# Patient Record
Sex: FEMALE | Race: WHITE | NOT HISPANIC OR LATINO | Employment: OTHER | URBAN - METROPOLITAN AREA
[De-identification: names, ages, dates, MRNs, and addresses within clinical notes are randomized per-mention and may not be internally consistent; named-entity substitution may affect disease eponyms.]

---

## 2018-01-04 ENCOUNTER — HOSPITAL ENCOUNTER (OUTPATIENT)
Dept: LAB | Facility: MEDICAL CENTER | Age: 78
End: 2018-01-04
Attending: OTOLARYNGOLOGY
Payer: MEDICARE

## 2018-01-04 LAB
BUN SERPL-MCNC: 32 MG/DL (ref 8–22)
CREAT SERPL-MCNC: 0.96 MG/DL (ref 0.5–1.4)
GFR SERPL CREATININE-BSD FRML MDRD: 56 ML/MIN/1.73 M 2

## 2018-01-04 PROCEDURE — 36415 COLL VENOUS BLD VENIPUNCTURE: CPT

## 2018-01-04 PROCEDURE — 84520 ASSAY OF UREA NITROGEN: CPT

## 2018-01-04 PROCEDURE — 82565 ASSAY OF CREATININE: CPT

## 2018-01-07 ENCOUNTER — HOSPITAL ENCOUNTER (OUTPATIENT)
Dept: RADIOLOGY | Facility: MEDICAL CENTER | Age: 78
End: 2018-01-07
Attending: OTOLARYNGOLOGY
Payer: MEDICARE

## 2018-01-07 DIAGNOSIS — R42 DIZZINESS: ICD-10-CM

## 2018-01-07 PROCEDURE — 700117 HCHG RX CONTRAST REV CODE 255: Performed by: OTOLARYNGOLOGY

## 2018-01-07 PROCEDURE — 70553 MRI BRAIN STEM W/O & W/DYE: CPT

## 2018-01-07 PROCEDURE — A9577 INJ MULTIHANCE: HCPCS | Performed by: OTOLARYNGOLOGY

## 2018-01-07 RX ADMIN — GADOBENATE DIMEGLUMINE 10 ML: 529 INJECTION, SOLUTION INTRAVENOUS at 10:49

## 2018-01-25 ENCOUNTER — PHYSICAL THERAPY (OUTPATIENT)
Dept: PHYSICAL THERAPY | Facility: REHABILITATION | Age: 78
End: 2018-01-25
Attending: OTOLARYNGOLOGY
Payer: MEDICARE

## 2018-01-25 DIAGNOSIS — R42 DIZZINESS AND GIDDINESS: ICD-10-CM

## 2018-01-25 PROCEDURE — G8978 MOBILITY CURRENT STATUS: HCPCS | Mod: CK

## 2018-01-25 PROCEDURE — 97161 PT EVAL LOW COMPLEX 20 MIN: CPT

## 2018-01-25 PROCEDURE — 97535 SELF CARE MNGMENT TRAINING: CPT

## 2018-01-25 PROCEDURE — G8979 MOBILITY GOAL STATUS: HCPCS | Mod: CI

## 2018-01-25 ASSESSMENT — ENCOUNTER SYMPTOMS
PAIN SCALE: 0
PAIN SCALE AT LOWEST: 0
PAIN SCALE AT HIGHEST: 2

## 2018-01-25 NOTE — OP THERAPY EVALUATION
Outpatient Physical Therapy  VESTIBULAR EVALUATION    [facility] Physical [place] [address]  [address]  [place] [address]  Phone:  [phone]  Fax:  [phone]    Date of Evaluation: 2018    Patient: Rashmi Parra  YOB: 1940  MRN: [id number]     Referring Provider: Aliza Pemberton M.D.  [address] [place] [address]   Referring Diagnosis: Dizziness and giddiness [R42]     Time Calculation  Start time: 1334  Stop time: 1430 Time Calculation (min): 56 minutes     Physical Therapy Occurrence Codes    Date physical therapy treatment started:  18          Chief Complaint: Loss Of Balance    Visit Diagnoses     ICD-10-CM   1. Dizziness and giddiness R42         History of Present Illness:     Mechanism of injury:  Pt presents to PT with complaint of dizziness/unsteadiness that started Spring 2017.  Sudden onset in the middle of the day after a STS.  Defines it as dizziness and having to hold onto the wall. Does not note true vertigo.  Has had nausea along with it a few times. Seems to typically come on with these transitions and with head turns.  ENG and MRI WNL.  L sided SNHL.  Over time reports the dizzy 'spells' to have become less frequent in the last few months.  She does have neck pain, for which she has had good response to PT right before the dizziness started.  Finds she does have to walk slower and be more aware of head movements.  Vision has been more blurred, no recent vision exam.     Prior level of function:  Retired. Gym 2x/week, daily walks. Started using a walking stick approx 9 mo ago.     Ear problems:  Hearing loss    Sleep disturbance:  Not disrupted  Symptoms:     Current symptom ratin    At best symptom ratin    At worst symptom ratin    Progression:  Improving  Social Support:     Lives in:  One-story house    Lives with:  Spouse ( has PD. )  Diagnostic Tests:     MRI studies: normal    Treatments:     No prior  "treatments received    Patient goals:     Patient goals for therapy:  Improved balance and independence with ADLs/IADLs      Past Medical History:   Diagnosis Date   • Anesthesia 1964    PONV in 1964   • Arthritis     osteo hands & knees   • Cataract     PE IOL   • Heart burn    • Heart murmur    • Hemorrhagic disorder (CMS-HCC)     \"thin skin\"   • Hiatus hernia syndrome    • High cholesterol    • Hypertension    • Indigestion    • Pain 2015    \"painful tooth upper L jaw\"   • Psychiatric problem 2015    anxiety   • Urinary incontinence      Past Surgical History:   Procedure Laterality Date   • KNEE ARTHROPLASTY TOTAL Right 11/2/2015    Procedure: KNEE ARTHROPLASTY TOTAL;  Surgeon: Venkatesh Cardoza M.D.;  Location: SURGERY [facility];  Service:    • OTHER ORTHOPEDIC SURGERY  1993    L ankle   • CHOLECYSTECTOMY  1964   • GYN SURGERY      hysterectomy     Social History   Substance Use Topics   • Smoking status: Former Smoker     Quit date: 1/22/1993   • Smokeless tobacco: Never Used   • Alcohol use Yes      Comment: 1 glass of wine 3-4 times a week     Family and Occupational History     Social History   • Marital status:      Spouse name: N/A   • Number of children: N/A   • Years of education: N/A       Objective:    Gait:     Assessment: difficulty with concurrent head rotation, improved gait with assistive device and unable to walk tandem gait  Vestibulospinal Exam:     MCTSIB:         Firm, eyes open: within normal limits        Firm, eyes closed: within normal limits        Foam, eyes open: within normal limits        Foam, eyes closed: 3% below normal        Composite Score: within normal limits        Comments: Notable fear of falling during testing despite nearly WNL across all conditions.  Especially fearful cond 4- pt opening eyes on first trial and listing to R consistently      Dynamic Gait Index: 20/24    Fall Risk: no  Oculomotor Exam:         Details: No spontaneous nystagmus central gaze     "      Details: No spontaneous nystagmus eccentric gaze    No saccadic eye movements    Smooth pursuit present    Convergence:        Abnormal convergence 5 cm    No skew  Active Range of Motion:     Active range of motion comments: C/S rotation R= 60, L=40 deg. Painful to the L.    Limb Ataxia Exam:     Finger-to-Nose:         Intention tremor: none    Heel-to-Shin:         Intention tremor: none    Dysdiadochokinesia: none.  Strength Exam:     Upper extremities within functional limits    Comments: Hip abd/ext 4-/5  Reflex Exam:       Deep Tendon Reflexes:         Left L4 (Patellar): normal (2+)        Right L4 (Patellar): normal (2+)  Sensation Tests:     Comments: Diminished bilateral LEs in peripheral neuropathy pattern   Vestibulo-Ocular Exam:   Normal head thrust test  BPPV Exam:     Normal [place]-Hallpike    Normal roll test    Comments: Slight motion sensitivity in return to seated from L simba hallpike.         Therapeutic Treatments and Modalities:     1. Functional Training, Self Care (CPT 66992), 10 minutes, Education- goals of PT, balance systems, testing results.  Pt to schedule a vision exam to clear this system.  Also to start back to regular walks, emphasizing UE swing and trunk rotation.         Assessment:     Functional impairments:  Gait abnormality/instability, decreased utilization of VIS/vest/somato, decreased limits of stability, pain and decreased postural stability    Assessment details:  Mrs. Parra is a 77 y.o female who presents to PT with complaint of dizziness and imbalance x approx 9 months.  PT eval suggests her symptoms to be multifactorial.  Possible L sided peripheral hypofunction related to the SNHL, but also with subacute onset of peripheral neuropathy, recent vision changes and chronic OA in the neck.  Her balance testing was only slightly abnormal; however, pt demonstrates severe guarding and fear avoidance during clinic navigation.  Skilled PT services are indicated to address the  mentioned functional limitations and enhance QOL.       Barriers to therapy:  Comorbidities    Prognosis: good    Goals:     Short term goals:    - Improve L c/s rotation 10 degrees  - No dizziness with SL to sit or STS transitions  - Normalize arm swing and trunk rotation during gait.     Short term goal time span:  2-4 weeks    Long term goals:  - Improve DHI at least 15%  - All MCTSIB measures WNL  - Pt able to ambulate community without walking stick  - Indep with HEP    Long term goal time span:  6-8 weeks  Plan:     Therapy options:  Physical therapy treatment to continue    Planned therapy interventions:  Canalith Repositioning (CPT 40440), E Stim Unattended (CPT 70880), Functional Training, Self Care (CPT 12465), Neuromuscular Re-education (CPT 75963), Mechanical Traction (CPT 03922), Manual Therapy (CPT 39882), Hot or Cold Pack Therapy (CPT 42964), Therapeutic Activities (CPT 71238) and Therapeutic Exercise (CPT 07266)    Frequency:  1x week    Duration in weeks:  8    Discussed with:  Patient      Functional Limitation G-Codes and Severity Modifiers  PT Functional Assessment Tool Used: DHI  PT Functional Assessment Score: 50%  Dizziness Handicap Inventory - Physical Items Score: 14  Dizziness Handicap Inventory - Emotional Items Score: 22  Dizziness Handicap Inventory - Functional Items Score: 14  Dizziness Handicap Inventory - Total Score: 50  Current: Mobility: Current (): CK   Goal: Mobility: Goal (): CI       Referring provider co-signature:  I have reviewed this plan of care and my co-signature certifies the need for services.  Certification Dates:   From 1/25/18     To 3/22/18    Physician Signature: ________________________________ Date: ______________

## 2018-02-06 ENCOUNTER — PHYSICAL THERAPY (OUTPATIENT)
Dept: PHYSICAL THERAPY | Facility: REHABILITATION | Age: 78
End: 2018-02-06
Attending: OTOLARYNGOLOGY
Payer: MEDICARE

## 2018-02-06 DIAGNOSIS — R42 DIZZINESS AND GIDDINESS: ICD-10-CM

## 2018-02-06 PROCEDURE — 97112 NEUROMUSCULAR REEDUCATION: CPT

## 2018-02-06 PROCEDURE — 97014 ELECTRIC STIMULATION THERAPY: CPT

## 2018-02-06 NOTE — OP THERAPY DAILY TREATMENT
Outpatient Physical Therapy  DAILY TREATMENT     [facility] Physical [address]  [address]  [place] [address]  Phone:  [phone]  Fax:  [phone]    Date: 02/06/2018    Patient: Rashmi Parra  YOB: 1940  MRN: [id number]     Time Calculation  Start time: 1130  Stop time: 1226 Time Calculation (min): 56 minutes     Chief Complaint: Vertigo and Loss Of Balance    Visit #: 2    SUBJECTIVE:  Balance seems a little better when I pay attention to arm swing.  No recent dizziness.     OBJECTIVE:    Therapeutic Treatments and Modalities:     1. Neuromuscular Re-education (CPT 06595), 25 minutes    2. E Stim Unattended (CPT 75311), 15 minutes    Therapeutic Treatment and Modalities Summary:   - Ankle sways EC  - [place] sways EO  - Heel raises (bilat x 30 and on shuttle 4C x 30)   - Gait with HTs  - Gait with EC- SBA.  Slight R list    Brief manual: SOR and CTJ rotational mobs    IFC and MH x 15 min to c/s      Pain rating before treatment: 4  Pain rating after treatment: 2    ASSESSMENT:   Response to treatment: Poor gastroc strength limits ant WS and push off.  Tx well tolerated.  Decreased c/s pain following.     PLAN/RECOMMENDATIONS:   Plan for treatment: therapy treatment to continue next visit.  Planned interventions for next visit: continue with current treatment.

## 2018-02-15 ENCOUNTER — PHYSICAL THERAPY (OUTPATIENT)
Dept: PHYSICAL THERAPY | Facility: REHABILITATION | Age: 78
End: 2018-02-15
Attending: OTOLARYNGOLOGY
Payer: MEDICARE

## 2018-02-15 DIAGNOSIS — R42 DIZZINESS AND GIDDINESS: ICD-10-CM

## 2018-02-15 PROCEDURE — 97112 NEUROMUSCULAR REEDUCATION: CPT

## 2018-02-15 PROCEDURE — 97014 ELECTRIC STIMULATION THERAPY: CPT

## 2018-02-15 NOTE — OP THERAPY DAILY TREATMENT
"  Outpatient Physical Therapy  DAILY TREATMENT     [facility] Physical [address].  [address]  [place] [address]  Phone:  [phone]  Fax:  [phone]    Date: 02/15/2018    Patient: Rashmi Parra  YOB: 1940  MRN: [id number]     Time Calculation  Start time: 1059  Stop time: 1155 Time Calculation (min): 56 minutes     Chief Complaint: Vertigo; Loss Of Balance; and Neck Problem    Visit #: 3    SUBJECTIVE:  Started using HR machine at home. Sore neck from massage yesterday.     OBJECTIVE:    Therapeutic Treatments and Modalities:     1. Neuromuscular Re-education (CPT 27608), 25 minutes    2. E Stim Unattended (CPT 54538), 15 minutes    Therapeutic Treatment and Modalities Summary:   - Ankle sways EC  - [place] sways EO  - Heel raises (on shuttle 5C x 30)   - Airex balance EC standard stance  - Ball circles  - 1/2 tandem with EC    Brief manual: SOR and CTJ rotational mobs    IFC and MH x 15 min to c/s      Pain rating before treatment: 6- R side of neck.   Pain rating after treatment: 3    ASSESSMENT:   Response to treatment: Improving understanding of how to create \"just right\" balance challenge for self at home.  Less stable on L compared to R, but improved confidence in WS.      PLAN/RECOMMENDATIONS:   Plan for treatment: therapy treatment to continue next visit.  Planned interventions for next visit: continue with current treatment. Progress balance challenges, rebounder, VORx1      "

## 2018-02-22 ENCOUNTER — PHYSICAL THERAPY (OUTPATIENT)
Dept: PHYSICAL THERAPY | Facility: REHABILITATION | Age: 78
End: 2018-02-22
Attending: OTOLARYNGOLOGY
Payer: MEDICARE

## 2018-02-22 DIAGNOSIS — R42 DIZZINESS AND GIDDINESS: ICD-10-CM

## 2018-02-22 PROCEDURE — 97112 NEUROMUSCULAR REEDUCATION: CPT

## 2018-02-22 PROCEDURE — 97140 MANUAL THERAPY 1/> REGIONS: CPT

## 2018-02-22 PROCEDURE — 97014 ELECTRIC STIMULATION THERAPY: CPT

## 2018-02-22 NOTE — OP THERAPY DAILY TREATMENT
Outpatient Physical Therapy  DAILY TREATMENT     [facility] Physical [address]  [address]  [place] [address]  Phone:  [phone]  Fax:  [phone]    Date: 02/22/2018    Patient: Rashmi Parra  YOB: 1940  MRN: [id number]     Time Calculation  Start time: 1131  Stop time: 1241 Time Calculation (min): 70 minutes     Chief Complaint: Loss Of Balance and Neck Problem    Visit #: 4    SUBJECTIVE:  No new complaints.     OBJECTIVE:      Therapeutic Treatments and Modalities:     1. Neuromuscular Re-education (CPT 09546)    2. E Stim Unattended (CPT 11562), IFC and MH to c/s    3. Manual Therapy (CPT 56274), SOR, CTJ rotational mobs, DTW B UTs/LS. R1 mobs L.      Therapeutic Treatment and Modalities Summary:   - Ankle sways EC  - [place] sways EO  - Rocker board x 2 min ea way   - Airex balance with step tap  - Airex step hold with HTs  - Gait with HT  - Bkwd gait  - [place] with HHA      Time-based treatments/modalities:  Manual therapy minutes (CPT 72376): 15 minutes  Neuromusc re-ed, balance, coor, post minutes (CPT 93623): 25 minutes       Pain rating before treatment: 4  Pain rating after treatment: 2    ASSESSMENT:   Response to treatment: Initially needing UEs for rocker board, but able to improve ankle strategy to indep by the end of session.  Sometimes self limits balance work due to fear of falling. Dizziness resolved x approx 2 weeks.     PLAN/RECOMMENDATIONS:   Plan for treatment: therapy treatment to continue next visit.  Planned interventions for next visit: Pt has 1 visit RE.  Expecting d/c .

## 2018-03-01 ENCOUNTER — PHYSICAL THERAPY (OUTPATIENT)
Dept: PHYSICAL THERAPY | Facility: REHABILITATION | Age: 78
End: 2018-03-01
Attending: OTOLARYNGOLOGY
Payer: MEDICARE

## 2018-03-01 DIAGNOSIS — R42 DIZZINESS AND GIDDINESS: ICD-10-CM

## 2018-03-01 PROCEDURE — G8979 MOBILITY GOAL STATUS: HCPCS | Mod: CI

## 2018-03-01 PROCEDURE — 97014 ELECTRIC STIMULATION THERAPY: CPT

## 2018-03-01 PROCEDURE — G8980 MOBILITY D/C STATUS: HCPCS | Mod: CI

## 2018-03-01 PROCEDURE — 97530 THERAPEUTIC ACTIVITIES: CPT

## 2018-03-01 PROCEDURE — G8978 MOBILITY CURRENT STATUS: HCPCS | Mod: CI

## 2018-03-01 NOTE — OP THERAPY DAILY TREATMENT
Outpatient Physical Therapy  DAILY TREATMENT     [facility] Physical [address].  [address]  [place] [address]  Phone:  [phone]  Fax:  [phone]    Date: 03/01/2018    Patient: Rashmi Parra  YOB: 1940  MRN: [id number]     Time Calculation  Start time: 1131  Stop time: 1223 Time Calculation (min): 52 minutes     Chief Complaint: Loss Of Balance    Visit #: 5    SUBJECTIVE:  Did a new machine on Mon/Wed- heel raises in 3 positions. Not sure why, but the L knee is hurting a lot today with any lateral or pivoting motions.     OBJECTIVE:  Current objective measures: TUG= 9 sec. MCTSIB WNL.       Therapeutic Treatments and Modalities:     1. Therapeutic Activities (CPT 80562), Review of balance HEP.  Modifications for soleus strengthening vs the machine she was using.  Obj measures as above.     2. E Stim Unattended (CPT 06988), IFC and MH to c/s 15 min    3. Manual Therapy (CPT 44892), SOR, CTJ rotational mobs, DTW B UTs/LS. R1 mobs L.      Time-based treatments/modalities:  Manual therapy minutes (CPT 90393): 10 minutes  Therapeutic activity minutes (CPT 37652): 25 minutes       ASSESSMENT:   Response to treatment: Balance WNL, low fall risk. Indep with HEP.     PLAN/RECOMMENDATIONS:   Plan for treatment: no further treatment needed.  Planned interventions for next visit: non, d/c.

## 2018-03-01 NOTE — OP THERAPY DISCHARGE SUMMARY
Outpatient Physical Therapy  DISCHARGE SUMMARY NOTE      [place] [address]  [address]  [place] [address]  Phone:  [phone]  Fax:  [phone]    Date of Visit: 03/01/2018    Patient: Rashmi Parra  YOB: 1940  MRN: [id number]     Referring Provider: Aliza Pemberton M.D.  [address] [place] [address]   Referring Diagnosis Dizziness and giddiness [R42]     Physical Therapy Occurrence Codes    Date physical therapy treatment started:  1/25/18          Functional Limitation G-Codes and Severity Modifiers  PT Functional Assessment Tool Used: MCTSIB  PT Functional Assessment Score: WNL  Goal: Mobility: Goal (): CI   Discharge: Mobility: Discharge (): CI       Your patient is being discharged from Physical Therapy with the following comments:   · Goals met    Comments:  Mrs. Parra has been seen for  5 PT sessions for her dizziness and imbalance.  Symptoms appear to be related to poor posterior chain strength, decreased vestibular control, slower balance reactions.  She now shows all balance measures WNL for age and reports infrequent dizziness.  Did have an episode last week related to lack of nutrition- short lasting.  Pt is indep with HEP.     Limitations Remaining:  New onset of L knee pain x 2 days.     Recommendations:  D/C to HEP.  Pt may call with further questions.  Thank you for the referral!    Cydney Blevins, PT, DPT    Date: 3/1/2018

## 2018-04-28 ENCOUNTER — HOSPITAL ENCOUNTER (OUTPATIENT)
Dept: RADIOLOGY | Facility: MEDICAL CENTER | Age: 78
End: 2018-04-28
Attending: OBSTETRICS & GYNECOLOGY
Payer: MEDICARE

## 2018-04-28 DIAGNOSIS — Z12.31 VISIT FOR SCREENING MAMMOGRAM: ICD-10-CM

## 2018-04-28 PROCEDURE — 77067 SCR MAMMO BI INCL CAD: CPT

## 2018-12-28 ENCOUNTER — HOSPITAL ENCOUNTER (OUTPATIENT)
Dept: RADIOLOGY | Facility: MEDICAL CENTER | Age: 78
End: 2018-12-28
Attending: OBSTETRICS & GYNECOLOGY
Payer: MEDICARE

## 2018-12-28 DIAGNOSIS — Z01.812 PRE-OPERATIVE LABORATORY EXAMINATION: ICD-10-CM

## 2018-12-28 DIAGNOSIS — Z01.811 PRE-OPERATIVE RESPIRATORY EXAMINATION: ICD-10-CM

## 2018-12-28 DIAGNOSIS — Z01.810 PRE-OPERATIVE CARDIOVASCULAR EXAMINATION: ICD-10-CM

## 2018-12-28 LAB
ANION GAP SERPL CALC-SCNC: 8 MMOL/L (ref 0–11.9)
BASOPHILS # BLD AUTO: 0.3 % (ref 0–1.8)
BASOPHILS # BLD: 0.02 K/UL (ref 0–0.12)
BUN SERPL-MCNC: 22 MG/DL (ref 8–22)
CALCIUM SERPL-MCNC: 9.6 MG/DL (ref 8.5–10.5)
CHLORIDE SERPL-SCNC: 102 MMOL/L (ref 96–112)
CO2 SERPL-SCNC: 27 MMOL/L (ref 20–33)
CREAT SERPL-MCNC: 0.9 MG/DL (ref 0.5–1.4)
EKG IMPRESSION: NORMAL
EOSINOPHIL # BLD AUTO: 0.07 K/UL (ref 0–0.51)
EOSINOPHIL NFR BLD: 1.1 % (ref 0–6.9)
ERYTHROCYTE [DISTWIDTH] IN BLOOD BY AUTOMATED COUNT: 48.1 FL (ref 35.9–50)
EST. AVERAGE GLUCOSE BLD GHB EST-MCNC: 166 MG/DL
GLUCOSE SERPL-MCNC: 129 MG/DL (ref 65–99)
HBA1C MFR BLD: 7.4 % (ref 0–5.6)
HCT VFR BLD AUTO: 46.2 % (ref 37–47)
HGB BLD-MCNC: 15.2 G/DL (ref 12–16)
IMM GRANULOCYTES # BLD AUTO: 0.02 K/UL (ref 0–0.11)
IMM GRANULOCYTES NFR BLD AUTO: 0.3 % (ref 0–0.9)
LYMPHOCYTES # BLD AUTO: 1.1 K/UL (ref 1–4.8)
LYMPHOCYTES NFR BLD: 17.5 % (ref 22–41)
MCH RBC QN AUTO: 31.7 PG (ref 27–33)
MCHC RBC AUTO-ENTMCNC: 32.9 G/DL (ref 33.6–35)
MCV RBC AUTO: 96.3 FL (ref 81.4–97.8)
MONOCYTES # BLD AUTO: 0.63 K/UL (ref 0–0.85)
MONOCYTES NFR BLD AUTO: 10 % (ref 0–13.4)
NEUTROPHILS # BLD AUTO: 4.45 K/UL (ref 2–7.15)
NEUTROPHILS NFR BLD: 70.8 % (ref 44–72)
NRBC # BLD AUTO: 0 K/UL
NRBC BLD-RTO: 0 /100 WBC
PLATELET # BLD AUTO: 247 K/UL (ref 164–446)
PMV BLD AUTO: 10.2 FL (ref 9–12.9)
POTASSIUM SERPL-SCNC: 4.7 MMOL/L (ref 3.6–5.5)
RBC # BLD AUTO: 4.8 M/UL (ref 4.2–5.4)
SODIUM SERPL-SCNC: 137 MMOL/L (ref 135–145)
WBC # BLD AUTO: 6.3 K/UL (ref 4.8–10.8)

## 2018-12-28 PROCEDURE — 80048 BASIC METABOLIC PNL TOTAL CA: CPT

## 2018-12-28 PROCEDURE — 85025 COMPLETE CBC W/AUTO DIFF WBC: CPT

## 2018-12-28 PROCEDURE — 93010 ELECTROCARDIOGRAM REPORT: CPT | Performed by: INTERNAL MEDICINE

## 2018-12-28 PROCEDURE — 71046 X-RAY EXAM CHEST 2 VIEWS: CPT

## 2018-12-28 PROCEDURE — 36415 COLL VENOUS BLD VENIPUNCTURE: CPT

## 2018-12-28 PROCEDURE — 83036 HEMOGLOBIN GLYCOSYLATED A1C: CPT

## 2018-12-28 PROCEDURE — 93005 ELECTROCARDIOGRAM TRACING: CPT

## 2018-12-28 RX ORDER — POLYETHYLENE GLYCOL 3350 17 G/17G
17 POWDER, FOR SOLUTION ORAL DAILY
Status: ON HOLD | COMMUNITY
End: 2019-07-17

## 2019-01-21 ENCOUNTER — HOSPITAL ENCOUNTER (OUTPATIENT)
Facility: MEDICAL CENTER | Age: 79
End: 2019-01-21
Attending: OBSTETRICS & GYNECOLOGY | Admitting: OBSTETRICS & GYNECOLOGY
Payer: MEDICARE

## 2019-01-21 ENCOUNTER — APPOINTMENT (OUTPATIENT)
Dept: RADIOLOGY | Facility: MEDICAL CENTER | Age: 79
End: 2019-01-21
Attending: ANESTHESIOLOGY
Payer: MEDICARE

## 2019-01-21 VITALS
OXYGEN SATURATION: 92 % | WEIGHT: 171.08 LBS | BODY MASS INDEX: 29.21 KG/M2 | TEMPERATURE: 97.2 F | SYSTOLIC BLOOD PRESSURE: 110 MMHG | RESPIRATION RATE: 16 BRPM | HEIGHT: 64 IN | HEART RATE: 94 BPM | DIASTOLIC BLOOD PRESSURE: 65 MMHG

## 2019-01-21 LAB — GLUCOSE BLD-MCNC: 153 MG/DL (ref 65–99)

## 2019-01-21 PROCEDURE — 501330 HCHG SET, CYSTO IRRIG TUBING: Performed by: OBSTETRICS & GYNECOLOGY

## 2019-01-21 PROCEDURE — 160029 HCHG SURGERY MINUTES - 1ST 30 MINS LEVEL 4: Performed by: OBSTETRICS & GYNECOLOGY

## 2019-01-21 PROCEDURE — 160041 HCHG SURGERY MINUTES - EA ADDL 1 MIN LEVEL 4: Performed by: OBSTETRICS & GYNECOLOGY

## 2019-01-21 PROCEDURE — 110454 HCHG SHELL REV 250: Performed by: OBSTETRICS & GYNECOLOGY

## 2019-01-21 PROCEDURE — 700101 HCHG RX REV CODE 250

## 2019-01-21 PROCEDURE — C1771 REP DEV, URINARY, W/SLING: HCPCS | Performed by: OBSTETRICS & GYNECOLOGY

## 2019-01-21 PROCEDURE — 160035 HCHG PACU - 1ST 60 MINS PHASE I: Performed by: OBSTETRICS & GYNECOLOGY

## 2019-01-21 PROCEDURE — 501838 HCHG SUTURE GENERAL: Performed by: OBSTETRICS & GYNECOLOGY

## 2019-01-21 PROCEDURE — A4314 CATH W/DRAINAGE 2-WAY LATEX: HCPCS | Performed by: OBSTETRICS & GYNECOLOGY

## 2019-01-21 PROCEDURE — 82962 GLUCOSE BLOOD TEST: CPT

## 2019-01-21 PROCEDURE — 160046 HCHG PACU - 1ST 60 MINS PHASE II: Performed by: OBSTETRICS & GYNECOLOGY

## 2019-01-21 PROCEDURE — 500892 HCHG PACK, PERI-GYN: Performed by: OBSTETRICS & GYNECOLOGY

## 2019-01-21 PROCEDURE — A9270 NON-COVERED ITEM OR SERVICE: HCPCS | Performed by: ANESTHESIOLOGY

## 2019-01-21 PROCEDURE — 160047 HCHG PACU  - EA ADDL 30 MINS PHASE II: Performed by: OBSTETRICS & GYNECOLOGY

## 2019-01-21 PROCEDURE — 71045 X-RAY EXAM CHEST 1 VIEW: CPT

## 2019-01-21 PROCEDURE — 160009 HCHG ANES TIME/MIN: Performed by: OBSTETRICS & GYNECOLOGY

## 2019-01-21 PROCEDURE — 160048 HCHG OR STATISTICAL LEVEL 1-5: Performed by: OBSTETRICS & GYNECOLOGY

## 2019-01-21 PROCEDURE — 160025 RECOVERY II MINUTES (STATS): Performed by: OBSTETRICS & GYNECOLOGY

## 2019-01-21 PROCEDURE — 700111 HCHG RX REV CODE 636 W/ 250 OVERRIDE (IP)

## 2019-01-21 PROCEDURE — 700102 HCHG RX REV CODE 250 W/ 637 OVERRIDE(OP): Performed by: ANESTHESIOLOGY

## 2019-01-21 PROCEDURE — 160002 HCHG RECOVERY MINUTES (STAT): Performed by: OBSTETRICS & GYNECOLOGY

## 2019-01-21 PROCEDURE — 160036 HCHG PACU - EA ADDL 30 MINS PHASE I: Performed by: OBSTETRICS & GYNECOLOGY

## 2019-01-21 DEVICE — SLING TRANSOBTURATOR CURVED SYSTEM: Type: IMPLANTABLE DEVICE | Status: FUNCTIONAL

## 2019-01-21 RX ORDER — METOPROLOL TARTRATE 1 MG/ML
1 INJECTION, SOLUTION INTRAVENOUS
Status: DISCONTINUED | OUTPATIENT
Start: 2019-01-21 | End: 2019-01-21 | Stop reason: HOSPADM

## 2019-01-21 RX ORDER — HYDROMORPHONE HYDROCHLORIDE 1 MG/ML
0.4 INJECTION, SOLUTION INTRAMUSCULAR; INTRAVENOUS; SUBCUTANEOUS
Status: DISCONTINUED | OUTPATIENT
Start: 2019-01-21 | End: 2019-01-21 | Stop reason: HOSPADM

## 2019-01-21 RX ORDER — OXYCODONE HCL 5 MG/5 ML
10 SOLUTION, ORAL ORAL
Status: DISCONTINUED | OUTPATIENT
Start: 2019-01-21 | End: 2019-01-21 | Stop reason: HOSPADM

## 2019-01-21 RX ORDER — SODIUM CHLORIDE, SODIUM LACTATE, POTASSIUM CHLORIDE, CALCIUM CHLORIDE 600; 310; 30; 20 MG/100ML; MG/100ML; MG/100ML; MG/100ML
1000 INJECTION, SOLUTION INTRAVENOUS
Status: DISCONTINUED | OUTPATIENT
Start: 2019-01-21 | End: 2019-01-21 | Stop reason: HOSPADM

## 2019-01-21 RX ORDER — VANCOMYCIN HYDROCHLORIDE 500 MG/10ML
INJECTION, POWDER, LYOPHILIZED, FOR SOLUTION INTRAVENOUS
Status: COMPLETED | OUTPATIENT
Start: 2019-01-21 | End: 2019-01-21

## 2019-01-21 RX ORDER — OXYCODONE HCL 5 MG/5 ML
5 SOLUTION, ORAL ORAL
Status: DISCONTINUED | OUTPATIENT
Start: 2019-01-21 | End: 2019-01-21 | Stop reason: HOSPADM

## 2019-01-21 RX ORDER — DULOXETIN HYDROCHLORIDE 60 MG/1
60 CAPSULE, DELAYED RELEASE ORAL DAILY
Status: ON HOLD | COMMUNITY
End: 2019-08-16

## 2019-01-21 RX ORDER — BUPIVACAINE HYDROCHLORIDE AND EPINEPHRINE 2.5; 5 MG/ML; UG/ML
INJECTION, SOLUTION EPIDURAL; INFILTRATION; INTRACAUDAL; PERINEURAL
Status: DISCONTINUED | OUTPATIENT
Start: 2019-01-21 | End: 2019-01-21 | Stop reason: HOSPADM

## 2019-01-21 RX ORDER — DOCUSATE SODIUM 100 MG/1
100 CAPSULE, LIQUID FILLED ORAL
COMMUNITY
End: 2021-05-06

## 2019-01-21 RX ORDER — ONDANSETRON 2 MG/ML
4 INJECTION INTRAMUSCULAR; INTRAVENOUS
Status: DISCONTINUED | OUTPATIENT
Start: 2019-01-21 | End: 2019-01-21 | Stop reason: HOSPADM

## 2019-01-21 RX ORDER — MAGNESIUM HYDROXIDE 1200 MG/15ML
LIQUID ORAL
Status: DISCONTINUED | OUTPATIENT
Start: 2019-01-21 | End: 2019-01-21 | Stop reason: HOSPADM

## 2019-01-21 RX ORDER — ACETAMINOPHEN 500 MG
1000 TABLET ORAL ONCE
Status: DISCONTINUED | OUTPATIENT
Start: 2019-01-21 | End: 2019-01-21 | Stop reason: HOSPADM

## 2019-01-21 RX ORDER — HYDROMORPHONE HYDROCHLORIDE 1 MG/ML
0.2 INJECTION, SOLUTION INTRAMUSCULAR; INTRAVENOUS; SUBCUTANEOUS
Status: DISCONTINUED | OUTPATIENT
Start: 2019-01-21 | End: 2019-01-21 | Stop reason: HOSPADM

## 2019-01-21 RX ORDER — GENTAMICIN SULFATE 40 MG/ML
INJECTION, SOLUTION INTRAMUSCULAR; INTRAVENOUS
Status: DISCONTINUED | OUTPATIENT
Start: 2019-01-21 | End: 2019-01-21 | Stop reason: HOSPADM

## 2019-01-21 RX ORDER — CHOLECALCIFEROL (VITAMIN D3) 125 MCG
5 CAPSULE ORAL
COMMUNITY
End: 2024-01-31

## 2019-01-21 RX ORDER — PROMETHAZINE HYDROCHLORIDE 25 MG/1
12.5 SUPPOSITORY RECTAL EVERY 4 HOURS PRN
Status: DISCONTINUED | OUTPATIENT
Start: 2019-01-21 | End: 2019-01-21 | Stop reason: HOSPADM

## 2019-01-21 RX ORDER — HYDROMORPHONE HYDROCHLORIDE 1 MG/ML
0.1 INJECTION, SOLUTION INTRAMUSCULAR; INTRAVENOUS; SUBCUTANEOUS
Status: DISCONTINUED | OUTPATIENT
Start: 2019-01-21 | End: 2019-01-21 | Stop reason: HOSPADM

## 2019-01-21 RX ORDER — HYDRALAZINE HYDROCHLORIDE 20 MG/ML
5 INJECTION INTRAMUSCULAR; INTRAVENOUS
Status: DISCONTINUED | OUTPATIENT
Start: 2019-01-21 | End: 2019-01-21 | Stop reason: HOSPADM

## 2019-01-21 RX ORDER — HALOPERIDOL 5 MG/ML
1 INJECTION INTRAMUSCULAR
Status: DISCONTINUED | OUTPATIENT
Start: 2019-01-21 | End: 2019-01-21 | Stop reason: HOSPADM

## 2019-01-21 RX ORDER — DIPHENHYDRAMINE HYDROCHLORIDE 50 MG/ML
12.5 INJECTION INTRAMUSCULAR; INTRAVENOUS
Status: DISCONTINUED | OUTPATIENT
Start: 2019-01-21 | End: 2019-01-21 | Stop reason: HOSPADM

## 2019-01-21 RX ORDER — CELECOXIB 200 MG/1
200 CAPSULE ORAL ONCE
Status: DISCONTINUED | OUTPATIENT
Start: 2019-01-21 | End: 2019-01-21 | Stop reason: HOSPADM

## 2019-01-21 RX ORDER — ESTRADIOL 0.1 MG/G
CREAM VAGINAL DAILY
Status: ON HOLD | COMMUNITY
End: 2019-07-17

## 2019-01-21 RX ADMIN — Medication 2.5 MG: at 09:45

## 2019-01-21 RX ADMIN — ALBUTEROL SULFATE 2.5 MG: 2.5 SOLUTION RESPIRATORY (INHALATION) at 09:45

## 2019-01-21 ASSESSMENT — PAIN SCALES - GENERAL
PAINLEVEL_OUTOF10: 3
PAINLEVEL_OUTOF10: 0
PAINLEVEL_OUTOF10: 3
PAINLEVEL_OUTOF10: 4
PAINLEVEL_OUTOF10: 2
PAINLEVEL_OUTOF10: 3
PAINLEVEL_OUTOF10: 2
PAINLEVEL_OUTOF10: 3
PAINLEVEL_OUTOF10: 0
PAINLEVEL_OUTOF10: 0

## 2019-01-21 NOTE — PROGRESS NOTES
Patient up to bathroom after instilling 300cc of NS.  Patient voided.  Bladder Scan showed she had 139 cc in her bladder.  Replaced rausch catheter.  Gave instructions on leg bag and large rausch bag.  Patient's questions answered.    at bedside.

## 2019-01-21 NOTE — OR NURSING
Patient A+Ox4. States she has hx of emphysema.  Does nnot wear home o2.  Sats 97% on rm air pre-op. Post op rm air sats to 70's. .  Dr Williamson updated.  To give patient Albuterol Breathing TX and recheck.  Cont to monitor

## 2019-01-21 NOTE — OR NURSING
CXR done per Dr Morgan.  Awaiting plan of care,.  Patient sats on rm air after treatment 84%.  Placed back on o2 2l/nc.  Cont to monitor

## 2019-01-21 NOTE — H&P
DATE OF OPERATION:  01/21/2019    CHIEF COMPLAINT:  Prolapse including cystocele, enterocele and rectocele,   difficulty urinating, pain and urgency incontinence.    HISTORY OF PRESENT ILLNESS:  The patient is a 78-year-old G3, P3, status post   hysterectomy with BSO in 1993.  The patient presents to our office as a   referral from Eliz Braun for evaluation and treatment of pelvic support   problems.  The patient states that she has tried a pessary in the past.  She   has tried of them, they have all fallen out.  The prolapse itself is not very   bothersome, but her urinary incontinence is very bothersome.  She wants the   urinary incontinence fixed more so than the prolapse.  She does complain of   urgency incontinence and was seen by Kassidy and had urethral hypermobility and   was able to empty her bladder.  She was placed on Myrbetriq and this helped   some of the urgency and frequency, but she still has urge incontinence.  She   wears pads or depends all the time.  She denies stress urinary incontinence.    Her bowels are firm.  She takes stool softeners.  She denies any splinting.    She is not sexually active.  Her  has Parkinson's disease.  On exam,   the patient has a large cystocele and large enterocele, which bulges beyond   the introitus with Valsalva.  She has a moderate rectocele with good perineal   support.  After discussion of the treatment options, the patient would like to   proceed with low LeFort colpocleisis, transobturator tape urethral sling and   perineorrhaphy if necessary.  Risks, benefits, alternatives and procedure were   discussed with the patient in detail and she agrees to proceed.    PAST MEDICAL HISTORY:  Diabetes, irritable bowel syndrome, high blood   pressure, depression, anxiety, mitral valve disorders, history of pneumonia,   GERD.    MEDICATIONS:  Dexilant 60 mg, lovastatin 20 mg, carvedilol 6.5 mg, metformin    mg, spironolactone 25 mg, duloxetine 60 mg,  Myrbetriq 50 mg, and   supplements.    PAST SURGICAL HISTORY:  Hysterectomy with BSO in , gallbladder in ,   tonsils in 1944.    MENSTRUAL HISTORY:  The patient underwent menarche at age 11, menopause at age   53.    OBSTETRICAL HISTORY:  She has had 3 vaginal deliveries, largest weighing 6   pounds 6 ounces and was delivered in .    FAMILY HISTORY:  Her father  at age 62 from a heart attack.  Her mother    at age 46 from colon and liver cancer.  Otherwise noncontributory.    SOCIAL HISTORY:  The patient smoked a pack a day for 36 years, but quit 24   years ago.  Alcohol use, drinks a glass of wine 5-6 days a week.    ALLERGIES:  No known drug allergies.    REVIEW OF SYSTEMS:  Noncontributory.    PHYSICAL EXAMINATION:  VITAL SIGNS:  The patient's blood pressure is 90/60, her weight is 169, height   is 5 feet 3 inches, BMI is 30.  Please see EPIC vitals.  HEENT:  Within normal limits.  NECK:  Supple, without lymphadenopathy or thyromegaly.  CARDIOVASCULAR:  Regular rate and rhythm.  LUNGS:  Clear to auscultation.  BACK:  No CVA tenderness.  ABDOMEN:  Soft and nontender, nondistended.  No masses are palpable.  PELVIC:  EGBUS is without lesions.  The vagina is atrophic and there is a   bulge, which extends beyond the introitus with Valsalva consistent with a   cystocele and enterocele.  There is mild-to-moderate rectocele with good   perineal support.  There is laxity at the UV junction and she does leak with   Valsalva.  EXTREMITIES:  Benign.    ASSESSMENT:  1.  A 78-year-old G3, P3, status post total abdominal hysterectomy bilateral   salpingo-oophorectomy in .  2.  Not on hormone replacement therapy.  3.  Diabetes, high blood pressure, gastroesophageal reflux disease, anxiety   depression, irritable bowel syndrome.  4.  Pelvic support problems including large cystocele and enterocele, mild to   moderate rectocele, urgency incontinence, urgency and frequency better on   Myrbetriq, failed  treatment with pessary, vaginal atrophy, not sexually   active, and without desire to be in the future.    PLAN:  The patient is scheduled for LeFort colpocleisis, transobturator tape   urethral sling and perineorrhaphy if necessary.  Risks, benefits, alternatives   and procedure were discussed with the patient in detail and she agrees to   proceed.  Preoperative labs will be obtained.  Preoperative antibiotics will   be administered.  If she has any problems or questions, she can contact my   office.  She was given a prescription for Norco 5/325, #30; and Zofran 8 mg,   #10.       ____________________________________     Minnie Bañuelos MD    EAH / NTS    DD:  01/20/2019 16:50:49  DT:  01/20/2019 17:16:09    D#:  [id number]  Job#:  [id number]    cc: TRINITY SILVEIRA, NAKUL OROZCO MD

## 2019-01-21 NOTE — OP REPORT
DATE OF SERVICE:  01/21/2019    PREOPERATIVE DIAGNOSES:  Prolapse, cystocele, enterocele, mild rectocele,   difficulty emptying her bladder, occult stress urinary incontinence.    POSTOPERATIVE DIAGNOSES:  Prolapse, cystocele, enterocele, mild rectocele,   difficulty emptying her bladder, occult stress urinary incontinence.    PROCEDURES:  [place] colpocleisis, a transobturator tape urethral sling using   [employer] Obtryx product and cystoscopy.    SURGEON:  Minnie Bañuelos MD    ASSISTANT:  MIGUE Amaral    ANESTHESIOLOGIST:  Inderjit Felix MD    ANESTHESIA:  General LMA.    ESTIMATED BLOOD LOSS:  20 mL.    SPECIMEN:  None.    FINDINGS AT THE TIME OF SURGERY:  Exam under anesthesia reveals a large   enterocele protruding beyond the introitus and a moderate-to-large cystocele   included in this prolapse.  There is a mild rectocele noted and good perineal   support.  Post-procedure cystoscopy was normal with good efflux of urine   bilaterally and no interruption in the bladder mucosa.  Excellent support was   achieved postoperatively.    COMPLICATIONS:  None.    TECHNIQUE:  Patient was taken to the operating room where general anesthesia   was placed.  She was then placed in the [facility] with excellent   positioning, prepped and draped in the normal sterile fashion.  The apex of   the vagina was grasped with 2 Allis clamps and tension applied.  The anterior   wall of the vagina rectangle was outlined in this area as well as posteriorly   0.25% Marcaine with epinephrine was then injected into the vaginal mucosa,   especially in and around that rectangular area.  The same was done   posteriorly.  The scalpel was then used to outline the rectangles both   anteriorly and posteriorly and Metzenbaum scissors were then used to dissect   the vaginal mucosa off the underlying tissue inside of this rectangular area,   both anteriorly and posteriorly.  Once this was complete, the 0 Vicryl was    then used to imbricate the prolapse up board starting at the angles and in an   anterior to posterior fashion imbricating the tissue upward and approximating   the 2 raw surface areas made anteriorly and posteriorly.  Once the mucosa   matched up at the top, I then used 0 Vicryl to reapproximate the vaginal   mucosa.  Excellent reduction in the prolapse was achieved.  Attention was then   turned to the transobturator tape urethral sling procedure.  An Allis clamp   was used to grasp the vaginal mucosa underlying the mid urethra and 0.25%   Marcaine with epinephrine was then injected into this area and out laterally   on each side.  A 1 cm incision was made with the scalpel and Metzenbaum   scissors were then used to dissect out laterally on each side, allowing entry   of my pinky finger just behind the pubic ramus.  The patient's legs were put   in a low lithotomy position.  The adductor insertion was palpated.  A cammy was   made 1 cm below on each side, 0.25% Marcaine with epinephrine was then   injected into this area and the Gaff needle was introduced through the lateral   incision through the transobturator fascia around the pubic ramus touching my   pinky finger and brought through periurethrally on each side.  The Obtryx   graft was then attached to these Gaff needles and brought out through the   lateral incisions, creating a hammock effect below the mid urethra.  An 8 mm   dilator was then placed above the graft and below the urethra to ensure   tension free positioning.  The plastic sheath was then removed from the graft   and the graft was noted to be underlying the mid urethra in a tension freeway.    Irrigation was then performed.  Surgiflo was placed into the dissected areas   and 3-0 Vicryl was used to close the vaginal mucosa in a running fashion.    Excellent reapproximation was achieved.  Irrigation was performed.  There was   no bleeding.  Cystoscopic examination of the bladder revealed a normal  bladder   with good efflux of urine bilaterally and no interruption in the bladder   mucosa.  The cystoscope was removed.  Melissa catheter replaced.  The lateral   incisions from the TOT were reapproximated with Dermabond.  Excellent   reapproximation was achieved.  There was no reason for a perineorrhaphy as the   patient had excellent support and so this was not performed.  The patient   tolerated the procedure well and was brought to recovery room in stable   condition.       ____________________________________     MD BRIDGETTE Romero / KHURRAM    DD:  01/21/2019 09:03:03  DT:  01/21/2019 09:21:27    D#:  [id number]  Job#:  [id number]

## 2019-01-21 NOTE — OR SURGEON
Immediate Post OP Note    PreOp Diagnosis: Prolapse, cystocele, enterocele, mild rectocele, difficulty emptying bladder, occult JANUARY    PostOp Diagnosis: Same    Procedure(s):  [place] COLPOCLESIS - Wound Class: Clean Contaminated  BLADDER SLING FEMALE- TOT - Wound Class: Clean Contaminated  CYSTOSCOPY    Surgeon(s):  Minnie Bañuelos M.D.    Anesthesiologist/Type of Anesthesia:  Anesthesiologist: Inderjit Felix M.D./General    Surgical Staff:  Assistant: Marshall Lambert R.N.  Circulator: Cholo Watkins R.N.; Any Henderson R.N.  Scrub Person: Neisha Lo    Specimens removed if any:  * No specimens in log *    Estimated Blood Loss: 20 CC    Findings: Large enterocele protruding beyond the introitus, large cystocele, mild rectocele, great support achieved with colopocleisis, cystoscopy normal postop, no perneorrhaphy needed postop    Complications: None        1/21/2019 8:53 AM Minnie Bañuelos M.D.

## 2019-01-21 NOTE — OR NURSING
Patient sats mid 90's on room air.  Dr Felix updated.  Patient states she feels better.  Still some soreness in lower abd but it is tolerable.  Plan to discharge home ok with MD.

## 2019-01-21 NOTE — DISCHARGE INSTRUCTIONS
Surgery after care  Refer to this sheet in the next few weeks. These instructions provide you with information on caring for yourself after your procedure. Your health care provider may also give you more specific instructions. Your treatment has been planned according to current medical practices, but problems sometimes occur. Call your health care provider if you have any problems or questions after your procedure.  WHAT TO EXPECT AFTER THE PROCEDURE   After your procedure, it is typical to have the following:  · Vaginal discomfort that can be relieved by pain medicines.  · Vaginal spotting.  HOME CARE INSTRUCTIONS   · Follow your health care provider's instructions regarding diet, rest, driving, and general activities.  · Do not lift anything over 5 lb (2.3 kg) until your health care provider approves.  · Only take over-the-counter or prescription medicines as directed by your health care provider.  · Do not take aspirin. It can cause bleeding.  · You may take a laxative as directed by your health care provider.  · You may take warm sitz baths 2-3 times a day to reduce swelling and discomfort.  ¨ Fill the bathtub half full with warm water.  ¨ Sit in the water and open the drain a little.  ¨ Turn on the warm water to keep the tub half full. Keep the water running constantly.  ¨ Soak in the water for 15-20 minutes.  ¨ After the sitz bath, pat the affected area dry first.  · Follow up with your health care provider as directed.  SEEK MEDICAL CARE IF:   · You have a fever.  · You begin to bleed from the wound area.  · You develop bloody or painful urination.  · You develop abdominal pain.  · You have increasing pain in the affected area.  · You have redness or swelling in the affected area.  · You have heavy drainage or pus coming from the affected area.  · You develop a rash.  · You think the stitches (sutures) in your wound are breaking.  · You have nausea or diarrhea.  · You become constipated.  SEEK  IMMEDIATE MEDICAL CARE IF:   · You develop shortness of breath.  · You develop leg or chest pain.  · You faint.     This information is not intended to replace advice given to you by your health care provider. Make sure you discuss any questions you have with your health care provider.   Melissa Catheter Care, Adult  A Melissa catheter is a soft, flexible tube that is placed into the bladder to drain urine. A Melissa catheter may be inserted if:  · You leak urine or are not able to control when you urinate (urinary incontinence).  · You are not able to urinate when you need to (urinary retention).  · You had prostate surgery or surgery on the genitals.  · You have certain medical conditions, such as multiple sclerosis, dementia, or a spinal cord injury.  If you are going home with a Melissa catheter in place, follow the instructions below.  TAKING CARE OF THE CATHETER  1. Wash your hands with soap and water.  2. Using mild soap and warm water on a clean washcloth:  ¨ Clean the area on your body closest to the catheter insertion site using a circular motion, moving away from the catheter. Never wipe toward the catheter because this could sweep bacteria up into the urethra and cause infection.  ¨ Remove all traces of soap. Pat the area dry with a clean towel. For males, reposition the foreskin.  3. Attach the catheter to your leg so there is no tension on the catheter. Use adhesive tape or a leg strap. If you are using adhesive tape, remove any sticky residue left behind by the previous tape you used.  4. Keep the drainage bag below the level of the bladder, but keep it off the floor.  5. Check throughout the day to be sure the catheter is working and urine is draining freely. Make sure the tubing does not become kinked.  6. Do not pull on the catheter or try to remove it. Pulling could damage internal tissues.  TAKING CARE OF THE DRAINAGE BAGS  You will be given two drainage bags to take home. One is a large overnight  drainage bag, and the other is a smaller leg bag that fits underneath clothing. You may wear the overnight bag at any time, but you should never wear the smaller leg bag at night. Follow the instructions below for how to empty, change, and clean your drainage bags.  Emptying the Drainage Bag  You must empty your drainage bag when it is  -½ full or at least 2-3 times a day.  1. Wash your hands with soap and water.  2. Keep the drainage bag below your hips, below the level of your bladder. This stops urine from going back into the tubing and into your bladder.  3. Hold the dirty bag over the toilet or a clean container.  4. Open the pour spout at the bottom of the bag and empty the urine into the toilet or container. Do not let the pour spout touch the toilet, container, or any other surface. Doing so can place bacteria on the bag, which can cause an infection.  5. Clean the pour spout with a gauze pad or cotton ball that has rubbing alcohol on it.  6. Close the pour spout.  7. Attach the bag to your leg with adhesive tape or a leg strap.  8. Wash your hands well.  Changing the Drainage Bag  Change your drainage bag once a month or sooner if it starts to smell bad or look dirty. Below are steps to follow when changing the drainage bag.  1. Wash your hands with soap and water.  2. Pinch off the rubber catheter so that urine does not spill out.  3. Disconnect the catheter tube from the drainage tube at the connection valve. Do not let the tubes touch any surface.  4. Clean the end of the catheter tube with an alcohol wipe. Use a different alcohol wipe to clean the end of the drainage tube.  5. Connect the catheter tube to the drainage tube of the clean drainage bag.  6. Attach the new bag to the leg with adhesive tape or a leg strap. Avoid attaching the new bag too tightly.  7. Wash your hands well.  Cleaning the Drainage Bag  1. Wash your hands with soap and water.  2. Wash the bag in warm, soapy water.  3. Rinse the  bag thoroughly with warm water.  4. Fill the bag with a solution of white vinegar and water (1 cup vinegar to 1 qt warm water [.2 L vinegar to 1 L warm water]). Close the bag and soak it for 30 minutes in the solution.  5. Rinse the bag with warm water.  6. Hang the bag to dry with the pour spout open and hanging downward.  7. Store the clean bag (once it is dry) in a clean plastic bag.  8. Wash your hands well.  PREVENTING INFECTION  · Wash your hands before and after handling your catheter.  · Take showers daily and wash the area where the catheter enters your body. Do not take baths. Replace wet leg straps with dry ones, if this applies.  · Do not use powders, sprays, or lotions on the genital area. Only use creams, lotions, or ointments as directed by your caregiver.  · For females, wipe from front to back after each bowel movement.  · Drink enough fluids to keep your urine clear or pale yellow unless you have a fluid restriction.  · Do not let the drainage bag or tubing touch or lie on the floor.  · Wear cotton underwear to absorb moisture and to keep your .  SEEK MEDICAL CARE IF:   · Your urine is cloudy or smells unusually bad.  · Your catheter becomes clogged.  · You are not draining urine into the bag or your bladder feels full.  · Your catheter starts to leak.  SEEK IMMEDIATE MEDICAL CARE IF:   · You have pain, swelling, redness, or pus where the catheter enters the body.  · You have pain in the abdomen, legs, lower back, or bladder.  · You have a fever.  · You see blood fill the catheter, or your urine is pink or red.  · You have nausea, vomiting, or chills.  · Your catheter gets pulled out.  MAKE SURE YOU:   · Understand these instructions.  · Will watch your condition.  · Will get help right away if you are not doing well or get worse.     This information is not intended to replace advice given to you by your health care provider. Make sure you discuss any questions you have with your health  care provider.     Document Released: 12/18/2006 Document Revised: 05/04/2015 Document Reviewed: 12/09/2013  [employer] Interactive Patient Education ©2016 [employer]      ACTIVITY: Rest and take it easy for the first 24 hours.  A responsible adult is recommended to remain with you during that time.  It is normal to feel sleepy.  We encourage you to not do anything that requires balance, judgment or coordination.    MILD FLU-LIKE SYMPTOMS ARE NORMAL. YOU MAY EXPERIENCE GENERALIZED MUSCLE ACHES, THROAT IRRITATION, HEADACHE AND/OR SOME NAUSEA.    FOR 24 HOURS DO NOT:  Drive, operate machinery or run household appliances.  Drink beer or alcoholic beverages.   Make important decisions or sign legal documents.    SPECIAL INSTRUCTIONS: *Follow Dr. See instructions**    DIET: To avoid nausea, slowly advance diet as tolerated, avoiding spicy or greasy foods for the first day.  Add more substantial food to your diet according to your physician's instructions.  Babies can be fed formula or breast milk as soon as they are hungry.  INCREASE FLUIDS AND FIBER TO AVOID CONSTIPATION.    SURGICAL DRESSING/BATHING: *Ok to shower in 24-48 hours**    FOLLOW-UP APPOINTMENT:  A follow-up appointment should be arranged with your doctor in 1-2 weeks; call to schedule.    You should CALL YOUR PHYSICIAN if you develop:  Fever greater than 101 degrees F.  Pain not relieved by medication, or persistent nausea or vomiting.  Excessive bleeding (blood soaking through dressing) or unexpected drainage from the wound.  Extreme redness or swelling around the incision site, drainage of pus or foul smelling drainage.  Inability to urinate or empty your bladder within 8 hours.  Problems with breathing or chest pain.    You should call 911 if you develop problems with breathing or chest pain.  If you are unable to contact your doctor or surgical center, you should go to the nearest emergency room or urgent care center.  Physician's telephone #:  [phone]  Dr. Bañuelos    If any questions arise, call your doctor.  If your doctor is not available, please feel free to call the Surgical Center at [phone].  The Center is open Monday through Friday from 7AM to 7PM.  You can also call the HEALTH HOTLINE open 24 hours/day, 7 days/week and speak to a nurse at [phone], or toll free at [phone].    A registered nurse may call you a few days after your surgery to see how you are doing after your procedure.    MEDICATIONS: Resume taking daily medication.  Take prescribed pain medication with food.  If no medication is prescribed, you may take non-aspirin pain medication if needed.  PAIN MEDICATION CAN BE VERY CONSTIPATING.  Take a stool softener or laxative such as senokot, pericolace, or milk of magnesia if needed.    Prescription given for pain medication at home..    If your physician has prescribed pain medication that includes Acetaminophen (Tylenol), do not take additional Acetaminophen (Tylenol) while taking the prescribed medication.    Depression / Suicide Risk    As you are discharged from this [facility] facility, it is important to learn how to keep safe from harming yourself.    Recognize the warning signs:  · Abrupt changes in personality, positive or negative- including increase in energy   · Giving away possessions  · Change in eating patterns- significant weight changes-  positive or negative  · Change in sleeping patterns- unable to sleep or sleeping all the time   · Unwillingness or inability to communicate  · Depression  · Unusual sadness, discouragement and loneliness  · Talk of wanting to die  · Neglect of personal appearance   · Rebelliousness- reckless behavior  · Withdrawal from people/activities they love  · Confusion- inability to concentrate     If you or a loved one observes any of these behaviors or has concerns about self-harm, here's what you can do:  · Talk about it- your feelings and reasons for harming  yourself  · Remove any means that you might use to hurt yourself (examples: pills, rope, extension cords, firearm)  · Get professional help from the community (Mental Health, Substance Abuse, psychological counseling)  · Do not be alone:Call your Safe Contact- someone whom you trust who will be there for you.  · Call your local CRISIS HOTLINE 718-3219 or 928-840-4143  · Call your local Children's Mobile Crisis Response Team [place] (683) 637-0625 or www.[employer]  · Call the toll free National Suicide Prevention Hotlines   · National Suicide Prevention Lifeline 899-512-ICGS (1625)  · National Hope Line Network 800-SUICIDE (749-0201)

## 2019-01-21 NOTE — OP REPORT
DATE OF SERVICE:  01/21/2019    PREOPERATIVE DIAGNOSES:  Pelvic support problems including large enterocele,   large cystocele and mild rectocele, difficulty emptying her bladder, frequency   and urgency of urination.    POSTOPERATIVE DIAGNOSES:  Pelvic support problems including large enterocele,   large cystocele and mild rectocele, difficulty emptying her bladder, frequency   and urgency of urination.    PROCEDURE:  [place] colpocleisis and a transobturator tape urethral sling,   cystoscopy.    SURGEON:  Minnie Bañuelos MD    ASSISTANT:  PRISCA Amaral    ANESTHESIOLOGIST:  Inderjit Felix MD    ANESTHESIA:  General LMA.    ESTIMATED BLOOD LOSS:  50 mL.    FINDINGS AT THE TIME OF SURGERY:  Exam under anesthesia reveals a bulge   protruding through the introitus which is consistent with her bladder.  The   cervix is not far behind.  There is good perineal support and only a mild   rectocele.  Postprocedure, the bladder appeared normal with excellent support   and the vagina had great support, its length was reduced to 5 cm and introital   opening is 3 fingerbreadths.    TECHNIQUE:  Patient was taken to the operating room where general anesthesia   was placed.  She was then placed in the [facility] with excellent   position, prepped and draped in normal sterile fashion.  The Melissa catheter   was in place and 2 Allis clamps were used to grasp the apex of the vagina and   a rectangular area was outlined and injected with 0.25% Marcaine with   epinephrine on both the anterior and posterior wall of this prolapse.  I then   excised the vaginal mucosa inside of the rectangle on both sides and 0 Vicryl   was placed, was used to imbricate this prolapse upward and attached the 2 raw   surface areas together.  Once this was accomplished, 0 Vicryl was used to   close the vaginal mucosa at the distal end.  This reduced the prolapse nicely.    I then did a transobturator tape urethral sling in the usual  fashion and did   a cystoscopic examination which appeared normal postprocedure with excellent   support at the base of the bladder.  The cystoscope was removed and Melissa   catheter replaced.  No rectocele was necessary.  The patient tolerated the   procedure well and was brought to recovery room in stable condition.       ____________________________________     MD BRIDGETTE Romero / KHURRAM    DD:  01/21/2019 12:41:31  DT:  01/21/2019 12:52:50    D#:  [id number]  Job#:  [id number]

## 2019-07-09 ENCOUNTER — APPOINTMENT (RX ONLY)
Dept: URBAN - METROPOLITAN AREA CLINIC 4 | Facility: CLINIC | Age: 79
Setting detail: DERMATOLOGY
End: 2019-07-09

## 2019-07-09 DIAGNOSIS — L57.0 ACTINIC KERATOSIS: ICD-10-CM

## 2019-07-09 PROBLEM — D48.5 NEOPLASM OF UNCERTAIN BEHAVIOR OF SKIN: Status: ACTIVE | Noted: 2019-07-09

## 2019-07-09 PROBLEM — E78.5 HYPERLIPIDEMIA, UNSPECIFIED: Status: ACTIVE | Noted: 2019-07-09

## 2019-07-09 PROCEDURE — ? LIQUID NITROGEN

## 2019-07-09 PROCEDURE — ? BIOPSY BY SHAVE METHOD

## 2019-07-09 PROCEDURE — 17003 DESTRUCT PREMALG LES 2-14: CPT

## 2019-07-09 PROCEDURE — 11102 TANGNTL BX SKIN SINGLE LES: CPT

## 2019-07-09 PROCEDURE — 17000 DESTRUCT PREMALG LESION: CPT | Mod: 59

## 2019-07-09 ASSESSMENT — LOCATION ZONE DERM
LOCATION ZONE: NOSE
LOCATION ZONE: ARM

## 2019-07-09 ASSESSMENT — LOCATION DETAILED DESCRIPTION DERM
LOCATION DETAILED: NASAL SUPRATIP
LOCATION DETAILED: LEFT PROXIMAL DORSAL FOREARM

## 2019-07-09 ASSESSMENT — LOCATION SIMPLE DESCRIPTION DERM
LOCATION SIMPLE: NOSE
LOCATION SIMPLE: LEFT FOREARM

## 2019-07-09 NOTE — PROCEDURE: BIOPSY BY SHAVE METHOD
Render Path Notes In Note?: No
Curettage Text: The wound bed was treated with curettage after the biopsy was performed.
Hemostasis: Pressure
Silver Nitrate Text: The wound bed was treated with silver nitrate after the biopsy was performed.
Detail Level: Detailed
Biopsy Method: Dermablade
Was A Bandage Applied: Yes
Post-Care Instructions: I reviewed with the patient in detail post-care instructions. Patient is to keep the biopsy site dry overnight, and then apply bacitracin twice daily until healed. Patient may apply hydrogen peroxide soaks to remove any crusting.
Additional Anesthesia Volume In Cc (Will Not Render If 0): 0
Cryotherapy Text: The wound bed was treated with cryotherapy after the biopsy was performed.
Lab Facility: 
Wound Care: Petrolatum
Depth Of Biopsy: dermis
Anesthesia Type: 1% lidocaine with 1:100,000 epinephrine and 408mcg clindamycin/ml and a 1:10 solution of 8.4% sodium bicarbonate
Billing Type: Third-Party Bill
Lab: [id number]
Notification Instructions: Patient will be notified of biopsy results. However, patient instructed to call the office if not contacted within 2 weeks.
Electrodesiccation Text: The wound bed was treated with electrodesiccation after the biopsy was performed.
Biopsy Type: H and E
Dressing: bandage
Type Of Destruction Used: Curettage
Consent: Written consent was obtained and risks were reviewed including but not limited to scarring, infection, bleeding, scabbing, incomplete removal, nerve damage and allergy to anesthesia.
Electrodesiccation And Curettage Text: The wound bed was treated with electrodesiccation and curettage after the biopsy was performed.

## 2019-07-16 ENCOUNTER — APPOINTMENT (RX ONLY)
Dept: URBAN - METROPOLITAN AREA CLINIC 4 | Facility: CLINIC | Age: 79
Setting detail: DERMATOLOGY
End: 2019-07-16

## 2019-07-16 PROBLEM — C44.729 SQUAMOUS CELL CARCINOMA OF SKIN OF LEFT LOWER LIMB, INCLUDING HIP: Status: ACTIVE | Noted: 2019-07-16

## 2019-07-16 PROCEDURE — ? MOHS SURGERY PHONE CONSULTATION

## 2019-07-17 ENCOUNTER — APPOINTMENT (OUTPATIENT)
Dept: RADIOLOGY | Facility: MEDICAL CENTER | Age: 79
End: 2019-07-17
Attending: SURGERY
Payer: MEDICARE

## 2019-07-17 ENCOUNTER — ANESTHESIA EVENT (OUTPATIENT)
Dept: SURGERY | Facility: MEDICAL CENTER | Age: 79
End: 2019-07-17
Payer: MEDICARE

## 2019-07-17 ENCOUNTER — ANESTHESIA (OUTPATIENT)
Dept: SURGERY | Facility: MEDICAL CENTER | Age: 79
End: 2019-07-17
Payer: MEDICARE

## 2019-07-17 ENCOUNTER — HOSPITAL ENCOUNTER (OUTPATIENT)
Facility: MEDICAL CENTER | Age: 79
End: 2019-07-18
Attending: SURGERY | Admitting: SURGERY
Payer: MEDICARE

## 2019-07-17 DIAGNOSIS — C50.311 MALIGNANT NEOPLASM OF LOWER-INNER QUADRANT OF RIGHT FEMALE BREAST, UNSPECIFIED ESTROGEN RECEPTOR STATUS (HCC): ICD-10-CM

## 2019-07-17 LAB
EKG IMPRESSION: NORMAL
ERYTHROCYTE [DISTWIDTH] IN BLOOD BY AUTOMATED COUNT: 45.1 FL (ref 35.9–50)
GLUCOSE BLD-MCNC: 104 MG/DL (ref 65–99)
HCT VFR BLD AUTO: 42.1 % (ref 37–47)
HGB BLD-MCNC: 14.5 G/DL (ref 12–16)
MCH RBC QN AUTO: 32.4 PG (ref 27–33)
MCHC RBC AUTO-ENTMCNC: 34.4 G/DL (ref 33.6–35)
MCV RBC AUTO: 94.2 FL (ref 81.4–97.8)
PLATELET # BLD AUTO: 246 K/UL (ref 164–446)
PMV BLD AUTO: 11.1 FL (ref 9–12.9)
RBC # BLD AUTO: 4.47 M/UL (ref 4.2–5.4)
WBC # BLD AUTO: 6.7 K/UL (ref 4.8–10.8)

## 2019-07-17 PROCEDURE — A9270 NON-COVERED ITEM OR SERVICE: HCPCS | Performed by: ANESTHESIOLOGY

## 2019-07-17 PROCEDURE — 700105 HCHG RX REV CODE 258: Performed by: SURGERY

## 2019-07-17 PROCEDURE — 160048 HCHG OR STATISTICAL LEVEL 1-5: Performed by: SURGERY

## 2019-07-17 PROCEDURE — 85027 COMPLETE CBC AUTOMATED: CPT

## 2019-07-17 PROCEDURE — 500389 HCHG DRAIN, RESERVOIR SUCT JP 100CC: Performed by: SURGERY

## 2019-07-17 PROCEDURE — 93010 ELECTROCARDIOGRAM REPORT: CPT | Performed by: INTERNAL MEDICINE

## 2019-07-17 PROCEDURE — A9270 NON-COVERED ITEM OR SERVICE: HCPCS | Performed by: SURGERY

## 2019-07-17 PROCEDURE — 700111 HCHG RX REV CODE 636 W/ 250 OVERRIDE (IP): Performed by: ANESTHESIOLOGY

## 2019-07-17 PROCEDURE — 160041 HCHG SURGERY MINUTES - EA ADDL 1 MIN LEVEL 4: Performed by: SURGERY

## 2019-07-17 PROCEDURE — 93005 ELECTROCARDIOGRAM TRACING: CPT | Performed by: SURGERY

## 2019-07-17 PROCEDURE — 700101 HCHG RX REV CODE 250: Performed by: ANESTHESIOLOGY

## 2019-07-17 PROCEDURE — 700102 HCHG RX REV CODE 250 W/ 637 OVERRIDE(OP): Performed by: SURGERY

## 2019-07-17 PROCEDURE — 160029 HCHG SURGERY MINUTES - 1ST 30 MINS LEVEL 4: Performed by: SURGERY

## 2019-07-17 PROCEDURE — 160002 HCHG RECOVERY MINUTES (STAT): Performed by: SURGERY

## 2019-07-17 PROCEDURE — 502594 HCHG SCISSOR HANDLE, HARMONIC ACE: Performed by: SURGERY

## 2019-07-17 PROCEDURE — 501838 HCHG SUTURE GENERAL: Performed by: SURGERY

## 2019-07-17 PROCEDURE — 700101 HCHG RX REV CODE 250: Performed by: SURGERY

## 2019-07-17 PROCEDURE — 160036 HCHG PACU - EA ADDL 30 MINS PHASE I: Performed by: SURGERY

## 2019-07-17 PROCEDURE — 96374 THER/PROPH/DIAG INJ IV PUSH: CPT

## 2019-07-17 PROCEDURE — 700111 HCHG RX REV CODE 636 W/ 250 OVERRIDE (IP)

## 2019-07-17 PROCEDURE — 96376 TX/PRO/DX INJ SAME DRUG ADON: CPT

## 2019-07-17 PROCEDURE — A6402 STERILE GAUZE <= 16 SQ IN: HCPCS | Performed by: SURGERY

## 2019-07-17 PROCEDURE — 96375 TX/PRO/DX INJ NEW DRUG ADDON: CPT

## 2019-07-17 PROCEDURE — 500378 HCHG DRAIN, J-VAC ROUND 19FR: Performed by: SURGERY

## 2019-07-17 PROCEDURE — 88331 PATH CONSLTJ SURG 1 BLK 1SPC: CPT

## 2019-07-17 PROCEDURE — 700102 HCHG RX REV CODE 250 W/ 637 OVERRIDE(OP): Performed by: ANESTHESIOLOGY

## 2019-07-17 PROCEDURE — A9541 TC99M SULFUR COLLOID: HCPCS | Mod: RT

## 2019-07-17 PROCEDURE — 88307 TISSUE EXAM BY PATHOLOGIST: CPT | Mod: 59

## 2019-07-17 PROCEDURE — 82962 GLUCOSE BLOOD TEST: CPT

## 2019-07-17 PROCEDURE — 160009 HCHG ANES TIME/MIN: Performed by: SURGERY

## 2019-07-17 PROCEDURE — 88309 TISSUE EXAM BY PATHOLOGIST: CPT

## 2019-07-17 PROCEDURE — 160035 HCHG PACU - 1ST 60 MINS PHASE I: Performed by: SURGERY

## 2019-07-17 RX ORDER — LORAZEPAM 2 MG/ML
0.5 INJECTION INTRAMUSCULAR
Status: DISCONTINUED | OUTPATIENT
Start: 2019-07-17 | End: 2019-07-18 | Stop reason: HOSPADM

## 2019-07-17 RX ORDER — ONDANSETRON 2 MG/ML
4 INJECTION INTRAMUSCULAR; INTRAVENOUS
Status: DISCONTINUED | OUTPATIENT
Start: 2019-07-17 | End: 2019-07-18 | Stop reason: HOSPADM

## 2019-07-17 RX ORDER — DOCUSATE SODIUM 100 MG/1
100 CAPSULE, LIQUID FILLED ORAL
Status: DISCONTINUED | OUTPATIENT
Start: 2019-07-17 | End: 2019-07-18 | Stop reason: HOSPADM

## 2019-07-17 RX ORDER — DEXLANSOPRAZOLE 60 MG/1
60 CAPSULE, DELAYED RELEASE ORAL EVERY EVENING
Status: DISCONTINUED | OUTPATIENT
Start: 2019-07-17 | End: 2019-07-18

## 2019-07-17 RX ORDER — SODIUM CHLORIDE 9 MG/ML
INJECTION, SOLUTION INTRAVENOUS CONTINUOUS
Status: DISCONTINUED | OUTPATIENT
Start: 2019-07-17 | End: 2019-07-18 | Stop reason: HOSPADM

## 2019-07-17 RX ORDER — HALOPERIDOL 5 MG/ML
1 INJECTION INTRAMUSCULAR
Status: DISCONTINUED | OUTPATIENT
Start: 2019-07-17 | End: 2019-07-18 | Stop reason: HOSPADM

## 2019-07-17 RX ORDER — OXYCODONE HCL 5 MG/5 ML
10 SOLUTION, ORAL ORAL
Status: COMPLETED | OUTPATIENT
Start: 2019-07-17 | End: 2019-07-17

## 2019-07-17 RX ORDER — DULOXETIN HYDROCHLORIDE 60 MG/1
60 CAPSULE, DELAYED RELEASE ORAL DAILY
Status: DISCONTINUED | OUTPATIENT
Start: 2019-07-18 | End: 2019-07-18 | Stop reason: HOSPADM

## 2019-07-17 RX ORDER — AMOXICILLIN 250 MG
1 CAPSULE ORAL NIGHTLY
Status: DISCONTINUED | OUTPATIENT
Start: 2019-07-17 | End: 2019-07-18 | Stop reason: HOSPADM

## 2019-07-17 RX ORDER — SPIRONOLACTONE 25 MG/1
25 TABLET ORAL DAILY
Status: DISCONTINUED | OUTPATIENT
Start: 2019-07-18 | End: 2019-07-18 | Stop reason: HOSPADM

## 2019-07-17 RX ORDER — IBUPROFEN 200 MG
200 TABLET ORAL EVERY 6 HOURS PRN
Status: ON HOLD | COMMUNITY
End: 2022-06-15

## 2019-07-17 RX ORDER — ONDANSETRON 2 MG/ML
INJECTION INTRAMUSCULAR; INTRAVENOUS PRN
Status: DISCONTINUED | OUTPATIENT
Start: 2019-07-17 | End: 2019-07-17 | Stop reason: SURG

## 2019-07-17 RX ORDER — CARVEDILOL 6.25 MG/1
6.25 TABLET ORAL EVERY EVENING
Status: DISCONTINUED | OUTPATIENT
Start: 2019-07-18 | End: 2019-07-18 | Stop reason: HOSPADM

## 2019-07-17 RX ORDER — SODIUM CHLORIDE, SODIUM LACTATE, POTASSIUM CHLORIDE, CALCIUM CHLORIDE 600; 310; 30; 20 MG/100ML; MG/100ML; MG/100ML; MG/100ML
INJECTION, SOLUTION INTRAVENOUS CONTINUOUS
Status: DISCONTINUED | OUTPATIENT
Start: 2019-07-17 | End: 2019-07-18 | Stop reason: HOSPADM

## 2019-07-17 RX ORDER — LIDOCAINE AND PRILOCAINE 25; 25 MG/G; MG/G
CREAM TOPICAL ONCE
Status: COMPLETED | OUTPATIENT
Start: 2019-07-17 | End: 2019-07-17

## 2019-07-17 RX ORDER — ENEMA 19; 7 G/133ML; G/133ML
1 ENEMA RECTAL
Status: DISCONTINUED | OUTPATIENT
Start: 2019-07-17 | End: 2019-07-18 | Stop reason: HOSPADM

## 2019-07-17 RX ORDER — OXYCODONE HCL 5 MG/5 ML
5 SOLUTION, ORAL ORAL
Status: COMPLETED | OUTPATIENT
Start: 2019-07-17 | End: 2019-07-17

## 2019-07-17 RX ORDER — DEXAMETHASONE SODIUM PHOSPHATE 4 MG/ML
INJECTION, SOLUTION INTRA-ARTICULAR; INTRALESIONAL; INTRAMUSCULAR; INTRAVENOUS; SOFT TISSUE PRN
Status: DISCONTINUED | OUTPATIENT
Start: 2019-07-17 | End: 2019-07-17 | Stop reason: SURG

## 2019-07-17 RX ORDER — LABETALOL HYDROCHLORIDE 5 MG/ML
5 INJECTION, SOLUTION INTRAVENOUS
Status: DISCONTINUED | OUTPATIENT
Start: 2019-07-17 | End: 2019-07-18 | Stop reason: HOSPADM

## 2019-07-17 RX ORDER — IBUPROFEN 200 MG
400 TABLET ORAL EVERY 6 HOURS PRN
Status: DISCONTINUED | OUTPATIENT
Start: 2019-07-17 | End: 2019-07-18

## 2019-07-17 RX ORDER — POLYETHYLENE GLYCOL 3350 17 G/17G
17 POWDER, FOR SOLUTION ORAL DAILY
Status: ON HOLD | COMMUNITY
End: 2022-06-15

## 2019-07-17 RX ORDER — IBUPROFEN 200 MG
200 TABLET ORAL 3 TIMES DAILY
Status: DISCONTINUED | OUTPATIENT
Start: 2019-07-18 | End: 2019-07-18 | Stop reason: HOSPADM

## 2019-07-17 RX ORDER — MEPERIDINE HYDROCHLORIDE 25 MG/ML
25 INJECTION INTRAMUSCULAR; INTRAVENOUS; SUBCUTANEOUS
Status: DISCONTINUED | OUTPATIENT
Start: 2019-07-17 | End: 2019-07-18 | Stop reason: HOSPADM

## 2019-07-17 RX ORDER — HYDROMORPHONE HYDROCHLORIDE 1 MG/ML
0.4 INJECTION, SOLUTION INTRAMUSCULAR; INTRAVENOUS; SUBCUTANEOUS
Status: DISCONTINUED | OUTPATIENT
Start: 2019-07-17 | End: 2019-07-18 | Stop reason: HOSPADM

## 2019-07-17 RX ORDER — CARBOXYMETHYLCELLULOSE SODIUM 5 MG/ML
1 SOLUTION/ DROPS OPHTHALMIC PRN
Status: ON HOLD | COMMUNITY
End: 2019-08-16

## 2019-07-17 RX ORDER — OXYCODONE HYDROCHLORIDE 10 MG/1
10 TABLET ORAL
Status: DISCONTINUED | OUTPATIENT
Start: 2019-07-17 | End: 2019-07-18 | Stop reason: HOSPADM

## 2019-07-17 RX ORDER — HYDROMORPHONE HYDROCHLORIDE 1 MG/ML
0.2 INJECTION, SOLUTION INTRAMUSCULAR; INTRAVENOUS; SUBCUTANEOUS
Status: DISCONTINUED | OUTPATIENT
Start: 2019-07-17 | End: 2019-07-18 | Stop reason: HOSPADM

## 2019-07-17 RX ORDER — LOVASTATIN 20 MG/1
20 TABLET ORAL NIGHTLY
Status: DISCONTINUED | OUTPATIENT
Start: 2019-07-18 | End: 2019-07-18 | Stop reason: HOSPADM

## 2019-07-17 RX ORDER — DIPHENHYDRAMINE HYDROCHLORIDE 50 MG/ML
12.5 INJECTION INTRAMUSCULAR; INTRAVENOUS
Status: DISCONTINUED | OUTPATIENT
Start: 2019-07-17 | End: 2019-07-18 | Stop reason: HOSPADM

## 2019-07-17 RX ORDER — HYDRALAZINE HYDROCHLORIDE 20 MG/ML
5 INJECTION INTRAMUSCULAR; INTRAVENOUS
Status: DISCONTINUED | OUTPATIENT
Start: 2019-07-17 | End: 2019-07-18 | Stop reason: HOSPADM

## 2019-07-17 RX ORDER — ACETAMINOPHEN 325 MG/1
650 TABLET ORAL EVERY 6 HOURS
Status: DISCONTINUED | OUTPATIENT
Start: 2019-07-18 | End: 2019-07-18 | Stop reason: HOSPADM

## 2019-07-17 RX ORDER — LOVASTATIN 20 MG/1
20 TABLET ORAL NIGHTLY
COMMUNITY
End: 2024-01-03 | Stop reason: SDUPTHER

## 2019-07-17 RX ORDER — DOCUSATE SODIUM 100 MG/1
100 CAPSULE, LIQUID FILLED ORAL 2 TIMES DAILY
Status: DISCONTINUED | OUTPATIENT
Start: 2019-07-17 | End: 2019-07-18 | Stop reason: HOSPADM

## 2019-07-17 RX ORDER — ONDANSETRON 2 MG/ML
4 INJECTION INTRAMUSCULAR; INTRAVENOUS EVERY 4 HOURS PRN
Status: DISCONTINUED | OUTPATIENT
Start: 2019-07-17 | End: 2019-07-18 | Stop reason: HOSPADM

## 2019-07-17 RX ORDER — SODIUM CHLORIDE, SODIUM LACTATE, POTASSIUM CHLORIDE, CALCIUM CHLORIDE 600; 310; 30; 20 MG/100ML; MG/100ML; MG/100ML; MG/100ML
INJECTION, SOLUTION INTRAVENOUS CONTINUOUS
Status: ACTIVE | OUTPATIENT
Start: 2019-07-17 | End: 2019-07-18

## 2019-07-17 RX ORDER — HYDROMORPHONE HYDROCHLORIDE 1 MG/ML
0.1 INJECTION, SOLUTION INTRAMUSCULAR; INTRAVENOUS; SUBCUTANEOUS
Status: DISCONTINUED | OUTPATIENT
Start: 2019-07-17 | End: 2019-07-18 | Stop reason: HOSPADM

## 2019-07-17 RX ORDER — CARBOXYMETHYLCELLULOSE SODIUM 5 MG/ML
1 SOLUTION/ DROPS OPHTHALMIC PRN
Status: DISCONTINUED | OUTPATIENT
Start: 2019-07-17 | End: 2019-07-18

## 2019-07-17 RX ORDER — CELECOXIB 200 MG/1
CAPSULE ORAL
Status: DISPENSED
Start: 2019-07-17 | End: 2019-07-18

## 2019-07-17 RX ORDER — BUPIVACAINE HYDROCHLORIDE AND EPINEPHRINE 5; 5 MG/ML; UG/ML
INJECTION, SOLUTION EPIDURAL; INTRACAUDAL; PERINEURAL
Status: DISCONTINUED | OUTPATIENT
Start: 2019-07-17 | End: 2019-07-17 | Stop reason: HOSPADM

## 2019-07-17 RX ORDER — BISACODYL 10 MG
10 SUPPOSITORY, RECTAL RECTAL
Status: DISCONTINUED | OUTPATIENT
Start: 2019-07-17 | End: 2019-07-18 | Stop reason: HOSPADM

## 2019-07-17 RX ORDER — ALPRAZOLAM 0.25 MG/1
0.5 TABLET ORAL ONCE
Status: COMPLETED | OUTPATIENT
Start: 2019-07-17 | End: 2019-07-17

## 2019-07-17 RX ORDER — SPIRONOLACTONE 25 MG/1
25 TABLET ORAL DAILY
COMMUNITY
End: 2021-02-10

## 2019-07-17 RX ORDER — ACETAMINOPHEN 500 MG
TABLET ORAL
Status: DISPENSED
Start: 2019-07-17 | End: 2019-07-18

## 2019-07-17 RX ORDER — AMOXICILLIN 250 MG
1 CAPSULE ORAL
Status: DISCONTINUED | OUTPATIENT
Start: 2019-07-17 | End: 2019-07-18 | Stop reason: HOSPADM

## 2019-07-17 RX ORDER — OXYCODONE HYDROCHLORIDE 5 MG/1
5 TABLET ORAL
Status: DISCONTINUED | OUTPATIENT
Start: 2019-07-17 | End: 2019-07-18 | Stop reason: HOSPADM

## 2019-07-17 RX ORDER — POLYETHYLENE GLYCOL 3350 17 G/17G
1 POWDER, FOR SOLUTION ORAL 2 TIMES DAILY
Status: DISCONTINUED | OUTPATIENT
Start: 2019-07-17 | End: 2019-07-18 | Stop reason: HOSPADM

## 2019-07-17 RX ORDER — PHENYLEPHRINE HYDROCHLORIDE 10 MG/ML
INJECTION, SOLUTION INTRAMUSCULAR; INTRAVENOUS; SUBCUTANEOUS PRN
Status: DISCONTINUED | OUTPATIENT
Start: 2019-07-17 | End: 2019-07-17 | Stop reason: SURG

## 2019-07-17 RX ORDER — CEFAZOLIN SODIUM 1 G/3ML
INJECTION, POWDER, FOR SOLUTION INTRAMUSCULAR; INTRAVENOUS PRN
Status: DISCONTINUED | OUTPATIENT
Start: 2019-07-17 | End: 2019-07-17 | Stop reason: SURG

## 2019-07-17 RX ADMIN — CEFAZOLIN 2 G: 330 INJECTION, POWDER, FOR SOLUTION INTRAMUSCULAR; INTRAVENOUS at 18:41

## 2019-07-17 RX ADMIN — FENTANYL CITRATE 25 MCG: 50 INJECTION, SOLUTION INTRAMUSCULAR; INTRAVENOUS at 21:50

## 2019-07-17 RX ADMIN — PROPOFOL 100 MG: 10 INJECTION, EMULSION INTRAVENOUS at 18:47

## 2019-07-17 RX ADMIN — DEXAMETHASONE SODIUM PHOSPHATE 8 MG: 4 INJECTION, SOLUTION INTRA-ARTICULAR; INTRALESIONAL; INTRAMUSCULAR; INTRAVENOUS; SOFT TISSUE at 18:56

## 2019-07-17 RX ADMIN — FENTANYL CITRATE 50 MCG: 50 INJECTION, SOLUTION INTRAMUSCULAR; INTRAVENOUS at 20:45

## 2019-07-17 RX ADMIN — LIDOCAINE HYDROCHLORIDE 60 MG: 20 INJECTION, SOLUTION INTRAVENOUS at 18:47

## 2019-07-17 RX ADMIN — LORAZEPAM 0.5 MG: 2 INJECTION INTRAMUSCULAR; INTRAVENOUS at 22:39

## 2019-07-17 RX ADMIN — FENTANYL CITRATE 50 MCG: 50 INJECTION, SOLUTION INTRAMUSCULAR; INTRAVENOUS at 19:25

## 2019-07-17 RX ADMIN — FENTANYL CITRATE 50 MCG: 50 INJECTION, SOLUTION INTRAMUSCULAR; INTRAVENOUS at 19:55

## 2019-07-17 RX ADMIN — SODIUM CHLORIDE, POTASSIUM CHLORIDE, SODIUM LACTATE AND CALCIUM CHLORIDE: 600; 310; 30; 20 INJECTION, SOLUTION INTRAVENOUS at 18:41

## 2019-07-17 RX ADMIN — FENTANYL CITRATE 25 MCG: 50 INJECTION, SOLUTION INTRAMUSCULAR; INTRAVENOUS at 22:34

## 2019-07-17 RX ADMIN — ROCURONIUM BROMIDE 40 MG: 10 INJECTION, SOLUTION INTRAVENOUS at 18:47

## 2019-07-17 RX ADMIN — ONDANSETRON 4 MG: 2 INJECTION INTRAMUSCULAR; INTRAVENOUS at 18:56

## 2019-07-17 RX ADMIN — SUGAMMADEX 160 MG: 100 INJECTION, SOLUTION INTRAVENOUS at 20:45

## 2019-07-17 RX ADMIN — ROCURONIUM BROMIDE 20 MG: 10 INJECTION, SOLUTION INTRAVENOUS at 19:55

## 2019-07-17 RX ADMIN — ALPRAZOLAM 0.5 MG: 0.25 TABLET ORAL at 15:08

## 2019-07-17 RX ADMIN — PHENYLEPHRINE HYDROCHLORIDE 100 MCG: 10 INJECTION INTRAVENOUS at 19:00

## 2019-07-17 RX ADMIN — MIDAZOLAM HYDROCHLORIDE 2 MG: 1 INJECTION, SOLUTION INTRAMUSCULAR; INTRAVENOUS at 18:41

## 2019-07-17 RX ADMIN — FENTANYL CITRATE 25 MCG: 50 INJECTION, SOLUTION INTRAMUSCULAR; INTRAVENOUS at 21:42

## 2019-07-17 RX ADMIN — FENTANYL CITRATE 25 MCG: 50 INJECTION, SOLUTION INTRAMUSCULAR; INTRAVENOUS at 22:11

## 2019-07-17 RX ADMIN — FENTANYL CITRATE 50 MCG: 50 INJECTION, SOLUTION INTRAMUSCULAR; INTRAVENOUS at 19:00

## 2019-07-17 RX ADMIN — OXYCODONE HYDROCHLORIDE 10 MG: 5 SOLUTION ORAL at 21:56

## 2019-07-17 RX ADMIN — FENTANYL CITRATE 50 MCG: 50 INJECTION, SOLUTION INTRAMUSCULAR; INTRAVENOUS at 18:47

## 2019-07-17 RX ADMIN — LIDOCAINE AND PRILOCAINE 1 APPLICATION: 25; 25 CREAM TOPICAL at 15:07

## 2019-07-17 ASSESSMENT — PAIN SCALES - GENERAL: PAIN_LEVEL: 2

## 2019-07-17 NOTE — PROGRESS NOTES
Med rec updated and complete  Allergies reviewed  Interviewed pt with  at bedside with permission from pt.  Pt had a list of medications at bedside, went over list of medications and returned list of medications back to pts .  Pt reports that she only takes her CARVEDILOL 6.25MG and METFORMIN 500MG once a day, not twice a day.  Pt reports no antibiotics in the last 2 weeks

## 2019-07-17 NOTE — OR NURSING
preop assessment completed, all questions answered, pt educated on surgical plan of care, call light in place

## 2019-07-18 VITALS
SYSTOLIC BLOOD PRESSURE: 124 MMHG | DIASTOLIC BLOOD PRESSURE: 46 MMHG | TEMPERATURE: 97.8 F | RESPIRATION RATE: 17 BRPM | WEIGHT: 171.32 LBS | HEIGHT: 64 IN | BODY MASS INDEX: 29.25 KG/M2 | OXYGEN SATURATION: 96 % | HEART RATE: 112 BPM

## 2019-07-18 LAB — PATHOLOGY CONSULT NOTE: NORMAL

## 2019-07-18 PROCEDURE — 302135 SEQUENTIAL COMPRESSION MACHINE: Performed by: SURGERY

## 2019-07-18 PROCEDURE — A9270 NON-COVERED ITEM OR SERVICE: HCPCS | Performed by: SURGERY

## 2019-07-18 PROCEDURE — 700112 HCHG RX REV CODE 229: Performed by: SURGERY

## 2019-07-18 PROCEDURE — 700102 HCHG RX REV CODE 250 W/ 637 OVERRIDE(OP): Performed by: SURGERY

## 2019-07-18 PROCEDURE — G0378 HOSPITAL OBSERVATION PER HR: HCPCS

## 2019-07-18 RX ORDER — OXYCODONE HYDROCHLORIDE 5 MG/1
2.5-5 TABLET ORAL EVERY 4 HOURS PRN
Qty: 15 TAB | Refills: 0 | Status: SHIPPED | OUTPATIENT
Start: 2019-07-18 | End: 2019-07-25

## 2019-07-18 RX ORDER — OMEPRAZOLE 20 MG/1
40 CAPSULE, DELAYED RELEASE ORAL EVERY EVENING
Status: DISCONTINUED | OUTPATIENT
Start: 2019-07-18 | End: 2019-07-18 | Stop reason: HOSPADM

## 2019-07-18 RX ADMIN — ACETAMINOPHEN 650 MG: 325 TABLET, FILM COATED ORAL at 02:01

## 2019-07-18 RX ADMIN — ACETAMINOPHEN 650 MG: 325 TABLET, FILM COATED ORAL at 05:07

## 2019-07-18 RX ADMIN — LOVASTATIN 20 MG: 20 TABLET ORAL at 02:01

## 2019-07-18 RX ADMIN — ACETAMINOPHEN 650 MG: 325 TABLET, FILM COATED ORAL at 17:56

## 2019-07-18 RX ADMIN — IBUPROFEN 200 MG: 200 TABLET, FILM COATED ORAL at 12:31

## 2019-07-18 RX ADMIN — IBUPROFEN 200 MG: 200 TABLET, FILM COATED ORAL at 17:56

## 2019-07-18 RX ADMIN — DOCUSATE SODIUM 100 MG: 100 CAPSULE, LIQUID FILLED ORAL at 05:08

## 2019-07-18 RX ADMIN — DULOXETINE HYDROCHLORIDE 60 MG: 60 CAPSULE, DELAYED RELEASE ORAL at 05:08

## 2019-07-18 RX ADMIN — SPIRONOLACTONE 25 MG: 25 TABLET ORAL at 05:07

## 2019-07-18 RX ADMIN — OXYCODONE HYDROCHLORIDE 5 MG: 5 TABLET ORAL at 05:08

## 2019-07-18 RX ADMIN — CARVEDILOL 6.25 MG: 6.25 TABLET, FILM COATED ORAL at 02:01

## 2019-07-18 RX ADMIN — IBUPROFEN 200 MG: 200 TABLET, FILM COATED ORAL at 07:34

## 2019-07-18 RX ADMIN — ACETAMINOPHEN 650 MG: 325 TABLET, FILM COATED ORAL at 12:31

## 2019-07-18 ASSESSMENT — PATIENT HEALTH QUESTIONNAIRE - PHQ9
1. LITTLE INTEREST OR PLEASURE IN DOING THINGS: NOT AT ALL
2. FEELING DOWN, DEPRESSED, IRRITABLE, OR HOPELESS: NOT AT ALL
SUM OF ALL RESPONSES TO PHQ9 QUESTIONS 1 AND 2: 0

## 2019-07-18 NOTE — ANESTHESIA QCDR
2019 [facility] Clinical Data Registry (for Quality Improvement)     Postoperative nausea/vomiting risk protocol (Adult = 18 yrs and Pediatric 3-17 yrs)- (430 and 463)  General inhalation anesthetic (NOT TIVA) with PONV risk factors: Yes  Provision of anti-emetic therapy with at least 2 different classes of agents: Yes   Patient DID NOT receive anti-emetic therapy and reason is documented in Medical Record:  N/A    Multimodal Pain Management- (AQI59)  Patient undergoing Elective Surgery (i.e. Outpatient, or ASC, or Prescheduled Surgery prior to Hospital Admission): Yes  Use of Multimodal Pain Management, two or more drugs and/or interventions, NOT including systemic opioids: Yes   Exception: Documented allergy to multiple classes of analgesics:  N/A    PACU assessment of acute postoperative pain prior to Anesthesia Care End- Applies to Patients Age = 18- (ABG7)  Initial PACU pain score is which of the following: < 7/10  Patient unable to report pain score: N/A    Post-anesthetic transfer of care checklist/protocol to PACU/ICU- (426 and 427)  Upon conclusion of case, patient transferred to which of the following locations: PACU/Non-ICU  Use of transfer checklist/protocol: Yes  Exclusion: Service Performed in Patient Hospital Room (and thus did not require transfer): N/A    PACU Reintubation- (AQI31)  General anesthesia requiring endotracheal intubation (ETT) along with subsequent extubation in OR or PACU: Yes  Required reintubation in the PACU: No   Extubation was a planned trial documented in the medical record prior to removal of the original airway device:  N/A    Unplanned admission to ICU related to anesthesia service up through end of PACU care- (MD51)  Unplanned admission to ICU (not initially anticipated at anesthesia start time): No

## 2019-07-18 NOTE — PROGRESS NOTES
Two RN skin check completed. Incisions to left and right chest. Scab on left lower calf where mole was recently removed. All other skin clean dry and intact.

## 2019-07-18 NOTE — OR SURGEON
Immediate Post OP Note    PreOp Diagnosis: Right breast cancer clinical stage 2    PostOp Diagnosis: same    Procedure(s):  TOTAL MASTECTOMY - Wound Class: Clean with Drain  INTRA OPERATIVE SENTINEL NODE IDENTIFICATION AND BIOPSY - Wound Class: Clean with Drain  LYMPHADENECTOMY, AXILLARY - Wound Class: Clean with Drain  MASTECTOMY, MODIFIED RADICAL - Wound Class: Clean with Drain  WIDE V FLAP - Wound Class: Clean with Drain    Surgeon(s):  SOHEILA Munoz M.D.    Anesthesiologist/Type of Anesthesia:  Anesthesiologist: Julio Cesar Spear M.D./General    Surgical Staff:  Circulator: Ros Ramirez R.N.  Relief Circulator: Talia Christine R.N.  Scrub Person: Carmenelisabeth Ganelian    Specimens removed if any:  ID Type Source Tests Collected by Time Destination   A : Right axilla sentinel lymph node Tissue Lymph Node PATHOLOGY SPECIMEN Andre Shelton M.D. 7/17/2019  7:38 PM    B : Right Axillary Content Tissue Arm PATHOLOGY SPECIMEN Andre Shelton M.D. 7/17/2019  7:39 PM    C : Right Modified Radical Mastectomy Tissue Breast PATHOLOGY SPECIMEN Andre Shelton M.D. 7/17/2019  7:56 PM    D : Left Total mastectomy Tissue Breast PATHOLOGY SPECIMEN Andre Shelton M.D. 7/17/2019  8:18 PM        Estimated Blood Loss: 300    Findings: neg SLN    Complications: 0        7/17/2019 9:10 PM Andre Shelton M.D.

## 2019-07-18 NOTE — OP REPORT
DATE OF SERVICE:  07/17/2019    PREOPERATIVE DIAGNOSIS:  Carcinoma of the right breast, clinical stage II.    POSTOPERATIVE DIAGNOSIS:  Carcinoma of the right breast, clinical stage II.    OPERATIONS:  1.  Intraoperative identification of right axillary sentinel lymph node with   biopsy.  2.  Right modified radical mastectomy.  3.  Left total mastectomy.  4.  Bilateral Y-V advancement flaps, approximately 140 sq cm on the right and   120 sq cm on the left.    SURGEON:  Andre Shelton MD    ANESTHESIOLOGIST:  Julio Cesar Spear MD    ANESTHESIA:  General anesthesia.    ASSISTANT:  Donna Warren MD    OPERATIVE NOTE:  The patient, 79 years of age, presented with a mass in the   right breast.  She underwent radiologic evaluation, which was inconclusive.    She presented to my office.  She did have some mild erythema and tenderness   overlying the mass, which was in the lower inner quadrant of the right breast   in the juxtasternal location.  Office ultrasound demonstrated a slight   hypoechoic irregularity underlying the mass, which appeared to extend up close   to the skin.  I felt this might represent a sebaceous cyst, but could not   rule out malignant pathology.  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  Patient did have it sent for further molecular   subtyping and I believe it was HER-2/liz negative.  The lesion was at least 4   cm in maximum diameter and it did extend to surgical margin.  Patient was   carefully counseled regarding options of care.  We did await the results of   the HER-2/liz test, which came back negative.  The patient was therefore felt   to be a candidate to proceed to surgical therapy.  She did consult with   plastic surgery, but ultimately declined reconstruction.  She initially wanted   bilateral  mastectomies, but after counseling agreed to proceed with a right   mastectomy with sentinel lymph node biopsy.  However, after considering her   options, her desire for symmetry was overriding, and when she presented on the   day of surgery, she again requested that bilateral mastectomies be performed.    The patient did undergo preoperative injection with technetium sulfur   colloid in the right periareolar area.  After a period for incubation to   occur, the patient was felt to be a candidate to proceed to surgical therapy.    She did undergo preoperative evaluation, which was satisfactory to proceed.    She had the risks, possible complications of operation explained to her in   detail.  We discussed also plastic surgical oncoplastic reconstruction with a   Y-V advancement flap to reduce the posterior axillary fold and she consented   to that.  The patient received prophylactic antibiotics, had sequential   stockings applied as antiembolism prophylaxis and was taken to the operating   room.  There, she was placed under anesthesia by Dr. Spear.  Her breasts   were prepped with ChloraPrep solution.  Sterile drapes were applied and a   timeout was affected.  The right axilla was interrogated with the navigator   and appeared to harbor a sentinel lymph node.  Patient had incisions made on   the chest encompassing the excisional biopsy on the right.  This was an   ellipse broadly around the nipple and then a Y-V advancement flap was also   created in the posterior axillary fold.  The patient had incisions made and   carried down through skin and subcutaneous tissue.  Then, she had   circumferential flaps made using countertraction electrocautery superiorly.    Dissection was carried up into the infraclavicular location of the pectoralis   muscle, medially to the lateral border of the sternum, inferiorly to the   rectus sheath and laterally to the latissimus dorsi.  Patient had the breast   then reflected off the  chest wall from medial to lateral.  The axilla was   opened on the right.  It was interrogated and a likely location of a sentinel   lymph node was identified.  This was dissected, re-interrogated and then   ultimately elevated and excised.  The tissue that was excised did contain a   sentinel lymph node, which was isolated and given to pathology for frozen   section.  That returned negative.  The remaining tissue was sent as axillary   contents.  Some of the axillary tail of the breast also harbored lymph nodes.    The breast was ultimately detached from the chest wall and marked in the   axilla with a long suture.  Patient had the Y-V advancement flap created using   a triangular dissection and medial rotation.  Hemostasis had been secured   with electrocautery.  In the axilla, the axillary tissues were divided using   Harmonic becky.  On the left, a similar dissection was created without   axillary node sampling.  However, I believe the axillary tail tissue did   contain some lymph nodes.  This excision basically was performed in a similar   fashion with again a Y-V advancement flap being advanced.  This was about 140   sq cm on the right and 120 sq cm on the left.  Patient had the wounds   irrigated.  Hemostasis was assured.  A 19 round Bard channel drain was placed   through a separate stab incision in the anterior axillary line on both sides.    This was sewn in place using nylon suture.  The Y-V advancement flap was   advanced.  The skin was tailored in order to allow for smooth fitting of the   flap.  The patient had the wound then closed ____ interrupted 3-0 Vicryl   sutures and the skin was closed using running 4-0 Monocryl subcuticular.  Once   these were closed completely, Marcaine was injected into both drains, which   were left uncapped to be opened in the recovery room.  Patient had the wound   steri-stripped.  A sterile dressing was applied.  Biopatches were placed   around the drain under  Tegaderms.  Patient was awakened, extubated and taken   to the recovery room in stable and satisfactory condition.  Estimated blood   loss was around 300 mL.  Sponge, instrument and needle counts were reported as   correct for all phases of surgery.  Patient is hours late.  The patient will   be admitted for postoperative observation and care this evening.  Permanent   pathology will dictate the recommendations regarding adjuvant therapy.       ____________________________________     MD JOE RIZVI / KHURRAM    DD:  07/17/2019 21:18:52  DT:  07/17/2019 22:34:17    D#:  [id number]  Job#:  [id number]    cc: MARTIN HIGHTOWER MD, ARCELIA SRINIVASAN MD, Donna Warren MD

## 2019-07-18 NOTE — ANESTHESIA PROCEDURE NOTES
Airway  Date/Time: 7/17/2019 6:47 PM  Performed by: MARTIN HIGHTOWER  Authorized by: MARTIN HIGHTOWER     Location:  OR  Urgency:  Elective  Indications for Airway Management:  Anesthesia  Spontaneous Ventilation: absent    Sedation Level:  Deep  Preoxygenated: Yes    Patient Position:  Sniffing  Final Airway Type:  Endotracheal airway  Final Endotracheal Airway:  ETT  Cuffed: Yes    Technique Used for Successful ETT Placement:  Direct laryngoscopy  Insertion Site:  Oral  Blade Type:  Venkat  Laryngoscope Blade/Videolaryngoscope Blade Size:  3  ETT Size (mm):  7.0  Measured from:  Teeth  ETT to Teeth (cm):  21  Placement Verified by: auscultation and capnometry    Cormack-Lehane Classification:  Grade I - full view of glottis  Number of Attempts at Approach:  1

## 2019-07-18 NOTE — DISCHARGE PLANNING
Anticipated Discharge Disposition:   Home with help from     Action:    Spoke to patient.  She was ambulating to bathroom.  She and spouse stated that she has a FWW at home that she can use.  Pt has DELMAR x 2 visible.  Pts spouse stated that they live in a single level home and pt independent with ADLs and IADLs.  Spouse and daughter will be available to assist patient as needed.    Barriers to Discharge:    None    Plan:    DC today    Care Transition Team Assessment    Information Source  Orientation : Oriented x 4  Information Given By: Spouse  Informant's Name: Michael Parra  Who is responsible for making decisions for patient? : Patient    Readmission Evaluation  Is this a readmission?: No    Elopement Risk  Legal Hold: No  Ambulatory or Self Mobile in Wheelchair: Yes  Disoriented: No  Psychiatric Symptoms: None  History of Wandering: No  Elopement this Admit: No  Vocalizing Wanting to Leave: No  Displays Behaviors, Body Language Wanting to Leave: No-Not at Risk for Elopement  Elopement Risk: Not at Risk for Elopement         Discharge Preparedness  What is your plan after discharge?: Home with help  What are your discharge supports?: Spouse  Prior Functional Level: Ambulatory, Drives Self, Independent with Activities of Daily Living, Independent with Medication Management  Difficulity with ADLs: Walking  Difficulity with IADLs: None    Functional Assesment  Prior Functional Level: Ambulatory, Drives Self, Independent with Activities of Daily Living, Independent with Medication Management    Finances  Financial Barriers to Discharge: No  Prescription Coverage: Yes              Advance Directive  Advance Directive?: DPOA for Health Care  Durable Power of  Name and Contact : Michael Parra              Discharge Risks or Barriers  Discharge risks or barriers?: No    Anticipated Discharge Information  Anticipated discharge disposition: Home  Discharge Address: [address] [place] [id number]  Discharge  Contact Phone Number: [phone]

## 2019-07-18 NOTE — DISCHARGE INSTRUCTIONS
- Call or seek medical attention for questions or concerns   - Follow up with Dr. Browning in 1 weeks time   - Follow up with primary care provider within one weeks time   - Resume regular diet   - May take over the counter acetaminophen or ibuprofen as needed for pain   - Continue daily over the counter stool softener while on narcotics   - No operation of machinery or motorized vehicles while under the influence of narcotics   - No alcohol, marijuana or illicit drug use while under the influence of narcotics   - No swimming, hot tubs, baths or wound submersion until cleared by outpatient provider. May shower   - No contact sports, strenuous activities, or heavy lifting until cleared by outpatient provider   - If respiratory decompensation, change in condition or worsening condition, or signs or symptoms of infection occur seek medical attention   - Empty DELMAR drains and record output and color as frequently as needed    Discharge Instructions    Discharged to home by car with relative. Discharged via wheelchair, hospital escort: Yes.  Special equipment needed: Not Applicable    Be sure to schedule a follow-up appointment with your primary care doctor or any specialists as instructed.     Discharge Plan:        I understand that a diet low in cholesterol, fat, and sodium is recommended for good health. Unless I have been given specific instructions below for another diet, I accept this instruction as my diet prescription.   Other diet: Regular    Special Instructions: None    · Is patient discharged on Warfarin / Coumadin?   No     Depression / Suicide Risk    As you are discharged from this [facility] facility, it is important to learn how to keep safe from harming yourself.    Recognize the warning signs:  · Abrupt changes in personality, positive or negative- including increase in energy   · Giving away possessions  · Change in eating patterns- significant weight changes-  positive or negative  · Change in  sleeping patterns- unable to sleep or sleeping all the time   · Unwillingness or inability to communicate  · Depression  · Unusual sadness, discouragement and loneliness  · Talk of wanting to die  · Neglect of personal appearance   · Rebelliousness- reckless behavior  · Withdrawal from people/activities they love  · Confusion- inability to concentrate     If you or a loved one observes any of these behaviors or has concerns about self-harm, here's what you can do:  · Talk about it- your feelings and reasons for harming yourself  · Remove any means that you might use to hurt yourself (examples: pills, rope, extension cords, firearm)  · Get professional help from the community (Mental Health, Substance Abuse, psychological counseling)  · Do not be alone:Call your Safe Contact- someone whom you trust who will be there for you.  · Call your local CRISIS HOTLINE 344-9317 or 172-240-1338  · Call your local Children's Mobile Crisis Response Team [place] (645) 569-4308 or www.MovingHealth  · Call the toll free National Suicide Prevention Hotlines   · National Suicide Prevention Lifeline 677-506-ZKQQ (7193)  · National Hope Line Network 800-SUICIDE (570-7775)

## 2019-07-18 NOTE — PROGRESS NOTES
"/51   Pulse 76   Temp 36.4 °C (97.6 °F) (Temporal)   Resp 17   Ht 1.626 m (5' 4\")   Wt 77.7 kg (171 lb 5.1 oz)   LMP  (LMP Unknown)   SpO2 97%   BMI 29.41 kg/m²     POD # 1 - bilateral mastectomy     Up in chair  Tolerating diet  Adequate pain control    A&O x 4  Respiratory rate even and unlabored  Abd soft  Chest wall tender as expected - steri strips bilateral - DELMAR x 2 - serosanguinous  Mobilizing    Home with RX and instructions  Follow up discussed   Delmar care per nursing  RX given to daughter     "

## 2019-07-18 NOTE — ANESTHESIA PREPROCEDURE EVALUATION
Relevant Problems   No relevant active problems       Physical Exam    Airway   Mallampati: II  TM distance: >3 FB  Neck ROM: full       Cardiovascular - normal exam  Rhythm: regular  Rate: normal  (-) murmur     Dental - normal exam         Pulmonary - normal exam  Breath sounds clear to auscultation     Abdominal    Neurological - normal exam                 Anesthesia Plan    ASA 3       Plan - general       Airway plan will be ETT        Induction: intravenous    Postoperative Plan: Postoperative administration of opioids is intended.    Pertinent diagnostic labs and testing reviewed    Informed Consent:    Anesthetic plan and risks discussed with patient.    Use of blood products discussed with: patient whom consented to blood products.

## 2019-07-18 NOTE — ANESTHESIA POSTPROCEDURE EVALUATION
Patient: Rashmi Parra    Procedure Summary     Date:  07/17/19 Room / Location:  [facility] OR 08 / SURGERY [facility]    Anesthesia Start:  1841 Anesthesia Stop:  2124    Procedures:       TOTAL MASTECTOMY (Left Breast)      INTRA OPERATIVE SENTINEL NODE IDENTIFICATION AND BIOPSY (Right Axilla)      LYMPHADENECTOMY, AXILLARY (Axilla)      MASTECTOMY, MODIFIED RADICAL (Right Breast)      WIDE V FLAP (Bilateral Chest) Diagnosis:  (RIGHT BREAST CANCER LOWER INNER QUADRANT)    Surgeon:  Andre Shelton M.D. Responsible Provider:  Julio Cesar Spear M.D.    Anesthesia Type:  general ASA Status:  3          Final Anesthesia Type: general  Last vitals  BP   Blood Pressure : (!) 81/65    Temp   36.3 °C (97.4 °F)    Pulse   Pulse: 86   Resp   16    SpO2   98 %      Anesthesia Post Evaluation    Patient location during evaluation: PACU  Patient participation: complete - patient participated  Level of consciousness: awake and alert  Pain score: 2    Airway patency: patent  Anesthetic complications: no  Cardiovascular status: hemodynamically stable  Respiratory status: acceptable  Hydration status: euvolemic    PONV: none

## 2019-07-18 NOTE — CARE PLAN
Problem: Safety  Goal: Will remain free from falls    Intervention: Implement fall precautions  Call light w/in reach. Instructed pt to call for assistance before getting OOB- pt verbalized understanding.        Problem: Pain Management  Goal: Pain level will decrease to patient's comfort goal    Intervention: Follow pain managment plan developed in collaboration with patient and Interdisciplinary Team  Tylenol and motrin given scheduled. Pt denies pain this AM. Educated pt on importance of pain control- pt verbalized understanding.

## 2019-07-18 NOTE — PROGRESS NOTES
Pt aaox4. Up w/ hand held assist. Pt denies pain. +BS. Voiding via BR. DELMAR drains x2 in place. Reviewed poc with pt-verbalized understanding. Call light in reach. Educated pt on using IS.

## 2019-07-18 NOTE — ANESTHESIA TIME REPORT
Anesthesia Start and Stop Event Times     Date Time Event    7/17/2019 1841 Anesthesia Start     2124 Anesthesia Stop        Responsible Staff  07/17/19    Name Role Begin End    Julio Cesar Spear M.D. Anesth 1841 2124        Preop Diagnosis (Free Text):  Pre-op Diagnosis     RIGHT BREAST CANCER LOWER INNER QUADRANT        Preop Diagnosis (Codes):  Diagnosis Information     Diagnosis Code(s):         Post op Diagnosis  Breast cancer (HCC)      Premium Reason  A. 3PM - 7AM    Comments:

## 2019-07-19 ENCOUNTER — HOSPITAL ENCOUNTER (OUTPATIENT)
Dept: RADIOLOGY | Facility: MEDICAL CENTER | Age: 79
End: 2019-07-19

## 2019-07-19 NOTE — PROGRESS NOTES
Educated pt and family on DELMAR drain care. Denis handouts provided. Supplies given. Went over discharge instructions w/ pt, when to call the doctor, f/u appointments, medications, spinal precautions. Copy of discharge paperwork given to pt. Pt had no further questions, pt discharged to home w/ family by WC.

## 2019-07-23 ENCOUNTER — APPOINTMENT (RX ONLY)
Dept: URBAN - METROPOLITAN AREA CLINIC 22 | Facility: CLINIC | Age: 79
Setting detail: DERMATOLOGY
End: 2019-07-23

## 2019-07-23 PROBLEM — C44.92 SQUAMOUS CELL CARCINOMA OF SKIN, UNSPECIFIED: Status: ACTIVE | Noted: 2019-07-23

## 2019-07-23 PROCEDURE — ? MOHS SURGERY PHONE CONSULTATION

## 2019-07-23 NOTE — PROCEDURE: MOHS SURGERY PHONE CONSULTATION
Detail Level: Simple
Office Location Of Mohs Surgery: [facility]
Which Antibiotic Do They Take For Surgical Prophylaxis?: Amoxicillin (2 grams)
If Yes- Additional Details: R knee 2015
Has The Patient Ever Had A Heart Attack Or Stroke?: No
Has The Pathology Report Been Received?: Yes
Date Of Mohs Surgery: 08/13/2019
Pathology Accession #: [id number]
If Yes- What Is The Patient's Pharmacy Number?: [phone]
Referring Provider: Steve

## 2019-08-16 ENCOUNTER — ANESTHESIA (OUTPATIENT)
Dept: SURGERY | Facility: MEDICAL CENTER | Age: 79
End: 2019-08-16
Payer: MEDICARE

## 2019-08-16 ENCOUNTER — ANESTHESIA EVENT (OUTPATIENT)
Dept: SURGERY | Facility: MEDICAL CENTER | Age: 79
End: 2019-08-16
Payer: MEDICARE

## 2019-08-16 ENCOUNTER — HOSPITAL ENCOUNTER (OUTPATIENT)
Facility: MEDICAL CENTER | Age: 79
End: 2019-08-16
Attending: SURGERY | Admitting: SURGERY
Payer: MEDICARE

## 2019-08-16 VITALS
DIASTOLIC BLOOD PRESSURE: 56 MMHG | RESPIRATION RATE: 17 BRPM | SYSTOLIC BLOOD PRESSURE: 73 MMHG | HEIGHT: 64 IN | HEART RATE: 89 BPM | TEMPERATURE: 97.1 F | BODY MASS INDEX: 28.12 KG/M2 | OXYGEN SATURATION: 91 % | WEIGHT: 164.68 LBS

## 2019-08-16 LAB
ANION GAP SERPL CALC-SCNC: 10 MMOL/L (ref 0–11.9)
BUN SERPL-MCNC: 27 MG/DL (ref 8–22)
CALCIUM SERPL-MCNC: 9.7 MG/DL (ref 8.5–10.5)
CHLORIDE SERPL-SCNC: 102 MMOL/L (ref 96–112)
CO2 SERPL-SCNC: 26 MMOL/L (ref 20–33)
CREAT SERPL-MCNC: 1.07 MG/DL (ref 0.5–1.4)
GLUCOSE SERPL-MCNC: 181 MG/DL (ref 65–99)
PATHOLOGY CONSULT NOTE: NORMAL
POTASSIUM SERPL-SCNC: 4.6 MMOL/L (ref 3.6–5.5)
SODIUM SERPL-SCNC: 138 MMOL/L (ref 135–145)

## 2019-08-16 PROCEDURE — 88307 TISSUE EXAM BY PATHOLOGIST: CPT

## 2019-08-16 PROCEDURE — 160028 HCHG SURGERY MINUTES - 1ST 30 MINS LEVEL 3: Performed by: SURGERY

## 2019-08-16 PROCEDURE — 700111 HCHG RX REV CODE 636 W/ 250 OVERRIDE (IP)

## 2019-08-16 PROCEDURE — 700111 HCHG RX REV CODE 636 W/ 250 OVERRIDE (IP): Performed by: ANESTHESIOLOGY

## 2019-08-16 PROCEDURE — 160047 HCHG PACU  - EA ADDL 30 MINS PHASE II: Performed by: SURGERY

## 2019-08-16 PROCEDURE — 160036 HCHG PACU - EA ADDL 30 MINS PHASE I: Performed by: SURGERY

## 2019-08-16 PROCEDURE — A6402 STERILE GAUZE <= 16 SQ IN: HCPCS | Performed by: SURGERY

## 2019-08-16 PROCEDURE — 501497 HCHG SURGICLIP: Performed by: SURGERY

## 2019-08-16 PROCEDURE — 700101 HCHG RX REV CODE 250: Performed by: ANESTHESIOLOGY

## 2019-08-16 PROCEDURE — A9270 NON-COVERED ITEM OR SERVICE: HCPCS | Performed by: ANESTHESIOLOGY

## 2019-08-16 PROCEDURE — A6222 GAUZE <=16 IN NO W/SAL W/O B: HCPCS | Performed by: SURGERY

## 2019-08-16 PROCEDURE — 80048 BASIC METABOLIC PNL TOTAL CA: CPT

## 2019-08-16 PROCEDURE — 500389 HCHG DRAIN, RESERVOIR SUCT JP 100CC: Performed by: SURGERY

## 2019-08-16 PROCEDURE — 160046 HCHG PACU - 1ST 60 MINS PHASE II: Performed by: SURGERY

## 2019-08-16 PROCEDURE — 501838 HCHG SUTURE GENERAL: Performed by: SURGERY

## 2019-08-16 PROCEDURE — 160025 RECOVERY II MINUTES (STATS): Performed by: SURGERY

## 2019-08-16 PROCEDURE — 700102 HCHG RX REV CODE 250 W/ 637 OVERRIDE(OP): Performed by: ANESTHESIOLOGY

## 2019-08-16 PROCEDURE — 501445 HCHG STAPLER, SKIN DISP: Performed by: SURGERY

## 2019-08-16 PROCEDURE — 160009 HCHG ANES TIME/MIN: Performed by: SURGERY

## 2019-08-16 PROCEDURE — 160048 HCHG OR STATISTICAL LEVEL 1-5: Performed by: SURGERY

## 2019-08-16 PROCEDURE — 502594 HCHG SCISSOR HANDLE, HARMONIC ACE: Performed by: SURGERY

## 2019-08-16 PROCEDURE — 700105 HCHG RX REV CODE 258: Performed by: ANESTHESIOLOGY

## 2019-08-16 PROCEDURE — 160039 HCHG SURGERY MINUTES - EA ADDL 1 MIN LEVEL 3: Performed by: SURGERY

## 2019-08-16 PROCEDURE — 700105 HCHG RX REV CODE 258: Performed by: SURGERY

## 2019-08-16 PROCEDURE — 160035 HCHG PACU - 1ST 60 MINS PHASE I: Performed by: SURGERY

## 2019-08-16 PROCEDURE — 160002 HCHG RECOVERY MINUTES (STAT): Performed by: SURGERY

## 2019-08-16 RX ORDER — HALOPERIDOL 5 MG/ML
1 INJECTION INTRAMUSCULAR
Status: DISCONTINUED | OUTPATIENT
Start: 2019-08-16 | End: 2019-08-16 | Stop reason: HOSPADM

## 2019-08-16 RX ORDER — ACETAMINOPHEN 500 MG
1000 TABLET ORAL ONCE
Status: COMPLETED | OUTPATIENT
Start: 2019-08-16 | End: 2019-08-16

## 2019-08-16 RX ORDER — HYDROMORPHONE HYDROCHLORIDE 1 MG/ML
0.2 INJECTION, SOLUTION INTRAMUSCULAR; INTRAVENOUS; SUBCUTANEOUS
Status: DISCONTINUED | OUTPATIENT
Start: 2019-08-16 | End: 2019-08-16 | Stop reason: HOSPADM

## 2019-08-16 RX ORDER — SODIUM CHLORIDE, SODIUM LACTATE, POTASSIUM CHLORIDE, CALCIUM CHLORIDE 600; 310; 30; 20 MG/100ML; MG/100ML; MG/100ML; MG/100ML
INJECTION, SOLUTION INTRAVENOUS CONTINUOUS
Status: DISCONTINUED | OUTPATIENT
Start: 2019-08-16 | End: 2019-08-16 | Stop reason: HOSPADM

## 2019-08-16 RX ORDER — SUCCINYLCHOLINE CHLORIDE 20 MG/ML
INJECTION INTRAMUSCULAR; INTRAVENOUS PRN
Status: DISCONTINUED | OUTPATIENT
Start: 2019-08-16 | End: 2019-08-16 | Stop reason: SURG

## 2019-08-16 RX ORDER — OXYCODONE HCL 5 MG/5 ML
5 SOLUTION, ORAL ORAL
Status: COMPLETED | OUTPATIENT
Start: 2019-08-16 | End: 2019-08-16

## 2019-08-16 RX ORDER — HYDROMORPHONE HYDROCHLORIDE 1 MG/ML
0.1 INJECTION, SOLUTION INTRAMUSCULAR; INTRAVENOUS; SUBCUTANEOUS
Status: DISCONTINUED | OUTPATIENT
Start: 2019-08-16 | End: 2019-08-16 | Stop reason: HOSPADM

## 2019-08-16 RX ORDER — LIDOCAINE HYDROCHLORIDE 20 MG/ML
INJECTION, SOLUTION EPIDURAL; INFILTRATION; INTRACAUDAL; PERINEURAL PRN
Status: DISCONTINUED | OUTPATIENT
Start: 2019-08-16 | End: 2019-08-16 | Stop reason: SURG

## 2019-08-16 RX ORDER — HYDROMORPHONE HYDROCHLORIDE 1 MG/ML
0.4 INJECTION, SOLUTION INTRAMUSCULAR; INTRAVENOUS; SUBCUTANEOUS
Status: DISCONTINUED | OUTPATIENT
Start: 2019-08-16 | End: 2019-08-16 | Stop reason: HOSPADM

## 2019-08-16 RX ORDER — DIPHENHYDRAMINE HYDROCHLORIDE 50 MG/ML
12.5 INJECTION INTRAMUSCULAR; INTRAVENOUS
Status: DISCONTINUED | OUTPATIENT
Start: 2019-08-16 | End: 2019-08-16 | Stop reason: HOSPADM

## 2019-08-16 RX ORDER — MEPERIDINE HYDROCHLORIDE 25 MG/ML
6.25 INJECTION INTRAMUSCULAR; INTRAVENOUS; SUBCUTANEOUS
Status: DISCONTINUED | OUTPATIENT
Start: 2019-08-16 | End: 2019-08-16 | Stop reason: HOSPADM

## 2019-08-16 RX ORDER — ROCURONIUM BROMIDE 10 MG/ML
INJECTION, SOLUTION INTRAVENOUS PRN
Status: DISCONTINUED | OUTPATIENT
Start: 2019-08-16 | End: 2019-08-16 | Stop reason: SURG

## 2019-08-16 RX ORDER — ACETAMINOPHEN 500 MG
500 TABLET ORAL EVERY 6 HOURS PRN
Status: ON HOLD | COMMUNITY
End: 2022-06-15

## 2019-08-16 RX ORDER — LIDOCAINE HYDROCHLORIDE 10 MG/ML
INJECTION, SOLUTION EPIDURAL; INFILTRATION; INTRACAUDAL; PERINEURAL
Status: COMPLETED
Start: 2019-08-16 | End: 2019-08-16

## 2019-08-16 RX ORDER — GABAPENTIN 300 MG/1
300 CAPSULE ORAL ONCE
Status: COMPLETED | OUTPATIENT
Start: 2019-08-16 | End: 2019-08-16

## 2019-08-16 RX ORDER — OXYCODONE HCL 5 MG/5 ML
10 SOLUTION, ORAL ORAL
Status: COMPLETED | OUTPATIENT
Start: 2019-08-16 | End: 2019-08-16

## 2019-08-16 RX ORDER — ONDANSETRON 2 MG/ML
INJECTION INTRAMUSCULAR; INTRAVENOUS PRN
Status: DISCONTINUED | OUTPATIENT
Start: 2019-08-16 | End: 2019-08-16 | Stop reason: SURG

## 2019-08-16 RX ORDER — ONDANSETRON 2 MG/ML
4 INJECTION INTRAMUSCULAR; INTRAVENOUS
Status: COMPLETED | OUTPATIENT
Start: 2019-08-16 | End: 2019-08-16

## 2019-08-16 RX ORDER — DEXAMETHASONE SODIUM PHOSPHATE 4 MG/ML
INJECTION, SOLUTION INTRA-ARTICULAR; INTRALESIONAL; INTRAMUSCULAR; INTRAVENOUS; SOFT TISSUE PRN
Status: DISCONTINUED | OUTPATIENT
Start: 2019-08-16 | End: 2019-08-16 | Stop reason: HOSPADM

## 2019-08-16 RX ORDER — PHENYLEPHRINE HYDROCHLORIDE 10 MG/ML
INJECTION, SOLUTION INTRAMUSCULAR; INTRAVENOUS; SUBCUTANEOUS PRN
Status: DISCONTINUED | OUTPATIENT
Start: 2019-08-16 | End: 2019-08-16 | Stop reason: HOSPADM

## 2019-08-16 RX ORDER — CEFAZOLIN SODIUM 1 G/3ML
INJECTION, POWDER, FOR SOLUTION INTRAMUSCULAR; INTRAVENOUS PRN
Status: DISCONTINUED | OUTPATIENT
Start: 2019-08-16 | End: 2019-08-16 | Stop reason: SURG

## 2019-08-16 RX ADMIN — FENTANYL CITRATE 25 MCG: 50 INJECTION, SOLUTION INTRAMUSCULAR; INTRAVENOUS at 10:10

## 2019-08-16 RX ADMIN — LIDOCAINE HYDROCHLORIDE 0.3 ML: 10 INJECTION, SOLUTION EPIDURAL; INFILTRATION; INTRACAUDAL at 07:17

## 2019-08-16 RX ADMIN — CEFAZOLIN 2 G: 330 INJECTION, POWDER, FOR SOLUTION INTRAMUSCULAR; INTRAVENOUS at 08:26

## 2019-08-16 RX ADMIN — Medication 0.3 ML: at 07:17

## 2019-08-16 RX ADMIN — ONDANSETRON 4 MG: 2 INJECTION INTRAMUSCULAR; INTRAVENOUS at 10:24

## 2019-08-16 RX ADMIN — FENTANYL CITRATE 50 MCG: 50 INJECTION, SOLUTION INTRAMUSCULAR; INTRAVENOUS at 08:29

## 2019-08-16 RX ADMIN — LIDOCAINE HYDROCHLORIDE 5 ML: 20 INJECTION, SOLUTION EPIDURAL; INFILTRATION; INTRACAUDAL at 08:29

## 2019-08-16 RX ADMIN — PHENYLEPHRINE HYDROCHLORIDE 200 MCG: 10 INJECTION INTRAVENOUS at 08:48

## 2019-08-16 RX ADMIN — FENTANYL CITRATE 50 MCG: 50 INJECTION, SOLUTION INTRAMUSCULAR; INTRAVENOUS at 08:45

## 2019-08-16 RX ADMIN — ACETAMINOPHEN 1000 MG: 500 TABLET ORAL at 07:15

## 2019-08-16 RX ADMIN — GABAPENTIN 300 MG: 300 CAPSULE ORAL at 07:15

## 2019-08-16 RX ADMIN — ROCURONIUM BROMIDE 50 MG: 10 INJECTION, SOLUTION INTRAVENOUS at 08:29

## 2019-08-16 RX ADMIN — SUCCINYLCHOLINE CHLORIDE 100 MG: 20 INJECTION, SOLUTION INTRAMUSCULAR; INTRAVENOUS at 08:29

## 2019-08-16 RX ADMIN — FENTANYL CITRATE 25 MCG: 50 INJECTION, SOLUTION INTRAMUSCULAR; INTRAVENOUS at 10:40

## 2019-08-16 RX ADMIN — PHENYLEPHRINE HYDROCHLORIDE 400 MCG: 10 INJECTION INTRAVENOUS at 08:29

## 2019-08-16 RX ADMIN — FENTANYL CITRATE 50 MCG: 50 INJECTION, SOLUTION INTRAMUSCULAR; INTRAVENOUS at 09:32

## 2019-08-16 RX ADMIN — ONDANSETRON 4 MG: 2 INJECTION INTRAMUSCULAR; INTRAVENOUS at 08:29

## 2019-08-16 RX ADMIN — DEXAMETHASONE SODIUM PHOSPHATE 4 MG: 4 INJECTION, SOLUTION INTRA-ARTICULAR; INTRALESIONAL; INTRAMUSCULAR; INTRAVENOUS; SOFT TISSUE at 08:29

## 2019-08-16 RX ADMIN — SODIUM CHLORIDE, POTASSIUM CHLORIDE, SODIUM LACTATE AND CALCIUM CHLORIDE: 600; 310; 30; 20 INJECTION, SOLUTION INTRAVENOUS at 07:18

## 2019-08-16 RX ADMIN — PROPOFOL 100 MG: 10 INJECTION, EMULSION INTRAVENOUS at 08:29

## 2019-08-16 RX ADMIN — SODIUM CHLORIDE, POTASSIUM CHLORIDE, SODIUM LACTATE AND CALCIUM CHLORIDE: 600; 310; 30; 20 INJECTION, SOLUTION INTRAVENOUS at 08:40

## 2019-08-16 RX ADMIN — EPHEDRINE SULFATE 50 MG: 50 INJECTION INTRAMUSCULAR; INTRAVENOUS; SUBCUTANEOUS at 08:51

## 2019-08-16 RX ADMIN — SODIUM CHLORIDE, POTASSIUM CHLORIDE, SODIUM LACTATE AND CALCIUM CHLORIDE: 600; 310; 30; 20 INJECTION, SOLUTION INTRAVENOUS at 10:26

## 2019-08-16 RX ADMIN — OXYCODONE HYDROCHLORIDE 10 MG: 5 SOLUTION ORAL at 10:11

## 2019-08-16 RX ADMIN — FENTANYL CITRATE 25 MCG: 50 INJECTION, SOLUTION INTRAMUSCULAR; INTRAVENOUS at 10:55

## 2019-08-16 RX ADMIN — FENTANYL CITRATE 25 MCG: 50 INJECTION, SOLUTION INTRAMUSCULAR; INTRAVENOUS at 10:16

## 2019-08-16 SDOH — HEALTH STABILITY: MENTAL HEALTH: HOW OFTEN DO YOU HAVE 6 OR MORE DRINKS ON ONE OCCASION?: WEEKLY

## 2019-08-16 ASSESSMENT — PAIN SCALES - GENERAL: PAIN_LEVEL: 0

## 2019-08-16 NOTE — OR NURSING
Received from pacu at 1135.  Dressing dry and intact.  Up to chair and taking po well.  ezra's in place.  Family knows how to empty.

## 2019-08-16 NOTE — ANESTHESIA TIME REPORT
Anesthesia Start and Stop Event Times     Date Time Event    8/16/2019 0743 Ready for Procedure     0826 Anesthesia Start     0948 Anesthesia Stop        Responsible Staff  08/16/19    Name Role Begin End    Edi Parekh D.O. Anesth 0826 0948        Preop Diagnosis (Free Text):  Pre-op Diagnosis     RIGHT BREAST CANCER OVERLAPPING SITES, POST OPERATIVE SEROMAS        Preop Diagnosis (Codes):    Post op Diagnosis  Breast cancer (HCC)  local invasion into chest wall    Premium Reason  Non-Premium    Comments:

## 2019-08-16 NOTE — OR NURSING
Came over from pacu at 1135.  Bp  systolic in high 60 and low 70's.  Sats staying down below 90 without aggresive incentive spirometer. Initially sats 80-88  Then up to 90-92 with pulmonary toilet.  Able to do 500 on incentive spirometer and eventually able to pull 1300 to 1500.  Able to urinate.  Walked 3 times around department.  Slow and steady and states she has walker at home.  On dc stated pain was 3/10 and will take tramadol when she gets home.   States she is ready to go home and home at 1422. Patient promised to stop taking her diuretic and stated she was no longer dizzy like when she first came to pacu 2. . Stated she felt comfortable going home. Anesthesia and surgeon both aware of low BP and is back to baseline.

## 2019-08-16 NOTE — ANESTHESIA PROCEDURE NOTES
Airway  Date/Time: 8/16/2019 8:31 AM  Performed by: Edi Parekh D.O.  Authorized by: Edi Parekh D.O.     Location:  OR  Urgency:  Elective  Indications for Airway Management:  Anesthesia  Spontaneous Ventilation: absent    Sedation Level:  Deep  Preoxygenated: Yes    Patient Position:  Sniffing  Final Airway Type:  Endotracheal airway  Final Endotracheal Airway:  ETT  Cuffed: Yes    Technique Used for Successful ETT Placement:  Direct laryngoscopy  Insertion Site:  Oral  Blade Type:  Venkat  Laryngoscope Blade/Videolaryngoscope Blade Size:  3  ETT Size (mm):  7.0  Measured from:  Teeth  ETT to Teeth (cm):  20  Placement Verified by: auscultation and capnometry    Cormack-Lehane Classification:  Grade I - full view of glottis  Number of Attempts at Approach:  1

## 2019-08-16 NOTE — OR SURGEON
Immediate Post OP Note    PreOp Diagnosis: postoperative seromas  positve superior medial margin right mastectomy   Flap necrosis     PostOp Diagnosis: same    Procedure(s):  WIDE EXCISION, LESION- RE-EXCISION FOR EXCISION FOR MARGINS RIGHT CHEST WALL  - Wound Class: Clean  INCISION AND DRAINAGE- OF BILATERAL CHEST SEROMAS WITH DRAIN PLACEMENT revision advancement flaps 100 sq cm  - Wound Class: Clean    Surgeon(s):  Andre Shelton M.D.    Anesthesiologist/Type of Anesthesia:  Anesthesiologist: Edi Parekh D.O./General    Surgical Staff:  Circulator: Izabela Ulloa R.N.  Relief Scrub: Elidia Toledo  Scrub Person: iLcha Feliz    Specimens removed if any:  ID Type Source Tests Collected by Time Destination   A : SUPERIOR MEDIAL MARGIN, SHORT SUPERIOS, LONG DISTAL Other Other PATHOLOGY SPECIMEN Andre Shelton M.D. 8/16/2019  9:10 AM        Estimated Blood Loss: 100    Findings: same    Complications: 0        8/16/2019 9:37 AM Andre Shelton M.D.

## 2019-08-16 NOTE — ANESTHESIA POSTPROCEDURE EVALUATION
Patient: Rashmi Parra    Procedure Summary     Date:  08/16/19 [facility] / [facility] SURGERY [facility]    Anesthesia Start:  0826 Anesthesia Stop:  0948    Procedures:       WIDE EXCISION, LESION- RE-EXCISION FOR EXCISION FOR MARGINS RIGHT CHEST WALL INSTRMUSCULAR (Right Breast)      INCISION AND DRAINAGE- OF BILATERAL CHEST SEROMAS WITH DRAIN PLACEMENT (Bilateral Breast) Diagnosis:  (RIGHT BREAST CANCER OVERLAPPING SITES, POST OPERATIVE SEROMAS)    Surgeon:  Andre Shelton M.D. Responsible Provider:  Edi Parekh D.O.    Anesthesia Type:  general ASA Status:  3          Final Anesthesia Type: general  Last vitals  BP   NIBP: (!) 75/59    Temp   35.9 °C (96.7 °F)    Pulse   Pulse: 94   Resp   16    SpO2   90 %      Anesthesia Post Evaluation    Patient location during evaluation: bedside  Patient participation: complete - patient participated  Level of consciousness: awake and alert  Pain score: 0    Airway patency: patent  Anesthetic complications: no  Cardiovascular status: adequate  Respiratory status: acceptable  Hydration status: acceptable    PONV: none           Nurse Pain Score: 0 (NPRS)

## 2019-08-16 NOTE — ANESTHESIA PREPROCEDURE EVALUATION
Relevant Problems   ANESTHESIA (within normal limits)      PULMONARY (within normal limits)      NEURO (within normal limits)      CARDIAC (within normal limits)      GI (within normal limits)       (within normal limits)      ENDO (within normal limits)       Physical Exam    Airway   Mallampati: II  TM distance: >3 FB  Neck ROM: limited       Cardiovascular - normal exam  Rhythm: regular  Rate: normal     Dental - normal exam         Pulmonary - normal exam  (+) decreased breath sounds     Abdominal - normal exam     Neurological - normal exam                 Anesthesia Plan    ASA 3       Plan - general       Airway plan will be ETT        Induction: intravenous          Informed Consent:    Anesthetic plan and risks discussed with patient.    Use of blood products discussed with: patient whom.

## 2019-08-16 NOTE — PROGRESS NOTES
Med rec complete per pt at bedside  Interviewed pt with family at bedside with permission from pt  Allergies reviewed and updated.    Pt does not remember last doses of medications.

## 2019-08-16 NOTE — OP REPORT
DATE OF SERVICE:  08/16/2019    PREOPERATIVE DIAGNOSES:  1.  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  2.  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  3.  Bilateral postoperative seromas.    POSTOPERATIVE DIAGNOSES:  1.  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  2.  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  3.  Bilateral postoperative seromas.    OPERATIONS AND PROCEDURES:  1.  Resection of right mastectomy superior medial margin.  2.  Revision of bilateral Y-V advancement flaps over 100 square cm for flap   necrosis.  3.  Incision and drainage of bilateral breast seroma.    SURGEON:  Andre Shelton MD    ANESTHESIOLOGIST:  Edi Parekh DO    OPERATIVE NOTE:  The patient, 79 years of age, underwent bilateral   mastectomies for stage II carcinoma of the right breast.  She was left with a   positive superior medial margin over the deep and superior margin over just   several millimeters.  The patient was felt to be a candidate for reoperation,   established clear surgical margins.  In addition, she had areas of flap   necrosis involving both Y-V advancement flaps, which were felt to be revisable   under anesthesia.  In addition, she had bilateral postoperative seromas after   drain removal and it was felt that incision and drainage replacement of   drainage was indicated.  The risks, possible complications of such operation   were carefully explained to the patient in detail.  She understood and   accepted those risks and wished to proceed.  She had a satisfactory   preoperative evaluation, was taken to the operating room and placed under   anesthesia by Dr. Parekh.  Once anesthetized, her chest wall was sterilely   prepped with ChloraPrep solution, sterile drapes were applied.  A time-out  was   affected.  The patient had this right superior medial margin widely resected   circumferentially involving the medial aspect of the previous incision.  This   was resected full thickness down to the chest wall.  This was done using   combination of sharp dissection and electrocautery.  The patient had this   margin marked short superior, long lateral with suture materials and submitted   for permanent pathology.  The wound was subsequently closed using interrupted   3-0 subdermal tissues and automatic stapling device.  The patient's flaps   were resected over the necrotic areas bilaterally.  On the left, the patient   had a fairly large seroma.  This was drained.  A 19-Burkin[place] round Glen drain   was placed within the cavity and brought out through separate stab incisions   and sewn in place using nylon suture later connected to bulb suction.  This   flap was closed using interrupted subdermal 3-0 Vicryl sutures and automatic   stapling device to the skin.  The patient had a larger area of flap necrosis   on the right and along the inferior aspect of the Y-V advancement flap   extending up to the anterior tip.  This was widely resected over a distance of   about 95 square cm.  This was resected full thickness.  Dog ear deformities   were corrected with triangular resections.  Once this was satisfactorily   resected and hemostasis was obtained, a small seroma on the right side was   drained with a 19-Burkin[place] round Glen drain and similarly tunneled and sewn in   place using nylon suture.  The wound was similarly closed.  The patient had a   Biopatch placement around both drains and Tegaderm dressings.  The wounds   themselves were dressed with Xeroform and soft dressings.  The patient was   awakened, extubated, and taken to recovery room in stable satisfactory   condition.  Estimated blood loss was 100 mL.  Sponge, instrument, and needle   counts reported as correct.  The patient has an adequate supply of  tramadol   for postoperative pain.  She was asked to return to see me in the office in 1   week and final pathology is now pending.  We will determine further treatment.       ____________________________________     MD JOE RIZVI / KHURRAM    DD:  08/16/2019 09:44:43  DT:  08/16/2019 10:52:24    D#:  [id number]  Job#:  [id number]    cc: Edi Parekh DO

## 2019-08-16 NOTE — DISCHARGE INSTRUCTIONS
ACTIVITY: Rest and take it easy for the first 24 hours.  A responsible adult is  recommended to remain with you during that time.  It is normal to feel sleepy.  We encourage you to not do anything that requires balance, judgment or coordination.    MILD FLU-LIKE SYMPTOMS ARE NORMAL. YOU MAY EXPERIENCE GENERALIZED MUSCLE ACHES, THROAT IRRITATION, HEADACHE AND/OR SOME NAUSEA.    FOR 24 HOURS DO NOT:  Drive, operate machinery or run household appliances.  Drink beer or alcoholic beverages.   Make important decisions or sign legal documents.    SPECIAL INSTRUCTIONS:     Discharge Instructions: Caring for Your Lewis-Berrios Drainage Tube  Your doctor discharges you with a Lewis-Berrios drainage tube. Doctors commonly leave this drain within the abdominal cavity after surgery. It helps drain and collect blood and body fluid after surgery. This can prevent swelling and reduces the risk for infection. The tube is held in place by a few stitches. It is covered with a bandage. Your doctor will remove the drain when he or she determines you no longer need it.  Home care  · Don’t sleep on the same side as the tube.  · Secure the tube and bag inside your clothing with a safety pin. This helps keep the tube from being pulled out.  · Empty your drain at least twice a day. Empty it more often if the drain is full. Wash  and dry your hands before emptying the drain.  ? Lift the opening on the drain.  ? Drain the fluid into a measuring cup.  ? Record the amount of fluid each time you empty the drain. Include the date and time it was emptied. Share this information with your doctor on your next visit.  ? Squeeze the bulb with your hands until you hear air coming out of the bulb if your doctor has instructed you to do so (sometimes the bulb is used as a reservoir without suction). Check with your doctor about specific drain instructions.  ? Close the opening.  · Change the dressing around the tube every day.  ? Wash your  hands.  ? Remove the old bandage.  ? Wash your hands again.  ? Wet a cotton swab and clean the skin around the incision and tube site. Use normal saline solution (salt and water). Or, you can use warm, soapy water.  ? Put a new bandage on the incision and tube site. Make the bandage large enough to cover the whole incision area.  ? Tape the bandage in place.  · Keep the bandage and tube site dry when you shower. Ask your healthcare provider about the best way to do this.  · “Stripping” the tube helps keep blood clots from blocking the tube. Ask your nurse how often you should strip the tube. Stripping may not be needed, depending on where and why your doctor placed the tube. It may even be dangerous in some cases.   ? Hold the tubing where it leaves the skin, with one hand. This keeps it from pulling on the skin.  ? Pinch the tubing with the thumb and first finger of your other hand.  ? Slowly and firmly pull your thumb and first finger down the tubing. You may find it helpful to hold an alcohol swab between your fingers and the tube to lubricate the tubing.  ? If the pulling hurts or feels like it’s coming out of the skin, stop. Begin again more gently.  Follow-up care  Make a follow-up appointment as directed by our staff.     When to seek medical care  Call your healthcare provider right away if you have any of the following:  · New or increased pain around the tube  · Redness, swelling, or warmth around the incision or tube  · Drainage that is foul-smelling  · Vomiting  · Fever of 100.4°F (38°C)  · Fluid leaking around the tube  · Incision seems not to be healing  · Stitches become loose  · Tube falls out or breaks  · Drainage that changes from light pink to dark red  · Blood clots in the drainage bulb  · A sudden increase or decrease in the amount of drainage (over 30 mL)         DIET: To avoid nausea, slowly advance diet as tolerated, avoiding spicy or greasy foods for the first day.  Add more substantial food  to your diet according to your physician's instructions.  Babies can be fed formula or breast milk as soon as they are hungry.  INCREASE FLUIDS AND FIBER TO AVOID CONSTIPATION.    SURGICAL DRESSING/BATHING: *keep dressings clean and dry*    FOLLOW-UP APPOINTMENT:  A follow-up appointment should be arranged with your doctor in *1 week*; call to schedule.    You should CALL YOUR PHYSICIAN if you develop:  Fever greater than 101 degrees F.  Pain not relieved by medication, or persistent nausea or vomiting.  Excessive bleeding (blood soaking through dressing) or unexpected drainage from the wound.  Extreme redness or swelling around the incision site, drainage of pus or foul smelling drainage.  Inability to urinate or empty your bladder within 8 hours.  Problems with breathing or chest pain.    You should call 911 if you develop problems with breathing or chest pain.  If you are unable to contact your doctor or surgical center, you should go to the nearest emergency room or urgent care center.  Physician's telephone #: *Dr. Shelton [phone]*    If any questions arise, call your doctor.  If your doctor is not available, please feel free to call the Surgical Center at [phone].  The Center is open Monday through Friday from 7AM to 7PM.  You can also call the [employer] HOTLINE open 24 hours/day, 7 days/week and speak to a nurse at [phone], or toll free at [phone].    A registered nurse may call you a few days after your surgery to see how you are doing after your procedure.    MEDICATIONS: Resume taking daily medication.  Take prescribed pain medication with food.  If no medication is prescribed, you may take non-aspirin pain medication if needed.  PAIN MEDICATION CAN BE VERY CONSTIPATING.  Take a stool softener or laxative such as senokot, pericolace, or milk of magnesia if needed.    Prescription given for *has at home**.  Last pain medication given at *10:11am*.    If your physician has prescribed pain  medication that includes Acetaminophen (Tylenol), do not take additional Acetaminophen (Tylenol) while taking the prescribed medication.    Depression / Suicide Risk    As you are discharged from this [facility] facility, it is important to learn how to keep safe from harming yourself.    Recognize the warning signs:  · Abrupt changes in personality, positive or negative- including increase in energy   · Giving away possessions  · Change in eating patterns- significant weight changes-  positive or negative  · Change in sleeping patterns- unable to sleep or sleeping all the time   · Unwillingness or inability to communicate  · Depression  · Unusual sadness, discouragement and loneliness  · Talk of wanting to die  · Neglect of personal appearance   · Rebelliousness- reckless behavior  · Withdrawal from people/activities they love  · Confusion- inability to concentrate     If you or a loved one observes any of these behaviors or has concerns about self-harm, here's what you can do:  · Talk about it- your feelings and reasons for harming yourself  · Remove any means that you might use to hurt yourself (examples: pills, rope, extension cords, firearm)  · Get professional help from the community (Mental Health, Substance Abuse, psychological counseling)  · Do not be alone:Call your Safe Contact- someone whom you trust who will be there for you.  · Call your local CRISIS HOTLINE 932-3391 or 132-395-1695  · Call your local Children's Mobile Crisis Response Team [place] (979) 872-2070 or www.[employer]  · Call the toll free National Suicide Prevention Hotlines   · National Suicide Prevention Lifeline 445-505-RZKH (5913)  · National Hope Line Network 800-SUICIDE (177-8501)

## 2019-08-26 ENCOUNTER — APPOINTMENT (RX ONLY)
Dept: URBAN - METROPOLITAN AREA CLINIC 36 | Facility: CLINIC | Age: 79
Setting detail: DERMATOLOGY
End: 2019-08-26

## 2019-09-05 ENCOUNTER — HOSPITAL ENCOUNTER (OUTPATIENT)
Dept: RADIATION ONCOLOGY | Facility: MEDICAL CENTER | Age: 79
End: 2019-09-30
Attending: RADIOLOGY
Payer: MEDICARE

## 2019-09-05 ENCOUNTER — DOCUMENTATION (OUTPATIENT)
Dept: HEMATOLOGY ONCOLOGY | Facility: MEDICAL CENTER | Age: 79
End: 2019-09-05

## 2019-09-05 VITALS
SYSTOLIC BLOOD PRESSURE: 102 MMHG | DIASTOLIC BLOOD PRESSURE: 68 MMHG | HEART RATE: 94 BPM | OXYGEN SATURATION: 90 % | WEIGHT: 161 LBS | TEMPERATURE: 97.5 F | BODY MASS INDEX: 27.64 KG/M2

## 2019-09-05 PROCEDURE — 99205 OFFICE O/P NEW HI 60 MIN: CPT | Performed by: RADIOLOGY

## 2019-09-05 PROCEDURE — 99214 OFFICE O/P EST MOD 30 MIN: CPT | Performed by: RADIOLOGY

## 2019-09-05 ASSESSMENT — PAIN SCALES - GENERAL: PAINLEVEL: NO PAIN

## 2019-09-05 NOTE — NON-PROVIDER
Patient was seen today in clinic with Dr. Martinez for Consult.  Vitals signs and weight were obtained and pain assessment was completed.  Allergies and medications were reviewed with the patient.  Review of systems completed.     Vitals/Pain:  Vitals:    09/05/19 0851   BP: 102/68   Pulse: 94   Temp: 36.4 °C (97.5 °F)   SpO2: 90%   Weight: 73 kg (161 lb)   Pain Score: No pain        Allergies:   Patient has no known allergies.    Current Medications:  Current Outpatient Medications   Medication Sig Dispense Refill   • Cholecalciferol (VITAMIN D PO) Take  by mouth.     • Cyanocobalamin (B-12 PO) Take 1 Tab by mouth every day.     • acetaminophen (TYLENOL) 500 MG Tab Take 500 mg by mouth every 6 hours as needed.     • spironolactone (ALDACTONE) 25 MG Tab Take 25 mg by mouth every day.     • lovastatin (MEVACOR) 20 MG Tab Take 20 mg by mouth every evening.     • Calcium Citrate (CITRACAL PO) Take 1 Tab by mouth every day.     • B Complex Vitamins (B COMPLEX PO) Take 1 Tab by mouth every day.     • SELENIUM PO Take 1 Tab by mouth every day.     • polyethylene glycol/lytes (MIRALAX) Pack Take 17 g by mouth every day.     • Lifitegrast (XIIDRA) 5 % Solution Place 1 Drop in both eyes every bedtime.     • ibuprofen (MOTRIN) 200 MG Tab Take 200 mg by mouth every 6 hours as needed for Mild Pain.     • Melatonin 5 MG Tab Take 0.25 Tabs by mouth every bedtime.     • docusate sodium (COLACE) 100 MG Cap Take 100 mg by mouth 1 time daily as needed for Constipation.     • metFORMIN (GLUCOPHAGE) 500 MG Tab Take 500 mg by mouth every evening.     • Mirabegron ER (MYRBETRIQ) 50 MG TABLET SR 24 HR Take 50 mg by mouth every evening.     • Dexlansoprazole (DEXILANT) 60 MG CAPSULE DELAYED RELEASE delayed-release capsule Take 60 mg by mouth every evening.     • Multiple Vitamins-Minerals (MULTIVITAMIN PO) Take 1 Tab by mouth every day.     • MAGNESIUM PO Take 1 Cap by mouth every day.       No current facility-administered medications for  this encounter.          PCP:  Kade Renner, Med Ass't

## 2019-09-05 NOTE — PROGRESS NOTES
RADIATION ONCOLOGY CONSULT    DATE OF SERVICE: 9/5/2019    IDENTIFICATION: A 79 y.o. female with  a T2-T3N0 grade I,  ER/MT positive HER-2/liz negative invasive ductal carcinoma presenting with skin redness, lymph vascular invasion and dermal lymph vascular invasion S/P MRM with initial positive margin status post re-resection with a 2 mm margin on the anterior margin.  She is here at the kind request of Dr. Velazquez.      HISTORY OF PRESENT ILLNESS: Patient initially presented with a sore bump on the medial inferior aspect of the right breast.  It was thought to possibly be an inflamed sebaceous cyst.  She underwent a mammogram and ultrasound on 5/24/2019.  The mammogram was negative but the ultrasound did show some skin thickening on the right breast.  She was then subsequently referred to Dr. Shelton and she underwent an excisional biopsy on 7/2/2019.  Tumor came back a grade 1 invasive ductal carcinoma 4.1 cm in greatest dimension with positive margins.  There was dermal lymphatic invasion and lymphovascular invasion.  Tumor was ER MT positive at greater than 90%.  HER-2/liz was IHC 0 Ki-67 was 10%.  She then elected to undergo bilateral mastectomies and sentinel node dissection on 7/17/2019.  The left breast was negative the right breast showed a grade 1 invasive ductal carcinoma with 1.4 cm there was a positive superior medial skin margin with the breath of 5 mm and a positive deep margin.  4 nodes were taken out in the right axilla all were negative.  She then underwent a re-resection of the anterior margin on 8/16/2019 and there was greater than 2 mm of margin on that specimen but there was residual disease.  She is now seen in consultation about the possibility of radiation therapy to decrease her chance of local recurrence.  She has seen Dr. Velazquez and an aromatase inhibitor is also recommended.    PAST MEDICAL HISTORY:   Past Medical History:   Diagnosis Date   • Arthritis     osteo, generalized   • Bowel  "habit changes     constipation   • Breath shortness    • Cataract     removed bilat   • COPD (chronic obstructive pulmonary disease) (HCC)    • Diabetes (HCC)     \"pre\", oral meds   • Heart burn    • Heart murmur    • Hiatus hernia syndrome    • High cholesterol    • Hypertension    • Indigestion    • Psychiatric problem 2015    anxiety   • Snoring     no sleep study   • Urinary incontinence        PAST SURGICAL HISTORY:  Past Surgical History:   Procedure Laterality Date   • WIDE EXCISION Right 8/16/2019    Procedure: WIDE EXCISION, LESION- RE-EXCISION FOR EXCISION FOR MARGINS RIGHT CHEST WALL INSTRMUSCULAR;  Surgeon: Andre Shelton M.D.;  Location: [facility];  Service: General   • INCISION AND DRAINAGE GENERAL Bilateral 8/16/2019    Procedure: INCISION AND DRAINAGE- OF BILATERAL CHEST SEROMAS WITH DRAIN PLACEMENT;  Surgeon: Andre Shelton M.D.;  Location: [facility];  Service: General   • PB MASTECTOMY, MODIFIED RADICAL Right 7/17/2019    Procedure: MASTECTOMY, MODIFIED RADICAL;  Surgeon: Andre Shelton M.D.;  Location: [facility];  Service: General   • NODE BIOPSY SENTINEL Right 7/17/2019    Procedure: INTRA OPERATIVE SENTINEL NODE IDENTIFICATION AND BIOPSY;  Surgeon: Andre Shelton M.D.;  Location: [facility];  Service: General   • AXILLARY NODE DISSECTION  7/17/2019    Procedure: LYMPHADENECTOMY, AXILLARY;  Surgeon: Andre Shelton M.D.;  Location: [facility];  Service: General   • MASTECTOMY Left 7/17/2019    Procedure: TOTAL MASTECTOMY;  Surgeon: Andre Shelton M.D.;  Location: [facility];  Service: General   • FLAP CLOSURE Bilateral 7/17/2019    Procedure: WIDE V FLAP;  Surgeon: Andre Shelton M.D.;  Location: [facility];  Service: General   • LEFORT COLPOCLESIS  1/21/2019    Procedure: LEFORT COLPOCLESIS;  Surgeon: Minnie Bañuelos M.D.;  Location: [facility];  Service: Labor and Delivery   • BLADDER " SLING FEMALE  2019    Procedure: BLADDER SLING FEMALE- TOT;  Surgeon: Minnie Bañuelos M.D.;  Location: [facility];  Service: Labor and Delivery   • KNEE ARTHROPLASTY TOTAL Right 2015    Procedure: KNEE ARTHROPLASTY TOTAL;  Surgeon: Venkatesh Cardoza M.D.;  Location: SUR[facility];  Service:    • OTHER ORTHOPEDIC SURGERY      L ankle   • GYN SURGERY      abdominal hysterectomy   • CHOLECYSTECTOMY     • CATARACT EXTRACTION WITH IOL Bilateral        GYNECOLOGICAL STATUS:   she was on hormone replacement for 10 years and on vaginal estrogen from 2018 until diagnosis.    CURRENT MEDICATIONS:  Current Outpatient Medications   Medication Sig Dispense Refill   • Cholecalciferol (VITAMIN D PO) Take  by mouth.     • Cyanocobalamin (B-12 PO) Take 1 Tab by mouth every day.     • acetaminophen (TYLENOL) 500 MG Tab Take 500 mg by mouth every 6 hours as needed.     • spironolactone (ALDACTONE) 25 MG Tab Take 25 mg by mouth every day.     • lovastatin (MEVACOR) 20 MG Tab Take 20 mg by mouth every evening.     • Calcium Citrate (CITRACAL PO) Take 1 Tab by mouth every day.     • B Complex Vitamins (B COMPLEX PO) Take 1 Tab by mouth every day.     • SELENIUM PO Take 1 Tab by mouth every day.     • polyethylene glycol/lytes (MIRALAX) Pack Take 17 g by mouth every day.     • Lifitegrast (XIIDRA) 5 % Solution Place 1 Drop in both eyes every bedtime.     • ibuprofen (MOTRIN) 200 MG Tab Take 200 mg by mouth every 6 hours as needed for Mild Pain.     • Melatonin 5 MG Tab Take 0.25 Tabs by mouth every bedtime.     • docusate sodium (COLACE) 100 MG Cap Take 100 mg by mouth 1 time daily as needed for Constipation.     • metFORMIN (GLUCOPHAGE) 500 MG Tab Take 500 mg by mouth every evening.     • Mirabegron ER (MYRBETRIQ) 50 MG TABLET SR 24 HR Take 50 mg by mouth every evening.     • Dexlansoprazole (DEXILANT) 60 MG CAPSULE DELAYED RELEASE delayed-release capsule Take 60 mg by mouth every  evening.     • Multiple Vitamins-Minerals (MULTIVITAMIN PO) Take 1 Tab by mouth every day.     • MAGNESIUM PO Take 1 Cap by mouth every day.       No current facility-administered medications for this encounter.        ALLERGIES:    Patient has no known allergies.    FAMILY HISTORY:    Mother  of liver cancer age 46.  Her maternal grandfather  at age 52 of some type of abdominal cancer        SOCIAL HISTORY:     reports that she quit smoking about 27 years ago. Her smoking use included cigarettes. She has a 30.00 pack-year smoking history. She has never used smokeless tobacco. She reports that she drinks about 0.6 oz of alcohol per week. She reports that she does not use drugs.  She lives with her  who has Parkinson's and she has a very supportive family.    REVIEW OF SYSTEMS: Pertinent positives consist of she had weight loss since the surgery, decrease in appetite fatigue diabetes and she is also had hot flashes since she stopped the vaginal estrogen.  She has taste change dry mouth change in voice dental issues.  She has hearing loss left greater than right occasional vertigo blurred vision double vision visual difficulties and she has had cataract surgery.  She has neck stiffness dizziness unsteady gait.  She is had memory loss which she says is worse and she is had the surgeries.  She has occasional shortness of breath on exertion.  She has depression and anxiety which is chronic but worsened with the diagnosis  The rest of the review of systems is negative and has been reviewed by me. It is in the nursing note dated 2019 in [facility]    Patient has no pain.      PHYSICAL EXAM:    1= Restricted in physically strenuous activity, but ambulatory and able to carry out work of a light sedentary nature, e.g., light housework, office work.  Vitals:    19 0851   BP: 102/68   Pulse: 94   Temp: 36.4 °C (97.5 °F)   SpO2: 90%   Weight: 73 kg (161 lb)   Pain Score: No pain        GENERAL: Well-appearing  alert and oriented x3 appearing younger than her stated age of 79  Chest wall: Bilateral mastectomies that she has more swelling on the left side laterally and she has an open dehiscence on the left mastectomy scar.  The right is smooth she has postsurgical changes bilaterally there is no evidence of nodularity or mass appreciated.  HEENT:  Pupils are equal, round, and reactive to light.  Extraocular muscles   are intact. Sclerae nonicteric.  Conjunctivae pink.  Oral cavity, tongue   protrudes midline.   NECK:   No peripheral adenopathy of the neck, supraclavicular fossa or axillae   bilaterally.  LUNGS:  Clear to ascultation   HEART:  Regular rate and rhythm.  No murmur appreciated  ABDOMEN:  Soft. No evidence of hepatosplenomegaly.    EXTREMITIES: 1+ edema in the lower extremities she also has stasis changes.  She also has a resection site of a skin cancer on the medial upper aspect of the left calf  NEUROLOGIC:  Cranial nerves II through XII were intact. Normal stance and gait motor and sensory grossly within normal limits        IMPRESSION:    A 79 y.o. with   a T2-T3N0 grade I,  ER/NY positive HER-2/liz negative invasive ductal carcinoma presenting with skin redness, lymph vascular invasion and dermal lymph vascular invasion S/P MRM with initial positive margin status post re-resection with a 2 mm margin on the anterior margin..      RECOMMENDATIONS:   I had a long discussion with the patient her  and daughter regarding radiation therapy.  I am very concerned about local recurrence mainly because of the extent of the tumor, the extent of lymph vascular invasion in the dermal lymphatic invasion seen on the initial biopsy specimen.  I am recommending radiation therapy to decrease her chance of local recurrence.  I've described the details of radiation along with the side effects both acute and chronic, including but not exclusive to fatigue, skin reaction, local soreness, swelling, delayed healing,  scarring fibrosis discoloration. Ample time was allowed for questions, and patient understands.    We are going to wait a few weeks because of healing issues and she is scheduled for simulation on the 20th of this month.    Thank you for the opportunity to participate in her care.  If any questions or comments, please do not hesitate in calling.    Please note that this dictation was created using voice recognition software. I have made every reasonable attempt to correct obvious errors, but I expect that there are errors of grammar and possibly content that I did not discover before finalizing the note.

## 2019-09-06 NOTE — PROGRESS NOTES
Late entry from brief visit 9/5/2019    Nutrition Services: New XRT  79 year old female with diagnosis of breast cancer.  Scheduled for SIM 9/20.  Patient and her family stopped me in the hallway yesterday following consult with Dr. Martinez and requested information for a healthy diet.  Provided handouts and briefly talked through tips.     RD will follow-up for more formal consult either day of SIM or when XRT starts.  Please have patient contact us as needed until then: [phone]

## 2019-09-10 ENCOUNTER — APPOINTMENT (RX ONLY)
Dept: URBAN - METROPOLITAN AREA CLINIC 4 | Facility: CLINIC | Age: 79
Setting detail: DERMATOLOGY
End: 2019-09-10

## 2019-09-10 DIAGNOSIS — Z85.828 PERSONAL HISTORY OF OTHER MALIGNANT NEOPLASM OF SKIN: ICD-10-CM

## 2019-09-10 DIAGNOSIS — D22 MELANOCYTIC NEVI: ICD-10-CM

## 2019-09-10 DIAGNOSIS — L85.3 XEROSIS CUTIS: ICD-10-CM

## 2019-09-10 DIAGNOSIS — L81.4 OTHER MELANIN HYPERPIGMENTATION: ICD-10-CM

## 2019-09-10 DIAGNOSIS — D18.0 HEMANGIOMA: ICD-10-CM

## 2019-09-10 DIAGNOSIS — L57.0 ACTINIC KERATOSIS: ICD-10-CM

## 2019-09-10 DIAGNOSIS — L82.1 OTHER SEBORRHEIC KERATOSIS: ICD-10-CM

## 2019-09-10 PROBLEM — D22.61 MELANOCYTIC NEVI OF RIGHT UPPER LIMB, INCLUDING SHOULDER: Status: ACTIVE | Noted: 2019-09-10

## 2019-09-10 PROBLEM — D22.5 MELANOCYTIC NEVI OF TRUNK: Status: ACTIVE | Noted: 2019-09-10

## 2019-09-10 PROBLEM — D22.71 MELANOCYTIC NEVI OF RIGHT LOWER LIMB, INCLUDING HIP: Status: ACTIVE | Noted: 2019-09-10

## 2019-09-10 PROBLEM — D18.01 HEMANGIOMA OF SKIN AND SUBCUTANEOUS TISSUE: Status: ACTIVE | Noted: 2019-09-10

## 2019-09-10 PROBLEM — D22.72 MELANOCYTIC NEVI OF LEFT LOWER LIMB, INCLUDING HIP: Status: ACTIVE | Noted: 2019-09-10

## 2019-09-10 PROBLEM — D22.62 MELANOCYTIC NEVI OF LEFT UPPER LIMB, INCLUDING SHOULDER: Status: ACTIVE | Noted: 2019-09-10

## 2019-09-10 PROCEDURE — ? SUNSCREEN RECOMMENDATIONS

## 2019-09-10 PROCEDURE — ? COUNSELING

## 2019-09-10 PROCEDURE — ? LIQUID NITROGEN

## 2019-09-10 PROCEDURE — 17000 DESTRUCT PREMALG LESION: CPT

## 2019-09-10 PROCEDURE — ? ADDITIONAL NOTES

## 2019-09-10 PROCEDURE — 17003 DESTRUCT PREMALG LES 2-14: CPT

## 2019-09-10 PROCEDURE — 99214 OFFICE O/P EST MOD 30 MIN: CPT | Mod: 25

## 2019-09-10 ASSESSMENT — LOCATION DETAILED DESCRIPTION DERM
LOCATION DETAILED: RIGHT SUPERIOR MEDIAL MIDBACK
LOCATION DETAILED: PERIUMBILICAL SKIN
LOCATION DETAILED: LEFT SUPERIOR PARIETAL SCALP
LOCATION DETAILED: RIGHT ANTERIOR PROXIMAL THIGH
LOCATION DETAILED: LEFT CENTRAL ZYGOMA
LOCATION DETAILED: LEFT ULNAR DORSAL HAND
LOCATION DETAILED: LEFT PROXIMAL POSTERIOR UPPER ARM
LOCATION DETAILED: LEFT SUPERIOR MEDIAL UPPER BACK
LOCATION DETAILED: RIGHT DISTAL POSTERIOR UPPER ARM
LOCATION DETAILED: LEFT CENTRAL MALAR CHEEK
LOCATION DETAILED: LEFT DISTAL PRETIBIAL REGION
LOCATION DETAILED: LEFT PROXIMAL DORSAL FOREARM
LOCATION DETAILED: UPPER STERNUM
LOCATION DETAILED: LEFT ANTERIOR PROXIMAL THIGH
LOCATION DETAILED: RIGHT RADIAL DORSAL HAND
LOCATION DETAILED: SUPERIOR THORACIC SPINE
LOCATION DETAILED: LEFT FOREHEAD
LOCATION DETAILED: RIGHT CENTRAL MALAR CHEEK
LOCATION DETAILED: RIGHT RIB CAGE
LOCATION DETAILED: LEFT INFERIOR ANTERIOR NECK
LOCATION DETAILED: RIGHT PROXIMAL DORSAL FOREARM

## 2019-09-10 ASSESSMENT — LOCATION SIMPLE DESCRIPTION DERM
LOCATION SIMPLE: LEFT ANTERIOR NECK
LOCATION SIMPLE: RIGHT THIGH
LOCATION SIMPLE: SCALP
LOCATION SIMPLE: LEFT UPPER BACK
LOCATION SIMPLE: LEFT HAND
LOCATION SIMPLE: RIGHT LOWER BACK
LOCATION SIMPLE: RIGHT HAND
LOCATION SIMPLE: LEFT CHEEK
LOCATION SIMPLE: LEFT PRETIBIAL REGION
LOCATION SIMPLE: UPPER BACK
LOCATION SIMPLE: RIGHT FOREARM
LOCATION SIMPLE: LEFT POSTERIOR UPPER ARM
LOCATION SIMPLE: LEFT ZYGOMA
LOCATION SIMPLE: LEFT FOREHEAD
LOCATION SIMPLE: RIGHT CHEEK
LOCATION SIMPLE: LEFT FOREARM
LOCATION SIMPLE: CHEST
LOCATION SIMPLE: ABDOMEN
LOCATION SIMPLE: RIGHT POSTERIOR UPPER ARM
LOCATION SIMPLE: LEFT THIGH

## 2019-09-10 ASSESSMENT — LOCATION ZONE DERM
LOCATION ZONE: TRUNK
LOCATION ZONE: HAND
LOCATION ZONE: ARM
LOCATION ZONE: LEG
LOCATION ZONE: SCALP
LOCATION ZONE: NECK
LOCATION ZONE: FACE

## 2019-09-10 NOTE — PROCEDURE: ADDITIONAL NOTES
Detail Level: Detailed
Additional Notes: Was excised by Dr Ralph, due to patients breast cancer diagnosis.
Additional Notes: Treated with LN2
Detail Level: Simple

## 2019-09-10 NOTE — HPI: FULL BODY SKIN EXAMINATION
How Severe Are Your Spot(S)?: moderate
What Type Of Note Output Would You Prefer (Optional)?: Standard Output
What Is The Reason For Today's Visit?: Full Body Skin Examination
What Is The Reason For Today's Visit? (Being Monitored For X): concerning skin lesions on an annual basis
Additional History: Dry, rough skin on left shin for 3 months. FBE.

## 2019-09-20 ENCOUNTER — HOSPITAL ENCOUNTER (OUTPATIENT)
Dept: RADIATION ONCOLOGY | Facility: MEDICAL CENTER | Age: 79
End: 2019-09-20

## 2019-09-20 ENCOUNTER — DOCUMENTATION (OUTPATIENT)
Dept: HEMATOLOGY ONCOLOGY | Facility: MEDICAL CENTER | Age: 79
End: 2019-09-20

## 2019-09-20 PROCEDURE — 77263 THER RADIOLOGY TX PLNG CPLX: CPT | Performed by: RADIOLOGY

## 2019-09-20 PROCEDURE — 77290 THER RAD SIMULAJ FIELD CPLX: CPT | Performed by: RADIOLOGY

## 2019-09-20 PROCEDURE — 77334 RADIATION TREATMENT AID(S): CPT | Performed by: RADIOLOGY

## 2019-09-20 PROCEDURE — 77334 RADIATION TREATMENT AID(S): CPT | Mod: 26 | Performed by: RADIOLOGY

## 2019-09-20 PROCEDURE — 77290 THER RAD SIMULAJ FIELD CPLX: CPT | Mod: 26 | Performed by: RADIOLOGY

## 2019-09-20 NOTE — PROGRESS NOTES
"Nutrition Services:  New XRT Patient  79 year old female with diagnosis of breast cancer - T2-T3N0 grade I,  ER/AR positive HER-2/liz.    Past Medical History:   Diagnosis Date   • Arthritis     osteo, generalized   • Bowel habit changes     constipation   • Breath shortness    • Cataract     removed bilat   • COPD (chronic obstructive pulmonary disease) (HCC)    • Diabetes (HCC)     \"pre\", oral meds   • Heart burn    • Heart murmur    • Hiatus hernia syndrome    • High cholesterol    • Hypertension    • Indigestion    • Psychiatric problem 2015    anxiety   • Snoring     no sleep study   • Urinary incontinence      I visited with patient and her family chairside this afternoon following SIM appointment.  Patient reports that she has lost weight since May.  She reports that prior to diagnosis her UBW was around 170-172 pounds in May of this year.  She reports a fair appetite and that she eats three meals per day and typically has an evening snack or dessert.  No GI distress or N/V/C/D reported. She does take miralax fairly regularly per her report. Additionally she drinks a pea protein based protein powder most days, 1x/day.  She reports that she was told to lose weight.  I discussed RD role in IFC and nutrition goals during treatment. We discussed the benefit of small, frequent meals and snacks focusing on high calorie high protein and nutrient dense items as well as the benefits of maintaining weight and lean muscle mass throughout treatment.  Provided patient with handouts on topics disused as well as a cook book with nutricius recipes.  Encouraged patient and family to attend cancer survivorship nutrition program.     Assessment:  • Pertinent Labs: No chem panel in the last 2 weeks; A1c 12/28/18: 7.4  • Pertinent Meds: Vitamin B complex, calcium citrate, Vitamin D, colace, lovastatin, magnesium, metformin, multivitamin, miralax, tramadol  • S/P bilateral mastectomies 7/17/19    Weight: 9/5 = 73 kg/161 " pounds  Height: 5'3'' (Per ID)  BMI: 28.5 - Considered overweight however weight loss not recommended during treatment.     Weight Change/Malnutrition Risk:   Patient with 11 pound/6.3% weight loss in the last 4 months which is non-severe and considered insignificant but worth noting. Some of patients weight loss likely related to bilateral mastectomy. No indication of malnutrition at this time.     PLAN/RECOMMEND -   • Focus on high protein and nutrient rich foods - handout provided.  • Incorporate protein shake daily - provided recipe ideas and recipe book.  • Continue with current daily nutrition routine and contact RD should questions or concerns arise.     RD monitoring

## 2019-09-26 PROCEDURE — 77295 3-D RADIOTHERAPY PLAN: CPT | Mod: 26 | Performed by: RADIOLOGY

## 2019-09-26 PROCEDURE — 77334 RADIATION TREATMENT AID(S): CPT | Mod: 26 | Performed by: RADIOLOGY

## 2019-09-26 PROCEDURE — 77300 RADIATION THERAPY DOSE PLAN: CPT | Performed by: RADIOLOGY

## 2019-09-26 PROCEDURE — 77300 RADIATION THERAPY DOSE PLAN: CPT | Mod: 26,59 | Performed by: RADIOLOGY

## 2019-09-26 PROCEDURE — 77295 3-D RADIOTHERAPY PLAN: CPT | Performed by: RADIOLOGY

## 2019-09-26 PROCEDURE — 77334 RADIATION TREATMENT AID(S): CPT | Performed by: RADIOLOGY

## 2019-09-30 ENCOUNTER — DOCUMENTATION (OUTPATIENT)
Dept: HEMATOLOGY ONCOLOGY | Facility: MEDICAL CENTER | Age: 79
End: 2019-09-30

## 2019-09-30 ENCOUNTER — HOSPITAL ENCOUNTER (OUTPATIENT)
Dept: RADIATION ONCOLOGY | Facility: MEDICAL CENTER | Age: 79
End: 2019-09-30

## 2019-09-30 LAB
CHEMOTHERAPY INFUSION START DATE: NORMAL
CHEMOTHERAPY RECORDS: 1.8
CHEMOTHERAPY RECORDS: 2520
CHEMOTHERAPY RECORDS: NORMAL
CHEMOTHERAPY RX CANCER: NORMAL
RAD ONC ARIA COURSE TREATMENT ELAPSED DAYS: NORMAL
RAD ONC ARIA PLAN TREATMENT DATES: NORMAL
RAD ONC ARIA REFERENCE POINT DOSAGE GIVEN TO DATE: 1.8
RAD ONC ARIA REFERENCE POINT DOSAGE GIVEN TO DATE: 1.8
RAD ONC ARIA REFERENCE POINT ID: NORMAL
RAD ONC ARIA REFERENCE POINT ID: NORMAL
RAD ONC ARIA REFERENCE POINT SESSION DOSAGE GIVEN: 1.8
RAD ONC ARIA REFERENCE POINT SESSION DOSAGE GIVEN: 1.8

## 2019-09-30 PROCEDURE — 77412 RADIATION TX DELIVERY LVL 3: CPT | Performed by: RADIOLOGY

## 2019-09-30 PROCEDURE — 77280 THER RAD SIMULAJ FIELD SMPL: CPT | Performed by: RADIOLOGY

## 2019-09-30 PROCEDURE — 77280 THER RAD SIMULAJ FIELD SMPL: CPT | Mod: 26 | Performed by: RADIOLOGY

## 2019-09-30 PROCEDURE — 77417 THER RADIOLOGY PORT IMAGE(S): CPT | Performed by: RADIOLOGY

## 2019-10-01 ENCOUNTER — HOSPITAL ENCOUNTER (OUTPATIENT)
Dept: RADIATION ONCOLOGY | Facility: MEDICAL CENTER | Age: 79
End: 2019-10-01

## 2019-10-01 ENCOUNTER — HOSPITAL ENCOUNTER (OUTPATIENT)
Dept: RADIATION ONCOLOGY | Facility: MEDICAL CENTER | Age: 79
End: 2019-10-31
Attending: RADIOLOGY
Payer: MEDICARE

## 2019-10-01 LAB
CHEMOTHERAPY INFUSION START DATE: NORMAL
CHEMOTHERAPY RECORDS: 1.8
CHEMOTHERAPY RECORDS: 2520
CHEMOTHERAPY RECORDS: NORMAL
CHEMOTHERAPY RX CANCER: NORMAL
RAD ONC ARIA COURSE TREATMENT ELAPSED DAYS: NORMAL
RAD ONC ARIA PLAN TREATMENT DATES: NORMAL
RAD ONC ARIA REFERENCE POINT DOSAGE GIVEN TO DATE: 1.8
RAD ONC ARIA REFERENCE POINT DOSAGE GIVEN TO DATE: 3.6
RAD ONC ARIA REFERENCE POINT ID: NORMAL
RAD ONC ARIA REFERENCE POINT ID: NORMAL
RAD ONC ARIA REFERENCE POINT SESSION DOSAGE GIVEN: 1.8
RAD ONC ARIA REFERENCE POINT SESSION DOSAGE GIVEN: 1.8

## 2019-10-01 PROCEDURE — 77417 THER RADIOLOGY PORT IMAGE(S): CPT | Performed by: RADIOLOGY

## 2019-10-01 PROCEDURE — 77412 RADIATION TX DELIVERY LVL 3: CPT | Performed by: RADIOLOGY

## 2019-10-01 NOTE — PROGRESS NOTES
Nutrition Services: Brief Update  Weight: 160 lbs, weighed in radiation   Previous weight: 161 lbs (9/5/19)  Weight Change: weight has been stable for past month.    RD met with pt, accompanied by pt , to discuss adequate nutrition. Pt has many questions regarding healthy vs unhealthy foods and how much to have. Asked questions about exercise-currently walking occasionally, protein sources, and deli meats. Denies n/v/d/c, does state that has not had treatment yet. RD clarifies that it is good to establish a baseline and target any issues if they are already present. Pt states appetite is still good and continues to drink pea protein smoothie. Also making sandwiches, having Greek yogurt with fruit, and oatmeal with nuts.     Plan/Recommend:  • Recommended balanced diet vs seeing food as unhealthy or healthy. Emphasized that maintaining muscle mass/strength during treatment process as most important. Discussed pea protein as suitable protein source.   • Discussed walking as means to help maintain muscle mass, encourage as tolerated.  • Recommended continue with pea protein, provided handout with other high protein foods  • Provided RD contact information, encouraged to call if questions/issues arise.     RD to continue to monitor throughout treatment.  Please contact [phone]

## 2019-10-02 LAB
CHEMOTHERAPY INFUSION START DATE: NORMAL
CHEMOTHERAPY RECORDS: 1.8
CHEMOTHERAPY RECORDS: 2520
CHEMOTHERAPY RECORDS: NORMAL
CHEMOTHERAPY RX CANCER: NORMAL
RAD ONC ARIA COURSE TREATMENT ELAPSED DAYS: NORMAL
RAD ONC ARIA PLAN TREATMENT DATES: NORMAL
RAD ONC ARIA REFERENCE POINT DOSAGE GIVEN TO DATE: 3.6
RAD ONC ARIA REFERENCE POINT DOSAGE GIVEN TO DATE: 5.4
RAD ONC ARIA REFERENCE POINT ID: NORMAL
RAD ONC ARIA REFERENCE POINT ID: NORMAL
RAD ONC ARIA REFERENCE POINT SESSION DOSAGE GIVEN: 1.8
RAD ONC ARIA REFERENCE POINT SESSION DOSAGE GIVEN: 1.8

## 2019-10-02 PROCEDURE — 77336 RADIATION PHYSICS CONSULT: CPT | Performed by: RADIOLOGY

## 2019-10-02 PROCEDURE — 77412 RADIATION TX DELIVERY LVL 3: CPT | Performed by: RADIOLOGY

## 2019-10-03 ENCOUNTER — HOSPITAL ENCOUNTER (OUTPATIENT)
Dept: RADIATION ONCOLOGY | Facility: MEDICAL CENTER | Age: 79
End: 2019-10-03

## 2019-10-03 LAB
CHEMOTHERAPY INFUSION START DATE: NORMAL
CHEMOTHERAPY RECORDS: 1.8
CHEMOTHERAPY RECORDS: 2520
CHEMOTHERAPY RECORDS: NORMAL
CHEMOTHERAPY RX CANCER: NORMAL
RAD ONC ARIA COURSE TREATMENT ELAPSED DAYS: NORMAL
RAD ONC ARIA PLAN TREATMENT DATES: NORMAL
RAD ONC ARIA REFERENCE POINT DOSAGE GIVEN TO DATE: 3.6
RAD ONC ARIA REFERENCE POINT DOSAGE GIVEN TO DATE: 7.2
RAD ONC ARIA REFERENCE POINT ID: NORMAL
RAD ONC ARIA REFERENCE POINT ID: NORMAL
RAD ONC ARIA REFERENCE POINT SESSION DOSAGE GIVEN: 1.8
RAD ONC ARIA REFERENCE POINT SESSION DOSAGE GIVEN: 1.8

## 2019-10-03 PROCEDURE — 77412 RADIATION TX DELIVERY LVL 3: CPT | Performed by: RADIOLOGY

## 2019-10-04 ENCOUNTER — HOSPITAL ENCOUNTER (OUTPATIENT)
Dept: RADIATION ONCOLOGY | Facility: MEDICAL CENTER | Age: 79
End: 2019-10-04

## 2019-10-04 LAB
CHEMOTHERAPY INFUSION START DATE: NORMAL
CHEMOTHERAPY RECORDS: 1.8
CHEMOTHERAPY RECORDS: 2520
CHEMOTHERAPY RECORDS: NORMAL
CHEMOTHERAPY RX CANCER: NORMAL
RAD ONC ARIA COURSE TREATMENT ELAPSED DAYS: NORMAL
RAD ONC ARIA PLAN TREATMENT DATES: NORMAL
RAD ONC ARIA REFERENCE POINT DOSAGE GIVEN TO DATE: 5.4
RAD ONC ARIA REFERENCE POINT DOSAGE GIVEN TO DATE: 9
RAD ONC ARIA REFERENCE POINT ID: NORMAL
RAD ONC ARIA REFERENCE POINT ID: NORMAL
RAD ONC ARIA REFERENCE POINT SESSION DOSAGE GIVEN: 1.8
RAD ONC ARIA REFERENCE POINT SESSION DOSAGE GIVEN: 1.8

## 2019-10-04 PROCEDURE — 77427 RADIATION TX MANAGEMENT X5: CPT | Performed by: RADIOLOGY

## 2019-10-04 PROCEDURE — 77412 RADIATION TX DELIVERY LVL 3: CPT | Performed by: RADIOLOGY

## 2019-10-07 ENCOUNTER — HOSPITAL ENCOUNTER (OUTPATIENT)
Dept: RADIATION ONCOLOGY | Facility: MEDICAL CENTER | Age: 79
End: 2019-10-07

## 2019-10-07 LAB
CHEMOTHERAPY INFUSION START DATE: NORMAL
CHEMOTHERAPY RECORDS: 1.8
CHEMOTHERAPY RECORDS: 2520
CHEMOTHERAPY RECORDS: NORMAL
CHEMOTHERAPY RX CANCER: NORMAL
RAD ONC ARIA COURSE TREATMENT ELAPSED DAYS: NORMAL
RAD ONC ARIA PLAN TREATMENT DATES: NORMAL
RAD ONC ARIA REFERENCE POINT DOSAGE GIVEN TO DATE: 10.8
RAD ONC ARIA REFERENCE POINT DOSAGE GIVEN TO DATE: 5.4
RAD ONC ARIA REFERENCE POINT ID: NORMAL
RAD ONC ARIA REFERENCE POINT ID: NORMAL
RAD ONC ARIA REFERENCE POINT SESSION DOSAGE GIVEN: 1.8
RAD ONC ARIA REFERENCE POINT SESSION DOSAGE GIVEN: 1.8

## 2019-10-07 PROCEDURE — 77412 RADIATION TX DELIVERY LVL 3: CPT | Performed by: RADIOLOGY

## 2019-10-07 PROCEDURE — 77417 THER RADIOLOGY PORT IMAGE(S): CPT | Performed by: RADIOLOGY

## 2019-10-08 ENCOUNTER — HOSPITAL ENCOUNTER (OUTPATIENT)
Dept: RADIATION ONCOLOGY | Facility: MEDICAL CENTER | Age: 79
End: 2019-10-08

## 2019-10-08 LAB
CHEMOTHERAPY INFUSION START DATE: NORMAL
CHEMOTHERAPY RECORDS: 1.8
CHEMOTHERAPY RECORDS: 2520
CHEMOTHERAPY RECORDS: NORMAL
CHEMOTHERAPY RX CANCER: NORMAL
RAD ONC ARIA COURSE TREATMENT ELAPSED DAYS: NORMAL
RAD ONC ARIA PLAN TREATMENT DATES: NORMAL
RAD ONC ARIA REFERENCE POINT DOSAGE GIVEN TO DATE: 12.6
RAD ONC ARIA REFERENCE POINT DOSAGE GIVEN TO DATE: 7.2
RAD ONC ARIA REFERENCE POINT ID: NORMAL
RAD ONC ARIA REFERENCE POINT ID: NORMAL
RAD ONC ARIA REFERENCE POINT SESSION DOSAGE GIVEN: 1.8
RAD ONC ARIA REFERENCE POINT SESSION DOSAGE GIVEN: 1.8

## 2019-10-08 PROCEDURE — 77412 RADIATION TX DELIVERY LVL 3: CPT | Performed by: RADIOLOGY

## 2019-10-09 LAB
CHEMOTHERAPY INFUSION START DATE: NORMAL
CHEMOTHERAPY RECORDS: 1.8
CHEMOTHERAPY RECORDS: 2520
CHEMOTHERAPY RECORDS: NORMAL
CHEMOTHERAPY RX CANCER: NORMAL
RAD ONC ARIA COURSE TREATMENT ELAPSED DAYS: NORMAL
RAD ONC ARIA PLAN TREATMENT DATES: NORMAL
RAD ONC ARIA REFERENCE POINT DOSAGE GIVEN TO DATE: 14.4
RAD ONC ARIA REFERENCE POINT DOSAGE GIVEN TO DATE: 7.2
RAD ONC ARIA REFERENCE POINT ID: NORMAL
RAD ONC ARIA REFERENCE POINT ID: NORMAL
RAD ONC ARIA REFERENCE POINT SESSION DOSAGE GIVEN: 1.8
RAD ONC ARIA REFERENCE POINT SESSION DOSAGE GIVEN: 1.8

## 2019-10-09 PROCEDURE — 77412 RADIATION TX DELIVERY LVL 3: CPT | Performed by: RADIOLOGY

## 2019-10-09 PROCEDURE — 77336 RADIATION PHYSICS CONSULT: CPT | Performed by: RADIOLOGY

## 2019-10-10 ENCOUNTER — HOSPITAL ENCOUNTER (OUTPATIENT)
Dept: RADIATION ONCOLOGY | Facility: MEDICAL CENTER | Age: 79
End: 2019-10-10

## 2019-10-10 LAB
CHEMOTHERAPY INFUSION START DATE: NORMAL
CHEMOTHERAPY RECORDS: 1.8
CHEMOTHERAPY RECORDS: 2520
CHEMOTHERAPY RECORDS: NORMAL
CHEMOTHERAPY RX CANCER: NORMAL
RAD ONC ARIA COURSE TREATMENT ELAPSED DAYS: NORMAL
RAD ONC ARIA PLAN TREATMENT DATES: NORMAL
RAD ONC ARIA REFERENCE POINT DOSAGE GIVEN TO DATE: 16.2
RAD ONC ARIA REFERENCE POINT DOSAGE GIVEN TO DATE: 9
RAD ONC ARIA REFERENCE POINT ID: NORMAL
RAD ONC ARIA REFERENCE POINT ID: NORMAL
RAD ONC ARIA REFERENCE POINT SESSION DOSAGE GIVEN: 1.8
RAD ONC ARIA REFERENCE POINT SESSION DOSAGE GIVEN: 1.8

## 2019-10-10 PROCEDURE — 77412 RADIATION TX DELIVERY LVL 3: CPT | Performed by: RADIOLOGY

## 2019-10-11 ENCOUNTER — HOSPITAL ENCOUNTER (OUTPATIENT)
Dept: RADIATION ONCOLOGY | Facility: MEDICAL CENTER | Age: 79
End: 2019-10-11

## 2019-10-11 LAB
CHEMOTHERAPY INFUSION START DATE: NORMAL
CHEMOTHERAPY RECORDS: 1.8
CHEMOTHERAPY RECORDS: 2520
CHEMOTHERAPY RECORDS: NORMAL
CHEMOTHERAPY RX CANCER: NORMAL
RAD ONC ARIA COURSE TREATMENT ELAPSED DAYS: NORMAL
RAD ONC ARIA PLAN TREATMENT DATES: NORMAL
RAD ONC ARIA REFERENCE POINT DOSAGE GIVEN TO DATE: 18
RAD ONC ARIA REFERENCE POINT DOSAGE GIVEN TO DATE: 9
RAD ONC ARIA REFERENCE POINT ID: NORMAL
RAD ONC ARIA REFERENCE POINT ID: NORMAL
RAD ONC ARIA REFERENCE POINT SESSION DOSAGE GIVEN: 1.8
RAD ONC ARIA REFERENCE POINT SESSION DOSAGE GIVEN: 1.8

## 2019-10-11 PROCEDURE — 77412 RADIATION TX DELIVERY LVL 3: CPT | Performed by: RADIOLOGY

## 2019-10-11 PROCEDURE — 77427 RADIATION TX MANAGEMENT X5: CPT | Performed by: RADIOLOGY

## 2019-10-14 ENCOUNTER — HOSPITAL ENCOUNTER (OUTPATIENT)
Dept: RADIATION ONCOLOGY | Facility: MEDICAL CENTER | Age: 79
End: 2019-10-14

## 2019-10-14 LAB
CHEMOTHERAPY INFUSION START DATE: NORMAL
CHEMOTHERAPY RECORDS: 1.8
CHEMOTHERAPY RECORDS: 2520
CHEMOTHERAPY RECORDS: NORMAL
CHEMOTHERAPY RX CANCER: NORMAL
RAD ONC ARIA COURSE TREATMENT ELAPSED DAYS: NORMAL
RAD ONC ARIA PLAN TREATMENT DATES: NORMAL
RAD ONC ARIA REFERENCE POINT DOSAGE GIVEN TO DATE: 10.8
RAD ONC ARIA REFERENCE POINT DOSAGE GIVEN TO DATE: 19.8
RAD ONC ARIA REFERENCE POINT ID: NORMAL
RAD ONC ARIA REFERENCE POINT ID: NORMAL
RAD ONC ARIA REFERENCE POINT SESSION DOSAGE GIVEN: 1.8
RAD ONC ARIA REFERENCE POINT SESSION DOSAGE GIVEN: 1.8

## 2019-10-14 PROCEDURE — 77412 RADIATION TX DELIVERY LVL 3: CPT | Performed by: RADIOLOGY

## 2019-10-14 PROCEDURE — 77417 THER RADIOLOGY PORT IMAGE(S): CPT | Performed by: RADIOLOGY

## 2019-10-15 ENCOUNTER — HOSPITAL ENCOUNTER (OUTPATIENT)
Dept: RADIATION ONCOLOGY | Facility: MEDICAL CENTER | Age: 79
End: 2019-10-15

## 2019-10-15 LAB
CHEMOTHERAPY INFUSION START DATE: NORMAL
CHEMOTHERAPY RECORDS: 1.8
CHEMOTHERAPY RECORDS: 2520
CHEMOTHERAPY RECORDS: NORMAL
CHEMOTHERAPY RX CANCER: NORMAL
RAD ONC ARIA COURSE TREATMENT ELAPSED DAYS: NORMAL
RAD ONC ARIA PLAN TREATMENT DATES: NORMAL
RAD ONC ARIA REFERENCE POINT DOSAGE GIVEN TO DATE: 10.8
RAD ONC ARIA REFERENCE POINT DOSAGE GIVEN TO DATE: 21.6
RAD ONC ARIA REFERENCE POINT ID: NORMAL
RAD ONC ARIA REFERENCE POINT ID: NORMAL
RAD ONC ARIA REFERENCE POINT SESSION DOSAGE GIVEN: 1.8
RAD ONC ARIA REFERENCE POINT SESSION DOSAGE GIVEN: 1.8

## 2019-10-15 PROCEDURE — 77412 RADIATION TX DELIVERY LVL 3: CPT | Performed by: RADIOLOGY

## 2019-10-16 LAB
CHEMOTHERAPY INFUSION START DATE: NORMAL
CHEMOTHERAPY RECORDS: 1.8
CHEMOTHERAPY RECORDS: 2520
CHEMOTHERAPY RECORDS: NORMAL
CHEMOTHERAPY RX CANCER: NORMAL
DATE 1ST CHEMO CANCER: NORMAL
RAD ONC ARIA COURSE LAST TREATMENT DATE: NORMAL
RAD ONC ARIA COURSE TREATMENT ELAPSED DAYS: NORMAL
RAD ONC ARIA REFERENCE POINT DOSAGE GIVEN TO DATE: 12.6
RAD ONC ARIA REFERENCE POINT DOSAGE GIVEN TO DATE: 23.4
RAD ONC ARIA REFERENCE POINT ID: NORMAL
RAD ONC ARIA REFERENCE POINT ID: NORMAL
RAD ONC ARIA REFERENCE POINT SESSION DOSAGE GIVEN: 1.8
RAD ONC ARIA REFERENCE POINT SESSION DOSAGE GIVEN: 1.8

## 2019-10-16 PROCEDURE — 77412 RADIATION TX DELIVERY LVL 3: CPT | Performed by: RADIOLOGY

## 2019-10-16 PROCEDURE — 77336 RADIATION PHYSICS CONSULT: CPT | Performed by: RADIOLOGY

## 2019-10-17 ENCOUNTER — HOSPITAL ENCOUNTER (OUTPATIENT)
Dept: RADIATION ONCOLOGY | Facility: MEDICAL CENTER | Age: 79
End: 2019-10-17

## 2019-10-17 ENCOUNTER — DOCUMENTATION (OUTPATIENT)
Dept: HEMATOLOGY ONCOLOGY | Facility: MEDICAL CENTER | Age: 79
End: 2019-10-17

## 2019-10-17 LAB
CHEMOTHERAPY INFUSION START DATE: NORMAL
CHEMOTHERAPY RECORDS: 1.8
CHEMOTHERAPY RECORDS: 2520
CHEMOTHERAPY RECORDS: NORMAL
CHEMOTHERAPY RX CANCER: NORMAL
DATE 1ST CHEMO CANCER: NORMAL
RAD ONC ARIA COURSE LAST TREATMENT DATE: NORMAL
RAD ONC ARIA COURSE TREATMENT ELAPSED DAYS: NORMAL
RAD ONC ARIA REFERENCE POINT DOSAGE GIVEN TO DATE: 12.6
RAD ONC ARIA REFERENCE POINT DOSAGE GIVEN TO DATE: 25.2
RAD ONC ARIA REFERENCE POINT ID: NORMAL
RAD ONC ARIA REFERENCE POINT ID: NORMAL
RAD ONC ARIA REFERENCE POINT SESSION DOSAGE GIVEN: 1.8
RAD ONC ARIA REFERENCE POINT SESSION DOSAGE GIVEN: 1.8

## 2019-10-17 PROCEDURE — 77412 RADIATION TX DELIVERY LVL 3: CPT | Performed by: RADIOLOGY

## 2019-10-17 NOTE — PROGRESS NOTES
Nutrition Services: Brief Update  Weight: 158.6 pounds (10/16)  Weight Change: Weight is down ~ 2.4 pounds/1.4% in the last month which is insignificant but worth noting..    I visited with patient this afternoon prior to XRT. Patient reports that she is feeling good and has not GI distress or nutrition related questions or concerns at this time.  I provided RD contact information and encouraged her to contact us should questions or concerns arise throughout the remainder of treatment. Patient had to rush off to another appointment today and unable to stay for more discussion.     RD is available [phone].

## 2019-10-18 ENCOUNTER — HOSPITAL ENCOUNTER (OUTPATIENT)
Dept: RADIATION ONCOLOGY | Facility: MEDICAL CENTER | Age: 79
End: 2019-10-18

## 2019-10-18 LAB
CHEMOTHERAPY INFUSION START DATE: NORMAL
CHEMOTHERAPY RECORDS: 1.8
CHEMOTHERAPY RECORDS: 2520
CHEMOTHERAPY RECORDS: NORMAL
CHEMOTHERAPY RX CANCER: NORMAL
DATE 1ST CHEMO CANCER: NORMAL
RAD ONC ARIA COURSE LAST TREATMENT DATE: NORMAL
RAD ONC ARIA COURSE TREATMENT ELAPSED DAYS: NORMAL
RAD ONC ARIA REFERENCE POINT DOSAGE GIVEN TO DATE: 14.4
RAD ONC ARIA REFERENCE POINT DOSAGE GIVEN TO DATE: 27
RAD ONC ARIA REFERENCE POINT ID: NORMAL
RAD ONC ARIA REFERENCE POINT ID: NORMAL
RAD ONC ARIA REFERENCE POINT SESSION DOSAGE GIVEN: 1.8
RAD ONC ARIA REFERENCE POINT SESSION DOSAGE GIVEN: 1.8

## 2019-10-18 PROCEDURE — 77427 RADIATION TX MANAGEMENT X5: CPT | Performed by: RADIOLOGY

## 2019-10-18 PROCEDURE — 77412 RADIATION TX DELIVERY LVL 3: CPT | Performed by: RADIOLOGY

## 2019-10-21 ENCOUNTER — HOSPITAL ENCOUNTER (OUTPATIENT)
Dept: RADIATION ONCOLOGY | Facility: MEDICAL CENTER | Age: 79
End: 2019-10-21

## 2019-10-21 LAB
CHEMOTHERAPY INFUSION START DATE: NORMAL
CHEMOTHERAPY RECORDS: 1.8
CHEMOTHERAPY RECORDS: 2520
CHEMOTHERAPY RECORDS: NORMAL
CHEMOTHERAPY RX CANCER: NORMAL
DATE 1ST CHEMO CANCER: NORMAL
RAD ONC ARIA COURSE LAST TREATMENT DATE: NORMAL
RAD ONC ARIA COURSE TREATMENT ELAPSED DAYS: NORMAL
RAD ONC ARIA REFERENCE POINT DOSAGE GIVEN TO DATE: 14.4
RAD ONC ARIA REFERENCE POINT DOSAGE GIVEN TO DATE: 28.8
RAD ONC ARIA REFERENCE POINT ID: NORMAL
RAD ONC ARIA REFERENCE POINT ID: NORMAL
RAD ONC ARIA REFERENCE POINT SESSION DOSAGE GIVEN: 1.8
RAD ONC ARIA REFERENCE POINT SESSION DOSAGE GIVEN: 1.8

## 2019-10-21 PROCEDURE — 77417 THER RADIOLOGY PORT IMAGE(S): CPT | Performed by: RADIOLOGY

## 2019-10-21 PROCEDURE — 77412 RADIATION TX DELIVERY LVL 3: CPT | Performed by: RADIOLOGY

## 2019-10-22 ENCOUNTER — HOSPITAL ENCOUNTER (OUTPATIENT)
Dept: RADIATION ONCOLOGY | Facility: MEDICAL CENTER | Age: 79
End: 2019-10-22

## 2019-10-22 LAB
CHEMOTHERAPY INFUSION START DATE: NORMAL
CHEMOTHERAPY RECORDS: 1.8
CHEMOTHERAPY RECORDS: 2520
CHEMOTHERAPY RECORDS: NORMAL
CHEMOTHERAPY RX CANCER: NORMAL
DATE 1ST CHEMO CANCER: NORMAL
RAD ONC ARIA COURSE LAST TREATMENT DATE: NORMAL
RAD ONC ARIA COURSE TREATMENT ELAPSED DAYS: NORMAL
RAD ONC ARIA REFERENCE POINT DOSAGE GIVEN TO DATE: 16.2
RAD ONC ARIA REFERENCE POINT DOSAGE GIVEN TO DATE: 30.6
RAD ONC ARIA REFERENCE POINT ID: NORMAL
RAD ONC ARIA REFERENCE POINT ID: NORMAL
RAD ONC ARIA REFERENCE POINT SESSION DOSAGE GIVEN: 1.8
RAD ONC ARIA REFERENCE POINT SESSION DOSAGE GIVEN: 1.8

## 2019-10-22 PROCEDURE — 77412 RADIATION TX DELIVERY LVL 3: CPT | Performed by: RADIOLOGY

## 2019-10-23 LAB
CHEMOTHERAPY INFUSION START DATE: NORMAL
CHEMOTHERAPY RECORDS: 1.8
CHEMOTHERAPY RECORDS: 2520
CHEMOTHERAPY RECORDS: NORMAL
CHEMOTHERAPY RX CANCER: NORMAL
DATE 1ST CHEMO CANCER: NORMAL
RAD ONC ARIA COURSE LAST TREATMENT DATE: NORMAL
RAD ONC ARIA COURSE TREATMENT ELAPSED DAYS: NORMAL
RAD ONC ARIA REFERENCE POINT DOSAGE GIVEN TO DATE: 16.2
RAD ONC ARIA REFERENCE POINT DOSAGE GIVEN TO DATE: 32.4
RAD ONC ARIA REFERENCE POINT ID: NORMAL
RAD ONC ARIA REFERENCE POINT ID: NORMAL
RAD ONC ARIA REFERENCE POINT SESSION DOSAGE GIVEN: 1.8
RAD ONC ARIA REFERENCE POINT SESSION DOSAGE GIVEN: 1.8

## 2019-10-23 PROCEDURE — 77412 RADIATION TX DELIVERY LVL 3: CPT | Performed by: RADIOLOGY

## 2019-10-23 PROCEDURE — 77336 RADIATION PHYSICS CONSULT: CPT | Performed by: RADIOLOGY

## 2019-10-24 ENCOUNTER — HOSPITAL ENCOUNTER (OUTPATIENT)
Dept: RADIATION ONCOLOGY | Facility: MEDICAL CENTER | Age: 79
End: 2019-10-24

## 2019-10-24 LAB
CHEMOTHERAPY INFUSION START DATE: NORMAL
CHEMOTHERAPY RECORDS: 1.8
CHEMOTHERAPY RECORDS: 2520
CHEMOTHERAPY RECORDS: NORMAL
CHEMOTHERAPY RX CANCER: NORMAL
DATE 1ST CHEMO CANCER: NORMAL
RAD ONC ARIA COURSE LAST TREATMENT DATE: NORMAL
RAD ONC ARIA COURSE TREATMENT ELAPSED DAYS: NORMAL
RAD ONC ARIA REFERENCE POINT DOSAGE GIVEN TO DATE: 18
RAD ONC ARIA REFERENCE POINT DOSAGE GIVEN TO DATE: 34.2
RAD ONC ARIA REFERENCE POINT ID: NORMAL
RAD ONC ARIA REFERENCE POINT ID: NORMAL
RAD ONC ARIA REFERENCE POINT SESSION DOSAGE GIVEN: 1.8
RAD ONC ARIA REFERENCE POINT SESSION DOSAGE GIVEN: 1.8

## 2019-10-24 PROCEDURE — 77412 RADIATION TX DELIVERY LVL 3: CPT | Performed by: RADIOLOGY

## 2019-10-28 ENCOUNTER — HOSPITAL ENCOUNTER (OUTPATIENT)
Dept: RADIATION ONCOLOGY | Facility: MEDICAL CENTER | Age: 79
End: 2019-10-28

## 2019-10-28 LAB
CHEMOTHERAPY INFUSION START DATE: NORMAL
CHEMOTHERAPY RECORDS: 1.8
CHEMOTHERAPY RECORDS: 2520
CHEMOTHERAPY RECORDS: NORMAL
CHEMOTHERAPY RX CANCER: NORMAL
DATE 1ST CHEMO CANCER: NORMAL
RAD ONC ARIA COURSE LAST TREATMENT DATE: NORMAL
RAD ONC ARIA COURSE TREATMENT ELAPSED DAYS: NORMAL
RAD ONC ARIA REFERENCE POINT DOSAGE GIVEN TO DATE: 18
RAD ONC ARIA REFERENCE POINT DOSAGE GIVEN TO DATE: 36
RAD ONC ARIA REFERENCE POINT ID: NORMAL
RAD ONC ARIA REFERENCE POINT ID: NORMAL
RAD ONC ARIA REFERENCE POINT SESSION DOSAGE GIVEN: 1.8
RAD ONC ARIA REFERENCE POINT SESSION DOSAGE GIVEN: 1.8

## 2019-10-28 PROCEDURE — 77427 RADIATION TX MANAGEMENT X5: CPT | Performed by: RADIOLOGY

## 2019-10-28 PROCEDURE — 77412 RADIATION TX DELIVERY LVL 3: CPT | Performed by: RADIOLOGY

## 2019-10-29 ENCOUNTER — HOSPITAL ENCOUNTER (OUTPATIENT)
Dept: RADIATION ONCOLOGY | Facility: MEDICAL CENTER | Age: 79
End: 2019-10-29

## 2019-10-29 LAB
CHEMOTHERAPY INFUSION START DATE: NORMAL
CHEMOTHERAPY RECORDS: 1.8
CHEMOTHERAPY RECORDS: 2520
CHEMOTHERAPY RECORDS: NORMAL
CHEMOTHERAPY RX CANCER: NORMAL
DATE 1ST CHEMO CANCER: NORMAL
RAD ONC ARIA COURSE LAST TREATMENT DATE: NORMAL
RAD ONC ARIA COURSE TREATMENT ELAPSED DAYS: NORMAL
RAD ONC ARIA REFERENCE POINT DOSAGE GIVEN TO DATE: 19.8
RAD ONC ARIA REFERENCE POINT DOSAGE GIVEN TO DATE: 37.8
RAD ONC ARIA REFERENCE POINT ID: NORMAL
RAD ONC ARIA REFERENCE POINT ID: NORMAL
RAD ONC ARIA REFERENCE POINT SESSION DOSAGE GIVEN: 1.8
RAD ONC ARIA REFERENCE POINT SESSION DOSAGE GIVEN: 1.8

## 2019-10-29 PROCEDURE — 77412 RADIATION TX DELIVERY LVL 3: CPT | Performed by: RADIOLOGY

## 2019-10-29 PROCEDURE — 77417 THER RADIOLOGY PORT IMAGE(S): CPT | Performed by: RADIOLOGY

## 2019-10-30 LAB
CHEMOTHERAPY INFUSION START DATE: NORMAL
CHEMOTHERAPY RECORDS: 1.8
CHEMOTHERAPY RECORDS: 2520
CHEMOTHERAPY RECORDS: NORMAL
CHEMOTHERAPY RX CANCER: NORMAL
DATE 1ST CHEMO CANCER: NORMAL
RAD ONC ARIA COURSE LAST TREATMENT DATE: NORMAL
RAD ONC ARIA COURSE TREATMENT ELAPSED DAYS: NORMAL
RAD ONC ARIA REFERENCE POINT DOSAGE GIVEN TO DATE: 19.8
RAD ONC ARIA REFERENCE POINT DOSAGE GIVEN TO DATE: 39.6
RAD ONC ARIA REFERENCE POINT ID: NORMAL
RAD ONC ARIA REFERENCE POINT ID: NORMAL
RAD ONC ARIA REFERENCE POINT SESSION DOSAGE GIVEN: 1.8
RAD ONC ARIA REFERENCE POINT SESSION DOSAGE GIVEN: 1.8

## 2019-10-30 PROCEDURE — 77412 RADIATION TX DELIVERY LVL 3: CPT | Performed by: RADIOLOGY

## 2019-10-31 ENCOUNTER — HOSPITAL ENCOUNTER (OUTPATIENT)
Dept: RADIATION ONCOLOGY | Facility: MEDICAL CENTER | Age: 79
End: 2019-10-31

## 2019-10-31 LAB
CHEMOTHERAPY INFUSION START DATE: NORMAL
CHEMOTHERAPY RECORDS: 1.8
CHEMOTHERAPY RECORDS: 2520
CHEMOTHERAPY RECORDS: NORMAL
CHEMOTHERAPY RX CANCER: NORMAL
DATE 1ST CHEMO CANCER: NORMAL
RAD ONC ARIA COURSE LAST TREATMENT DATE: NORMAL
RAD ONC ARIA COURSE TREATMENT ELAPSED DAYS: NORMAL
RAD ONC ARIA REFERENCE POINT DOSAGE GIVEN TO DATE: 21.6
RAD ONC ARIA REFERENCE POINT DOSAGE GIVEN TO DATE: 41.4
RAD ONC ARIA REFERENCE POINT ID: NORMAL
RAD ONC ARIA REFERENCE POINT ID: NORMAL
RAD ONC ARIA REFERENCE POINT SESSION DOSAGE GIVEN: 1.8
RAD ONC ARIA REFERENCE POINT SESSION DOSAGE GIVEN: 1.8

## 2019-10-31 PROCEDURE — 77336 RADIATION PHYSICS CONSULT: CPT | Performed by: RADIOLOGY

## 2019-10-31 PROCEDURE — 77412 RADIATION TX DELIVERY LVL 3: CPT | Performed by: RADIOLOGY

## 2019-11-01 ENCOUNTER — HOSPITAL ENCOUNTER (OUTPATIENT)
Dept: RADIATION ONCOLOGY | Facility: MEDICAL CENTER | Age: 79
End: 2019-11-30
Attending: INTERNAL MEDICINE
Payer: MEDICARE

## 2019-11-01 ENCOUNTER — HOSPITAL ENCOUNTER (OUTPATIENT)
Dept: RADIATION ONCOLOGY | Facility: MEDICAL CENTER | Age: 79
End: 2019-11-01

## 2019-11-01 LAB
CHEMOTHERAPY INFUSION START DATE: NORMAL
CHEMOTHERAPY RECORDS: 1.8
CHEMOTHERAPY RECORDS: 2520
CHEMOTHERAPY RECORDS: NORMAL
CHEMOTHERAPY RX CANCER: NORMAL
DATE 1ST CHEMO CANCER: NORMAL
RAD ONC ARIA COURSE LAST TREATMENT DATE: NORMAL
RAD ONC ARIA COURSE TREATMENT ELAPSED DAYS: NORMAL
RAD ONC ARIA REFERENCE POINT DOSAGE GIVEN TO DATE: 21.6
RAD ONC ARIA REFERENCE POINT DOSAGE GIVEN TO DATE: 43.2
RAD ONC ARIA REFERENCE POINT ID: NORMAL
RAD ONC ARIA REFERENCE POINT ID: NORMAL
RAD ONC ARIA REFERENCE POINT SESSION DOSAGE GIVEN: 1.8
RAD ONC ARIA REFERENCE POINT SESSION DOSAGE GIVEN: 1.8

## 2019-11-01 PROCEDURE — 77412 RADIATION TX DELIVERY LVL 3: CPT | Performed by: RADIOLOGY

## 2019-11-04 ENCOUNTER — HOSPITAL ENCOUNTER (OUTPATIENT)
Dept: RADIATION ONCOLOGY | Facility: MEDICAL CENTER | Age: 79
End: 2019-11-04

## 2019-11-04 LAB
CHEMOTHERAPY INFUSION START DATE: NORMAL
CHEMOTHERAPY RECORDS: 1.8
CHEMOTHERAPY RECORDS: 2520
CHEMOTHERAPY RECORDS: NORMAL
CHEMOTHERAPY RX CANCER: NORMAL
DATE 1ST CHEMO CANCER: NORMAL
RAD ONC ARIA COURSE LAST TREATMENT DATE: NORMAL
RAD ONC ARIA COURSE TREATMENT ELAPSED DAYS: NORMAL
RAD ONC ARIA REFERENCE POINT DOSAGE GIVEN TO DATE: 23.4
RAD ONC ARIA REFERENCE POINT DOSAGE GIVEN TO DATE: 45
RAD ONC ARIA REFERENCE POINT ID: NORMAL
RAD ONC ARIA REFERENCE POINT ID: NORMAL
RAD ONC ARIA REFERENCE POINT SESSION DOSAGE GIVEN: 1.8
RAD ONC ARIA REFERENCE POINT SESSION DOSAGE GIVEN: 1.8

## 2019-11-04 PROCEDURE — 77412 RADIATION TX DELIVERY LVL 3: CPT | Performed by: RADIOLOGY

## 2019-11-04 PROCEDURE — 77427 RADIATION TX MANAGEMENT X5: CPT | Performed by: RADIOLOGY

## 2019-11-05 ENCOUNTER — HOSPITAL ENCOUNTER (OUTPATIENT)
Dept: RADIATION ONCOLOGY | Facility: MEDICAL CENTER | Age: 79
End: 2019-11-05

## 2019-11-05 LAB
CHEMOTHERAPY INFUSION START DATE: NORMAL
CHEMOTHERAPY RECORDS: 1.8
CHEMOTHERAPY RECORDS: 2520
CHEMOTHERAPY RECORDS: NORMAL
CHEMOTHERAPY RX CANCER: NORMAL
DATE 1ST CHEMO CANCER: NORMAL
RAD ONC ARIA COURSE LAST TREATMENT DATE: NORMAL
RAD ONC ARIA COURSE TREATMENT ELAPSED DAYS: NORMAL
RAD ONC ARIA REFERENCE POINT DOSAGE GIVEN TO DATE: 23.4
RAD ONC ARIA REFERENCE POINT DOSAGE GIVEN TO DATE: 46.8
RAD ONC ARIA REFERENCE POINT ID: NORMAL
RAD ONC ARIA REFERENCE POINT ID: NORMAL
RAD ONC ARIA REFERENCE POINT SESSION DOSAGE GIVEN: 1.8
RAD ONC ARIA REFERENCE POINT SESSION DOSAGE GIVEN: 1.8

## 2019-11-05 PROCEDURE — 77412 RADIATION TX DELIVERY LVL 3: CPT | Performed by: RADIOLOGY

## 2019-11-05 PROCEDURE — 77417 THER RADIOLOGY PORT IMAGE(S): CPT | Performed by: RADIOLOGY

## 2019-11-06 VITALS
OXYGEN SATURATION: 90 % | BODY MASS INDEX: 26.73 KG/M2 | DIASTOLIC BLOOD PRESSURE: 62 MMHG | SYSTOLIC BLOOD PRESSURE: 101 MMHG | WEIGHT: 155.7 LBS | TEMPERATURE: 98.3 F | HEART RATE: 100 BPM

## 2019-11-06 PROBLEM — Z17.0 MALIGNANT NEOPLASM OF OVERLAPPING SITES OF BREAST IN FEMALE, ESTROGEN RECEPTOR POSITIVE (HCC): Status: ACTIVE | Noted: 2019-11-06

## 2019-11-06 PROBLEM — C50.819 MALIGNANT NEOPLASM OF OVERLAPPING SITES OF BREAST IN FEMALE, ESTROGEN RECEPTOR POSITIVE (HCC): Status: ACTIVE | Noted: 2019-11-06

## 2019-11-06 LAB
CHEMOTHERAPY INFUSION START DATE: NORMAL
CHEMOTHERAPY RECORDS: 1.8
CHEMOTHERAPY RECORDS: 2520
CHEMOTHERAPY RECORDS: NORMAL
CHEMOTHERAPY RX CANCER: NORMAL
DATE 1ST CHEMO CANCER: NORMAL
RAD ONC ARIA COURSE LAST TREATMENT DATE: NORMAL
RAD ONC ARIA COURSE TREATMENT ELAPSED DAYS: NORMAL
RAD ONC ARIA REFERENCE POINT DOSAGE GIVEN TO DATE: 25.2
RAD ONC ARIA REFERENCE POINT DOSAGE GIVEN TO DATE: 48.6
RAD ONC ARIA REFERENCE POINT ID: NORMAL
RAD ONC ARIA REFERENCE POINT ID: NORMAL
RAD ONC ARIA REFERENCE POINT SESSION DOSAGE GIVEN: 1.8
RAD ONC ARIA REFERENCE POINT SESSION DOSAGE GIVEN: 1.8

## 2019-11-06 PROCEDURE — 77307 TELETHX ISODOSE PLAN CPLX: CPT | Performed by: RADIOLOGY

## 2019-11-06 PROCEDURE — 77334 RADIATION TREATMENT AID(S): CPT | Performed by: RADIOLOGY

## 2019-11-06 PROCEDURE — 77412 RADIATION TX DELIVERY LVL 3: CPT | Performed by: RADIOLOGY

## 2019-11-06 PROCEDURE — 77334 RADIATION TREATMENT AID(S): CPT | Mod: 26 | Performed by: RADIOLOGY

## 2019-11-06 PROCEDURE — 77307 TELETHX ISODOSE PLAN CPLX: CPT | Mod: 26 | Performed by: RADIOLOGY

## 2019-11-06 ASSESSMENT — PAIN SCALES - GENERAL: PAINLEVEL: 7=MODERATE-SEVERE PAIN

## 2019-11-06 NOTE — ON TREATMENT VISIT
ON TREATMENT NOTE  RADIATION ONCOLOGY DEPARTMENT    Patient name:  Rashmi Parra    Primary Physician:  Michael Braun M.D. MRN: [id number]  CSN: [id number]   Referring physician:  Shalonda Velazquez M.D. : 1940, 79 y.o.     ENCOUNTER DATE:  19    DIAGNOSIS:    Malignant neoplasm of overlapping sites of breast in female, estrogen receptor positive (HCC)  Staging form: Breast, AJCC 8th Edition  - Pathologic: Stage IA (pT3, pN0(mol+), cM0, G1, ER+, PA+, HER2-) - Signed by Anita Martinez M.D. on 2019  Multigene prognostic tests performed: None  Histologic grading system: 3 grade system      TREATMENT SUMMARY:  Radiation Treatments     Active   Plans   Rt CW Bolus   Most recent treatment: Dose planned: 180 cGy (fraction 13 of 14 on 2019)   Total: Dose planned: 2,520 cGy   Elapsed Course Treatment Days: 36 @       Rt CW No Marleen   Most recent treatment: Dose planned: 180 cGy (fraction 14 of 14 on 2019)   Total: Dose planned: 2,520 cGy   Elapsed Course Treatment Days: 37 @       Reference Points   Rt CW   Most recent treatment: Dose given: 180 cGy (on 2019)   Total: Dose given: 4,860 cGy   Elapsed Course Treatment Days: 37 @       Rt CW Bolus CP   Most recent treatment: Dose given: 180 cGy (on 2019)   Total: Dose given: 2,340 cGy   Elapsed Course Treatment Days: 36 @       Rt CW No Marleen CP   Most recent treatment: Dose given: 180 cGy (on 2019)   Total: Dose given: 2,520 cGy   Elapsed Course Treatment Days: 37 @                     SUBJECTIVE:   Doing well without major complaint      VITAL SIGNS:  /62 (BP Location: Left arm, Patient Position: Sitting, BP Cuff Size: Adult)   Pulse 100   Temp 36.8 °C (98.3 °F) (Temporal)   Wt 70.6 kg (155 lb 11.2 oz)   SpO2 90%    Encounter Vitals  Temperature: 36.8 °C (98.3 °F)  Temp src: Temporal  Blood Pressure : 101/62  Pulse: 100  Pulse Oximetry: 90 %  Weight: 70.6 kg  (155 lb 11.2 oz)  Pain Score: 7=Moderate-Severe Pain  Pain Assessment 11/6/2019 9/5/2019   Pain Assessment Acute Pain -   Pain Score 7 0   Pain Loc Breast -   Some recent data might be hidden          PHYSICAL EXAM:  Brisk erythematous reaction in the treatment field but no sign of skin breakdown  Moderate    Toxicity Assessment 11/6/2019   Toxicity Assessment Breast   Fatigue (lethargy, malaise, asthenia) Increased fatigue over baseline, but not altering normal activities   Radiation Dermatitis Moderate to brisk erythema or a patchy moist desquamation, mostly confined to skin folds and creases, with/without moderate edema   Lymphatics Normal   RT - Pain due to RT Mild pain not interfering with function   Dyspnea Normal         IMPRESSION:  Cancer Staging  No matching staging information was found for the patient.    PLAN:  No change in treatment plan    Disposition:  Treatment plan reviewed. Questions answered. Continue therapy outlined.     Anita Martinez M.D.    Orders Placed This Encounter   • Rad Onc [facility] Session Summary

## 2019-11-07 ENCOUNTER — HOSPITAL ENCOUNTER (OUTPATIENT)
Dept: RADIATION ONCOLOGY | Facility: MEDICAL CENTER | Age: 79
End: 2019-11-07

## 2019-11-07 LAB
CHEMOTHERAPY INFUSION START DATE: NORMAL
CHEMOTHERAPY RECORDS: 1.8
CHEMOTHERAPY RECORDS: 2520
CHEMOTHERAPY RECORDS: NORMAL
CHEMOTHERAPY RX CANCER: NORMAL
DATE 1ST CHEMO CANCER: NORMAL
RAD ONC ARIA COURSE LAST TREATMENT DATE: NORMAL
RAD ONC ARIA COURSE TREATMENT ELAPSED DAYS: NORMAL
RAD ONC ARIA REFERENCE POINT DOSAGE GIVEN TO DATE: 25.2
RAD ONC ARIA REFERENCE POINT DOSAGE GIVEN TO DATE: 50.4
RAD ONC ARIA REFERENCE POINT ID: NORMAL
RAD ONC ARIA REFERENCE POINT ID: NORMAL
RAD ONC ARIA REFERENCE POINT SESSION DOSAGE GIVEN: 1.8
RAD ONC ARIA REFERENCE POINT SESSION DOSAGE GIVEN: 1.8

## 2019-11-07 PROCEDURE — 77336 RADIATION PHYSICS CONSULT: CPT | Performed by: RADIOLOGY

## 2019-11-07 PROCEDURE — 77412 RADIATION TX DELIVERY LVL 3: CPT | Performed by: RADIOLOGY

## 2019-11-08 PROCEDURE — 77412 RADIATION TX DELIVERY LVL 3: CPT | Performed by: RADIOLOGY

## 2019-11-08 PROCEDURE — 77280 THER RAD SIMULAJ FIELD SMPL: CPT | Performed by: RADIOLOGY

## 2019-11-09 LAB
CHEMOTHERAPY INFUSION START DATE: NORMAL
CHEMOTHERAPY RECORDS: 1000
CHEMOTHERAPY RECORDS: 1000
CHEMOTHERAPY RECORDS: 2
CHEMOTHERAPY RECORDS: 2
CHEMOTHERAPY RECORDS: NORMAL
CHEMOTHERAPY RX CANCER: NORMAL
DATE 1ST CHEMO CANCER: NORMAL
RAD ONC ARIA COURSE LAST TREATMENT DATE: NORMAL
RAD ONC ARIA COURSE TREATMENT ELAPSED DAYS: NORMAL
RAD ONC ARIA REFERENCE POINT DOSAGE GIVEN TO DATE: 2
RAD ONC ARIA REFERENCE POINT DOSAGE GIVEN TO DATE: 2
RAD ONC ARIA REFERENCE POINT ID: NORMAL
RAD ONC ARIA REFERENCE POINT ID: NORMAL
RAD ONC ARIA REFERENCE POINT SESSION DOSAGE GIVEN: 2
RAD ONC ARIA REFERENCE POINT SESSION DOSAGE GIVEN: 2

## 2019-11-11 ENCOUNTER — HOSPITAL ENCOUNTER (OUTPATIENT)
Dept: RADIATION ONCOLOGY | Facility: MEDICAL CENTER | Age: 79
End: 2019-11-11

## 2019-11-11 LAB
CHEMOTHERAPY INFUSION START DATE: NORMAL
CHEMOTHERAPY RECORDS: 1000
CHEMOTHERAPY RECORDS: 1000
CHEMOTHERAPY RECORDS: 2
CHEMOTHERAPY RECORDS: 2
CHEMOTHERAPY RECORDS: NORMAL
CHEMOTHERAPY RX CANCER: NORMAL
DATE 1ST CHEMO CANCER: NORMAL
RAD ONC ARIA COURSE LAST TREATMENT DATE: NORMAL
RAD ONC ARIA COURSE TREATMENT ELAPSED DAYS: NORMAL
RAD ONC ARIA REFERENCE POINT DOSAGE GIVEN TO DATE: 4
RAD ONC ARIA REFERENCE POINT DOSAGE GIVEN TO DATE: 4
RAD ONC ARIA REFERENCE POINT ID: NORMAL
RAD ONC ARIA REFERENCE POINT ID: NORMAL
RAD ONC ARIA REFERENCE POINT SESSION DOSAGE GIVEN: 2
RAD ONC ARIA REFERENCE POINT SESSION DOSAGE GIVEN: 2

## 2019-11-11 PROCEDURE — 77412 RADIATION TX DELIVERY LVL 3: CPT | Performed by: RADIOLOGY

## 2019-11-11 PROCEDURE — 77427 RADIATION TX MANAGEMENT X5: CPT | Performed by: RADIOLOGY

## 2019-11-12 ENCOUNTER — HOSPITAL ENCOUNTER (OUTPATIENT)
Dept: RADIATION ONCOLOGY | Facility: MEDICAL CENTER | Age: 79
End: 2019-11-12

## 2019-11-12 LAB
CHEMOTHERAPY INFUSION START DATE: NORMAL
CHEMOTHERAPY RECORDS: 1000
CHEMOTHERAPY RECORDS: 1000
CHEMOTHERAPY RECORDS: 2
CHEMOTHERAPY RECORDS: 2
CHEMOTHERAPY RECORDS: NORMAL
CHEMOTHERAPY RX CANCER: NORMAL
DATE 1ST CHEMO CANCER: NORMAL
RAD ONC ARIA COURSE LAST TREATMENT DATE: NORMAL
RAD ONC ARIA COURSE TREATMENT ELAPSED DAYS: NORMAL
RAD ONC ARIA REFERENCE POINT DOSAGE GIVEN TO DATE: 6
RAD ONC ARIA REFERENCE POINT DOSAGE GIVEN TO DATE: 6
RAD ONC ARIA REFERENCE POINT ID: NORMAL
RAD ONC ARIA REFERENCE POINT ID: NORMAL
RAD ONC ARIA REFERENCE POINT SESSION DOSAGE GIVEN: 2
RAD ONC ARIA REFERENCE POINT SESSION DOSAGE GIVEN: 2

## 2019-11-12 PROCEDURE — 77412 RADIATION TX DELIVERY LVL 3: CPT | Performed by: RADIOLOGY

## 2019-11-13 VITALS
HEART RATE: 95 BPM | BODY MASS INDEX: 26.09 KG/M2 | TEMPERATURE: 98.5 F | DIASTOLIC BLOOD PRESSURE: 60 MMHG | WEIGHT: 152 LBS | OXYGEN SATURATION: 94 % | SYSTOLIC BLOOD PRESSURE: 88 MMHG

## 2019-11-13 LAB
CHEMOTHERAPY INFUSION START DATE: NORMAL
CHEMOTHERAPY RECORDS: 1000
CHEMOTHERAPY RECORDS: 1000
CHEMOTHERAPY RECORDS: 2
CHEMOTHERAPY RECORDS: 2
CHEMOTHERAPY RECORDS: NORMAL
CHEMOTHERAPY RX CANCER: NORMAL
DATE 1ST CHEMO CANCER: NORMAL
RAD ONC ARIA COURSE LAST TREATMENT DATE: NORMAL
RAD ONC ARIA COURSE TREATMENT ELAPSED DAYS: NORMAL
RAD ONC ARIA REFERENCE POINT DOSAGE GIVEN TO DATE: 8
RAD ONC ARIA REFERENCE POINT DOSAGE GIVEN TO DATE: 8
RAD ONC ARIA REFERENCE POINT ID: NORMAL
RAD ONC ARIA REFERENCE POINT ID: NORMAL
RAD ONC ARIA REFERENCE POINT SESSION DOSAGE GIVEN: 2
RAD ONC ARIA REFERENCE POINT SESSION DOSAGE GIVEN: 2

## 2019-11-13 PROCEDURE — 77412 RADIATION TX DELIVERY LVL 3: CPT | Performed by: RADIOLOGY

## 2019-11-13 ASSESSMENT — PAIN SCALES - GENERAL: PAINLEVEL: NO PAIN

## 2019-11-13 NOTE — ON TREATMENT VISIT
ON TREATMENT NOTE  RADIATION ONCOLOGY DEPARTMENT    Patient name:  Rashmi Parra    Primary Physician:  Michael Braun M.D. MRN: [id number]  CSN: [id number]   Referring physician:  Shalonda Velazquez M.D. : 1940, 79 y.o.     ENCOUNTER DATE:  19    DIAGNOSIS:    Malignant neoplasm of overlapping sites of breast in female, estrogen receptor positive (HCC)  Staging form: Breast, AJCC 8th Edition  - Pathologic: Stage IA (pT3, pN0(mol+), cM0, G1, ER+, IA+, HER2-) - Signed by Anita Martinez M.D. on 2019  Multigene prognostic tests performed: None  Histologic grading system: 3 grade system      TREATMENT SUMMARY:  Radiation Treatments     Active   Plans   R Lat CW Bst   Most recent treatment: Dose planned: 200 cGy (fraction 4 of 5 on 2019)   Total: Dose planned: 1,000 cGy   Elapsed Course Treatment Days: 44 @       R Med CW Bst   Most recent treatment: Dose planned: 200 cGy (fraction 4 of 5 on 2019)   Total: Dose planned: 1,000 cGy   Elapsed Course Treatment Days: 44 @       Rt CW Bolus   Most recent treatment: Dose planned: 180 cGy (fraction 14 of 14 on 2019)   Total: Dose planned: 2,520 cGy   Elapsed Course Treatment Days: 38 @ [id number]      Rt CW No Marleen   Most recent treatment: Dose planned: 180 cGy (fraction 14 of 14 on 2019)   Total: Dose planned: 2,520 cGy   Elapsed Course Treatment Days: 37 @ [id number]      Reference Points   R Lat CW Scar Bs   Most recent treatment: Dose given: 200 cGy (on 2019)   Total: Dose given: 800 cGy   Elapsed Course Treatment Days: 44 @       R Med CW Scar Bs   Most recent treatment: Dose given: 200 cGy (on 2019)   Total: Dose given: 800 cGy   Elapsed Course Treatment Days: 44 @       Rt CW   Most recent treatment: Dose given: 180 cGy (on 2019)   Total: Dose given: 5,040 cGy   Elapsed Course Treatment Days: 38 @ [id number]      Rt CW Bolus CP   Most recent treatment:  Dose given: 180 cGy (on 11/7/2019)   Total: Dose given: 2,520 cGy   Elapsed Course Treatment Days: 38 @ [id number]      Rt CW No Marleen CP   Most recent treatment: Dose given: 180 cGy (on 11/6/2019)   Total: Dose given: 2,520 cGy   Elapsed Course Treatment Days: 37 @ [id number]                    SUBJECTIVE:   Doing well slight pain in the treatment field but otherwise without complaints      VITAL SIGNS:  BP (!) 88/60 (BP Location: Left arm, Patient Position: Sitting, BP Cuff Size: Adult)   Pulse 95   Temp 36.9 °C (98.5 °F) (Temporal)   Wt 68.9 kg (152 lb)   SpO2 94%    Encounter Vitals  Temperature: 36.9 °C (98.5 °F)  Temp src: Temporal  Blood Pressure : (!) 88/60  Pulse: 95  Pulse Oximetry: 94 %  Weight: 68.9 kg (152 lb)  Pain Score: No pain  Pain Assessment 11/13/2019 11/6/2019 9/5/2019   Pain Assessment Denies Pain Acute Pain -   Pain Score 0 7 0   Pain Loc - Breast -   Some recent data might be hidden          PHYSICAL EXAM:  Brisk erythematous reaction in the treatment field no sign of skin breakdown  Moderate    Toxicity Assessment 11/13/2019 11/6/2019   Toxicity Assessment Breast Breast   Fatigue (lethargy, malaise, asthenia) Increased fatigue over baseline, but not altering normal activities Increased fatigue over baseline, but not altering normal activities   Radiation Dermatitis Moderate to brisk erythema or a patchy moist desquamation, mostly confined to skin folds and creases, with/without moderate edema Moderate to brisk erythema or a patchy moist desquamation, mostly confined to skin folds and creases, with/without moderate edema   Lymphatics Normal Normal   RT - Pain due to RT None Mild pain not interfering with function   Dyspnea Normal Normal         IMPRESSION:  Cancer Staging  Malignant neoplasm of overlapping sites of breast in female, estrogen receptor positive (HCC)  Staging form: Breast, AJCC 8th Edition  - Pathologic: Stage IA (pT3, pN0(mol+), cM0, G1, ER+, DC+, HER2-) - Signed by  Anita Martinez M.D. on 11/6/2019      PLAN:  No change in treatment plan.  Recommend she use more Aquaphor.  She will be complete tomorrow and I will see her in follow-up in 6 weeks or sooner as needed.    Disposition:  Treatment plan reviewed. Questions answered. Continue therapy outlined.     Anita Martinez M.D.    Orders Placed This Encounter   • Rad Onc [facility] Session Summary

## 2019-11-14 ENCOUNTER — HOSPITAL ENCOUNTER (OUTPATIENT)
Dept: RADIATION ONCOLOGY | Facility: MEDICAL CENTER | Age: 79
End: 2019-11-14

## 2019-11-14 LAB
CHEMOTHERAPY INFUSION START DATE: NORMAL
CHEMOTHERAPY RECORDS: 1000
CHEMOTHERAPY RECORDS: 1000
CHEMOTHERAPY RECORDS: 2
CHEMOTHERAPY RECORDS: 2
CHEMOTHERAPY RECORDS: NORMAL
CHEMOTHERAPY RX CANCER: NORMAL
DATE 1ST CHEMO CANCER: NORMAL
RAD ONC ARIA COURSE LAST TREATMENT DATE: NORMAL
RAD ONC ARIA COURSE TREATMENT ELAPSED DAYS: NORMAL
RAD ONC ARIA REFERENCE POINT DOSAGE GIVEN TO DATE: 10
RAD ONC ARIA REFERENCE POINT DOSAGE GIVEN TO DATE: 10
RAD ONC ARIA REFERENCE POINT ID: NORMAL
RAD ONC ARIA REFERENCE POINT ID: NORMAL
RAD ONC ARIA REFERENCE POINT SESSION DOSAGE GIVEN: 2
RAD ONC ARIA REFERENCE POINT SESSION DOSAGE GIVEN: 2

## 2019-11-14 PROCEDURE — 77336 RADIATION PHYSICS CONSULT: CPT | Performed by: RADIOLOGY

## 2019-11-14 PROCEDURE — 77412 RADIATION TX DELIVERY LVL 3: CPT | Performed by: RADIOLOGY

## 2019-11-19 LAB
CHEMOTHERAPY INFUSION START DATE: NORMAL
CHEMOTHERAPY INFUSION STOP DATE: NORMAL
CHEMOTHERAPY RECORDS: 1.8
CHEMOTHERAPY RECORDS: 1.8
CHEMOTHERAPY RECORDS: 1000
CHEMOTHERAPY RECORDS: 1000
CHEMOTHERAPY RECORDS: 2
CHEMOTHERAPY RECORDS: 2
CHEMOTHERAPY RECORDS: 2520
CHEMOTHERAPY RECORDS: 2520
CHEMOTHERAPY RECORDS: NORMAL
CHEMOTHERAPY RX CANCER: NORMAL
DATE 1ST CHEMO CANCER: NORMAL
RAD ONC ARIA COURSE LAST TREATMENT DATE: NORMAL
RAD ONC ARIA COURSE TREATMENT ELAPSED DAYS: NORMAL
RAD ONC ARIA REFERENCE POINT DOSAGE GIVEN TO DATE: 10
RAD ONC ARIA REFERENCE POINT DOSAGE GIVEN TO DATE: 10
RAD ONC ARIA REFERENCE POINT DOSAGE GIVEN TO DATE: 25.2
RAD ONC ARIA REFERENCE POINT DOSAGE GIVEN TO DATE: 25.2
RAD ONC ARIA REFERENCE POINT DOSAGE GIVEN TO DATE: 50.4
RAD ONC ARIA REFERENCE POINT ID: NORMAL

## 2019-12-31 ENCOUNTER — PATIENT MESSAGE (OUTPATIENT)
Dept: HEALTH INFORMATION MANAGEMENT | Facility: OTHER | Age: 79
End: 2019-12-31

## 2019-12-31 ENCOUNTER — PATIENT OUTREACH (OUTPATIENT)
Dept: OTHER | Facility: MEDICAL CENTER | Age: 79
End: 2019-12-31

## 2019-12-31 NOTE — LETTER
[facility]   [address] [place] [address] [address]  [place] [address]  Phone: [phone] - Fax: [phone]              Date: 12/31/19    Rashmi Richter [place] [address]    Medical Record Number: [id number]  Re: Treatment Summary and Survivorship Care Plan      Dear Rashmi,    A cancer Treatment Summary and Follow Up Care Plan, also known as a survivorship care plan, contains important information for you including: a summary of your diagnosis and treatment, recommendations for follow up care, recommendations for early detection and management of treatment-related effects, and a guide for healthy living going forward.    Why is a survivorship care plan important? This is a record for you to keep on file for future reference, but it is also a communication tool that you can share with your non-cancer medical providers to inform them of potential long-term or late effects of your cancer treatment and recommended follow up screening needed to maintain optimal health.     We will be calling you to schedule a meeting to review this document with you to answer any questions you might have regarding follow up and to assist you in identifying resources if necessary.      You can contact us at [phone] if you have questions or if you would like to schedule an appointment. Our scheduling hours are from 8:00am - 4:30pm Monday through Friday.      Kind Regards,       [facility] Survivorship program

## 2020-01-07 ENCOUNTER — HOSPITAL ENCOUNTER (OUTPATIENT)
Dept: RADIATION ONCOLOGY | Facility: MEDICAL CENTER | Age: 80
End: 2020-01-31
Attending: RADIOLOGY
Payer: MEDICARE

## 2020-01-07 VITALS
TEMPERATURE: 98.2 F | OXYGEN SATURATION: 96 % | HEART RATE: 92 BPM | DIASTOLIC BLOOD PRESSURE: 72 MMHG | SYSTOLIC BLOOD PRESSURE: 101 MMHG | BODY MASS INDEX: 26.47 KG/M2 | WEIGHT: 154.2 LBS

## 2020-01-07 PROCEDURE — 99212 OFFICE O/P EST SF 10 MIN: CPT | Performed by: RADIOLOGY

## 2020-01-07 RX ORDER — ANASTROZOLE 1 MG/1
1 TABLET ORAL DAILY
COMMUNITY
Start: 2019-12-01 | End: 2023-07-04

## 2020-01-07 SDOH — HEALTH STABILITY: MENTAL HEALTH: HOW OFTEN DO YOU HAVE 6 OR MORE DRINKS ON ONE OCCASION?: DAILY OR ALMOST DAILY

## 2020-01-07 SDOH — HEALTH STABILITY: MENTAL HEALTH: HOW OFTEN DO YOU HAVE A DRINK CONTAINING ALCOHOL?: 4 OR MORE TIMES A WEEK

## 2020-01-07 ASSESSMENT — PAIN SCALES - GENERAL: PAINLEVEL: NO PAIN

## 2020-01-07 NOTE — NON-PROVIDER
Patient was seen today in clinic with Dr. Martinez for follow up.  Vitals signs and weight were obtained and pain assessment was completed.  Allergies and medications were reviewed with the patient.  Review of systems completed.     Vitals/Pain:  Vitals:    01/07/20 1428   BP: 101/72   BP Location: Left arm   Patient Position: Sitting   BP Cuff Size: Adult   Pulse: 92   Temp: 36.8 °C (98.2 °F)   TempSrc: Temporal   SpO2: 96%   Weight: 69.9 kg (154 lb 3.2 oz)   Pain Score: No pain        Allergies:   Patient has no known allergies.    Current Medications:  Current Outpatient Medications   Medication Sig Dispense Refill   • Cholecalciferol (VITAMIN D PO) Take  by mouth.     • Cyanocobalamin (B-12 PO) Take 1 Tab by mouth every day.     • acetaminophen (TYLENOL) 500 MG Tab Take 500 mg by mouth every 6 hours as needed.     • lovastatin (MEVACOR) 20 MG Tab Take 20 mg by mouth every evening.     • B Complex Vitamins (B COMPLEX PO) Take 1 Tab by mouth every day.     • polyethylene glycol/lytes (MIRALAX) Pack Take 17 g by mouth every day.     • Lifitegrast (XIIDRA) 5 % Solution Place 1 Drop in both eyes every bedtime.     • ibuprofen (MOTRIN) 200 MG Tab Take 200 mg by mouth every 6 hours as needed for Mild Pain.     • Melatonin 5 MG Tab Take 0.25 Tabs by mouth every bedtime.     • metFORMIN (GLUCOPHAGE) 500 MG Tab Take 500 mg by mouth every evening.     • Mirabegron ER (MYRBETRIQ) 50 MG TABLET SR 24 HR Take 50 mg by mouth every evening.     • Dexlansoprazole (DEXILANT) 60 MG CAPSULE DELAYED RELEASE delayed-release capsule Take 60 mg by mouth every evening.     • Multiple Vitamins-Minerals (MULTIVITAMIN PO) Take 1 Tab by mouth every day.     • MAGNESIUM PO Take 1 Cap by mouth every day.     • spironolactone (ALDACTONE) 25 MG Tab Take 25 mg by mouth every day.     • Calcium Citrate (CITRACAL PO) Take 1 Tab by mouth every day.     • SELENIUM PO Take 1 Tab by mouth every day.     • docusate sodium (COLACE) 100 MG Cap Take 100 mg  by mouth 1 time daily as needed for Constipation.       No current facility-administered medications for this encounter.          PCP:  Kade Villatoro, Med Ass't

## 2020-01-07 NOTE — PROGRESS NOTES
RADIATION ONCOLOGY FOLLOW-UP    DATE OF SERVICE: 1/7/2020    IDENTIFICATION:   A 79 y.o. 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   Radiation therapy complete 11/14/2019    HISTORY OF PRESENT ILLNESS:   Since last seen patient has been doing well she still notices tightness and achiness but otherwise she is doing well.  She is continuing on anastrozole.    CURRENT MEDICATIONS:  Current Outpatient Medications   Medication Sig Dispense Refill   • anastrozole (ARIMIDEX) 1 MG Tab Take 1 mg by mouth.     • Cholecalciferol (VITAMIN D PO) Take  by mouth.     • Cyanocobalamin (B-12 PO) Take 1 Tab by mouth every day.     • acetaminophen (TYLENOL) 500 MG Tab Take 500 mg by mouth every 6 hours as needed.     • lovastatin (MEVACOR) 20 MG Tab Take 20 mg by mouth every evening.     • B Complex Vitamins (B COMPLEX PO) Take 1 Tab by mouth every day.     • polyethylene glycol/lytes (MIRALAX) Pack Take 17 g by mouth every day.     • Lifitegrast (XIIDRA) 5 % Solution Place 1 Drop in both eyes every bedtime.     • ibuprofen (MOTRIN) 200 MG Tab Take 200 mg by mouth every 6 hours as needed for Mild Pain.     • Melatonin 5 MG Tab Take 0.25 Tabs by mouth every bedtime.     • metFORMIN (GLUCOPHAGE) 500 MG Tab Take 500 mg by mouth every evening.     • Mirabegron ER (MYRBETRIQ) 50 MG TABLET SR 24 HR Take 50 mg by mouth every evening.     • Dexlansoprazole (DEXILANT) 60 MG CAPSULE DELAYED RELEASE delayed-release capsule Take 60 mg by mouth every evening.     • Multiple Vitamins-Minerals (MULTIVITAMIN PO) Take 1 Tab by mouth every day.     • MAGNESIUM PO Take 1 Cap by mouth every day.     • spironolactone (ALDACTONE) 25 MG Tab Take 25 mg by mouth every day.     • Calcium Citrate (CITRACAL PO) Take 1 Tab  by mouth every day.     • SELENIUM PO Take 1 Tab by mouth every day.     • docusate sodium (COLACE) 100 MG Cap Take 100 mg by mouth 1 time daily as needed for Constipation.       No current facility-administered medications for this encounter.        ALLERGIES:  Patient has no known allergies.    FAMILY HISTORY:    No family history on file.[unfilled]        SOCIAL HISTORY:     reports that she quit smoking about 28 years ago. Her smoking use included cigarettes. She has a 30.00 pack-year smoking history. She has quit using smokeless tobacco. She reports current alcohol use of about 0.6 oz of alcohol per week. She reports that she does not use drugs.      REVIEW OF SYSTEMS: Is significant for palpitations weight gain because of increase in appetite she has some fatigue.  She has marginal diabetes and occasional hot flashes.  Her taste is improving she has dry mouth she has fatigue issues hearing loss visual difficulties nocturia incontinence muscle pain joint pain in the arms and legs all of these things are chronic.  She has neck stiffness unsteady gait neuropathy in her legs memory loss cough shortness of breath depression and anxiety.  The rest of the review of systems has been reviewed by me and is documented in the nursing note in [facility] dated 1/7/2020     PHYSICAL EXAM:     ECOG PERFORMANCE STATUS:  0= Fully active, able to carry on all pre-disease performance without restriction.       Vitals:    01/07/20 1428   BP: 101/72   BP Location: Left arm   Patient Position: Sitting   BP Cuff Size: Adult   Pulse: 92   Temp: 36.8 °C (98.2 °F)   TempSrc: Temporal   SpO2: 96%   Weight: 69.9 kg (154 lb 3.2 oz)   Pain Score: No pain        GENERAL: Well-appearing alert and oriented x3 in no apparent distress  Chest wall: Bilateral mastectomies the right chest wall has evidence of hyperpigmentation and induration consistent with post radiation change.  Left side is well-healed there is no evidence of any nodularity or mass  in either chest wall or axilla.  HEENT:  Pupils are equal, round, and reactive to light.  Extraocular muscles   are intact. Sclerae nonicteric.  Conjunctivae pink.  Oral cavity, tongue   protrudes midline.   EXTREMITIES:  Without Edema.  NEUROLOGIC:  Cranial nerves II through XII were intact. Normal stance and gait motor and sensory grossly within normal limits          IMPRESSION:    A 79 y.o. female with  a T2-T3N0 grade I,  ER/WI positive HER-2/liz negative invasive ductal carcinoma presenting with skin redness, lymph vascular invasion and dermal lymph vascular invasion S/P MRM with initial positive margin status post re-resection with a 2 mm margin on the anterior margin.  Radiation therapy complete 11/14/2019 on anastrozole without evidence of disease.    RECOMMENDATIONS:   Patient is going to continue her follow-up based on national cancer guidelines with Dr. Velazquez am happy to see her on an as-needed basis.      Thank you for the opportunity to participate in her care.  If any questions or comments, please do not hesitate in calling.      Please note that this dictation was created using voice recognition software. I have made every reasonable attempt to correct obvious errors, but I expect that there are errors of grammar and possibly content that I did not discover before finalizing the note.

## 2020-02-18 ENCOUNTER — APPOINTMENT (RX ONLY)
Dept: URBAN - METROPOLITAN AREA CLINIC 4 | Facility: CLINIC | Age: 80
Setting detail: DERMATOLOGY
End: 2020-02-18

## 2020-02-18 DIAGNOSIS — L82.1 OTHER SEBORRHEIC KERATOSIS: ICD-10-CM

## 2020-02-18 DIAGNOSIS — L57.0 ACTINIC KERATOSIS: ICD-10-CM

## 2020-02-18 PROBLEM — D48.5 NEOPLASM OF UNCERTAIN BEHAVIOR OF SKIN: Status: ACTIVE | Noted: 2020-02-18

## 2020-02-18 PROCEDURE — 11102 TANGNTL BX SKIN SINGLE LES: CPT

## 2020-02-18 PROCEDURE — ? LIQUID NITROGEN

## 2020-02-18 PROCEDURE — 11103 TANGNTL BX SKIN EA SEP/ADDL: CPT

## 2020-02-18 PROCEDURE — 99213 OFFICE O/P EST LOW 20 MIN: CPT | Mod: 25

## 2020-02-18 PROCEDURE — ? BIOPSY BY SHAVE METHOD AND DESTRUCTION

## 2020-02-18 PROCEDURE — ? COUNSELING

## 2020-02-18 PROCEDURE — 17003 DESTRUCT PREMALG LES 2-14: CPT

## 2020-02-18 PROCEDURE — 17000 DESTRUCT PREMALG LESION: CPT | Mod: 59

## 2020-02-18 ASSESSMENT — LOCATION SIMPLE DESCRIPTION DERM
LOCATION SIMPLE: LEFT CHEEK
LOCATION SIMPLE: NOSE
LOCATION SIMPLE: RIGHT PRETIBIAL REGION
LOCATION SIMPLE: INFERIOR FOREHEAD
LOCATION SIMPLE: RIGHT CHEEK

## 2020-02-18 ASSESSMENT — LOCATION DETAILED DESCRIPTION DERM
LOCATION DETAILED: RIGHT MEDIAL MALAR CHEEK
LOCATION DETAILED: LEFT NASAL DORSUM
LOCATION DETAILED: RIGHT DISTAL PRETIBIAL REGION
LOCATION DETAILED: LEFT INFERIOR CENTRAL MALAR CHEEK
LOCATION DETAILED: RIGHT LATERAL MALAR CHEEK
LOCATION DETAILED: LEFT CENTRAL MALAR CHEEK
LOCATION DETAILED: INFERIOR MID FOREHEAD
LOCATION DETAILED: LEFT SUPERIOR CENTRAL MALAR CHEEK

## 2020-02-18 ASSESSMENT — LOCATION ZONE DERM
LOCATION ZONE: FACE
LOCATION ZONE: LEG
LOCATION ZONE: NOSE

## 2020-02-18 NOTE — PROCEDURE: BIOPSY BY SHAVE METHOD AND DESTRUCTION
Detail Level: Detailed
Biopsy Type: H and E
Bill As?: Biopsy by Shave Method
Size Of Lesion In Cm (Optional): 0
Size Of Lesion After Curettage: 0.6
Anesthesia Type: 1% lidocaine with epinephrine
Anesthesia Volume In Cc: 2
Hemostasis: Aluminum Chloride and Electrocautery
Destruction Type: electrodesiccation
Number Of Curettages: 3
Wound Care: Petrolatum
Lab: [id number]
Lab Facility: 
Render Path Notes In Note?: No
Consent: Written consent was obtained and risks were reviewed including but not limited to scarring, infection, bleeding, scabbing, incomplete removal, nerve damage and allergy to anesthesia.
Post-Care Instructions: I reviewed with the patient in detail post-care instructions. Patient is to keep the biopsy site dry overnight, and then apply bacitracin twice daily until healed. Patient may apply hydrogen peroxide soaks to remove any crusting.
Notification Instructions: Patient will be notified of biopsy results. However, patient instructed to call the office if not contacted within 2 weeks.
Billing Type: Third-Party Bill
Size Of Lesion After Curettage: 0.5

## 2020-02-18 NOTE — PROCEDURE: MIPS QUALITY
Quality 431: Preventive Care And Screening: Unhealthy Alcohol Use - Screening: Patient screened for unhealthy alcohol use using a single question and scores less than 2 times per year
Quality 226: Preventive Care And Screening: Tobacco Use: Screening And Cessation Intervention: Patient screened for tobacco use and is an ex/non-smoker
Quality 130: Documentation Of Current Medications In The Medical Record: Current Medications Documented
Detail Level: Detailed
Quality 111:Pneumonia Vaccination Status For Older Adults: Pneumococcal Vaccination Previously Received

## 2020-02-18 NOTE — PROCEDURE: LIQUID NITROGEN
Render Note In Bullet Format When Appropriate: No
Post-Care Instructions: I reviewed with the patient in detail post-care instructions. Patient is to wear sunprotection, and avoid picking at any of the treated lesions. Pt may apply Vaseline to crusted or scabbing areas.
Number Of Freeze-Thaw Cycles: 2 freeze-thaw cycles
Duration Of Freeze Thaw-Cycle (Seconds): 2
Detail Level: Simple
Consent: The patient's consent was obtained including but not limited to risks of crusting, scabbing, blistering, scarring, darker or lighter pigmentary change, recurrence, incomplete removal and infection.

## 2020-02-18 NOTE — PROCEDURE: COUNSELING
Detail Level: Zone
Patient Specific Counseling (Will Not Stick From Patient To Patient): Discussed benefit of red light PDT.

## 2020-04-07 ENCOUNTER — APPOINTMENT (RX ONLY)
Dept: URBAN - METROPOLITAN AREA CLINIC 36 | Facility: CLINIC | Age: 80
Setting detail: DERMATOLOGY
End: 2020-04-07

## 2020-04-07 DIAGNOSIS — Z48.817 ENCOUNTER FOR SURGICAL AFTERCARE FOLLOWING SURGERY ON THE SKIN AND SUBCUTANEOUS TISSUE: ICD-10-CM

## 2020-04-07 PROBLEM — C44.722 SQUAMOUS CELL CARCINOMA OF SKIN OF RIGHT LOWER LIMB, INCLUDING HIP: Status: ACTIVE | Noted: 2020-04-07

## 2020-04-07 PROCEDURE — ? EXCISION

## 2020-04-07 PROCEDURE — 99212 OFFICE O/P EST SF 10 MIN: CPT | Mod: 25

## 2020-04-07 PROCEDURE — 11602 EXC TR-EXT MAL+MARG 1.1-2 CM: CPT

## 2020-04-07 PROCEDURE — 12032 INTMD RPR S/A/T/EXT 2.6-7.5: CPT

## 2020-04-07 ASSESSMENT — LOCATION SIMPLE DESCRIPTION DERM: LOCATION SIMPLE: LEFT PRETIBIAL REGION

## 2020-04-07 ASSESSMENT — LOCATION DETAILED DESCRIPTION DERM: LOCATION DETAILED: LEFT DISTAL PRETIBIAL REGION

## 2020-04-07 ASSESSMENT — LOCATION ZONE DERM: LOCATION ZONE: LEG

## 2020-04-07 NOTE — PROCEDURE: EXCISION
Referring Physician (Optional): jos
Surgeon Performing The Repair (Optional): [email or web address]
Biopsy Photograph Reviewed: Yes
Previous Accession (Optional): [id number]
Size Of Lesion In Cm: 1
Size Of Margin In Cm: 0.3
Excision Method: Round
Anesthesia Volume In Cc: 10
Did You Provide Opioid Counseling: No
Repair Type: Purse String Closure (Intermediate)
Intermediate / Complex Repair - Final Wound Length In Cm: 2.6
Undermining Type: Entire Wound
Debridement Text: The wound edges were debrided prior to proceeding with the closure to facilitate wound healing.
Helical Rim Text: The closure involved the helical rim.
Vermilion Border Text: The closure involved the vermilion border.
Nostril Rim Text: The closure involved the nostril rim.
Retention Suture Text: Retention sutures were placed to support the closure and prevent dehiscence.
Primary Defect Length (In Cm): 1.6
Secondary Defect Length (In Cm): 0
Suture Removal: 14 days
Lab: [id number]
Lab Facility: 
Graft Donor Site Bandage (Optional-Leave Blank If You Don't Want In Note): Steri-strips and a pressure bandage were applied to the donor site.
Epidermal Closure Graft Donor Site (Optional): simple interrupted
Billing Type: Third-Party Bill
Excision Depth: adipose tissue
Scalpel Size: 15 blade
Anesthesia Type: 0.5% lidocaine with 1:200,000 epinephrine and a 1:10 solution of 8.4% sodium bicarbonate and 408mcg clindamycin/ml
Additional Anesthesia Volume In Cc: 0.5
Hemostasis: Electrocautery
Estimated Blood Loss (Cc): minimal
Detail Level: Detailed
Anesthesia Type: 1% lidocaine with epinephrine
Anesthesia Volume In Cc: 7
Deep Sutures: 4-0 PDO
Epidermal Closure: running subcuticular
Wound Care: Petrolatum
Dressing: dry sterile dressing
Positioning (Leave Blank If You Do Not Want): The patient was placed in a comfortable position exposing the surgical site.
Pre-Excision Curettage Text (Leave Blank If You Do Not Want): Prior to drawing the surgical margin the visible lesion was removed with electrodesiccation and curettage to clearly define the lesion size.
Complex Repair Preamble Text (Leave Blank If You Do Not Want): Extensive wide undermining was performed.
Intermediate Repair Preamble Text (Leave Blank If You Do Not Want): Undermining was performed with blunt dissection.
Curvilinear Excision Additional Text (Leave Blank If You Do Not Want): The margin was drawn around the clinically apparent lesion.  A curvilinear shape was then drawn on the skin incorporating the lesion and margins.  Incisions were then made along these lines to the appropriate tissue plane and the lesion was extirpated.
Fusiform Excision Additional Text (Leave Blank If You Do Not Want): The margin was drawn around the clinically apparent lesion.  A fusiform shape was then drawn on the skin incorporating the lesion and margins.  Incisions were then made along these lines to the appropriate tissue plane and the lesion was extirpated.
Elliptical Excision Additional Text (Leave Blank If You Do Not Want): The margin was drawn around the clinically apparent lesion.  An elliptical shape was then drawn on the skin incorporating the lesion and margins.  Incisions were then made along these lines to the appropriate tissue plane and the lesion was extirpated.
Saucerization Excision Additional Text (Leave Blank If You Do Not Want): The margin was drawn around the clinically apparent lesion.  Incisions were then made along these lines, in a tangential fashion, to the appropriate tissue plane and the lesion was extirpated.
Slit Excision Additional Text (Leave Blank If You Do Not Want): A linear line was drawn on the skin overlying the lesion. An incision was made slowly until the lesion was visualized.  Once visualized, the lesion was removed with blunt dissection.
Excisional Biopsy Additional Text (Leave Blank If You Do Not Want): The margin was drawn around the clinically apparent lesion. An elliptical shape was then drawn on the skin incorporating the lesion and margins.  Incisions were then made along these lines to the appropriate tissue plane and the lesion was extirpated.
Perilesional Excision Additional Text (Leave Blank If You Do Not Want): The margin was drawn around the clinically apparent lesion. Incisions were then made along these lines to the appropriate tissue plane and the lesion was extirpated.
Repair Performed By Another Provider Text (Leave Blank If You Do Not Want): After the tissue was excised the defect was repaired by another provider.
No Repair - Repaired With Adjacent Surgical Defect Text (Leave Blank If You Do Not Want): After the excision the defect was repaired concurrently with another surgical defect which was in close approximation.
Advancement Flap (Single) Text: The defect edges were debeveled with a #15 scalpel blade.  Given the location of the defect and the proximity to free margins a single advancement flap was deemed most appropriate.  Using a sterile surgical marker, an appropriate advancement flap was drawn incorporating the defect and placing the expected incisions within the relaxed skin tension lines where possible.    The area thus outlined was incised deep to adipose tissue with a #15 scalpel blade.  The skin margins were undermined to an appropriate distance in all directions utilizing iris scissors.
Advancement Flap (Double) Text: The defect edges were debeveled with a #15 scalpel blade.  Given the location of the defect and the proximity to free margins a double advancement flap was deemed most appropriate.  Using a sterile surgical marker, the appropriate advancement flaps were drawn incorporating the defect and placing the expected incisions within the relaxed skin tension lines where possible.    The area thus outlined was incised deep to adipose tissue with a #15 scalpel blade.  The skin margins were undermined to an appropriate distance in all directions utilizing iris scissors.
Burow's Advancement Flap Text: The defect edges were debeveled with a #15 scalpel blade.  Given the location of the defect and the proximity to free margins a Burow's advancement flap was deemed most appropriate.  Using a sterile surgical marker, the appropriate advancement flap was drawn incorporating the defect and placing the expected incisions within the relaxed skin tension lines where possible.    The area thus outlined was incised deep to adipose tissue with a #15 scalpel blade.  The skin margins were undermined to an appropriate distance in all directions utilizing iris scissors.
Chonodrocutaneous Helical Advancement Flap Text: The defect edges were debeveled with a #15 scalpel blade.  Given the location of the defect and the proximity to free margins a chondrocutaneous helical advancement flap was deemed most appropriate.  Using a sterile surgical marker, the appropriate advancement flap was drawn incorporating the defect and placing the expected incisions within the relaxed skin tension lines where possible.    The area thus outlined was incised deep to adipose tissue with a #15 scalpel blade.  The skin margins were undermined to an appropriate distance in all directions utilizing iris scissors.
Crescentic Advancement Flap Text: The defect edges were debeveled with a #15 scalpel blade.  Given the location of the defect and the proximity to free margins a crescentic advancement flap was deemed most appropriate.  Using a sterile surgical marker, the appropriate advancement flap was drawn incorporating the defect and placing the expected incisions within the relaxed skin tension lines where possible.    The area thus outlined was incised deep to adipose tissue with a #15 scalpel blade.  The skin margins were undermined to an appropriate distance in all directions utilizing iris scissors.
A-T Advancement Flap Text: The defect edges were debeveled with a #15 scalpel blade.  Given the location of the defect, shape of the defect and the proximity to free margins an A-T advancement flap was deemed most appropriate.  Using a sterile surgical marker, an appropriate advancement flap was drawn incorporating the defect and placing the expected incisions within the relaxed skin tension lines where possible.    The area thus outlined was incised deep to adipose tissue with a #15 scalpel blade.  The skin margins were undermined to an appropriate distance in all directions utilizing iris scissors.
O-T Advancement Flap Text: The defect edges were debeveled with a #15 scalpel blade.  Given the location of the defect, shape of the defect and the proximity to free margins an O-T advancement flap was deemed most appropriate.  Using a sterile surgical marker, an appropriate advancement flap was drawn incorporating the defect and placing the expected incisions within the relaxed skin tension lines where possible.    The area thus outlined was incised deep to adipose tissue with a #15 scalpel blade.  The skin margins were undermined to an appropriate distance in all directions utilizing iris scissors.
O-L Flap Text: The defect edges were debeveled with a #15 scalpel blade.  Given the location of the defect, shape of the defect and the proximity to free margins an O-L flap was deemed most appropriate.  Using a sterile surgical marker, an appropriate advancement flap was drawn incorporating the defect and placing the expected incisions within the relaxed skin tension lines where possible.    The area thus outlined was incised deep to adipose tissue with a #15 scalpel blade.  The skin margins were undermined to an appropriate distance in all directions utilizing iris scissors.
O-Z Flap Text: The defect edges were debeveled with a #15 scalpel blade.  Given the location of the defect, shape of the defect and the proximity to free margins an O-Z flap was deemed most appropriate.  Using a sterile surgical marker, an appropriate transposition flap was drawn incorporating the defect and placing the expected incisions within the relaxed skin tension lines where possible. The area thus outlined was incised deep to adipose tissue with a #15 scalpel blade.  The skin margins were undermined to an appropriate distance in all directions utilizing iris scissors.
Double O-Z Flap Text: The defect edges were debeveled with a #15 scalpel blade.  Given the location of the defect, shape of the defect and the proximity to free margins a Double O-Z flap was deemed most appropriate.  Using a sterile surgical marker, an appropriate transposition flap was drawn incorporating the defect and placing the expected incisions within the relaxed skin tension lines where possible. The area thus outlined was incised deep to adipose tissue with a #15 scalpel blade.  The skin margins were undermined to an appropriate distance in all directions utilizing iris scissors.
V-Y Flap Text: The defect edges were debeveled with a #15 scalpel blade.  Given the location of the defect, shape of the defect and the proximity to free margins a V-Y flap was deemed most appropriate.  Using a sterile surgical marker, an appropriate advancement flap was drawn incorporating the defect and placing the expected incisions within the relaxed skin tension lines where possible.    The area thus outlined was incised deep to adipose tissue with a #15 scalpel blade.  The skin margins were undermined to an appropriate distance in all directions utilizing iris scissors.
Advancement-Rotation Flap Text: The defect edges were debeveled with a #15 scalpel blade.  Given the location of the defect, shape of the defect and the proximity to free margins an advancement-rotation flap was deemed most appropriate.  Using a sterile surgical marker, an appropriate flap was drawn incorporating the defect and placing the expected incisions within the relaxed skin tension lines where possible. The area thus outlined was incised deep to adipose tissue with a #15 scalpel blade.  The skin margins were undermined to an appropriate distance in all directions utilizing iris scissors.
Mercedes Flap Text: The defect edges were debeveled with a #15 scalpel blade.  Given the location of the defect, shape of the defect and the proximity to free margins a Mercedes flap was deemed most appropriate.  Using a sterile surgical marker, an appropriate advancement flap was drawn incorporating the defect and placing the expected incisions within the relaxed skin tension lines where possible. The area thus outlined was incised deep to adipose tissue with a #15 scalpel blade.  The skin margins were undermined to an appropriate distance in all directions utilizing iris scissors.
Modified Advancement Flap Text: The defect edges were debeveled with a #15 scalpel blade.  Given the location of the defect, shape of the defect and the proximity to free margins a modified advancement flap was deemed most appropriate.  Using a sterile surgical marker, an appropriate advancement flap was drawn incorporating the defect and placing the expected incisions within the relaxed skin tension lines where possible.    The area thus outlined was incised deep to adipose tissue with a #15 scalpel blade.  The skin margins were undermined to an appropriate distance in all directions utilizing iris scissors.
Mucosal Advancement Flap Text: Given the location of the defect, shape of the defect and the proximity to free margins a mucosal advancement flap was deemed most appropriate. Incisions were made with a 15 blade scalpel in the appropriate fashion along the cutaneous vermilion border and the mucosal lip. The remaining actinically damaged mucosal tissue was excised.  The mucosal advancement flap was then elevated to the gingival sulcus with care taken to preserve the neurovascular structures and advanced into the primary defect. Care was taken to ensure that precise realignment of the vermilion border was achieved.
Hatchet Flap Text: The defect edges were debeveled with a #15 scalpel blade.  Given the location of the defect, shape of the defect and the proximity to free margins a hatchet flap was deemed most appropriate.  Using a sterile surgical marker, an appropriate hatchet flap was drawn incorporating the defect and placing the expected incisions within the relaxed skin tension lines where possible.    The area thus outlined was incised deep to adipose tissue with a #15 scalpel blade.  The skin margins were undermined to an appropriate distance in all directions utilizing iris scissors.
Rotation Flap Text: The defect edges were debeveled with a #15 scalpel blade.  Given the location of the defect, shape of the defect and the proximity to free margins a rotation flap was deemed most appropriate.  Using a sterile surgical marker, an appropriate rotation flap was drawn incorporating the defect and placing the expected incisions within the relaxed skin tension lines where possible.    The area thus outlined was incised deep to adipose tissue with a #15 scalpel blade.  The skin margins were undermined to an appropriate distance in all directions utilizing iris scissors.
Spiral Flap Text: The defect edges were debeveled with a #15 scalpel blade.  Given the location of the defect, shape of the defect and the proximity to free margins a spiral flap was deemed most appropriate.  Using a sterile surgical marker, an appropriate rotation flap was drawn incorporating the defect and placing the expected incisions within the relaxed skin tension lines where possible. The area thus outlined was incised deep to adipose tissue with a #15 scalpel blade.  The skin margins were undermined to an appropriate distance in all directions utilizing iris scissors.
Star Wedge Flap Text: The defect edges were debeveled with a #15 scalpel blade.  Given the location of the defect, shape of the defect and the proximity to free margins a star wedge flap was deemed most appropriate.  Using a sterile surgical marker, an appropriate rotation flap was drawn incorporating the defect and placing the expected incisions within the relaxed skin tension lines where possible. The area thus outlined was incised deep to adipose tissue with a #15 scalpel blade.  The skin margins were undermined to an appropriate distance in all directions utilizing iris scissors.
Transposition Flap Text: The defect edges were debeveled with a #15 scalpel blade.  Given the location of the defect and the proximity to free margins a transposition flap was deemed most appropriate.  Using a sterile surgical marker, an appropriate transposition flap was drawn incorporating the defect.    The area thus outlined was incised deep to adipose tissue with a #15 scalpel blade.  The skin margins were undermined to an appropriate distance in all directions utilizing iris scissors.
Muscle Hinge Flap Text: The defect edges were debeveled with a #15 scalpel blade.  Given the size, depth and location of the defect and the proximity to free margins a muscle hinge flap was deemed most appropriate.  Using a sterile surgical marker, an appropriate hinge flap was drawn incorporating the defect. The area thus outlined was incised with a #15 scalpel blade.  The skin margins were undermined to an appropriate distance in all directions utilizing iris scissors.
Melolabial Transposition Flap Text: The defect edges were debeveled with a #15 scalpel blade.  Given the location of the defect and the proximity to free margins a melolabial flap was deemed most appropriate.  Using a sterile surgical marker, an appropriate melolabial transposition flap was drawn incorporating the defect.    The area thus outlined was incised deep to adipose tissue with a #15 scalpel blade.  The skin margins were undermined to an appropriate distance in all directions utilizing iris scissors.
Rhombic Flap Text: The defect edges were debeveled with a #15 scalpel blade.  Given the location of the defect and the proximity to free margins a rhombic flap was deemed most appropriate.  Using a sterile surgical marker, an appropriate rhombic flap was drawn incorporating the defect.    The area thus outlined was incised deep to adipose tissue with a #15 scalpel blade.  The skin margins were undermined to an appropriate distance in all directions utilizing iris scissors.
Rhomboid Transposition Flap Text: The defect edges were debeveled with a #15 scalpel blade.  Given the location of the defect and the proximity to free margins a rhomboid transposition flap was deemed most appropriate.  Using a sterile surgical marker, an appropriate rhomboid flap was drawn incorporating the defect.    The area thus outlined was incised deep to adipose tissue with a #15 scalpel blade.  The skin margins were undermined to an appropriate distance in all directions utilizing iris scissors.
Bi-Rhombic Flap Text: The defect edges were debeveled with a #15 scalpel blade.  Given the location of the defect and the proximity to free margins a bi-rhombic flap was deemed most appropriate.  Using a sterile surgical marker, an appropriate rhombic flap was drawn incorporating the defect. The area thus outlined was incised deep to adipose tissue with a #15 scalpel blade.  The skin margins were undermined to an appropriate distance in all directions utilizing iris scissors.
Helical Rim Advancement Flap Text: The defect edges were debeveled with a #15 blade scalpel.  Given the location of the defect and the proximity to free margins (helical rim) a double helical rim advancement flap was deemed most appropriate.  Using a sterile surgical marker, the appropriate advancement flaps were drawn incorporating the defect and placing the expected incisions between the helical rim and antihelix where possible.  The area thus outlined was incised through and through with a #15 scalpel blade.  With a skin hook and iris scissors, the flaps were gently and sharply undermined and freed up.
Bilateral Helical Rim Advancement Flap Text: The defect edges were debeveled with a #15 blade scalpel.  Given the location of the defect and the proximity to free margins (helical rim) a bilateral helical rim advancement flap was deemed most appropriate.  Using a sterile surgical marker, the appropriate advancement flaps were drawn incorporating the defect and placing the expected incisions between the helical rim and antihelix where possible.  The area thus outlined was incised through and through with a #15 scalpel blade.  With a skin hook and iris scissors, the flaps were gently and sharply undermined and freed up.
Ear Star Wedge Flap Text: The defect edges were debeveled with a #15 blade scalpel.  Given the location of the defect and the proximity to free margins (helical rim) an ear star wedge flap was deemed most appropriate.  Using a sterile surgical marker, the appropriate flap was drawn incorporating the defect and placing the expected incisions between the helical rim and antihelix where possible.  The area thus outlined was incised through and through with a #15 scalpel blade.
Banner Transposition Flap Text: The defect edges were debeveled with a #15 scalpel blade.  Given the location of the defect and the proximity to free margins a Banner transposition flap was deemed most appropriate.  Using a sterile surgical marker, an appropriate flap drawn around the defect. The area thus outlined was incised deep to adipose tissue with a #15 scalpel blade.  The skin margins were undermined to an appropriate distance in all directions utilizing iris scissors.
Bilobed Flap Text: The defect edges were debeveled with a #15 scalpel blade.  Given the location of the defect and the proximity to free margins a bilobe flap was deemed most appropriate.  Using a sterile surgical marker, an appropriate bilobe flap drawn around the defect.    The area thus outlined was incised deep to adipose tissue with a #15 scalpel blade.  The skin margins were undermined to an appropriate distance in all directions utilizing iris scissors.
Bilobed Transposition Flap Text: The defect edges were debeveled with a #15 scalpel blade.  Given the location of the defect and the proximity to free margins a bilobed transposition flap was deemed most appropriate.  Using a sterile surgical marker, an appropriate bilobe flap drawn around the defect.    The area thus outlined was incised deep to adipose tissue with a #15 scalpel blade.  The skin margins were undermined to an appropriate distance in all directions utilizing iris scissors.
Trilobed Flap Text: The defect edges were debeveled with a #15 scalpel blade.  Given the location of the defect and the proximity to free margins a trilobed flap was deemed most appropriate.  Using a sterile surgical marker, an appropriate trilobed flap drawn around the defect.    The area thus outlined was incised deep to adipose tissue with a #15 scalpel blade.  The skin margins were undermined to an appropriate distance in all directions utilizing iris scissors.
Dorsal Nasal Flap Text: The defect edges were debeveled with a #15 scalpel blade.  Given the location of the defect and the proximity to free margins a dorsal nasal flap was deemed most appropriate.  Using a sterile surgical marker, an appropriate dorsal nasal flap was drawn around the defect.    The area thus outlined was incised deep to adipose tissue with a #15 scalpel blade.  The skin margins were undermined to an appropriate distance in all directions utilizing iris scissors.
Island Pedicle Flap Text: The defect edges were debeveled with a #15 scalpel blade.  Given the location of the defect, shape of the defect and the proximity to free margins an island pedicle advancement flap was deemed most appropriate.  Using a sterile surgical marker, an appropriate advancement flap was drawn incorporating the defect, outlining the appropriate donor tissue and placing the expected incisions within the relaxed skin tension lines where possible.    The area thus outlined was incised deep to adipose tissue with a #15 scalpel blade.  The skin margins were undermined to an appropriate distance in all directions around the primary defect and laterally outward around the island pedicle utilizing iris scissors.  There was minimal undermining beneath the pedicle flap.
Island Pedicle Flap With Canthal Suspension Text: The defect edges were debeveled with a #15 scalpel blade.  Given the location of the defect, shape of the defect and the proximity to free margins an island pedicle advancement flap was deemed most appropriate.  Using a sterile surgical marker, an appropriate advancement flap was drawn incorporating the defect, outlining the appropriate donor tissue and placing the expected incisions within the relaxed skin tension lines where possible. The area thus outlined was incised deep to adipose tissue with a #15 scalpel blade.  The skin margins were undermined to an appropriate distance in all directions around the primary defect and laterally outward around the island pedicle utilizing iris scissors.  There was minimal undermining beneath the pedicle flap. A suspension suture was placed in the canthal tendon to prevent tension and prevent ectropion.
Alar Island Pedicle Flap Text: The defect edges were debeveled with a #15 scalpel blade.  Given the location of the defect, shape of the defect and the proximity to the alar rim an island pedicle advancement flap was deemed most appropriate.  Using a sterile surgical marker, an appropriate advancement flap was drawn incorporating the defect, outlining the appropriate donor tissue and placing the expected incisions within the nasal ala running parallel to the alar rim. The area thus outlined was incised with a #15 scalpel blade.  The skin margins were undermined minimally to an appropriate distance in all directions around the primary defect and laterally outward around the island pedicle utilizing iris scissors.  There was minimal undermining beneath the pedicle flap.
Double Island Pedicle Flap Text: The defect edges were debeveled with a #15 scalpel blade.  Given the location of the defect, shape of the defect and the proximity to free margins a double island pedicle advancement flap was deemed most appropriate.  Using a sterile surgical marker, an appropriate advancement flap was drawn incorporating the defect, outlining the appropriate donor tissue and placing the expected incisions within the relaxed skin tension lines where possible.    The area thus outlined was incised deep to adipose tissue with a #15 scalpel blade.  The skin margins were undermined to an appropriate distance in all directions around the primary defect and laterally outward around the island pedicle utilizing iris scissors.  There was minimal undermining beneath the pedicle flap.
Island Pedicle Flap-Requiring Vessel Identification Text: The defect edges were debeveled with a #15 scalpel blade.  Given the location of the defect, shape of the defect and the proximity to free margins an island pedicle advancement flap was deemed most appropriate.  Using a sterile surgical marker, an appropriate advancement flap was drawn, based on the axial vessel mentioned above, incorporating the defect, outlining the appropriate donor tissue and placing the expected incisions within the relaxed skin tension lines where possible.    The area thus outlined was incised deep to adipose tissue with a #15 scalpel blade.  The skin margins were undermined to an appropriate distance in all directions around the primary defect and laterally outward around the island pedicle utilizing iris scissors.  There was minimal undermining beneath the pedicle flap.
Keystone Flap Text: The defect edges were debeveled with a #15 scalpel blade.  Given the location of the defect, shape of the defect a keystone flap was deemed most appropriate.  Using a sterile surgical marker, an appropriate keystone flap was drawn incorporating the defect, outlining the appropriate donor tissue and placing the expected incisions within the relaxed skin tension lines where possible. The area thus outlined was incised deep to adipose tissue with a #15 scalpel blade.  The skin margins were undermined to an appropriate distance in all directions around the primary defect and laterally outward around the flap utilizing iris scissors.
O-T Plasty Text: The defect edges were debeveled with a #15 scalpel blade.  Given the location of the defect, shape of the defect and the proximity to free margins an O-T plasty was deemed most appropriate.  Using a sterile surgical marker, an appropriate O-T plasty was drawn incorporating the defect and placing the expected incisions within the relaxed skin tension lines where possible.    The area thus outlined was incised deep to adipose tissue with a #15 scalpel blade.  The skin margins were undermined to an appropriate distance in all directions utilizing iris scissors.
O-Z Plasty Text: The defect edges were debeveled with a #15 scalpel blade.  Given the location of the defect, shape of the defect and the proximity to free margins an O-Z plasty (double transposition flap) was deemed most appropriate.  Using a sterile surgical marker, the appropriate transposition flaps were drawn incorporating the defect and placing the expected incisions within the relaxed skin tension lines where possible.    The area thus outlined was incised deep to adipose tissue with a #15 scalpel blade.  The skin margins were undermined to an appropriate distance in all directions utilizing iris scissors.  Hemostasis was achieved with electrocautery.  The flaps were then transposed into place, one clockwise and the other counterclockwise, and anchored with interrupted buried subcutaneous sutures.
Double O-Z Plasty Text: The defect edges were debeveled with a #15 scalpel blade.  Given the location of the defect, shape of the defect and the proximity to free margins a Double O-Z plasty (double transposition flap) was deemed most appropriate.  Using a sterile surgical marker, the appropriate transposition flaps were drawn incorporating the defect and placing the expected incisions within the relaxed skin tension lines where possible. The area thus outlined was incised deep to adipose tissue with a #15 scalpel blade.  The skin margins were undermined to an appropriate distance in all directions utilizing iris scissors.  Hemostasis was achieved with electrocautery.  The flaps were then transposed into place, one clockwise and the other counterclockwise, and anchored with interrupted buried subcutaneous sutures.
S Plasty Text: Given the location and shape of the defect, and the orientation of relaxed skin tension lines, an S-plasty was deemed most appropriate for repair.  Using a sterile surgical marker, the appropriate outline of the S-plasty was drawn, incorporating the defect and placing the expected incisions within the relaxed skin tension lines where possible.  The area thus outlined was incised deep to adipose tissue with a #15 scalpel blade.  The skin margins were undermined to an appropriate distance in all directions utilizing iris scissors. The skin flaps were advanced over the defect.  The opposing margins were then approximated with interrupted buried subcutaneous sutures.
V-Y Plasty Text: The defect edges were debeveled with a #15 scalpel blade.  Given the location of the defect, shape of the defect and the proximity to free margins an V-Y advancement flap was deemed most appropriate.  Using a sterile surgical marker, an appropriate advancement flap was drawn incorporating the defect and placing the expected incisions within the relaxed skin tension lines where possible.    The area thus outlined was incised deep to adipose tissue with a #15 scalpel blade.  The skin margins were undermined to an appropriate distance in all directions utilizing iris scissors.
H Plasty Text: Given the location of the defect, shape of the defect and the proximity to free margins a H-plasty was deemed most appropriate for repair.  Using a sterile surgical marker, the appropriate advancement arms of the H-plasty were drawn incorporating the defect and placing the expected incisions within the relaxed skin tension lines where possible. The area thus outlined was incised deep to adipose tissue with a #15 scalpel blade. The skin margins were undermined to an appropriate distance in all directions utilizing iris scissors.  The opposing advancement arms were then advanced into place in opposite direction and anchored with interrupted buried subcutaneous sutures.
W Plasty Text: The lesion was extirpated to the level of the fat with a #15 scalpel blade.  Given the location of the defect, shape of the defect and the proximity to free margins a W-plasty was deemed most appropriate for repair.  Using a sterile surgical marker, the appropriate transposition arms of the W-plasty were drawn incorporating the defect and placing the expected incisions within the relaxed skin tension lines where possible.    The area thus outlined was incised deep to adipose tissue with a #15 scalpel blade.  The skin margins were undermined to an appropriate distance in all directions utilizing iris scissors.  The opposing transposition arms were then transposed into place in opposite direction and anchored with interrupted buried subcutaneous sutures.
Z Plasty Text: The lesion was extirpated to the level of the fat with a #15 scalpel blade.  Given the location of the defect, shape of the defect and the proximity to free margins a Z-plasty was deemed most appropriate for repair.  Using a sterile surgical marker, the appropriate transposition arms of the Z-plasty were drawn incorporating the defect and placing the expected incisions within the relaxed skin tension lines where possible.    The area thus outlined was incised deep to adipose tissue with a #15 scalpel blade.  The skin margins were undermined to an appropriate distance in all directions utilizing iris scissors.  The opposing transposition arms were then transposed into place in opposite direction and anchored with interrupted buried subcutaneous sutures.
Cheek Interpolation Flap Text: A decision was made to reconstruct the defect utilizing an interpolation axial flap and a staged reconstruction.  A telfa template was made of the defect.  This telfa template was then used to outline the Cheek Interpolation flap.  The donor area for the pedicle flap was then injected with anesthesia.  The flap was excised through the skin and subcutaneous tissue down to the layer of the underlying musculature.  The interpolation flap was carefully excised within this deep plane to maintain its blood supply.  The edges of the donor site were undermined.   The donor site was closed in a primary fashion.  The pedicle was then rotated into position and sutured.  Once the tube was sutured into place, adequate blood supply was confirmed with blanching and refill.  The pedicle was then wrapped with xeroform gauze and dressed appropriately with a telfa and gauze bandage to ensure continued blood supply and protect the attached pedicle.
Cheek-To-Nose Interpolation Flap Text: A decision was made to reconstruct the defect utilizing an interpolation axial flap and a staged reconstruction.  A telfa template was made of the defect.  This telfa template was then used to outline the Cheek-To-Nose Interpolation flap.  The donor area for the pedicle flap was then injected with anesthesia.  The flap was excised through the skin and subcutaneous tissue down to the layer of the underlying musculature.  The interpolation flap was carefully excised within this deep plane to maintain its blood supply.  The edges of the donor site were undermined.   The donor site was closed in a primary fashion.  The pedicle was then rotated into position and sutured.  Once the tube was sutured into place, adequate blood supply was confirmed with blanching and refill.  The pedicle was then wrapped with xeroform gauze and dressed appropriately with a telfa and gauze bandage to ensure continued blood supply and protect the attached pedicle.
Interpolation Flap Text: A decision was made to reconstruct the defect utilizing an interpolation axial flap and a staged reconstruction.  A telfa template was made of the defect.  This telfa template was then used to outline the interpolation flap.  The donor area for the pedicle flap was then injected with anesthesia.  The flap was excised through the skin and subcutaneous tissue down to the layer of the underlying musculature.  The interpolation flap was carefully excised within this deep plane to maintain its blood supply.  The edges of the donor site were undermined.   The donor site was closed in a primary fashion.  The pedicle was then rotated into position and sutured.  Once the tube was sutured into place, adequate blood supply was confirmed with blanching and refill.  The pedicle was then wrapped with xeroform gauze and dressed appropriately with a telfa and gauze bandage to ensure continued blood supply and protect the attached pedicle.
Melolabial Interpolation Flap Text: A decision was made to reconstruct the defect utilizing an interpolation axial flap and a staged reconstruction.  A telfa template was made of the defect.  This telfa template was then used to outline the melolabial interpolation flap.  The donor area for the pedicle flap was then injected with anesthesia.  The flap was excised through the skin and subcutaneous tissue down to the layer of the underlying musculature.  The pedicle flap was carefully excised within this deep plane to maintain its blood supply.  The edges of the donor site were undermined.   The donor site was closed in a primary fashion.  The pedicle was then rotated into position and sutured.  Once the tube was sutured into place, adequate blood supply was confirmed with blanching and refill.  The pedicle was then wrapped with xeroform gauze and dressed appropriately with a telfa and gauze bandage to ensure continued blood supply and protect the attached pedicle.
Mastoid Interpolation Flap Text: A decision was made to reconstruct the defect utilizing an interpolation axial flap and a staged reconstruction.  A telfa template was made of the defect.  This telfa template was then used to outline the mastoid interpolation flap.  The donor area for the pedicle flap was then injected with anesthesia.  The flap was excised through the skin and subcutaneous tissue down to the layer of the underlying musculature.  The pedicle flap was carefully excised within this deep plane to maintain its blood supply.  The edges of the donor site were undermined.   The donor site was closed in a primary fashion.  The pedicle was then rotated into position and sutured.  Once the tube was sutured into place, adequate blood supply was confirmed with blanching and refill.  The pedicle was then wrapped with xeroform gauze and dressed appropriately with a telfa and gauze bandage to ensure continued blood supply and protect the attached pedicle.
Posterior Auricular Interpolation Flap Text: A decision was made to reconstruct the defect utilizing an interpolation axial flap and a staged reconstruction.  A telfa template was made of the defect.  This telfa template was then used to outline the posterior auricular interpolation flap.  The donor area for the pedicle flap was then injected with anesthesia.  The flap was excised through the skin and subcutaneous tissue down to the layer of the underlying musculature.  The pedicle flap was carefully excised within this deep plane to maintain its blood supply.  The edges of the donor site were undermined.   The donor site was closed in a primary fashion.  The pedicle was then rotated into position and sutured.  Once the tube was sutured into place, adequate blood supply was confirmed with blanching and refill.  The pedicle was then wrapped with xeroform gauze and dressed appropriately with a telfa and gauze bandage to ensure continued blood supply and protect the attached pedicle.
Paramedian Forehead Flap Text: A decision was made to reconstruct the defect utilizing an interpolation axial flap and a staged reconstruction.  A telfa template was made of the defect.  This telfa template was then used to outline the paramedian forehead pedicle flap.  The donor area for the pedicle flap was then injected with anesthesia.  The flap was excised through the skin and subcutaneous tissue down to the layer of the underlying musculature.  The pedicle flap was carefully excised within this deep plane to maintain its blood supply.  The edges of the donor site were undermined.   The donor site was closed in a primary fashion.  The pedicle was then rotated into position and sutured.  Once the tube was sutured into place, adequate blood supply was confirmed with blanching and refill.  The pedicle was then wrapped with xeroform gauze and dressed appropriately with a telfa and gauze bandage to ensure continued blood supply and protect the attached pedicle.
Lip Wedge Excision Repair Text: Given the location of the defect and the proximity to free margins a full thickness wedge repair was deemed most appropriate.  Using a sterile surgical marker, the appropriate repair was drawn incorporating the defect and placing the expected incisions perpendicular to the vermilion border.  The vermilion border was also meticulously outlined to ensure appropriate reapproximation during the repair.  The area thus outlined was incised through and through with a #15 scalpel blade.  The muscularis and dermis were reaproximated with deep sutures following hemostasis. Care was taken to realign the vermilion border before proceeding with the superficial closure.  Once the vermilion was realigned the superfical and mucosal closure was finished.
Ftsg Text: The defect edges were debeveled with a #15 scalpel blade.  Given the location of the defect, shape of the defect and the proximity to free margins a full thickness skin graft was deemed most appropriate.  Using a sterile surgical marker, the primary defect shape was transferred to the donor site. The area thus outlined was incised deep to adipose tissue with a #15 scalpel blade.  The harvested graft was then trimmed of adipose tissue until only dermis and epidermis was left.  The skin margins of the secondary defect were undermined to an appropriate distance in all directions utilizing iris scissors.  The secondary defect was closed with interrupted buried subcutaneous sutures.  The skin edges were then re-apposed with running  sutures.  The skin graft was then placed in the primary defect and oriented appropriately.
Split-Thickness Skin Graft Text: The defect edges were debeveled with a #15 scalpel blade.  Given the location of the defect, shape of the defect and the proximity to free margins a split thickness skin graft was deemed most appropriate.  Using a sterile surgical marker, the primary defect shape was transferred to the donor site. The split thickness graft was then harvested.  The skin graft was then placed in the primary defect and oriented appropriately.
Cartilage Graft Text: The defect edges were debeveled with a #15 scalpel blade.  Given the location of the defect, shape of the defect, the fact the defect involved a full thickness cartilage defect a cartilage graft was deemed most appropriate.  An appropriate donor site was identified, cleansed, and anesthetized. The cartilage graft was then harvested and transferred to the recipient site, oriented appropriately and then sutured into place.  The secondary defect was then repaired using a primary closure.
Composite Graft Text: The defect edges were debeveled with a #15 scalpel blade.  Given the location of the defect, shape of the defect, the proximity to free margins and the fact the defect was full thickness a composite graft was deemed most appropriate.  The defect was outline and then transferred to the donor site.  A full thickness graft was then excised from the donor site. The graft was then placed in the primary defect, oriented appropriately and then sutured into place.  The secondary defect was then repaired using a primary closure.
Epidermal Autograft Text: The defect edges were debeveled with a #15 scalpel blade.  Given the location of the defect, shape of the defect and the proximity to free margins an epidermal autograft was deemed most appropriate.  Using a sterile surgical marker, the primary defect shape was transferred to the donor site. The epidermal graft was then harvested.  The skin graft was then placed in the primary defect and oriented appropriately.
Dermal Autograft Text: The defect edges were debeveled with a #15 scalpel blade.  Given the location of the defect, shape of the defect and the proximity to free margins a dermal autograft was deemed most appropriate.  Using a sterile surgical marker, the primary defect shape was transferred to the donor site. The area thus outlined was incised deep to adipose tissue with a #15 scalpel blade.  The harvested graft was then trimmed of adipose and epidermal tissue until only dermis was left.  The skin graft was then placed in the primary defect and oriented appropriately.
Skin Substitute Text: The defect edges were debeveled with a #15 scalpel blade.  Given the location of the defect, shape of the defect and the proximity to free margins a skin substitute graft was deemed most appropriate.  The graft material was trimmed to fit the size of the defect. The graft was then placed in the primary defect and oriented appropriately.
Tissue Cultured Epidermal Autograft Text: The defect edges were debeveled with a #15 scalpel blade.  Given the location of the defect, shape of the defect and the proximity to free margins a tissue cultured epidermal autograft was deemed most appropriate.  The graft was then trimmed to fit the size of the defect.  The graft was then placed in the primary defect and oriented appropriately.
Xenograft Text: The defect edges were debeveled with a #15 scalpel blade.  Given the location of the defect, shape of the defect and the proximity to free margins a xenograft was deemed most appropriate.  The graft was then trimmed to fit the size of the defect.  The graft was then placed in the primary defect and oriented appropriately.
Purse String (Intermediate) Text: Given the location of the defect and the characteristics of the surrounding skin a purse string intermediate closure was deemed most appropriate.  Undermining was performed circumfirentially around the surgical defect.  A purse string suture was then placed and tightened.
Purse String (Simple) Text: Given the location of the defect and the characteristics of the surrounding skin a purse string simple closure was deemed most appropriate.  Undermining was performed circumferentially around the surgical defect.  A purse string suture was then placed and tightened.
Partial Purse String (Intermediate) Text: Given the location of the defect and the characteristics of the surrounding skin an intermediate purse string closure was deemed most appropriate.  Undermining was performed circumferentially around the surgical defect.  A purse string suture was then placed and tightened. Wound tension of the circular defect prevented complete closure of the wound.
Partial Purse String (Simple) Text: Given the location of the defect and the characteristics of the surrounding skin a simple purse string closure was deemed most appropriate.  Undermining was performed circumferentially around the surgical defect.  A purse string suture was then placed and tightened. Wound tension of the circular defect prevented complete closure of the wound.
Complex Repair And Single Advancement Flap Text: The defect edges were debeveled with a #15 scalpel blade.  The primary defect was closed partially with a complex linear closure.  Given the location of the remaining defect, shape of the defect and the proximity to free margins a single advancement flap was deemed most appropriate for complete closure of the defect.  Using a sterile surgical marker, an appropriate advancement flap was drawn incorporating the defect and placing the expected incisions within the relaxed skin tension lines where possible.    The area thus outlined was incised deep to adipose tissue with a #15 scalpel blade.  The skin margins were undermined to an appropriate distance in all directions utilizing iris scissors.
Complex Repair And Double Advancement Flap Text: The defect edges were debeveled with a #15 scalpel blade.  The primary defect was closed partially with a complex linear closure.  Given the location of the remaining defect, shape of the defect and the proximity to free margins a double advancement flap was deemed most appropriate for complete closure of the defect.  Using a sterile surgical marker, an appropriate advancement flap was drawn incorporating the defect and placing the expected incisions within the relaxed skin tension lines where possible.    The area thus outlined was incised deep to adipose tissue with a #15 scalpel blade.  The skin margins were undermined to an appropriate distance in all directions utilizing iris scissors.
Complex Repair And Modified Advancement Flap Text: The defect edges were debeveled with a #15 scalpel blade.  The primary defect was closed partially with a complex linear closure.  Given the location of the remaining defect, shape of the defect and the proximity to free margins a modified advancement flap was deemed most appropriate for complete closure of the defect.  Using a sterile surgical marker, an appropriate advancement flap was drawn incorporating the defect and placing the expected incisions within the relaxed skin tension lines where possible.    The area thus outlined was incised deep to adipose tissue with a #15 scalpel blade.  The skin margins were undermined to an appropriate distance in all directions utilizing iris scissors.
Complex Repair And A-T Advancement Flap Text: The defect edges were debeveled with a #15 scalpel blade.  The primary defect was closed partially with a complex linear closure.  Given the location of the remaining defect, shape of the defect and the proximity to free margins an A-T advancement flap was deemed most appropriate for complete closure of the defect.  Using a sterile surgical marker, an appropriate advancement flap was drawn incorporating the defect and placing the expected incisions within the relaxed skin tension lines where possible.    The area thus outlined was incised deep to adipose tissue with a #15 scalpel blade.  The skin margins were undermined to an appropriate distance in all directions utilizing iris scissors.
Complex Repair And O-T Advancement Flap Text: The defect edges were debeveled with a #15 scalpel blade.  The primary defect was closed partially with a complex linear closure.  Given the location of the remaining defect, shape of the defect and the proximity to free margins an O-T advancement flap was deemed most appropriate for complete closure of the defect.  Using a sterile surgical marker, an appropriate advancement flap was drawn incorporating the defect and placing the expected incisions within the relaxed skin tension lines where possible.    The area thus outlined was incised deep to adipose tissue with a #15 scalpel blade.  The skin margins were undermined to an appropriate distance in all directions utilizing iris scissors.
Complex Repair And O-L Flap Text: The defect edges were debeveled with a #15 scalpel blade.  The primary defect was closed partially with a complex linear closure.  Given the location of the remaining defect, shape of the defect and the proximity to free margins an O-L flap was deemed most appropriate for complete closure of the defect.  Using a sterile surgical marker, an appropriate flap was drawn incorporating the defect and placing the expected incisions within the relaxed skin tension lines where possible.    The area thus outlined was incised deep to adipose tissue with a #15 scalpel blade.  The skin margins were undermined to an appropriate distance in all directions utilizing iris scissors.
Complex Repair And Bilobe Flap Text: The defect edges were debeveled with a #15 scalpel blade.  The primary defect was closed partially with a complex linear closure.  Given the location of the remaining defect, shape of the defect and the proximity to free margins a bilobe flap was deemed most appropriate for complete closure of the defect.  Using a sterile surgical marker, an appropriate advancement flap was drawn incorporating the defect and placing the expected incisions within the relaxed skin tension lines where possible.    The area thus outlined was incised deep to adipose tissue with a #15 scalpel blade.  The skin margins were undermined to an appropriate distance in all directions utilizing iris scissors.
Complex Repair And Melolabial Flap Text: The defect edges were debeveled with a #15 scalpel blade.  The primary defect was closed partially with a complex linear closure.  Given the location of the remaining defect, shape of the defect and the proximity to free margins a melolabial flap was deemed most appropriate for complete closure of the defect.  Using a sterile surgical marker, an appropriate advancement flap was drawn incorporating the defect and placing the expected incisions within the relaxed skin tension lines where possible.    The area thus outlined was incised deep to adipose tissue with a #15 scalpel blade.  The skin margins were undermined to an appropriate distance in all directions utilizing iris scissors.
Complex Repair And Rotation Flap Text: The defect edges were debeveled with a #15 scalpel blade.  The primary defect was closed partially with a complex linear closure.  Given the location of the remaining defect, shape of the defect and the proximity to free margins a rotation flap was deemed most appropriate for complete closure of the defect.  Using a sterile surgical marker, an appropriate advancement flap was drawn incorporating the defect and placing the expected incisions within the relaxed skin tension lines where possible.    The area thus outlined was incised deep to adipose tissue with a #15 scalpel blade.  The skin margins were undermined to an appropriate distance in all directions utilizing iris scissors.
Complex Repair And Rhombic Flap Text: The defect edges were debeveled with a #15 scalpel blade.  The primary defect was closed partially with a complex linear closure.  Given the location of the remaining defect, shape of the defect and the proximity to free margins a rhombic flap was deemed most appropriate for complete closure of the defect.  Using a sterile surgical marker, an appropriate advancement flap was drawn incorporating the defect and placing the expected incisions within the relaxed skin tension lines where possible.    The area thus outlined was incised deep to adipose tissue with a #15 scalpel blade.  The skin margins were undermined to an appropriate distance in all directions utilizing iris scissors.
Complex Repair And Transposition Flap Text: The defect edges were debeveled with a #15 scalpel blade.  The primary defect was closed partially with a complex linear closure.  Given the location of the remaining defect, shape of the defect and the proximity to free margins a transposition flap was deemed most appropriate for complete closure of the defect.  Using a sterile surgical marker, an appropriate advancement flap was drawn incorporating the defect and placing the expected incisions within the relaxed skin tension lines where possible.    The area thus outlined was incised deep to adipose tissue with a #15 scalpel blade.  The skin margins were undermined to an appropriate distance in all directions utilizing iris scissors.
Complex Repair And V-Y Plasty Text: The defect edges were debeveled with a #15 scalpel blade.  The primary defect was closed partially with a complex linear closure.  Given the location of the remaining defect, shape of the defect and the proximity to free margins a V-Y plasty was deemed most appropriate for complete closure of the defect.  Using a sterile surgical marker, an appropriate advancement flap was drawn incorporating the defect and placing the expected incisions within the relaxed skin tension lines where possible.    The area thus outlined was incised deep to adipose tissue with a #15 scalpel blade.  The skin margins were undermined to an appropriate distance in all directions utilizing iris scissors.
Complex Repair And M Plasty Text: The defect edges were debeveled with a #15 scalpel blade.  The primary defect was closed partially with a complex linear closure.  Given the location of the remaining defect, shape of the defect and the proximity to free margins an M plasty was deemed most appropriate for complete closure of the defect.  Using a sterile surgical marker, an appropriate advancement flap was drawn incorporating the defect and placing the expected incisions within the relaxed skin tension lines where possible.    The area thus outlined was incised deep to adipose tissue with a #15 scalpel blade.  The skin margins were undermined to an appropriate distance in all directions utilizing iris scissors.
Complex Repair And Double M Plasty Text: The defect edges were debeveled with a #15 scalpel blade.  The primary defect was closed partially with a complex linear closure.  Given the location of the remaining defect, shape of the defect and the proximity to free margins a double M plasty was deemed most appropriate for complete closure of the defect.  Using a sterile surgical marker, an appropriate advancement flap was drawn incorporating the defect and placing the expected incisions within the relaxed skin tension lines where possible.    The area thus outlined was incised deep to adipose tissue with a #15 scalpel blade.  The skin margins were undermined to an appropriate distance in all directions utilizing iris scissors.
Complex Repair And W Plasty Text: The defect edges were debeveled with a #15 scalpel blade.  The primary defect was closed partially with a complex linear closure.  Given the location of the remaining defect, shape of the defect and the proximity to free margins a W plasty was deemed most appropriate for complete closure of the defect.  Using a sterile surgical marker, an appropriate advancement flap was drawn incorporating the defect and placing the expected incisions within the relaxed skin tension lines where possible.    The area thus outlined was incised deep to adipose tissue with a #15 scalpel blade.  The skin margins were undermined to an appropriate distance in all directions utilizing iris scissors.
Complex Repair And Z Plasty Text: The defect edges were debeveled with a #15 scalpel blade.  The primary defect was closed partially with a complex linear closure.  Given the location of the remaining defect, shape of the defect and the proximity to free margins a Z plasty was deemed most appropriate for complete closure of the defect.  Using a sterile surgical marker, an appropriate advancement flap was drawn incorporating the defect and placing the expected incisions within the relaxed skin tension lines where possible.    The area thus outlined was incised deep to adipose tissue with a #15 scalpel blade.  The skin margins were undermined to an appropriate distance in all directions utilizing iris scissors.
Complex Repair And Dorsal Nasal Flap Text: The defect edges were debeveled with a #15 scalpel blade.  The primary defect was closed partially with a complex linear closure.  Given the location of the remaining defect, shape of the defect and the proximity to free margins a dorsal nasal flap was deemed most appropriate for complete closure of the defect.  Using a sterile surgical marker, an appropriate flap was drawn incorporating the defect and placing the expected incisions within the relaxed skin tension lines where possible.    The area thus outlined was incised deep to adipose tissue with a #15 scalpel blade.  The skin margins were undermined to an appropriate distance in all directions utilizing iris scissors.
Complex Repair And Ftsg Text: The defect edges were debeveled with a #15 scalpel blade.  The primary defect was closed partially with a complex linear closure.  Given the location of the defect, shape of the defect and the proximity to free margins a full thickness skin graft was deemed most appropriate to repair the remaining defect.  The graft was trimmed to fit the size of the remaining defect.  The graft was then placed in the primary defect, oriented appropriately, and sutured into place.
Complex Repair And Split-Thickness Skin Graft Text: The defect edges were debeveled with a #15 scalpel blade.  The primary defect was closed partially with a complex linear closure.  Given the location of the defect, shape of the defect and the proximity to free margins a split thickness skin graft was deemed most appropriate to repair the remaining defect.  The graft was trimmed to fit the size of the remaining defect.  The graft was then placed in the primary defect, oriented appropriately, and sutured into place.
Complex Repair And Epidermal Autograft Text: The defect edges were debeveled with a #15 scalpel blade.  The primary defect was closed partially with a complex linear closure.  Given the location of the defect, shape of the defect and the proximity to free margins an epidermal autograft was deemed most appropriate to repair the remaining defect.  The graft was trimmed to fit the size of the remaining defect.  The graft was then placed in the primary defect, oriented appropriately, and sutured into place.
Complex Repair And Dermal Autograft Text: The defect edges were debeveled with a #15 scalpel blade.  The primary defect was closed partially with a complex linear closure.  Given the location of the defect, shape of the defect and the proximity to free margins an dermal autograft was deemed most appropriate to repair the remaining defect.  The graft was trimmed to fit the size of the remaining defect.  The graft was then placed in the primary defect, oriented appropriately, and sutured into place.
Complex Repair And Tissue Cultured Epidermal Autograft Text: The defect edges were debeveled with a #15 scalpel blade.  The primary defect was closed partially with a complex linear closure.  Given the location of the defect, shape of the defect and the proximity to free margins an tissue cultured epidermal autograft was deemed most appropriate to repair the remaining defect.  The graft was trimmed to fit the size of the remaining defect.  The graft was then placed in the primary defect, oriented appropriately, and sutured into place.
Complex Repair And Xenograft Text: The defect edges were debeveled with a #15 scalpel blade.  The primary defect was closed partially with a complex linear closure.  Given the location of the defect, shape of the defect and the proximity to free margins a xenograft was deemed most appropriate to repair the remaining defect.  The graft was trimmed to fit the size of the remaining defect.  The graft was then placed in the primary defect, oriented appropriately, and sutured into place.
Complex Repair And Skin Substitute Graft Text: The defect edges were debeveled with a #15 scalpel blade.  The primary defect was closed partially with a complex linear closure.  Given the location of the remaining defect, shape of the defect and the proximity to free margins a skin substitute graft was deemed most appropriate to repair the remaining defect.  The graft was trimmed to fit the size of the remaining defect.  The graft was then placed in the primary defect, oriented appropriately, and sutured into place.
Path Notes (To The Dermatopathologist): Please check margins.
Consent was obtained from the patient. The risks and benefits to therapy were discussed in detail. Specifically, the risks of infection, scarring, bleeding, prolonged wound healing, incomplete removal, allergy to anesthesia, nerve injury and recurrence were addressed. Prior to the procedure, the treatment site was clearly identified and confirmed by the patient. All components of Universal Protocol/PAUSE Rule completed.
Post-Care Instructions: I reviewed with the patient in detail post-care instructions. Patient is not to engage in any heavy lifting, exercise, or swimming for the next 14 days. Should the patient develop any fevers, chills, bleeding, severe pain patient will contact the office immediately.
Home Suture Removal Text: Patient was provided a home suture removal kit and will remove their sutures at home.  If they have any questions or difficulties they will call the office.
Where Do You Want The Question To Include Opioid Counseling Located?: Case Summary Tab
Information: Selecting Yes will display possible errors in your note based on the variables you have selected. This validation is only offered as a suggestion for you. PLEASE NOTE THAT THE VALIDATION TEXT WILL BE REMOVED WHEN YOU FINALIZE YOUR NOTE. IF YOU WANT TO FAX A PRELIMINARY NOTE YOU WILL NEED TO TOGGLE THIS TO 'NO' IF YOU DO NOT WANT IT IN YOUR FAXED NOTE.

## 2020-04-28 ENCOUNTER — APPOINTMENT (RX ONLY)
Dept: URBAN - METROPOLITAN AREA CLINIC 36 | Facility: CLINIC | Age: 80
Setting detail: DERMATOLOGY
End: 2020-04-28

## 2020-04-28 DIAGNOSIS — Z48.02 ENCOUNTER FOR REMOVAL OF SUTURES: ICD-10-CM

## 2020-04-28 PROCEDURE — ? SUTURE REMOVAL (GLOBAL PERIOD)

## 2020-04-28 PROCEDURE — 99024 POSTOP FOLLOW-UP VISIT: CPT

## 2020-04-28 ASSESSMENT — LOCATION SIMPLE DESCRIPTION DERM: LOCATION SIMPLE: RIGHT PRETIBIAL REGION

## 2020-04-28 ASSESSMENT — LOCATION ZONE DERM: LOCATION ZONE: LEG

## 2020-04-28 ASSESSMENT — LOCATION DETAILED DESCRIPTION DERM: LOCATION DETAILED: RIGHT PROXIMAL PRETIBIAL REGION

## 2020-04-28 NOTE — PROCEDURE: SUTURE REMOVAL (GLOBAL PERIOD)
Detail Level: Detailed
Add 98554 Cpt? (Important Note: In 2017 The Use Of 85314 Is Being Tracked By Cms To Determine Future Global Period Reimbursement For Global Periods): yes

## 2020-06-29 ENCOUNTER — APPOINTMENT (RX ONLY)
Dept: URBAN - METROPOLITAN AREA CLINIC 36 | Facility: CLINIC | Age: 80
Setting detail: DERMATOLOGY
End: 2020-06-29

## 2020-06-29 DIAGNOSIS — Z48.817 ENCOUNTER FOR SURGICAL AFTERCARE FOLLOWING SURGERY ON THE SKIN AND SUBCUTANEOUS TISSUE: ICD-10-CM

## 2020-06-29 PROCEDURE — ? POST-OP WOUND CHECK

## 2020-06-29 PROCEDURE — 99024 POSTOP FOLLOW-UP VISIT: CPT

## 2020-06-29 ASSESSMENT — LOCATION SIMPLE DESCRIPTION DERM: LOCATION SIMPLE: RIGHT PRETIBIAL REGION

## 2020-06-29 ASSESSMENT — LOCATION ZONE DERM: LOCATION ZONE: LEG

## 2020-06-29 ASSESSMENT — LOCATION DETAILED DESCRIPTION DERM: LOCATION DETAILED: RIGHT PROXIMAL PRETIBIAL REGION

## 2020-06-29 NOTE — PROCEDURE: POST-OP WOUND CHECK
Detail Level: Generalized
Add 91053 Cpt? (Important Note: In 2017 The Use Of 54098 Is Being Tracked By Cms To Determine Future Global Period Reimbursement For Global Periods): yes

## 2020-08-18 ENCOUNTER — APPOINTMENT (RX ONLY)
Dept: URBAN - METROPOLITAN AREA CLINIC 4 | Facility: CLINIC | Age: 80
Setting detail: DERMATOLOGY
End: 2020-08-18

## 2020-08-18 DIAGNOSIS — Z85.828 PERSONAL HISTORY OF OTHER MALIGNANT NEOPLASM OF SKIN: ICD-10-CM

## 2020-08-18 DIAGNOSIS — L81.4 OTHER MELANIN HYPERPIGMENTATION: ICD-10-CM

## 2020-08-18 DIAGNOSIS — D18.0 HEMANGIOMA: ICD-10-CM

## 2020-08-18 DIAGNOSIS — D22 MELANOCYTIC NEVI: ICD-10-CM

## 2020-08-18 DIAGNOSIS — L82.1 OTHER SEBORRHEIC KERATOSIS: ICD-10-CM

## 2020-08-18 DIAGNOSIS — L57.0 ACTINIC KERATOSIS: ICD-10-CM

## 2020-08-18 DIAGNOSIS — Z71.89 OTHER SPECIFIED COUNSELING: ICD-10-CM

## 2020-08-18 PROBLEM — D48.5 NEOPLASM OF UNCERTAIN BEHAVIOR OF SKIN: Status: ACTIVE | Noted: 2020-08-18

## 2020-08-18 PROBLEM — D22.9 MELANOCYTIC NEVI, UNSPECIFIED: Status: ACTIVE | Noted: 2020-08-18

## 2020-08-18 PROBLEM — D18.01 HEMANGIOMA OF SKIN AND SUBCUTANEOUS TISSUE: Status: ACTIVE | Noted: 2020-08-18

## 2020-08-18 PROCEDURE — 11102 TANGNTL BX SKIN SINGLE LES: CPT

## 2020-08-18 PROCEDURE — 17000 DESTRUCT PREMALG LESION: CPT | Mod: 59

## 2020-08-18 PROCEDURE — 17003 DESTRUCT PREMALG LES 2-14: CPT

## 2020-08-18 PROCEDURE — ? BIOPSY BY SHAVE METHOD AND DESTRUCTION

## 2020-08-18 PROCEDURE — ? COUNSELING

## 2020-08-18 PROCEDURE — ? ADDITIONAL NOTES

## 2020-08-18 PROCEDURE — 99213 OFFICE O/P EST LOW 20 MIN: CPT | Mod: 25

## 2020-08-18 PROCEDURE — ? LIQUID NITROGEN

## 2020-08-18 ASSESSMENT — LOCATION SIMPLE DESCRIPTION DERM
LOCATION SIMPLE: LEFT FOREARM
LOCATION SIMPLE: LEFT CHEEK
LOCATION SIMPLE: NOSE

## 2020-08-18 ASSESSMENT — LOCATION ZONE DERM
LOCATION ZONE: FACE
LOCATION ZONE: ARM
LOCATION ZONE: NOSE

## 2020-08-18 ASSESSMENT — LOCATION DETAILED DESCRIPTION DERM
LOCATION DETAILED: LEFT MEDIAL MALAR CHEEK
LOCATION DETAILED: NASAL SUPRATIP
LOCATION DETAILED: LEFT INFERIOR MEDIAL MALAR CHEEK
LOCATION DETAILED: LEFT PROXIMAL LATERAL DORSAL FOREARM

## 2020-08-18 NOTE — PROCEDURE: LIQUID NITROGEN
Consent: The patient's consent was obtained including but not limited to risks of crusting, scabbing, blistering, scarring, darker or lighter pigmentary change, recurrence, incomplete removal and infection.
Number Of Freeze-Thaw Cycles: 2 freeze-thaw cycles
Duration Of Freeze Thaw-Cycle (Seconds): 2
Render Note In Bullet Format When Appropriate: No
Detail Level: Simple
Post-Care Instructions: I reviewed with the patient in detail post-care instructions. Patient is to wear sunprotection, and avoid picking at any of the treated lesions. Pt may apply Vaseline to crusted or scabbing areas.

## 2020-08-18 NOTE — PROCEDURE: BIOPSY BY SHAVE METHOD AND DESTRUCTION
Detail Level: Detailed
Biopsy Type: H and E
Bill As?: Biopsy by Shave Method
Size Of Lesion In Cm (Optional): 0.4
Size Of Lesion After Curettage: 0.5
Anesthesia Type: 1% lidocaine with epinephrine
Anesthesia Volume In Cc: 2
Hemostasis: Drysol
Destruction Type: electrodesiccation
Number Of Curettages: 3
Wound Care: Petrolatum
Lab: [id number]
Lab Facility: 
Render Path Notes In Note?: No
Consent: Written consent was obtained and risks were reviewed including but not limited to scarring, infection, bleeding, scabbing, incomplete removal, nerve damage and allergy to anesthesia.
Post-Care Instructions: I reviewed with the patient in detail post-care instructions. Patient is to keep the biopsy site dry overnight, and then apply bacitracin twice daily until healed. Patient may apply hydrogen peroxide soaks to remove any crusting.
Notification Instructions: Patient will be notified of biopsy results. However, patient instructed to call the office if not contacted within 2 weeks.
Billing Type: Third-Party Bill

## 2020-08-18 NOTE — PROCEDURE: MIPS QUALITY
Quality 111:Pneumonia Vaccination Status For Older Adults: Pneumococcal Vaccination Previously Received
Detail Level: Detailed
Quality 431: Preventive Care And Screening: Unhealthy Alcohol Use - Screening: Patient screened for unhealthy alcohol use using a single question and scores less than 2 times per year
Quality 226: Preventive Care And Screening: Tobacco Use: Screening And Cessation Intervention: Patient screened for tobacco use and is an ex/non-smoker
Quality 130: Documentation Of Current Medications In The Medical Record: Current Medications Documented

## 2020-08-31 ENCOUNTER — APPOINTMENT (RX ONLY)
Dept: URBAN - METROPOLITAN AREA CLINIC 36 | Facility: CLINIC | Age: 80
Setting detail: DERMATOLOGY
End: 2020-08-31

## 2020-08-31 DIAGNOSIS — Z48.817 ENCOUNTER FOR SURGICAL AFTERCARE FOLLOWING SURGERY ON THE SKIN AND SUBCUTANEOUS TISSUE: ICD-10-CM

## 2020-08-31 PROCEDURE — 99024 POSTOP FOLLOW-UP VISIT: CPT

## 2020-08-31 PROCEDURE — ? POST-OP WOUND CHECK

## 2020-08-31 ASSESSMENT — LOCATION ZONE DERM: LOCATION ZONE: FACE

## 2020-08-31 ASSESSMENT — LOCATION SIMPLE DESCRIPTION DERM: LOCATION SIMPLE: LEFT FOREHEAD

## 2020-08-31 ASSESSMENT — LOCATION DETAILED DESCRIPTION DERM: LOCATION DETAILED: LEFT INFERIOR MEDIAL FOREHEAD

## 2020-08-31 NOTE — PROCEDURE: POST-OP WOUND CHECK
Additional Comments: Patient to follow up with PMD and PRN with me
Detail Level: Generalized
Add 46850 Cpt? (Important Note: In 2017 The Use Of 01585 Is Being Tracked By Cms To Determine Future Global Period Reimbursement For Global Periods): yes

## 2020-09-28 ENCOUNTER — APPOINTMENT (RX ONLY)
Dept: URBAN - METROPOLITAN AREA CLINIC 36 | Facility: CLINIC | Age: 80
Setting detail: DERMATOLOGY
End: 2020-09-28

## 2020-09-28 PROBLEM — C44.622 SQUAMOUS CELL CARCINOMA OF SKIN OF RIGHT UPPER LIMB, INCLUDING SHOULDER: Status: ACTIVE | Noted: 2020-09-28

## 2020-09-28 PROCEDURE — 17271 DSTR MAL LES S/N/H/F/G 0.6-1: CPT

## 2020-09-28 PROCEDURE — 17271 DSTR MAL LES S/N/H/F/G 0.6-1: CPT | Mod: 76

## 2020-09-28 PROCEDURE — ? CURETTAGE AND DESTRUCTION WITH PATHOLOGY

## 2020-09-28 NOTE — PROCEDURE: CURETTAGE AND DESTRUCTION WITH PATHOLOGY
Detail Level: Detailed
Size Of Lesion In Cm: 0.5
Size Of Lesion After Curettage: 0.7
Anesthesia Type: 1% lidocaine with 1:100,000 epinephrine and a 1:6 solution of 8.4% sodium bicarbonate
Anesthesia Volume In Cc: 1.8
Cautery Type: electrodesiccation
Number Of Curettages: 3
What Was Performed First?: Curettage
Additional Information: (Optional): The wound was cleaned, and a pressure dressing was applied.  The patient received detailed post-op instructions.
Lab: [id number]
Lab Facility: 
Histology Text: Following the procedure a portion of the curetted material was sent for histologic evaluation.
Biopsy Type: H and E
Render Path Notes In Note?: No
Consent was obtained from the patient. The risks, benefits and alternatives to therapy were discussed in detail. Specifically, the risks of infection, scarring, bleeding, prolonged wound healing, nerve injury, incomplete removal, allergy to anesthesia and recurrence were addressed. Alternatives to ED&C, such as: surgical removal and XRT were also discussed.  Prior to the procedure, the treatment site was clearly identified and confirmed by the patient. All components of Universal Protocol/PAUSE Rule completed.
Post-Care Instructions: I reviewed with the patient in detail post-care instructions. Patient is to keep the area dry for 48 hours, and not to engage in any swimming until the area is healed. Should the patient develop any fevers, chills, bleeding, severe pain patient will contact the office immediately.
Billing Type: Third-Party Bill
Bill As A Line Item Or As Units: Line Item
Size Of Lesion In Cm: 0.8
Size Of Lesion After Curettage: 1
Anesthesia Type: 1% lidocaine with epinephrine and a 1:10 solution of 8.4% sodium bicarbonate
Anesthesia Volume In Cc: 1.1

## 2020-12-30 ENCOUNTER — APPOINTMENT (RX ONLY)
Dept: URBAN - METROPOLITAN AREA CLINIC 4 | Facility: CLINIC | Age: 80
Setting detail: DERMATOLOGY
End: 2020-12-30

## 2020-12-30 DIAGNOSIS — L57.0 ACTINIC KERATOSIS: ICD-10-CM

## 2020-12-30 DIAGNOSIS — L82.1 OTHER SEBORRHEIC KERATOSIS: ICD-10-CM

## 2020-12-30 PROBLEM — D48.5 NEOPLASM OF UNCERTAIN BEHAVIOR OF SKIN: Status: ACTIVE | Noted: 2020-12-30

## 2020-12-30 PROCEDURE — ? ADDITIONAL NOTES

## 2020-12-30 PROCEDURE — 17000 DESTRUCT PREMALG LESION: CPT | Mod: 59

## 2020-12-30 PROCEDURE — ? LIQUID NITROGEN

## 2020-12-30 PROCEDURE — 99212 OFFICE O/P EST SF 10 MIN: CPT | Mod: 25

## 2020-12-30 PROCEDURE — ? COUNSELING

## 2020-12-30 PROCEDURE — ? BIOPSY BY SHAVE METHOD AND DESTRUCTION

## 2020-12-30 PROCEDURE — 11102 TANGNTL BX SKIN SINGLE LES: CPT

## 2020-12-30 PROCEDURE — 17003 DESTRUCT PREMALG LES 2-14: CPT

## 2020-12-30 ASSESSMENT — LOCATION DETAILED DESCRIPTION DERM
LOCATION DETAILED: LEFT MEDIAL SUPERIOR CHEST
LOCATION DETAILED: NASAL DORSUM
LOCATION DETAILED: UPPER STERNUM
LOCATION DETAILED: STERNAL NOTCH
LOCATION DETAILED: LEFT SUPERIOR PARIETAL SCALP

## 2020-12-30 ASSESSMENT — LOCATION ZONE DERM
LOCATION ZONE: SCALP
LOCATION ZONE: NOSE
LOCATION ZONE: TRUNK

## 2020-12-30 ASSESSMENT — LOCATION SIMPLE DESCRIPTION DERM
LOCATION SIMPLE: NOSE
LOCATION SIMPLE: SCALP
LOCATION SIMPLE: CHEST

## 2020-12-30 NOTE — PROCEDURE: BIOPSY BY SHAVE METHOD AND DESTRUCTION

## 2020-12-30 NOTE — PROCEDURE: LIQUID NITROGEN
Consent: The patient's consent was obtained including but not limited to risks of crusting, scabbing, blistering, scarring, darker or lighter pigmentary change, recurrence, incomplete removal and infection.
Duration Of Freeze Thaw-Cycle (Seconds): 2
Number Of Freeze-Thaw Cycles: 2 freeze-thaw cycles
Post-Care Instructions: I reviewed with the patient in detail post-care instructions. Patient is to wear sunprotection, and avoid picking at any of the treated lesions. Pt may apply Vaseline to crusted or scabbing areas.
Render Post-Care Instructions In Note?: no
Detail Level: Simple

## 2021-01-14 DIAGNOSIS — Z23 NEED FOR VACCINATION: ICD-10-CM

## 2021-02-03 ENCOUNTER — PATIENT OUTREACH (OUTPATIENT)
Dept: MEDICAL GROUP | Facility: MEDICAL CENTER | Age: 81
End: 2021-02-03

## 2021-02-03 ENCOUNTER — TELEPHONE (OUTPATIENT)
Dept: MEDICAL GROUP | Facility: MEDICAL CENTER | Age: 81
End: 2021-02-03

## 2021-02-03 NOTE — PROGRESS NOTES
NEW PATIENT VISIT PRE-VISIT PLANNING    1.  [facility] Patient is checked in Patient Demographics?Yes    2.  Immunizations were updated in Epic using Reconcile Outside Information activity? Yes         3.  Is this appointment scheduled as a Hospital Follow-Up? No    4.  Patient is due for the following Health Maintenance Topics:   Health Maintenance Due   Topic Date Due   • Annual Wellness Visit  1940   • COVID-19 Vaccine (1 of 2) [birth year]19/1956   • IMM DTaP/Tdap/Td Vaccine (1 - Tdap) 05/19/1958   • PAP SMEAR  05/19/1960   • COLONOSCOPY  05/19/1989   • IMM ZOSTER VACCINES (2 of 3) 12/09/2012   • MAMMOGRAM  04/28/2019   • BONE DENSITY  06/25/2020   • IMM INFLUENZA (1) 09/01/2020       5.  Reviewed/Updated the following with patient:       •   Preferred Pharmacy? Yes       •   Preferred Lab? Yes       •   Preferred Communication? Yes       •   Allergies? Yes       •   Medications? YES. Was Abstract Encounter opened and chart updated? YES       •   Social History? Yes       •   Family History (document living status of immediate family members and if + hx of  cancer, diabetes, hypertension, hyperlipidemia, heart attack, stroke) No    6.  Updated Care Team?       •   DME Company (gait device, O2, CPAP, etc.) N\A       •   Other Specialists (eye doctor, derm, GYN, cardiology, endo, etc): YES    7.  AHA ([employer]uls[employer]) form printed for Provider? N/A

## 2021-02-09 SDOH — ECONOMIC STABILITY: HOUSING INSECURITY
IN THE LAST 12 MONTHS, WAS THERE A TIME WHEN YOU DID NOT HAVE A STEADY PLACE TO SLEEP OR SLEPT IN A SHELTER (INCLUDING NOW)?: NO

## 2021-02-09 SDOH — HEALTH STABILITY: PHYSICAL HEALTH: ON AVERAGE, HOW MANY DAYS PER WEEK DO YOU ENGAGE IN MODERATE TO STRENUOUS EXERCISE (LIKE A BRISK WALK)?: 0 DAYS

## 2021-02-09 SDOH — ECONOMIC STABILITY: INCOME INSECURITY: IN THE LAST 12 MONTHS, WAS THERE A TIME WHEN YOU WERE NOT ABLE TO PAY THE MORTGAGE OR RENT ON TIME?: NO

## 2021-02-09 SDOH — ECONOMIC STABILITY: TRANSPORTATION INSECURITY
IN THE PAST 12 MONTHS, HAS LACK OF RELIABLE TRANSPORTATION KEPT YOU FROM MEDICAL APPOINTMENTS, MEETINGS, WORK OR FROM GETTING THINGS NEEDED FOR DAILY LIVING?: NO

## 2021-02-09 SDOH — HEALTH STABILITY: PHYSICAL HEALTH: ON AVERAGE, HOW MANY MINUTES DO YOU ENGAGE IN EXERCISE AT THIS LEVEL?: 0 MINUTES

## 2021-02-09 SDOH — ECONOMIC STABILITY: HOUSING INSECURITY: IN THE LAST 12 MONTHS, HOW MANY PLACES HAVE YOU LIVED?: 1

## 2021-02-09 SDOH — ECONOMIC STABILITY: TRANSPORTATION INSECURITY

## 2021-02-09 SDOH — HEALTH STABILITY: MENTAL HEALTH
STRESS IS WHEN SOMEONE FEELS TENSE, NERVOUS, ANXIOUS, OR CAN'T SLEEP AT NIGHT BECAUSE THEIR MIND IS TROUBLED. HOW STRESSED ARE YOU?: TO SOME EXTENT

## 2021-02-09 ASSESSMENT — SOCIAL DETERMINANTS OF HEALTH (SDOH)
HOW OFTEN DO YOU HAVE SIX OR MORE DRINKS ON ONE OCCASION: NEVER
IN A TYPICAL WEEK, HOW MANY TIMES DO YOU TALK ON THE PHONE WITH FAMILY, FRIENDS, OR NEIGHBORS?: MORE THAN THREE TIMES A WEEK
HOW OFTEN DO YOU ATTEND CHURCH OR RELIGIOUS SERVICES?: 1 TO 4 TIMES PER YEAR
HOW OFTEN DO YOU GET TOGETHER WITH FRIENDS OR RELATIVES?: TWICE A WEEK
HOW MANY DRINKS CONTAINING ALCOHOL DO YOU HAVE ON A TYPICAL DAY WHEN YOU ARE DRINKING: 1 OR 2

## 2021-02-10 ENCOUNTER — OFFICE VISIT (OUTPATIENT)
Dept: MEDICAL GROUP | Facility: MEDICAL CENTER | Age: 81
End: 2021-02-10
Payer: MEDICARE

## 2021-02-10 VITALS
SYSTOLIC BLOOD PRESSURE: 122 MMHG | HEART RATE: 86 BPM | OXYGEN SATURATION: 92 % | BODY MASS INDEX: 27.46 KG/M2 | TEMPERATURE: 97.6 F | WEIGHT: 155 LBS | HEIGHT: 63 IN | DIASTOLIC BLOOD PRESSURE: 74 MMHG

## 2021-02-10 DIAGNOSIS — C50.812 MALIGNANT NEOPLASM OF OVERLAPPING SITES OF BOTH BREASTS IN FEMALE, ESTROGEN RECEPTOR POSITIVE (HCC): ICD-10-CM

## 2021-02-10 DIAGNOSIS — R06.02 SHORTNESS OF BREATH: ICD-10-CM

## 2021-02-10 DIAGNOSIS — N39.46 MIXED STRESS AND URGE URINARY INCONTINENCE: ICD-10-CM

## 2021-02-10 DIAGNOSIS — Z17.0 MALIGNANT NEOPLASM OF OVERLAPPING SITES OF BOTH BREASTS IN FEMALE, ESTROGEN RECEPTOR POSITIVE (HCC): ICD-10-CM

## 2021-02-10 DIAGNOSIS — K22.719 BARRETT'S ESOPHAGUS WITH DYSPLASIA: ICD-10-CM

## 2021-02-10 DIAGNOSIS — C50.811 MALIGNANT NEOPLASM OF OVERLAPPING SITES OF BOTH BREASTS IN FEMALE, ESTROGEN RECEPTOR POSITIVE (HCC): ICD-10-CM

## 2021-02-10 DIAGNOSIS — E78.49 OTHER HYPERLIPIDEMIA: ICD-10-CM

## 2021-02-10 DIAGNOSIS — R73.03 PREDIABETES: ICD-10-CM

## 2021-02-10 DIAGNOSIS — M25.551 HIP PAIN, RIGHT: ICD-10-CM

## 2021-02-10 PROCEDURE — 99204 OFFICE O/P NEW MOD 45 MIN: CPT | Performed by: NURSE PRACTITIONER

## 2021-02-10 RX ORDER — DULOXETIN HYDROCHLORIDE 60 MG/1
60 CAPSULE, DELAYED RELEASE ORAL DAILY
COMMUNITY
End: 2022-04-26 | Stop reason: SDUPTHER

## 2021-02-10 NOTE — PROGRESS NOTES
Rashmi Parra is a 80 y.o. 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 Had biopsy and double mastectomy with Dr. Shelton. Completed 6 weeks radiation with Dr. Martinez, finished November 2019.     [facility]-Onc  [facility]/Onc    Hip pain, right  Chronic pain. Planning to get hip replaced.     Shortness of breath  Chronic. Ongoing for a few years. Getting short of breath with exertion. Sleeping 9+ hours nightly. Feels exhausted in the morning. No history of asthma. Smoked for over 30 years, quit in 1994.     Does snore when she sleeps. Does wake up with some mild headaches.     Prediabetes  Chronic. Diagnosed by past PCP a few years ago. Currently taking Metformin 500 mg nightly.     Ott's esophagus with dysplasia  Chronic.  Established with [facility], Dr. Wesley    Other hyperlipidemia  Chronic.  Stable on lovastatin 20 mg nightly.    Current medicines (including changes today)  Current Outpatient Medications   Medication Sig Dispense Refill   • DULoxetine (CYMBALTA) 60 MG Cap DR Particles delayed-release capsule Take 60 mg by mouth every day.     • anastrozole (ARIMIDEX) 1 MG Tab Take 1 mg by mouth.     • Dexlansoprazole (DEXILANT) 60 MG CAPSULE DELAYED RELEASE delayed-release capsule Take 60 mg by mouth every evening.     • Cholecalciferol (VITAMIN D PO) Take  by mouth.     • Cyanocobalamin (B-12 PO) Take 1 Tab by mouth every day.     • acetaminophen (TYLENOL) 500 MG Tab Take 500 mg by mouth every 6 hours as needed.     • lovastatin (MEVACOR) 20 MG Tab Take 20 mg by mouth every evening.     • Calcium Citrate (CITRACAL PO) Take 1 Tab by mouth every day.     • B Complex Vitamins (B COMPLEX PO) Take 1 Tab by mouth every day.     • polyethylene glycol/lytes (MIRALAX) Pack Take 17 g by mouth every day.     • Lifitegrast (XIIDRA) 5 % Solution Place 1 Drop in both eyes every  bedtime.     • ibuprofen (MOTRIN) 200 MG Tab Take 200 mg by mouth every 6 hours as needed for Mild Pain.     • Melatonin 5 MG Tab Take 0.25 Tabs by mouth every bedtime.     • docusate sodium (COLACE) 100 MG Cap Take 100 mg by mouth 1 time daily as needed for Constipation.     • metFORMIN (GLUCOPHAGE) 500 MG Tab Take 500 mg by mouth every evening.     • Mirabegron ER (MYRBETRIQ) 50 MG TABLET SR 24 HR Take 50 mg by mouth every evening.     • Multiple Vitamins-Minerals (MULTIVITAMIN PO) Take 1 Tab by mouth every day.     • MAGNESIUM PO Take 1 Cap by mouth every day.       No current facility-administered medications for this visit.     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  She  has a past surgical history that includes other orthopedic surgery (); gyn surgery (); cholecystectomy (); knee arthroplasty total (Right, 2015); cataract extraction with iol (Bilateral); lefort colpoclesis (2019); bladder sling female (2019); pr mastectomy, modified radical (Right, 2019); node biopsy sentinel (Right, 2019); axillary node dissection (2019); mastectomy (Left, 2019); flap closure (Bilateral, 2019); wide excision (Right, 2019); incision and drainage general (Bilateral, 2019); and abdominal hysterectomy total ().  Social History     Tobacco Use   • Smoking status: Former Smoker     Packs/day: 1.00     Years: 30.00     Pack years: 30.00     Types: Cigarettes     Quit date: 1992     Years since quittin.1   • Smokeless tobacco: Former User   Substance Use Topics   • Alcohol use:  "Yes     Alcohol/week: 0.6 oz     Types: 1 Glasses of wine per week     Comment: occ   • Drug use: No     Social History     Social History Narrative   • Not on file     No family history on file.  No family status information on file.         ROS  No chest pain, no abdominal pain, no rash.  Positive ROS as per HPI.  All other systems reviewed and are negative      Objective:     /74 (BP Location: Right arm, Patient Position: Sitting, BP Cuff Size: Adult)   Pulse 86   Temp 36.4 °C (97.6 °F) (Temporal)   Ht 1.6 m (5' 3\")   Wt 70.3 kg (155 lb)   SpO2 92%  Body mass index is 27.46 kg/m².  Physical Exam:    Constitutional: Alert, no distress.  Skin: Warm, dry, good turgor, no rashes in visible areas.  Eye: Equal, round and reactive, conjunctiva clear, lids normal.  ENMT: L mask in place  Respiratory: Unlabored respiratory effort, lungs clear to auscultation, no wheezes, no ronchi.  Cardiovascular: Normal S1, S2, no murmur, no edema.  Psych: Alert and oriented x3, normal affect and mood.        Assessment and Plan:   The following treatment plan was discussed    1. Malignant neoplasm of overlapping sites of both breasts in female, estrogen receptor positive (HCC)  Stable  Continue anastrozole 1 mg daily  Continue follow-up with oncology, Dr. Velazquez    2. Ott's esophagus with dysplasia  Stable  Continue Dexilant 60 mg daily  Continue follow-up with GI    3. Mixed stress and urge urinary incontinence  Stable  S/p bladder surgery with Dr. Bauñelos  Continue Myrbetriq 50 mg nightly    4. Hip pain, right  Unstable  Patient plans for hip replacement when she is medically stable.    5. Shortness of breath  Unstable  Concern for possible underlying sleep apnea versus COPD  Check overnight oximetry and pulmonary function test  - PULMONARY FUNCTION TESTS -Test requested: Spirometry with-out & with Bronchodilator; Future    6. Prediabetes  Stable  Continue metformin 500 mg daily    7. Other " hyperlipidemia  Stable  Continue lovastatin 20 mg daily      Records requested.  Followup: Return in about 2 months (around 4/10/2021).    I have placed the below orders and discussed them with an approved delegating provider. The MA is performing the below orders under the direction of Dr. Jenkins

## 2021-02-10 NOTE — ASSESSMENT & PLAN NOTE
Diagnosed July 2019. Had biopsy and double mastectomy with Dr. Shelton. Completed 6 weeks radiation with Dr. Martinez, finished November 2019.     [facility]On[facility]  [facility]/Onc

## 2021-02-10 NOTE — LETTER
[facility]  DAYANA Zaman  [address]  [place] [address]  Fax: [phone]   Authorization for Release/Disclosure of   Protected Health Information   Name: LINNEA HOFFMANN : 1940 SSN: [id number]   Address: [address] [place] [address]  [place] [address] Phone:    [phone] (home)    I authorize the entity listed below to release/disclose the PHI below to:   [facility]/DAYANA Zaman and DAYANA Zaman   Provider or Entity Name:  Michael Braun MD   Address   [facility], [facility], Zip  [address] [place] [address] [address] [place], [place],[address]   Phone: [phone]    Fax:   Reason for request: continuity of care   Information to be released:    [  ] LAST COLONOSCOPY,  including any PATH REPORT and follow-up  [  ] LAST FIT/COLOGUARD RESULT [  ] LAST DEXA  [  ] LAST MAMMOGRAM  [  ] LAST PAP  [  ] LAST LABS [  ] RETINA EXAM REPORT  [  ] IMMUNIZATION RECORDS  [ XXX ] Release all info      [  ] Check here and initial the line next to each item to release ALL health information INCLUDING  _____ Care and treatment for drug and / or alcohol abuse  _____ HIV testing, infection status, or AIDS  _____ Genetic Testing    DATES OF SERVICE OR TIME PERIOD TO BE DISCLOSED: _____________  I understand and acknowledge that:  * This Authorization may be revoked at any time by you in writing, except if your health information has already been used or disclosed.  * Your health information that will be used or disclosed as a result of you signing this authorization could be re-disclosed by the recipient. If this occurs, your re-disclosed health information may no longer be protected by State or Federal laws.  * You may refuse to sign this Authorization. Your refusal will not affect your ability to obtain treatment.  * This Authorization becomes effective upon signing and will  on (date) __________.      If no date is indicated, this Authorization will  one (1) year from the signature date.    Name:  Rashmi Parra    Signature:   Date:     2/10/2021       PLEASE FAX REQUESTED RECORDS BACK TO: [phone]

## 2021-02-10 NOTE — ASSESSMENT & PLAN NOTE
Chronic. Ongoing for a few years. Getting short of breath with exertion. Sleeping 9+ hours nightly. Feels exhausted in the morning. No history of asthma. Smoked for over 30 years, quit in 1994.     Does snore when she sleeps. Does wake up with some mild headaches.

## 2021-02-17 ENCOUNTER — IMMUNIZATION (OUTPATIENT)
Dept: FAMILY PLANNING/WOMEN'S HEALTH CLINIC | Facility: IMMUNIZATION CENTER | Age: 81
End: 2021-02-17
Attending: INTERNAL MEDICINE
Payer: MEDICARE

## 2021-02-17 DIAGNOSIS — Z23 ENCOUNTER FOR VACCINATION: Primary | ICD-10-CM

## 2021-02-17 DIAGNOSIS — Z23 NEED FOR VACCINATION: ICD-10-CM

## 2021-02-17 PROCEDURE — 0001A PFIZER SARS-COV-2 VACCINE: CPT

## 2021-02-17 PROCEDURE — 91300 PFIZER SARS-COV-2 VACCINE: CPT

## 2021-02-22 ENCOUNTER — APPOINTMENT (RX ONLY)
Dept: URBAN - METROPOLITAN AREA CLINIC 4 | Facility: CLINIC | Age: 81
Setting detail: DERMATOLOGY
End: 2021-02-22

## 2021-02-22 DIAGNOSIS — D22 MELANOCYTIC NEVI: ICD-10-CM

## 2021-02-22 DIAGNOSIS — L82.0 INFLAMED SEBORRHEIC KERATOSIS: ICD-10-CM

## 2021-02-22 DIAGNOSIS — D18.0 HEMANGIOMA: ICD-10-CM

## 2021-02-22 DIAGNOSIS — L29.8 OTHER PRURITUS: ICD-10-CM

## 2021-02-22 DIAGNOSIS — L82.1 OTHER SEBORRHEIC KERATOSIS: ICD-10-CM

## 2021-02-22 DIAGNOSIS — L29.89 OTHER PRURITUS: ICD-10-CM

## 2021-02-22 DIAGNOSIS — L81.4 OTHER MELANIN HYPERPIGMENTATION: ICD-10-CM

## 2021-02-22 DIAGNOSIS — Z85.828 PERSONAL HISTORY OF OTHER MALIGNANT NEOPLASM OF SKIN: ICD-10-CM

## 2021-02-22 PROBLEM — D22.9 MELANOCYTIC NEVI, UNSPECIFIED: Status: ACTIVE | Noted: 2021-02-22

## 2021-02-22 PROBLEM — D18.01 HEMANGIOMA OF SKIN AND SUBCUTANEOUS TISSUE: Status: ACTIVE | Noted: 2021-02-22

## 2021-02-22 PROCEDURE — 99213 OFFICE O/P EST LOW 20 MIN: CPT

## 2021-02-22 PROCEDURE — ? COUNSELING

## 2021-02-22 PROCEDURE — ? LIQUID NITROGEN (COSMETIC)

## 2021-02-22 PROCEDURE — ? SUNSCREEN RECOMMENDATIONS

## 2021-02-22 ASSESSMENT — LOCATION SIMPLE DESCRIPTION DERM
LOCATION SIMPLE: LEFT UPPER BACK
LOCATION SIMPLE: CHEST
LOCATION SIMPLE: POSTERIOR SCALP
LOCATION SIMPLE: RIGHT UPPER BACK
LOCATION SIMPLE: LEFT PRETIBIAL REGION
LOCATION SIMPLE: NOSE

## 2021-02-22 ASSESSMENT — LOCATION DETAILED DESCRIPTION DERM
LOCATION DETAILED: MID-OCCIPITAL SCALP
LOCATION DETAILED: RIGHT SUPERIOR MEDIAL UPPER BACK
LOCATION DETAILED: NASAL DORSUM
LOCATION DETAILED: STERNAL NOTCH
LOCATION DETAILED: LEFT SUPERIOR UPPER BACK
LOCATION DETAILED: RIGHT SUPERIOR UPPER BACK
LOCATION DETAILED: LEFT DISTAL PRETIBIAL REGION

## 2021-02-22 ASSESSMENT — LOCATION ZONE DERM
LOCATION ZONE: NOSE
LOCATION ZONE: LEG
LOCATION ZONE: TRUNK
LOCATION ZONE: SCALP

## 2021-02-22 NOTE — PROCEDURE: LIQUID NITROGEN (COSMETIC)
Billing Information: Bill by Static Price
Render Post-Care Instructions In Note?: no
Post-Care Instructions: I reviewed with the patient in detail post-care instructions. Patient is to wear sunprotection, and avoid picking at any of the treated lesions. Pt may apply Vaseline to crusted or scabbing areas.
Consent: The patient's consent was obtained including but not limited to risks of crusting, scabbing, blistering, scarring, darker or lighter pigmentary change, recurrence, incomplete removal and infection. The patient understands that the procedure is cosmetic in nature and is not covered by insurance.
Detail Level: Detailed

## 2021-02-24 DIAGNOSIS — N39.46 MIXED STRESS AND URGE URINARY INCONTINENCE: ICD-10-CM

## 2021-02-24 RX ORDER — MIRABEGRON 50 MG/1
50 TABLET, FILM COATED, EXTENDED RELEASE ORAL EVERY EVENING
Qty: 90 TABLET | Refills: 2 | Status: SHIPPED | OUTPATIENT
Start: 2021-02-24 | End: 2021-09-20

## 2021-02-24 RX ORDER — MIRABEGRON 50 MG/1
50 TABLET, FILM COATED, EXTENDED RELEASE ORAL EVERY EVENING
Qty: 5 TABLET | Refills: 0 | Status: SHIPPED | OUTPATIENT
Start: 2021-02-24 | End: 2021-02-24 | Stop reason: SDUPTHER

## 2021-02-25 NOTE — TELEPHONE ENCOUNTER
Received request via: Pharmacy    Was the patient seen in the last year in this department? Yes    Does the patient have an active prescription (recently filled or refills available) for medication(s) requested? No       Pt states she takes this medication every day and only has 3 left. She would like a couple of pills sent to [facility] on [facility] so she can  this week. She would like a 90 day supply sent through [facility] mail order. Please advise.

## 2021-03-06 ENCOUNTER — IMMUNIZATION (OUTPATIENT)
Dept: FAMILY PLANNING/WOMEN'S HEALTH CLINIC | Facility: IMMUNIZATION CENTER | Age: 81
End: 2021-03-06
Attending: INTERNAL MEDICINE
Payer: MEDICARE

## 2021-03-06 DIAGNOSIS — Z23 ENCOUNTER FOR VACCINATION: Primary | ICD-10-CM

## 2021-03-06 PROCEDURE — 91300 PFIZER SARS-COV-2 VACCINE: CPT

## 2021-03-06 PROCEDURE — 0002A PFIZER SARS-COV-2 VACCINE: CPT

## 2021-03-12 ENCOUNTER — HOSPITAL ENCOUNTER (OUTPATIENT)
Dept: PULMONOLOGY | Facility: MEDICAL CENTER | Age: 81
End: 2021-03-12
Attending: NURSE PRACTITIONER
Payer: MEDICARE

## 2021-03-12 DIAGNOSIS — R06.02 SHORTNESS OF BREATH: ICD-10-CM

## 2021-03-12 PROCEDURE — 94060 EVALUATION OF WHEEZING: CPT

## 2021-03-12 RX ORDER — ALBUTEROL SULFATE 90 UG/1
2 AEROSOL, METERED RESPIRATORY (INHALATION)
Status: DISCONTINUED | OUTPATIENT
Start: 2021-03-12 | End: 2021-03-13 | Stop reason: HOSPADM

## 2021-03-12 ASSESSMENT — PULMONARY FUNCTION TESTS
FVC: 1.85
FEV1: 1.44
FEV1/FVC: 77.81
FEV1_PERCENT_CHANGE: 5
FEV1/FVC_PERCENT_CHANGE: 125
FVC: 1.76
FEV1: 1.37
FEV1_LLN: 1.54
FVC_LLN: 2.03
FEV1/FVC_PERCENT_PREDICTED: 101
FVC_PERCENT_PREDICTED: 72
FEV1_PERCENT_PREDICTED: 78
FEV1/FVC_PERCENT_LLN: 64.32
FVC_PREDICTED: 2.43
FEV1/FVC_PERCENT_PREDICTED: 76
FEV1/FVC: 77.98
FEV1_LLN: 1.54
FEV1/FVC_PERCENT_LLN: 64.32
FEV1/FVC_PERCENT_PREDICTED: 101
FEV1_PREDICTED: 1.84
FEV1/FVC_PERCENT_CHANGE: 0
FEV1/FVC_PERCENT_PREDICTED: 103
FEV1/FVC: 77.84
FEV1_PERCENT_CHANGE: 4
FVC_PERCENT_PREDICTED: 76
FVC_LLN: 2.03
FEV1/FVC: 78
FEV1_PERCENT_PREDICTED: 74
FEV1/FVC_PREDICTED: 77.03
FEV1/FVC_PERCENT_PREDICTED: 103

## 2021-03-13 PROCEDURE — 94060 EVALUATION OF WHEEZING: CPT | Mod: 26 | Performed by: INTERNAL MEDICINE

## 2021-03-13 NOTE — PROCEDURES
DATE OF SERVICE:  03/12/2021     PULMONARY FUNCTION TEST INTERPRETATION     REFERRING PROVIDER:  JAMARCUS Segura     INTERPRETING PROVIDER:  Edu Rodarte MD     RESULTS:  SPIROMETRY:  FVC prebronchodilator 1.76 liters, 72% predicted; postbronchodilator 1.85   liters, 4% improvement.  FEV1 prebronchodilator 1.37 liters, 74% predicted; postbronchodilator 1.44   liters, 5% improvement.  FEV1/FVC 77%.     LUNG VOLUMES:  Slow vital capacity 1.77 liters, 72% predicted.  Inspiratory capacity 1.56 liters, 75% predicted.  Expiratory reserve volume 0.20 liters, 57% predicted.     INTERPRETATION:  1.  There is a proportional reduction in the forced volumes.  There is no   significant obstruction.  There is a partial bronchodilator response in the   mid flow rates.  In the absence of spirometric evidence of obstruction, use   symptom response to determine if short-acting bronchodilator use is clinically   indicated.  2.  The proportional reduction in forced volumes is suggestive of restriction. Consider repeat testing with complete lung volume and diffusion capacity testing if clinically appropriate.  3.  Flow-volume loop is consistent with the above interpretation.  4.  No prior PFTs for comparison at the time of interpretation.        ______________________________  MD UNRULY Zaragoza III/DUNCAN/JELENA    DD:  03/12/2021 15:56  DT:  03/12/2021 17:51    Job#:  [id number]

## 2021-03-22 ENCOUNTER — TELEPHONE (OUTPATIENT)
Dept: MEDICAL GROUP | Facility: MEDICAL CENTER | Age: 81
End: 2021-03-22

## 2021-03-22 DIAGNOSIS — R06.02 SHORTNESS OF BREATH: ICD-10-CM

## 2021-03-22 DIAGNOSIS — R94.2 ABNORMAL PFT: ICD-10-CM

## 2021-03-22 NOTE — TELEPHONE ENCOUNTER
Please notify patient that her pulmonary function test was abnormal.  I would still like her to do the overnight home sleep test as well, however in the meantime I have placed a referral to the pulmonary specialists as they may need to do further testing to see why she is having the symptoms.    STEVEN Zaman.

## 2021-03-24 NOTE — TELEPHONE ENCOUNTER
Likely not since her symptoms have been going on for a few years.  I am more concerned for a lung problem like COPD or sleep apnea which is what we will be testing for.  I would still like her to do the OPO and please add apnea link.      STEVEN Zaman.

## 2021-03-24 NOTE — TELEPHONE ENCOUNTER
1. Caller Name: Rashmi [place] Olson  Call Back Number: [phone] (home)     How would the patient prefer to be contacted with a response: Phone call OK to leave a detailed message    Pt notified, provided her with Pulmonary contact information. She will call to schedule. She is wondering if the cause of the abnormal results could be cancer? Please advise.    Pt states she had difficulties with the home sleep OPO because she did not read the instructions right. She tried three nights in a row and was unsuccessful so she sent it back, she is wondering if she should try to do it again? Please advise.

## 2021-04-07 ENCOUNTER — TELEPHONE (OUTPATIENT)
Dept: MEDICAL GROUP | Facility: MEDICAL CENTER | Age: 81
End: 2021-04-07

## 2021-04-07 NOTE — TELEPHONE ENCOUNTER
ESTABLISHED PATIENT PRE-VISIT PLANNING     Patient was NOT contacted to complete PVP.  ----------------------------------------------------------------------------------------------------    1.  Reviewed notes from the last few office visits within the medical group: Yes    2.  If any orders were placed at last visit or intended to be done for this visit (i.e. 6 mos follow-up), do we have Results/Consult Notes?        •  Labs - Labs were not ordered at last office visit.       •  Imaging - Imaging was not ordered at last office visit.       •  Referrals - Referral ordered, patient has NOT been seen. Referral to Pulmonary and Sleep has been AUTHORIZED.    3. Is this appointment scheduled as a Hospital Follow-Up? No    4.  Immunizations were updated in Epic using Reconcile Outside Information activity? Yes. Immunizations reconciled through Web-[employer] and outside sources. Immunization records are up to date.     5.  Patient is due for the following Health Maintenance Topics:   Health Maintenance Due   Topic Date Due   • Annual Wellness Visit  Never done   • IMM DTaP/Tdap/Td Vaccine (1 - Tdap) Never done   • PAP SMEAR  Never done   • COLONOSCOPY  Never done   • IMM ZOSTER VACCINES (2 of 3) 12/09/2012   • MAMMOGRAM  04/28/2019   • BONE DENSITY  06/25/2020         ----------------------------------------------------------------------------------------------------  This pre-Visit Planning note has been created for the Provider and Medical Assistant to review prior to the patient's office appointment. Patient is NOT REQUIRED to follow-up on this note.   Outside information NOT reconciled using the [employer]ist[employer] feature. Per Carlota Plaza, the Tourist[employer] feature is down as of 02/09/2021 at 2:00pm. Will reconcile outside information at a later date.

## 2021-04-09 ENCOUNTER — OFFICE VISIT (OUTPATIENT)
Dept: MEDICAL GROUP | Facility: MEDICAL CENTER | Age: 81
End: 2021-04-09
Payer: MEDICARE

## 2021-04-09 VITALS
DIASTOLIC BLOOD PRESSURE: 70 MMHG | TEMPERATURE: 98.7 F | OXYGEN SATURATION: 94 % | HEART RATE: 99 BPM | WEIGHT: 153 LBS | HEIGHT: 63 IN | SYSTOLIC BLOOD PRESSURE: 120 MMHG | BODY MASS INDEX: 27.11 KG/M2

## 2021-04-09 DIAGNOSIS — N89.8 VAGINAL ITCHING: ICD-10-CM

## 2021-04-09 DIAGNOSIS — B37.81 THRUSH OF MOUTH AND ESOPHAGUS (HCC): ICD-10-CM

## 2021-04-09 DIAGNOSIS — B37.0 THRUSH OF MOUTH AND ESOPHAGUS (HCC): ICD-10-CM

## 2021-04-09 DIAGNOSIS — R07.0 BURNING SENSATION OF THROAT: ICD-10-CM

## 2021-04-09 DIAGNOSIS — R06.02 SHORTNESS OF BREATH: ICD-10-CM

## 2021-04-09 DIAGNOSIS — G47.34 NOCTURNAL HYPOXIA: ICD-10-CM

## 2021-04-09 PROCEDURE — 99214 OFFICE O/P EST MOD 30 MIN: CPT | Performed by: NURSE PRACTITIONER

## 2021-04-09 RX ORDER — FLUCONAZOLE 150 MG/1
150 TABLET ORAL DAILY
Qty: 1 TABLET | Refills: 0 | Status: SHIPPED | OUTPATIENT
Start: 2021-04-09 | End: 2021-04-10

## 2021-04-09 ASSESSMENT — PATIENT HEALTH QUESTIONNAIRE - PHQ9: CLINICAL INTERPRETATION OF PHQ2 SCORE: 0

## 2021-04-09 NOTE — ASSESSMENT & PLAN NOTE
Started having some voice changes and noticing white cottage cheese like chunks in her mouth and throat in the morning. Started about October 2020. She does have Barretts esophagus, established with GI, last EGD was last May or June which was stable.     Unable to eat certain foods due to burning.     Has been on a few rounds of antibiotics.

## 2021-04-09 NOTE — ASSESSMENT & PLAN NOTE
Recent OPO revealed significant hypoxia throughout the night. Oxygen levels remained less that 90%, the lowest reading was 57%.     Patient continues to have significant shortness of breath.    No history of heart or lung disease.

## 2021-04-13 ENCOUNTER — TELEPHONE (OUTPATIENT)
Dept: MEDICAL GROUP | Facility: MEDICAL CENTER | Age: 81
End: 2021-04-13

## 2021-04-13 DIAGNOSIS — R06.02 SHORTNESS OF BREATH: ICD-10-CM

## 2021-04-13 DIAGNOSIS — G47.34 NOCTURNAL HYPOXIA: ICD-10-CM

## 2021-04-14 NOTE — PROGRESS NOTES
Subjective:   Rashmi Parra is a 80 y.o. female here today for follow up on shortness of breath:    Burning sensation of throat  Started having some voice changes and noticing white cottage cheese like chunks in her mouth and throat in the morning. Started about October 2020. She does have Barretts esophagus, established with GI, last EGD was last May or June which was stable.     Unable to eat certain foods due to burning.     Has been on a few rounds of antibiotics.     Nocturnal hypoxia  Recent OPO revealed significant hypoxia throughout the night. Oxygen levels remained less that 90%, the lowest reading was 57%.     Patient continues to have significant shortness of breath.    No history of heart or lung disease.        Current medicines (including changes today)  Current Outpatient Medications   Medication Sig Dispense Refill   • nystatin (MYCOSTATIN) 693631 UNIT/ML Suspension Take 5 mL by mouth 4 times a day for 7 days. Swish in the mouth and retain for as long as possible (several minutes) before swallowing 60 mL 1   • Mirabegron ER (MYRBETRIQ) 50 MG TABLET SR 24 HR Take 50 mg by mouth every evening. Indications: Frequent Urination, Urinary Incontinence, Urinary Urgency 90 tablet 2   • DULoxetine (CYMBALTA) 60 MG Cap DR Particles delayed-release capsule Take 60 mg by mouth every day.     • anastrozole (ARIMIDEX) 1 MG Tab Take 1 mg by mouth.     • Cholecalciferol (VITAMIN D PO) Take  by mouth.     • Cyanocobalamin (B-12 PO) Take 1 Tab by mouth every day.     • acetaminophen (TYLENOL) 500 MG Tab Take 500 mg by mouth every 6 hours as needed.     • lovastatin (MEVACOR) 20 MG Tab Take 20 mg by mouth every evening.     • Calcium Citrate (CITRACAL PO) Take 1 Tab by mouth every day.     • B Complex Vitamins (B COMPLEX PO) Take 1 Tab by mouth every day.     • polyethylene glycol/lytes (MIRALAX) Pack Take 17 g by mouth every day.     • Lifitegrast (XIIDRA) 5 % Solution Place 1 Drop in both eyes every bedtime.     •  "ibuprofen (MOTRIN) 200 MG Tab Take 200 mg by mouth every 6 hours as needed for Mild Pain.     • Melatonin 5 MG Tab Take 0.25 Tabs by mouth every bedtime.     • docusate sodium (COLACE) 100 MG Cap Take 100 mg by mouth 1 time daily as needed for Constipation.     • metFORMIN (GLUCOPHAGE) 500 MG Tab Take 500 mg by mouth every evening.     • Dexlansoprazole (DEXILANT) 60 MG CAPSULE DELAYED RELEASE delayed-release capsule Take 60 mg by mouth every evening.     • Multiple Vitamins-Minerals (MULTIVITAMIN PO) Take 1 Tab by mouth every day.     • MAGNESIUM PO Take 1 Cap by mouth every day.       No current facility-administered medications for this visit.     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    ROS   No chest pain, + shortness of breath, no abdominal pain  Positive ROS as per HPI.  All other systems reviewed and are negative.     Objective:     /70 (BP Location: Left arm, Patient Position: Sitting, BP Cuff Size: Adult)   Pulse 99   Temp 37.1 °C (98.7 °F) (Temporal)   Ht 1.6 m (5' 3\")   Wt 69.4 kg (153 lb)   SpO2 94%  Body mass index is 27.1 kg/m².     Physical Exam:  Constitutional: Alert, no distress.  Skin: Warm, dry, good turgor, no rashes in visible areas.  Eye: Equal, round and reactive, conjunctiva clear, lids normal.  ENMT: Mask in place  Respiratory: Unlabored respiratory effort, lungs clear to auscultation, no wheezes, no ronchi.  Cardiovascular: Normal S1, S2, no murmur, no edema.  Psych: Alert and oriented x3, normal affect and mood.        Assessment and Plan:   The following treatment plan was discussed    1. " "Shortness of breath  Unstable  OPO shows nocturnal hypoxia of unknown etiology  Patient had issues with OPO device, unsure of accuracy  Repeat test with [facility]   Check Echo, concern for underlying heart or lung disease  Follow up with pulmonology  - EC-ECHOCARDIOGRAM COMPLETE W/O CONT; Future    2. Burning sensation of throat  Unstable  Concern for GERD vs Thrush due to \" white chunks in back of throat in the morning\" and recent antibiotic use    3. Thrush of mouth and esophagus (HCC)  Unstable  Treat for thrush to see if symptoms improve, if not may be worsening GERD  - fluconazole (DIFLUCAN) 150 MG tablet; Take 1 tablet by mouth every day for 1 day.  Dispense: 1 tablet; Refill: 0  - nystatin (MYCOSTATIN) 884348 UNIT/ML Suspension; Take 5 mL by mouth 4 times a day for 7 days. Swish in the mouth and retain for as long as possible (several minutes) before swallowing  Dispense: 60 mL; Refill: 1    4. Vaginal itching  Unstable, antibiotic induced  Diflucan sent to pharmacy  - fluconazole (DIFLUCAN) 150 MG tablet; Take 1 tablet by mouth every day for 1 day.  Dispense: 1 tablet; Refill: 0    5. Nocturnal hypoxia  Unstable  Cause still unknown  Night time oxygen ordered for 2L  Repeat OPO  Check Echo  Follow up with pulmonology for PFT and workup  COPD vs sleep apnea?      Followup: Return in about 3 months (around 7/9/2021).    I have placed the below orders and discussed them with an approved delegating provider. The MA is performing the below orders under the direction of Dr. Mandujano             "

## 2021-04-22 ENCOUNTER — HOSPITAL ENCOUNTER (OUTPATIENT)
Dept: CARDIOLOGY | Facility: MEDICAL CENTER | Age: 81
End: 2021-04-22
Attending: NURSE PRACTITIONER
Payer: MEDICARE

## 2021-04-22 ENCOUNTER — TELEPHONE (OUTPATIENT)
Dept: MEDICAL GROUP | Facility: MEDICAL CENTER | Age: 81
End: 2021-04-22

## 2021-04-22 DIAGNOSIS — R06.02 SHORTNESS OF BREATH: ICD-10-CM

## 2021-04-22 DIAGNOSIS — G47.34 NOCTURNAL HYPOXIA: ICD-10-CM

## 2021-04-22 DIAGNOSIS — R94.30 ABNORMAL CARDIAC FUNCTION TEST: ICD-10-CM

## 2021-04-22 DIAGNOSIS — Z92.3 HISTORY OF RADIATION THERAPY: ICD-10-CM

## 2021-04-22 LAB
LV EJECT FRACT  99904: 75
LV EJECT FRACT MOD 2C 99903: 83.1
LV EJECT FRACT MOD 4C 99902: 72.97
LV EJECT FRACT MOD BP 99901: 78.24

## 2021-04-22 PROCEDURE — 93306 TTE W/DOPPLER COMPLETE: CPT | Mod: 26 | Performed by: INTERNAL MEDICINE

## 2021-04-22 PROCEDURE — 93306 TTE W/DOPPLER COMPLETE: CPT

## 2021-04-22 NOTE — TELEPHONE ENCOUNTER
Please notify patient that her ultrasound of her heart was abnormal. I have placed a referral for her to see one of our cardiologists to further evaluate this.     STEVEN Zaman.

## 2021-04-23 ENCOUNTER — TELEPHONE (OUTPATIENT)
Dept: MEDICAL GROUP | Facility: MEDICAL CENTER | Age: 81
End: 2021-04-23

## 2021-04-26 NOTE — TELEPHONE ENCOUNTER
She has abnormal movement of her heart muscle which is may be a sign of heart failure. Her heart is still functioning well, but I think this likely is contributing to her symptoms.     STEVEN Zaman.

## 2021-04-26 NOTE — TELEPHONE ENCOUNTER
Pt notified, provided her with contact information to schedule with cardiology. Pt would like to know what was found that was abnormal in the results? Please advise.

## 2021-05-06 ENCOUNTER — OFFICE VISIT (OUTPATIENT)
Dept: CARDIOLOGY | Facility: MEDICAL CENTER | Age: 81
End: 2021-05-06
Payer: MEDICARE

## 2021-05-06 VITALS
DIASTOLIC BLOOD PRESSURE: 58 MMHG | RESPIRATION RATE: 16 BRPM | HEIGHT: 63 IN | OXYGEN SATURATION: 92 % | BODY MASS INDEX: 27.36 KG/M2 | WEIGHT: 154.4 LBS | SYSTOLIC BLOOD PRESSURE: 90 MMHG | HEART RATE: 87 BPM

## 2021-05-06 DIAGNOSIS — E78.00 HYPERCHOLESTEROLEMIA: ICD-10-CM

## 2021-05-06 DIAGNOSIS — R94.30 ABNORMAL CARDIAC FUNCTION TEST: ICD-10-CM

## 2021-05-06 LAB — EKG IMPRESSION: NORMAL

## 2021-05-06 PROCEDURE — 99204 OFFICE O/P NEW MOD 45 MIN: CPT | Performed by: INTERNAL MEDICINE

## 2021-05-06 PROCEDURE — 93000 ELECTROCARDIOGRAM COMPLETE: CPT | Performed by: INTERNAL MEDICINE

## 2021-05-06 ASSESSMENT — ENCOUNTER SYMPTOMS
PND: 0
WEIGHT LOSS: 1
ORTHOPNEA: 0
MEMORY LOSS: 1
DIAPHORESIS: 1
LOSS OF CONSCIOUSNESS: 0
WEAKNESS: 1
MYALGIAS: 0
BLURRED VISION: 0
FEVER: 0
INSOMNIA: 0
DEPRESSION: 1
CHILLS: 0
ABDOMINAL PAIN: 0
DIZZINESS: 0
PALPITATIONS: 0
SHORTNESS OF BREATH: 1

## 2021-05-06 NOTE — PROGRESS NOTES
"Chief Complaint   Patient presents with   • Abnormal Cardiac Function Testing     NP       Subjective:   Aleyda Parra is a 80 y.o. female who presents today furred by PCP Tere Roth for an abnormal echocardiogram showing a normal EF, increased left ventricular gradient and mild mitral stenosis.    The patient has been a longtime patient of Ruddy Braun MD recently transferred care to Emil Mandujano MD.    The patient has no prior history of heart disease.  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    The patient states that over the 2 years she has \"had to slow down\" primarily due to right hip pain.  She and her  used to \"hike\" regularly.  She requires walking sticks outside and uses a walker around the house for stability and support.  She denies any shortness of breath with her usual daily activities but if she tries to go up an incline she she will feel fatigued and short of breath.  No PND, orthopnea or lower extremity edema.  No angina pectoris.  No palpitations.  No syncope.    The patient purposely avoids drinking water throughout the day because of bladder incontinence being unable to get to the bathroom in time.    The patient previously been on Benicar for hypertension but over time her blood pressure became low and her prior PCP Dr. Ruddy Braun discontinued her medications.    The patient smoked cigarettes 25 pack years, quit 27 years ago.  No history of TIA, stroke, seizures, DVT or PE.    Past Medical History:   Diagnosis Date   • Arthritis     osteo, generalized   • Bowel habit changes     constipation   • Breath shortness    • Cataract     removed bilat   • COPD (chronic obstructive pulmonary disease) (HCC)    • Diabetes (HCC)  " "   \"pre\", oral meds   • Heart burn    • Heart murmur    • Hiatus hernia syndrome    • High cholesterol    • Hypertension    • Indigestion    • Malignant neoplasm of overlapping sites of breast in female, estrogen receptor positive (HCC) 11/6/2019   • Psychiatric problem 2015    anxiety   • Snoring     no sleep study   • Urinary incontinence      Past Surgical History:   Procedure Laterality Date   • WIDE EXCISION Right 8/16/2019    Procedure: WIDE EXCISION, LESION- RE-EXCISION FOR EXCISION FOR MARGINS RIGHT CHEST WALL INSTRMUSCULAR;  Surgeon: Andre Shelton M.D.;  Location: [facility];  Service: General   • INCISION AND DRAINAGE GENERAL Bilateral 8/16/2019    Procedure: INCISION AND DRAINAGE- OF BILATERAL CHEST SEROMAS WITH DRAIN PLACEMENT;  Surgeon: Andre Shelton M.D.;  Location: [facility];  Service: General   • PB MASTECTOMY, MODIFIED RADICAL Right 7/17/2019    Procedure: MASTECTOMY, MODIFIED RADICAL;  Surgeon: Andre Shelton M.D.;  Location: [facility];  Service: General   • NODE BIOPSY SENTINEL Right 7/17/2019    Procedure: INTRA OPERATIVE SENTINEL NODE IDENTIFICATION AND BIOPSY;  Surgeon: Andre Shelton M.D.;  Location: [facility];  Service: General   • AXILLARY NODE DISSECTION  7/17/2019    Procedure: LYMPHADENECTOMY, AXILLARY;  Surgeon: Andre Shelton M.D.;  Location: [facility];  Service: General   • MASTECTOMY Left 7/17/2019    Procedure: TOTAL MASTECTOMY;  Surgeon: Andre Shelton M.D.;  Location: [facility];  Service: General   • FLAP CLOSURE Bilateral 7/17/2019    Procedure: WIDE V FLAP;  Surgeon: Andre Shelton M.D.;  Location: [facility];  Service: General   • LEFORT COLPOCLESIS  1/21/2019    Procedure: LEFORT COLPOCLESIS;  Surgeon: Minnie Bañuelos M.D.;  Location: [facility];  Service: Labor and Delivery   • BLADDER SLING FEMALE  1/21/2019    Procedure: BLADDER SLING FEMALE- TOT;  Surgeon: " Minnie Bañuelos M.D.;  Location: SUR[facility];  Service: Labor and Delivery   • KNEE ARTHROPLASTY TOTAL Right 2015    Procedure: KNEE ARTHROPLASTY TOTAL;  Surgeon: Venkatesh Cardoza M.D.;  Location: SURGERY [facility];  Service:    • ABDOMINAL HYSTERECTOMY TOTAL     • OTHER ORTHOPEDIC SURGERY      L ankle   • GYN SURGERY      abdominal hysterectomy   • CHOLECYSTECTOMY     • CATARACT EXTRACTION WITH IOL Bilateral      Family History   Problem Relation Age of Onset   • Heart Disease Neg Hx      Social History     Socioeconomic History   • Marital status:      Spouse name: Not on file   • Number of children: Not on file   • Years of education: Not on file   • Highest education level: Some college, no degree   Occupational History   • Not on file   Tobacco Use   • Smoking status: Former Smoker     Packs/day: 1.00     Years: 30.00     Pack years: 30.00     Types: Cigarettes     Quit date: 1992     Years since quittin.3   • Smokeless tobacco: Former User   Substance and Sexual Activity   • Alcohol use: Yes     Alcohol/week: 0.6 oz     Types: 1 Glasses of wine per week     Comment: occ   • Drug use: No   • Sexual activity: Not on file   Other Topics Concern   • Not on file   Social History Narrative   • Not on file     Social Determinants of Health     Financial Resource Strain:    • Difficulty of Paying Living Expenses:    Food Insecurity:    • Worried About Running Out of Food in the Last Year:    • Ran Out of Food in the Last Year:    Transportation Needs: Unknown   • Lack of Transportation (Medical): Not on file   • Lack of Transportation (Non-Medical): No   Physical Activity: Inactive   • Days of Exercise per Week: 0 days   • Minutes of Exercise per Session: 0 min   Stress: Stress Concern Present   • Feeling of Stress : To some extent   Social Connections: Unknown   • Frequency of Communication with Friends and Family: More than three times a week   • Frequency of  Social Gatherings with Friends and Family: Twice a week   • Attends [employer] Services: 1 to 4 times per year   • Active Member of Clubs or Organizations: Not on file   • Attends Club or Organization Meetings: Not on file   • Marital Status:    Intimate Partner Violence:    • Fear of Current or Ex-Partner:    • Emotionally Abused:    • Physically Abused:    • Sexually Abused:      No Known Allergies  Outpatient Encounter Medications as of 5/6/2021   Medication Sig Dispense Refill   • Mirabegron ER (MYRBETRIQ) 50 MG TABLET SR 24 HR Take 50 mg by mouth every evening. Indications: Frequent Urination, Urinary Incontinence, Urinary Urgency 90 tablet 2   • DULoxetine (CYMBALTA) 60 MG Cap DR Particles delayed-release capsule Take 60 mg by mouth every day.     • anastrozole (ARIMIDEX) 1 MG Tab Take 1 mg by mouth.     • Cholecalciferol (VITAMIN D PO) Take  by mouth.     • acetaminophen (TYLENOL) 500 MG Tab Take 500 mg by mouth every 6 hours as needed.     • lovastatin (MEVACOR) 20 MG Tab Take 20 mg by mouth every evening.     • Calcium Citrate (CITRACAL PO) Take 1 Tab by mouth every day.     • B Complex Vitamins (B COMPLEX PO) Take 1 Tab by mouth every day.     • polyethylene glycol/lytes (MIRALAX) Pack Take 17 g by mouth every day.     • Lifitegrast (XIIDRA) 5 % Solution Place 1 Drop in both eyes every bedtime.     • ibuprofen (MOTRIN) 200 MG Tab Take 200 mg by mouth every 6 hours as needed for Mild Pain.     • Melatonin 5 MG Tab Take 0.25 Tabs by mouth every bedtime.     • metFORMIN (GLUCOPHAGE) 500 MG Tab Take 500 mg by mouth every evening.     • Dexlansoprazole (DEXILANT) 60 MG CAPSULE DELAYED RELEASE delayed-release capsule Take 60 mg by mouth every evening.     • Multiple Vitamins-Minerals (MULTIVITAMIN PO) Take 1 Tab by mouth every day.     • MAGNESIUM PO Take 1 Cap by mouth every day.     • Cyanocobalamin (B-12 PO) Take 1 Tab by mouth every day.     • docusate sodium (COLACE) 100 MG Cap Take 100 mg by mouth 1  "time daily as needed for Constipation.       No facility-administered encounter medications on file as of 5/6/2021.     Review of Systems   Constitutional: Positive for malaise/fatigue. Negative for chills and fever.   HENT: Negative for congestion.    Eyes: Negative for blurred vision.   Respiratory: Positive for shortness of breath.    Cardiovascular: Negative for chest pain, palpitations, orthopnea, leg swelling and PND.   Gastrointestinal: Negative for abdominal pain.   Genitourinary: Negative for dysuria.   Musculoskeletal: Positive for joint pain. Negative for myalgias.   Skin: Negative for rash.   Neurological: Positive for weakness. Negative for dizziness and loss of consciousness.   Psychiatric/Behavioral: Positive for depression and memory loss. The patient does not have insomnia.         Objective:   BP (!) 90/58 (BP Location: Left arm, Patient Position: Sitting, BP Cuff Size: Adult)   Pulse 87   Resp 16   Ht 1.6 m (5' 3\")   Wt 70 kg (154 lb 6.4 oz)   LMP  (LMP Unknown)   SpO2 92%   BMI 27.35 kg/m²     Physical Exam   Constitutional: She is oriented to person, place, and time. No distress.   Elderly.  Frail.   Eyes: Pupils are equal, round, and reactive to light. Conjunctivae and EOM are normal.   Neck: No JVD present.   Cardiovascular: Normal rate, regular rhythm, normal heart sounds and intact distal pulses. Exam reveals no gallop and no friction rub.   No murmur heard.  Pulses:       Carotid pulses are 2+ on the right side and 2+ on the left side.  No murmur appreciated today.   Pulmonary/Chest: Effort normal. No accessory muscle usage. No respiratory distress. She has decreased breath sounds. She has no wheezes. She has no rales.   Severe kyphosis.   Abdominal: Soft. She exhibits no distension and no mass. There is no hepatosplenomegaly. There is no abdominal tenderness.   Musculoskeletal:         General: No edema.      Cervical back: Normal range of motion and neck supple.   Neurological: She " is alert and oriented to person, place, and time. Coordination abnormal.   Significant limited mobility requiring walker for ambulation.   Skin: Skin is warm, dry and intact. No rash noted. No cyanosis. Nails show no clubbing.   Psychiatric: She has a normal mood and affect. Her behavior is normal.   Vitals reviewed.    EKG 5/6/2021 sinus rhythm, rate 77.  Within normal limits.  Personally reviewed/interpreted.    ECHOCARDIOGRAM 4/22/2021  No prior study is available for comparison.   Left ventricular chamber size is small.  Hyperdynamic left ventricular systolic function.  Left ventricular ejection fraction is visually estimated to be greater   than 75%.  Increased intracavitary gradient 36 mmHg.  Mild mitral stenosis.  Unable to estimate pulmonary artery pressure due to an inadequate tricuspid regurgitant jet.    PFTs  1.  There is a proportional reduction in the forced volumes.  There is no significant obstruction.  There is a partial bronchodilator response in the mid flow rates.  In the absence of spirometric evidence of obstruction, use symptom response to determine if short-acting bronchodilator use is clinically   indicated.  2.  The proportional reduction in forced volumes is suggestive of restriction. Consider repeat testing with complete lung volume and diffusion capacity testing if clinically appropriate.  3.  Flow-volume loop is consistent with the above interpretation.  4.  No prior PFTs for comparison at the time of interpretation.    Assessment:     1. Abnormal cardiac function test     2. Hypercholesterolemia  Comp Metabolic Panel    TSH+FREE T4    CBC WITHOUT DIFFERENTIAL    EKG    DX-CHEST-2 VIEWS       Medical Decision Making:  Today's Assessment / Status / Plan:     Assessment  1.  Abnormal echocardiogram with mild mitral stenosis due to mitral annular calcification and increased left ventricular cavity gradient.  2.  H/O shortness of breath, probably multifactorial.  3.  General functional  debility multifactorial.  4.  Nocturnal hypoxemia requiring O2 therapy.  5.  Severe kyphosis.  6. ?  Restrictive lung disease by PFTs.  7.  Diabetes mellitus.  8.  Low blood pressure, chronic.  9.  Osteoarthritis, bilateral hips.  10.  S/P right TKR.  11.  Breast cancer 2019 right side.  Bilateral mastectomy.  Radiation therapy.    Recommendation Discussion  1.  The patient shortness of breath is mainly limited to our attempt at at least moderate activity and probably a combination of her general infirmity, debility, some lung disease but unlikely primary cardiac.  She has no shortness of breath with her normal daily activity nor heart failure symptoms.  Her echocardiographic findings involving the intracavitary gradient physiologic, nonpathologic due in part to an underfilled left ventricle related to volume depletion as she admittedly avoids adequate daily hydration because of urinary frequency and urgency.  Prior renal function laboratories results and low blood pressure suggests volume depletion.  Mitral stenosis is only mild and would unlikely account for any contributing symptoms.  2.  Her nocturnal oximetry is not cardiac related as she has no manifestations of CHF.  I suspect she has some restrictive lung disease at least related to her severe kyphosis and some degree of chronic (nonobstructive) lung disease related to her prior smoking history.  This could be clarified as recommended with complete PFTs.  3.  No CXR has been done and this will be ordered.  4.  No current laboratories have been ordered and this will be arranged.  5.  Instructed patient to increase daily fluid and salt intake i.e. salty soups, as this would help improve her volume status and hopefully blood pressure.

## 2021-05-06 NOTE — PROGRESS NOTES
"Chief Complaint   Patient presents with   • Abnormal Cardiac Function Testing     NP       Subjective:   Aleyda Parra is a 80 y.o. female who presents today referred by PCP Tere RICKETTS for a cardiology consultation for an abnormal echocardiogram showing normal EF 75%, increased left ventricular cavitary gradient and mild mitral stenosis.    The patient had been a longtime patient of Ruddy Braun MD recently transferred care to Emil Mandujano MD.    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    The patient states that over the past 2 years she has \"had to slow down\" primarily due to right hip pain.  She and her  used to \"hike\" regularly.  She requires the assistance of walking sticks outside and a walker around the house for stability and support.  She denies shortness of breath with her usual daily activities but if she tries to walk up an incline she gets fatigued and some shortness of breath.  No PND, orthopnea or lower extremity edema.  No angina pectoris.  No palpitations.  No syncope.    The patient purposely avoids drinking water throughout the day because of significant bladder incontinence, being unable to get to the bathroom on time.    The patient previously had been on Benicar for hypertension but over time her blood pressure became low and her prior PCP Dr. Ruddy Braun discontinued her medications.    The patient smoked cigarettes 25 pack years, quit 27 years ago.  No history of TIA, stroke, seizures, DVT or pulmonary emboli.    Past Medical History:   Diagnosis Date   • Arthritis     osteo, generalized   • Bowel habit changes     constipation   • Breath shortness    • Cataract     " "removed bilat   • COPD (chronic obstructive pulmonary disease) (HCC)    • Diabetes (HCC)     \"pre\", oral meds   • Heart burn    • Heart murmur    • Hiatus hernia syndrome    • High cholesterol    • Hypertension    • Indigestion    • Malignant neoplasm of overlapping sites of breast in female, estrogen receptor positive (HCC) 11/6/2019   • Psychiatric problem 2015    anxiety   • Snoring     no sleep study   • Urinary incontinence      Past Surgical History:   Procedure Laterality Date   • WIDE EXCISION Right 8/16/2019    Procedure: WIDE EXCISION, LESION- RE-EXCISION FOR EXCISION FOR MARGINS RIGHT CHEST WALL INSTRMUSCULAR;  Surgeon: Andre Shelton M.D.;  Location: [facility];  Service: General   • INCISION AND DRAINAGE GENERAL Bilateral 8/16/2019    Procedure: INCISION AND DRAINAGE- OF BILATERAL CHEST SEROMAS WITH DRAIN PLACEMENT;  Surgeon: Andre Shelton M.D.;  Location: [facility];  Service: General   • PB MASTECTOMY, MODIFIED RADICAL Right 7/17/2019    Procedure: MASTECTOMY, MODIFIED RADICAL;  Surgeon: Andre Shelton M.D.;  Location: [facility];  Service: General   • NODE BIOPSY SENTINEL Right 7/17/2019    Procedure: INTRA OPERATIVE SENTINEL NODE IDENTIFICATION AND BIOPSY;  Surgeon: Andre Shelton M.D.;  Location: [facility];  Service: General   • AXILLARY NODE DISSECTION  7/17/2019    Procedure: LYMPHADENECTOMY, AXILLARY;  Surgeon: Andre Shelton M.D.;  Location: [facility];  Service: General   • MASTECTOMY Left 7/17/2019    Procedure: TOTAL MASTECTOMY;  Surgeon: Andre Shelton M.D.;  Location: [facility];  Service: General   • FLAP CLOSURE Bilateral 7/17/2019    Procedure: WIDE V FLAP;  Surgeon: Andre Shelton M.D.;  Location: [facility];  Service: General   • LEFORT COLPOCLESIS  1/21/2019    Procedure: LEFORT COLPOCLESIS;  Surgeon: Minnie Bañuelos M.D.;  Location: [facility];  Service: Labor and Delivery "   • BLADDER SLING FEMALE  2019    Procedure: BLADDER SLING FEMALE- TOT;  Surgeon: Minnie Bañuelos M.D.;  Location: [facility];  Service: Labor and Delivery   • KNEE ARTHROPLASTY TOTAL Right 2015    Procedure: KNEE ARTHROPLASTY TOTAL;  Surgeon: Venkatesh Cardoza M.D.;  Location: SUR[facility];  Service:    • ABDOMINAL HYSTERECTOMY TOTAL     • OTHER ORTHOPEDIC SURGERY      L ankle   • GYN SURGERY      abdominal hysterectomy   • CHOLECYSTECTOMY     • CATARACT EXTRACTION WITH IOL Bilateral      Family History   Problem Relation Age of Onset   • Heart Disease Neg Hx      Social History     Socioeconomic History   • Marital status:      Spouse name: Not on file   • Number of children: Not on file   • Years of education: Not on file   • Highest education level: Some college, no degree   Occupational History   • Not on file   Tobacco Use   • Smoking status: Former Smoker     Packs/day: 1.00     Years: 30.00     Pack years: 30.00     Types: Cigarettes     Quit date: 1992     Years since quittin.3   • Smokeless tobacco: Former User   Substance and Sexual Activity   • Alcohol use: Yes     Alcohol/week: 0.6 oz     Types: 1 Glasses of wine per week     Comment: occ   • Drug use: No   • Sexual activity: Not on file   Other Topics Concern   • Not on file   Social History Narrative   • Not on file     Social Determinants of Health     Financial Resource Strain:    • Difficulty of Paying Living Expenses:    Food Insecurity:    • Worried About Running Out of Food in the Last Year:    • Ran Out of Food in the Last Year:    Transportation Needs: Unknown   • Lack of Transportation (Medical): Not on file   • Lack of Transportation (Non-Medical): No   Physical Activity: Inactive   • Days of Exercise per Week: 0 days   • Minutes of Exercise per Session: 0 min   Stress: Stress Concern Present   • Feeling of Stress : To some extent   Social Connections: Unknown   • Frequency  of Communication with Friends and Family: More than three times a week   • Frequency of Social Gatherings with Friends and Family: Twice a week   • Attends [employer] Services: 1 to 4 times per year   • Active Member of Clubs or Organizations: Not on file   • Attends Club or Organization Meetings: Not on file   • Marital Status:    Intimate Partner Violence:    • Fear of Current or Ex-Partner:    • Emotionally Abused:    • Physically Abused:    • Sexually Abused:      No Known Allergies  Outpatient Encounter Medications as of 5/6/2021   Medication Sig Dispense Refill   • Mirabegron ER (MYRBETRIQ) 50 MG TABLET SR 24 HR Take 50 mg by mouth every evening. Indications: Frequent Urination, Urinary Incontinence, Urinary Urgency 90 tablet 2   • DULoxetine (CYMBALTA) 60 MG Cap DR Particles delayed-release capsule Take 60 mg by mouth every day.     • anastrozole (ARIMIDEX) 1 MG Tab Take 1 mg by mouth.     • Cholecalciferol (VITAMIN D PO) Take  by mouth.     • acetaminophen (TYLENOL) 500 MG Tab Take 500 mg by mouth every 6 hours as needed.     • lovastatin (MEVACOR) 20 MG Tab Take 20 mg by mouth every evening.     • Calcium Citrate (CITRACAL PO) Take 1 Tab by mouth every day.     • B Complex Vitamins (B COMPLEX PO) Take 1 Tab by mouth every day.     • polyethylene glycol/lytes (MIRALAX) Pack Take 17 g by mouth every day.     • Lifitegrast (XIIDRA) 5 % Solution Place 1 Drop in both eyes every bedtime.     • ibuprofen (MOTRIN) 200 MG Tab Take 200 mg by mouth every 6 hours as needed for Mild Pain.     • Melatonin 5 MG Tab Take 0.25 Tabs by mouth every bedtime.     • metFORMIN (GLUCOPHAGE) 500 MG Tab Take 500 mg by mouth every evening.     • Dexlansoprazole (DEXILANT) 60 MG CAPSULE DELAYED RELEASE delayed-release capsule Take 60 mg by mouth every evening.     • Multiple Vitamins-Minerals (MULTIVITAMIN PO) Take 1 Tab by mouth every day.     • MAGNESIUM PO Take 1 Cap by mouth every day.     • Cyanocobalamin (B-12 PO) Take 1  "Tab by mouth every day.     • docusate sodium (COLACE) 100 MG Cap Take 100 mg by mouth 1 time daily as needed for Constipation.       No facility-administered encounter medications on file as of 5/6/2021.     Review of Systems   Constitutional: Positive for diaphoresis, malaise/fatigue and weight loss. Negative for chills and fever.   HENT: Positive for hearing loss. Negative for congestion.    Eyes: Negative for blurred vision.   Respiratory: Positive for shortness of breath.    Cardiovascular: Negative for chest pain, palpitations, orthopnea, leg swelling and PND.   Gastrointestinal: Negative for abdominal pain.   Genitourinary: Negative for dysuria.   Musculoskeletal: Positive for joint pain. Negative for myalgias.   Skin: Negative for rash.   Neurological: Positive for weakness. Negative for dizziness and loss of consciousness.   Psychiatric/Behavioral: Positive for depression and memory loss. The patient does not have insomnia.         Objective:   BP (!) 90/58 (BP Location: Left arm, Patient Position: Sitting, BP Cuff Size: Adult)   Pulse 87   Resp 16   Ht 1.6 m (5' 3\")   Wt 70 kg (154 lb 6.4 oz)   LMP  (LMP Unknown)   SpO2 92%   BMI 27.35 kg/m²     Physical Exam   Constitutional: She is oriented to person, place, and time. She appears well-developed and well-nourished. No distress.   Eyes: Pupils are equal, round, and reactive to light. Conjunctivae and EOM are normal.   Neck: No JVD present.   Cardiovascular: Normal rate, regular rhythm, normal heart sounds and intact distal pulses. Exam reveals no gallop and no friction rub.   No murmur heard.  Pulses:       Carotid pulses are 2+ on the right side and 2+ on the left side.       Radial pulses are 2+ on the right side and 2+ on the left side.   No appreciable murmur noted.   Pulmonary/Chest: Effort normal. No accessory muscle usage. No respiratory distress. She has decreased breath sounds. She has no wheezes. She has no rales.   Severe kyphosis. "   Abdominal: Soft. She exhibits no distension and no mass. There is no hepatosplenomegaly. There is no abdominal tenderness.   Musculoskeletal:         General: No edema.      Cervical back: Normal range of motion and neck supple.   Neurological: She is alert and oriented to person, place, and time. Coordination abnormal.   Significant limited mobility.   Skin: Skin is warm, dry and intact. No rash noted. No cyanosis. Nails show no clubbing.   Psychiatric: She has a normal mood and affect. Her behavior is normal.   Vitals reviewed.      Assessment:     1. Abnormal cardiac function test     2. Hypercholesterolemia  Comp Metabolic Panel    TSH+FREE T4    CBC WITHOUT DIFFERENTIAL    EKG    DX-CHEST-2 VIEWS       Medical Decision Making:  Today's Assessment / Status / Plan:     Assessment  1.  Abnormal echocardiogram with mild mitral stenosis, mitral annular calcification, increased left ventricular intracavitary gradient.  2.  H/O shortness of breath, probably multifactorial.  3.  General functional debility, multifactorial.  4.  Nocturnal hypoxemia requiring O2 therapy.  5.  Severe kyphosis.  6.?  Restrictive lung disease by PFTs.  7.  Diabetes mellitus.  8.  Low blood pressure, chronic.  9.  Osteoarthritis, diffuse, bilateral hips.  10.  S/P right total knee replacement.  11.  Breast cancer 2019, right side.  Bilateral mastectomy.  Radiation therapy.    Recommendation Discussion  1.  No CXR has been done and this will be ordered.  2.  No current laboratories have been done and these will be ordered.  3.  Instructed patient to increase daily fluid and salt intake i.e. salty soups, potato chips as this would improve her volume status and hopefully blood pressure and left ventricular volume.  4.  The patient does not appear to be limited by dyspnea and shortness of breath.  The patient's functional status appears to be mainly limited due to her severe osteoarthritis.  To the degree that her shortness of breath is a  factor is uncertain but there is no evidence of volume overload or heart failure.  The echocardiographic findings of intracavitary gradient he is probably more physiologic than pathologic due in part to underfilled, volume depleted status as she purposely avoids adequate daily hydration due to urinary incontinence.  Prior renal function laboratories support volume depletion as a factor.  Mitral stenosis only mild and would unlikely account for any contributing symptoms.  5.  Her nocturnal oximetry is not cardiac related as she has no heart failure symptoms.  PFTs suggests some element of restrictive lung disease.  6.  Await laboratory and CXR results.  7.  No additional diagnostic or therapeutic recommendations at this time.

## 2021-05-13 ENCOUNTER — HOSPITAL ENCOUNTER (OUTPATIENT)
Dept: RADIOLOGY | Facility: MEDICAL CENTER | Age: 81
End: 2021-05-13
Attending: INTERNAL MEDICINE
Payer: MEDICARE

## 2021-05-13 DIAGNOSIS — E78.00 HYPERCHOLESTEROLEMIA: ICD-10-CM

## 2021-05-13 PROCEDURE — 71046 X-RAY EXAM CHEST 2 VIEWS: CPT

## 2021-05-14 ENCOUNTER — HOSPITAL ENCOUNTER (OUTPATIENT)
Dept: LAB | Facility: MEDICAL CENTER | Age: 81
End: 2021-05-14
Attending: INTERNAL MEDICINE
Payer: MEDICARE

## 2021-05-14 DIAGNOSIS — E78.00 HYPERCHOLESTEROLEMIA: ICD-10-CM

## 2021-05-14 LAB
ALBUMIN SERPL BCP-MCNC: 4.3 G/DL (ref 3.2–4.9)
ALBUMIN/GLOB SERPL: 1.4 G/DL
ALP SERPL-CCNC: 114 U/L (ref 30–99)
ALT SERPL-CCNC: 16 U/L (ref 2–50)
ANION GAP SERPL CALC-SCNC: 11 MMOL/L (ref 7–16)
AST SERPL-CCNC: 23 U/L (ref 12–45)
BILIRUB SERPL-MCNC: 0.3 MG/DL (ref 0.1–1.5)
BUN SERPL-MCNC: 37 MG/DL (ref 8–22)
CALCIUM SERPL-MCNC: 9.7 MG/DL (ref 8.4–10.2)
CHLORIDE SERPL-SCNC: 103 MMOL/L (ref 96–112)
CO2 SERPL-SCNC: 27 MMOL/L (ref 20–33)
CREAT SERPL-MCNC: 0.92 MG/DL (ref 0.5–1.4)
ERYTHROCYTE [DISTWIDTH] IN BLOOD BY AUTOMATED COUNT: 46.6 FL (ref 35.9–50)
GLOBULIN SER CALC-MCNC: 3 G/DL (ref 1.9–3.5)
GLUCOSE SERPL-MCNC: 164 MG/DL (ref 65–99)
HCT VFR BLD AUTO: 46 % (ref 37–47)
HGB BLD-MCNC: 15.1 G/DL (ref 12–16)
MCH RBC QN AUTO: 32 PG (ref 27–33)
MCHC RBC AUTO-ENTMCNC: 32.8 G/DL (ref 33.6–35)
MCV RBC AUTO: 97.5 FL (ref 81.4–97.8)
PLATELET # BLD AUTO: 233 K/UL (ref 164–446)
PMV BLD AUTO: 10.1 FL (ref 9–12.9)
POTASSIUM SERPL-SCNC: 4.3 MMOL/L (ref 3.6–5.5)
PROT SERPL-MCNC: 7.3 G/DL (ref 6–8.2)
RBC # BLD AUTO: 4.72 M/UL (ref 4.2–5.4)
SODIUM SERPL-SCNC: 141 MMOL/L (ref 135–145)
T4 FREE SERPL-MCNC: 0.98 NG/DL (ref 0.93–1.7)
TSH SERPL DL<=0.005 MIU/L-ACNC: 1.27 UIU/ML (ref 0.38–5.33)
WBC # BLD AUTO: 7 K/UL (ref 4.8–10.8)

## 2021-05-14 PROCEDURE — 36415 COLL VENOUS BLD VENIPUNCTURE: CPT

## 2021-05-14 PROCEDURE — 85027 COMPLETE CBC AUTOMATED: CPT

## 2021-05-14 PROCEDURE — 80053 COMPREHEN METABOLIC PANEL: CPT

## 2021-05-14 PROCEDURE — 84443 ASSAY THYROID STIM HORMONE: CPT

## 2021-05-14 PROCEDURE — 84439 ASSAY OF FREE THYROXINE: CPT

## 2021-05-26 ENCOUNTER — OFFICE VISIT (OUTPATIENT)
Dept: SLEEP MEDICINE | Facility: MEDICAL CENTER | Age: 81
End: 2021-05-26
Payer: MEDICARE

## 2021-05-26 VITALS
HEIGHT: 63 IN | TEMPERATURE: 97.9 F | SYSTOLIC BLOOD PRESSURE: 106 MMHG | WEIGHT: 155 LBS | HEART RATE: 90 BPM | OXYGEN SATURATION: 95 % | DIASTOLIC BLOOD PRESSURE: 70 MMHG | RESPIRATION RATE: 14 BRPM | BODY MASS INDEX: 27.46 KG/M2

## 2021-05-26 DIAGNOSIS — Z85.3 HISTORY OF BREAST CANCER: ICD-10-CM

## 2021-05-26 DIAGNOSIS — G47.34 NOCTURNAL HYPOXIA: ICD-10-CM

## 2021-05-26 DIAGNOSIS — R60.9 EDEMA, UNSPECIFIED TYPE: ICD-10-CM

## 2021-05-26 DIAGNOSIS — R91.8 OTHER NONSPECIFIC ABNORMAL FINDING OF LUNG FIELD: ICD-10-CM

## 2021-05-26 DIAGNOSIS — R06.09 DOE (DYSPNEA ON EXERTION): ICD-10-CM

## 2021-05-26 DIAGNOSIS — M40.203 KYPHOSIS OF CERVICOTHORACIC REGION, UNSPECIFIED KYPHOSIS TYPE: ICD-10-CM

## 2021-05-26 DIAGNOSIS — Z87.891 FORMER SMOKER: ICD-10-CM

## 2021-05-26 PROCEDURE — 99204 OFFICE O/P NEW MOD 45 MIN: CPT | Performed by: INTERNAL MEDICINE

## 2021-05-26 ASSESSMENT — ENCOUNTER SYMPTOMS
SPEECH CHANGE: 0
EYE DISCHARGE: 0
CLAUDICATION: 0
PHOTOPHOBIA: 0
BLURRED VISION: 0
COUGH: 0
ABDOMINAL PAIN: 0
VOMITING: 0
MYALGIAS: 0
CHILLS: 0
NECK PAIN: 0
NAUSEA: 0
ORTHOPNEA: 0
DOUBLE VISION: 0
SPUTUM PRODUCTION: 0
SHORTNESS OF BREATH: 1
DIZZINESS: 0
FALLS: 0
WEAKNESS: 0
DIARRHEA: 0
HEMOPTYSIS: 0
BACK PAIN: 0
DEPRESSION: 0
HEADACHES: 0
FOCAL WEAKNESS: 0
HEARTBURN: 0
STRIDOR: 0
PND: 0
WEIGHT LOSS: 0
FEVER: 0
EYE PAIN: 0
PALPITATIONS: 0
EYE REDNESS: 0
WHEEZING: 0
SORE THROAT: 0
DIAPHORESIS: 0
TREMORS: 0
CONSTIPATION: 0
SINUS PAIN: 0

## 2021-05-26 ASSESSMENT — PAIN SCALES - GENERAL: PAINLEVEL: NO PAIN

## 2021-05-26 ASSESSMENT — FIBROSIS 4 INDEX: FIB4 SCORE: 2.02

## 2021-05-26 NOTE — PROGRESS NOTES
Chief Complaint   Patient presents with   • New Patient     Shortness of Breath       HPI: This patient is a 81 y.o. female presenting for evaluation of MONTIEL.  PMHx is significant for gastroesophageal reflux disease complicated by Ott's esophagus, breast cancer, ER positive, status post bilateral mastectomy and radiation therapy completed November 2018 currently on anastrozole, urinary incontinence, cataracts.  She is a former tobacco smoker with roughly 30-pack-year history and quit at the age of 54.  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  Father suffered from coronary artery disease.  Mother suffered from unknown type of cancer but was a non-smoker.  Patient tells me that prior to her gym closure for coronavirus precautions she was going to the gym on a regular basis and walking daily with her .  She does have fairly severe degenerative joint disease of the hip as well as kyphosis of the spine which has resulted in 2 to 3 inches of height loss.  She has been unable to exercise on a regular basis and over the past 6 months has become more dyspneic with activity.  She had an echocardiogram recently that showed hyperdynamic LV and was seen by cardiology.  It was felt that this was physiologic and not pathologic and she was advised to take in more salt and fluid however this is difficult for her due to urinary incontinence.  She denies chronic cough.  She does have some bilateral lower extremity edema which is somewhat new for her per patient.  She had spirometry done in March that showed proportionate reduction in FEV1 and FVC likely consistent with her kyphosis.  Chest x-ray from May 13 showed kyphosis but no clear evidence of pulmonary parenchymal disease.  Recent labs were unremarkable with normal hemoglobin, normal thyroid.  She had mildly elevated alkaline phosphatase.  She is on supplemental oxygen at 3 L/min based on overnight oximetry performed  "by her primary care provider.  Room air SPO2 is 95% today.  There is an overnight oximetry on 2 L as well as 3 L showing adequate oxygenation.  The one done on 2 L showed normal SPO2 for the most part although there was artifact suggesting inadequate oxygenation therefore oxygen was turned up to 3 L.    Past Medical History:   Diagnosis Date   • Arthritis     osteo, generalized   • Bowel habit changes     constipation   • Breath shortness    • Cataract     removed bilat   • COPD (chronic obstructive pulmonary disease) ([facility])    • Diabetes (HCC)     \"pre\", oral meds   • Heart burn    • Heart murmur    • Hiatus hernia syndrome    • High cholesterol    • Hypertension    • Indigestion    • Malignant neoplasm of overlapping sites of breast in female, estrogen receptor positive (HCC) 2019   • Psychiatric problem 2015    anxiety   • Shortness of breath    • Snoring     no sleep study   • Urinary incontinence    • Wears glasses        Social History     Socioeconomic History   • Marital status:      Spouse name: Not on file   • Number of children: Not on file   • Years of education: Not on file   • Highest education level: Some college, no degree   Occupational History   • Not on file   Tobacco Use   • Smoking status: Former Smoker     Packs/day: 1.00     Years: 30.00     Pack years: 30.00     Types: Cigarettes     Quit date: 1992     Years since quittin.4   • Smokeless tobacco: Former User   Vaping Use   • Vaping Use: Never used   Substance and Sexual Activity   • Alcohol use: Yes     Alcohol/week: 0.6 oz     Types: 1 Glasses of wine per week     Comment: occ   • Drug use: No   • Sexual activity: Not on file   Other Topics Concern   • Not on file   Social History Narrative   • Not on file     Social Determinants of Health     Financial Resource Strain:    • Difficulty of Paying Living Expenses:    Food Insecurity:    • Worried About Running Out of Food in the Last Year:    • Ran Out of Food in the Last " Year:    Transportation Needs: Unknown   • Lack of Transportation (Medical): Not on file   • Lack of Transportation (Non-Medical): No   Physical Activity: Inactive   • Days of Exercise per Week: 0 days   • Minutes of Exercise per Session: 0 min   Stress: Stress Concern Present   • Feeling of Stress : To some extent   Social Connections: Unknown   • Frequency of Communication with Friends and Family: More than three times a week   • Frequency of Social Gatherings with Friends and Family: Twice a week   • Attends [employer] Services: 1 to 4 times per year   • Active Member of Clubs or Organizations: Not on file   • Attends Club or Organization Meetings: Not on file   • Marital Status:    Intimate Partner Violence:    • Fear of Current or Ex-Partner:    • Emotionally Abused:    • Physically Abused:    • Sexually Abused:        Family History   Problem Relation Age of Onset   • Heart Disease Neg Hx        Current Outpatient Medications on File Prior to Visit   Medication Sig Dispense Refill   • Mirabegron ER (MYRBETRIQ) 50 MG TABLET SR 24 HR Take 50 mg by mouth every evening. Indications: Frequent Urination, Urinary Incontinence, Urinary Urgency 90 tablet 2   • DULoxetine (CYMBALTA) 60 MG Cap DR Particles delayed-release capsule Take 60 mg by mouth every day.     • Cholecalciferol (VITAMIN D PO) Take  by mouth.     • acetaminophen (TYLENOL) 500 MG Tab Take 500 mg by mouth every 6 hours as needed.     • lovastatin (MEVACOR) 20 MG Tab Take 20 mg by mouth every evening.     • Calcium Citrate (CITRACAL PO) Take 1 Tab by mouth every day.     • B Complex Vitamins (B COMPLEX PO) Take 1 Tab by mouth every day.     • polyethylene glycol/lytes (MIRALAX) Pack Take 17 g by mouth every day.     • ibuprofen (MOTRIN) 200 MG Tab Take 200 mg by mouth every 6 hours as needed for Mild Pain.     • Melatonin 5 MG Tab Take 0.25 Tabs by mouth every bedtime.     • metFORMIN (GLUCOPHAGE) 500 MG Tab Take 500 mg by mouth every evening.    "  • Multiple Vitamins-Minerals (MULTIVITAMIN PO) Take 1 Tab by mouth every day.     • MAGNESIUM PO Take 1 Cap by mouth every day.     • anastrozole (ARIMIDEX) 1 MG Tab Take 1 mg by mouth.     • Lifitegrast (XIIDRA) 5 % Solution Place 1 Drop in both eyes every bedtime.     • Dexlansoprazole (DEXILANT) 60 MG CAPSULE DELAYED RELEASE delayed-release capsule Take 60 mg by mouth every evening.       No current facility-administered medications on file prior to visit.       Allergies: Patient has no known allergies.    ROS:   Review of Systems   Constitutional: Negative for chills, diaphoresis, fever, malaise/fatigue and weight loss.   HENT: Negative for congestion, ear discharge, ear pain, hearing loss, nosebleeds, sinus pain, sore throat and tinnitus.    Eyes: Negative for blurred vision, double vision, photophobia, pain, discharge and redness.   Respiratory: Positive for shortness of breath. Negative for cough, hemoptysis, sputum production, wheezing and stridor.    Cardiovascular: Negative for chest pain, palpitations, orthopnea, claudication, leg swelling and PND.   Gastrointestinal: Negative for abdominal pain, constipation, diarrhea, heartburn, nausea and vomiting.   Genitourinary: Negative for dysuria and urgency.   Musculoskeletal: Negative for back pain, falls, joint pain, myalgias and neck pain.   Skin: Negative for itching and rash.   Neurological: Negative for dizziness, tremors, speech change, focal weakness, weakness and headaches.   Endo/Heme/Allergies: Negative for environmental allergies.   Psychiatric/Behavioral: Negative for depression.       /70 (BP Location: Right arm, Patient Position: Sitting, BP Cuff Size: Adult)   Pulse 90   Temp 36.6 °C (97.9 °F) (Temporal)   Resp 14   Ht 1.6 m (5' 3\")   Wt 70.3 kg (155 lb)   SpO2 95%     Physical Exam:  Physical Exam  Vitals reviewed.   Constitutional:       General: She is not in acute distress.     Appearance: Normal appearance. She is " well-developed.   HENT:      Head: Normocephalic and atraumatic.      Right Ear: External ear normal.      Left Ear: External ear normal.      Nose: Nose normal. No congestion.      Mouth/Throat:      Mouth: Mucous membranes are moist.      Pharynx: Oropharynx is clear. No oropharyngeal exudate.   Eyes:      General: No scleral icterus.     Extraocular Movements: Extraocular movements intact.      Conjunctiva/sclera: Conjunctivae normal.      Pupils: Pupils are equal, round, and reactive to light.   Neck:      Vascular: No JVD.      Trachea: No tracheal deviation.   Cardiovascular:      Rate and Rhythm: Normal rate and regular rhythm.      Heart sounds: Normal heart sounds. No murmur heard.   No friction rub. No gallop.    Pulmonary:      Effort: Pulmonary effort is normal. No accessory muscle usage or respiratory distress.      Breath sounds: Normal breath sounds. No wheezing or rales.   Abdominal:      General: There is no distension.      Palpations: Abdomen is soft.      Tenderness: There is no abdominal tenderness.   Musculoskeletal:         General: Deformity present. No tenderness. Normal range of motion.      Cervical back: Normal range of motion and neck supple.      Right lower leg: Edema present.      Left lower leg: Edema present.      Comments: Kyphosis of C-T spine   Lymphadenopathy:      Cervical: No cervical adenopathy.   Skin:     General: Skin is warm and dry.      Findings: No rash.      Nails: There is no clubbing.   Neurological:      Mental Status: She is alert and oriented to person, place, and time.      Cranial Nerves: No cranial nerve deficit.      Gait: Gait normal.   Psychiatric:         Mood and Affect: Mood normal.         Behavior: Behavior normal.         PFTs as reviewed by me personally: as per hPI    Imaging as reviewed by me personally: as per hPI    Assessment:  1. Nocturnal hypoxia  CT-CHEST (THORAX) W/O   2. MONTIEL (dyspnea on exertion)  PULMONARY FUNCTION TESTS -Test requested:  Complete Pulmonary Function Test    CT-CHEST (THORAX) W/O   3. Kyphosis of cervicothoracic region, unspecified kyphosis type     4. Former smoker     5. Edema, unspecified type     6. Other nonspecific abnormal finding of lung field   PULMONARY FUNCTION TESTS -Test requested: Complete Pulmonary Function Test    CT-CHEST (THORAX) W/O   7. History of breast cancer         Plan:  1.  This is a new finding although I am not sure if it is a new phenomenon.  I suspect patient probably had low oxygen for some time at night and my guess is related to her kyphosis with limited lung expansion that is made worse in the supine position during REM sleep with skeletal muscle paralysis.  I do think she probably did fine on 2 L/min but she is certainly not harmed at 3 L/min.  We will continue this and obtain full pulmonary function testing and CT chest given she is at risk for radiation-induced lung damage and has a history of tobacco use.  2.  This is new for the patient and really only started after she has decreased her activity therefore I suspect an element of deconditioning in the setting of already, mildly compromised lung expansion due to kyphosis.  No clear history of COPD but she was a smoker and we will obtain CT chest and PFTs as per above.  3.  Discussion above.  I expect we will have restriction on PFTs as was seen on spirometry.  Best treatment for this is exercise.  4.  Patient is tobacco free and not a candidate for lung cancer screening.  Encouraged ongoing abstinence.  5.  This is new and I suspect venous stasis given relative inactivity.  No evidence for RV dysfunction on echo and nocturnal hypoxia is adequately treated.  Follow-up with PCP.  6.  Duplicate diagnosis to obtain PFTs.  Please see above.  7.  On anastrozole but did receive XRT.  CT chest to ensure no radiation-induced lung damage.  Return in about 3 months (around 8/26/2021) for PFTs, CT chest.

## 2021-06-06 ENCOUNTER — HOSPITAL ENCOUNTER (OUTPATIENT)
Dept: RADIOLOGY | Facility: MEDICAL CENTER | Age: 81
End: 2021-06-06
Attending: INTERNAL MEDICINE
Payer: MEDICARE

## 2021-06-06 DIAGNOSIS — R91.8 OTHER NONSPECIFIC ABNORMAL FINDING OF LUNG FIELD: ICD-10-CM

## 2021-06-06 DIAGNOSIS — R06.09 DOE (DYSPNEA ON EXERTION): ICD-10-CM

## 2021-06-06 DIAGNOSIS — G47.34 NOCTURNAL HYPOXIA: ICD-10-CM

## 2021-06-06 PROCEDURE — 71250 CT THORAX DX C-: CPT | Mod: MG

## 2021-06-11 ENCOUNTER — OFFICE VISIT (OUTPATIENT)
Dept: MEDICAL GROUP | Facility: MEDICAL CENTER | Age: 81
End: 2021-06-11
Payer: MEDICARE

## 2021-06-11 VITALS
SYSTOLIC BLOOD PRESSURE: 100 MMHG | HEART RATE: 93 BPM | DIASTOLIC BLOOD PRESSURE: 64 MMHG | OXYGEN SATURATION: 92 % | BODY MASS INDEX: 27.11 KG/M2 | TEMPERATURE: 97.5 F | HEIGHT: 63 IN | WEIGHT: 153 LBS

## 2021-06-11 DIAGNOSIS — M25.561 CHRONIC PAIN OF BOTH KNEES: ICD-10-CM

## 2021-06-11 DIAGNOSIS — M25.551 HIP PAIN, RIGHT: ICD-10-CM

## 2021-06-11 DIAGNOSIS — M25.562 CHRONIC PAIN OF BOTH KNEES: ICD-10-CM

## 2021-06-11 DIAGNOSIS — Z13.21 ENCOUNTER FOR VITAMIN DEFICIENCY SCREENING: ICD-10-CM

## 2021-06-11 DIAGNOSIS — R73.03 PREDIABETES: ICD-10-CM

## 2021-06-11 DIAGNOSIS — G89.29 CHRONIC PAIN OF BOTH KNEES: ICD-10-CM

## 2021-06-11 DIAGNOSIS — E78.49 OTHER HYPERLIPIDEMIA: ICD-10-CM

## 2021-06-11 DIAGNOSIS — J34.89 RHINORRHEA: ICD-10-CM

## 2021-06-11 DIAGNOSIS — J43.8 OTHER EMPHYSEMA (HCC): ICD-10-CM

## 2021-06-11 PROCEDURE — 99214 OFFICE O/P EST MOD 30 MIN: CPT | Performed by: NURSE PRACTITIONER

## 2021-06-11 RX ORDER — FLUTICASONE PROPIONATE 50 MCG
1 SPRAY, SUSPENSION (ML) NASAL DAILY
Qty: 16 G | Refills: 3 | Status: ON HOLD | OUTPATIENT
Start: 2021-06-11 | End: 2021-09-30

## 2021-06-11 ASSESSMENT — FIBROSIS 4 INDEX: FIB4 SCORE: 2.02

## 2021-06-11 NOTE — ASSESSMENT & PLAN NOTE
Ongoing for years. She had a right total knee replacement by Dr. Cardoza in 2015, a year later she fell and landed on her knee and has had increased pain ever since. She now reports worsening left knee pain which she feels is likely due to her severe right hip pain which is limiting her ability to stay active and exercise and also making her walk differently.     She takes tylenol or ibuprofen as needed with minimal relief. Has never had IA injections in the past but would be interested if this will help regain her mobility. She feels her health is rapidly declining due to her decreased mobility, she is starting to feel weak.

## 2021-06-11 NOTE — ASSESSMENT & PLAN NOTE
Diagnosed via recent CT chest. Currently seeing pulmonology for this workup. PFTs ordered, she has not yet scheduled this.     Wearing oxygen at night.     CT also showed RLL opacity concerning for atelectasis vs pneumonia. Patient states that she feels well, does have a chronic dry cough. No fevers, chills, worsening malaise. She does have significant kyphosis as well.

## 2021-06-11 NOTE — ASSESSMENT & PLAN NOTE
Chronic. She has been wanting to get surgical clearance to get her hip replaced, however due to her declining pulmonary function this will not happen anytime soon.     She continues to take ibuprofen or tylenol which provide minimal relief.     She feels that she is becoming quite deconditioned and weak because of this and feels it is contributing to her declining health as she has always been very active.     She has never had IA injections or any interventional pain treatments but she is interested in this if it will help her with pain and mobility. She may not be a good surgical candidate.

## 2021-06-12 NOTE — PROGRESS NOTES
Subjective:   Rashmi Parra is a 81 y.o. female here today for 2 month follow up    Chronic pain of both knees  Ongoing for years. She had a right total knee replacement by Dr. Cardoza in 2015, a year later she fell and landed on her knee and has had increased pain ever since. She now reports worsening left knee pain which she feels is likely due to her severe right hip pain which is limiting her ability to stay active and exercise and also making her walk differently.     She takes tylenol or ibuprofen as needed with minimal relief. Has never had IA injections in the past but would be interested if this will help regain her mobility. She feels her health is rapidly declining due to her decreased mobility, she is starting to feel weak.     Hip pain, right  Chronic. She has been wanting to get surgical clearance to get her hip replaced, however due to her declining pulmonary function this will not happen anytime soon.     She continues to take ibuprofen or tylenol which provide minimal relief.     She feels that she is becoming quite deconditioned and weak because of this and feels it is contributing to her declining health as she has always been very active.     She has never had IA injections or any interventional pain treatments but she is interested in this if it will help her with pain and mobility. She may not be a good surgical candidate.     Other emphysema (HCC)  Diagnosed via recent CT chest. Currently seeing pulmonology for this workup. PFTs ordered, she has not yet scheduled this.     Wearing oxygen at night.     CT also showed RLL opacity concerning for atelectasis vs pneumonia. Patient states that she feels well, does have a chronic dry cough. No fevers, chills, worsening malaise. She does have significant kyphosis as well.        Current medicines (including changes today)  Current Outpatient Medications   Medication Sig Dispense Refill   • fluticasone (FLONASE) 50 MCG/ACT nasal spray Administer 1  Spray into affected nostril(S) every day. 16 g 3   • Mirabegron ER (MYRBETRIQ) 50 MG TABLET SR 24 HR Take 50 mg by mouth every evening. Indications: Frequent Urination, Urinary Incontinence, Urinary Urgency 90 tablet 2   • DULoxetine (CYMBALTA) 60 MG Cap DR Particles delayed-release capsule Take 60 mg by mouth every day.     • anastrozole (ARIMIDEX) 1 MG Tab Take 1 mg by mouth.     • Cholecalciferol (VITAMIN D PO) Take  by mouth.     • acetaminophen (TYLENOL) 500 MG Tab Take 500 mg by mouth every 6 hours as needed.     • lovastatin (MEVACOR) 20 MG Tab Take 20 mg by mouth every evening.     • Calcium Citrate (CITRACAL PO) Take 1 Tab by mouth every day.     • B Complex Vitamins (B COMPLEX PO) Take 1 Tab by mouth every day.     • polyethylene glycol/lytes (MIRALAX) Pack Take 17 g by mouth every day.     • Lifitegrast (XIIDRA) 5 % Solution Place 1 Drop in both eyes every bedtime.     • ibuprofen (MOTRIN) 200 MG Tab Take 200 mg by mouth every 6 hours as needed for Mild Pain.     • Melatonin 5 MG Tab Take 0.25 Tabs by mouth every bedtime.     • metFORMIN (GLUCOPHAGE) 500 MG Tab Take 500 mg by mouth every evening.     • Dexlansoprazole (DEXILANT) 60 MG CAPSULE DELAYED RELEASE delayed-release capsule Take 60 mg by mouth every evening.     • Multiple Vitamins-Minerals (MULTIVITAMIN PO) Take 1 Tab by mouth every day.     • MAGNESIUM PO Take 1 Cap by mouth every day.       No current facility-administered medications for this visit.     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    ROS   No chest pain, no  "shortness of breath, no abdominal pain  Positive ROS as per HPI.  All other systems reviewed and are negative.     Objective:     /64 (BP Location: Right arm, Patient Position: Sitting, BP Cuff Size: Adult)   Pulse 93   Temp 36.4 °C (97.5 °F) (Temporal)   Ht 1.6 m (5' 3\")   Wt 69.4 kg (153 lb)   SpO2 92%  Body mass index is 27.1 kg/m².     Physical Exam:  Constitutional: Alert, no distress.  Skin: Warm, dry, good turgor, no rashes in visible areas.  Eye: Equal, round and reactive, conjunctiva clear, lids normal.  ENMT: Mask in place  Respiratory: Unlabored respiratory effort, lungs clear to auscultation, no wheezes, no ronchi.  Cardiovascular: Normal S1, S2, no murmur, trace BLE edema.  Psych: Alert and oriented x3, normal affect and mood.  MSK: Enlargement/swelling right knee.      Assessment and Plan:   The following treatment plan was discussed    1. Other emphysema (HCC)  Unstable  Continue follow up with pulmonology  Will attempt to schedule patient's PFT  I think opacity on CT likely atelectasis and not PNA due to patient's lack of symptoms  Educated on pulmonary toilet and deep breathing/posture exercises.     2. Chronic pain of both knees  Unstable  Follow up with physiatry to discuss interventional options  - REFERRAL TO OUTPATIENT INTERVENTIONAL PAIN CLINIC    3. Hip pain, right  Unstable  Causing patient quite a bit of pain and mental strain  Likely will not be a good surgical candidate for hip replacement   Follow up with physiatry to discuss additional pain management options  - REFERRAL TO OUTPATIENT INTERVENTIONAL PAIN CLINIC    4. Rhinorrhea  Unstable  Advised flonase by pulmonology, rx provided  - fluticasone (FLONASE) 50 MCG/ACT nasal spray; Administer 1 Spray into affected nostril(S) every day.  Dispense: 16 g; Refill: 3    5. Prediabetes  - HEMOGLOBIN A1C; Future    6. Other hyperlipidemia  - Lipid Profile; Future    7. Encounter for vitamin deficiency screening  - VITAMIN D,25 " HYDROXY; Future      Followup: Return in about 2 months (around 8/11/2021).    I have placed the below orders and discussed them with an approved delegating provider. The MA is performing the below orders under the direction of Dr. Mandujano

## 2021-06-16 ENCOUNTER — NON-PROVIDER VISIT (OUTPATIENT)
Dept: SLEEP MEDICINE | Facility: MEDICAL CENTER | Age: 81
End: 2021-06-16
Payer: MEDICARE

## 2021-06-16 VITALS — HEIGHT: 63 IN | BODY MASS INDEX: 26.93 KG/M2 | WEIGHT: 152 LBS

## 2021-06-16 DIAGNOSIS — R91.8 OTHER NONSPECIFIC ABNORMAL FINDING OF LUNG FIELD: ICD-10-CM

## 2021-06-16 DIAGNOSIS — R06.09 DOE (DYSPNEA ON EXERTION): ICD-10-CM

## 2021-06-16 PROCEDURE — 94729 DIFFUSING CAPACITY: CPT | Performed by: INTERNAL MEDICINE

## 2021-06-16 PROCEDURE — 94010 BREATHING CAPACITY TEST: CPT | Performed by: INTERNAL MEDICINE

## 2021-06-16 PROCEDURE — 94726 PLETHYSMOGRAPHY LUNG VOLUMES: CPT | Performed by: INTERNAL MEDICINE

## 2021-06-16 ASSESSMENT — PULMONARY FUNCTION TESTS
FVC: 1.96
FEV1_LLN: 1.52
FEV1/FVC_PREDICTED: 77
FEV1/FVC: 74
FVC_PREDICTED: 2.4
FVC_PERCENT_PREDICTED: 81
FEV1/FVC_PERCENT_PREDICTED: 99
FEV1/FVC_PERCENT_PREDICTED: 76
FEV1_PREDICTED: 1.82
FEV1/FVC: 75
FEV1: 1.46
FEV1/FVC_PERCENT_PREDICTED: 96
FEV1/FVC_PERCENT_LLN: 64
FVC_LLN: 2.00
FEV1_PERCENT_PREDICTED: 80

## 2021-06-16 ASSESSMENT — FIBROSIS 4 INDEX: FIB4 SCORE: 2.02

## 2021-06-16 NOTE — PROCEDURES
Tech: Omaira Iraheta, RRT, CPFT  Tech notes: Good patient effort & cooperation.  Except DLCO IVC less than 90% of VC, the results of this test meet the ATS/ERS standards for acceptability & reproducibility.  Test was performed on the [employer] Body Plethysmograph-Elite DX system.  Predicted values were GLI-2012 for spirometry, GLI- 2017 for DLCO, ITS for Lung Volumes.  The DLCO was uncorrected for Hgb.    Interpretation:  Baseline spirometry shows low normal FVC at 1.96 L or 81% predicted and low normal FEV1 at 1.46 L or 80% predicted.  FEV1/FVC ratio is normal at 75.  Total lung capacity is also low normal at 3.93 L or 80% predicted.  Diffusion capacity is mildly reduced at 74% predicted.  Overall pulmonary function testing shows mild restrictive defect with mildly reduced DLCO.  This could be suggestive of early interstitial process.  Correlate clinically and with imaging.

## 2021-06-23 ENCOUNTER — TELEPHONE (OUTPATIENT)
Dept: MEDICAL GROUP | Facility: MEDICAL CENTER | Age: 81
End: 2021-06-23

## 2021-06-23 NOTE — TELEPHONE ENCOUNTER
Received request via: Pharmacy    Was the patient seen in the last year in this department? Yes    Does the patient have an active prescription (recently filled or refills available) for medication(s) requested? No       SIG from pharmacy: Take 1 Tablet by mouth twice daily. However, pt states that in the last year and a half she has been taking 1 per day per her previous doctor. She would like to know if you would like her to stay at 1 or increase to 2 per day.

## 2021-06-23 NOTE — TELEPHONE ENCOUNTER
She should complete these labs prior to our next visit so that we can review them.     STEVEN Zaman.

## 2021-06-23 NOTE — TELEPHONE ENCOUNTER
VOICEMAIL  1. Caller Name: Rashmi Neelam Olson  Call Back Number: [phone] (home)     2. Message: Pt received labs that were ordered on 06/11/21 in the mail. She is wondering when she needs to complete them? Pt has appt on 07/09/21. Please advise.

## 2021-06-24 ENCOUNTER — TELEPHONE (OUTPATIENT)
Dept: MEDICAL GROUP | Facility: MEDICAL CENTER | Age: 81
End: 2021-06-24

## 2021-06-24 NOTE — TELEPHONE ENCOUNTER
VOICEMAIL  1. Caller Name: [facility]  Call Back Number: [phone]    2. Message: [facility] pharmacy left message that they received an rx for Metformin 500 MG today. However, yesterday the pt picked up an old rx that was for Metformin  MG. Pharmacy requesting clarification if pt should be on extended release or regular Metformin. Please advise.

## 2021-07-01 ENCOUNTER — HOSPITAL ENCOUNTER (OUTPATIENT)
Dept: LAB | Facility: MEDICAL CENTER | Age: 81
End: 2021-07-01
Attending: NURSE PRACTITIONER
Payer: MEDICARE

## 2021-07-01 DIAGNOSIS — E78.49 OTHER HYPERLIPIDEMIA: ICD-10-CM

## 2021-07-01 DIAGNOSIS — R73.03 PREDIABETES: ICD-10-CM

## 2021-07-01 DIAGNOSIS — Z13.21 ENCOUNTER FOR VITAMIN DEFICIENCY SCREENING: ICD-10-CM

## 2021-07-01 LAB
25(OH)D3 SERPL-MCNC: 83 NG/ML (ref 30–100)
CHOLEST SERPL-MCNC: 164 MG/DL (ref 100–199)
EST. AVERAGE GLUCOSE BLD GHB EST-MCNC: 148 MG/DL
FASTING STATUS PATIENT QL REPORTED: NORMAL
HBA1C MFR BLD: 6.8 % (ref 4–5.6)
HDLC SERPL-MCNC: 66 MG/DL
LDLC SERPL CALC-MCNC: 79 MG/DL
TRIGL SERPL-MCNC: 94 MG/DL (ref 0–149)

## 2021-07-01 PROCEDURE — 82306 VITAMIN D 25 HYDROXY: CPT | Mod: GA

## 2021-07-01 PROCEDURE — 36415 COLL VENOUS BLD VENIPUNCTURE: CPT | Mod: GA

## 2021-07-01 PROCEDURE — 80061 LIPID PANEL: CPT

## 2021-07-01 PROCEDURE — 83036 HEMOGLOBIN GLYCOSYLATED A1C: CPT | Mod: GA

## 2021-07-08 ENCOUNTER — OFFICE VISIT (OUTPATIENT)
Dept: CARDIOLOGY | Facility: MEDICAL CENTER | Age: 81
End: 2021-07-08
Payer: MEDICARE

## 2021-07-08 VITALS
BODY MASS INDEX: 26.75 KG/M2 | HEART RATE: 86 BPM | HEIGHT: 63 IN | RESPIRATION RATE: 12 BRPM | DIASTOLIC BLOOD PRESSURE: 60 MMHG | OXYGEN SATURATION: 92 % | WEIGHT: 151 LBS | SYSTOLIC BLOOD PRESSURE: 112 MMHG

## 2021-07-08 DIAGNOSIS — I05.0 MILD MITRAL STENOSIS: ICD-10-CM

## 2021-07-08 DIAGNOSIS — R06.02 SHORTNESS OF BREATH: ICD-10-CM

## 2021-07-08 PROCEDURE — 99214 OFFICE O/P EST MOD 30 MIN: CPT | Performed by: INTERNAL MEDICINE

## 2021-07-08 RX ORDER — GABAPENTIN 100 MG/1
100 CAPSULE ORAL
COMMUNITY
Start: 2021-06-22 | End: 2023-01-16 | Stop reason: SDUPTHER

## 2021-07-08 RX ORDER — METFORMIN HYDROCHLORIDE 500 MG/1
500 TABLET, EXTENDED RELEASE ORAL
COMMUNITY
Start: 2021-06-23 | End: 2021-07-10

## 2021-07-08 ASSESSMENT — ENCOUNTER SYMPTOMS
DIAPHORESIS: 1
WEAKNESS: 1
FEVER: 0
SHORTNESS OF BREATH: 1
MEMORY LOSS: 1
BLURRED VISION: 0
INSOMNIA: 0
CHILLS: 0
DIZZINESS: 0
ABDOMINAL PAIN: 0
MYALGIAS: 0
LOSS OF CONSCIOUSNESS: 0
PALPITATIONS: 0
PND: 0
DEPRESSION: 1
ORTHOPNEA: 0
WEIGHT LOSS: 1

## 2021-07-08 ASSESSMENT — FIBROSIS 4 INDEX: FIB4 SCORE: 2.02

## 2021-07-08 NOTE — PROGRESS NOTES
"Chief Complaint   Patient presents with   • Shortness of Breath   • Abnormal Cardiac Function Testing       Subjective:   Aleyda Parra is a 81 y.o. female who presents today for follow-up evaluation of shortness of breath, mild mitral regurgitation and mild left ventricular outflow gradient.    Since 5/6/2021 she has no symptoms.  No chest discomfort.  No increase in shortness of breath.  Fatigues easily.  Naps during the day.  Work-up since last appointment remarkable for elevated BUN.  Remainder of the lab including TFTs normal.  CXR negative for edema.  Tries to maintain adequate daily hydration.    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    The patient smoked cigarettes 25 pack years, quit 27 years ago.  No history of TIA, stroke, seizures, DVT or pulmonary emboli.    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, estrogen receptor positive (HCC) 11/6/2019   • Psychiatric problem 2015    anxiety   • Shortness of breath    • Snoring     no sleep study   • Urinary incontinence    • Wears glasses      Past Surgical History:   Procedure Laterality Date   • WIDE EXCISION Right 8/16/2019    Procedure: WIDE EXCISION, LESION- RE-EXCISION FOR EXCISION FOR MARGINS RIGHT CHEST WALL INSTRMUSCULAR;  Surgeon: Andre PENALOZA" SOHEILA Shelton;  Location: [facility];  Service: General   • INCISION AND DRAINAGE GENERAL Bilateral 8/16/2019    Procedure: INCISION AND DRAINAGE- OF BILATERAL CHEST SEROMAS WITH DRAIN PLACEMENT;  Surgeon: Andre Shelton M.D.;  Location: [facility];  Service: General   • PB MASTECTOMY, MODIFIED RADICAL Right 7/17/2019    Procedure: MASTECTOMY, MODIFIED RADICAL;  Surgeon: Andre Shelton M.D.;  Location: [facility];  Service: General   • NODE BIOPSY SENTINEL Right 7/17/2019    Procedure: INTRA OPERATIVE SENTINEL NODE IDENTIFICATION AND BIOPSY;  Surgeon: Andre Shelton M.D.;  Location: [facility];  Service: General   • AXILLARY NODE DISSECTION  7/17/2019    Procedure: LYMPHADENECTOMY, AXILLARY;  Surgeon: Andre Shelton M.D.;  Location: [facility];  Service: General   • MASTECTOMY Left 7/17/2019    Procedure: TOTAL MASTECTOMY;  Surgeon: Andre Shelton M.D.;  Location: [facility];  Service: General   • FLAP CLOSURE Bilateral 7/17/2019    Procedure: WIDE V FLAP;  Surgeon: Andre Shelton M.D.;  Location: [facility];  Service: General   • LEFORT COLPOCLESIS  1/21/2019    Procedure: LEFORT COLPOCLESIS;  Surgeon: Minnie Bañuelos M.D.;  Location: [facility];  Service: Labor and Delivery   • BLADDER SLING FEMALE  1/21/2019    Procedure: BLADDER SLING FEMALE- TOT;  Surgeon: Minnie Bañuelos M.D.;  Location: [facility];  Service: Labor and Delivery   • KNEE ARTHROPLASTY TOTAL Right 11/2/2015    Procedure: KNEE ARTHROPLASTY TOTAL;  Surgeon: Venkatesh Cardoza M.D.;  Location: [facility];  Service:    • ABDOMINAL HYSTERECTOMY TOTAL  1994   • OTHER ORTHOPEDIC SURGERY  1993    L ankle   • GYN SURGERY  1992    abdominal hysterectomy   • CHOLECYSTECTOMY  1964   • CATARACT EXTRACTION WITH IOL Bilateral      Family History   Problem Relation Age of Onset   • Heart Disease Neg Hx      Social History      Socioeconomic History   • Marital status:      Spouse name: Not on file   • Number of children: Not on file   • Years of education: Not on file   • Highest education level: Some college, no degree   Occupational History   • Not on file   Tobacco Use   • Smoking status: Former Smoker     Packs/day: 1.00     Years: 30.00     Pack years: 30.00     Types: Cigarettes     Quit date: 1992     Years since quittin.5   • Smokeless tobacco: Former User   Vaping Use   • Vaping Use: Never used   Substance and Sexual Activity   • Alcohol use: Yes     Alcohol/week: 0.6 oz     Types: 1 Glasses of wine per week     Comment: occ   • Drug use: No   • Sexual activity: Not on file   Other Topics Concern   • Not on file   Social History Narrative   • Not on file     Social Determinants of Health     Financial Resource Strain:    • Difficulty of Paying Living Expenses:    Food Insecurity:    • Worried About Running Out of Food in the Last Year:    • Ran Out of Food in the Last Year:    Transportation Needs: Unknown   • Lack of Transportation (Medical): Not on file   • Lack of Transportation (Non-Medical): No   Physical Activity: Inactive   • Days of Exercise per Week: 0 days   • Minutes of Exercise per Session: 0 min   Stress: Stress Concern Present   • Feeling of Stress : To some extent   Social Connections: Unknown   • Frequency of Communication with Friends and Family: More than three times a week   • Frequency of Social Gatherings with Friends and Family: Twice a week   • Attends Congregational Services: 1 to 4 times per year   • Active Member of Clubs or Organizations: Not on file   • Attends Club or Organization Meetings: Not on file   • Marital Status:    Intimate Partner Violence:    • Fear of Current or Ex-Partner:    • Emotionally Abused:    • Physically Abused:    • Sexually Abused:      No Known Allergies  Outpatient Encounter Medications as of 2021   Medication Sig Dispense Refill   • gabapentin  (NEURONTIN) 100 MG Cap Take 100 mg by mouth.     • metFORMIN (GLUCOPHAGE) 500 MG Tab Take 1 tablet by mouth every day. 90 tablet 1   • fluticasone (FLONASE) 50 MCG/ACT nasal spray Administer 1 Spray into affected nostril(S) every day. 16 g 3   • Mirabegron ER (MYRBETRIQ) 50 MG TABLET SR 24 HR Take 50 mg by mouth every evening. Indications: Frequent Urination, Urinary Incontinence, Urinary Urgency 90 tablet 2   • DULoxetine (CYMBALTA) 60 MG Cap DR Particles delayed-release capsule Take 60 mg by mouth every day.     • anastrozole (ARIMIDEX) 1 MG Tab Take 1 mg by mouth.     • Cholecalciferol (VITAMIN D PO) Take  by mouth.     • acetaminophen (TYLENOL) 500 MG Tab Take 500 mg by mouth every 6 hours as needed.     • lovastatin (MEVACOR) 20 MG Tab Take 20 mg by mouth every evening.     • Calcium Citrate (CITRACAL PO) Take 1 Tab by mouth every day.     • B Complex Vitamins (B COMPLEX PO) Take 1 Tab by mouth every day.     • polyethylene glycol/lytes (MIRALAX) Pack Take 17 g by mouth every day.     • Lifitegrast (XIIDRA) 5 % Solution Place 1 Drop in both eyes every bedtime.     • ibuprofen (MOTRIN) 200 MG Tab Take 200 mg by mouth every 6 hours as needed for Mild Pain.     • Melatonin 5 MG Tab Take 0.25 Tabs by mouth every bedtime.     • Dexlansoprazole (DEXILANT) 60 MG CAPSULE DELAYED RELEASE delayed-release capsule Take 60 mg by mouth every evening.     • Multiple Vitamins-Minerals (MULTIVITAMIN PO) Take 1 Tab by mouth every day.     • MAGNESIUM PO Take 1 capsule by mouth as needed.     • metFORMIN ER (GLUCOPHAGE XR) 500 MG TABLET SR 24 HR Take 500 mg by mouth. (Patient not taking: Reported on 7/8/2021)       No facility-administered encounter medications on file as of 7/8/2021.     Review of Systems   Constitutional: Positive for diaphoresis, malaise/fatigue and weight loss. Negative for chills and fever.   HENT: Positive for hearing loss. Negative for congestion.    Eyes: Negative for blurred vision.   Respiratory:  "Positive for shortness of breath.    Cardiovascular: Negative for chest pain, palpitations, orthopnea, leg swelling and PND.   Gastrointestinal: Negative for abdominal pain.   Genitourinary: Negative for dysuria.   Musculoskeletal: Positive for joint pain. Negative for myalgias.   Skin: Negative for rash.   Neurological: Positive for weakness. Negative for dizziness and loss of consciousness.   Psychiatric/Behavioral: Positive for depression and memory loss. The patient does not have insomnia.         Objective:   /60 (BP Location: Left arm, Patient Position: Sitting, BP Cuff Size: Adult)   Pulse 86   Resp 12   Ht 1.6 m (5' 3\")   Wt 68.5 kg (151 lb)   LMP  (LMP Unknown)   SpO2 92%   BMI 26.75 kg/m²     Physical Exam   Constitutional: She is oriented to person, place, and time. She appears well-developed. No distress.   Eyes: Pupils are equal, round, and reactive to light. Conjunctivae are normal.   Neck: No JVD present.   Cardiovascular: Normal rate, regular rhythm and normal heart sounds. Exam reveals no gallop and no friction rub.   No murmur heard.  Pulses:       Carotid pulses are 2+ on the right side and 2+ on the left side.       Radial pulses are 2+ on the right side and 2+ on the left side.   No appreciable murmur noted.   Pulmonary/Chest: Effort normal. No accessory muscle usage. No respiratory distress. She has decreased breath sounds. She has no wheezes. She has no rales.   Abdominal: Soft. She exhibits no distension and no mass. There is no abdominal tenderness.   Musculoskeletal:      Cervical back: Normal range of motion and neck supple.   Neurological: She is alert and oriented to person, place, and time. Coordination abnormal.   Significant limited mobility.   Skin: Skin is warm and dry. No rash noted. Nails show no clubbing.   Psychiatric: Her behavior is normal.   Vitals reviewed.    ECHOCARDIOGRAM 4/22/2021  No prior study is available for comparison.   Hyperdynamic left ventricular " systolic function.  Left ventricular ejection fraction is visually estimated to be greater   than 75%.  Unable to estimate pulmonary artery pressure due to an inadequate   tricuspid regurgitant jet.    PFTs 2021  Baseline spirometry shows low normal FVC at 1.96 L or 81% predicted and low normal FEV1 at 1.46 L or 80% predicted.  FEV1/FVC ratio is normal at 75.  Total lung capacity is also low normal at 3.93 L or 80% predicted.  Diffusion capacity is mildly reduced at 74% predicted.  Overall pulmonary function testing shows mild restrictive defect with mildly reduced DLCO.  This could be suggestive of early interstitial process.  Correlate clinically and with imaging.    EK2021 sinus rhythm, rate 77.  Personally reviewed.    Assessment:     1. Shortness of breath     2. Mild mitral stenosis         Medical Decision Making:  Today's Assessment / Status / Plan:     Assessment  1.  Abnormal echocardiogram with mild mitral stenosis, mitral annular calcification, increased left ventricular intracavitary gradient.  2.  H/O shortness of breath, probably multifactorial.  3.  General functional debility, multifactorial.  4.  Nocturnal hypoxemia requiring O2 therapy.  5.  Severe kyphosis.  6.?  Restrictive lung disease by PFTs.  7.  Diabetes mellitus.  8.  Low blood pressure, chronic.  9.  Osteoarthritis, diffuse, bilateral hips.  10.  S/P right total knee replacement.  11.  Breast cancer 2019, right side.  Bilateral mastectomy.  Radiation therapy.  12.  General frailty.    Recommendation Discussion  1.  I cannot explain the patient's shortness of breath problems on any specific cardiac etiology.  Mitral stenosis is only mild.  I suspect the patient is significantly dehydrated based on history and elevated BUN.  Encouraged the patient to maintain good daily hydration with electrolyte supplements.  2.  No additional cardiac recommendations.  3.  RTC 8 months with her .

## 2021-07-09 ENCOUNTER — OFFICE VISIT (OUTPATIENT)
Dept: MEDICAL GROUP | Facility: MEDICAL CENTER | Age: 81
End: 2021-07-09
Payer: MEDICARE

## 2021-07-09 VITALS
BODY MASS INDEX: 26.75 KG/M2 | DIASTOLIC BLOOD PRESSURE: 78 MMHG | OXYGEN SATURATION: 91 % | SYSTOLIC BLOOD PRESSURE: 122 MMHG | HEIGHT: 63 IN | WEIGHT: 151 LBS | HEART RATE: 97 BPM | TEMPERATURE: 97 F

## 2021-07-09 DIAGNOSIS — F33.1 MODERATE EPISODE OF RECURRENT MAJOR DEPRESSIVE DISORDER (HCC): ICD-10-CM

## 2021-07-09 DIAGNOSIS — J43.8 OTHER EMPHYSEMA (HCC): ICD-10-CM

## 2021-07-09 PROCEDURE — 99214 OFFICE O/P EST MOD 30 MIN: CPT | Performed by: NURSE PRACTITIONER

## 2021-07-09 RX ORDER — DULOXETIN HYDROCHLORIDE 30 MG/1
30 CAPSULE, DELAYED RELEASE ORAL DAILY
Qty: 90 CAPSULE | Refills: 3 | Status: ON HOLD | OUTPATIENT
Start: 2021-07-09 | End: 2022-06-15

## 2021-07-09 ASSESSMENT — FIBROSIS 4 INDEX: FIB4 SCORE: 2.02

## 2021-07-09 NOTE — ASSESSMENT & PLAN NOTE
Diagnosed via recent CT chest. Currently seeing pulmonology for this workup. Recent PFTs show mild restrictive disease as well.     Wearing oxygen at night. Having shortness of breath and malaise with significant exertion, not check oxygen saturation at home, doesn't have oximeter but will purchase one.     CT also showed RLL opacity concerning for atelectasis vs pneumonia. Patient states that she feels well, does have a chronic dry cough. No fevers, chills, worsening malaise. She does have significant kyphosis as well.

## 2021-07-10 PROBLEM — I05.0 MILD MITRAL STENOSIS: Status: ACTIVE | Noted: 2021-07-10

## 2021-07-12 PROBLEM — F33.1 MODERATE EPISODE OF RECURRENT MAJOR DEPRESSIVE DISORDER (HCC): Status: ACTIVE | Noted: 2021-07-12

## 2021-07-12 NOTE — PROGRESS NOTES
Subjective:   Rashmi Parra is a 81 y.o. female here today for follow up:    Other emphysema (HCC)  Diagnosed via recent CT chest. Currently seeing pulmonology for this workup. Recent PFTs show mild restrictive disease as well.     Wearing oxygen at night. Having shortness of breath and malaise with significant exertion, not check oxygen saturation at home, doesn't have oximeter but will purchase one.     CT also showed RLL opacity concerning for atelectasis vs pneumonia. Patient states that she feels well, does have a chronic dry cough. No fevers, chills, worsening malaise. She does have significant kyphosis as well.     Moderate episode of recurrent major depressive disorder (HCC)  Chronic. Has been taking duloxetine 60 mg for several years. Feel that her depression has been worsening due to worsening health and life stress as well as worsening hip pain and delay in being able to get hip replacement.     She denies SI/HI. Not seeing therapist or psychiatry.        Current medicines (including changes today)  Current Outpatient Medications   Medication Sig Dispense Refill   • DULoxetine (CYMBALTA) 30 MG Cap DR Particles Take 1 capsule by mouth every day. TOTAL 90 MG DAILY 90 capsule 3   • gabapentin (NEURONTIN) 100 MG Cap Take 100 mg by mouth.     • metFORMIN (GLUCOPHAGE) 500 MG Tab Take 1 tablet by mouth every day. 90 tablet 1   • fluticasone (FLONASE) 50 MCG/ACT nasal spray Administer 1 Spray into affected nostril(S) every day. 16 g 3   • Mirabegron ER (MYRBETRIQ) 50 MG TABLET SR 24 HR Take 50 mg by mouth every evening. Indications: Frequent Urination, Urinary Incontinence, Urinary Urgency 90 tablet 2   • DULoxetine (CYMBALTA) 60 MG Cap DR Particles delayed-release capsule Take 60 mg by mouth every day.     • anastrozole (ARIMIDEX) 1 MG Tab Take 1 mg by mouth.     • Cholecalciferol (VITAMIN D PO) Take  by mouth.     • acetaminophen (TYLENOL) 500 MG Tab Take 500 mg by mouth every 6 hours as needed.     •  "lovastatin (MEVACOR) 20 MG Tab Take 20 mg by mouth every evening.     • Calcium Citrate (CITRACAL PO) Take 1 Tab by mouth every day.     • B Complex Vitamins (B COMPLEX PO) Take 1 Tab by mouth every day.     • polyethylene glycol/lytes (MIRALAX) Pack Take 17 g by mouth every day.     • Lifitegrast (XIIDRA) 5 % Solution Place 1 Drop in both eyes every bedtime.     • ibuprofen (MOTRIN) 200 MG Tab Take 200 mg by mouth every 6 hours as needed for Mild Pain.     • Melatonin 5 MG Tab Take 0.25 Tabs by mouth every bedtime.     • Dexlansoprazole (DEXILANT) 60 MG CAPSULE DELAYED RELEASE delayed-release capsule Take 60 mg by mouth every evening.     • Multiple Vitamins-Minerals (MULTIVITAMIN PO) Take 1 Tab by mouth every day.     • MAGNESIUM PO Take 1 capsule by mouth as needed.       No current facility-administered medications for this visit.     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    ROS   No chest pain, no shortness of breath, no abdominal pain  Positive ROS as per HPI.  All other systems reviewed and are negative.     Objective:     /78 (BP Location: Left arm, Patient Position: Sitting, BP Cuff Size: Adult)   Pulse 97   Temp 36.1 °C (97 °F) (Temporal)   Ht 1.6 m (5' 3\")   Wt 68.5 kg (151 lb)   SpO2 91%  Body mass index is 26.75 kg/m².     Physical Exam:  Constitutional: Alert, no distress.  Skin: Warm, dry, good turgor, no rashes in visible areas.  Eye: Equal, round and reactive, conjunctiva clear, lids normal.  ENMT: Lips without lesions, good dentition, oropharynx clear.  Neck: Trachea " midline, no masses, no thyromegaly. No cervical or supraclavicular lymphadenopathy  Respiratory: Unlabored respiratory effort, lungs clear to auscultation, no wheezes, no ronchi.  Cardiovascular: Normal S1, S2, no murmur, no edema.  Abdomen: Soft, non-tender, no masses, no hepatosplenomegaly.  Psych: Alert and oriented x3, normal affect and mood.        Assessment and Plan:   The following treatment plan was discussed    1. Moderate episode of recurrent major depressive disorder (HCC)  Unstable  Increase duloxetine to 90 mg daily total, additional 30 mg capsules sent to pharmacy to take with 60 mg capsules  - DULoxetine (CYMBALTA) 30 MG Cap DR Particles; Take 1 capsule by mouth every day. TOTAL 90 MG DAILY  Dispense: 90 capsule; Refill: 3    2. Other emphysema (HCC)  Unstable  Continue follow up with pulmonology  Continue wearing oxygen at night, 2L  In office walk test on room air revealed oxygen saturations dropping to 87 % after 5 minutes of walking. Returned to 91% at rest on room air.   Ordered daytime oxygen portable concentrator for as needed use with exertion.       Followup: Return in about 4 weeks (around 8/6/2021) for Depression/Anxiety.    I have placed the below orders and discussed them with an approved delegating provider. The MA is performing the below orders under the direction of Dr. Mandujano

## 2021-07-12 NOTE — ASSESSMENT & PLAN NOTE
Chronic. Has been taking duloxetine 60 mg for several years. Feel that her depression has been worsening due to worsening health and life stress as well as worsening hip pain and delay in being able to get hip replacement.     She denies SI/HI. Not seeing therapist or psychiatry.

## 2021-08-09 ENCOUNTER — APPOINTMENT (OUTPATIENT)
Dept: MEDICAL GROUP | Facility: MEDICAL CENTER | Age: 81
End: 2021-08-09
Payer: MEDICARE

## 2021-08-12 ENCOUNTER — OFFICE VISIT (OUTPATIENT)
Dept: MEDICAL GROUP | Facility: MEDICAL CENTER | Age: 81
End: 2021-08-12
Payer: MEDICARE

## 2021-08-12 VITALS
WEIGHT: 147 LBS | DIASTOLIC BLOOD PRESSURE: 66 MMHG | HEART RATE: 96 BPM | SYSTOLIC BLOOD PRESSURE: 114 MMHG | HEIGHT: 63 IN | OXYGEN SATURATION: 91 % | TEMPERATURE: 98.1 F | BODY MASS INDEX: 26.05 KG/M2

## 2021-08-12 DIAGNOSIS — M25.551 HIP PAIN, RIGHT: ICD-10-CM

## 2021-08-12 DIAGNOSIS — R53.81 PHYSICAL DECONDITIONING: ICD-10-CM

## 2021-08-12 PROCEDURE — 99213 OFFICE O/P EST LOW 20 MIN: CPT | Performed by: NURSE PRACTITIONER

## 2021-08-12 ASSESSMENT — FIBROSIS 4 INDEX: FIB4 SCORE: 2.02

## 2021-08-12 NOTE — ASSESSMENT & PLAN NOTE
Chronic. She has been wanting to get surgical clearance to get her hip replaced, however needed cardiac and pulmonary clearance which she now has.     She continues to take ibuprofen or tylenol which provide minimal relief.     She feels that she is becoming quite deconditioned and weak because of this and feels it is contributing to her declining health as she has always been very active.     She has never had IA injections or any interventional pain treatments but she is interested in this if it will help her with pain and mobility. She may not be a good surgical candidate.

## 2021-08-12 NOTE — PROGRESS NOTES
Subjective:   Rashmi Parra is a 81 y.o. female here today for hip pain    Hip pain, right  Chronic. She has been wanting to get surgical clearance to get her hip replaced, however needed cardiac and pulmonary clearance which she now has.     She continues to take ibuprofen or tylenol which provide minimal relief.     She feels that she is becoming quite deconditioned and weak because of this and feels it is contributing to her declining health as she has always been very active.     She has never had IA injections or any interventional pain treatments but she is interested in this if it will help her with pain and mobility. She may not be a good surgical candidate.        Current medicines (including changes today)  Current Outpatient Medications   Medication Sig Dispense Refill   • DULoxetine (CYMBALTA) 30 MG Cap DR Particles Take 1 capsule by mouth every day. TOTAL 90 MG DAILY 90 capsule 3   • gabapentin (NEURONTIN) 100 MG Cap Take 100 mg by mouth.     • metFORMIN (GLUCOPHAGE) 500 MG Tab Take 1 tablet by mouth every day. 90 tablet 1   • fluticasone (FLONASE) 50 MCG/ACT nasal spray Administer 1 Spray into affected nostril(S) every day. 16 g 3   • Mirabegron ER (MYRBETRIQ) 50 MG TABLET SR 24 HR Take 50 mg by mouth every evening. Indications: Frequent Urination, Urinary Incontinence, Urinary Urgency 90 tablet 2   • DULoxetine (CYMBALTA) 60 MG Cap DR Particles delayed-release capsule Take 60 mg by mouth every day.     • anastrozole (ARIMIDEX) 1 MG Tab Take 1 mg by mouth.     • Cholecalciferol (VITAMIN D PO) Take  by mouth.     • acetaminophen (TYLENOL) 500 MG Tab Take 500 mg by mouth every 6 hours as needed.     • lovastatin (MEVACOR) 20 MG Tab Take 20 mg by mouth every evening.     • Calcium Citrate (CITRACAL PO) Take 1 Tab by mouth every day.     • B Complex Vitamins (B COMPLEX PO) Take 1 Tab by mouth every day.     • polyethylene glycol/lytes (MIRALAX) Pack Take 17 g by mouth every day.     • Lifitegrast  "(XIIDRA) 5 % Solution Place 1 Drop in both eyes every bedtime.     • ibuprofen (MOTRIN) 200 MG Tab Take 200 mg by mouth every 6 hours as needed for Mild Pain.     • Melatonin 5 MG Tab Take 0.25 Tabs by mouth every bedtime.     • Dexlansoprazole (DEXILANT) 60 MG CAPSULE DELAYED RELEASE delayed-release capsule Take 60 mg by mouth every evening.     • Multiple Vitamins-Minerals (MULTIVITAMIN PO) Take 1 Tab by mouth every day.     • MAGNESIUM PO Take 1 capsule by mouth as needed.       No current facility-administered medications for this visit.     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    ROS   No chest pain, no shortness of breath, no abdominal pain  Positive ROS as per HPI.  All other systems reviewed and are negative.     Objective:     /66 (BP Location: Left arm, Patient Position: Sitting, BP Cuff Size: Adult)   Pulse 96   Temp 36.7 °C (98.1 °F) (Temporal)   Ht 1.6 m (5' 3\")   Wt 66.7 kg (147 lb)   SpO2 91%  Body mass index is 26.04 kg/m².     Physical Exam:  Constitutional: Alert, no distress.  Skin: Warm, dry, good turgor, no rashes in visible areas.  Eye: Equal, round and reactive, conjunctiva clear, lids normal.  ENMT:Mask in place  Respiratory: Unlabored respiratory effort  Psych: Alert and oriented x3, normal affect and mood.      Assessment and Plan:   The following treatment plan was discussed    1. Hip pain, right  Unstable  Follow up with PT for strengthening  Follow up with Dr. Lewis Serrano to discuss IA injection and surgery  - REFERRAL TO PHYSICAL THERAPY    2. Physical " deconditioning  - REFERRAL TO PHYSICAL THERAPY      Followup: Return in about 3 months (around 11/12/2021).    I have placed the below orders and discussed them with an approved delegating provider. The MA is performing the below orders under the direction of Dr. Mandujano

## 2021-09-17 ENCOUNTER — TELEPHONE (OUTPATIENT)
Dept: MEDICAL GROUP | Facility: MEDICAL CENTER | Age: 81
End: 2021-09-17

## 2021-09-19 DIAGNOSIS — N39.46 MIXED STRESS AND URGE URINARY INCONTINENCE: ICD-10-CM

## 2021-09-20 ENCOUNTER — SLEEP CENTER VISIT (OUTPATIENT)
Dept: SLEEP MEDICINE | Facility: MEDICAL CENTER | Age: 81
End: 2021-09-20
Payer: MEDICARE

## 2021-09-20 VITALS
OXYGEN SATURATION: 90 % | HEIGHT: 63 IN | RESPIRATION RATE: 16 BRPM | WEIGHT: 148 LBS | SYSTOLIC BLOOD PRESSURE: 124 MMHG | DIASTOLIC BLOOD PRESSURE: 76 MMHG | TEMPERATURE: 97.2 F | BODY MASS INDEX: 26.22 KG/M2 | HEART RATE: 95 BPM

## 2021-09-20 DIAGNOSIS — C50.811 MALIGNANT NEOPLASM OF OVERLAPPING SITES OF BOTH BREASTS IN FEMALE, ESTROGEN RECEPTOR POSITIVE (HCC): ICD-10-CM

## 2021-09-20 DIAGNOSIS — K21.9 GASTROESOPHAGEAL REFLUX DISEASE, UNSPECIFIED WHETHER ESOPHAGITIS PRESENT: ICD-10-CM

## 2021-09-20 DIAGNOSIS — J43.9 MIXED RESTRICTIVE AND OBSTRUCTIVE LUNG DISEASE (HCC): ICD-10-CM

## 2021-09-20 DIAGNOSIS — Z87.891 FORMER SMOKER: ICD-10-CM

## 2021-09-20 DIAGNOSIS — J98.4 MIXED RESTRICTIVE AND OBSTRUCTIVE LUNG DISEASE (HCC): ICD-10-CM

## 2021-09-20 DIAGNOSIS — R06.09 DOE (DYSPNEA ON EXERTION): ICD-10-CM

## 2021-09-20 DIAGNOSIS — C50.812 MALIGNANT NEOPLASM OF OVERLAPPING SITES OF BOTH BREASTS IN FEMALE, ESTROGEN RECEPTOR POSITIVE (HCC): ICD-10-CM

## 2021-09-20 DIAGNOSIS — Z17.0 MALIGNANT NEOPLASM OF OVERLAPPING SITES OF BOTH BREASTS IN FEMALE, ESTROGEN RECEPTOR POSITIVE (HCC): ICD-10-CM

## 2021-09-20 PROCEDURE — 99214 OFFICE O/P EST MOD 30 MIN: CPT | Performed by: INTERNAL MEDICINE

## 2021-09-20 RX ORDER — MIRABEGRON 50 MG/1
TABLET, FILM COATED, EXTENDED RELEASE ORAL
Qty: 90 TABLET | Refills: 3 | Status: SHIPPED | OUTPATIENT
Start: 2021-09-20 | End: 2022-10-05

## 2021-09-20 RX ORDER — ALBUTEROL SULFATE 90 UG/1
2 AEROSOL, METERED RESPIRATORY (INHALATION) EVERY 4 HOURS PRN
Qty: 1 EACH | Refills: 6 | Status: ON HOLD | OUTPATIENT
Start: 2021-09-20 | End: 2021-09-30

## 2021-09-20 RX ORDER — DEXLANSOPRAZOLE 60 MG/1
60 CAPSULE, DELAYED RELEASE ORAL EVERY EVENING
Qty: 90 CAPSULE | Refills: 3 | Status: ON HOLD | OUTPATIENT
Start: 2021-09-20 | End: 2022-06-15

## 2021-09-20 ASSESSMENT — ENCOUNTER SYMPTOMS
DIAPHORESIS: 0
SHORTNESS OF BREATH: 1
EYE PAIN: 0
DIZZINESS: 0
TREMORS: 0
SPEECH CHANGE: 0
ABDOMINAL PAIN: 0
FOCAL WEAKNESS: 0
ORTHOPNEA: 0
MYALGIAS: 0
HEADACHES: 0
WEAKNESS: 0
NECK PAIN: 0
FEVER: 0
EYE REDNESS: 0
STRIDOR: 0
SINUS PAIN: 0
HEARTBURN: 0
PND: 0
BLURRED VISION: 0
BACK PAIN: 0
FALLS: 0
CONSTIPATION: 0
DOUBLE VISION: 0
HEMOPTYSIS: 0
COUGH: 0
WEIGHT LOSS: 0
SPUTUM PRODUCTION: 0
DIARRHEA: 0
SORE THROAT: 0
EYE DISCHARGE: 0
CLAUDICATION: 0
VOMITING: 0
DEPRESSION: 0
CHILLS: 0
WHEEZING: 0
PALPITATIONS: 0
NAUSEA: 0
PHOTOPHOBIA: 0

## 2021-09-20 ASSESSMENT — FIBROSIS 4 INDEX: FIB4 SCORE: 2.02

## 2021-09-20 NOTE — PROGRESS NOTES
"Chief Complaint   Patient presents with   • Follow-Up     last seen 5/26/21    • Results     CT Chest Thorax 6/7/21, PFT 6/16/21          HPI: This patient is a 81 y.o. female whom is followed in our clinic for MONTIEL last seen by me on 5/26/21. PMHx is significant for gastroesophageal reflux disease complicated by Ott's esophagus, breast cancer, ER positive, status post bilateral mastectomy and radiation therapy completed November 2018 currently on anastrozole, urinary incontinence, cataracts.  She is a former tobacco smoker with roughly 30-pack-year history and quit at the age of 54. Pt presented to me with MONTIEL over 6 mos after not being able to exercise due to covid for 9-12 mos. She was seen by cardiology and LV fx was hyperdynamic but no TR jet to estimate RVSP in April. We ordered PFTs and CT chest. PFTS showed restriction based on low FVC and low TLC (both 80% pred) but FEV1 also reduced at 81% pred. Her DLCO was reduced mildly at 74% predicted. Her CT showed focal fibrosis in RUL c/w hx of XRT and some emphysema. She continues to have MONTIEL stable to slightly worse per pt. No new sxs such as CP, edema, wheezing or cough.     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, estrogen receptor positive (HCC) 11/6/2019   • Psychiatric problem 2015    anxiety   • Shortness of breath    • Snoring     no sleep study   • Urinary incontinence    • Wears glasses        Social History     Socioeconomic History   • Marital status:      Spouse name: Not on file   • Number of children: Not on file   • Years of education: " Not on file   • Highest education level: Some college, no degree   Occupational History   • Not on file   Tobacco Use   • Smoking status: Former Smoker     Packs/day: 1.00     Years: 30.00     Pack years: 30.00     Types: Cigarettes     Quit date: 1992     Years since quittin.7   • Smokeless tobacco: Former User   Vaping Use   • Vaping Use: Never used   Substance and Sexual Activity   • Alcohol use: Yes     Alcohol/week: 0.6 oz     Types: 1 Glasses of wine per week     Comment: occ   • Drug use: No   • Sexual activity: Not on file   Other Topics Concern   • Not on file   Social History Narrative   • Not on file     Social Determinants of Health     Financial Resource Strain:    • Difficulty of Paying Living Expenses:    Food Insecurity:    • Worried About Running Out of Food in the Last Year:    • Ran Out of Food in the Last Year:    Transportation Needs: Unknown   • Lack of Transportation (Medical): Not on file   • Lack of Transportation (Non-Medical): No   Physical Activity: Inactive   • Days of Exercise per Week: 0 days   • Minutes of Exercise per Session: 0 min   Stress: Stress Concern Present   • Feeling of Stress : To some extent   Social Connections: Unknown   • Frequency of Communication with Friends and Family: More than three times a week   • Frequency of Social Gatherings with Friends and Family: Twice a week   • Attends Protestant Services: 1 to 4 times per year   • Active Member of Clubs or Organizations: Not on file   • Attends Club or Organization Meetings: Not on file   • Marital Status:    Intimate Partner Violence:    • Fear of Current or Ex-Partner:    • Emotionally Abused:    • Physically Abused:    • Sexually Abused:        Family History   Problem Relation Age of Onset   • Heart Disease Neg Hx        Current Outpatient Medications on File Prior to Visit   Medication Sig Dispense Refill   • Dexlansoprazole (DEXILANT) 60 MG CAPSULE DELAYED RELEASE delayed-release capsule Take 60 mg  by mouth every evening. Indications: Gastroesophageal Reflux Disease 90 Capsule 3   • MYRBETRIQ 50 MG TABLET SR 24 HR TAKE 1 TABLET EVERY EVENINGFOR FREQUENT URINATION,    URINARY INCONTINENCE,      URINARY URGENCY. 90 Tablet 3   • DULoxetine (CYMBALTA) 30 MG Cap DR Particles Take 1 capsule by mouth every day. TOTAL 90 MG DAILY 90 capsule 3   • gabapentin (NEURONTIN) 100 MG Cap Take 100 mg by mouth.     • metFORMIN (GLUCOPHAGE) 500 MG Tab Take 1 tablet by mouth every day. 90 tablet 1   • fluticasone (FLONASE) 50 MCG/ACT nasal spray Administer 1 Spray into affected nostril(S) every day. 16 g 3   • DULoxetine (CYMBALTA) 60 MG Cap DR Particles delayed-release capsule Take 60 mg by mouth every day.     • anastrozole (ARIMIDEX) 1 MG Tab Take 1 mg by mouth.     • Cholecalciferol (VITAMIN D PO) Take  by mouth.     • acetaminophen (TYLENOL) 500 MG Tab Take 500 mg by mouth every 6 hours as needed.     • lovastatin (MEVACOR) 20 MG Tab Take 20 mg by mouth every evening.     • Calcium Citrate (CITRACAL PO) Take 1 Tab by mouth every day.     • B Complex Vitamins (B COMPLEX PO) Take 1 Tab by mouth every day.     • polyethylene glycol/lytes (MIRALAX) Pack Take 17 g by mouth every day.     • Lifitegrast (XIIDRA) 5 % Solution Place 1 Drop in both eyes every bedtime.     • ibuprofen (MOTRIN) 200 MG Tab Take 200 mg by mouth every 6 hours as needed for Mild Pain.     • Melatonin 5 MG Tab Take 0.25 Tabs by mouth every bedtime.     • Multiple Vitamins-Minerals (MULTIVITAMIN PO) Take 1 Tab by mouth every day.     • MAGNESIUM PO Take 1 capsule by mouth as needed.       No current facility-administered medications on file prior to visit.       Patient has no known allergies.      ROS:   Review of Systems   Constitutional: Negative for chills, diaphoresis, fever, malaise/fatigue and weight loss.   HENT: Negative for congestion, ear discharge, ear pain, hearing loss, nosebleeds, sinus pain, sore throat and tinnitus.    Eyes: Negative for  "blurred vision, double vision, photophobia, pain, discharge and redness.   Respiratory: Positive for shortness of breath. Negative for cough, hemoptysis, sputum production, wheezing and stridor.    Cardiovascular: Negative for chest pain, palpitations, orthopnea, claudication, leg swelling and PND.   Gastrointestinal: Negative for abdominal pain, constipation, diarrhea, heartburn, nausea and vomiting.   Genitourinary: Negative for dysuria and urgency.   Musculoskeletal: Negative for back pain, falls, joint pain, myalgias and neck pain.   Skin: Negative for itching and rash.   Neurological: Negative for dizziness, tremors, speech change, focal weakness, weakness and headaches.   Endo/Heme/Allergies: Negative for environmental allergies.   Psychiatric/Behavioral: Negative for depression.       /76 (BP Location: Left arm, Patient Position: Sitting, BP Cuff Size: Adult)   Pulse 95   Temp 36.2 °C (97.2 °F) (Temporal)   Resp 16   Ht 1.6 m (5' 3\")   Wt 67.1 kg (148 lb)   SpO2 90%   Physical Exam  Vitals reviewed.   Constitutional:       General: She is not in acute distress.     Appearance: Normal appearance. She is well-developed and normal weight.   HENT:      Head: Normocephalic and atraumatic.      Right Ear: External ear normal.      Left Ear: External ear normal.      Nose: Nose normal. No congestion.      Mouth/Throat:      Mouth: Mucous membranes are moist.      Pharynx: Oropharynx is clear. No oropharyngeal exudate.   Eyes:      General: No scleral icterus.     Extraocular Movements: Extraocular movements intact.      Conjunctiva/sclera: Conjunctivae normal.      Pupils: Pupils are equal, round, and reactive to light.   Neck:      Vascular: No JVD.      Trachea: No tracheal deviation.   Cardiovascular:      Rate and Rhythm: Normal rate and regular rhythm.      Heart sounds: Murmur heard.   No friction rub. No gallop.       Comments: II/VI systolic murmur  Pulmonary:      Effort: Pulmonary effort is " normal. No accessory muscle usage or respiratory distress.      Breath sounds: No wheezing or rales.      Comments: Focal inspiratory crackles R posterior/upper lung field, o/w CTA  Abdominal:      General: There is no distension.      Palpations: Abdomen is soft.      Tenderness: There is no abdominal tenderness.   Musculoskeletal:         General: No tenderness or deformity. Normal range of motion.      Cervical back: Normal range of motion and neck supple.      Right lower leg: No edema.      Left lower leg: No edema.   Lymphadenopathy:      Cervical: No cervical adenopathy.   Skin:     General: Skin is warm and dry.      Findings: No rash.      Nails: There is no clubbing.   Neurological:      Mental Status: She is alert and oriented to person, place, and time.      Cranial Nerves: No cranial nerve deficit.      Gait: Gait normal.   Psychiatric:         Mood and Affect: Mood normal.         Behavior: Behavior normal.         PFTs as reviewed by me personally: as per hPI    Imaging as reviewed by me personally:  As per HPI    Assessment:  1. MONTIEL (dyspnea on exertion)     2. Mixed restrictive and obstructive lung disease (HCC)  PULMONARY FUNCTION TESTS -Test requested: Complete Pulmonary Function Test   3. Former smoker     4. Gastroesophageal reflux disease, unspecified whether esophagitis present     5. 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 I suspect multifactorial given emphysema and focal fibrosis plus deconditioning. Unfortunatetly she does not qualify for pulm rehab. Start trial of prn MICHAEL and encourage activity. F/U 6 mos with repeat PFTS to eval for progression  2. Suspect combo of emphysema, focal fibrosis and MSK limitations. See above. Consider PT given does not qualify for pulmonary rehab  3. Tobacco free, encouraged ongoing abstinence  4. Fairly severe, Discussed lifestyle modifications and how this may contribute to  airway inflammation, on PPI       5. S/p XRT likely accounting for CT findings; no e/o recurrence    Return in about 6 months (around 3/20/2022) for PFTs at that time .

## 2021-09-20 NOTE — PATIENT INSTRUCTIONS
The only way an adult can develop immunity to an infectious agent such as a virus is to become infected and clear the infection or by vaccination.

## 2021-09-23 NOTE — H&P
"Surgery Orthopedic History & Physical Note    Date  9/23/2021    Primary Care Physician  DAYANA Zaman  Pre-Op Diagnosis Codes:     * Primary localized osteoarthrosis of the hip, right [M16.11]    HPI  This is a 81 y.o. female who presented with bilateral hip pain. She says the right hip is worse than the left. She has had both of this pain for years. She says the pain is currently 3 out of 10, but it can get up to a 10 out of 10 depending on activity. She says specifically downhill activities makes the right hip worse. She has pain with weightbearing and pain with activities. It is interfering with her ADLs.  She has done physical therapy for over 3 months in the past. This did not provide any relief.  She takes Advil every morning for the pain. She is now using assistive devices to help with walking.     Past Medical History:   Diagnosis Date   • Arthritis     osteo, generalized   • Bowel habit changes     constipation   • Breath shortness    • Cataract     removed bilat   • COPD (chronic obstructive pulmonary disease) ([facility])    • Diabetes (HCC)     \"pre\", oral meds   • Heart burn    • Heart murmur    • Hiatus hernia syndrome    • High cholesterol    • Hypertension    • Indigestion    • Malignant neoplasm of overlapping sites of breast in female, estrogen receptor positive (HCC) 11/6/2019   • Psychiatric problem 2015    anxiety   • Shortness of breath    • Snoring     no sleep study   • Urinary incontinence    • Wears glasses        Past Surgical History:   Procedure Laterality Date   • WIDE EXCISION Right 8/16/2019    Procedure: WIDE EXCISION, LESION- RE-EXCISION FOR EXCISION FOR MARGINS RIGHT CHEST WALL INSTRMUSCULAR;  Surgeon: Andre Shelton M.D.;  Location: [facility];  Service: General   • INCISION AND DRAINAGE GENERAL Bilateral 8/16/2019    Procedure: INCISION AND DRAINAGE- OF BILATERAL CHEST SEROMAS WITH DRAIN PLACEMENT;  Surgeon: Andre Shelton M.D.;  Location: [facility]" [facility];  Service: General   • PB MASTECTOMY, MODIFIED RADICAL Right 7/17/2019    Procedure: MASTECTOMY, MODIFIED RADICAL;  Surgeon: Andre Shelton M.D.;  Location: [facility];  Service: General   • NODE BIOPSY SENTINEL Right 7/17/2019    Procedure: INTRA OPERATIVE SENTINEL NODE IDENTIFICATION AND BIOPSY;  Surgeon: Andre Shelton M.D.;  Location: [facility];  Service: General   • AXILLARY NODE DISSECTION  7/17/2019    Procedure: LYMPHADENECTOMY, AXILLARY;  Surgeon: Andre Shelton M.D.;  Location: [facility];  Service: General   • MASTECTOMY Left 7/17/2019    Procedure: TOTAL MASTECTOMY;  Surgeon: Andre Shelton M.D.;  Location: [facility];  Service: General   • FLAP CLOSURE Bilateral 7/17/2019    Procedure: WIDE V FLAP;  Surgeon: Andre Shelton M.D.;  Location: [facility];  Service: General   • LEFORT COLPOCLESIS  1/21/2019    Procedure: LEFORT COLPOCLESIS;  Surgeon: Minnie Bañuelos M.D.;  Location: [facility];  Service: Labor and Delivery   • BLADDER SLING FEMALE  1/21/2019    Procedure: BLADDER SLING FEMALE- TOT;  Surgeon: Minnie Bañuelos M.D.;  Location: [facility];  Service: Labor and Delivery   • KNEE ARTHROPLASTY TOTAL Right 11/2/2015    Procedure: KNEE ARTHROPLASTY TOTAL;  Surgeon: Venkatesh Cardoza M.D.;  Location: [facility];  Service:    • ABDOMINAL HYSTERECTOMY TOTAL  1994   • OTHER ORTHOPEDIC SURGERY  1993    L ankle   • GYN SURGERY  1992    abdominal hysterectomy   • CHOLECYSTECTOMY  1964   • CATARACT EXTRACTION WITH IOL Bilateral        No current facility-administered medications for this encounter.     Current Outpatient Medications   Medication Sig Dispense Refill   • Dexlansoprazole (DEXILANT) 60 MG CAPSULE DELAYED RELEASE delayed-release capsule Take 60 mg by mouth every evening. Indications: Gastroesophageal Reflux Disease 90 Capsule 3   • MYRBETRIQ 50 MG TABLET SR 24 HR TAKE 1 TABLET  EVERY EVENINGFOR FREQUENT URINATION,    URINARY INCONTINENCE,      URINARY URGENCY. 90 Tablet 3   • albuterol (VENTOLIN HFA) 108 (90 Base) MCG/ACT Aero Soln inhalation aerosol Inhale 2 Puffs every four hours as needed for Shortness of Breath. 1 Each 6   • DULoxetine (CYMBALTA) 30 MG Cap DR Particles Take 1 capsule by mouth every day. TOTAL 90 MG DAILY 90 capsule 3   • gabapentin (NEURONTIN) 100 MG Cap Take 100 mg by mouth.     • metFORMIN (GLUCOPHAGE) 500 MG Tab Take 1 tablet by mouth every day. 90 tablet 1   • fluticasone (FLONASE) 50 MCG/ACT nasal spray Administer 1 Spray into affected nostril(S) every day. 16 g 3   • DULoxetine (CYMBALTA) 60 MG Cap DR Particles delayed-release capsule Take 60 mg by mouth every day.     • anastrozole (ARIMIDEX) 1 MG Tab Take 1 mg by mouth.     • Cholecalciferol (VITAMIN D PO) Take  by mouth.     • acetaminophen (TYLENOL) 500 MG Tab Take 500 mg by mouth every 6 hours as needed.     • lovastatin (MEVACOR) 20 MG Tab Take 20 mg by mouth every evening.     • Calcium Citrate (CITRACAL PO) Take 1 Tab by mouth every day.     • B Complex Vitamins (B COMPLEX PO) Take 1 Tab by mouth every day.     • polyethylene glycol/lytes (MIRALAX) Pack Take 17 g by mouth every day.     • Lifitegrast (XIIDRA) 5 % Solution Place 1 Drop in both eyes every bedtime.     • ibuprofen (MOTRIN) 200 MG Tab Take 200 mg by mouth every 6 hours as needed for Mild Pain.     • Melatonin 5 MG Tab Take 0.25 Tabs by mouth every bedtime.     • Multiple Vitamins-Minerals (MULTIVITAMIN PO) Take 1 Tab by mouth every day.     • MAGNESIUM PO Take 1 capsule by mouth as needed.         Social History     Occupational History   • Not on file   Tobacco Use   • Smoking status: Former Smoker     Packs/day: 1.00     Years: 30.00     Pack years: 30.00     Types: Cigarettes     Quit date: 1992     Years since quittin.7   • Smokeless tobacco: Former User   Vaping Use   • Vaping Use: Never used   Substance and Sexual Activity   •  Alcohol use: Yes     Alcohol/week: 0.6 oz     Types: 1 Glasses of wine per week     Comment: occ   • Drug use: No   • Sexual activity: Not on file       Family History   Problem Relation Age of Onset   • Heart Disease Neg Hx        Allergies  Patient has no known allergies.    Review of Systems  Pertinent ROS in HPI. Other ROS reviewed and found to be otherwise unremarkable.     Physical Exam  General: On exam, she is a well-developed, well-nourished female. She appears her stated age.   HEENT: Normal.  Respirations:  Nonlabored.   Cardiovascular:  She has palpable pedal pulses.   Skin: Warm and dry. No rash or lesions.   Neurologic:  She has intact sensation, bilateral lower extremities.   Musculoskeletal: On exam of the left hip, her left hip moves well in all directions with minimal discomfort. She has some tenderness over the greater troch.  Her right hip, she has severe pain at 90 degrees of hip flexion. External rotation she can get to about 25 degrees and internal rotation she will only do about 5 degrees of rotation before she has severe limiting pain. She has a negative straight leg raise. She walks with a severely antalgic gait favoring that right side. She does also use a walking stick, which she has used for the last year or 2.     Radiology:  X-rays taken show severe bone-on-bone degenerative changes in the right hip with sclerosis, cystic changes, and osteophytes. The left hip shows moderate narrowing.     Assessment/Plan:  Pre-Op Diagnosis Codes:     * Primary localized osteoarthrosis of the hip, right [M16.11]  Procedure(s):  ARTHROPLASTY, HIP, TOTAL, ANTERIOR APPROACH    At this point, she has tried and failed conservative management. She is indicated for a total hip arthroplasty.  Risk benefits surgery were discussed patient we will set her up for surgery now that she is been cleared.      Patient has been educated about choice for planned prosthesis and rationale for its use.  Patient understands  and wishes to proceed.

## 2021-09-27 ENCOUNTER — PRE-ADMISSION TESTING (OUTPATIENT)
Dept: ADMISSIONS | Facility: MEDICAL CENTER | Age: 81
End: 2021-09-27
Attending: ORTHOPAEDIC SURGERY
Payer: MEDICARE

## 2021-09-27 DIAGNOSIS — M16.11 PRIMARY OSTEOARTHRITIS OF RIGHT HIP: ICD-10-CM

## 2021-09-27 LAB
ABO GROUP BLD: NORMAL
ANION GAP SERPL CALC-SCNC: 12 MMOL/L (ref 7–16)
APTT PPP: 26.1 SEC (ref 24.7–36)
BASOPHILS # BLD AUTO: 0.3 % (ref 0–1.8)
BASOPHILS # BLD: 0.02 K/UL (ref 0–0.12)
BUN SERPL-MCNC: 30 MG/DL (ref 8–22)
CALCIUM SERPL-MCNC: 9.9 MG/DL (ref 8.4–10.2)
CHLORIDE SERPL-SCNC: 102 MMOL/L (ref 96–112)
CO2 SERPL-SCNC: 26 MMOL/L (ref 20–33)
CREAT SERPL-MCNC: 0.7 MG/DL (ref 0.5–1.4)
EOSINOPHIL # BLD AUTO: 0.06 K/UL (ref 0–0.51)
EOSINOPHIL NFR BLD: 0.9 % (ref 0–6.9)
ERYTHROCYTE [DISTWIDTH] IN BLOOD BY AUTOMATED COUNT: 46.6 FL (ref 35.9–50)
GLUCOSE SERPL-MCNC: 94 MG/DL (ref 65–99)
HCT VFR BLD AUTO: 44.1 % (ref 37–47)
HGB BLD-MCNC: 14.3 G/DL (ref 12–16)
IMM GRANULOCYTES # BLD AUTO: 0.01 K/UL (ref 0–0.11)
IMM GRANULOCYTES NFR BLD AUTO: 0.1 % (ref 0–0.9)
LYMPHOCYTES # BLD AUTO: 1.17 K/UL (ref 1–4.8)
LYMPHOCYTES NFR BLD: 17.1 % (ref 22–41)
MCH RBC QN AUTO: 31.9 PG (ref 27–33)
MCHC RBC AUTO-ENTMCNC: 32.4 G/DL (ref 33.6–35)
MCV RBC AUTO: 98.4 FL (ref 81.4–97.8)
MONOCYTES # BLD AUTO: 0.6 K/UL (ref 0–0.85)
MONOCYTES NFR BLD AUTO: 8.7 % (ref 0–13.4)
NEUTROPHILS # BLD AUTO: 5 K/UL (ref 2–7.15)
NEUTROPHILS NFR BLD: 72.9 % (ref 44–72)
NRBC # BLD AUTO: 0 K/UL
NRBC BLD-RTO: 0 /100 WBC
PLATELET # BLD AUTO: 220 K/UL (ref 164–446)
PMV BLD AUTO: 10.5 FL (ref 9–12.9)
POTASSIUM SERPL-SCNC: 4.5 MMOL/L (ref 3.6–5.5)
RBC # BLD AUTO: 4.48 M/UL (ref 4.2–5.4)
RH BLD: NORMAL
SODIUM SERPL-SCNC: 140 MMOL/L (ref 135–145)
WBC # BLD AUTO: 6.9 K/UL (ref 4.8–10.8)

## 2021-09-27 PROCEDURE — U0003 INFECTIOUS AGENT DETECTION BY NUCLEIC ACID (DNA OR RNA); SEVERE ACUTE RESPIRATORY SYNDROME CORONAVIRUS 2 (SARS-COV-2) (CORONAVIRUS DISEASE [COVID-19]), AMPLIFIED PROBE TECHNIQUE, MAKING USE OF HIGH THROUGHPUT TECHNOLOGIES AS DESCRIBED BY CMS-2020-01-R: HCPCS

## 2021-09-27 PROCEDURE — 80048 BASIC METABOLIC PNL TOTAL CA: CPT

## 2021-09-27 PROCEDURE — 85025 COMPLETE CBC W/AUTO DIFF WBC: CPT

## 2021-09-27 PROCEDURE — 87640 STAPH A DNA AMP PROBE: CPT | Mod: XU

## 2021-09-27 PROCEDURE — 85730 THROMBOPLASTIN TIME PARTIAL: CPT

## 2021-09-27 PROCEDURE — U0005 INFEC AGEN DETEC AMPLI PROBE: HCPCS

## 2021-09-27 PROCEDURE — G0475 HIV COMBINATION ASSAY: HCPCS

## 2021-09-27 PROCEDURE — C9803 HOPD COVID-19 SPEC COLLECT: HCPCS

## 2021-09-27 PROCEDURE — 87641 MR-STAPH DNA AMP PROBE: CPT

## 2021-09-27 PROCEDURE — 86900 BLOOD TYPING SEROLOGIC ABO: CPT

## 2021-09-27 PROCEDURE — 86901 BLOOD TYPING SEROLOGIC RH(D): CPT

## 2021-09-27 PROCEDURE — 36415 COLL VENOUS BLD VENIPUNCTURE: CPT

## 2021-09-27 ASSESSMENT — FIBROSIS 4 INDEX: FIB4 SCORE: 2.02

## 2021-09-27 NOTE — OR NURSING
Request for medical clearance for surgery sent to Dr. Serrano office on Friday, 9/24/21 via fax. Called and left message to Margo to send medical clearance for surgery.

## 2021-09-27 NOTE — PREPROCEDURE INSTRUCTIONS
"Pre-admit appointment completed. \"Preparing for your Procedure\" instructions given to Pt with verbal, written, and video content. Pt states all instructions given are understood and to call pre-admit or Dr's office for additional questions or any symptoms of illness/covid develop prior to DOS. Medications the patient will take the morning of surgery per anesthesia protocol: Cymbalta, Gabapentin. Instructed to take other prescribed medications through the day before surgery.        Surgery preparation checklist for Joint Replacement Program given to Pt with verbal instructions.     Denies anesthesia complications    Talked to Margo Surgery Scheduler via Telephone, states Dr. Serrano never requested a cardiac or pulmonary clearance for the patient. Margo states the medical clearance from a year ago was regarding an infection the patient had after dental work and had been cleared of that after infection was resolved.     Patient states she has clearance for surgery by Dr. Osorio, Pulmonologist and Dr. Brand, Cardiologist.  "

## 2021-09-28 LAB
HIV 1+2 AB+HIV1 P24 AG SERPL QL IA: NORMAL
SARS-COV-2 RNA RESP QL NAA+PROBE: NOTDETECTED
SCCMEC + MECA PNL NOSE NAA+PROBE: NEGATIVE
SCCMEC + MECA PNL NOSE NAA+PROBE: NEGATIVE
SPECIMEN SOURCE: NORMAL

## 2021-09-28 NOTE — OR NURSING
"Sent patient anesthesia summary and verified need for official cardiac and pulmonary clearances from patient's doctors (Dr. Brand and Dr. Osorio) with Dr. Guthrie. Response from Dr. Guthrie:      \"Based on the cardiology recommendations, there is no need for further follow-up of this patient. She is OK to proceed from my standpoint. Thank you.  Jefferson Gtuhrie M.D.  [facility]\"    "

## 2021-09-28 NOTE — DISCHARGE PLANNING
Late entry for 9/27/2021 at 1535:      DISCHARGE PLANNING NOTE - TOTAL JOINT    Procedure: Procedure(s):  ARTHROPLASTY, HIP, TOTAL, ANTERIOR APPROACH  Procedure Date: 9/30/2021  Insurance: Payor: MEDICARE / Plan: MEDICARE PART A & B / Product Type: *No Product type* /    Equipment currently available at home?  front-wheel walker, raised toilet seat and built in shower bench, walking poles.  Steps into the home? 2  Steps within the home? 0  Toilet height? ADA  Type of shower? walk-in shower  Who will be with you during your recovery? Spouse.  Is Outpatient Physical Therapy set up after surgery? No   Did you take the Total Joint Class and where? Yes received NAON book today.  Planning same day discharge?No     This writer met with pt and spouse during her preadmission appointment. Pt states she has all needed equipment. Home safety checklist reviewed and copy given to pt. Pt educated to dc criteria. All questions answered and verbalizes understanding of all instructions. No dc needs identified at this time. Anticipate dc to home without barriers.

## 2021-09-29 NOTE — TELEPHONE ENCOUNTER
Tried to call pt to ask which medication she has tried and failed. Unable to leave .    Form placed in con bon.

## 2021-09-30 ENCOUNTER — HOSPITAL ENCOUNTER (OUTPATIENT)
Facility: MEDICAL CENTER | Age: 81
End: 2021-10-04
Attending: ORTHOPAEDIC SURGERY | Admitting: ORTHOPAEDIC SURGERY
Payer: MEDICARE

## 2021-09-30 ENCOUNTER — ANESTHESIA EVENT (OUTPATIENT)
Dept: SURGERY | Facility: MEDICAL CENTER | Age: 81
End: 2021-09-30
Payer: MEDICARE

## 2021-09-30 ENCOUNTER — ANESTHESIA (OUTPATIENT)
Dept: SURGERY | Facility: MEDICAL CENTER | Age: 81
End: 2021-09-30
Payer: MEDICARE

## 2021-09-30 ENCOUNTER — APPOINTMENT (OUTPATIENT)
Dept: RADIOLOGY | Facility: MEDICAL CENTER | Age: 81
End: 2021-09-30
Attending: ORTHOPAEDIC SURGERY
Payer: MEDICARE

## 2021-09-30 ENCOUNTER — APPOINTMENT (OUTPATIENT)
Dept: RADIOLOGY | Facility: MEDICAL CENTER | Age: 81
End: 2021-09-30
Attending: STUDENT IN AN ORGANIZED HEALTH CARE EDUCATION/TRAINING PROGRAM
Payer: MEDICARE

## 2021-09-30 ENCOUNTER — PATIENT OUTREACH (OUTPATIENT)
Dept: HEALTH INFORMATION MANAGEMENT | Facility: OTHER | Age: 81
End: 2021-09-30

## 2021-09-30 DIAGNOSIS — Z96.641 STATUS POST RIGHT HIP REPLACEMENT: ICD-10-CM

## 2021-09-30 DIAGNOSIS — M17.11 LOCALIZED PRIMARY OSTEOARTHRITIS OF RIGHT LOWER LEG: Primary | ICD-10-CM

## 2021-09-30 LAB
ABO GROUP BLD: NORMAL
BLD GP AB SCN SERPL QL: NORMAL
GLUCOSE BLD-MCNC: 137 MG/DL (ref 65–99)
RH BLD: NORMAL

## 2021-09-30 PROCEDURE — A9270 NON-COVERED ITEM OR SERVICE: HCPCS | Performed by: STUDENT IN AN ORGANIZED HEALTH CARE EDUCATION/TRAINING PROGRAM

## 2021-09-30 PROCEDURE — 700101 HCHG RX REV CODE 250

## 2021-09-30 PROCEDURE — A9270 NON-COVERED ITEM OR SERVICE: HCPCS | Performed by: ORTHOPAEDIC SURGERY

## 2021-09-30 PROCEDURE — 86850 RBC ANTIBODY SCREEN: CPT

## 2021-09-30 PROCEDURE — 36415 COLL VENOUS BLD VENIPUNCTURE: CPT

## 2021-09-30 PROCEDURE — 86900 BLOOD TYPING SEROLOGIC ABO: CPT

## 2021-09-30 PROCEDURE — 700111 HCHG RX REV CODE 636 W/ 250 OVERRIDE (IP): Performed by: ANESTHESIOLOGY

## 2021-09-30 PROCEDURE — 160002 HCHG RECOVERY MINUTES (STAT): Performed by: ORTHOPAEDIC SURGERY

## 2021-09-30 PROCEDURE — 501838 HCHG SUTURE GENERAL: Performed by: ORTHOPAEDIC SURGERY

## 2021-09-30 PROCEDURE — 160031 HCHG SURGERY MINUTES - 1ST 30 MINS LEVEL 5: Performed by: ORTHOPAEDIC SURGERY

## 2021-09-30 PROCEDURE — 700101 HCHG RX REV CODE 250: Performed by: ANESTHESIOLOGY

## 2021-09-30 PROCEDURE — 700111 HCHG RX REV CODE 636 W/ 250 OVERRIDE (IP): Performed by: ORTHOPAEDIC SURGERY

## 2021-09-30 PROCEDURE — 97535 SELF CARE MNGMENT TRAINING: CPT

## 2021-09-30 PROCEDURE — 700101 HCHG RX REV CODE 250: Performed by: ORTHOPAEDIC SURGERY

## 2021-09-30 PROCEDURE — 160009 HCHG ANES TIME/MIN: Performed by: ORTHOPAEDIC SURGERY

## 2021-09-30 PROCEDURE — 160036 HCHG PACU - EA ADDL 30 MINS PHASE I: Performed by: ORTHOPAEDIC SURGERY

## 2021-09-30 PROCEDURE — 86901 BLOOD TYPING SEROLOGIC RH(D): CPT

## 2021-09-30 PROCEDURE — 96375 TX/PRO/DX INJ NEW DRUG ADDON: CPT

## 2021-09-30 PROCEDURE — 700102 HCHG RX REV CODE 250 W/ 637 OVERRIDE(OP): Performed by: ORTHOPAEDIC SURGERY

## 2021-09-30 PROCEDURE — 27130 TOTAL HIP ARTHROPLASTY: CPT | Mod: RT | Performed by: ORTHOPAEDIC SURGERY

## 2021-09-30 PROCEDURE — 500002 HCHG ADHESIVE, DERMABOND: Performed by: ORTHOPAEDIC SURGERY

## 2021-09-30 PROCEDURE — G0378 HOSPITAL OBSERVATION PER HR: HCPCS

## 2021-09-30 PROCEDURE — 160042 HCHG SURGERY MINUTES - EA ADDL 1 MIN LEVEL 5: Performed by: ORTHOPAEDIC SURGERY

## 2021-09-30 PROCEDURE — 110372 HCHG SHELL REV 278: Performed by: ORTHOPAEDIC SURGERY

## 2021-09-30 PROCEDURE — 94669 MECHANICAL CHEST WALL OSCILL: CPT

## 2021-09-30 PROCEDURE — 700105 HCHG RX REV CODE 258: Performed by: ORTHOPAEDIC SURGERY

## 2021-09-30 PROCEDURE — 700102 HCHG RX REV CODE 250 W/ 637 OVERRIDE(OP): Performed by: STUDENT IN AN ORGANIZED HEALTH CARE EDUCATION/TRAINING PROGRAM

## 2021-09-30 PROCEDURE — 160035 HCHG PACU - 1ST 60 MINS PHASE I: Performed by: ORTHOPAEDIC SURGERY

## 2021-09-30 PROCEDURE — 27130 TOTAL HIP ARTHROPLASTY: CPT | Mod: 80ROC,RT | Performed by: STUDENT IN AN ORGANIZED HEALTH CARE EDUCATION/TRAINING PROGRAM

## 2021-09-30 PROCEDURE — 82962 GLUCOSE BLOOD TEST: CPT

## 2021-09-30 PROCEDURE — C1776 JOINT DEVICE (IMPLANTABLE): HCPCS | Performed by: ORTHOPAEDIC SURGERY

## 2021-09-30 PROCEDURE — 94760 N-INVAS EAR/PLS OXIMETRY 1: CPT

## 2021-09-30 PROCEDURE — C1713 ANCHOR/SCREW BN/BN,TIS/BN: HCPCS | Performed by: ORTHOPAEDIC SURGERY

## 2021-09-30 PROCEDURE — 700111 HCHG RX REV CODE 636 W/ 250 OVERRIDE (IP): Performed by: STUDENT IN AN ORGANIZED HEALTH CARE EDUCATION/TRAINING PROGRAM

## 2021-09-30 PROCEDURE — 160048 HCHG OR STATISTICAL LEVEL 1-5: Performed by: ORTHOPAEDIC SURGERY

## 2021-09-30 PROCEDURE — 502578 HCHG PACK, TOTAL HIP: Performed by: ORTHOPAEDIC SURGERY

## 2021-09-30 PROCEDURE — 72170 X-RAY EXAM OF PELVIS: CPT

## 2021-09-30 PROCEDURE — 96365 THER/PROPH/DIAG IV INF INIT: CPT

## 2021-09-30 PROCEDURE — 96374 THER/PROPH/DIAG INJ IV PUSH: CPT

## 2021-09-30 DEVICE — IMPLANT R3 0 DEG XLPE ACET LNR 36MM X 52MM (1EA): Type: IMPLANTABLE DEVICE | Site: HIP | Status: FUNCTIONAL

## 2021-09-30 DEVICE — IMPLANT REF SPHER HEAD SCREW 25MM (1EA): Type: IMPLANTABLE DEVICE | Site: HIP | Status: FUNCTIONAL

## 2021-09-30 DEVICE — IMPLANT OXINIUM FEM HD 12/14 36 MM -3 (1EA): Type: IMPLANTABLE DEVICE | Site: HIP | Status: FUNCTIONAL

## 2021-09-30 DEVICE — STEM POLAR CEMENTLESS WITH COLLAR 1: Type: IMPLANTABLE DEVICE | Site: HIP | Status: FUNCTIONAL

## 2021-09-30 DEVICE — IMPLANT REF SPHER HEAD SCREW 35MM (1EA): Type: IMPLANTABLE DEVICE | Site: HIP | Status: FUNCTIONAL

## 2021-09-30 DEVICE — IMPLANT R3 3 HOLE ACET SHELL 52MM (1EA): Type: IMPLANTABLE DEVICE | Site: HIP | Status: FUNCTIONAL

## 2021-09-30 RX ORDER — ACETAMINOPHEN 500 MG
1000 TABLET ORAL EVERY 6 HOURS
Status: DISCONTINUED | OUTPATIENT
Start: 2021-09-30 | End: 2021-10-04 | Stop reason: HOSPADM

## 2021-09-30 RX ORDER — OXYCODONE HYDROCHLORIDE 5 MG/1
2.5 TABLET ORAL
Status: DISCONTINUED | OUTPATIENT
Start: 2021-09-30 | End: 2021-10-04 | Stop reason: HOSPADM

## 2021-09-30 RX ORDER — AMOXICILLIN 250 MG
1 CAPSULE ORAL
Status: DISCONTINUED | OUTPATIENT
Start: 2021-09-30 | End: 2021-10-04 | Stop reason: HOSPADM

## 2021-09-30 RX ORDER — DIPHENHYDRAMINE HYDROCHLORIDE 50 MG/ML
25 INJECTION INTRAMUSCULAR; INTRAVENOUS EVERY 6 HOURS PRN
Status: DISCONTINUED | OUTPATIENT
Start: 2021-09-30 | End: 2021-10-04 | Stop reason: HOSPADM

## 2021-09-30 RX ORDER — HALOPERIDOL 5 MG/ML
1 INJECTION INTRAMUSCULAR
Status: DISCONTINUED | OUTPATIENT
Start: 2021-09-30 | End: 2021-09-30 | Stop reason: HOSPADM

## 2021-09-30 RX ORDER — MIDAZOLAM HYDROCHLORIDE 1 MG/ML
INJECTION INTRAMUSCULAR; INTRAVENOUS PRN
Status: DISCONTINUED | OUTPATIENT
Start: 2021-09-30 | End: 2021-09-30 | Stop reason: SURG

## 2021-09-30 RX ORDER — ONDANSETRON 2 MG/ML
4 INJECTION INTRAMUSCULAR; INTRAVENOUS
Status: DISCONTINUED | OUTPATIENT
Start: 2021-09-30 | End: 2021-09-30 | Stop reason: HOSPADM

## 2021-09-30 RX ORDER — ACETAMINOPHEN 500 MG
1000 TABLET ORAL EVERY 6 HOURS PRN
Status: DISCONTINUED | OUTPATIENT
Start: 2021-10-05 | End: 2021-10-04 | Stop reason: HOSPADM

## 2021-09-30 RX ORDER — OXYCODONE HCL 5 MG/5 ML
5 SOLUTION, ORAL ORAL
Status: DISCONTINUED | OUTPATIENT
Start: 2021-09-30 | End: 2021-09-30 | Stop reason: HOSPADM

## 2021-09-30 RX ORDER — BISACODYL 10 MG
10 SUPPOSITORY, RECTAL RECTAL
Status: DISCONTINUED | OUTPATIENT
Start: 2021-09-30 | End: 2021-10-04 | Stop reason: HOSPADM

## 2021-09-30 RX ORDER — SODIUM CHLORIDE, SODIUM LACTATE, POTASSIUM CHLORIDE, CALCIUM CHLORIDE 600; 310; 30; 20 MG/100ML; MG/100ML; MG/100ML; MG/100ML
INJECTION, SOLUTION INTRAVENOUS CONTINUOUS
Status: ACTIVE | OUTPATIENT
Start: 2021-09-30 | End: 2021-09-30

## 2021-09-30 RX ORDER — LIDOCAINE HYDROCHLORIDE 40 MG/ML
SOLUTION TOPICAL PRN
Status: DISCONTINUED | OUTPATIENT
Start: 2021-09-30 | End: 2021-09-30 | Stop reason: SURG

## 2021-09-30 RX ORDER — POLYETHYLENE GLYCOL 3350 17 G/17G
1 POWDER, FOR SOLUTION ORAL 2 TIMES DAILY PRN
Status: DISCONTINUED | OUTPATIENT
Start: 2021-09-30 | End: 2021-10-04 | Stop reason: HOSPADM

## 2021-09-30 RX ORDER — DULOXETIN HYDROCHLORIDE 30 MG/1
60 CAPSULE, DELAYED RELEASE ORAL DAILY
Status: DISCONTINUED | OUTPATIENT
Start: 2021-10-01 | End: 2021-10-04 | Stop reason: HOSPADM

## 2021-09-30 RX ORDER — ROCURONIUM BROMIDE 10 MG/ML
INJECTION, SOLUTION INTRAVENOUS PRN
Status: DISCONTINUED | OUTPATIENT
Start: 2021-09-30 | End: 2021-09-30 | Stop reason: SURG

## 2021-09-30 RX ORDER — OMEPRAZOLE 20 MG/1
40 CAPSULE, DELAYED RELEASE ORAL EVERY EVENING
Status: DISCONTINUED | OUTPATIENT
Start: 2021-09-30 | End: 2021-10-04 | Stop reason: HOSPADM

## 2021-09-30 RX ORDER — OXYCODONE HYDROCHLORIDE 5 MG/1
2.5-5 TABLET ORAL EVERY 4 HOURS PRN
Qty: 30 TABLET | Refills: 0 | Status: SHIPPED | OUTPATIENT
Start: 2021-09-30 | End: 2021-10-01

## 2021-09-30 RX ORDER — MAGNESIUM SULFATE HEPTAHYDRATE 40 MG/ML
INJECTION, SOLUTION INTRAVENOUS PRN
Status: DISCONTINUED | OUTPATIENT
Start: 2021-09-30 | End: 2021-09-30 | Stop reason: SURG

## 2021-09-30 RX ORDER — DEXLANSOPRAZOLE 60 MG/1
60 CAPSULE, DELAYED RELEASE ORAL EVERY EVENING
Status: DISCONTINUED | OUTPATIENT
Start: 2021-09-30 | End: 2021-09-30

## 2021-09-30 RX ORDER — OXYCODONE HCL 5 MG/5 ML
10 SOLUTION, ORAL ORAL
Status: DISCONTINUED | OUTPATIENT
Start: 2021-09-30 | End: 2021-09-30 | Stop reason: HOSPADM

## 2021-09-30 RX ORDER — PHENYLEPHRINE HCL IN 0.9% NACL 0.5 MG/5ML
SYRINGE (ML) INTRAVENOUS PRN
Status: DISCONTINUED | OUTPATIENT
Start: 2021-09-30 | End: 2021-09-30 | Stop reason: SURG

## 2021-09-30 RX ORDER — GABAPENTIN 100 MG/1
100 CAPSULE ORAL 3 TIMES DAILY
Status: DISCONTINUED | OUTPATIENT
Start: 2021-09-30 | End: 2021-10-04 | Stop reason: HOSPADM

## 2021-09-30 RX ORDER — CEFAZOLIN SODIUM 1 G/3ML
INJECTION, POWDER, FOR SOLUTION INTRAMUSCULAR; INTRAVENOUS PRN
Status: DISCONTINUED | OUTPATIENT
Start: 2021-09-30 | End: 2021-09-30 | Stop reason: SURG

## 2021-09-30 RX ORDER — SODIUM CHLORIDE 9 MG/ML
INJECTION, SOLUTION INTRAMUSCULAR; INTRAVENOUS; SUBCUTANEOUS
Status: DISCONTINUED | OUTPATIENT
Start: 2021-09-30 | End: 2021-09-30 | Stop reason: HOSPADM

## 2021-09-30 RX ORDER — CEFAZOLIN SODIUM IN 0.9 % NACL 2 G/100 ML
2 PLASTIC BAG, INJECTION (ML) INTRAVENOUS ONCE
Status: DISCONTINUED | OUTPATIENT
Start: 2021-09-30 | End: 2021-09-30 | Stop reason: HOSPADM

## 2021-09-30 RX ORDER — BUPIVACAINE HYDROCHLORIDE AND EPINEPHRINE 2.5; 5 MG/ML; UG/ML
INJECTION, SOLUTION EPIDURAL; INFILTRATION; INTRACAUDAL; PERINEURAL
Status: DISCONTINUED | OUTPATIENT
Start: 2021-09-30 | End: 2021-09-30 | Stop reason: HOSPADM

## 2021-09-30 RX ORDER — TRANEXAMIC ACID 100 MG/ML
INJECTION, SOLUTION INTRAVENOUS PRN
Status: DISCONTINUED | OUTPATIENT
Start: 2021-09-30 | End: 2021-09-30 | Stop reason: SURG

## 2021-09-30 RX ORDER — ANASTROZOLE 1 MG/1
1 TABLET ORAL DAILY
Status: DISCONTINUED | OUTPATIENT
Start: 2021-09-30 | End: 2021-10-04 | Stop reason: HOSPADM

## 2021-09-30 RX ORDER — SCOLOPAMINE TRANSDERMAL SYSTEM 1 MG/1
1 PATCH, EXTENDED RELEASE TRANSDERMAL
Status: DISCONTINUED | OUTPATIENT
Start: 2021-09-30 | End: 2021-10-04 | Stop reason: HOSPADM

## 2021-09-30 RX ORDER — HYDROMORPHONE HYDROCHLORIDE 1 MG/ML
0.25 INJECTION, SOLUTION INTRAMUSCULAR; INTRAVENOUS; SUBCUTANEOUS
Status: DISCONTINUED | OUTPATIENT
Start: 2021-09-30 | End: 2021-10-04 | Stop reason: HOSPADM

## 2021-09-30 RX ORDER — SODIUM CHLORIDE, SODIUM LACTATE, POTASSIUM CHLORIDE, CALCIUM CHLORIDE 600; 310; 30; 20 MG/100ML; MG/100ML; MG/100ML; MG/100ML
INJECTION, SOLUTION INTRAVENOUS CONTINUOUS
Status: DISCONTINUED | OUTPATIENT
Start: 2021-09-30 | End: 2021-09-30 | Stop reason: HOSPADM

## 2021-09-30 RX ORDER — DEXAMETHASONE SODIUM PHOSPHATE 4 MG/ML
4 INJECTION, SOLUTION INTRA-ARTICULAR; INTRALESIONAL; INTRAMUSCULAR; INTRAVENOUS; SOFT TISSUE
Status: DISCONTINUED | OUTPATIENT
Start: 2021-09-30 | End: 2021-10-04 | Stop reason: HOSPADM

## 2021-09-30 RX ORDER — DOCUSATE SODIUM 100 MG/1
100 CAPSULE, LIQUID FILLED ORAL 2 TIMES DAILY
Status: DISCONTINUED | OUTPATIENT
Start: 2021-09-30 | End: 2021-10-04 | Stop reason: HOSPADM

## 2021-09-30 RX ORDER — DEXAMETHASONE SODIUM PHOSPHATE 4 MG/ML
INJECTION, SOLUTION INTRA-ARTICULAR; INTRALESIONAL; INTRAMUSCULAR; INTRAVENOUS; SOFT TISSUE PRN
Status: DISCONTINUED | OUTPATIENT
Start: 2021-09-30 | End: 2021-09-30 | Stop reason: SURG

## 2021-09-30 RX ORDER — AMOXICILLIN 250 MG
1 CAPSULE ORAL NIGHTLY
Status: DISCONTINUED | OUTPATIENT
Start: 2021-09-30 | End: 2021-10-04 | Stop reason: HOSPADM

## 2021-09-30 RX ORDER — DIPHENHYDRAMINE HYDROCHLORIDE 50 MG/ML
12.5 INJECTION INTRAMUSCULAR; INTRAVENOUS
Status: DISCONTINUED | OUTPATIENT
Start: 2021-09-30 | End: 2021-09-30 | Stop reason: HOSPADM

## 2021-09-30 RX ORDER — ACETAMINOPHEN 500 MG
500-1000 TABLET ORAL EVERY 6 HOURS PRN
Qty: 30 TABLET | Refills: 0 | Status: SHIPPED | OUTPATIENT
Start: 2021-09-30 | End: 2023-07-04

## 2021-09-30 RX ORDER — ENEMA 19; 7 G/133ML; G/133ML
1 ENEMA RECTAL
Status: DISCONTINUED | OUTPATIENT
Start: 2021-09-30 | End: 2021-10-04 | Stop reason: HOSPADM

## 2021-09-30 RX ORDER — HALOPERIDOL 5 MG/ML
1 INJECTION INTRAMUSCULAR EVERY 6 HOURS PRN
Status: DISCONTINUED | OUTPATIENT
Start: 2021-09-30 | End: 2021-10-04 | Stop reason: HOSPADM

## 2021-09-30 RX ORDER — ASPIRIN 81 MG/1
81 TABLET, CHEWABLE ORAL
Qty: 56 TABLET | Refills: 0 | Status: SHIPPED | OUTPATIENT
Start: 2021-10-01 | End: 2021-10-01 | Stop reason: SDUPTHER

## 2021-09-30 RX ORDER — CEFAZOLIN SODIUM IN 0.9 % NACL 2 G/100 ML
2 PLASTIC BAG, INJECTION (ML) INTRAVENOUS ONCE
Status: COMPLETED | OUTPATIENT
Start: 2021-09-30 | End: 2021-09-30

## 2021-09-30 RX ORDER — ONDANSETRON 2 MG/ML
4 INJECTION INTRAMUSCULAR; INTRAVENOUS EVERY 4 HOURS PRN
Status: DISCONTINUED | OUTPATIENT
Start: 2021-09-30 | End: 2021-10-04 | Stop reason: HOSPADM

## 2021-09-30 RX ORDER — ONDANSETRON 2 MG/ML
INJECTION INTRAMUSCULAR; INTRAVENOUS PRN
Status: DISCONTINUED | OUTPATIENT
Start: 2021-09-30 | End: 2021-09-30 | Stop reason: SURG

## 2021-09-30 RX ORDER — KETOROLAC TROMETHAMINE 30 MG/ML
INJECTION, SOLUTION INTRAMUSCULAR; INTRAVENOUS
Status: DISCONTINUED | OUTPATIENT
Start: 2021-09-30 | End: 2021-09-30 | Stop reason: HOSPADM

## 2021-09-30 RX ORDER — POLYETHYLENE GLYCOL 3350 17 G/17G
1 POWDER, FOR SOLUTION ORAL DAILY
Status: DISCONTINUED | OUTPATIENT
Start: 2021-09-30 | End: 2021-10-04 | Stop reason: HOSPADM

## 2021-09-30 RX ORDER — OXYCODONE HYDROCHLORIDE 5 MG/1
5 TABLET ORAL
Status: DISCONTINUED | OUTPATIENT
Start: 2021-09-30 | End: 2021-10-04 | Stop reason: HOSPADM

## 2021-09-30 RX ADMIN — MAGNESIUM SULFATE IN WATER 1 G: 40 INJECTION, SOLUTION INTRAVENOUS at 12:42

## 2021-09-30 RX ADMIN — ACETAMINOPHEN 1000 MG: 500 TABLET, FILM COATED ORAL at 21:33

## 2021-09-30 RX ADMIN — TRANEXAMIC ACID 1000 MG: 100 INJECTION, SOLUTION INTRAVENOUS at 12:40

## 2021-09-30 RX ADMIN — DEXAMETHASONE SODIUM PHOSPHATE 4 MG: 4 INJECTION, SOLUTION INTRAMUSCULAR; INTRAVENOUS at 11:49

## 2021-09-30 RX ADMIN — CEFAZOLIN 2 G: 330 INJECTION, POWDER, FOR SOLUTION INTRAMUSCULAR; INTRAVENOUS at 11:22

## 2021-09-30 RX ADMIN — TRANEXAMIC ACID 1000 MG: 100 INJECTION, SOLUTION INTRAVENOUS at 11:24

## 2021-09-30 RX ADMIN — PROPOFOL 120 MG: 10 INJECTION, EMULSION INTRAVENOUS at 11:19

## 2021-09-30 RX ADMIN — ROCURONIUM BROMIDE 15 MG: 10 INJECTION, SOLUTION INTRAVENOUS at 12:00

## 2021-09-30 RX ADMIN — OMEPRAZOLE 40 MG: 20 CAPSULE, DELAYED RELEASE ORAL at 17:59

## 2021-09-30 RX ADMIN — SODIUM CHLORIDE, POTASSIUM CHLORIDE, SODIUM LACTATE AND CALCIUM CHLORIDE: 600; 310; 30; 20 INJECTION, SOLUTION INTRAVENOUS at 09:20

## 2021-09-30 RX ADMIN — Medication 100 MCG: at 12:35

## 2021-09-30 RX ADMIN — LIDOCAINE HYDROCHLORIDE 4 ML: 40 SOLUTION TOPICAL at 11:20

## 2021-09-30 RX ADMIN — SENNOSIDES AND DOCUSATE SODIUM 1 TABLET: 50; 8.6 TABLET ORAL at 21:34

## 2021-09-30 RX ADMIN — ACETAMINOPHEN 1000 MG: 500 TABLET, FILM COATED ORAL at 15:28

## 2021-09-30 RX ADMIN — ROCURONIUM BROMIDE 35 MG: 10 INJECTION, SOLUTION INTRAVENOUS at 11:19

## 2021-09-30 RX ADMIN — CEFAZOLIN 2 G: 1 INJECTION, POWDER, FOR SOLUTION INTRAVENOUS at 15:28

## 2021-09-30 RX ADMIN — GABAPENTIN 100 MG: 100 CAPSULE ORAL at 15:28

## 2021-09-30 RX ADMIN — EPHEDRINE SULFATE 10 MG: 50 INJECTION INTRAVENOUS at 13:00

## 2021-09-30 RX ADMIN — EPHEDRINE SULFATE 25 MG: 50 INJECTION INTRAVENOUS at 12:59

## 2021-09-30 RX ADMIN — POLYETHYLENE GLYCOL 3350 1 PACKET: 17 POWDER, FOR SOLUTION ORAL at 15:27

## 2021-09-30 RX ADMIN — MIDAZOLAM HYDROCHLORIDE 1 MG: 1 INJECTION, SOLUTION INTRAMUSCULAR; INTRAVENOUS at 11:19

## 2021-09-30 RX ADMIN — FENTANYL CITRATE 100 MCG: 50 INJECTION, SOLUTION INTRAMUSCULAR; INTRAVENOUS at 11:19

## 2021-09-30 RX ADMIN — ANASTROZOLE 1 MG: 1 TABLET, COATED ORAL at 15:30

## 2021-09-30 RX ADMIN — SUGAMMADEX 200 MG: 100 INJECTION, SOLUTION INTRAVENOUS at 12:39

## 2021-09-30 RX ADMIN — ONDANSETRON 4 MG: 2 INJECTION INTRAMUSCULAR; INTRAVENOUS at 12:39

## 2021-09-30 ASSESSMENT — COGNITIVE AND FUNCTIONAL STATUS - GENERAL
SUGGESTED CMS G CODE MODIFIER MOBILITY: CL
DAILY ACTIVITIY SCORE: 15
WALKING IN HOSPITAL ROOM: A LOT
PERSONAL GROOMING: A LITTLE
TURNING FROM BACK TO SIDE WHILE IN FLAT BAD: A LITTLE
STANDING UP FROM CHAIR USING ARMS: A LOT
MOVING TO AND FROM BED TO CHAIR: A LOT
DRESSING REGULAR UPPER BODY CLOTHING: A LOT
SUGGESTED CMS G CODE MODIFIER DAILY ACTIVITY: CK
DRESSING REGULAR LOWER BODY CLOTHING: A LOT
MOBILITY SCORE: 12
HELP NEEDED FOR BATHING: A LOT
CLIMB 3 TO 5 STEPS WITH RAILING: TOTAL
MOVING FROM LYING ON BACK TO SITTING ON SIDE OF FLAT BED: A LOT
TOILETING: A LOT

## 2021-09-30 ASSESSMENT — PAIN DESCRIPTION - PAIN TYPE
TYPE: SURGICAL PAIN
TYPE: ACUTE PAIN;SURGICAL PAIN
TYPE: ACUTE PAIN
TYPE: ACUTE PAIN;SURGICAL PAIN

## 2021-09-30 NOTE — ANESTHESIA POSTPROCEDURE EVALUATION
Patient: Rashmi Parra    Procedure Summary     Date: 09/30/21 Room / Location:  OR 05 / SURGERY [facility]    Anesthesia Start: 1117 Anesthesia Stop: 1253    Procedure: ARTHROPLASTY, HIP, TOTAL, ANTERIOR APPROACH (Right Hip) Diagnosis:       Primary localized osteoarthrosis of the hip, right      (Primary localized osteoarthrosis of the hip, right [M16.11])    Surgeons: Lewis Serrano M.D. Responsible Provider: Cuong Carmona M.D.    Anesthesia Type: general ASA Status: 3          Final Anesthesia Type: general  Last vitals  BP   Blood Pressure : 102/57    Temp   36.4 °C (97.5 °F)    Pulse   82   Resp   16    SpO2   92 %      Anesthesia Post Evaluation    Patient location during evaluation: PACU  Patient participation: complete - patient participated  Level of consciousness: awake and alert    Airway patency: patent  Anesthetic complications: no  Cardiovascular status: hemodynamically stable  Respiratory status: acceptable  Hydration status: euvolemic    PONV: none          No complications documented.     Nurse Pain Score: 3 (NPRS)

## 2021-09-30 NOTE — PROGRESS NOTES
"COVID-19 Surge In Effect    Assumed care of pt at 1440, pt admitted to room 223-1 from PACU. Pt is alert and oriented, on 10L O2 via oxymask per ERAS protocol. Assessment complete. R hip surgical dressing observed, CDI. Pt c/o R hip bein \"sore\", polar ice placed, SCD's on. Pt able to dorsi/plantar flex BLE, +2 pedal pulses palpated to BLE, denies any numbness/tingling at this time.    1500 Pt x2 assist to commode, pt states \"I feel so much weaker than before\". Pt unable to ambulate, only shuffle at this time. Pt tires easily, discussed plan to stay overnight with patient and family at bedside. Will update Dr. Serrano.  "

## 2021-09-30 NOTE — OR SURGEON
Immediate Post OP Note    PreOp Diagnosis: Right hip OA      PostOp Diagnosis: Same      Procedure(s):  ARTHROPLASTY, HIP, TOTAL, ANTERIOR APPROACH - Wound Class: Clean    Surgeon(s):  SOHEILA Garces M.D.    Anesthesiologist/Type of Anesthesia:  Anesthesiologist: Cuong Carmona M.D./General    Surgical Staff:  Circulator: Cholo Watkins R.N.; Dean Downs R.N.  Limb Duff: Katie Maki  Scrub Person: Jem Villarreal  Radiology Technologist: Barbara Jenkins; Leann Lomeli    Specimens removed if any:  * No specimens in log *    Estimated Blood Loss: 100cc    Findings: Right hip OA    Complications: None        9/30/2021 12:52 PM Domenic Martinez M.D.

## 2021-09-30 NOTE — OR NURSING
1320-received report from Edmund HOOPER. Patient  Awake, lying with eyes open, responding to questions. She remains very drowsy.     1330-xrays done at bedside. Changed to oxymask at 10L O2 per ERAS orders. Checked surgical dressing, CDI, surrounding tissue appears soft and no swelling noted.    1335-pts BP improving    1340-checked FSBS 137    1348-report to Jocelyn HOOPER

## 2021-09-30 NOTE — OR NURSING
1250 Pt arrived from OR post   ARTHROPLASTY, HIP, TOTAL, ANTERIOR APPROACH Right   , report received. Pt breathing spontaneous and unlabored with OPA. Dressing CDI. CMS intact.

## 2021-09-30 NOTE — LETTER
September 7, 2021    [facility]  [address] [address]  [phone]    Patient Name: Rashmi Parra  Surgeon Name: Surgeon(s):  SOHEILA Garces P.A.-C.  Surgery Facility: [facility] (Former[facility], [facility] [facility])  Surgery Date: 9/28/2021    The time of your surgery is not final and may change up to and until the day of your surgery. You will be contacted 24-48 hours prior to your surgery date with your check-in and surgery time.    If you will not be at one of the below numbers please call/text the surgery scheduler at [phone]  Cell Phone: [phone]    BEFORE YOUR SURGERY  Pre Registration and/or Lab Work must be done within and no earlier than 28 days prior to your surgery date. Please call [facility] @ [phone] for an appointment as soon as possible.      Please refrain from smoking any substance after midnight prior to surgery. Smoking may interfere with the anesthetic and frequently produces nausea during the recovery period.    Continue taking all lifesaving medications. Including the morning of your surgery with small sip of water.    Please read the MEDICATION INSTRUCTIONS below completely.    DAY OF YOUR SURGERY  Nothing to eat or drink after midnight     Please arrive at the hospital/surgery center at the check-in time provided.     An adult will need to bring you and take you home after your surgery.     AFTER YOUR SURGERY  Post op Appointment:   Date: 10/13/21   Time: 0945   With: DR MORAN   Location: [address] [place] [phone]    - No dental work for 3-6 months after your surgery.    TIME OFF WORK  FMLA or Disability forms can be faxed directly to: [phone] or you may drop them off at [address] [facility] [phone]. Our office charges a $20.00 fee per form. Forms will be completed within 10 business days of drop off and payment received. For the status of your forms you may contact  our disability office directly [phone].    MEDICATION INSTRUCTIONS  The following medications should be stopped a minimum of 10 days prior to surgery:  All over the counter, Supplements & Herbal medications    Anorectics: Phentermine (Adipex-P, Lomaira and Suprenza), Phentermine-topiramate (Qsymia), Bupropion-naltrexone (Contrave)    Opiod Partial Agonists/Opioid Antagonists: Buprenorphine (Subocone, Belbuca, Butrans, Probuphine Implant, Sublocade), Naltrexone (ReVia, Vivitrol), Naloxone    Amphetamines: Dextroamphetamine/Amphetamine (Adderall, Mydayis), Methylphenidate Hydrochloride (Concerta, Metadate, Methylin, Ritalin)    The following medications should be stopped 5 days prior to surgery:  Blood Thinners: Any Aspirin, Aspirin products, anti-inflammatories such as ibuprofen and any blood thinners such as Coumadin and Plavix. Please consult your prescribing physician if you are on life saving blood thinners, in regards to when to stop medications prior to surgery.     The following medications should be stopped a minimum of 3 days prior to surgery:  PDE-5 inhibitors: Sildenafil (Viagra), Tadalafil (Cialis), Vardenafil (Levitra), Avanafil (Stendra)    MAO Inhibitors: Rasagiline (Azilect), Selegiline (Eldepryl, Emsam, Selapar), Isocarboxazid (Marplan), Phenelzine (Nardil)

## 2021-09-30 NOTE — ANESTHESIA PREPROCEDURE EVALUATION
Relevant Problems   PULMONARY   (positive) Other emphysema (HCC)   (positive) Shortness of breath       Physical Exam    Airway   Mallampati: II  TM distance: >3 FB  Neck ROM: full       Cardiovascular - normal exam  Rhythm: regular  Rate: normal  (-) murmur     Dental - normal exam           Pulmonary - normal exam  Breath sounds clear to auscultation  (+) decreased breath sounds     Abdominal    Neurological - normal exam                 Anesthesia Plan    ASA 3   ASA physical status 3 criteria: COPD    Plan - general       Airway plan will be ETT        Plan Factors:   Patient was previously instructed to abstain from smoking on day of procedure.  Patient did not smoke on day of procedure.      Induction: intravenous    Postoperative Plan: Postoperative administration of opioids is intended.        Informed Consent:    Anesthetic plan and risks discussed with patient.    Use of blood products discussed with: patient whom.

## 2021-09-30 NOTE — DISCHARGE PLANNING
Anticipated Discharge Disposition: Home    Action: LSW completed chart review. Per d/c planning note, pt has all needed equipment including front-wheel walker and raised toilet seat. No d/c needs reported or identified at this time.    Barriers to Discharge: None    Plan: LSW to follow and assist as needed.

## 2021-09-30 NOTE — OR NURSING
1357 Pt  updated on progress, POC and ETA for move to room.    1406 Pt states pain is controled and tolerable, denies nausea. Pt tolerating PO fluids. Report to Twyla HOOPER. Pt placed on transport list.    1434 Pt taken to room by transport in stable condition on 10 lpm O2 via oxymask with portable tank @ full psi.

## 2021-09-30 NOTE — ANESTHESIA TIME REPORT
Anesthesia Start and Stop Event Times     Date Time Event    9/30/2021 1111 Ready for Procedure     1117 Anesthesia Start     1253 Anesthesia Stop        Responsible Staff  09/30/21    Name Role Begin End    Cuong Carmona M.D. Anesth 1117 1253        Preop Diagnosis (Free Text):  Pre-op Diagnosis     Primary localized osteoarthrosis of the hip, right [M16.11]        Preop Diagnosis (Codes):  Diagnosis Information     Diagnosis Code(s): Primary localized osteoarthrosis of the hip, right [M16.11]        Premium Reason  Non-Premium    Comments:

## 2021-09-30 NOTE — OP REPORT
DIAGNOSIS: Osteoarthritis, right hip.    PROCEDURE: right Total hip arthroplasty.    ANESTHESIA: General.    COMPLICATIONS: None.    SURGEON: Lewis Serrano MD.    ASSISTANT: Domenic Martinez    INDICATIONS: This is a patient with severe osteoarthritis causing pain,   having failed conservative treatments.    DESCRIPTION OF PROCEDURE: Patient was identified in the preop area, site was   marked, taken back to the operating room and underwent general anesthesia.   right lower extremity was prepped and draped in sterile manner. Preoperative   timeout was held and antibiotics were given. Incision was made coming off the  ASIS. Soft tissue dissected down to fascia. Fascia was split in line with   the tensor. Tensor was retracted laterally. Deep fascia was incised and   vessels were ligated. A capsulotomy was performed and then an osteotomy of   the femoral neck. The acetabulum was then reamed up to a 54 and a 54 R3 cluster   hole cup by Smith and Nephew was placed. A liner was placed for a 36 head.   Osteophytes were debrided and then the femur was externally rotated and   extended. This was then broached up to a size 1, and a 1 standard offset polar stem  by Smith and Nephew was placed. Final trialing showed equal leg lengths with  a -3 head, the -3 36 Oxinium head by Smith and Nephew was placed. Wound was   soaked with dilute Betadine solution and was injected with Marcaine. Vicryl   was used for the fascia, Monocryl soft tissue skin and Dermabond for the final  skin layer. Patient was woken up, taken back to PACU, will be weightbear as   tolerated.

## 2021-09-30 NOTE — ANESTHESIA PROCEDURE NOTES
Airway    Date/Time: 9/30/2021 11:20 AM  Performed by: Cuong Carmona M.D.  Authorized by: Cuong Carmona M.D.     Location:  OR  Urgency:  Elective  Indications for Airway Management:  Anesthesia      Spontaneous Ventilation: absent    Sedation Level:  Deep  Preoxygenated: Yes    Patient Position:  Sniffing  Final Airway Type:  Endotracheal airway  Final Endotracheal Airway:  ETT  Cuffed: Yes    Technique Used for Successful ETT Placement:  Direct laryngoscopy    Insertion Site:  Oral  Blade Type:  Venkat  Laryngoscope Blade/Videolaryngoscope Blade Size:  3  ETT Size (mm):  6.5  Measured from:  Teeth  ETT to Teeth (cm):  22  Placement Verified by: auscultation and capnometry    Cormack-Lehane Classification:  Grade I - full view of glottis  Number of Attempts at Approach:  1

## 2021-10-01 PROCEDURE — G0378 HOSPITAL OBSERVATION PER HR: HCPCS

## 2021-10-01 PROCEDURE — 97165 OT EVAL LOW COMPLEX 30 MIN: CPT

## 2021-10-01 PROCEDURE — 700102 HCHG RX REV CODE 250 W/ 637 OVERRIDE(OP): Performed by: STUDENT IN AN ORGANIZED HEALTH CARE EDUCATION/TRAINING PROGRAM

## 2021-10-01 PROCEDURE — A9270 NON-COVERED ITEM OR SERVICE: HCPCS | Performed by: STUDENT IN AN ORGANIZED HEALTH CARE EDUCATION/TRAINING PROGRAM

## 2021-10-01 PROCEDURE — 97162 PT EVAL MOD COMPLEX 30 MIN: CPT

## 2021-10-01 PROCEDURE — 700102 HCHG RX REV CODE 250 W/ 637 OVERRIDE(OP): Performed by: ORTHOPAEDIC SURGERY

## 2021-10-01 PROCEDURE — 97535 SELF CARE MNGMENT TRAINING: CPT

## 2021-10-01 PROCEDURE — A9270 NON-COVERED ITEM OR SERVICE: HCPCS | Performed by: ORTHOPAEDIC SURGERY

## 2021-10-01 PROCEDURE — 94760 N-INVAS EAR/PLS OXIMETRY 1: CPT

## 2021-10-01 RX ORDER — ASPIRIN 81 MG/1
81 TABLET, CHEWABLE ORAL
Qty: 90 TABLET | Refills: 0 | Status: SHIPPED | OUTPATIENT
Start: 2021-10-01 | End: 2021-11-15

## 2021-10-01 RX ORDER — HYDROCODONE BITARTRATE AND ACETAMINOPHEN 5; 325 MG/1; MG/1
1 TABLET ORAL EVERY 4 HOURS PRN
Qty: 42 TABLET | Refills: 0 | Status: SHIPPED | OUTPATIENT
Start: 2021-10-01 | End: 2021-10-08

## 2021-10-01 RX ADMIN — ANASTROZOLE 1 MG: 1 TABLET, COATED ORAL at 06:01

## 2021-10-01 RX ADMIN — GABAPENTIN 100 MG: 100 CAPSULE ORAL at 11:26

## 2021-10-01 RX ADMIN — METFORMIN HYDROCHLORIDE 500 MG: 500 TABLET ORAL at 06:01

## 2021-10-01 RX ADMIN — OMEPRAZOLE 40 MG: 20 CAPSULE, DELAYED RELEASE ORAL at 17:05

## 2021-10-01 RX ADMIN — DULOXETINE HYDROCHLORIDE 60 MG: 30 CAPSULE, DELAYED RELEASE ORAL at 06:01

## 2021-10-01 RX ADMIN — GABAPENTIN 100 MG: 100 CAPSULE ORAL at 06:01

## 2021-10-01 RX ADMIN — POLYETHYLENE GLYCOL 3350 1 PACKET: 17 POWDER, FOR SOLUTION ORAL at 06:03

## 2021-10-01 RX ADMIN — ACETAMINOPHEN 1000 MG: 500 TABLET, FILM COATED ORAL at 11:26

## 2021-10-01 RX ADMIN — GABAPENTIN 100 MG: 100 CAPSULE ORAL at 17:06

## 2021-10-01 RX ADMIN — ASPIRIN 81 MG: 81 TABLET, COATED ORAL at 01:38

## 2021-10-01 RX ADMIN — ASPIRIN 81 MG: 81 TABLET, COATED ORAL at 12:33

## 2021-10-01 RX ADMIN — DOCUSATE SODIUM 100 MG: 100 CAPSULE, LIQUID FILLED ORAL at 17:05

## 2021-10-01 RX ADMIN — ACETAMINOPHEN 1000 MG: 500 TABLET, FILM COATED ORAL at 17:05

## 2021-10-01 RX ADMIN — ACETAMINOPHEN 1000 MG: 500 TABLET, FILM COATED ORAL at 06:01

## 2021-10-01 ASSESSMENT — COGNITIVE AND FUNCTIONAL STATUS - GENERAL
MOBILITY SCORE: 10
TOILETING: A LITTLE
SUGGESTED CMS G CODE MODIFIER DAILY ACTIVITY: CK
CLIMB 3 TO 5 STEPS WITH RAILING: TOTAL
MOVING FROM LYING ON BACK TO SITTING ON SIDE OF FLAT BED: A LOT
MOVING TO AND FROM BED TO CHAIR: UNABLE
DRESSING REGULAR LOWER BODY CLOTHING: A LOT
STANDING UP FROM CHAIR USING ARMS: A LITTLE
DAILY ACTIVITIY SCORE: 18
PERSONAL GROOMING: A LITTLE
TURNING FROM BACK TO SIDE WHILE IN FLAT BAD: UNABLE
SUGGESTED CMS G CODE MODIFIER MOBILITY: CL
HELP NEEDED FOR BATHING: A LOT
WALKING IN HOSPITAL ROOM: A LOT

## 2021-10-01 ASSESSMENT — GAIT ASSESSMENTS
GAIT LEVEL OF ASSIST: MINIMAL ASSIST
ASSISTIVE DEVICE: FRONT WHEEL WALKER
DISTANCE (FEET): 15

## 2021-10-01 ASSESSMENT — PAIN DESCRIPTION - PAIN TYPE
TYPE: SURGICAL PAIN

## 2021-10-01 ASSESSMENT — ACTIVITIES OF DAILY LIVING (ADL): TOILETING: INDEPENDENT

## 2021-10-01 NOTE — FACE TO FACE
Face to Face Supporting Documentation - Home Health    The encounter with this patient was in whole or in part the primary reason for home health admission.    Date of encounter:   Patient:                    MRN:                       YOB: 2022  Rashmi Parra  [id number]  1940     Home health to see patient for:  Comment: PT/OT/ HH aide    Skilled need for:  Surgical Aftercare BAKARI    Skilled nursing interventions to include:  Comment: none    Homebound status evidenced by:  Needs the assistance of another person in order to leave the home. Leaving home requires a considerable and taxing effort. There is a normal inability to leave the home.    Community Physician to provide follow up care: DAYANA Zaman     Optional Interventions? No      I certify the face to face encounter for this home health care referral meets the CMS requirements and the encounter/clinical assessment with the patient was, in whole, or in part, for the medical condition(s) listed above, which is the primary reason for home health care. Based on my clinical findings: the service(s) are medically necessary, support the need for home health care, and the homebound criteria are met.  I certify that this patient has had a face to face encounter by myself.  Roya Griffin P.A.-C. - NPI: [id number]

## 2021-10-01 NOTE — PROGRESS NOTES
"\"Covid 19 in surge\"      Bedside report received from day RN. Pt is AAO x 4.  Pt denies pain, N/V. POC discussed.Pt understands the plan to ambulate 50ft by midnight and urinate within 6hrs.Pt voided but unable to walk due to weakness and unstable.Evening assessment completed. CMS intact with good pulses. Polar ice and SCD's on to the Right Hip.Pt uses IS well. Surgical dressing is clean dry and intact. All needs met at this time.Bed in low position.Call light within reach.Rounding in place.Bed alarm is on.  "

## 2021-10-01 NOTE — PROGRESS NOTES
"Patient seen and examined  Reports feeling better today.  Was slow to progress according to PT notes.     /47   Pulse 64   Temp 36.6 °C (97.9 °F) (Oral)   Resp 18   Ht 1.6 m (5' 3\")   Wt 68.2 kg (150 lb 5.7 oz)   SpO2 94%           No acute distress  Dressing clean dry and intact  Neurovascularly intact    POD#1 BAKARI    Plan:  DVT Prophylaxis- TEDS/SCDs,  ASA 81 mg PO BID   Weight Bearing Status-wbat  PT/OT  Case Coordination  Patient has daughter that may be available to assist with addition of home help services.   Otherwise if patient's daughter is not amenable to this then we will discharge to SNF.   Medically cleared for discharge when arrangements decided.             "

## 2021-10-01 NOTE — THERAPY
Physical Therapy   Initial Evaluation     Patient Name: Rashmi Parra  Age:  81 y.o., Sex:  female  Medical Record #: [id number]  Today's Date: 10/1/2021     Precautions  Precautions: (P) Fall Risk;Anterior Hip Precautions;Weight Bearing As Tolerated Right Lower Extremity    Assessment  Patient is 81 y.o. female who was seen s/p R BAKARI with anterior hip precautions and WBAT for R LE. Pt presented to PT with impaired balance, impaired gait, impaired coordination, poor motor planning, poor safety awareness/insight into deficits, and dec activity tolerance. Pt was primarily limited due to poor balance and weakness. Pt demonstrated with tremulous activity in B LE as fatigue increased. With current functional impairments pt is a high fall risk and it is limiting her ability to safely perform bed mobility, sit<>stands, transfers, ambulation, and stairs. Pt is high motivated and was making progress during session and was provided with therapeutic exercises and able to perform sit<>stands, marching, sides steps, and eventually ambulate for 10 ft. Given high motivation and quick progression anticipate pt to benefit from post acute rehab as pt appears to be able to tolerate intensive therapy sessions.     Plan    Recommend Physical Therapy daily until therapy goals are met for the following treatments:  Bed Mobility, Community Re-integration, Equipment, Gait Training, Manual Therapy, Neuro Re-Education / Balance, Self Care/Home Evaluation, Stair Training, Therapeutic Activities and Therapeutic Exercises    DC Equipment Recommendations: (P) Unable to determine at this time  Discharge Recommendations: (P) Recommend post-acute placement for additional physical therapy services prior to discharge home     Objective       10/01/21 1130   Initial Contact Note    Initial Contact Note Order Received and Verified, Physical Therapy Evaluation in Progress with Full Report to Follow.   Precautions   Precautions Fall Risk;Anterior Hip  Precautions;Weight Bearing As Tolerated Right Lower Extremity   Vitals   O2 (LPM) 3   O2 Delivery Device Silicone Nasal Cannula   Vitals Comments pt states she needs it during the day, however, does not wear it    Pain 0 - 10 Group   Location Hip   Location Orientation Right   Description Sore   Therapist Pain Assessment Prior to Activity;During Activity;Post Activity;Nurse Notified;2   Prior Living Situation   Prior Services None   Housing / Facility 1 Story House   Steps Into Home 2   Steps In Home 0   Equipment Owned Front-Wheel Walker;4-Wheel Walker;Hospital Bed   Lives with - Patient's Self Care Capacity Spouse   Comments spouse has parkinsons and is unable to assist upon d/c to home; pt stated dtr may be able to assist, however this is unclear   Prior Level of Functional Mobility   Bed Mobility Independent   Transfer Status Independent   Ambulation Independent   Distance Ambulation (Feet)   (community )   Assistive Devices Used 4-Wheel Walker   Stairs Independent   Comments pt reports of an IPLOF at home    History of Falls   History of Falls No   Cognition    Cognition / Consciousness X   Safety Awareness Impaired   Attention Impaired   Comments presents with poor insight into current deficits; however very motivated    Strength Lower Body   Lower Body Strength  X   Comments presents with gross strength of 3+/5 in B LE; presents with tremulous activity in B LE   Sensation Lower Body   Lower Extremity Sensation   WDL   Lower Body Muscle Tone   Lower Body Muscle Tone  WDL   Strength Upper Body   Upper Body Strength  WDL   Upper Body Muscle Tone   Upper Body Muscle Tone  WDL   Neurological Concerns   Neurological Concerns No   Coordination Lower Body    Coordination Lower Body  X   Comments presents with poor foot placement and initiation during ambualtion with tremulous activity in B LE    Balance Assessment   Sitting Balance (Static) Fair   Sitting Balance (Dynamic) Fair -   Standing Balance (Static) Fair -    Standing Balance (Dynamic) Poor +   Weight Shift Sitting Fair   Weight Shift Standing Poor   Comments w/fww    Gait Analysis   Gait Level Of Assist Minimal Assist   Assistive Device Front Wheel Walker   Distance (Feet) 15   # of Times Distance was Traveled 1   Deviation Antalgic;Decreased Base Of Support;Step To;Shuffled Gait   Weight Bearing Status WBAT R LE    Comments cues for using B UE to offload B LE; cues for FWW placement and correct use and upright posture during ambulation    Bed Mobility    Supine to Sit Moderate Assist   Scooting Moderate Assist  (Min A after sit<>stands )   Comments HOB elevated and rails up    Functional Mobility   Sit to Stand Moderate Assist  (Min A after multiple attempts and cues )   Bed, Chair, Wheelchair Transfer Minimal Assist   Transfer Method Stand Step   Mobility EOB, sit<>stand, marching, side steps, transfer to chair, ambulation, back to chair    Comments cues throughout session for handplacement and sit<>stand set up    How much difficulty does the patient currently have...   Turning over in bed (including adjusting bedclothes, sheets and blankets)? 1   Sitting down on and standing up from a chair with arms (e.g., wheelchair, bedside commode, etc.) 2   Moving from lying on back to sitting on the side of the bed? 1   How much help from another person does the patient currently need...   Moving to and from a bed to a chair (including a wheelchair)? 3   Need to walk in a hospital room? 2   Climbing 3-5 steps with a railing? 1   6 clicks Mobility Score 10   Activity Tolerance   Sitting in Chair 25 mins   Sitting Edge of Bed 10 mins    Standing 3 mins x 5    Comments pt limited due to fatigue and weakness    Edema / Skin Assessment   Edema / Skin  Not Assessed   Patient / Family Goals    Patient / Family Goal #1 to go home    Short Term Goals    Short Term Goal # 1 pt will go supine<>sit w/hob elevated and rails down w/spv in 6tx for safe d/c home    Short Term Goal # 2 pt  will go sit<>stand w/fww w/spv in 6tx for safe d/c home   Short Term Goal # 3 pt will transfer bed<>chair w/fww w/spv in 6tx for safe d/c home    Short Term Goal # 4 pt will ambulate for 150ft w/fww w/spv in 6tx for safe d/c home    Short Term Goal # 5 pt will go up/down 2 steps w/spv in 6tx for safe d/c home    Education Group   Hip Precautions Anterior Patient Response Patient;Nonacceptance;Handout;Demonstration;Explanation;Verbal Demonstration   Role of Physical Therapist Patient Response Patient;Acceptance;Explanation;Demonstration;Verbal Demonstration   Exercises - Supine Patient Response Patient;Acceptance;Explanation;Demonstration;Verbal Demonstration;Action Demonstration;Handout   Problem List    Problems Impaired Bed Mobility;Impaired Transfers;Impaired Ambulation;Functional Strength Deficit;Impaired Balance;Impaired Coordination;Decreased Activity Tolerance;Safety Awareness Deficits / Cognition   Anticipated Discharge Equipment and Recommendations   DC Equipment Recommendations Unable to determine at this time   Discharge Recommendations Recommend post-acute placement for additional physical therapy services prior to discharge home   Interdisciplinary Plan of Care Collaboration   IDT Collaboration with  Nursing   Patient Position at End of Therapy Seated;Chair Alarm On;Call Light within Reach;Tray Table within Reach;Phone within Reach   Collaboration Comments aware of visit and recs    Session Information   Date / Session Number  10/1-1 (1/7, 10/7)   Priority 4  (SNF vs. Home ? )

## 2021-10-01 NOTE — THERAPY
Physical Therapy Education       09/30/21 [id number]   Interdisciplinary Plan of Care Collaboration   IDT Collaboration with  Nursing   Patient Position at End of Therapy In Bed   Collaboration Comments Deferred full evaluation and upright mobility at this time as pt recently mobilized with [facility] staff and required 2 person assist. Deferred due to safety concerns due to lethargy and risk of falling. Pt was provided with education on supine therapeutic exercises and anterior hip precautions. Pt provided with cues, demo, and facilitation to promote correct mechanics with supine therapeutic exercises. Pt provided with demo to maintain hip precautions with bed mobility and upright ambulation. Pt appears to understand current restrictions and was provided with handouts. Will plan on conducting formal evaluation 10/1.

## 2021-10-01 NOTE — PROGRESS NOTES
Covid-19 surge in effect    Received bedside report. Assumed pt care. Chart check complete. Pt is AAOX4, no signs of distress, denies nausea/vomiting and pain at this moment. Dressing at RLE CDI, with dorsi flex and plantar flex, +2 pulses. Ice pack and SCD's in placed. Educated for IS and deep breathing exercises. Fall precautions in place, treaded socks on pt, bed in low position. Call light within reach. Educated pt to call if needing anything.

## 2021-10-01 NOTE — DISCHARGE SUMMARY
Discharge Summary    CHIEF COMPLAINT ON ADMISSION  Right hip pain    Reason for Admission  Primary localized osteoarthrosis right hip     CODE STATUS  Full Code    HPI & HOSPITAL COURSE  This is a 81 y.o. female here with arthritic right hip pain, failed conservative measures.   She was indicated for R BAKARI and that was performed by Dr. Lewis Serrano on 9/30/21 without complications.   Patient has been stable post operatively but slow to progress and unsteady.   With no help at home it is decided SNF placement was best option.    She is WBAT, no precautions.     Therefore, she is discharged in fair and stable condition to skilled nursing facility.    The patient met 2-midnight criteria for an inpatient stay at the time of discharge.      FOLLOW UP ITEMS POST DISCHARGE  Keep dressing on until f/u in 2 weeks at [facility]. Ok to get wet in shower.   WBAT    DISCHARGE DIAGNOSES  Principal Problem:    Localized primary osteoarthritis of lower leg POA: Unknown  Resolved Problems:    * No resolved hospital problems. *      FOLLOW UP  Future Appointments   Date Time Provider Department Center   10/13/2021  9:45 AM Lewis Serrano M.D. [facility]   11/11/2021  2:30 PM DAYANA Zaman [facility] DEBORAH [place]   2/22/2022  2:30 PM PFT-RM2 [facility] None   3/3/2022  2:10 PM Leona Osorio M.D. [facility] None     DAYANA Zaman  [id number]  [facility] [id number] [phone]      Please call your Primary Care Provider to schedule an appointment as needed.      MEDICATIONS ON DISCHARGE     Medication List      START taking these medications      Instructions   aspirin 81 MG Chew chewable tablet  Commonly known as: Aspirin 81   Doctor's comments: For DVT prophylaxis  Chew 1 Tablet 2 times a day for 45 days.  Dose: 81 mg     HYDROcodone-acetaminophen 5-325 MG Tabs per tablet  Commonly known as: Norco   Take 1 Tablet by mouth every four hours as needed for up to 7 days.  Dose: 1 Tablet        CHANGE how you take  these medications      Instructions   * acetaminophen 500 MG Tabs  What changed: Another medication with the same name was added. Make sure you understand how and when to take each.  Commonly known as: TYLENOL   Take 500 mg by mouth every 6 hours as needed.  Dose: 500 mg     * acetaminophen 500 MG Tabs  What changed: You were already taking a medication with the same name, and this prescription was added. Make sure you understand how and when to take each.  Commonly known as: TYLENOL   Take 1-2 Tablets by mouth every 6 hours as needed.  Dose: 500-1,000 mg         * This list has 2 medication(s) that are the same as other medications prescribed for you. Read the directions carefully, and ask your doctor or other care provider to review them with you.            CONTINUE taking these medications      Instructions   anastrozole 1 MG Tabs  Commonly known as: ARIMIDEX   Take 1 mg by mouth.  Dose: 1 mg     B COMPLEX PO   Take 1 Tab by mouth every day.  Dose: 1 Tablet     CITRACAL PO   Take 1 Tab by mouth every day.  Dose: 1 Tablet     Dexilant 60 MG Cpdr delayed-release capsule  Generic drug: Dexlansoprazole   Take 60 mg by mouth every evening. Indications: Gastroesophageal Reflux Disease  Dose: 60 mg     * DULoxetine 60 MG Cpep delayed-release capsule  Commonly known as: CYMBALTA   Take 60 mg by mouth every day.  Dose: 60 mg     * DULoxetine 30 MG Cpep  Commonly known as: CYMBALTA   Take 1 capsule by mouth every day. TOTAL 90 MG DAILY  Dose: 30 mg     gabapentin 100 MG Caps  Commonly known as: NEURONTIN   Take 100 mg by mouth.  Dose: 100 mg     ibuprofen 200 MG Tabs  Commonly known as: MOTRIN   Take 200 mg by mouth every 6 hours as needed for Mild Pain.  Dose: 200 mg     lovastatin 20 MG Tabs  Commonly known as: MEVACOR   Take 20 mg by mouth every evening.  Dose: 20 mg     MAGNESIUM PO   Take 1 capsule by mouth as needed.  Dose: 1 Capsule     melatonin 5 mg Tabs   Take 0.25 Tabs by mouth every bedtime.  Dose: 0.25  Tablet     metFORMIN 500 MG Tabs  Commonly known as: GLUCOPHAGE   Take 1 tablet by mouth every day.  Dose: 500 mg     MULTIVITAMIN PO   Take 1 Tab by mouth every day.  Dose: 1 Tablet     Myrbetriq 50 MG Tb24  Generic drug: Mirabegron ER   TAKE 1 TABLET EVERY EVENINGFOR FREQUENT URINATION,    URINARY INCONTINENCE,      URINARY URGENCY.     polyethylene glycol/lytes 17 g Pack  Commonly known as: MIRALAX   Take 17 g by mouth every day.  Dose: 17 g     VITAMIN D PO   Take  by mouth.     Xiidra 5 % Soln  Generic drug: Lifitegrast   Place 1 Drop in both eyes every bedtime.  Dose: 1 Drop         * This list has 2 medication(s) that are the same as other medications prescribed for you. Read the directions carefully, and ask your doctor or other care provider to review them with you.                Allergies  No Known Allergies    DIET  Orders Placed This Encounter   Procedures   • Diet Order Diet: Regular     Standing Status:   Standing     Number of Occurrences:   1     Order Specific Question:   Diet:     Answer:   Regular [1]       ACTIVITY  As tolerated.  Weight bearing as tolerated    LINES, DRAINS, AND WOUNDS  This is an automated list. Peripheral IVs will be removed prior to discharge.  Peripheral IV 09/30/21 20 G Posterior;Right Hand (Active)   Site Assessment Clean;Dry;Intact 10/01/21 0800   Dressing Type Occlusive;Transparent 10/01/21 0800   Line Status Scrubbed the hub prior to access;Flushed;Infusing 10/01/21 0800   Dressing Status Clean;Dry;Intact 10/01/21 0800   Dressing Intervention N/A 10/01/21 0800   Date Primary Tubing Changed 09/30/21 09/30/21 1600   Date Secondary Tubing Changed 09/30/21 09/30/21 1600   Infiltration Grading ([facility], [facility]) 0 10/01/21 0800   Phlebitis Scale ([facility] Only) 0 10/01/21 0800       Wound 09/30/21 Incision Hip Right dermabond, zipline, acticoat, 4x4, tegaderm (Active)   Site Assessment RENÉE 10/01/21 0800   Periwound Assessment RENÉE 10/01/21 0800   Margins RENÉE 10/01/21 0800    Closure RENÉE 10/01/21 0800   Drainage Amount None 10/01/21 0800   Treatments Ice applied 10/01/21 0800   Dressing Options Transparent Film;Dry Gauze 10/01/21 0800   Dressing Changed Observed 10/01/21 0800   Dressing Status Intact;Dry;Clean 10/01/21 0800       Peripheral IV 09/30/21 20 G Posterior;Right Hand (Active)   Site Assessment Clean;Dry;Intact 10/01/21 0800   Dressing Type Occlusive;Transparent 10/01/21 0800   Line Status Scrubbed the hub prior to access;Flushed;Infusing 10/01/21 0800   Dressing Status Clean;Dry;Intact 10/01/21 0800   Dressing Intervention N/A 10/01/21 0800   Date Primary Tubing Changed 09/30/21 09/30/21 1600   Date Secondary Tubing Changed 09/30/21 09/30/21 1600   Infiltration Grading ([facility], [facility]) 0 10/01/21 0800   Phlebitis Scale ([facility] Only) 0 10/01/21 0800               MENTAL STATUS ON TRANSFER   A&O x 4    PROCEDURES  Right BAKARI 9/30/21    LABORATORY  Lab Results   Component Value Date    SODIUM 140 09/27/2021    POTASSIUM 4.5 09/27/2021    CHLORIDE 102 09/27/2021    CO2 26 09/27/2021    GLUCOSE 94 09/27/2021    BUN 30 (H) 09/27/2021    CREATININE 0.70 09/27/2021        Lab Results   Component Value Date    WBC 6.9 09/27/2021    HEMOGLOBIN 14.3 09/27/2021    HEMATOCRIT 44.1 09/27/2021    PLATELETCT 220 09/27/2021

## 2021-10-02 PROCEDURE — 97530 THERAPEUTIC ACTIVITIES: CPT

## 2021-10-02 PROCEDURE — 97116 GAIT TRAINING THERAPY: CPT

## 2021-10-02 PROCEDURE — G0378 HOSPITAL OBSERVATION PER HR: HCPCS

## 2021-10-02 PROCEDURE — 700102 HCHG RX REV CODE 250 W/ 637 OVERRIDE(OP): Performed by: ORTHOPAEDIC SURGERY

## 2021-10-02 PROCEDURE — A9270 NON-COVERED ITEM OR SERVICE: HCPCS | Performed by: ORTHOPAEDIC SURGERY

## 2021-10-02 PROCEDURE — 700102 HCHG RX REV CODE 250 W/ 637 OVERRIDE(OP): Performed by: STUDENT IN AN ORGANIZED HEALTH CARE EDUCATION/TRAINING PROGRAM

## 2021-10-02 PROCEDURE — A9270 NON-COVERED ITEM OR SERVICE: HCPCS | Performed by: STUDENT IN AN ORGANIZED HEALTH CARE EDUCATION/TRAINING PROGRAM

## 2021-10-02 PROCEDURE — 94760 N-INVAS EAR/PLS OXIMETRY 1: CPT

## 2021-10-02 RX ADMIN — DULOXETINE HYDROCHLORIDE 60 MG: 30 CAPSULE, DELAYED RELEASE ORAL at 05:14

## 2021-10-02 RX ADMIN — ACETAMINOPHEN 1000 MG: 500 TABLET, FILM COATED ORAL at 17:32

## 2021-10-02 RX ADMIN — GABAPENTIN 100 MG: 100 CAPSULE ORAL at 11:17

## 2021-10-02 RX ADMIN — ASPIRIN 81 MG: 81 TABLET, COATED ORAL at 01:28

## 2021-10-02 RX ADMIN — DOCUSATE SODIUM 100 MG: 100 CAPSULE, LIQUID FILLED ORAL at 05:14

## 2021-10-02 RX ADMIN — SENNOSIDES AND DOCUSATE SODIUM 1 TABLET: 50; 8.6 TABLET ORAL at 01:28

## 2021-10-02 RX ADMIN — ASPIRIN 81 MG: 81 TABLET, COATED ORAL at 12:44

## 2021-10-02 RX ADMIN — ACETAMINOPHEN 1000 MG: 500 TABLET, FILM COATED ORAL at 01:28

## 2021-10-02 RX ADMIN — ACETAMINOPHEN 1000 MG: 500 TABLET, FILM COATED ORAL at 05:14

## 2021-10-02 RX ADMIN — ACETAMINOPHEN 1000 MG: 500 TABLET, FILM COATED ORAL at 11:17

## 2021-10-02 RX ADMIN — METFORMIN HYDROCHLORIDE 500 MG: 500 TABLET ORAL at 08:06

## 2021-10-02 RX ADMIN — DOCUSATE SODIUM 100 MG: 100 CAPSULE, LIQUID FILLED ORAL at 17:32

## 2021-10-02 RX ADMIN — GABAPENTIN 100 MG: 100 CAPSULE ORAL at 05:14

## 2021-10-02 RX ADMIN — POLYETHYLENE GLYCOL 3350 1 PACKET: 17 POWDER, FOR SOLUTION ORAL at 05:14

## 2021-10-02 RX ADMIN — GABAPENTIN 100 MG: 100 CAPSULE ORAL at 17:32

## 2021-10-02 RX ADMIN — OMEPRAZOLE 40 MG: 20 CAPSULE, DELAYED RELEASE ORAL at 17:32

## 2021-10-02 RX ADMIN — ANASTROZOLE 1 MG: 1 TABLET, COATED ORAL at 05:14

## 2021-10-02 ASSESSMENT — COGNITIVE AND FUNCTIONAL STATUS - GENERAL
WALKING IN HOSPITAL ROOM: A LITTLE
MOVING FROM LYING ON BACK TO SITTING ON SIDE OF FLAT BED: A LOT
MOVING TO AND FROM BED TO CHAIR: A LITTLE
SUGGESTED CMS G CODE MODIFIER MOBILITY: CK
TURNING FROM BACK TO SIDE WHILE IN FLAT BAD: A LITTLE
CLIMB 3 TO 5 STEPS WITH RAILING: A LOT
MOBILITY SCORE: 16
STANDING UP FROM CHAIR USING ARMS: A LITTLE

## 2021-10-02 ASSESSMENT — GAIT ASSESSMENTS
GAIT LEVEL OF ASSIST: MINIMAL ASSIST
DISTANCE (FEET): 25
ASSISTIVE DEVICE: FRONT WHEEL WALKER
DEVIATION: ANTALGIC;STEP TO

## 2021-10-02 ASSESSMENT — PAIN DESCRIPTION - PAIN TYPE
TYPE: SURGICAL PAIN
TYPE: SURGICAL PAIN

## 2021-10-02 NOTE — DISCHARGE PLANNING
Received Choice form at 1154  Agency/Facility Name: [facility]  Referral sent per Choice form @ 0774

## 2021-10-02 NOTE — PROGRESS NOTES
"10/2/2021    Rashmi Parra    .S: No complaints    O:  /45   Pulse 80   Temp 37.2 °C (98.9 °F) (Temporal)   Resp 18   Ht 1.6 m (5' 3\")   Wt 68.2 kg (150 lb 5.7 oz)   SpO2 97%           No results for input(s): SODIUM, POTASSIUM, CHLORIDE, CO2, GLUCOSE, BUN, CPKTOTAL in the last 72 hours.    Intake/Output Summary (Last 24 hours) at 10/2/2021 0801  Last data filed at 10/2/2021 0459  Gross per 24 hour   Intake 120 ml   Output 900 ml   Net -780 ml     I have reviewed the relevant vascular and radiology studies.  Dressings intact  E/Fhl intact    A:   Active Hospital Problems    Diagnosis    • Localized primary osteoarthritis of lower leg [M17.10]          P: mobilize  D/c planning  "

## 2021-10-02 NOTE — THERAPY
Occupational Therapy   Initial Evaluation     Patient Name: Rashmi Parra  Age:  81 y.o., Sex:  female  Medical Record #: [id number]  Today's Date: 10/1/2021     Precautions  Precautions: Fall Risk, Anterior Hip Precautions, Weight Bearing As Tolerated Right Lower Extremity    Assessment  Patient is 81 y.o. female s/p R BAKARI. A&Ox3, mild confusion during session. Shows impaired safety,balance/strength,activity tolerance for ADL's; see below for current status. V/c's needed for transfers and safety with fww. Lives with spouse who has Parkinson's; pt had been indep prior. Pt is not at her baseline level; OT will continue to follow while in house.             Plan  Recommend Occupational Therapy 4 times per week until therapy goals are met for the following treatments:  Neuro Re-Education / Balance, Self Care/Activities of Daily Living and Therapeutic Activities.    DC Equipment Recommendations: Unable to determine at this time  Discharge Recommendations: Recommend post-acute placement for additional occupational therapy services prior to discharge home. Home with HH may be a safe discharge depending upon pt's progress and if daughter is available to provide 24/7 Spv for~1 week.        10/01/21 1359   Prior Living Situation   Prior Services None   Housing / Facility 1 Story House   Bathroom Set up Walk In Shower;Shower Chair   Equipment Owned 4-Wheel Walker;Front-Wheel Walker;Tub / Shower Seat;Bed Side Commode;Raised Toilet Seat With Arms;Hand Held Shower;Oxygen   Lives with - Patient's Self Care Capacity Spouse   Comments reports fww does not fit in BR.  with parkinson's; unable to assist. dtr may be able to stay 1-2 nights with pt.     Prior Level of ADL Function   Self Feeding Independent   Grooming / Hygiene Independent   Bathing Independent   Dressing Independent   Toileting Independent   Prior Level of IADL Function   Medication Management Independent   Laundry Independent   Kitchen Mobility Independent    Finances Unable To Determine At This Time   Home Management Independent   Shopping Unable To Determine At This Time   Prior Level Of Mobility Independent With Device in Home  (except for in br)   Driving / Transportation Unable To Determine At This Time   Occupation (Pre-Hospital Vocational) Retired Due To Age   Leisure Interests Unable To Determine At This Time   Vitals   O2 (LPM) 3   O2 Delivery Device Silicone Nasal Cannula   Pain 0 - 10 Group   Location Hip   Location Orientation Right   Therapist Pain Assessment Post Activity Pain Same as Prior to Activity;Nurse Notified   Cognition    Cognition / Consciousness X  (Ox3)   Safety Awareness Impaired   Attention Impaired   Passive ROM Upper Body   Passive ROM Upper Body WDL   Active ROM Upper Body   Active ROM Upper Body  WDL   Dominant Hand Right   Strength Upper Body   Upper Body Strength  WDL   Sensation Upper Body   Upper Extremity Sensation  WDL   Upper Body Muscle Tone   Upper Body Muscle Tone  WDL   Coordination Upper Body   Coordination WDL   Balance Assessment   Sitting Balance (Static) Fair +   Sitting Balance (Dynamic) Fair   Standing Balance (Static) Fair -   Standing Balance (Dynamic) Fair -   Weight Shift Sitting Fair   Weight Shift Standing Fair   Comments fww   Bed Mobility    Supine to Sit Moderate Assist   ADL Assessment   Eating Independent   Grooming Supervision;Standing   Lower Body Dressing Moderate Assist   Toileting Supervision   How much help from another person does the patient currently need...   Putting on and taking off regular lower body clothing? 2   Bathing (including washing, rinsing, and drying)? 2   Toileting, which includes using a toilet, bedpan, or urinal? 3   Putting on and taking off regular upper body clothing? 4   Taking care of personal grooming such as brushing teeth? 3   Eating meals? 4   6 Clicks Daily Activity Score 18   Functional Mobility   Sit to Stand Moderate Assist   Bed, Chair, Wheelchair Transfer Minimal  Assist   Toilet Transfers Moderate Assist   Transfer Method Stand Step   Mobility Chair>BR>chair>sink>chair   Visual Perception   Visual Perception  Not Tested   Activity Tolerance   Sitting in Chair >1hr   Sitting Edge of Bed 25   Standing 10 total    Patient / Family Goals   Patient / Family Goal #1 home   Short Term Goals   Short Term Goal # 1 pt will perform safe ADL transfers with Sup within 5 days    Short Term Goal # 2 pt will perform FB dressing with Sup within 5 days    Short Term Goal # 3 pt will perform toileting with Mod Indep within 5 days

## 2021-10-02 NOTE — PROGRESS NOTES
"Pt did not request pain meds this shift.  Pt tolerating 3L NC at NOC.  Pt voiding with purewick this shift.  Pt concerned about amount of Miralax ordered. Pt states she takes \"a cap-full\" at home and doesn't think the amount she is getting here has been enough. Day RN notified.  Chart check done.  COVID-19 surge in effect.  "

## 2021-10-02 NOTE — PROGRESS NOTES
Covid-19 surge in effect     Received bedside report. Assumed pt care. Chart check complete. Pt is AAOX4, no signs of distress. Dressing at RLE CDI, with dorsi flex and plantar flex, +2 pulses. Ice pack and SCD's in placed. Able to ambulate to the bathroom using FWW with standby assist. Educated for IS and deep breathing exercises. Fall precautions in place, treaded socks on pt, bed in low position. Call light within reach. Educated pt to call if needing anything.

## 2021-10-02 NOTE — THERAPY
Physical Therapy   Daily Treatment     Patient Name: Rashmi Parra  Age:  81 y.o., Sex:  female  Medical Record #: [id number]  Today's Date: 10/2/2021          Assessment    With assistance from daughter Pt appears to be safe for home.She understands HEP and has no equipment needs.Would benefit from out Pt PT after HH to regain former level of function    Plan    Continue current treatment plan.      10/02/21 1400   Total Time Spent   Total Time Spent (Mins) 45   Charge Group   PT Gait Training 1   PT Therapeutic Activities 1   Balance   Sitting Balance (Static) Fair +   Sitting Balance (Dynamic) Fair +   Standing Balance (Static) Fair   Standing Balance (Dynamic) Fair   Weight Shift Sitting Fair   Weight Shift Standing Fair   Gait Analysis   Gait Level Of Assist Minimal Assist   Assistive Device Front Wheel Walker   Distance (Feet) 25   # of Times Distance was Traveled 2   Deviation Antalgic;Step To   # of Stairs Climbed 1   Level of Assist with Stairs Moderate Assist   Weight Bearing Status wbat R   Bed Mobility    Supine to Sit Minimal Assist   Sit to Supine Moderate Assist   Scooting Minimal Assist   Functional Mobility   Sit to Stand Moderate Assist   Bed, Chair, Wheelchair Transfer Minimal Assist   Transfer Method Stand Step   How much difficulty does the patient currently have...   Turning over in bed (including adjusting bedclothes, sheets and blankets)? 3   Sitting down on and standing up from a chair with arms (e.g., wheelchair, bedside commode, etc.) 2   Moving from lying on back to sitting on the side of the bed? 3   How much help from another person does the patient currently need...   Moving to and from a bed to a chair (including a wheelchair)? 3   Need to walk in a hospital room? 3   Climbing 3-5 steps with a railing? 2   6 clicks Mobility Score 16   Activity Tolerance   Sitting Edge of Bed 10   Standing 15   Short Term Goals    Short Term Goal # 1 pt will go supine<>sit w/hob elevated and rails down  w/spv in 6tx for safe d/c home    Goal Outcome # 1 Progressing as expected   Short Term Goal # 2 pt will go sit<>stand w/fww w/spv in 6tx for safe d/c home   Goal Outcome # 2 Progressing as expected   Short Term Goal # 3 pt will transfer bed<>chair w/fww w/spv in 6tx for safe d/c home    Goal Outcome # 3 Progressing as expected   Short Term Goal # 4 pt will ambulate for 150ft w/fww w/spv in 6tx for safe d/c home    Goal Outcome # 4 Progressing as expected   Short Term Goal # 5 pt will go up/down 2 steps w/spv in 6tx for safe d/c home    Goal Outcome # 5 Progressing as expected   Anticipated Discharge Equipment and Recommendations   DC Equipment Recommendations None   Discharge Recommendations Recommend home health for continued physical therapy services   Interdisciplinary Plan of Care Collaboration   IDT Collaboration with  Nursing   Session Information   Date / Session Number  10/2-2 2/7 10/7       DC Equipment Recommendations: (P) None  Discharge Recommendations: (P) Recommend home health for continued physical therapy services

## 2021-10-02 NOTE — DISCHARGE PLANNING
Care Transition Team Assessment    Met with pt and spouse at bedside to complete assessment and discuss discharge planning. Pt lives with spouse in a [facility] home. Prior to admission pt was independent with ADL's and IADL's and used a walker at baseline.   Discussed SNF vs home with HH. PT states daughter lives nearby and will be staying at their house for a few days after d/c. Pt and spouse unsure if they want SNF placement or HH. Discussed difference in levels of care. Spouse states they would like to hold off on SNF referrals at this time and see how pt does with therapies tomorrow. Spouse is already on service with [facility] and states they would like [facility] if they decide to go home.   SNF choice form left with pt to review.     Information Source  Information Given By: Patient    Elopement Risk  Legal Hold: No  Ambulatory or Self Mobile in Wheelchair: No-Not an Elopement Risk    Interdisciplinary Discharge Planning  Lives with - Patient's Self Care Capacity: Spouse  Patient or legal guardian wants to designate a caregiver: No  Housing / Facility: [address] [place] [address]  Prior Services: None    Discharge Preparedness  What is your plan after discharge?: Uncertain - pending medical team collaboration  What are your discharge supports?: Spouse  Prior Functional Level: Independent with Activities of Daily Living, Independent with Medication Management, Uses Walker  Difficulity with ADLs: None  Difficulity with IADLs: None    Functional Assesment  Prior Functional Level: Independent with Activities of Daily Living, Independent with Medication Management, Uses Walker    Finances  Financial Barriers to Discharge: No    Vision / Hearing Impairment  Right Eye Vision: Wears Glasses  Left Eye Vision: Wears Glasses    Psychological Assessment  History of Substance Abuse: None  History of Psychiatric Problems: No  Non-compliant with Treatment: No  Newly Diagnosed Illness: Yes    Discharge Risks or Barriers  Discharge risks  or barriers?: No    Anticipated Discharge Information  Discharge Disposition: D/T to SNF with Medicare cert in anticipation of skilled care (03)

## 2021-10-02 NOTE — DISCHARGE PLANNING
Anticipated Discharge Disposition: Home with HH    Action: LSW met with Pt to discuss d/c plan. Pt's  is on [facility] and she would like the referral sent to them. Faxed choice to [facility]. Pt already has a wheeled and FWW walker.     Barriers to Discharge: HH accptance    Plan: LSW to f/u on referral.

## 2021-10-03 PROCEDURE — G0378 HOSPITAL OBSERVATION PER HR: HCPCS

## 2021-10-03 PROCEDURE — A9270 NON-COVERED ITEM OR SERVICE: HCPCS | Performed by: STUDENT IN AN ORGANIZED HEALTH CARE EDUCATION/TRAINING PROGRAM

## 2021-10-03 PROCEDURE — A9270 NON-COVERED ITEM OR SERVICE: HCPCS | Performed by: ORTHOPAEDIC SURGERY

## 2021-10-03 PROCEDURE — 99024 POSTOP FOLLOW-UP VISIT: CPT | Performed by: STUDENT IN AN ORGANIZED HEALTH CARE EDUCATION/TRAINING PROGRAM

## 2021-10-03 PROCEDURE — 700102 HCHG RX REV CODE 250 W/ 637 OVERRIDE(OP): Performed by: ORTHOPAEDIC SURGERY

## 2021-10-03 PROCEDURE — 700102 HCHG RX REV CODE 250 W/ 637 OVERRIDE(OP): Performed by: STUDENT IN AN ORGANIZED HEALTH CARE EDUCATION/TRAINING PROGRAM

## 2021-10-03 RX ADMIN — ANASTROZOLE 1 MG: 1 TABLET, COATED ORAL at 05:51

## 2021-10-03 RX ADMIN — ASPIRIN 81 MG: 81 TABLET, COATED ORAL at 00:28

## 2021-10-03 RX ADMIN — ACETAMINOPHEN 1000 MG: 500 TABLET, FILM COATED ORAL at 17:15

## 2021-10-03 RX ADMIN — ACETAMINOPHEN 1000 MG: 500 TABLET, FILM COATED ORAL at 11:57

## 2021-10-03 RX ADMIN — GABAPENTIN 100 MG: 100 CAPSULE ORAL at 05:51

## 2021-10-03 RX ADMIN — ACETAMINOPHEN 1000 MG: 500 TABLET, FILM COATED ORAL at 05:50

## 2021-10-03 RX ADMIN — DULOXETINE HYDROCHLORIDE 60 MG: 30 CAPSULE, DELAYED RELEASE ORAL at 05:51

## 2021-10-03 RX ADMIN — POLYETHYLENE GLYCOL 3350 1 PACKET: 17 POWDER, FOR SOLUTION ORAL at 05:53

## 2021-10-03 RX ADMIN — DOCUSATE SODIUM 100 MG: 100 CAPSULE, LIQUID FILLED ORAL at 17:15

## 2021-10-03 RX ADMIN — GABAPENTIN 100 MG: 100 CAPSULE ORAL at 11:57

## 2021-10-03 RX ADMIN — METFORMIN HYDROCHLORIDE 500 MG: 500 TABLET ORAL at 05:51

## 2021-10-03 RX ADMIN — ASPIRIN 81 MG: 81 TABLET, COATED ORAL at 12:04

## 2021-10-03 RX ADMIN — DOCUSATE SODIUM 100 MG: 100 CAPSULE, LIQUID FILLED ORAL at 05:51

## 2021-10-03 RX ADMIN — GABAPENTIN 100 MG: 100 CAPSULE ORAL at 17:15

## 2021-10-03 RX ADMIN — OMEPRAZOLE 40 MG: 20 CAPSULE, DELAYED RELEASE ORAL at 17:15

## 2021-10-03 ASSESSMENT — PAIN DESCRIPTION - PAIN TYPE: TYPE: ACUTE PAIN

## 2021-10-03 NOTE — PROGRESS NOTES
"\"Covid 19 surge in effect\"    Report received from day RN Pt is AAO x 4.  Pt reports no pain Pt ambulated to the commode. POC discussed.All needs met at this time.  Bed in low position.  Call light within reach.  Rounding in place.   "

## 2021-10-03 NOTE — PROGRESS NOTES
Report received from night shift RN. Assume care. Pt. AAOx4 pt is bed,  Assessment completed. VSS. Denies pain, able to wiggle toes and dorsi/plantar flex feet, good CMS and pulses to lucia LE, denies numbness and tingling. Dressing in place DCI, Pt was update for the care for the day. White board updated, All question answered. Pt has call light within reach,  bed is in the lowest position. Pt has no other needs at this time.   1400 Pt ambulating about hallways x2    Covid 19 surge in effect

## 2021-10-03 NOTE — PROGRESS NOTES
" Subjective:    No overnight events. No new complaints  Pain reasonably well controlled.remains afebrile, no chest pain or shortness of breath    Objective:  /44   Pulse 81   Temp 36.4 °C (97.6 °F) (Temporal)   Resp 18   Ht 1.6 m (5' 3\")   Wt 68.2 kg (150 lb 5.7 oz)   SpO2 99%                 Intake/Output Summary (Last 24 hours) at 10/3/2021 0865  Last data filed at 10/3/2021 0530  Gross per 24 hour   Intake --   Output 1500 ml   Net -1500 ml       Comfortable, no distress  Neurologically and vascularly intact with palpable pedal pulses bilaterally.  Dressing C/D/I  DF/PF intact bilaterally  EHL/FHL intact bilaterally    Assessment:    Doing well s/p total hip arthroplasty.    Plan:    Diet as tolerated  WBAT  OT/PT  Pain control/Ice   DVT ppx - ASA BID + Sequential Compression Devices  Discharge planning SNF vs home  Follow-up approximately 2 weeks post-op. [phone]    "

## 2021-10-04 VITALS
WEIGHT: 150.35 LBS | TEMPERATURE: 97.7 F | BODY MASS INDEX: 26.64 KG/M2 | DIASTOLIC BLOOD PRESSURE: 44 MMHG | HEART RATE: 82 BPM | RESPIRATION RATE: 18 BRPM | HEIGHT: 63 IN | OXYGEN SATURATION: 95 % | SYSTOLIC BLOOD PRESSURE: 174 MMHG

## 2021-10-04 PROCEDURE — 700111 HCHG RX REV CODE 636 W/ 250 OVERRIDE (IP): Performed by: ORTHOPAEDIC SURGERY

## 2021-10-04 PROCEDURE — G0378 HOSPITAL OBSERVATION PER HR: HCPCS

## 2021-10-04 PROCEDURE — A9270 NON-COVERED ITEM OR SERVICE: HCPCS | Performed by: STUDENT IN AN ORGANIZED HEALTH CARE EDUCATION/TRAINING PROGRAM

## 2021-10-04 PROCEDURE — 90662 IIV NO PRSV INCREASED AG IM: CPT | Performed by: ORTHOPAEDIC SURGERY

## 2021-10-04 PROCEDURE — 97535 SELF CARE MNGMENT TRAINING: CPT

## 2021-10-04 PROCEDURE — 90471 IMMUNIZATION ADMIN: CPT

## 2021-10-04 PROCEDURE — 700102 HCHG RX REV CODE 250 W/ 637 OVERRIDE(OP): Performed by: STUDENT IN AN ORGANIZED HEALTH CARE EDUCATION/TRAINING PROGRAM

## 2021-10-04 RX ADMIN — DULOXETINE HYDROCHLORIDE 60 MG: 30 CAPSULE, DELAYED RELEASE ORAL at 06:35

## 2021-10-04 RX ADMIN — DOCUSATE SODIUM 100 MG: 100 CAPSULE, LIQUID FILLED ORAL at 06:35

## 2021-10-04 RX ADMIN — ACETAMINOPHEN 1000 MG: 500 TABLET, FILM COATED ORAL at 13:52

## 2021-10-04 RX ADMIN — GABAPENTIN 100 MG: 100 CAPSULE ORAL at 06:35

## 2021-10-04 RX ADMIN — POLYETHYLENE GLYCOL 3350 1 PACKET: 17 POWDER, FOR SOLUTION ORAL at 06:35

## 2021-10-04 RX ADMIN — ASPIRIN 81 MG: 81 TABLET, COATED ORAL at 13:52

## 2021-10-04 RX ADMIN — GABAPENTIN 100 MG: 100 CAPSULE ORAL at 13:52

## 2021-10-04 RX ADMIN — SENNOSIDES AND DOCUSATE SODIUM 1 TABLET: 50; 8.6 TABLET ORAL at 00:31

## 2021-10-04 RX ADMIN — ACETAMINOPHEN 1000 MG: 500 TABLET, FILM COATED ORAL at 06:35

## 2021-10-04 RX ADMIN — ACETAMINOPHEN 1000 MG: 500 TABLET, FILM COATED ORAL at 00:30

## 2021-10-04 RX ADMIN — METFORMIN HYDROCHLORIDE 500 MG: 500 TABLET ORAL at 06:35

## 2021-10-04 RX ADMIN — INFLUENZA A VIRUS A/VICTORIA/2570/2019 IVR-215 (H1N1) ANTIGEN (FORMALDEHYDE INACTIVATED), INFLUENZA A VIRUS A/TASMANIA/503/2020 IVR-221 (H3N2) ANTIGEN (FORMALDEHYDE INACTIVATED), INFLUENZA B VIRUS B/PHUKET/3073/2013 ANTIGEN (FORMALDEHYDE INACTIVATED), AND INFLUENZA B VIRUS B/WASHINGTON/02/2019 ANTIGEN (FORMALDEHYDE INACTIVATED) 0.7 ML: 60; 60; 60; 60 INJECTION, SUSPENSION INTRAMUSCULAR at 15:15

## 2021-10-04 RX ADMIN — ASPIRIN 81 MG: 81 TABLET, COATED ORAL at 00:31

## 2021-10-04 RX ADMIN — ANASTROZOLE 1 MG: 1 TABLET, COATED ORAL at 06:35

## 2021-10-04 NOTE — PROGRESS NOTES
Received report from nightshift RN and assumed care of patient at 0700. Patient is sitting up in bed eating breakfast and denies needs at this time. Call light in reach. Bed in lowest locked position. Hourly rounding to continue.   COVID-19 surge in effect.

## 2021-10-04 NOTE — DISCHARGE PLANNING
Agency/Facility Name: [facility]  Spoke To: Berenice  Outcome: Agency requesting an update to HH order clarifying the need for Nursing.    Agency is accepting Pt pending updated HH order.     SW notified

## 2021-10-04 NOTE — DISCHARGE PLANNING
Anticipated Discharge Disposition: [facility]     Action: [facility] but [facility] requesting updated order to reflect nursing care.     Messaged YIN Griffin via voalte.     Barriers to Discharge: updated [facility] order     Plan: LSW to assist as needed and monitor for barriers to discharge.

## 2021-10-04 NOTE — DISCHARGE INSTRUCTIONS
Discharge Instructions    Discharged to home by car with relative. Discharged via wheelchair, hospital escort: Yes.  Special equipment needed: Walker    Be sure to schedule a follow-up appointment with your primary care doctor or any specialists as instructed.     Discharge Plan:   Diet Plan: Discussed  Activity Level: Discussed  Confirmed Follow up Appointment: Patient to Call and Schedule Appointment  Confirmed Symptoms Management: Discussed  Medication Reconciliation Updated: Yes  Influenza Vaccine Indication: Indicated: 65 years and older    I understand that a diet low in cholesterol, fat, and sodium is recommended for good health. Unless I have been given specific instructions below for another diet, I accept this instruction as my diet prescription.   Other diet: As tolerated    Special Instructions: Discharge instructions for the Orthopedic Patient    Follow up with Primary Care Physician within 2 weeks of discharge to home, regarding:  Review of medications and diagnostic testing.  Surveillance for medical complications.  Workup and treatment of osteoporosis, if appropriate.     -Is this a Hip/Knee/Shoulder Joint Replacement patient? Yes   TOTAL HIP REPLACEMENT, AFTER-CARE GUIDELINES     These instructions provide you with information on caring for yourself and your hip after surgery. Your health care provider may also give you instructions that are more specific. Your treatment was planned and performed according to current medical practices but problems sometimes occur. Call your health care provider if you have any problems or questions.     WHAT TO EXPECT AFTER THE PROCEDURE   After your procedure, your hip will typically be stiff, sore, and bruised. This will improve over time.     Pain   · Follow your home pain management plan as discussed with your nurse and as directed by your provider.   · It is important to follow any scheduled pain medications for maximal pain relief.   · If prescribed opioid  medication, the goal is to use opioids only as needed and to wean off prescription pain medicine as soon as possible.   · Ice use for pain control.  · Put ice in a plastic bag.   · Place a towel between your skin and the bag.   · Leave the ice on for 20 minutes, 2-3 times a day at a minimum.   · Most patients are off the pain pills by 3 weeks. If your pain continues to be severe, follow up with your provider.     Infection   Deep hip joint infections that require removal of the prostheses occur in less than 1.0% of patients. Lesser infections in the skin (cellulites) are more common and much more easily treated.   · Keep the incision as clean and dry as possible.   · Always wash your hands before touching your incision.   · Avoid dental care for 3 months after surgery. Your provider may recommend taking a dose of antibiotics an hour prior to any dental procedure. After 2 years, most providers recommend antibiotics only before an extensive procedure. Ask your provider what they recommend.   · Signs and symptoms of infection include low-grade fever, redness, pain, swelling and drainage from your incision. Notify your provider IMMEDIATELY if you develop ANY of these symptoms.     Post op Disturbances   · Bowel Habits - constipation is extremely common and caused by a combination of anesthesia, lack of mobility, dehydration and pain medicine. Use stool softeners or laxatives if necessary. It is important not to ignore this problem as bowel obstructions can be a serious complication after joint replacement surgery.   · Mood/Energy Level - Many patients experience a lack of energy and endurance for up to 2-3 months after surgery. Some people feel down and can even become depressed. This is likely due to postoperative anemia, change in activity level, lack of sleep, pain medicine and just the emotional reaction to the surgery itself that is a big disruption in a person’s life. This usually passes. If symptoms persist,  follow up with your primary care provider.   · Returning to Work - Your provider will give you specific instructions based on your profession. Generally, if you work a sedentary job requiring little standing or walking, most patients may return within 2-6 weeks. Manual labor jobs involving walking, lifting and standing may take 3-4 months. Your provider’s office can provide a release to part-time or light duty work early on in your recovery and progress you to full duty as able.   · Driving - You can begin driving once cleared by your provider, provided you are no longer taking narcotic pain medication or any other medications that impair driving. Discuss the length of time with your doctor as returning to driving will depend on things such as your vehicle, which hip was replaced (right or left) and if you do or do not have post-operative movement precautions.   · Avoiding falls - A fall during the first few weeks after surgery can damage your new hip and may result in a need for further surgery.  throw rugs and tack down loose carpeting. Be aware of floor hazards such as pets, small objects or uneven surfaces. Notify your provider of any falls.   · Airport Metal Detectors - The sensitivity of metal detectors varies and it is likely that your prosthesis will cause an alarm. Inform the  of your artificial joint.     Diet   · Resume your normal diet as tolerated.   · It is important to achieve a healthy nutritional status by eating a well-balanced diet on a regular basis.   · Your provider may recommend that you take iron and vitamin supplements.   · Continue to drink plenty of fluids.     Shower/Bathing   · You may shower as soon as you get home from the hospital unless otherwise instructed.   · Keep your incision out of water to prevent infection. To keep the incision dry when showering, cover it with a plastic bag or plastic wrap. If your bandage is waterproof, this may not be necessary.    · Pat incision dry if it gets wet. Do not rub. Notify your provider.   · Do not submerge in a bath until cleared by your provider. Your staples must be out and the incision completely healed.     Dressing Changes: Only change your dressing if directed by your provider.   · Wash hands.   · Open all dressing change materials.   · Remove old dressing and discard.   · Inspect incision for signs of irritation or infection including redness, increase in clear drainage, yellow/green drainage, odor and surrounding skin hot to touch. Notify your provider if present.   ·  the new dressing by one corner and lay over the incision. Be careful not to touch the inside of the dressing that will lay over the incision.   · Secure in place as instructed.     Swelling/Bruising   · Swelling is normal after hip replacement and can involve the thigh, knee, calf and foot.   · Swelling can last from 3-6 months.   · To reduce swelling, elevate your leg higher than your heart while reclining. The first week you are home you should elevate your leg an equal amount of time as you are active.   · The swelling is usually worse after you go home since you are upright for longer periods of time.   · Bruising often does not appear until after you arrive home and can be quite dramatic- appearing purple, black, or green. Bruising is typically not concerning and will subside without any treatment.     Blood Clot Prevention   Your treatment plan includes multiple preventative measures to decrease the risk of blood clots in the legs (DVTs) and the less common, but serious, clots that travel to the lungs (pulmonary emboli). Most patients are at standard risk for them, but people who are at higher risk include those who have had previous clots, a family history of clotting, smoking, diabetes, obesity, advanced age, use estrogen and/or live a sedentary lifestyle.     · Signs of blood clots in legs include - Swelling in thigh, calf or ankle that  does not go down with elevation. Pain, heat and tenderness in calf, back of calf or groin area. NOTE: blood clots can occur in either leg.   · Signs of blood clots in lungs include - Sudden increased shortness of breath, sudden onset of chest pain, and localized chest pain with coughing.   · If you experience any of the above symptoms, notify your provider and seek medical attention immediately.   · You received anticoagulant therapy (blood thinners) in the hospital. Continue the prescribed blood-thinning medication at home, as directed by your provider.   · Your risk for developing a clot continues for up to 2-3 months after surgery. Avoid prolonged sitting and dehydration (long air trips and car trips). If you do take a trip during this time, please get up, move around every 1-1.5 hours, and discuss all travel plans with your provider.     Activity   Once you get home, stay active. The key is not to overdo it. While you can expect some good days and some bad days, you should notice a gradual improvement over time and a gradual increase in endurance over the next 6 to 12 months.     · Weight Bearing - You can begin to bear weight as tolerated unless otherwise directed by your provider. Use a walker, crutches or cane to assist with walking until you can walk smoothly (minimal or no limp) without assistance.   · Physical Precautions - To assure proper recovery and tissue healing, you may be asked to take special precautions when moving (sitting, bending or sleeping) - usually for the first 6 weeks after surgery. Precautions vary from patient to patient depending on surgical approach used by your provider. Follow any specific precautions provided by your provider and physical therapist until cleared by your provider.   · Sitting - Early on it is easier to get up from a tall chair or a chair with arms. The physical therapist will show you how to sit and stand from a chair keeping your affected leg out in front of you.  Get up and move around on a regular basis--at least once every hour.   · Walking - Walk as much as you like once your doctor gives permission to proceed, but remember that walking is no substitute for your prescribed exercises.  · Follow the home exercise program prescribed during your hospital stay as directed by your physical therapist or provider.   · Continued physical therapy may be prescribed after hospital discharge to strengthen your muscles and improve your gait/walking pattern. The decision to have therapy or not after the hospital stay is made by you and your provider prior to surgery or at your follow up appointment.   · Swimming is an excellent low impact activity; you can begin as soon as the wound is healed and your provider clears you. Using a pair of training fins may make swimming a more enjoyable and effective exercise.   · Sexual activity - Your provider can tell you when it is safe to resume sexual activity.   · Other activities - Low impact activities are preferred. If you have specific questions, consult your provider.     When to Call the Doctor   Call the provider if you experience:   · Fever over 100.5° F   · Increased pain, drainage, redness, odor or heat around the incision area   · Shaking chills   · Increased knee pain with activity and rest   · Increased pain in calf, tenderness or redness above or below the knee   · Increased swelling of calf, ankle, foot   · Sudden increased shortness of breath, sudden onset of chest pain, localized chest pain with coughing   · Incision opening   Or, if there are any questions or concerns about medications or care.     Infection statistic resource:   https://www.[employer].com/contents/prosthetic-joint-infection-epidemiology-microbiology-clinical-manifestations-and-diagnosis     -Is this patient being discharged with medication to prevent blood clots?  Yes, Aspirin Aspirin, ASA oral tablets  What is this medicine?  ASPIRIN (AS pir in) is a pain  reliever. It is used to treat mild pain and fever. This medicine is also used as directed by a doctor to prevent and to treat heart attacks, to prevent strokes and blood clots, and to treat arthritis or inflammation.  This medicine may be used for other purposes; ask your health care provider or pharmacist if you have questions.  COMMON BRAND NAME(S): Aspir-Low, Aspir-Desi, Aspirtab, Nani Advanced Aspirin, Nani Aspirin, Nani Aspirin Extra Strength, Nani Aspirin Plus, Nani Extra Strength, Nani Extra Strength Plus, Nani Genuine Aspirin, Nani Womens Aspirin, Bufferin, Bufferin Extra Strength, Bufferin Low Dose  What should I tell my health care provider before I take this medicine?  They need to know if you have any of these conditions:  · anemia  · asthma  · bleeding problems  · child with chickenpox, the flu, or other viral infection  · diabetes  · gout  · if you frequently drink alcohol containing drinks  · kidney disease  · liver disease  · low level of vitamin K  · lupus  · smoke tobacco  · stomach ulcers or other problems  · an unusual or allergic reaction to aspirin, tartrazine dye, other medicines, dyes, or preservatives  · pregnant or trying to get pregnant  · breast-feeding  How should I use this medicine?  Take this medicine by mouth with a glass of water. Follow the directions on the package or prescription label. You can take this medicine with or without food. If it upsets your stomach, take it with food. Do not take your medicine more often than directed.  Talk to your pediatrician regarding the use of this medicine in children. While this drug may be prescribed for children as young as 12 years of age for selected conditions, precautions do apply. Children and teenagers should not use this medicine to treat chicken pox or flu symptoms unless directed by a doctor.  Patients over 65 years old may have a stronger reaction and need a smaller dose.  Overdosage: If you think you have taken too much of  this medicine contact a poison control center or emergency room at once.  NOTE: This medicine is only for you. Do not share this medicine with others.  What if I miss a dose?  If you are taking this medicine on a regular schedule and miss a dose, take it as soon as you can. If it is almost time for your next dose, take only that dose. Do not take double or extra doses.  What may interact with this medicine?  Do not take this medicine with any of the following medications:  · cidofovir  · ketorolac  · probenecid  This medicine may also interact with the following medications:  · alcohol  · alendronate  · bismuth subsalicylate  · flavocoxid  · herbal supplements like feverfew, garlic, cliff, ginkgo biloba, horse chestnut  · medicines for diabetes or glaucoma like acetazolamide, methazolamide  · medicines for gout  · medicines that treat or prevent blood clots like enoxaparin, heparin, ticlopidine, warfarin  · other aspirin and aspirin-like medicines  · NSAIDs, medicines for pain and inflammation, like ibuprofen or naproxen  · pemetrexed  · sulfinpyrazone  · varicella live vaccine  This list may not describe all possible interactions. Give your health care provider a list of all the medicines, herbs, non-prescription drugs, or dietary supplements you use. Also tell them if you smoke, drink alcohol, or use illegal drugs. Some items may interact with your medicine.  What should I watch for while using this medicine?  If you are treating yourself for pain, tell your doctor or health care professional if the pain lasts more than 10 days, if it gets worse, or if there is a new or different kind of pain. Tell your doctor if you see redness or swelling. Also, check with your doctor if you have a fever that lasts for more than 3 days. Only take this medicine to prevent heart attacks or blood clotting if prescribed by your doctor or health care professional.  Do not take aspirin or aspirin-like medicines with this medicine.  Too much aspirin can be dangerous. Always read the labels carefully.  This medicine can irritate your stomach or cause bleeding problems. Do not smoke cigarettes or drink alcohol while taking this medicine. Do not lie down for 30 minutes after taking this medicine to prevent irritation to your throat.  If you are scheduled for any medical or dental procedure, tell your healthcare provider that you are taking this medicine. You may need to stop taking this medicine before the procedure.  This medicine may be used to treat migraines. If you take migraine medicines for 10 or more days a month, your migraines may get worse. Keep a diary of headache days and medicine use. Contact your healthcare professional if your migraine attacks occur more frequently.  What side effects may I notice from receiving this medicine?  Side effects that you should report to your doctor or health care professional as soon as possible:  · allergic reactions like skin rash, itching or hives, swelling of the face, lips, or tongue  · breathing problems  · changes in hearing, ringing in the ears  · confusion  · general ill feeling or flu-like symptoms  · pain on swallowing  · redness, blistering, peeling or loosening of the skin, including inside the mouth or nose  · signs and symptoms of bleeding such as bloody or black, tarry stools; red or dark-brown urine; spitting up blood or brown material that looks like coffee grounds; red spots on the skin; unusual bruising or bleeding from the eye, gums, or nose  · trouble passing urine or change in the amount of urine  · unusually weak or tired  · yellowing of the eyes or skin  Side effects that usually do not require medical attention (report to your doctor or health care professional if they continue or are bothersome):  · diarrhea or constipation  · headache  · nausea, vomiting  · stomach gas, heartburn  This list may not describe all possible side effects. Call your doctor for medical advice about  side effects. You may report side effects to FDA at 6-073-ZRZ-3380.  Where should I keep my medicine?  Keep out of the reach of children.  Store at room temperature between 15 and 30 degrees C (59 and 86 degrees F). Protect from heat and moisture. Do not use this medicine if it has a strong vinegar smell. Throw away any unused medicine after the expiration date.  NOTE: This sheet is a summary. It may not cover all possible information. If you have questions about this medicine, talk to your doctor, pharmacist, or health care provider.  © 2020 [employer] (2018-01-30 10:42:13)      · Is patient discharged on Warfarin / Coumadin?   No     Depression / Suicide Risk    As you are discharged from this [facility] facility, it is important to learn how to keep safe from harming yourself.    Recognize the warning signs:  · Abrupt changes in personality, positive or negative- including increase in energy   · Giving away possessions  · Change in eating patterns- significant weight changes-  positive or negative  · Change in sleeping patterns- unable to sleep or sleeping all the time   · Unwillingness or inability to communicate  · Depression  · Unusual sadness, discouragement and loneliness  · Talk of wanting to die  · Neglect of personal appearance   · Rebelliousness- reckless behavior  · Withdrawal from people/activities they love  · Confusion- inability to concentrate     If you or a loved one observes any of these behaviors or has concerns about self-harm, here's what you can do:  · Talk about it- your feelings and reasons for harming yourself  · Remove any means that you might use to hurt yourself (examples: pills, rope, extension cords, firearm)  · Get professional help from the community (Mental Health, Substance Abuse, psychological counseling)  · Do not be alone:Call your Safe Contact- someone whom you trust who will be there for you.  · Call your local CRISIS HOTLINE 119-6617 or 455-264-5904  · Call  your local Children's Mobile Crisis Response Team [place] (789) 817-2860 or www.De[employer].com.HipGeo  · Call the toll free National Suicide Prevention Hotlines   · National Suicide Prevention Lifeline 467-961-GJEN (2811)  · National Hope Line Network 800-SUICIDE (119-4282)

## 2021-10-04 NOTE — PROGRESS NOTES
"\"Covid 19 surge in effect\"    Report received from day RN Pt is AAO x 4.  Pt reports no pain .POC discussed.  All needs met at this time.  Bed in low position.  Call light within reach.  Rounding in place.   "

## 2021-10-05 NOTE — TELEPHONE ENCOUNTER
Spoke with patient's daughter. She knows patient has tried and failed other medications, alternative to Dexilant, but she is unsure which specifically. She will find that information and send it to us in a mychart so that we can submit PA.

## 2021-11-11 ENCOUNTER — OFFICE VISIT (OUTPATIENT)
Dept: MEDICAL GROUP | Facility: MEDICAL CENTER | Age: 81
End: 2021-11-11
Payer: MEDICARE

## 2021-11-11 VITALS
DIASTOLIC BLOOD PRESSURE: 62 MMHG | HEART RATE: 98 BPM | WEIGHT: 145 LBS | TEMPERATURE: 98.1 F | HEIGHT: 63 IN | OXYGEN SATURATION: 91 % | SYSTOLIC BLOOD PRESSURE: 100 MMHG | BODY MASS INDEX: 25.69 KG/M2

## 2021-11-11 DIAGNOSIS — J43.8 OTHER EMPHYSEMA (HCC): ICD-10-CM

## 2021-11-11 DIAGNOSIS — Z96.641 HISTORY OF RIGHT HIP REPLACEMENT: ICD-10-CM

## 2021-11-11 PROCEDURE — 99213 OFFICE O/P EST LOW 20 MIN: CPT | Performed by: NURSE PRACTITIONER

## 2021-11-11 ASSESSMENT — FIBROSIS 4 INDEX: FIB4 SCORE: 2.14

## 2021-11-11 NOTE — ASSESSMENT & PLAN NOTE
S/P right hip replacement with Lewis Serrano. Healing well, taking tylenol as needed on occasion.

## 2021-11-11 NOTE — ASSESSMENT & PLAN NOTE
Diagnosed via  CT chest. Currently seeing pulmonology for this workup. Recent PFTs show mild restrictive disease as well. Has repeat PFT scheduled.     Wearing oxygen at night. Having shortness of breath and malaise with significant exertion, has daytime  O2 ordered and has portable concentrator at home but not wearing this, she doesn't want to start using it and become dependent on it.

## 2021-11-11 NOTE — PROGRESS NOTES
Subjective:   Rashmi Parra is a 81 y.o. female here today for 6 month follow up:    History of right hip replacement  S/P right hip replacement with Lewis Serrano. Healing well, taking tylenol as needed on occasion.     Other emphysema (HCC)  Diagnosed via  CT chest. Currently seeing pulmonology for this workup. Recent PFTs show mild restrictive disease as well. Has repeat PFT scheduled.     Wearing oxygen at night. Having shortness of breath and malaise with significant exertion, has daytime  O2 ordered and has portable concentrator at home but not wearing this, she doesn't want to start using it and become dependent on it.       Current medicines (including changes today)  Current Outpatient Medications   Medication Sig Dispense Refill   • aspirin (ASPIRIN 81) 81 MG Chew Tab chewable tablet Chew 1 Tablet 2 times a day for 45 days. 90 Tablet 0   • acetaminophen (TYLENOL) 500 MG Tab Take 1-2 Tablets by mouth every 6 hours as needed. 30 Tablet 0   • Dexlansoprazole (DEXILANT) 60 MG CAPSULE DELAYED RELEASE delayed-release capsule Take 60 mg by mouth every evening. Indications: Gastroesophageal Reflux Disease 90 Capsule 3   • MYRBETRIQ 50 MG TABLET SR 24 HR TAKE 1 TABLET EVERY EVENINGFOR FREQUENT URINATION,    URINARY INCONTINENCE,      URINARY URGENCY. 90 Tablet 3   • DULoxetine (CYMBALTA) 30 MG Cap DR Particles Take 1 capsule by mouth every day. TOTAL 90 MG DAILY 90 capsule 3   • gabapentin (NEURONTIN) 100 MG Cap Take 100 mg by mouth.     • metFORMIN (GLUCOPHAGE) 500 MG Tab Take 1 tablet by mouth every day. 90 tablet 1   • DULoxetine (CYMBALTA) 60 MG Cap DR Particles delayed-release capsule Take 60 mg by mouth every day.     • anastrozole (ARIMIDEX) 1 MG Tab Take 1 mg by mouth.     • Cholecalciferol (VITAMIN D PO) Take  by mouth.     • acetaminophen (TYLENOL) 500 MG Tab Take 500 mg by mouth every 6 hours as needed.     • lovastatin (MEVACOR) 20 MG Tab Take 20 mg by mouth every evening.     • Calcium Citrate  "(CITRACAL PO) Take 1 Tab by mouth every day.     • B Complex Vitamins (B COMPLEX PO) Take 1 Tab by mouth every day.     • polyethylene glycol/lytes (MIRALAX) Pack Take 17 g by mouth every day.     • Lifitegrast (XIIDRA) 5 % Solution Place 1 Drop in both eyes every bedtime.     • ibuprofen (MOTRIN) 200 MG Tab Take 200 mg by mouth every 6 hours as needed for Mild Pain.     • Melatonin 5 MG Tab Take 0.25 Tabs by mouth every bedtime.     • Multiple Vitamins-Minerals (MULTIVITAMIN PO) Take 1 Tab by mouth every day.     • MAGNESIUM PO Take 1 capsule by mouth as needed.       No current facility-administered medications for this visit.     She  has a past medical history of Arthritis, Bowel habit changes, Breath shortness, Cataract, COPD (chronic obstructive pulmonary disease) ([facility]), Diabetes ([facility]), Heart burn, Heart murmur, Hiatus hernia syndrome, High cholesterol, Hypertension, Indigestion, Malignant neoplasm of overlapping sites of breast in female, estrogen receptor positive ([facility]) (11/6/2019), Psychiatric problem (2015), Shortness of breath, Snoring, Urinary incontinence, and Wears glasses.    ROS   No chest pain, no shortness of breath, no abdominal pain  Positive ROS as per HPI.  All other systems reviewed and are negative.     Objective:     /62 (BP Location: Right arm, Patient Position: Sitting, BP Cuff Size: Adult)   Pulse 98   Temp 36.7 °C (98.1 °F) (Temporal)   Ht 1.6 m (5' 3\")   Wt 65.8 kg (145 lb)   SpO2 91%  Body mass index is 25.69 kg/m².     Physical Exam:  Constitutional: Alert, no distress.  Skin: Warm, dry, good turgor, no rashes in visible areas.  Eye: Equal, round and reactive, conjunctiva clear, lids normal.  ENMT: Lips without lesions, good dentition, oropharynx clear.  Neck: Trachea midline, no masses, no thyromegaly. No cervical or supraclavicular lymphadenopathy  Respiratory: Unlabored respiratory effort, lungs clear to auscultation, no wheezes, no ronchi.  Cardiovascular: Normal S1, " S2, no murmur, no edema.  Abdomen: Soft, non-tender, no masses, no hepatosplenomegaly.  Psych: Alert and oriented x3, normal affect and mood.        Assessment and Plan:   The following treatment plan was discussed    1. History of right hip replacement  Stable  Continue tylenol as needed  Continue follow up with Ortho as needed  Continue strengthening and increasing exercise tolerance    2. Other emphysema (HCC)  Stable  Continue follow up with pulmonology  Advised wearing her oxygen during the day with exertion       Followup: Return in about 6 months (around 5/11/2022).    I have placed the below orders and discussed them with an approved delegating provider. The MA is performing the below orders under the direction of Dr. Mandujano

## 2021-12-02 ENCOUNTER — TELEPHONE (OUTPATIENT)
Dept: MEDICAL GROUP | Facility: MEDICAL CENTER | Age: 81
End: 2021-12-02

## 2021-12-02 DIAGNOSIS — M54.50 ACUTE RIGHT-SIDED LOW BACK PAIN, UNSPECIFIED WHETHER SCIATICA PRESENT: ICD-10-CM
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
## 2021-12-02 NOTE — TELEPHONE ENCOUNTER
VOICEMAIL  1. Caller Name: Rashmi Neelam Olson   Call Back Number: [phone] (home)       2. Message: Pt left message stating that she had a hip replacement and a tripped and fell a couple of weeks ago. She was okay for a few days but now is having lower right back pain. She is wondering if she can get an x-ray ordered. She would also like an order to see [facility]. Please advise.

## 2021-12-03 ENCOUNTER — HOSPITAL ENCOUNTER (OUTPATIENT)
Dept: RADIOLOGY | Facility: MEDICAL CENTER | Age: 81
End: 2021-12-03
Attending: NURSE PRACTITIONER
Payer: MEDICARE
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
## 2021-12-03 DIAGNOSIS — M54.50 ACUTE RIGHT-SIDED LOW BACK PAIN, UNSPECIFIED WHETHER SCIATICA PRESENT: ICD-10-CM

## 2021-12-03 PROCEDURE — 72100 X-RAY EXAM L-S SPINE 2/3 VWS: CPT

## 2022-02-23 ENCOUNTER — APPOINTMENT (RX ONLY)
Dept: URBAN - METROPOLITAN AREA CLINIC 4 | Facility: CLINIC | Age: 82
Setting detail: DERMATOLOGY
End: 2022-02-23

## 2022-02-23 DIAGNOSIS — L81.4 OTHER MELANIN HYPERPIGMENTATION: ICD-10-CM

## 2022-02-23 DIAGNOSIS — D22 MELANOCYTIC NEVI: ICD-10-CM

## 2022-02-23 DIAGNOSIS — D18.0 HEMANGIOMA: ICD-10-CM

## 2022-02-23 DIAGNOSIS — Z85.828 PERSONAL HISTORY OF OTHER MALIGNANT NEOPLASM OF SKIN: ICD-10-CM

## 2022-02-23 DIAGNOSIS — L82.1 OTHER SEBORRHEIC KERATOSIS: ICD-10-CM

## 2022-02-23 DIAGNOSIS — Z71.89 OTHER SPECIFIED COUNSELING: ICD-10-CM

## 2022-02-23 DIAGNOSIS — L57.0 ACTINIC KERATOSIS: ICD-10-CM

## 2022-02-23 PROBLEM — D18.01 HEMANGIOMA OF SKIN AND SUBCUTANEOUS TISSUE: Status: ACTIVE | Noted: 2022-02-23

## 2022-02-23 PROBLEM — D22.5 MELANOCYTIC NEVI OF TRUNK: Status: ACTIVE | Noted: 2022-02-23

## 2022-02-23 PROCEDURE — ? LIQUID NITROGEN

## 2022-02-23 PROCEDURE — 99213 OFFICE O/P EST LOW 20 MIN: CPT | Mod: 25

## 2022-02-23 PROCEDURE — 17003 DESTRUCT PREMALG LES 2-14: CPT

## 2022-02-23 PROCEDURE — ? COUNSELING

## 2022-02-23 PROCEDURE — 17000 DESTRUCT PREMALG LESION: CPT

## 2022-02-23 ASSESSMENT — LOCATION DETAILED DESCRIPTION DERM
LOCATION DETAILED: LEFT ANTERIOR PROXIMAL UPPER ARM
LOCATION DETAILED: LEFT ANTERIOR DISTAL THIGH
LOCATION DETAILED: RIGHT SUPERIOR MEDIAL UPPER BACK
LOCATION DETAILED: SUPERIOR LUMBAR SPINE
LOCATION DETAILED: LEFT MID-UPPER BACK
LOCATION DETAILED: NASAL TIP
LOCATION DETAILED: LEFT DISTAL POSTERIOR UPPER ARM
LOCATION DETAILED: RIGHT ANTERIOR DISTAL THIGH
LOCATION DETAILED: RIGHT MID-UPPER BACK
LOCATION DETAILED: RIGHT VENTRAL PROXIMAL FOREARM
LOCATION DETAILED: LEFT INFERIOR MEDIAL MIDBACK
LOCATION DETAILED: RIGHT SUPERIOR UPPER BACK
LOCATION DETAILED: SUPERIOR THORACIC SPINE
LOCATION DETAILED: UPPER STERNUM
LOCATION DETAILED: RIGHT INFERIOR MEDIAL MIDBACK
LOCATION DETAILED: EPIGASTRIC SKIN
LOCATION DETAILED: NASAL DORSUM
LOCATION DETAILED: LEFT VENTRAL PROXIMAL FOREARM
LOCATION DETAILED: RIGHT ANTERIOR PROXIMAL UPPER ARM

## 2022-02-23 ASSESSMENT — LOCATION SIMPLE DESCRIPTION DERM
LOCATION SIMPLE: UPPER BACK
LOCATION SIMPLE: RIGHT THIGH
LOCATION SIMPLE: NOSE
LOCATION SIMPLE: LEFT POSTERIOR UPPER ARM
LOCATION SIMPLE: LEFT THIGH
LOCATION SIMPLE: LEFT LOWER BACK
LOCATION SIMPLE: LOWER BACK
LOCATION SIMPLE: LEFT UPPER ARM
LOCATION SIMPLE: LEFT FOREARM
LOCATION SIMPLE: RIGHT FOREARM
LOCATION SIMPLE: RIGHT LOWER BACK
LOCATION SIMPLE: CHEST
LOCATION SIMPLE: RIGHT UPPER BACK
LOCATION SIMPLE: LEFT UPPER BACK
LOCATION SIMPLE: ABDOMEN
LOCATION SIMPLE: RIGHT UPPER ARM

## 2022-02-23 ASSESSMENT — LOCATION ZONE DERM
LOCATION ZONE: ARM
LOCATION ZONE: TRUNK
LOCATION ZONE: NOSE
LOCATION ZONE: LEG

## 2022-02-23 NOTE — PROCEDURE: LIQUID NITROGEN
Render Note In Bullet Format When Appropriate: No
Detail Level: Detailed
Number Of Freeze-Thaw Cycles: 2 freeze-thaw cycles
Duration Of Freeze Thaw-Cycle (Seconds): 0
Show Applicator Variable?: Yes
Post-Care Instructions: I reviewed with the patient in detail post-care instructions. Patient is to wear sunprotection, and avoid picking at any of the treated lesions. Pt may apply Vaseline to crusted or scabbing areas.
Consent: The patient's consent was obtained including but not limited to risks of crusting, scabbing, blistering, scarring, darker or lighter pigmentary change, recurrence, incomplete removal and infection.

## 2022-03-08 ENCOUNTER — HOSPITAL ENCOUNTER (OUTPATIENT)
Dept: LAB | Facility: MEDICAL CENTER | Age: 82
End: 2022-03-08
Attending: SURGERY
Payer: MEDICARE

## 2022-03-08 LAB
ALBUMIN SERPL BCP-MCNC: 4.2 G/DL (ref 3.2–4.9)
ALBUMIN/GLOB SERPL: 1.8 G/DL
ALP SERPL-CCNC: 106 U/L (ref 30–99)
ALT SERPL-CCNC: 11 U/L (ref 2–50)
ANION GAP SERPL CALC-SCNC: 8 MMOL/L (ref 7–16)
AST SERPL-CCNC: 19 U/L (ref 12–45)
BASOPHILS # BLD AUTO: 0.3 % (ref 0–1.8)
BASOPHILS # BLD: 0.02 K/UL (ref 0–0.12)
BILIRUB SERPL-MCNC: 0.3 MG/DL (ref 0.1–1.5)
BUN SERPL-MCNC: 30 MG/DL (ref 8–22)
CALCIUM SERPL-MCNC: 9.8 MG/DL (ref 8.4–10.2)
CHLORIDE SERPL-SCNC: 102 MMOL/L (ref 96–112)
CO2 SERPL-SCNC: 27 MMOL/L (ref 20–33)
CREAT SERPL-MCNC: 0.67 MG/DL (ref 0.5–1.4)
EOSINOPHIL # BLD AUTO: 0.09 K/UL (ref 0–0.51)
EOSINOPHIL NFR BLD: 1.4 % (ref 0–6.9)
ERYTHROCYTE [DISTWIDTH] IN BLOOD BY AUTOMATED COUNT: 48.5 FL (ref 35.9–50)
GLOBULIN SER CALC-MCNC: 2.3 G/DL (ref 1.9–3.5)
GLUCOSE SERPL-MCNC: 107 MG/DL (ref 65–99)
HCT VFR BLD AUTO: 42.1 % (ref 37–47)
HGB BLD-MCNC: 13.8 G/DL (ref 12–16)
IMM GRANULOCYTES # BLD AUTO: 0.01 K/UL (ref 0–0.11)
IMM GRANULOCYTES NFR BLD AUTO: 0.2 % (ref 0–0.9)
LYMPHOCYTES # BLD AUTO: 0.76 K/UL (ref 1–4.8)
LYMPHOCYTES NFR BLD: 11.9 % (ref 22–41)
MCH RBC QN AUTO: 30.8 PG (ref 27–33)
MCHC RBC AUTO-ENTMCNC: 32.8 G/DL (ref 33.6–35)
MCV RBC AUTO: 94 FL (ref 81.4–97.8)
MONOCYTES # BLD AUTO: 0.68 K/UL (ref 0–0.85)
MONOCYTES NFR BLD AUTO: 10.6 % (ref 0–13.4)
NEUTROPHILS # BLD AUTO: 4.83 K/UL (ref 2–7.15)
NEUTROPHILS NFR BLD: 75.6 % (ref 44–72)
NRBC # BLD AUTO: 0 K/UL
NRBC BLD-RTO: 0 /100 WBC
PLATELET # BLD AUTO: 242 K/UL (ref 164–446)
PMV BLD AUTO: 10.4 FL (ref 9–12.9)
POTASSIUM SERPL-SCNC: 4.2 MMOL/L (ref 3.6–5.5)
PROT SERPL-MCNC: 6.5 G/DL (ref 6–8.2)
RBC # BLD AUTO: 4.48 M/UL (ref 4.2–5.4)
SODIUM SERPL-SCNC: 137 MMOL/L (ref 135–145)
T4 FREE SERPL-MCNC: 0.98 NG/DL (ref 0.93–1.7)
TSH SERPL DL<=0.005 MIU/L-ACNC: 0.78 UIU/ML (ref 0.38–5.33)
WBC # BLD AUTO: 6.4 K/UL (ref 4.8–10.8)

## 2022-03-08 PROCEDURE — 84439 ASSAY OF FREE THYROXINE: CPT | Mod: GA

## 2022-03-08 PROCEDURE — 36415 COLL VENOUS BLD VENIPUNCTURE: CPT

## 2022-03-08 PROCEDURE — 80053 COMPREHEN METABOLIC PANEL: CPT

## 2022-03-08 PROCEDURE — 85025 COMPLETE CBC W/AUTO DIFF WBC: CPT

## 2022-03-08 PROCEDURE — 84443 ASSAY THYROID STIM HORMONE: CPT | Mod: GA

## 2022-04-21 ENCOUNTER — OFFICE VISIT (OUTPATIENT)
Dept: MEDICAL GROUP | Facility: MEDICAL CENTER | Age: 82
End: 2022-04-21
Payer: MEDICARE

## 2022-04-21 VITALS
DIASTOLIC BLOOD PRESSURE: 64 MMHG | HEART RATE: 92 BPM | TEMPERATURE: 97.7 F | OXYGEN SATURATION: 92 % | BODY MASS INDEX: 25.16 KG/M2 | SYSTOLIC BLOOD PRESSURE: 120 MMHG | WEIGHT: 142 LBS | HEIGHT: 63 IN

## 2022-04-21 DIAGNOSIS — Z13.228 SCREENING FOR METABOLIC DISORDER: ICD-10-CM

## 2022-04-21 DIAGNOSIS — R20.0 NUMBNESS IN LEFT LEG: ICD-10-CM

## 2022-04-21 DIAGNOSIS — Z13.29 SCREENING FOR THYROID DISORDER: ICD-10-CM

## 2022-04-21 DIAGNOSIS — E78.49 OTHER HYPERLIPIDEMIA: ICD-10-CM

## 2022-04-21 DIAGNOSIS — Z13.0 ENCOUNTER FOR SCREENING FOR HEMATOLOGIC DISORDER: ICD-10-CM

## 2022-04-21 DIAGNOSIS — Z13.21 ENCOUNTER FOR VITAMIN DEFICIENCY SCREENING: ICD-10-CM

## 2022-04-21 DIAGNOSIS — E11.41 TYPE 2 DIABETES MELLITUS WITH DIABETIC MONONEUROPATHY, WITHOUT LONG-TERM CURRENT USE OF INSULIN (HCC): ICD-10-CM

## 2022-04-21 PROBLEM — C50.911 PRIMARY MALIGNANT NEOPLASM OF RIGHT FEMALE BREAST (HCC): Status: ACTIVE | Noted: 2019-11-06

## 2022-04-21 PROBLEM — R73.03 PREDIABETES: Status: RESOLVED | Noted: 2021-02-10 | Resolved: 2022-04-21

## 2022-04-21 PROCEDURE — 99214 OFFICE O/P EST MOD 30 MIN: CPT | Performed by: NURSE PRACTITIONER

## 2022-04-21 RX ORDER — FLUCONAZOLE 150 MG/1
TABLET ORAL
Status: ON HOLD | COMMUNITY
End: 2022-06-15

## 2022-04-21 RX ORDER — ALENDRONATE SODIUM 35 MG/1
TABLET ORAL
COMMUNITY
End: 2022-10-26

## 2022-04-21 RX ORDER — FLUTICASONE PROPIONATE 50 MCG
SPRAY, SUSPENSION (ML) NASAL
COMMUNITY
End: 2023-05-25

## 2022-04-21 ASSESSMENT — FIBROSIS 4 INDEX: FIB4 SCORE: 1.94

## 2022-04-21 NOTE — ASSESSMENT & PLAN NOTE
Patient reports numbness of left lower leg for a few years. Has not had this evaluated. Did break her ankle years ago. Has some decreased range of motion in ankle. No pain. No tingling or foot drop.

## 2022-04-21 NOTE — PROGRESS NOTES
Subjective:   Rashmi Parra is a 81 y.o. female here today for DVM form and follow up    Other hyperlipidemia  Chronic.  Stable on lovastatin 20 mg nightly.    Type 2 diabetes mellitus with diabetic mononeuropathy, without long-term current use of insulin (HCC)  Chronic. Currently taking metformin 500 mg daily. Due for labs. Last A1C 6.8    Numbness in left leg  Patient reports numbness of left lower leg for a few years. Has not had this evaluated. Did break her ankle years ago. Has some decreased range of motion in ankle. No pain. No tingling or foot drop.        Current medicines (including changes today)  Current Outpatient Medications   Medication Sig Dispense Refill   • nystatin (MYCOSTATIN) 577895 UNIT/ML Suspension nystatin 100,000 unit/mL oral suspension     • fluticasone (FLONASE) 50 MCG/ACT nasal spray fluticasone propionate 50 mcg/actuation nasal spray,suspension     • fluconazole (DIFLUCAN) 150 MG tablet fluconazole 150 mg tablet     • alendronate (FOSAMAX) 35 MG tablet alendronate 35 mg tablet   Take 1 tablet every week by oral route.     • acetaminophen (TYLENOL) 500 MG Tab Take 1-2 Tablets by mouth every 6 hours as needed. 30 Tablet 0   • Dexlansoprazole (DEXILANT) 60 MG CAPSULE DELAYED RELEASE delayed-release capsule Take 60 mg by mouth every evening. Indications: Gastroesophageal Reflux Disease 90 Capsule 3   • MYRBETRIQ 50 MG TABLET SR 24 HR TAKE 1 TABLET EVERY EVENINGFOR FREQUENT URINATION,    URINARY INCONTINENCE,      URINARY URGENCY. 90 Tablet 3   • DULoxetine (CYMBALTA) 30 MG Cap DR Particles Take 1 capsule by mouth every day. TOTAL 90 MG DAILY 90 capsule 3   • gabapentin (NEURONTIN) 100 MG Cap Take 100 mg by mouth.     • metFORMIN (GLUCOPHAGE) 500 MG Tab Take 1 tablet by mouth every day. 90 tablet 1   • DULoxetine (CYMBALTA) 60 MG Cap DR Particles delayed-release capsule Take 60 mg by mouth every day.     • anastrozole (ARIMIDEX) 1 MG Tab Take 1 mg by mouth.     • Cholecalciferol (VITAMIN  "D PO) Take  by mouth.     • acetaminophen (TYLENOL) 500 MG Tab Take 500 mg by mouth every 6 hours as needed.     • lovastatin (MEVACOR) 20 MG Tab Take 20 mg by mouth every evening.     • Calcium Citrate (CITRACAL PO) Take 1 Tab by mouth every day.     • B Complex Vitamins (B COMPLEX PO) Take 1 Tab by mouth every day.     • polyethylene glycol/lytes (MIRALAX) Pack Take 17 g by mouth every day.     • Lifitegrast (XIIDRA) 5 % Solution Place 1 Drop in both eyes every bedtime.     • ibuprofen (MOTRIN) 200 MG Tab Take 200 mg by mouth every 6 hours as needed for Mild Pain.     • Melatonin 5 MG Tab Take 0.25 Tabs by mouth every bedtime.     • Multiple Vitamins-Minerals (MULTIVITAMIN PO) Take 1 Tab by mouth every day.     • MAGNESIUM PO Take 1 capsule by mouth as needed.       No current facility-administered medications for this visit.     She  has a past medical history of Arthritis, Bowel habit changes, Breath shortness, Cataract, COPD (chronic obstructive pulmonary disease) ([facility]), Diabetes ([facility]), Heart burn, Heart murmur, Hiatus hernia syndrome, High cholesterol, Hypertension, Indigestion, Malignant neoplasm of overlapping sites of breast in female, estrogen receptor positive ([facility]) (11/6/2019), Psychiatric problem (2015), Shortness of breath, Snoring, Urinary incontinence, and Wears glasses.    ROS   No chest pain, no shortness of breath, no abdominal pain  Positive ROS as per HPI.  All other systems reviewed and are negative.     Objective:     /64 (BP Location: Right arm, Patient Position: Sitting, BP Cuff Size: Adult)   Pulse 92   Temp 36.5 °C (97.7 °F) (Temporal)   Ht 1.6 m (5' 3\")   Wt 64.4 kg (142 lb)   SpO2 92%  Body mass index is 25.15 kg/m².     Physical Exam:  Constitutional: Alert, no distress.  Skin: Warm, dry, good turgor, no rashes in visible areas.  Eye: Equal, round and reactive, conjunctiva clear, lids normal.  ENMT: Lips without lesions, good dentition, oropharynx clear.  Neck: Trachea " midline, no masses, no thyromegaly. No cervical or supraclavicular lymphadenopathy  Respiratory: Unlabored respiratory effort, lungs clear to auscultation, no wheezes, no ronchi.  Cardiovascular: Normal S1, S2, no murmur, no edema.  Abdomen: Soft, non-tender, no masses, no hepatosplenomegaly.  Psych: Alert and oriented x3, normal affect and mood.        Assessment and Plan:   The following treatment plan was discussed    1. Type 2 diabetes mellitus with diabetic mononeuropathy, without long-term current use of insulin (HCC)  Stable  Continue metformin 500 mg daily  Due for labs July  - MICROALBUMIN CREAT RATIO URINE; Future  - FERRITIN; Future  - IRON/TOTAL IRON BIND; Future  - HEMOGLOBIN A1C; Future    2. Other hyperlipidemia  Stable  Continue lovastatin 20 mg daily  - Lipid Profile; Future    3. Numbness in left leg  Unstable  Possibly related to diabetes  Check labs  - VITAMIN B12; Future  - FERRITIN; Future  - IRON/TOTAL IRON BIND; Future    4. Encounter for screening for hematologic disorder  - CBC WITH DIFFERENTIAL; Future    5. Screening for metabolic disorder  - Comp Metabolic Panel; Future    6. Screening for thyroid disorder  - TSH WITH REFLEX TO FT4; Future    7. Encounter for vitamin deficiency screening  - VITAMIN D,25 HYDROXY; Future      Followup: Return in about 3 months (around 7/21/2022) for Annual, Lab Review.    I have placed the below orders and discussed them with an approved delegating provider. The MA is performing the below orders under the direction of Dr. Mandujano

## 2022-04-27 RX ORDER — DULOXETIN HYDROCHLORIDE 60 MG/1
60 CAPSULE, DELAYED RELEASE ORAL DAILY
Qty: 90 CAPSULE | Refills: 3 | Status: ON HOLD | OUTPATIENT
Start: 2022-04-27 | End: 2023-05-31

## 2022-05-18 NOTE — TELEPHONE ENCOUNTER
----- Message from DAYANA Zaman sent at 4/23/2021  9:32 AM PDT -----  Please follow up with [employer] regarding increasing patient's overnight oxygen to 3 liters.     DAYANA Zaman     .

## 2022-06-09 ENCOUNTER — HOSPITAL ENCOUNTER (INPATIENT)
Facility: MEDICAL CENTER | Age: 82
LOS: 5 days | DRG: 493 | End: 2022-06-15
Attending: EMERGENCY MEDICINE | Admitting: INTERNAL MEDICINE
Payer: MEDICARE

## 2022-06-09 ENCOUNTER — APPOINTMENT (OUTPATIENT)
Dept: RADIOLOGY | Facility: MEDICAL CENTER | Age: 82
DRG: 493 | End: 2022-06-09
Attending: EMERGENCY MEDICINE
Payer: MEDICARE

## 2022-06-09 DIAGNOSIS — K22.719 BARRETT'S ESOPHAGUS WITH DYSPLASIA: ICD-10-CM

## 2022-06-09 DIAGNOSIS — Z98.890 S/P ORIF (OPEN REDUCTION INTERNAL FIXATION) FRACTURE: ICD-10-CM

## 2022-06-09 DIAGNOSIS — I10 HYPERTENSION, UNSPECIFIED TYPE: ICD-10-CM

## 2022-06-09 DIAGNOSIS — Z87.81 S/P ORIF (OPEN REDUCTION INTERNAL FIXATION) FRACTURE: ICD-10-CM

## 2022-06-09 DIAGNOSIS — S93.04XA CLOSED DISLOCATION OF RIGHT TALUS, INITIAL ENCOUNTER: ICD-10-CM

## 2022-06-09 DIAGNOSIS — S42.254A CLOSED NONDISPLACED FRACTURE OF GREATER TUBEROSITY OF RIGHT HUMERUS, INITIAL ENCOUNTER: ICD-10-CM
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
## 2022-06-09 DIAGNOSIS — S82.851A TRIMALLEOLAR FRACTURE OF ANKLE, CLOSED, RIGHT, INITIAL ENCOUNTER: ICD-10-CM

## 2022-06-09 DIAGNOSIS — S82.851A TRIMALLEOLAR FRACTURE, RIGHT, CLOSED, INITIAL ENCOUNTER: ICD-10-CM

## 2022-06-09 DIAGNOSIS — I95.81 HYPOTENSION AFTER PROCEDURE: ICD-10-CM

## 2022-06-09 DIAGNOSIS — S51.802A AVULSION OF SKIN OF FOREARM, LEFT, INITIAL ENCOUNTER: ICD-10-CM

## 2022-06-09 PROCEDURE — 99285 EMERGENCY DEPT VISIT HI MDM: CPT

## 2022-06-09 PROCEDURE — 0QSGXZZ REPOSITION RIGHT TIBIA, EXTERNAL APPROACH: ICD-10-PCS | Performed by: EMERGENCY MEDICINE

## 2022-06-09 PROCEDURE — 73600 X-RAY EXAM OF ANKLE: CPT | Mod: RT

## 2022-06-09 RX ORDER — PROPOFOL 10 MG/ML
1.5 INJECTION, EMULSION INTRAVENOUS ONCE
Status: COMPLETED | OUTPATIENT
Start: 2022-06-09 | End: 2022-06-10

## 2022-06-09 ASSESSMENT — FIBROSIS 4 INDEX: FIB4 SCORE: 1.96

## 2022-06-10 ENCOUNTER — APPOINTMENT (OUTPATIENT)
Dept: RADIOLOGY | Facility: MEDICAL CENTER | Age: 82
DRG: 493 | End: 2022-06-10
Attending: INTERNAL MEDICINE
Payer: MEDICARE

## 2022-06-10 ENCOUNTER — APPOINTMENT (OUTPATIENT)
Dept: RADIOLOGY | Facility: MEDICAL CENTER | Age: 82
DRG: 493 | End: 2022-06-10
Attending: ORTHOPAEDIC SURGERY
Payer: MEDICARE

## 2022-06-10 ENCOUNTER — ANESTHESIA EVENT (OUTPATIENT)
Dept: SURGERY | Facility: MEDICAL CENTER | Age: 82
DRG: 493 | End: 2022-06-10
Payer: MEDICARE

## 2022-06-10 ENCOUNTER — APPOINTMENT (OUTPATIENT)
Dept: RADIOLOGY | Facility: MEDICAL CENTER | Age: 82
DRG: 493 | End: 2022-06-10
Attending: EMERGENCY MEDICINE
Payer: MEDICARE

## 2022-06-10 ENCOUNTER — ANESTHESIA (OUTPATIENT)
Dept: SURGERY | Facility: MEDICAL CENTER | Age: 82
DRG: 493 | End: 2022-06-10
Payer: MEDICARE

## 2022-06-10 PROBLEM — E86.0 DEHYDRATION: Status: ACTIVE | Noted: 2022-06-10
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
## 2022-06-10 PROBLEM — S82.851A TRIMALLEOLAR FRACTURE OF ANKLE, CLOSED, RIGHT, INITIAL ENCOUNTER: Status: ACTIVE | Noted: 2022-06-10

## 2022-06-10 LAB
ALBUMIN SERPL BCP-MCNC: 3.8 G/DL (ref 3.2–4.9)
ALBUMIN/GLOB SERPL: 1.5 G/DL
ALP SERPL-CCNC: 98 U/L (ref 30–99)
ALT SERPL-CCNC: 11 U/L (ref 2–50)
ANION GAP SERPL CALC-SCNC: 9 MMOL/L (ref 7–16)
APTT PPP: 27 SEC (ref 24.7–36)
AST SERPL-CCNC: 20 U/L (ref 12–45)
BILIRUB SERPL-MCNC: 0.4 MG/DL (ref 0.1–1.5)
BUN SERPL-MCNC: 23 MG/DL (ref 8–22)
CALCIUM SERPL-MCNC: 8.8 MG/DL (ref 8.4–10.2)
CHLORIDE SERPL-SCNC: 105 MMOL/L (ref 96–112)
CO2 SERPL-SCNC: 26 MMOL/L (ref 20–33)
CREAT SERPL-MCNC: 0.65 MG/DL (ref 0.5–1.4)
EKG IMPRESSION: NORMAL
ERYTHROCYTE [DISTWIDTH] IN BLOOD BY AUTOMATED COUNT: 46.9 FL (ref 35.9–50)
GFR SERPLBLD CREATININE-BSD FMLA CKD-EPI: 87 ML/MIN/1.73 M 2
GLOBULIN SER CALC-MCNC: 2.5 G/DL (ref 1.9–3.5)
GLUCOSE SERPL-MCNC: 137 MG/DL (ref 65–99)
HCT VFR BLD AUTO: 40.7 % (ref 37–47)
HGB BLD-MCNC: 13.5 G/DL (ref 12–16)
INR PPP: 1.05 (ref 0.87–1.13)
MAGNESIUM SERPL-MCNC: 1.6 MG/DL (ref 1.5–2.5)
MCH RBC QN AUTO: 31.8 PG (ref 27–33)
MCHC RBC AUTO-ENTMCNC: 33.2 G/DL (ref 33.6–35)
MCV RBC AUTO: 95.8 FL (ref 81.4–97.8)
PHOSPHATE SERPL-MCNC: 2.5 MG/DL (ref 2.5–4.5)
PLATELET # BLD AUTO: 189 K/UL (ref 164–446)
PMV BLD AUTO: 10.7 FL (ref 9–12.9)
POTASSIUM SERPL-SCNC: 4.5 MMOL/L (ref 3.6–5.5)
PROT SERPL-MCNC: 6.3 G/DL (ref 6–8.2)
PROTHROMBIN TIME: 12.9 SEC (ref 12–14.6)
RBC # BLD AUTO: 4.25 M/UL (ref 4.2–5.4)
SODIUM SERPL-SCNC: 140 MMOL/L (ref 135–145)
WBC # BLD AUTO: 8 K/UL (ref 4.8–10.8)

## 2022-06-10 PROCEDURE — 01480 ANES OPEN PX LOWER L/A/F NOS: CPT | Performed by: ANESTHESIOLOGY

## 2022-06-10 PROCEDURE — 700101 HCHG RX REV CODE 250: Performed by: ANESTHESIOLOGY

## 2022-06-10 PROCEDURE — 0QSG04Z REPOSITION RIGHT TIBIA WITH INTERNAL FIXATION DEVICE, OPEN APPROACH: ICD-10-PCS | Performed by: ORTHOPAEDIC SURGERY

## 2022-06-10 PROCEDURE — 160041 HCHG SURGERY MINUTES - EA ADDL 1 MIN LEVEL 4: Performed by: ORTHOPAEDIC SURGERY

## 2022-06-10 PROCEDURE — 64447 NJX AA&/STRD FEMORAL NRV IMG: CPT | Mod: 59 | Performed by: ANESTHESIOLOGY

## 2022-06-10 PROCEDURE — 0PSCXZZ REPOSITION RIGHT HUMERAL HEAD, EXTERNAL APPROACH: ICD-10-PCS | Performed by: ORTHOPAEDIC SURGERY

## 2022-06-10 PROCEDURE — 83735 ASSAY OF MAGNESIUM: CPT

## 2022-06-10 PROCEDURE — 700102 HCHG RX REV CODE 250 W/ 637 OVERRIDE(OP): Performed by: INTERNAL MEDICINE

## 2022-06-10 PROCEDURE — 160048 HCHG OR STATISTICAL LEVEL 1-5: Performed by: ORTHOPAEDIC SURGERY

## 2022-06-10 PROCEDURE — 73060 X-RAY EXAM OF HUMERUS: CPT | Mod: RT

## 2022-06-10 PROCEDURE — 160002 HCHG RECOVERY MINUTES (STAT): Performed by: ORTHOPAEDIC SURGERY

## 2022-06-10 PROCEDURE — 96374 THER/PROPH/DIAG INJ IV PUSH: CPT

## 2022-06-10 PROCEDURE — A9270 NON-COVERED ITEM OR SERVICE: HCPCS | Performed by: INTERNAL MEDICINE

## 2022-06-10 PROCEDURE — 80053 COMPREHEN METABOLIC PANEL: CPT

## 2022-06-10 PROCEDURE — 64445 NJX AA&/STRD SCIATIC NRV IMG: CPT | Performed by: ORTHOPAEDIC SURGERY

## 2022-06-10 PROCEDURE — 99223 1ST HOSP IP/OBS HIGH 75: CPT | Mod: AI | Performed by: INTERNAL MEDICINE

## 2022-06-10 PROCEDURE — 73610 X-RAY EXAM OF ANKLE: CPT | Mod: RT

## 2022-06-10 PROCEDURE — 73700 CT LOWER EXTREMITY W/O DYE: CPT | Mod: RT,MG

## 2022-06-10 PROCEDURE — 700105 HCHG RX REV CODE 258: Performed by: ORTHOPAEDIC SURGERY

## 2022-06-10 PROCEDURE — 64447 NJX AA&/STRD FEMORAL NRV IMG: CPT | Performed by: ORTHOPAEDIC SURGERY

## 2022-06-10 PROCEDURE — 85610 PROTHROMBIN TIME: CPT

## 2022-06-10 PROCEDURE — 99100 ANES PT EXTEME AGE<1 YR&>70: CPT | Performed by: ANESTHESIOLOGY

## 2022-06-10 PROCEDURE — 700111 HCHG RX REV CODE 636 W/ 250 OVERRIDE (IP): Performed by: EMERGENCY MEDICINE

## 2022-06-10 PROCEDURE — 160029 HCHG SURGERY MINUTES - 1ST 30 MINS LEVEL 4: Performed by: ORTHOPAEDIC SURGERY

## 2022-06-10 PROCEDURE — C1713 ANCHOR/SCREW BN/BN,TIS/BN: HCPCS | Performed by: ORTHOPAEDIC SURGERY

## 2022-06-10 PROCEDURE — 770006 HCHG ROOM/CARE - MED/SURG/GYN SEMI*

## 2022-06-10 PROCEDURE — 36415 COLL VENOUS BLD VENIPUNCTURE: CPT

## 2022-06-10 PROCEDURE — 94760 N-INVAS EAR/PLS OXIMETRY 1: CPT

## 2022-06-10 PROCEDURE — 27818 TREATMENT OF ANKLE FRACTURE: CPT

## 2022-06-10 PROCEDURE — 73600 X-RAY EXAM OF ANKLE: CPT | Mod: RT

## 2022-06-10 PROCEDURE — 502240 HCHG MISC OR SUPPLY RC 0272: Performed by: ORTHOPAEDIC SURGERY

## 2022-06-10 PROCEDURE — 160009 HCHG ANES TIME/MIN: Performed by: ORTHOPAEDIC SURGERY

## 2022-06-10 PROCEDURE — 502000 HCHG MISC OR IMPLANTS RC 0278: Performed by: ORTHOPAEDIC SURGERY

## 2022-06-10 PROCEDURE — 0QSJ04Z REPOSITION RIGHT FIBULA WITH INTERNAL FIXATION DEVICE, OPEN APPROACH: ICD-10-PCS | Performed by: ORTHOPAEDIC SURGERY

## 2022-06-10 PROCEDURE — 700111 HCHG RX REV CODE 636 W/ 250 OVERRIDE (IP): Performed by: ANESTHESIOLOGY

## 2022-06-10 PROCEDURE — 84100 ASSAY OF PHOSPHORUS: CPT

## 2022-06-10 PROCEDURE — 99285 EMERGENCY DEPT VISIT HI MDM: CPT

## 2022-06-10 PROCEDURE — 94799 UNLISTED PULMONARY SVC/PX: CPT

## 2022-06-10 PROCEDURE — 85730 THROMBOPLASTIN TIME PARTIAL: CPT

## 2022-06-10 PROCEDURE — 93005 ELECTROCARDIOGRAM TRACING: CPT | Performed by: ORTHOPAEDIC SURGERY

## 2022-06-10 PROCEDURE — 700111 HCHG RX REV CODE 636 W/ 250 OVERRIDE (IP): Performed by: ORTHOPAEDIC SURGERY

## 2022-06-10 PROCEDURE — 700105 HCHG RX REV CODE 258: Performed by: INTERNAL MEDICINE

## 2022-06-10 PROCEDURE — 85027 COMPLETE CBC AUTOMATED: CPT

## 2022-06-10 PROCEDURE — 160035 HCHG PACU - 1ST 60 MINS PHASE I: Performed by: ORTHOPAEDIC SURGERY

## 2022-06-10 PROCEDURE — 93010 ELECTROCARDIOGRAM REPORT: CPT | Performed by: INTERNAL MEDICINE

## 2022-06-10 DEVICE — IMPLANTABLE DEVICE: Type: IMPLANTABLE DEVICE | Status: FUNCTIONAL

## 2022-06-10 RX ORDER — AMOXICILLIN 250 MG
2 CAPSULE ORAL 2 TIMES DAILY
Status: DISCONTINUED | OUTPATIENT
Start: 2022-06-10 | End: 2022-06-15 | Stop reason: HOSPADM

## 2022-06-10 RX ORDER — BUPIVACAINE HYDROCHLORIDE 2.5 MG/ML
INJECTION, SOLUTION EPIDURAL; INFILTRATION; INTRACAUDAL
Status: DISCONTINUED | OUTPATIENT
Start: 2022-06-10 | End: 2022-06-10 | Stop reason: HOSPADM

## 2022-06-10 RX ORDER — OXYCODONE HCL 5 MG/5 ML
5 SOLUTION, ORAL ORAL
Status: DISCONTINUED | OUTPATIENT
Start: 2022-06-10 | End: 2022-06-10 | Stop reason: HOSPADM

## 2022-06-10 RX ORDER — ACETAMINOPHEN 325 MG/1
650 TABLET ORAL EVERY 6 HOURS PRN
Status: DISCONTINUED | OUTPATIENT
Start: 2022-06-10 | End: 2022-06-15 | Stop reason: HOSPADM

## 2022-06-10 RX ORDER — OXYCODONE HCL 5 MG/5 ML
10 SOLUTION, ORAL ORAL
Status: DISCONTINUED | OUTPATIENT
Start: 2022-06-10 | End: 2022-06-10 | Stop reason: HOSPADM

## 2022-06-10 RX ORDER — ONDANSETRON 4 MG/1
4 TABLET, ORALLY DISINTEGRATING ORAL EVERY 4 HOURS PRN
Status: DISCONTINUED | OUTPATIENT
Start: 2022-06-10 | End: 2022-06-15 | Stop reason: HOSPADM

## 2022-06-10 RX ORDER — DULOXETIN HYDROCHLORIDE 30 MG/1
60 CAPSULE, DELAYED RELEASE ORAL DAILY
Status: DISCONTINUED | OUTPATIENT
Start: 2022-06-10 | End: 2022-06-10

## 2022-06-10 RX ORDER — UREA 10 %
1 LOTION (ML) TOPICAL NIGHTLY
Status: DISCONTINUED | OUTPATIENT
Start: 2022-06-10 | End: 2022-06-15 | Stop reason: HOSPADM

## 2022-06-10 RX ORDER — OXYCODONE HYDROCHLORIDE 5 MG/1
5 TABLET ORAL
Status: DISCONTINUED | OUTPATIENT
Start: 2022-06-10 | End: 2022-06-15 | Stop reason: HOSPADM

## 2022-06-10 RX ORDER — PHENYLEPHRINE HYDROCHLORIDE 10 MG/ML
INJECTION, SOLUTION INTRAMUSCULAR; INTRAVENOUS; SUBCUTANEOUS PRN
Status: DISCONTINUED | OUTPATIENT
Start: 2022-06-10 | End: 2022-06-10 | Stop reason: SURG

## 2022-06-10 RX ORDER — OMEPRAZOLE 20 MG/1
40 CAPSULE, DELAYED RELEASE ORAL DAILY
Status: DISCONTINUED | OUTPATIENT
Start: 2022-06-10 | End: 2022-06-15 | Stop reason: HOSPADM

## 2022-06-10 RX ORDER — ONDANSETRON 2 MG/ML
4 INJECTION INTRAMUSCULAR; INTRAVENOUS
Status: DISCONTINUED | OUTPATIENT
Start: 2022-06-10 | End: 2022-06-10 | Stop reason: HOSPADM

## 2022-06-10 RX ORDER — ONDANSETRON 2 MG/ML
4 INJECTION INTRAMUSCULAR; INTRAVENOUS EVERY 4 HOURS PRN
Status: DISCONTINUED | OUTPATIENT
Start: 2022-06-10 | End: 2022-06-15 | Stop reason: HOSPADM

## 2022-06-10 RX ORDER — POLYETHYLENE GLYCOL 3350 17 G/17G
1 POWDER, FOR SOLUTION ORAL
Status: DISCONTINUED | OUTPATIENT
Start: 2022-06-10 | End: 2022-06-15 | Stop reason: HOSPADM

## 2022-06-10 RX ORDER — CEFAZOLIN SODIUM 1 G/3ML
INJECTION, POWDER, FOR SOLUTION INTRAMUSCULAR; INTRAVENOUS PRN
Status: DISCONTINUED | OUTPATIENT
Start: 2022-06-10 | End: 2022-06-10 | Stop reason: SURG

## 2022-06-10 RX ORDER — BISACODYL 10 MG
10 SUPPOSITORY, RECTAL RECTAL
Status: DISCONTINUED | OUTPATIENT
Start: 2022-06-10 | End: 2022-06-15 | Stop reason: HOSPADM

## 2022-06-10 RX ORDER — GABAPENTIN 100 MG/1
100 CAPSULE ORAL 3 TIMES DAILY
Status: DISCONTINUED | OUTPATIENT
Start: 2022-06-10 | End: 2022-06-15 | Stop reason: HOSPADM

## 2022-06-10 RX ORDER — DULOXETIN HYDROCHLORIDE 30 MG/1
30 CAPSULE, DELAYED RELEASE ORAL DAILY
Status: DISCONTINUED | OUTPATIENT
Start: 2022-06-10 | End: 2022-06-10

## 2022-06-10 RX ORDER — SODIUM CHLORIDE 9 MG/ML
INJECTION, SOLUTION INTRAVENOUS CONTINUOUS
Status: DISCONTINUED | OUTPATIENT
Start: 2022-06-10 | End: 2022-06-15 | Stop reason: HOSPADM

## 2022-06-10 RX ORDER — HYDRALAZINE HYDROCHLORIDE 20 MG/ML
10 INJECTION INTRAMUSCULAR; INTRAVENOUS EVERY 4 HOURS PRN
Status: DISCONTINUED | OUTPATIENT
Start: 2022-06-10 | End: 2022-06-15 | Stop reason: HOSPADM

## 2022-06-10 RX ORDER — OXYCODONE HYDROCHLORIDE 10 MG/1
10 TABLET ORAL
Status: DISCONTINUED | OUTPATIENT
Start: 2022-06-10 | End: 2022-06-15 | Stop reason: HOSPADM

## 2022-06-10 RX ORDER — DIPHENHYDRAMINE HYDROCHLORIDE 50 MG/ML
12.5 INJECTION INTRAMUSCULAR; INTRAVENOUS
Status: DISCONTINUED | OUTPATIENT
Start: 2022-06-10 | End: 2022-06-10 | Stop reason: HOSPADM

## 2022-06-10 RX ORDER — ANASTROZOLE 1 MG/1
1 TABLET ORAL DAILY
Status: DISCONTINUED | OUTPATIENT
Start: 2022-06-10 | End: 2022-06-15 | Stop reason: HOSPADM

## 2022-06-10 RX ORDER — HALOPERIDOL 5 MG/ML
1 INJECTION INTRAMUSCULAR
Status: DISCONTINUED | OUTPATIENT
Start: 2022-06-10 | End: 2022-06-10 | Stop reason: HOSPADM

## 2022-06-10 RX ORDER — HYDROMORPHONE HYDROCHLORIDE 1 MG/ML
0.5 INJECTION, SOLUTION INTRAMUSCULAR; INTRAVENOUS; SUBCUTANEOUS
Status: DISCONTINUED | OUTPATIENT
Start: 2022-06-10 | End: 2022-06-15 | Stop reason: HOSPADM

## 2022-06-10 RX ORDER — DULOXETIN HYDROCHLORIDE 30 MG/1
90 CAPSULE, DELAYED RELEASE ORAL DAILY
Status: DISCONTINUED | OUTPATIENT
Start: 2022-06-10 | End: 2022-06-15 | Stop reason: HOSPADM

## 2022-06-10 RX ORDER — SODIUM CHLORIDE, SODIUM LACTATE, POTASSIUM CHLORIDE, CALCIUM CHLORIDE 600; 310; 30; 20 MG/100ML; MG/100ML; MG/100ML; MG/100ML
INJECTION, SOLUTION INTRAVENOUS CONTINUOUS
Status: ACTIVE | OUTPATIENT
Start: 2022-06-10 | End: 2022-06-10

## 2022-06-10 RX ORDER — DEXLANSOPRAZOLE 60 MG/1
60 CAPSULE, DELAYED RELEASE ORAL EVERY EVENING
Status: DISCONTINUED | OUTPATIENT
Start: 2022-06-10 | End: 2022-06-10

## 2022-06-10 RX ORDER — MIDAZOLAM HYDROCHLORIDE 1 MG/ML
INJECTION INTRAMUSCULAR; INTRAVENOUS PRN
Status: DISCONTINUED | OUTPATIENT
Start: 2022-06-10 | End: 2022-06-10 | Stop reason: SURG

## 2022-06-10 RX ORDER — LOVASTATIN 20 MG/1
20 TABLET ORAL NIGHTLY
Status: DISCONTINUED | OUTPATIENT
Start: 2022-06-10 | End: 2022-06-15 | Stop reason: HOSPADM

## 2022-06-10 RX ADMIN — SODIUM CHLORIDE, POTASSIUM CHLORIDE, SODIUM LACTATE AND CALCIUM CHLORIDE: 600; 310; 30; 20 INJECTION, SOLUTION INTRAVENOUS at 16:02

## 2022-06-10 RX ADMIN — OMEPRAZOLE 40 MG: 20 CAPSULE, DELAYED RELEASE ORAL at 05:16

## 2022-06-10 RX ADMIN — PHENYLEPHRINE HYDROCHLORIDE 100 MCG: 10 INJECTION INTRAVENOUS at 16:29

## 2022-06-10 RX ADMIN — EPHEDRINE SULFATE 25 MG: 50 INJECTION INTRAMUSCULAR; INTRAVENOUS; SUBCUTANEOUS at 16:57

## 2022-06-10 RX ADMIN — PROPOFOL 100 MG: 10 INJECTION, EMULSION INTRAVENOUS at 16:07

## 2022-06-10 RX ADMIN — DULOXETINE 90 MG: 30 CAPSULE, DELAYED RELEASE ORAL at 05:16

## 2022-06-10 RX ADMIN — LOVASTATIN 20 MG: 20 TABLET ORAL at 21:31

## 2022-06-10 RX ADMIN — PHENYLEPHRINE HYDROCHLORIDE 200 MCG: 10 INJECTION INTRAVENOUS at 16:50

## 2022-06-10 RX ADMIN — ANASTROZOLE 1 MG: 1 TABLET ORAL at 05:16

## 2022-06-10 RX ADMIN — PROPOFOL 95 MG: 10 INJECTION, EMULSION INTRAVENOUS at 00:24

## 2022-06-10 RX ADMIN — SODIUM CHLORIDE: 9 INJECTION, SOLUTION INTRAVENOUS at 01:55

## 2022-06-10 RX ADMIN — Medication 1 MG: at 02:02

## 2022-06-10 RX ADMIN — Medication 1 MG: at 21:31

## 2022-06-10 RX ADMIN — FENTANYL CITRATE 25 MCG: 50 INJECTION, SOLUTION INTRAMUSCULAR; INTRAVENOUS at 15:50

## 2022-06-10 RX ADMIN — PHENYLEPHRINE HYDROCHLORIDE 200 MCG: 10 INJECTION INTRAVENOUS at 16:37

## 2022-06-10 RX ADMIN — PHENYLEPHRINE HYDROCHLORIDE 200 MCG: 10 INJECTION INTRAVENOUS at 17:07

## 2022-06-10 RX ADMIN — SODIUM CHLORIDE, POTASSIUM CHLORIDE, SODIUM LACTATE AND CALCIUM CHLORIDE: 600; 310; 30; 20 INJECTION, SOLUTION INTRAVENOUS at 16:27

## 2022-06-10 RX ADMIN — CEFAZOLIN 1 G: 330 INJECTION, POWDER, FOR SOLUTION INTRAMUSCULAR; INTRAVENOUS at 16:10

## 2022-06-10 RX ADMIN — MIDAZOLAM HYDROCHLORIDE 0.5 MG: 1 INJECTION, SOLUTION INTRAMUSCULAR; INTRAVENOUS at 15:50

## 2022-06-10 RX ADMIN — PHENYLEPHRINE HYDROCHLORIDE 200 MCG: 10 INJECTION INTRAVENOUS at 17:15

## 2022-06-10 RX ADMIN — GABAPENTIN 100 MG: 100 CAPSULE ORAL at 05:16

## 2022-06-10 RX ADMIN — CEFAZOLIN 2 G: 2 INJECTION, POWDER, FOR SOLUTION INTRAMUSCULAR; INTRAVENOUS at 21:31

## 2022-06-10 ASSESSMENT — PAIN DESCRIPTION - PAIN TYPE
TYPE: ACUTE PAIN
TYPE: SURGICAL PAIN
TYPE: ACUTE PAIN

## 2022-06-10 ASSESSMENT — COGNITIVE AND FUNCTIONAL STATUS - GENERAL
MOVING FROM LYING ON BACK TO SITTING ON SIDE OF FLAT BED: A LITTLE
DRESSING REGULAR LOWER BODY CLOTHING: A LITTLE
STANDING UP FROM CHAIR USING ARMS: A LOT
CLIMB 3 TO 5 STEPS WITH RAILING: A LOT
MOBILITY SCORE: 15
WALKING IN HOSPITAL ROOM: A LOT
DAILY ACTIVITIY SCORE: 20
TURNING FROM BACK TO SIDE WHILE IN FLAT BAD: A LITTLE
SUGGESTED CMS G CODE MODIFIER MOBILITY: CK
MOVING TO AND FROM BED TO CHAIR: A LITTLE
TOILETING: A LITTLE
HELP NEEDED FOR BATHING: A LITTLE
SUGGESTED CMS G CODE MODIFIER DAILY ACTIVITY: CJ
DRESSING REGULAR UPPER BODY CLOTHING: A LITTLE

## 2022-06-10 ASSESSMENT — LIFESTYLE VARIABLES
TOTAL SCORE: 0
ALCOHOL_USE: YES
CONSUMPTION TOTAL: NEGATIVE
TOTAL SCORE: 0
HOW MANY TIMES IN THE PAST YEAR HAVE YOU HAD 5 OR MORE DRINKS IN A DAY: 0
AVERAGE NUMBER OF DAYS PER WEEK YOU HAVE A DRINK CONTAINING ALCOHOL: 1
HAVE YOU EVER FELT YOU SHOULD CUT DOWN ON YOUR DRINKING: NO
EVER FELT BAD OR GUILTY ABOUT YOUR DRINKING: NO
EVER HAD A DRINK FIRST THING IN THE MORNING TO STEADY YOUR NERVES TO GET RID OF A HANGOVER: NO
ON A TYPICAL DAY WHEN YOU DRINK ALCOHOL HOW MANY DRINKS DO YOU HAVE: 1
TOTAL SCORE: 0
HAVE PEOPLE ANNOYED YOU BY CRITICIZING YOUR DRINKING: NO

## 2022-06-10 ASSESSMENT — ENCOUNTER SYMPTOMS
VOMITING: 0
SPUTUM PRODUCTION: 0
CHILLS: 0
LOSS OF CONSCIOUSNESS: 0
CONSTIPATION: 0
STRIDOR: 0
DEPRESSION: 0
ABDOMINAL PAIN: 0
NAUSEA: 0
DIZZINESS: 0
DIARRHEA: 0
WEAKNESS: 0
FALLS: 1
MYALGIAS: 0
TINGLING: 0
PALPITATIONS: 0
FEVER: 0
SHORTNESS OF BREATH: 0
HEADACHES: 0
COUGH: 0

## 2022-06-10 ASSESSMENT — FIBROSIS 4 INDEX
FIB4 SCORE: 2.65
FIB4 SCORE: 1.96

## 2022-06-10 ASSESSMENT — PATIENT HEALTH QUESTIONNAIRE - PHQ9
SUM OF ALL RESPONSES TO PHQ9 QUESTIONS 1 AND 2: 0
1. LITTLE INTEREST OR PLEASURE IN DOING THINGS: NOT AT ALL
2. FEELING DOWN, DEPRESSED, IRRITABLE, OR HOPELESS: NOT AT ALL

## 2022-06-10 ASSESSMENT — PAIN SCALES - GENERAL: PAIN_LEVEL: 0

## 2022-06-10 NOTE — ED NOTES
Pt sitting up in gurney with no signs of distress. Daughter at bedside.  Reposition the patient for comfort. Denies any other needs.

## 2022-06-10 NOTE — ANESTHESIA PROCEDURE NOTES
Peripheral Block    Date/Time: 6/10/2022 3:50 PM  Performed by: Cuong Carmona M.D.  Authorized by: Cuong Carmona M.D.     Patient Location:  Pre-op  Start Time:  6/10/2022 3:50 PM  Reason for Block: at surgeon's request and post-op pain management ONLY    patient identified, IV checked, site marked, risks and benefits discussed, surgical consent, monitors and equipment checked, pre-op evaluation and timeout performed    Patient Position:  Supine  Prep: ChloraPrep    Monitoring:  Heart rate, continuous pulse ox and cardiac monitor  Block Region:  Lower Extremity  Lower Extremity - Block Type:  Selective FEMORAL nerve block at the Adductor Canal and SCIATIC nerve block, lateral approach    Laterality:  Right  Procedures: ultrasound guided  Image captured, interpreted and electronically stored.  Local Infiltration:  Lidocaine  Strength:  1 %  Dose:  3 ml  Block Type:  Single-shot  Needle Length:  100mm  Needle Gauge:  21 G  Needle Localization:  Ultrasound guidance  Injection Assessment:  Negative aspiration for heme, no paresthesia on injection, incremental injection and local visualized surrounding nerve on ultrasound

## 2022-06-10 NOTE — OR NURSING
1545 In person COVID screening completed; negative.  Patient allergies and NPO status verified, home medication reconciliation completed and belongings secured. Surgical site verified with patient. Patient verbalizes understanding of pain scale, expected course of stay and plan of care; patient and family state verbal understanding at this time. IV access established. Sequential in place as ordered to LLE.

## 2022-06-10 NOTE — PROGRESS NOTES
[facility] Medicine Daily Progress Note    Date of Service  6/10/2022    Chief Complaint  Rashmi Parra is a 82 y.o. female admitted 6/9/2022 with   Chief Complaint   Patient presents with   • Fall     Pt reports that her  fell on her. Denies head injury, neck, or back pain. -LOC - thinners.   • Laceration     Pt noted to have a large laceration to L forearm. Bleeding controlled    • Leg Injury     Pt noted to have obvious deformities to R ankle     Hospital Course  82-year-old female with past medical history of arthritis, COPD, dyslipidemia, hypertension, depression, T2DM admitted on 6/10/22 for ground-level fall at home with laceration to left forearm and right ankle injury.  Patient was in her kitchen and had tripped and fallen onto the ground with severe right ankle pain and patient was not able to get back up.  Pain was severe.  In the ED imaging showed trimalleolar fracture of the right ankle, reduced in the ER.  Orthopedic surgery was consulted and planning on surgical intervention.      Interval Problem Update  Patient to go to OR for right ankle fracture with Dr. Simpson.  Patient complained of right shoulder and arm pain, XR was ordered.      Addendum: Right greater tuberosity of humerus shows acute fracture.  Information given to pre-op.    I have personally seen and examined the patient at bedside. I discussed the plan of care with patient, family, bedside RN, charge RN,  and pharmacy.    Consultants/Specialty  orthopedics    Code Status  Full Code    Disposition  Patient is not medically cleared for discharge.   Anticipate discharge to TBD.  I have placed the appropriate orders for post-discharge needs.    Review of Systems  Review of Systems   Musculoskeletal: Positive for joint pain (right shoulder and arm pain).        Physical Exam  Temp:  [36.6 °C (97.8 °F)-36.8 °C (98.2 °F)] 36.8 °C (98.2 °F)  Pulse:  [73-85] 85  Resp:  [16-18] 17  BP: ()/(45-82) 106/68  SpO2:  [95 %-100 %]  96 %    Physical Exam  Vitals and nursing note reviewed.   Constitutional:       General: She is in acute distress (due to pain).      Appearance: She is ill-appearing.      Comments: Frail appearing elderly female   HENT:      Head: Normocephalic and atraumatic.      Mouth/Throat:      Mouth: Mucous membranes are dry.      Pharynx: No oropharyngeal exudate.   Eyes:      General: No scleral icterus.     Extraocular Movements: Extraocular movements intact.   Cardiovascular:      Rate and Rhythm: Normal rate and regular rhythm.      Pulses: Normal pulses.      Heart sounds: Normal heart sounds. No murmur heard.  Pulmonary:      Effort: Pulmonary effort is normal. No respiratory distress.      Breath sounds: Normal breath sounds. No wheezing.   Abdominal:      General: Abdomen is flat. Bowel sounds are normal. There is no distension.      Palpations: Abdomen is soft.      Tenderness: There is no abdominal tenderness.   Musculoskeletal:         General: Tenderness (right shoulder and arm) present. No swelling.      Cervical back: Normal range of motion. No rigidity.      Comments: Sarcopenic   Skin:     General: Skin is warm.      Capillary Refill: Capillary refill takes less than 2 seconds.      Coloration: Skin is not jaundiced.      Findings: No erythema.   Neurological:      Mental Status: She is alert and oriented to person, place, and time. Mental status is at baseline.      Motor: Weakness present.   Psychiatric:         Mood and Affect: Mood normal.         Behavior: Behavior normal.         Thought Content: Thought content normal.         Judgment: Judgment normal.         Fluids    Intake/Output Summary (Last 24 hours) at 6/10/2022 1515  Last data filed at 6/10/2022 1200  Gross per 24 hour   Intake --   Output 1200 ml   Net -1200 ml       Laboratory  Recent Labs     06/10/22  0955   WBC 8.0   RBC 4.25   HEMOGLOBIN 13.5   HEMATOCRIT 40.7   MCV 95.8   MCH 31.8   MCHC 33.2*   RDW 46.9   PLATELETCT 189   MPV 10.7      Recent Labs     06/10/22  0955   SODIUM 140   POTASSIUM 4.5   CHLORIDE 105   CO2 26   GLUCOSE 137*   BUN 23*   CREATININE 0.65   CALCIUM 8.8     Recent Labs     06/10/22  0955   APTT 27.0   INR 1.05               Imaging  DX-HUMERUS 2+ RIGHT   Final Result      1.  Nondisplaced right greater tuberosity fracture      2.  Multiple intra-articular ossific body within the subcoracoid recess of the glenohumeral joint      3.  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      DX-ANKLE 2- VIEWS RIGHT   Final Result         Interval reduction of trimalleolar ankle fracture      DX-ANKLE 2- VIEWS RIGHT   Final Result         Displaced trimalleolar fracture with dorsal dislocation of the talus      CT-ANKLE W/O PLUS RECONS RIGHT    (Results Pending)   DX-PORTABLE FLUORO > 1 HOUR    (Results Pending)   DX-ANKLE 3+ VIEWS RIGHT    (Results Pending)        Assessment/Plan  * Trimalleolar fracture of ankle, closed, right, initial encounter- (present on admission)  Assessment & Plan  - Post  falling on her leg  -Has been reduced in the ER, orthopedic surgery for surgical intervention in the morning  -Keep patient n.p.o.  -PT/OT postoperatively  -I do believe patient needs to be admitted at this time, she was to be sent home she would continue to be at high risk of additional falls  -I do feel she will require more than 2 midnights in the hospital due to severity of the fracture and need for increased strength and pain management prior to discharge    Fall- (present on admission)  Assessment & Plan  - Nonsyncopal  -Causing right ankle fracture  -PT/OT to evaluate postoperatively  - right arm pain complaint after admission, checking XR of Right shoulder and humerus.     XR showed Right greater tuberosity of humerus acute fracture.    Dehydration- (present on admission)  Assessment & Plan  - Start IV fluids  -Repeat BMP in the morning    Type 2 diabetes mellitus with diabetic mononeuropathy, without  long-term current use of insulin (HCC)- (present on admission)  Assessment & Plan  - Hold home metformin  -Mild hyperglycemia but no need for coverage    Moderate episode of recurrent major depressive disorder (HCC)- (present on admission)  Assessment & Plan  - Continue home Cymbalta    Nocturnal hypoxia- (present on admission)  Assessment & Plan  - Patient does wear oxygen at baseline, continue oxygen with continuous pulse ox monitoring    Other hyperlipidemia- (present on admission)  Assessment & Plan  - Continue home statin    Ott's esophagus with dysplasia- (present on admission)  Assessment & Plan  - Continue home PPI       VTE prophylaxis: SCDs/TEDs and pharmacologic prophylaxis contraindicated due to hold for surgery    I have performed a physical exam and reviewed and updated ROS and Plan today (6/10/2022). In review of yesterday's note (6/9/2022), there are no changes except as documented above.

## 2022-06-10 NOTE — RESPIRATORY CARE
"   COPD EDUCATION by COPD CLINICAL EDUCATOR  6/10/2022 at 7:03 AM by Haydee Rose RRT     Patient reviewed by COPD education team. Patient does not have a history or diagnosis of COPD and is a former smoker.  Therefore, patient does not qualify for the COPD program.    COPD Screen       COPD Assessment  COPD Clinical Specialists ONLY  COPD Education Initiated: No--Quick Screen (Per PFT 6/16/21  FEV1/FVC ratio is normal at 75.  Total lung capacity is also low normal at 3.93 L or 80% predicted.Overall PFT shows mild restrictive defect with mildly reduced DLCO.  This could be suggestive of early interstitial process. No inhalers)  Is this a COPD exacerbation patient?: No    PFT Results    No results found for: PFT    Meds to Beds  Would the patient like to opt in for Bedside Medication Delivery at Discharge?: No     MY COPD ACTION PLAN     It is recommended that patients and physicians /healthcare providers complete this action plan together. This plan should be discussed at each physician visit and updated as needed.    The green, yellow and red zones show groups of symptoms of COPD. This list of symptoms is not comprehensive, and you may experience other symptoms. In the \"Actions\" column, your healthcare provider has recommended actions for you to take based on your symptoms.    Patient Name: Rashmi Parra   YOB: 1940   Last Updated on:     Green Zone:  I am doing well today Actions   •  Usual activitiy and exercise level •  Take daily medications   •  Usual amounts of cough and phlegm/mucus •  Use oxygen as prescribed   •  Sleep well at night •  Continue regular exercise/diet plan   •  Appetite is good •  At all times avoid cigarette smoke, inhaled irritants     Daily Medications (these medications are taken every day):                Yellow Zone:  I am having a bad day or a COPD flare Actions   •  More breathless than usual •  Continue daily medications   •  I have less energy for my daily " "activities •  Use quick relief inhaler as ordered   •  Increased or thicker phlegm/mucus •  Use oxygen as prescribed   •  Using quick relief inhaler/nebulizer more often •  Get plenty of rest   •  Swelling of ankles more than usual •  Use pursed lip breathing   •  More coughing than usual •  At all times avoid cigarette smoke, inhaled irritants   •  I feel like I have a \"chest cold\"   •  Poor sleep and my symptoms woke me up   •  My appetite is not good   •  My medicine is not helping    •  Call provider immediately if symptoms don’t improve     Continue daily medications, add rescue medications:               Medications to be used during a flare up, (as Discussed with Provider):              Red Zone:  I need urgent medical care Actions   •  Severe shortness of breath even at rest •  Call 911 or seek medical care immediately   •  Not able to do any activity because of breathing    •  Fever or shaking chills    •  Feeling confused or very drowsy     •  Chest pains    •  Coughing up blood                "

## 2022-06-10 NOTE — PROGRESS NOTES
Received patient from the Emergency Department. Patient transferred from the [facility] to the hospital bed. Patient is alert and oriented X 4. Safety precautions in place. Will continue to monitor patient.

## 2022-06-10 NOTE — ANESTHESIA PROCEDURE NOTES
Airway    Date/Time: 6/10/2022 4:08 PM  Performed by: Cuong Carmona M.D.  Authorized by: Cuong Carmona M.D.     Location:  OR  Urgency:  Elective  Indications for Airway Management:  Anesthesia      Spontaneous Ventilation: absent    Sedation Level:  Deep  Preoxygenated: Yes    Final Airway Type:  Supraglottic airway  Final Supraglottic Airway:  Standard LMA    SGA Size:  3  Number of Attempts at Approach:  1

## 2022-06-10 NOTE — DISCHARGE PLANNING
Case Management Discharge Planning    Admission Date: 6/9/2022  GMLOS: 3  ALOS: 0    6-Clicks ADL Score: 20  6-Clicks Mobility Score: 15  PT and/or OT Eval ordered: Yes  Post-acute Referrals Ordered: NA  Post-acute Choice Obtained: NA  Has referral(s) been sent to post-acute provider:  YVES      Anticipated Discharge Dispo: Discharge Disposition: Discharged to home/self care (01)    DME Needed: No    Action(s) Taken: Updated Provider/Nurse on Discharge Plan    Escalations Completed: PT    Medically Clear: No    Next Steps: Medical clearance    Barriers to Discharge: Medical clearance    Is the patient up for discharge tomorrow: No

## 2022-06-10 NOTE — ED PROVIDER NOTES
ED Provider Note    ED Provider Note    Scribed for Kristen Oliver MD by Kristen Oliver M.D.. 6/9/2022, 11:11 PM.    Primary care provider: DAYANA Zaman  Means of arrival: EMS  History obtained from: EMS and Pt  History limited by: None    CHIEF COMPLAINT  Chief Complaint   Patient presents with   • Fall     Pt reports that her  fell on her. Denies head injury, neck, or back pain. -LOC - thinners.   • Laceration     Pt noted to have a large laceration to L forearm. Bleeding controlled    • Leg Injury     Pt noted to have obvious deformities to R ankle       HPI  Rashmi Parra is a 82 y.o. female who presents to the Emergency Department for evaluation of severe injury to the right ankle.  Patient relates her  was working on something in the kitchen, he unfortunately has Parkinson's disease and is somewhat unsteady at baseline.  She notes that he tripped and actually fell onto her, she was knocked to the ground and pinned between her  and the ground.  Patient noted at that time severe sudden pain to the right ankle.  There is an obvious deformity and she is unable to bear any weight on the affected limb.  She has had what sounds like open reduction internal fixation for a left ankle fracture in the past.  She has no numbness nor weakness, severe pain throughout the right ankle with obvious deformity, pain improved with fentanyl 100 mcg IV given by EMS.  She did not strike her head, she did not lose consciousness.  She notes no obvious head or neck or back pain or injury.  She is not anticoagulated, patient does have a large skin tear to the left forearm with bleeding resolved with bandage placed by EMS.    REVIEW OF SYSTEMS  Pertinent positives include ground-level fall with severe injury to right ankle with inability to bear weight, severe pain, and obvious deformity, skin tear to left forearm. Pertinent negatives include no head injury, no anticoagulation, no  other distracting injury beyond the right ankle and the left forearm.  All other systems reviewed and negative.    PAST MEDICAL HISTORY   has a past medical history of Arthritis, Bowel habit changes, Breath shortness, Cataract, COPD (chronic obstructive pulmonary disease) (HCC), Diabetes (HCC), Heart burn, Heart murmur, Hiatus hernia syndrome, High cholesterol, Hypertension, Indigestion, Malignant neoplasm of overlapping sites of breast in female, estrogen receptor positive (HCC) (2019), Psychiatric problem (), Shortness of breath, Snoring, Urinary incontinence, and Wears glasses.    SURGICAL HISTORY   has a past surgical history that includes other orthopedic surgery (); gyn surgery (); cholecystectomy (); knee arthroplasty total (Right, 2015); cataract extraction with iol (Bilateral); lefort colpoclesis (2019); bladder sling female (2019); mastectomy, modified radical (Right, 2019); node biopsy sentinel (Right, 2019); axillary node dissection (2019); mastectomy (Left, 2019); flap closure (Bilateral, 2019); wide excision (Right, 2019); incision and drainage general (Bilateral, 2019); abdominal hysterectomy total (); and total hip arthroplasty (Right, 2021).    SOCIAL HISTORY  Social History     Tobacco Use   • Smoking status: Former Smoker     Packs/day: 1.00     Years: 30.00     Pack years: 30.00     Types: Cigarettes     Quit date: 1992     Years since quittin.4   • Smokeless tobacco: Former User   Vaping Use   • Vaping Use: Never used   Substance Use Topics   • Alcohol use: Yes     Alcohol/week: 0.6 oz     Types: 1 Glasses of wine per week     Comment: occ   • Drug use: No      Social History     Substance and Sexual Activity   Drug Use No     FAMILY HISTORY  Family History   Problem Relation Age of Onset   • Heart Disease Neg Hx    Noncontributory for trauma    CURRENT MEDICATIONS  Home Medications     Reviewed by Cydney DELA CRUZ  "ROBERT Finley (Registered Nurse) on 06/09/22 at 2303  Med List Status: Not Addressed   Medication Last Dose Status   acetaminophen (TYLENOL) 500 MG Tab  Active   acetaminophen (TYLENOL) 500 MG Tab  Active   alendronate (FOSAMAX) 35 MG tablet  Active   anastrozole (ARIMIDEX) 1 MG Tab  Active   B Complex Vitamins (B COMPLEX PO)  Active   Calcium Citrate (CITRACAL PO)  Active   Cholecalciferol (VITAMIN D PO)  Active   Dexlansoprazole (DEXILANT) 60 MG CAPSULE DELAYED RELEASE delayed-release capsule  Active   DULoxetine (CYMBALTA) 30 MG Cap DR Particles  Active   DULoxetine (CYMBALTA) 60 MG Cap DR Particles delayed-release capsule  Active   fluconazole (DIFLUCAN) 150 MG tablet  Active   fluticasone (FLONASE) 50 MCG/ACT nasal spray  Active   gabapentin (NEURONTIN) 100 MG Cap  Active   ibuprofen (MOTRIN) 200 MG Tab  Active   Lifitegrast (XIIDRA) 5 % Solution  Active   lovastatin (MEVACOR) 20 MG Tab  Active   MAGNESIUM PO  Active   Melatonin 5 MG Tab  Active   metFORMIN (GLUCOPHAGE) 500 MG Tab  Active   Multiple Vitamins-Minerals (MULTIVITAMIN PO)  Active   MYRBETRIQ 50 MG TABLET SR 24 HR  Active   nystatin (MYCOSTATIN) 153929 UNIT/ML Suspension  Active   polyethylene glycol/lytes (MIRALAX) Pack  Active                ALLERGIES  No Known Allergies    PHYSICAL EXAM  VITAL SIGNS: BP (!) 90/60   Pulse 80   Temp 36.6 °C (97.8 °F) (Temporal)   Resp 16   Ht 1.6 m (5' 3\")   Wt 63.5 kg (140 lb)   LMP  (LMP Unknown)   SpO2 99%   BMI 24.80 kg/m²     General: Alert, mild acute distress  Skin: Warm, dry, normal for ethnicity.  Superficial skin tear noted to the volar surface of the left forearm which is 6 cm x 2 cm in diameter, no significant active bleeding.  Head: Normocephalic, atraumatic  Neck: Trachea midline  Eye: normal conjunctiva  ENMT: Oral mucosa pink and dry  Cardiovascular: Regular rate and rhythm,  Normal peripheral perfusion  Respiratory: respirations are non-labored,  symmetric chest rise noted  Gastrointestinal:  " non distended  Musculoskeletal: Moderate swelling and obvious deformity to the right ankle concerning for fracture/dislocation.  Ankle joint is unstable, severe tenderness throughout.  2+ DP and PT pulses noted with brisk capillary refill.  Sensation distally throughout the foot and toes is grossly intact with regard to light touch.  Plantar and dorsiflexion of the toes is also intact but does elicit pain.  No tenderness and no deformity proximally up the shaft the tibia and fibula and no tenderness nor deformity on exam of the right knee.  Neurological: Alert and oriented to person, place, time, and situation.  Sensation with regard to light touch distal to the fracture is fully intact as described above, dorsal and plantar flexion of the toes also grossly intact.  Lymphatics: No right lower extremity lymphangitis  Psychiatric: Cooperative, appropriate mood & affect      RADIOLOGY  DX-ANKLE 2- VIEWS RIGHT   Final Result         Interval reduction of trimalleolar ankle fracture      DX-ANKLE 2- VIEWS RIGHT   Final Result         Displaced trimalleolar fracture with dorsal dislocation of the talus        The radiologist's interpretation of all radiological studies have been reviewed by me.    COURSE & MEDICAL DECISION MAKING  Pertinent Labs & Imaging studies reviewed. (See chart for details)    11:11 PM - Patient seen and examined at bedside. Patient has been treated with fentanyl 100 mcg by EMS with adequate pain control initially. Ordered x-ray imaging of the right ankle to evaluate her symptoms. The differential diagnoses include but are not limited to: Ankle fracture, ankle dislocation    Laceration Repair Procedure Note    Indication: Laceration    Procedure: The patient was placed in the appropriate position and anesthesia around the laceration was not indicated. The area was then cleansed using chlorhexidine, irrigated with normal saline, explored with no foreign bodies discovered and no tendon injury noted  and draped in a sterile fashion. The laceration was closed with steri strips. There were no additional lacerations requiring repair. The wound area was then dressed with gauze.      Total repaired wound length: 6 cm.     Other Items: None    The patient tolerated the procedure well.    Complications: None      2334: Patient tolerates laceration/skin avulsion repair well.  I have discussed risks and benefits of procedural sedation and closed reduction and patient consents.  Reviewed the x-rays at bedside, clear indication for closed reduction.  We will need respiratory at bedside for the sedation.  I updated the patient's  and daughter who have arrived with the diagnosis and management plan.    2340: I have heard back from Dr. Simpson of the orthopedist, he concurs with plan, he would like the patient admitted given she is high risk with regard to the nature of the fracture and her age.  He will consult on the case, his hope is for operative intervention in the morning.      Conscious Sedation Procedure Note    Indication: fracture dislocation    Consent: I have discussed with the patient and/or the patient representative the indication, alternatives, and the possible risks and/or complications of the planned procedure and the anesthesia methods. The patient and/or patient representative appear to understand and agree to proceed.    Physician Involvement: The attending physician was present and supervising this procedure.    Pre-Sedation Documentation and Exam: I have personally completed a history, physical exam & review of systems for this patient (see notes).  Vital signs have been reviewed (see flow sheet for vitals).  I have reviewed the patient's history and review of systems.  Airway Assessment: Mallampati Class II - (soft palate, fauces & uvula are visible)  f3  Prior History of Anesthesia Complications: none    ASA Classification: Class 2 - A normal healthy patient with mild systemic disease    Sedation/  Anesthesia Plan: intravenous sedation    Medications Used: propofol 50 mg intravenously    Monitoring and Safety: The patient was placed on a cardiac monitor and vital signs, pulse oximetry and level of consciousness were continuously evaluated throughout the procedure. The patient was closely monitored until recovery from the medications was complete and the patient had returned to baseline status. Respiratory therapy was on standby at all times during the procedure.      (The following sections must be completed)  Post-Sedation Vital Signs: Vital signs were reviewed and were stable after the procedure (see flow sheet for vitals)            Intraservice Time: Greater than 10 minutes    Post-Sedation Exam: Lungs: clear           Complications: none    I provided both the sedation and procedure, a nurse was present at the bedside for the entire procedure.         Closed Reduction Procedure Note    Indication: R ankle fracture dislocation    Consent: I have discussed with the patient and/or the patient representative the indication, alternatives, and the possible risks and/or complications of the planned procedure and the anesthesia methods. The patient and/or patient representative appear to understand and agree to proceed.    Physician Involvement: The attending physician was present and supervising this procedure.    Pre-Procedure Documentation and Exam: I have personally completed a history, physical exam & review of systems for this patient (see notes).  Vital signs have been reviewed (see flow sheet for vitals).    Sedation/ Anesthesia Plan: intravenous sedation    Medications Used: propofol intravenously    Procedure summary: The patient was placed in supine position.  With appropriate monitoring and after procedural sedation as documented above utilizing direct traction countertraction as well as fracture manipulation palpable reduction accomplished with clinically improved alignment.  Remains neurovascular  "intact throughout, brisk capillary refill noted to the exposed toes with intact sensation as well as dorsal and plantar flexion.  Splinted with Ortho-Glass both sugar-tong and posterior and wrapped in Ace bandage.  Post reduction film ordered.           Complications: none    I provided both the sedation and procedure, a nurse was present at the bedside for the entire procedure.       0019: Patient tolerates reduction well.  Post reduction film ordered, we will page the hospitalist for admission.    0023: Spoke with Dr. Romo the hospitalist who concurs the plan and accepts the patient to his service.    0042: Blood pressure improving nicely with IV fluids, currently 98/68.  I reviewed postreduction films demonstrating satisfactory reduction.    HYDRATION: Based on the patient's presentation of Hypotension the patient was given IV fluids. IV Hydration was used because oral hydration failed due to N.p.o.. Upon recheck following hydration, the patient was Doing better, blood pressure steadily improving.    Patient Vitals for the past 24 hrs:   BP Temp Temp src Pulse Resp SpO2 Height Weight   06/10/22 0037 (!) 98/68 -- -- 77 -- 100 % -- --   06/10/22 0032 102/63 -- -- 78 -- 100 % -- --   06/10/22 0027 -- -- -- 76 -- 100 % -- --   06/10/22 0024 (!) 78/51 -- -- 79 -- 98 % -- --   06/10/22 0022 (!) 64/45 -- -- 77 -- 98 % -- --   06/10/22 0019 (!) 71/48 -- -- 79 -- 97 % -- --   06/10/22 0017 100/57 -- -- 78 -- 95 % -- --   06/10/22 0015 -- -- -- 79 16 95 % -- --   06/10/22 0012 (!) 82/64 -- -- 79 -- 97 % -- --   06/10/22 0007 (!) 83/54 -- -- 83 -- 96 % -- --   06/10/22 0002 (!) 75/58 -- -- 79 -- 96 % -- --   06/09/22 2342 110/82 -- -- 75 -- 97 % -- --   06/09/22 2312 111/77 -- -- 75 -- 98 % -- --   06/09/22 2301 111/77 36.6 °C (97.8 °F) Temporal 73 18 97 % -- --   06/09/22 2258 -- -- -- -- -- -- 1.6 m (5' 3\") 63.5 kg (140 lb)         Decision Making:  This is a 82 y.o. year old female who presents with mechanical " ground-level fall and obvious deformity to the right ankle.  This is consistent with fracture/dislocation of the ankle.  X-ray confirms significant injury.  Thankfully she remains neurovascularly intact but there is clear indication for bedside closed reduction before splinting.  Orthopedics consulted as well need operative intervention for definitive management.    Patient admitted in improved but guarded condition to the medical surgical floor in the care of the hospitalist Dr. Gerardo Romo with the orthopedist Dr. Simpson consulting.    FINAL IMPRESSION  1. Trimalleolar fracture, right, closed, initial encounter    2. Hypotension after procedure    3. Closed dislocation of right talus, initial encounter    4. Avulsion of skin of forearm, left, initial encounter          Kristen DAVIS M.D. (Scribe), am scribing for, and in the presence of, Kristen Oliver MD.    Electronically signed by: Kristen Oliver M.D. (Scribe), 6/9/2022    IKristen MD personally performed the services described in this documentation, as scribed by Kristen Oliver M.D. in my presence, and it is both accurate and complete    The note accurately reflects work and decisions made by me.  Kristen Oliver M.D.  6/10/2022  12:24 AM

## 2022-06-10 NOTE — ANESTHESIA PREPROCEDURE EVALUATION
Case: [id number] Date/Time: 06/10/22 1536    Procedure: ORIF, ANKLE - TRIMALLEOLAR (Right )    Location:  OR 06 / SURGERY [facility]    Surgeons: Anuj Simpson M.D.          Relevant Problems   PULMONARY   (positive) Other emphysema (HCC)   (positive) Shortness of breath      ENDO   (positive) Type 2 diabetes mellitus with diabetic mononeuropathy, without long-term current use of insulin (HCC)       Physical Exam    Airway   Mallampati: II  TM distance: >3 FB  Neck ROM: full       Cardiovascular - normal exam  Rhythm: regular  Rate: normal  (-) murmur     Dental - normal exam           Pulmonary - normal exam  Breath sounds clear to auscultation     Abdominal    Neurological - normal exam                 Anesthesia Plan    ASA 2       Plan - general and peripheral nerve block     Peripheral nerve block will be post-op pain control  Airway plan will be LMA        Plan Factors:   Patient was previously instructed to abstain from smoking on day of procedure.  Patient did not smoke on day of procedure.      Induction: intravenous    Postoperative Plan: Postoperative administration of opioids is intended.    Pertinent diagnostic labs and testing reviewed    Informed Consent:    Anesthetic plan and risks discussed with patient.    Use of blood products discussed with: patient whom consented to blood products.
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
## 2022-06-10 NOTE — PROGRESS NOTES
Bedside report received.  Patient is awake and alert.  C/O 4/10 ankle pain, will medicate per MAR.  Updated with plan of care.  Safety precautions in place.

## 2022-06-10 NOTE — PROGRESS NOTES
4 Eyes Skin Assessment Completed by RUFUS Disla and RUFUS Chaudhry.    Head WDL  Ears WDL  Nose WDL  Mouth WDL  Neck WDL  Breast/Chest WDL  Shoulder Blades WDL  Spine WDL  (R) Arm/Elbow/Hand WDL  (L) Arm/Elbow/Hand Laceration, Skin Tear, Dressing in place  Abdomen WDL  Groin WDL  Scrotum/Coccyx/Buttocks WDL  (R) Leg RENÉE, Dressing in place  (L) Leg WDL  (R) Heel/Foot/Toe RENÉE, Dressing in place  (L) Heel/Foot/Toe WDL          Devices In Places Splint, Ace Wrap      Interventions In Place Pillows and Pressure Redistribution Mattress and Silicone Nasal Cannula    Possible Skin Injury No    Pictures Uploaded Into Epic N/A  Wound Consult Placed N/A  RN Wound Prevention Protocol Ordered No

## 2022-06-10 NOTE — ASSESSMENT & PLAN NOTE
Patient's  apparently fell on her leg resulting in the fracture  She is status post ORIF  PT OT recommends at this point placement  Patient would like to go to rehab versus skilled, per family they do not believe she can do 3 hours/day  Pending acceptance patient medically cleared at this point

## 2022-06-10 NOTE — H&P
Hospital Medicine History & Physical Note    Date of Service  6/10/2022    Primary Care Physician  STEVEN Zaman.    Consultants  orthopedics    Specialist Names: Dr Simpson    Code Status  Full Code    Chief Complaint  Chief Complaint   Patient presents with   • Fall     Pt reports that her  fell on her. Denies head injury, neck, or back pain. -LOC - thinners.   • Laceration     Pt noted to have a large laceration to L forearm. Bleeding controlled    • Leg Injury     Pt noted to have obvious deformities to R ankle       History of Presenting Illness  Rashmi Parra is a 82 y.o. female who presented 6/9/2022 with fall with resultant right ankle pain.  Patient states she was in her usual state of health, her  was working on something in the kitchen, he tripped and fell onto her.  He knocked her to the ground, she was pinned between her  and the ground.  She noted sharp and sudden onset of severe right ankle pain.  She was unable to get up.  She did note significant abnormality of the ankle.  Because of this, 911 was called and patient was brought to the emergency department.  Upon arrival, trimalleolar fracture of the right ankle was noted, reduced in the ER.  ERP did discuss the case with orthopedic surgery who is planning on surgical intervention in the morning.  I did discuss the case including labs and imaging with the ER physician.    I discussed the plan of care with patient and family.    Review of Systems  Review of Systems   Constitutional: Negative for chills, fever and malaise/fatigue.   HENT: Negative for congestion.    Respiratory: Negative for cough, sputum production, shortness of breath and stridor.    Cardiovascular: Negative for chest pain, palpitations and leg swelling.   Gastrointestinal: Negative for abdominal pain, constipation, diarrhea, nausea and vomiting.   Genitourinary: Negative for dysuria and urgency.   Musculoskeletal: Positive for falls and joint pain.  Negative for myalgias.   Neurological: Negative for dizziness, tingling, loss of consciousness, weakness and headaches.   Psychiatric/Behavioral: Negative for depression and suicidal ideas.   All other systems reviewed and are negative.      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    Surgical History   has a past surgical history that includes other orthopedic surgery (1993); gyn surgery (1992); cholecystectomy (1964); knee arthroplasty total (Right, 11/2/2015); cataract extraction with iol (Bilateral); lefort colpoclesis (1/21/2019); bladder sling female (1/21/2019); pr mastectomy, modified radical (Right, 7/17/2019); node biopsy sentinel (Right, 7/17/2019); axillary node dissection (7/17/2019); mastectomy (Left, 7/17/2019); flap closure (Bilateral, 7/17/2019); wide excision (Right, 8/16/2019); incision and drainage general (Bilateral, 8/16/2019); abdominal hysterectomy total (1994); and pr total hip arthroplasty (Right, 9/30/2021).     Family History  family history is not on file.   Family history reviewed with patient. There is no family history that is pertinent to the chief complaint.     Social History   reports that she quit smoking about 30 years ago. Her smoking use included cigarettes. She has a 30.00 pack-year smoking history. She has quit using smokeless tobacco. She reports current alcohol use of about 0.6 oz of alcohol per week. She reports that she does not use drugs.    Allergies  No Known Allergies    Medications  Prior to Admission Medications   Prescriptions Last  Dose Informant Patient Reported? Taking?   B Complex Vitamins (B COMPLEX PO)  Patient Yes No   Sig: Take 1 Tab by mouth every day.   Calcium Citrate (CITRACAL PO)  Patient Yes No   Sig: Take 1 Tab by mouth every day.   Cholecalciferol (VITAMIN D PO)  Patient Yes No   Sig: Take  by mouth.   DULoxetine (CYMBALTA) 30 MG Cap DR Particles   No No   Sig: Take 1 capsule by mouth every day. TOTAL 90 MG DAILY   DULoxetine (CYMBALTA) 60 MG Cap DR Particles delayed-release capsule   No No   Sig: Take 1 Capsule by mouth every day.   Dexlansoprazole (DEXILANT) 60 MG CAPSULE DELAYED RELEASE delayed-release capsule   No No   Sig: Take 60 mg by mouth every evening. Indications: Gastroesophageal Reflux Disease   Lifitegrast (XIIDRA) 5 % Solution  Patient Yes No   Sig: Place 1 Drop in both eyes every bedtime.   MAGNESIUM PO  Patient Yes No   Sig: Take 1 capsule by mouth as needed.   MYRBETRIQ 50 MG TABLET SR 24 HR   No No   Sig: TAKE 1 TABLET EVERY EVENINGFOR FREQUENT URINATION,    URINARY INCONTINENCE,      URINARY URGENCY.   Melatonin 5 MG Tab  Patient Yes No   Sig: Take 0.25 Tabs by mouth every bedtime.   Multiple Vitamins-Minerals (MULTIVITAMIN PO)  Patient Yes No   Sig: Take 1 Tab by mouth every day.   acetaminophen (TYLENOL) 500 MG Tab  Patient Yes No   Sig: Take 500 mg by mouth every 6 hours as needed.   acetaminophen (TYLENOL) 500 MG Tab   No No   Sig: Take 1-2 Tablets by mouth every 6 hours as needed.   alendronate (FOSAMAX) 35 MG tablet   Yes No   Sig: alendronate 35 mg tablet   Take 1 tablet every week by oral route.   anastrozole (ARIMIDEX) 1 MG Tab   Yes No   Sig: Take 1 mg by mouth.   fluconazole (DIFLUCAN) 150 MG tablet   Yes No   Sig: fluconazole 150 mg tablet   fluticasone (FLONASE) 50 MCG/ACT nasal spray   Yes No   Sig: fluticasone propionate 50 mcg/actuation nasal spray,suspension   gabapentin (NEURONTIN) 100 MG Cap   Yes No   Sig: Take 100 mg by mouth.   ibuprofen (MOTRIN) 200 MG Tab  Patient Yes No   Sig: Take  200 mg by mouth every 6 hours as needed for Mild Pain.   lovastatin (MEVACOR) 20 MG Tab  Patient Yes No   Sig: Take 20 mg by mouth every evening.   metFORMIN (GLUCOPHAGE) 500 MG Tab   No No   Sig: Take 1 tablet by mouth every day.   nystatin (MYCOSTATIN) 702496 UNIT/ML Suspension   Yes No   Sig: nystatin 100,000 unit/mL oral suspension   polyethylene glycol/lytes (MIRALAX) Pack  Patient Yes No   Sig: Take 17 g by mouth every day.      Facility-Administered Medications: None       Physical Exam  Temp:  [36.6 °C (97.8 °F)] 36.6 °C (97.8 °F)  Pulse:  [73-83] 79  Resp:  [16-18] 16  BP: ()/(45-82) 78/51  SpO2:  [95 %-98 %] 98 %  Blood Pressure : (!) 78/51   Temperature: 36.6 °C (97.8 °F)   Pulse: 79   Respiration: 16   Pulse Oximetry: 98 %       Physical Exam  Vitals and nursing note reviewed.   Constitutional:       General: She is not in acute distress.     Appearance: She is well-developed. She is not diaphoretic.   HENT:      Head: Normocephalic and atraumatic.      Right Ear: External ear normal.      Nose: Nose normal. No congestion or rhinorrhea.      Mouth/Throat:      Mouth: Mucous membranes are dry.      Pharynx: No oropharyngeal exudate.   Eyes:      General:         Right eye: No discharge.         Left eye: No discharge.   Neck:      Trachea: No tracheal deviation.   Cardiovascular:      Rate and Rhythm: Normal rate and regular rhythm.      Heart sounds: No murmur heard.    No friction rub. No gallop.   Pulmonary:      Effort: Pulmonary effort is normal. No respiratory distress.      Breath sounds: Normal breath sounds. No stridor. No wheezing or rales.   Chest:      Chest wall: No tenderness.   Abdominal:      General: Bowel sounds are normal. There is no distension.      Palpations: Abdomen is soft.      Tenderness: There is no abdominal tenderness.   Musculoskeletal:      Cervical back: Normal range of motion and neck supple.      Right lower leg: No edema.      Left lower leg: No edema.      Right  ankle: Tenderness present. Decreased range of motion.   Lymphadenopathy:      Cervical: No cervical adenopathy.   Skin:     General: Skin is warm and dry.      Findings: No erythema or rash.   Neurological:      General: No focal deficit present.      Mental Status: She is alert and oriented to person, place, and time.      Cranial Nerves: No cranial nerve deficit.   Psychiatric:         Mood and Affect: Mood normal.         Behavior: Behavior normal.         Thought Content: Thought content normal.         Judgment: Judgment normal.         Laboratory:          No results for input(s): ALTSGPT, ASTSGOT, ALKPHOSPHAT, TBILIRUBIN, DBILIRUBIN, GAMMAGT, AMYLASE, LIPASE, ALB, PREALBUMIN, GLUCOSE in the last 72 hours.      No results for input(s): NTPROBNP in the last 72 hours.      No results for input(s): TROPONINT in the last 72 hours.    Imaging:  DX-ANKLE 2- VIEWS RIGHT   Final Result         Displaced trimalleolar fracture with dorsal dislocation of the talus      DX-ANKLE 2- VIEWS RIGHT    (Results Pending)       X-Ray:  I have personally reviewed the images and compared with prior images.    Assessment/Plan:  Justification for Admission Status  I anticipate this patient will require at least two midnights for appropriate medical management, necessitating inpatient admission because Trimalleolar ankle fracture    * Trimalleolar fracture of ankle, closed, right, initial encounter- (present on admission)  Assessment & Plan  - Post  falling on her leg  -Has been reduced in the ER, orthopedic surgery for surgical intervention in the morning  -Keep patient n.p.o.  -PT/OT postoperatively  -I do believe patient needs to be admitted at this time, she was to be sent home she would continue to be at high risk of additional falls  -I do feel she will require more than 2 midnights in the hospital due to severity of the fracture and need for increased strength and pain management prior to discharge    Fall- (present on  admission)  Assessment & Plan  - Nonsyncopal  -Causing right ankle fracture  -PT/OT to evaluate postoperatively    Dehydration- (present on admission)  Assessment & Plan  - Start IV fluids  -Repeat BMP in the morning    Type 2 diabetes mellitus with diabetic mononeuropathy, without long-term current use of insulin (HCC)- (present on admission)  Assessment & Plan  - Hold home metformin  -Mild hyperglycemia but no need for coverage    Moderate episode of recurrent major depressive disorder (HCC)- (present on admission)  Assessment & Plan  - Continue home Cymbalta    Nocturnal hypoxia- (present on admission)  Assessment & Plan  - Patient does wear oxygen at baseline, continue oxygen with continuous pulse ox monitoring    Other hyperlipidemia- (present on admission)  Assessment & Plan  - Continue home statin    Ott's esophagus with dysplasia- (present on admission)  Assessment & Plan  - Continue home PPI      VTE prophylaxis: SCDs/TEDs

## 2022-06-11 PROBLEM — S42.254A CLOSED NONDISPLACED FRACTURE OF GREATER TUBEROSITY OF RIGHT HUMERUS: Status: ACTIVE | Noted: 2022-06-11

## 2022-06-11 PROBLEM — E83.42 HYPOMAGNESEMIA: Status: ACTIVE | Noted: 2022-06-11

## 2022-06-11 LAB
ANION GAP SERPL CALC-SCNC: 9 MMOL/L (ref 7–16)
BUN SERPL-MCNC: 13 MG/DL (ref 8–22)
CALCIUM SERPL-MCNC: 8.3 MG/DL (ref 8.4–10.2)
CHLORIDE SERPL-SCNC: 105 MMOL/L (ref 96–112)
CO2 SERPL-SCNC: 24 MMOL/L (ref 20–33)
CREAT SERPL-MCNC: 0.55 MG/DL (ref 0.5–1.4)
ERYTHROCYTE [DISTWIDTH] IN BLOOD BY AUTOMATED COUNT: 45 FL (ref 35.9–50)
GFR SERPLBLD CREATININE-BSD FMLA CKD-EPI: 91 ML/MIN/1.73 M 2
GLUCOSE SERPL-MCNC: 166 MG/DL (ref 65–99)
HCT VFR BLD AUTO: 37 % (ref 37–47)
HGB BLD-MCNC: 12.1 G/DL (ref 12–16)
MAGNESIUM SERPL-MCNC: 1.6 MG/DL (ref 1.5–2.5)
MCH RBC QN AUTO: 30.9 PG (ref 27–33)
MCHC RBC AUTO-ENTMCNC: 32.7 G/DL (ref 33.6–35)
MCV RBC AUTO: 94.6 FL (ref 81.4–97.8)
PLATELET # BLD AUTO: 189 K/UL (ref 164–446)
PMV BLD AUTO: 10.6 FL (ref 9–12.9)
POTASSIUM SERPL-SCNC: 4.2 MMOL/L (ref 3.6–5.5)
RBC # BLD AUTO: 3.91 M/UL (ref 4.2–5.4)
SODIUM SERPL-SCNC: 138 MMOL/L (ref 135–145)
WBC # BLD AUTO: 8.1 K/UL (ref 4.8–10.8)

## 2022-06-11 PROCEDURE — 700111 HCHG RX REV CODE 636 W/ 250 OVERRIDE (IP): Performed by: INTERNAL MEDICINE

## 2022-06-11 PROCEDURE — 700111 HCHG RX REV CODE 636 W/ 250 OVERRIDE (IP): Performed by: ORTHOPAEDIC SURGERY

## 2022-06-11 PROCEDURE — 700105 HCHG RX REV CODE 258: Performed by: INTERNAL MEDICINE

## 2022-06-11 PROCEDURE — A9270 NON-COVERED ITEM OR SERVICE: HCPCS | Performed by: INTERNAL MEDICINE

## 2022-06-11 PROCEDURE — 83735 ASSAY OF MAGNESIUM: CPT

## 2022-06-11 PROCEDURE — 94760 N-INVAS EAR/PLS OXIMETRY 1: CPT

## 2022-06-11 PROCEDURE — 80048 BASIC METABOLIC PNL TOTAL CA: CPT

## 2022-06-11 PROCEDURE — 770006 HCHG ROOM/CARE - MED/SURG/GYN SEMI*

## 2022-06-11 PROCEDURE — 700105 HCHG RX REV CODE 258: Performed by: ORTHOPAEDIC SURGERY

## 2022-06-11 PROCEDURE — 97163 PT EVAL HIGH COMPLEX 45 MIN: CPT

## 2022-06-11 PROCEDURE — 85027 COMPLETE CBC AUTOMATED: CPT

## 2022-06-11 PROCEDURE — 36415 COLL VENOUS BLD VENIPUNCTURE: CPT

## 2022-06-11 PROCEDURE — 700102 HCHG RX REV CODE 250 W/ 637 OVERRIDE(OP): Performed by: INTERNAL MEDICINE

## 2022-06-11 PROCEDURE — 99232 SBSQ HOSP IP/OBS MODERATE 35: CPT | Performed by: INTERNAL MEDICINE

## 2022-06-11 RX ORDER — MAGNESIUM SULFATE 1 G/100ML
1 INJECTION INTRAVENOUS ONCE
Status: COMPLETED | OUTPATIENT
Start: 2022-06-11 | End: 2022-06-11

## 2022-06-11 RX ORDER — ENOXAPARIN SODIUM 100 MG/ML
40 INJECTION SUBCUTANEOUS DAILY
Status: DISCONTINUED | OUTPATIENT
Start: 2022-06-11 | End: 2022-06-15 | Stop reason: HOSPADM

## 2022-06-11 RX ORDER — SODIUM CHLORIDE 9 MG/ML
1000 INJECTION, SOLUTION INTRAVENOUS ONCE
Status: COMPLETED | OUTPATIENT
Start: 2022-06-11 | End: 2022-06-11

## 2022-06-11 RX ADMIN — GABAPENTIN 100 MG: 100 CAPSULE ORAL at 18:33

## 2022-06-11 RX ADMIN — SODIUM CHLORIDE 1000 ML: 9 INJECTION, SOLUTION INTRAVENOUS at 22:30

## 2022-06-11 RX ADMIN — MAGNESIUM SULFATE HEPTAHYDRATE 1 G: 1 INJECTION, SOLUTION INTRAVENOUS at 12:52

## 2022-06-11 RX ADMIN — SODIUM CHLORIDE: 9 INJECTION, SOLUTION INTRAVENOUS at 04:54

## 2022-06-11 RX ADMIN — OMEPRAZOLE 40 MG: 20 CAPSULE, DELAYED RELEASE ORAL at 04:55

## 2022-06-11 RX ADMIN — LOVASTATIN 20 MG: 20 TABLET ORAL at 20:49

## 2022-06-11 RX ADMIN — GABAPENTIN 100 MG: 100 CAPSULE ORAL at 04:55

## 2022-06-11 RX ADMIN — Medication 1 MG: at 20:49

## 2022-06-11 RX ADMIN — SODIUM CHLORIDE: 9 INJECTION, SOLUTION INTRAVENOUS at 23:33

## 2022-06-11 RX ADMIN — SENNOSIDES AND DOCUSATE SODIUM 2 TABLET: 50; 8.6 TABLET ORAL at 04:54

## 2022-06-11 RX ADMIN — CEFAZOLIN 2 G: 2 INJECTION, POWDER, FOR SOLUTION INTRAMUSCULAR; INTRAVENOUS at 04:54

## 2022-06-11 RX ADMIN — ACETAMINOPHEN 325MG 650 MG: 325 TABLET ORAL at 23:42

## 2022-06-11 RX ADMIN — DULOXETINE 90 MG: 30 CAPSULE, DELAYED RELEASE ORAL at 04:54

## 2022-06-11 RX ADMIN — ENOXAPARIN SODIUM 40 MG: 40 INJECTION SUBCUTANEOUS at 18:32

## 2022-06-11 RX ADMIN — ANASTROZOLE 1 MG: 1 TABLET ORAL at 04:55

## 2022-06-11 RX ADMIN — SENNOSIDES AND DOCUSATE SODIUM 2 TABLET: 50; 8.6 TABLET ORAL at 18:33

## 2022-06-11 RX ADMIN — GABAPENTIN 100 MG: 100 CAPSULE ORAL at 12:33

## 2022-06-11 RX ADMIN — CEFAZOLIN 2 G: 2 INJECTION, POWDER, FOR SOLUTION INTRAMUSCULAR; INTRAVENOUS at 14:06

## 2022-06-11 ASSESSMENT — ENCOUNTER SYMPTOMS
NAUSEA: 0
COUGH: 0
CONSTIPATION: 0
DIARRHEA: 0
CHILLS: 0
WEAKNESS: 1
PALPITATIONS: 0
VOMITING: 0
SHORTNESS OF BREATH: 0
ABDOMINAL PAIN: 0
BACK PAIN: 0
HEADACHES: 0
DIZZINESS: 0
FEVER: 0

## 2022-06-11 ASSESSMENT — COGNITIVE AND FUNCTIONAL STATUS - GENERAL
TURNING FROM BACK TO SIDE WHILE IN FLAT BAD: A LOT
MOBILITY SCORE: 10
SUGGESTED CMS G CODE MODIFIER MOBILITY: CL
MOVING TO AND FROM BED TO CHAIR: A LOT
CLIMB 3 TO 5 STEPS WITH RAILING: TOTAL
MOVING FROM LYING ON BACK TO SITTING ON SIDE OF FLAT BED: A LOT
WALKING IN HOSPITAL ROOM: TOTAL
STANDING UP FROM CHAIR USING ARMS: A LOT

## 2022-06-11 ASSESSMENT — PATIENT HEALTH QUESTIONNAIRE - PHQ9
1. LITTLE INTEREST OR PLEASURE IN DOING THINGS: NOT AT ALL
SUM OF ALL RESPONSES TO PHQ9 QUESTIONS 1 AND 2: 0
2. FEELING DOWN, DEPRESSED, IRRITABLE, OR HOPELESS: NOT AT ALL

## 2022-06-11 ASSESSMENT — GAIT ASSESSMENTS: GAIT LEVEL OF ASSIST: UNABLE TO PARTICIPATE

## 2022-06-11 NOTE — OR NURSING
1741 Patient to PACU from OR after report received   Dressing on right leg with cast/splint in place. She has sling on right arm     1800 patient denies pain or nausea    1815 Anesthesia stopped by to check on patient. He is aware of blood pressure and doesn't want to intervene at this time.  She continues to be pain free and nausea free. She knows her name and that she is at the hospital but can't tell me why she is here. Or when it is. This seems to correlate with her pre op assessment. She seems sleepy     1821 called report to  Jose Antonio HOOPER on [facility]      1825 changed dressing on large abrasion on left forearm. Unable to see the extent of the abrasion as it is covered with steri strips. But area was cleaned- adaptic applied then clean gauze applied.  Patient is aware she is going back to her room on [facility] she denies pain and nausea.     1835 patient  taken to [facility] in stable condition

## 2022-06-11 NOTE — DISCHARGE PLANNING
[facility] Transitional Care Coordination    Referral from: Dr. Mejia    Insurance Provider on Facesheet: MCR/ANT    Potential Rehab Diagnosis: R ankle trimalleolar fracture, also has R proximal humerus fracture    Chart review indicates patient may have on going medical management and may have therapy needs to possibly meet inpatient rehab facility criteria with the goal of returning to community.    D/C support will need to be verified: Spouse    Physiatry consultation forwarded per protocol.  TX pending.      Thank you for the referral.

## 2022-06-11 NOTE — CONSULTS
CHART REVIEW        Physical Medicine and Rehabilitation Consultation              Date of initial consultation: 6/11/2022  Consulting provider: Romario Mejia MD  Reason for consultation: assess for acute inpatient rehab appropriateness  LOS: 1 Day(s)    Chief complaint: Ground-level fall    HPI: The patient is a 82 y.o. female with a past medical history of diabetes, COPD, hypertension, hyperlipidemia, anxiety;  who presented on 6/9/2022 10:57 PM with right ankle and right arm pain after ground-level fall.   was working on something in the kitchen, he tripped and fell onto her, knocked her to the ground, had sudden onset right ankle pain.  Brought into the ED via EMS, found to have trimalleolar ankle fracture, and later discovered to have a right humerus nondisplaced greater tuberosity fracture.  Patient was taken to the OR by Dr. Andrea Simpson MD on 6/10/2022 for ORIF right ankle and closed treatment of right humerus fracture.  Patient is nonweightbearing RUE and RLE.    Social Hx:  1 SH  1 CHIDI  With: Spouse    THERAPY:  Restrictions: NWB RUE/ RLE  PT: Functional mobility   6/11: Max assist sit to stand and stand pivot transfers    OT: ADLs  Pending    SLP:   None    IMAGING:  Right ankle x-ray 6/9/2022  Displaced trimalleolar fracture with dorsal dislocation of the talus    Right humerus x-ray 6/10/2022  1.  Nondisplaced right greater tuberosity fracture  2.  Multiple intra-articular ossific body within the subcoracoid recess of the glenohumeral joint  3.  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    PROCEDURES:  Andrea Simpson MD 6/10/2022  Open reduction internal fixation right ankle trimalleolar fracture with fixation of posterior lip  Closed treatment of right proximal humerus fracture    PMH:  Past Medical History:   Diagnosis Date   • Arthritis     osteo, generalized   • Bowel habit changes     constipation   • Breath shortness    • Cataract     removed bilat   • COPD (chronic  "obstructive pulmonary disease) (HCC)    • Diabetes (HCC)     \"pre\", oral meds   • Heart burn    • Heart murmur    • Hiatus hernia syndrome    • High cholesterol    • Hypertension    • Indigestion    • Malignant neoplasm of overlapping sites of breast in female, estrogen receptor positive (HCC) 11/6/2019   • Psychiatric problem 2015    anxiety   • Shortness of breath    • Snoring     no sleep study   • Urinary incontinence    • Wears glasses        PSH:  Past Surgical History:   Procedure Laterality Date   • MO TOTAL HIP ARTHROPLASTY Right 9/30/2021    Procedure: ARTHROPLASTY, HIP, TOTAL, ANTERIOR APPROACH;  Surgeon: Lewis Serrano M.D.;  Location: [facility];  Service: Orthopedics   • WIDE EXCISION Right 8/16/2019    Procedure: WIDE EXCISION, LESION- RE-EXCISION FOR EXCISION FOR MARGINS RIGHT CHEST WALL INSTRMUSCULAR;  Surgeon: Andre Shelton M.D.;  Location: [facility];  Service: General   • INCISION AND DRAINAGE GENERAL Bilateral 8/16/2019    Procedure: INCISION AND DRAINAGE- OF BILATERAL CHEST SEROMAS WITH DRAIN PLACEMENT;  Surgeon: Andre Shelton M.D.;  Location: [facility];  Service: General   • PB MASTECTOMY, MODIFIED RADICAL Right 7/17/2019    Procedure: MASTECTOMY, MODIFIED RADICAL;  Surgeon: Andre Shelton M.D.;  Location: [facility];  Service: General   • NODE BIOPSY SENTINEL Right 7/17/2019    Procedure: INTRA OPERATIVE SENTINEL NODE IDENTIFICATION AND BIOPSY;  Surgeon: Andre Shelton M.D.;  Location: [facility];  Service: General   • AXILLARY NODE DISSECTION  7/17/2019    Procedure: LYMPHADENECTOMY, AXILLARY;  Surgeon: Andre Shelton M.D.;  Location: [facility];  Service: General   • MASTECTOMY Left 7/17/2019    Procedure: TOTAL MASTECTOMY;  Surgeon: Andre Shelton M.D.;  Location: [facility];  Service: General   • FLAP CLOSURE Bilateral 7/17/2019    Procedure: WIDE V FLAP;  Surgeon: Andre Shelton M.D.;  Location: " [facility];  Service: General   • LEFORT COLPOCLESIS  1/21/2019    Procedure: LEFORT COLPOCLESIS;  Surgeon: Minnie Bañuelos M.D.;  Location: [facility];  Service: Labor and Delivery   • BLADDER SLING FEMALE  1/21/2019    Procedure: BLADDER SLING FEMALE- TOT;  Surgeon: Minnie Bañuelos M.D.;  Location: [facility];  Service: Labor and Delivery   • KNEE ARTHROPLASTY TOTAL Right 11/2/2015    Procedure: KNEE ARTHROPLASTY TOTAL;  Surgeon: Venkatesh Cardoza M.D.;  Location: SUR[facility];  Service:    • ABDOMINAL HYSTERECTOMY TOTAL  1994   • OTHER ORTHOPEDIC SURGERY  1993    L ankle   • GYN SURGERY  1992    abdominal hysterectomy   • CHOLECYSTECTOMY  1964   • CATARACT EXTRACTION WITH IOL Bilateral        FHX:  Family History   Problem Relation Age of Onset   • Heart Disease Neg Hx        Medications:  Current Facility-Administered Medications   Medication Dose   • magnesium sulfate in D5W IVPB premix 1 g  1 g   • anastrozole (ARIMIDEX) tablet 1 mg  1 mg   • gabapentin (NEURONTIN) capsule 100 mg  100 mg   • lovastatin (MEVACOR) tablet 20 mg  20 mg   • melatonin tablet 1 mg  1 mg   • senna-docusate (PERICOLACE or SENOKOT S) 8.6-50 MG per tablet 2 Tablet  2 Tablet    And   • polyethylene glycol/lytes (MIRALAX) PACKET 1 Packet  1 Packet    And   • magnesium hydroxide (MILK OF MAGNESIA) suspension 30 mL  30 mL    And   • bisacodyl (DULCOLAX) suppository 10 mg  10 mg   • NS infusion     • acetaminophen (Tylenol) tablet 650 mg  650 mg   • Pharmacy Consult Request ...Pain Management Review 1 Each  1 Each   • oxyCODONE immediate-release (ROXICODONE) tablet 5 mg  5 mg    Or   • oxyCODONE immediate release (ROXICODONE) tablet 10 mg  10 mg    Or   • HYDROmorphone (Dilaudid) injection 0.5 mg  0.5 mg   • hydrALAZINE (APRESOLINE) injection 10 mg  10 mg   • ondansetron (ZOFRAN) syringe/vial injection 4 mg  4 mg   • ondansetron (ZOFRAN ODT) dispertab 4 mg  4 mg   • omeprazole (PRILOSEC)  "capsule 40 mg  40 mg   • DULoxetine (CYMBALTA) capsule 90 mg  90 mg   • ceFAZolin (Ancef) 2 g in  mL IVPB  2 g       Allergies:  No Known Allergies      Physical Exam:  Vitals: /51   Pulse 89   Temp 36.4 °C (97.6 °F) (Oral)   Resp 16   Ht 1.6 m (5' 3\")   Wt 68.5 kg (151 lb 0.2 oz)   SpO2 99%     Labs: Reviewed and significant for   Recent Labs     06/10/22  0955 06/11/22  0317   RBC 4.25 3.91*   HEMOGLOBIN 13.5 12.1   HEMATOCRIT 40.7 37.0   PLATELETCT 189 189   PROTHROMBTM 12.9  --    APTT 27.0  --    INR 1.05  --      Recent Labs     06/10/22  0955 06/11/22  0317   SODIUM 140 138   POTASSIUM 4.5 4.2   CHLORIDE 105 105   CO2 26 24   GLUCOSE 137* 166*   BUN 23* 13   CREATININE 0.65 0.55   CALCIUM 8.8 8.3*     Recent Results (from the past 24 hour(s))   CBC without Differential    Collection Time: 06/11/22  3:17 AM   Result Value Ref Range    WBC 8.1 4.8 - 10.8 K/uL    RBC 3.91 (L) 4.20 - 5.40 M/uL    Hemoglobin 12.1 12.0 - 16.0 g/dL    Hematocrit 37.0 37.0 - 47.0 %    MCV 94.6 81.4 - 97.8 fL    MCH 30.9 27.0 - 33.0 pg    MCHC 32.7 (L) 33.6 - 35.0 g/dL    RDW 45.0 35.9 - 50.0 fL    Platelet Count 189 164 - 446 K/uL    MPV 10.6 9.0 - 12.9 fL   Basic Metabolic Panel (BMP)    Collection Time: 06/11/22  3:17 AM   Result Value Ref Range    Sodium 138 135 - 145 mmol/L    Potassium 4.2 3.6 - 5.5 mmol/L    Chloride 105 96 - 112 mmol/L    Co2 24 20 - 33 mmol/L    Glucose 166 (H) 65 - 99 mg/dL    Bun 13 8 - 22 mg/dL    Creatinine 0.55 0.50 - 1.40 mg/dL    Calcium 8.3 (L) 8.4 - 10.2 mg/dL    Anion Gap 9.0 7.0 - 16.0   ESTIMATED GFR    Collection Time: 06/11/22  3:17 AM   Result Value Ref Range    GFR (CKD-EPI) 91 >60 mL/min/1.73 m 2   MAGNESIUM    Collection Time: 06/11/22  3:17 AM   Result Value Ref Range    Magnesium 1.6 1.5 - 2.5 mg/dL         ASSESSMENT:  Patient is a 82 y.o. female admitted with right ankle and right humerus fracture now s/p ORIF right ankle by Dr. Andrea Simpson on 6/10/2022, as well as " closed treatment of right humerus fracture.  Patient is NWB RLE RUE    [facility] Code / Diagnosis to Support: 0008.4 - Orthopaedic Disorders: Status Post Major Multiple Fractures    Rehabilitation: Impaired ADLs and mobility  Patient is a moderate candidate for inpatient rehab based on needs for PT, OT.  Patient will also benefit from family training.  Patient has a good discharge situation which will be home with spouse.     Barriers to transfer include: Insurance authorization, TCCs to verify disposition, medical clearance and bed availability     All cases are subject to administrative review and recommendations may change    Additional Recommendations:  -Reasonable to pursue IPR admission.  Patient is max assist for stand pivot transfers, which will be required for her to successfully discharge home.  Patient will need to use a wheelchair for mobilization on discharge due to her weightbearing restrictions.  -TCC to confirm patient spouse can provide 24/7 support and there house is wheelchair accessible  -Awaiting OT evaluation  -Labs reviewed, and patient is stable  -Medications reviewed, no contraindications  -No beds are available this weekend at [facility], potential candidate for Monday admission    Medical Complexity:  Diabetes  Hypertension      DVT PPX: SCDs would appreciate recommendations from orthopedic surgery on when to restart Lovenox      Thank you for allowing us to participate in the care of this patient.     George Hernandez, DO   Physical Medicine and Rehabilitation     Please note that this dictation was created using voice recognition software. I have made every reasonable attempt to correct obvious errors, but there may be errors of grammar and possibly content that I did not discover before finalizing the note.

## 2022-06-11 NOTE — PROGRESS NOTES
[facility] Medicine Daily Progress Note    Date of Service  6/11/2022    Chief Complaint  Rashmi Parra is a 82 y.o. female admitted 6/9/2022 with   Chief Complaint   Patient presents with   • Fall     Pt reports that her  fell on her. Denies head injury, neck, or back pain. -LOC - thinners.   • Laceration     Pt noted to have a large laceration to L forearm. Bleeding controlled    • Leg Injury     Pt noted to have obvious deformities to R ankle     Hospital Course  82-year-old female with past medical history of arthritis, COPD, dyslipidemia, hypertension, depression, T2DM admitted on 6/10/22 for ground-level fall at home with laceration to left forearm and right ankle injury.  Patient was in her kitchen and had tripped and fallen onto the ground with severe right ankle pain and patient was not able to get back up.  Pain was severe.  In the ED imaging showed trimalleolar fracture of the right ankle, reduced in the ER.  Orthopedic surgery was consulted and planning on surgical intervention.      Interval Problem Update  Patient stated she did not have any pain. She felt tired, was not able to sleep last night after her neighbor was moved in.    Patient is s/p ORIF right ankle and closed treatment of right humerus with Dr. Simpson.  --ordered PT/OT, referral for SNF vs IRF made.    I have personally seen and examined the patient at bedside. I discussed the plan of care with patient, bedside RN, charge RN,  and pharmacy.    Consultants/Specialty  orthopedics    Code Status  Full Code    Disposition  Patient is not medically cleared for discharge.   Anticipate discharge to SNF vs IRF.  I have placed the appropriate orders for post-discharge needs.    Review of Systems  Review of Systems   Constitutional: Positive for malaise/fatigue. Negative for chills and fever.   Respiratory: Negative for cough and shortness of breath.    Cardiovascular: Negative for chest pain and palpitations.   Gastrointestinal:  Negative for abdominal pain, constipation, diarrhea, nausea and vomiting.   Musculoskeletal: Positive for joint pain (right shoulder and arm pain). Negative for back pain.   Neurological: Positive for weakness. Negative for dizziness and headaches.   All other systems reviewed and are negative.       Physical Exam  Temp:  [35.9 °C (96.6 °F)-37 °C (98.6 °F)] 36.4 °C (97.6 °F)  Pulse:  [] 89  Resp:  [12-20] 16  BP: ()/(48-62) 137/51  SpO2:  [91 %-99 %] 99 %    Physical Exam  Vitals and nursing note reviewed.   Constitutional:       General: She is not in acute distress.     Appearance: She is not ill-appearing.      Comments: Frail appearing elderly female   HENT:      Mouth/Throat:      Mouth: Mucous membranes are dry.      Pharynx: No oropharyngeal exudate.   Cardiovascular:      Rate and Rhythm: Normal rate and regular rhythm.      Pulses: Normal pulses.      Heart sounds: Murmur (diastolic 2/4) heard.   Pulmonary:      Effort: Pulmonary effort is normal. No respiratory distress.      Breath sounds: Normal breath sounds. No wheezing.   Abdominal:      General: Abdomen is flat. Bowel sounds are normal. There is no distension.      Palpations: Abdomen is soft.   Musculoskeletal:         General: Tenderness (right shoulder and arm) present.      Comments: Sarcopenic  Right leg in cast   Skin:     General: Skin is warm.      Capillary Refill: Capillary refill takes less than 2 seconds.      Coloration: Skin is not jaundiced.      Findings: No erythema.   Neurological:      Mental Status: She is alert and oriented to person, place, and time. Mental status is at baseline.      Motor: Weakness present.      Comments: Right foot pt not able to move toes, but moves leg external and internal rotation.   Psychiatric:         Mood and Affect: Mood normal.         Behavior: Behavior normal.         Fluids    Intake/Output Summary (Last 24 hours) at 6/11/2022 1220  Last data filed at 6/11/2022 0921  Gross per 24 hour    Intake 3299.62 ml   Output 450 ml   Net 2849.62 ml       Laboratory  Recent Labs     06/10/22  0955 06/11/22 0317   WBC 8.0 8.1   RBC 4.25 3.91*   HEMOGLOBIN 13.5 12.1   HEMATOCRIT 40.7 37.0   MCV 95.8 94.6   MCH 31.8 30.9   MCHC 33.2* 32.7*   RDW 46.9 45.0   PLATELETCT 189 189   MPV 10.7 10.6     Recent Labs     06/10/22  0955 06/11/22 0317   SODIUM 140 138   POTASSIUM 4.5 4.2   CHLORIDE 105 105   CO2 26 24   GLUCOSE 137* 166*   BUN 23* 13   CREATININE 0.65 0.55   CALCIUM 8.8 8.3*     Recent Labs     06/10/22  0955   APTT 27.0   INR 1.05               Imaging  DX-ANKLE 3+ VIEWS RIGHT   Final Result      Fluoroscopic image(s) obtained during right ankle ORIF. Please see the patient's chart for full procedural details.      Fluoroscopy time 1 minute 4 seconds.         DX-HUMERUS 2+ RIGHT   Final Result      1.  Nondisplaced right greater tuberosity fracture      2.  Multiple intra-articular ossific body within the subcoracoid recess of the glenohumeral joint      3.  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 Reduction previously demonstrated talar dislocation.      DX-ANKLE 2- VIEWS RIGHT   Final Result         Interval reduction of trimalleolar ankle fracture      DX-ANKLE 2- VIEWS RIGHT   Final Result         Displaced trimalleolar fracture with dorsal dislocation of the talus      DX-PORTABLE FLUORO > 1 HOUR    (Results Pending)        Assessment/Plan  * Trimalleolar fracture of ankle, closed, right, initial encounter- (present on admission)  Assessment & Plan  - Post  falling on her leg  -Has been reduced in the ER, orthopedic surgery for surgical intervention in the morning    S/p ORIF in OR with Dr. Simpson  -PT/OT postoperative evaluation pending  Ordered for SNF and IRF referrals.    Closed nondisplaced fracture of greater tuberosity of right humerus- (present on admission)  Assessment  & Plan  Non-operative management  Continue sling  PT/OT pending  Pain control    Fall- (present on admission)  Assessment & Plan  - Nonsyncopal  -Causing right ankle fracture  -PT/OT to evaluate postoperatively  - right arm pain complaint after admission, checking XR of Right shoulder and humerus.     XR showed Right greater tuberosity of humerus acute fracture and acute right trimalleolar fractures.  PT/OT pending.    Hypomagnesemia- (present on admission)  Assessment & Plan  Mag 1.6, replacing via IV  Recheck in AM, replace if low    Dehydration- (present on admission)  Assessment & Plan  Was hypotensive after surgery.  BP improved after IVF. Continue until patient is tolerating diet, and drinking fluids.    Type 2 diabetes mellitus with diabetic mononeuropathy, without long-term current use of insulin (HCC)- (present on admission)  Assessment & Plan  - Hold home metformin  -Mild hyperglycemia but no need for coverage    Moderate episode of recurrent major depressive disorder (HCC)- (present on admission)  Assessment & Plan  - Continue home Cymbalta    Nocturnal hypoxia- (present on admission)  Assessment & Plan  - Patient does wear oxygen at baseline, continue oxygen with continuous pulse ox monitoring    Other hyperlipidemia- (present on admission)  Assessment & Plan  - Continue home statin    Ott's esophagus with dysplasia- (present on admission)  Assessment & Plan  - Continue home PPI       VTE prophylaxis: enoxaparin ppx    I have performed a physical exam and reviewed and updated ROS and Plan today (6/11/2022). In review of yesterday's note (6/10/2022), there are no changes except as documented above.

## 2022-06-11 NOTE — PROGRESS NOTES
Patient back from PACU.  Patient drowsy but responds to voice. Pain tolerable and denies nausea.  R arm in sling, R leg in cast, toes warm with sensation.

## 2022-06-11 NOTE — PROGRESS NOTES
Received bedside patient report from RUFUS Brady. Patient resting comfortably in bed, hypotensive. Safety precautions in place. Will continue to monitor.

## 2022-06-11 NOTE — ANESTHESIA POSTPROCEDURE EVALUATION
Patient: Rashmi Parra    Procedure Summary     Date: 06/10/22 Room / Location:  OR 06 / SURGERY [facility]    Anesthesia Start: 1602 Anesthesia Stop: 1745    Procedure: ORIF, ANKLE - TRIMALLEOLAR (Right Leg Lower) Diagnosis: (trimalleolar ankle fracture, )    Surgeons: Anuj Simpson M.D. Responsible Provider: Cuong Carmona M.D.    Anesthesia Type: general, peripheral nerve block ASA Status: 2          Final Anesthesia Type: general, peripheral nerve block  Last vitals  BP   Blood Pressure : 106/68    Temp   36.8 °C (98.2 °F)    Pulse   85   Resp   17    SpO2   96 %      Anesthesia Post Evaluation    Patient location during evaluation: PACU  Patient participation: complete - patient participated  Level of consciousness: awake and alert  Pain score: 0    Airway patency: patent  Anesthetic complications: no  Cardiovascular status: hemodynamically stable  Respiratory status: acceptable  Hydration status: euvolemic    PONV: none          No complications documented.     Nurse Pain Score: 2 (NPRS)

## 2022-06-11 NOTE — PROGRESS NOTES
Ortho Surgery Progress Note    S/p ORIF R ankle trimalleolar fracture 6/10/22, also has R proximal humerus fracture    Plan -   - NWB RLE, maintain splint, elevate RLE  - NWB RUE, continue sling  - zaira-op ancef  - PT/OT for mobility  - dispo per primary team  - f/u with Dr. Simpson in 2 weeks at [facility]

## 2022-06-11 NOTE — ANESTHESIA TIME REPORT
Anesthesia Start and Stop Event Times     Date Time Event    6/10/2022 1540 Ready for Procedure     1602 Anesthesia Start     1745 Anesthesia Stop        Responsible Staff  06/10/22    Name Role Begin End    Cuong Carmona M.D. Anesth 1602 1744        Overtime Reason:  no overtime (within assigned shift)    Comments:

## 2022-06-11 NOTE — ASSESSMENT & PLAN NOTE
Continue with nonoperative management  Patient will need placement to a skilled facility versus rehab

## 2022-06-11 NOTE — THERAPY
Physical Therapy   Initial Evaluation     Patient Name: Rashmi Parra  Age:  82 y.o., Sex:  female  Medical Record #: [id number]  Today's Date: 6/11/2022     Precautions  Precautions: (P) Fall Risk;Non Weight Bearing Right Lower Extremity;Non Weight Bearing Right Upper Extremity    Assessment  Patient is 82 y.o. female with a diagnosis of Fx R ankle with ORIF, Fx R greater tuberosity  proximal humerus.Pt lives at home with  and is mod active,has been using a fww to amb since R hip THR 1 year ago.Acute PT needed to improve bed mob and transfers     Plan    Recommend Physical Therapy 5 times per week until therapy goals are met for the following treatments:  Bed Mobility, Gait Training, Neuro Re-Education / Balance, Therapeutic Activities, and Therapeutic Exercises    DC Equipment Recommendations: (P) Unable to determine at this time  Discharge Recommendations: (P) Recommend post-acute placement for additional physical therapy services prior to discharge home          06/11/22 1100   Charge Group   PT Evaluation PT Evaluation High   Total Time Spent   PT Total Time Yes   PT Evaluation Time Spent (Mins) 45   Initial Contact Note    Initial Contact Note Order Received and Verified, Physical Therapy Evaluation in Progress with Full Report to Follow.   Precautions   Precautions Fall Risk;Non Weight Bearing Right Lower Extremity;Non Weight Bearing Right Upper Extremity   Pain 0 - 10 Group   Therapist Pain Assessment 0   Prior Living Situation   Prior Services None   Housing / Facility 1 Story House   Steps Into Home 1   Steps In Home 0   Equipment Owned Front-Wheel Walker   Lives with - Patient's Self Care Capacity Spouse   Prior Level of Functional Mobility   Bed Mobility Independent   Transfer Status Independent   Ambulation Independent   Distance Ambulation (Feet)   (limited community)   Assistive Devices Used Front-Wheel Walker   Stairs Independent   Cognition    Cognition / Consciousness WDL   Level of  Consciousness Alert   Passive ROM Lower Body   Passive ROM Lower Body Not Tested   Active ROM Lower Body    Active ROM Lower Body  Not Tested   Strength Lower Body   Lower Body Strength  Not Tested   Strength Upper Body   Upper Body Strength  Not Tested   Coordination Lower Body    Coordination Lower Body  WDL   Balance Assessment   Sitting Balance (Static) Fair   Sitting Balance (Dynamic) Fair   Standing Balance (Static) Poor   Standing Balance (Dynamic) Poor   Weight Shift Sitting Poor   Weight Shift Standing Absent   Gait Analysis   Gait Level Of Assist Unable to Participate   Weight Bearing Status NWB R LE and R UE   Bed Mobility    Supine to Sit Maximal Assist   Sit to Supine Maximal Assist   Scooting Maximal Assist   Functional Mobility   Sit to Stand Maximal Assist   Bed, Chair, Wheelchair Transfer Maximal Assist   Transfer Method Stand Pivot   How much difficulty does the patient currently have...   Turning over in bed (including adjusting bedclothes, sheets and blankets)? 2   Sitting down on and standing up from a chair with arms (e.g., wheelchair, bedside commode, etc.) 2   Moving from lying on back to sitting on the side of the bed? 2   How much help from another person does the patient currently need...   Moving to and from a bed to a chair (including a wheelchair)? 2   Need to walk in a hospital room? 1   Climbing 3-5 steps with a railing? 1   6 clicks Mobility Score 10   Activity Tolerance   Sitting Edge of Bed 10   Standing 2 x 30 seconds   Patient / Family Goals    Patient / Family Goal #1 not stated   Short Term Goals    Short Term Goal # 1 Min A with bed mob in 6 V   Short Term Goal # 2 mod A with transfers in 6 V   Problem List    Problems Impaired Bed Mobility;Impaired Transfers;Impaired Ambulation;Functional ROM Deficit;Functional Strength Deficit;Impaired Balance;Decreased Activity Tolerance   Anticipated Discharge Equipment and Recommendations   DC Equipment Recommendations Unable to determine  at this time   Discharge Recommendations Recommend post-acute placement for additional physical therapy services prior to discharge home   Interdisciplinary Plan of Care Collaboration   IDT Collaboration with  Nursing   Session Information   Date / Session Number  6/11-1 1/5 6/17

## 2022-06-11 NOTE — OR SURGEON
Immediate Post OP Note    PreOp Diagnosis: right ankle trimalleolar fracture dislocation, right proximal humerus greater tuberosity fracture      PostOp Diagnosis: right ankle trimalleolar fracture dislocation, right proximal humerus greater tuberosity fracture        Procedure(s):  Open reduction internal fixation right ankle trimalleolar fracture with fixation of posterior lip  Closed treatment of right proximal humerus fracture    Surgeon(s):  Anuj Simpson M.D.    Anesthesiologist/Type of Anesthesia:  Anesthesiologist: Cuong Carmona M.D./General, sciatic/pop block with adductor canal block    Surgical Staff:  Assistant: Angel Gunter P.A.-C.  Circulator: Geovanna Hester R.N.; Edi Menednez R.N.  Scrub Person: Ezio Rousseau  Radiology Technologist: Piter Cmumins    Specimens removed if any:  * No specimens in log *    Estimated Blood Loss: 50cc    Findings: see op note    Complications: none        6/10/2022 5:35 PM Anuj Simpson M.D.

## 2022-06-11 NOTE — PROGRESS NOTES
I have been paged by patient's nurse, Naif, for concern for hypotension.  Patient has just came out of surgery had a low blood pressure reading of 74/55.  I evaluated the patient at bedside she has good peripheral pulses with warm extremities.  Repeat blood pressure is 92/60.  The patient appears mildly dehydrated.  She is already on intravenous fluids.  Hypotension is likely multifactorial, anesthesia effects and dehydration.  Plan to continue intravenous fluids and continue to monitor vitals.

## 2022-06-11 NOTE — PROGRESS NOTES
Report received from Naif Fischer RN.  Patient resting quietly in bed.  No obvious signs of distress.  IVF infusing.

## 2022-06-12 ENCOUNTER — APPOINTMENT (OUTPATIENT)
Dept: RADIOLOGY | Facility: MEDICAL CENTER | Age: 82
DRG: 493 | End: 2022-06-12
Attending: INTERNAL MEDICINE
Payer: MEDICARE

## 2022-06-12 PROBLEM — I95.9 HYPOTENSION: Status: ACTIVE | Noted: 2022-06-12

## 2022-06-12 PROBLEM — E83.39 HYPOPHOSPHATEMIA: Status: ACTIVE | Noted: 2022-06-12

## 2022-06-12 LAB
25(OH)D3 SERPL-MCNC: 53 NG/ML (ref 30–100)
ALBUMIN SERPL BCP-MCNC: 3.1 G/DL (ref 3.2–4.9)
BASOPHILS # BLD AUTO: 0.4 % (ref 0–1.8)
BASOPHILS # BLD: 0.03 K/UL (ref 0–0.12)
BUN SERPL-MCNC: 17 MG/DL (ref 8–22)
CALCIUM SERPL-MCNC: 7.8 MG/DL (ref 8.4–10.2)
CHLORIDE SERPL-SCNC: 108 MMOL/L (ref 96–112)
CO2 SERPL-SCNC: 23 MMOL/L (ref 20–33)
CREAT SERPL-MCNC: 0.59 MG/DL (ref 0.5–1.4)
EKG IMPRESSION: NORMAL
EOSINOPHIL # BLD AUTO: 0.02 K/UL (ref 0–0.51)
EOSINOPHIL NFR BLD: 0.3 % (ref 0–6.9)
ERYTHROCYTE [DISTWIDTH] IN BLOOD BY AUTOMATED COUNT: 48 FL (ref 35.9–50)
GFR SERPLBLD CREATININE-BSD FMLA CKD-EPI: 89 ML/MIN/1.73 M 2
GLUCOSE SERPL-MCNC: 131 MG/DL (ref 65–99)
HCT VFR BLD AUTO: 31.7 % (ref 37–47)
HGB BLD-MCNC: 10.3 G/DL (ref 12–16)
IMM GRANULOCYTES # BLD AUTO: 0.03 K/UL (ref 0–0.11)
IMM GRANULOCYTES NFR BLD AUTO: 0.4 % (ref 0–0.9)
LYMPHOCYTES # BLD AUTO: 1.11 K/UL (ref 1–4.8)
LYMPHOCYTES NFR BLD: 15.2 % (ref 22–41)
MAGNESIUM SERPL-MCNC: 1.9 MG/DL (ref 1.5–2.5)
MCH RBC QN AUTO: 31.7 PG (ref 27–33)
MCHC RBC AUTO-ENTMCNC: 32.5 G/DL (ref 33.6–35)
MCV RBC AUTO: 97.5 FL (ref 81.4–97.8)
MONOCYTES # BLD AUTO: 1.01 K/UL (ref 0–0.85)
MONOCYTES NFR BLD AUTO: 13.8 % (ref 0–13.4)
NEUTROPHILS # BLD AUTO: 5.12 K/UL (ref 2–7.15)
NEUTROPHILS NFR BLD: 69.9 % (ref 44–72)
NRBC # BLD AUTO: 0 K/UL
NRBC BLD-RTO: 0 /100 WBC
PHOSPHATE SERPL-MCNC: 1.8 MG/DL (ref 2.5–4.5)
PLATELET # BLD AUTO: 159 K/UL (ref 164–446)
PMV BLD AUTO: 11 FL (ref 9–12.9)
POTASSIUM SERPL-SCNC: 3.8 MMOL/L (ref 3.6–5.5)
RBC # BLD AUTO: 3.25 M/UL (ref 4.2–5.4)
SODIUM SERPL-SCNC: 140 MMOL/L (ref 135–145)
WBC # BLD AUTO: 7.3 K/UL (ref 4.8–10.8)

## 2022-06-12 PROCEDURE — 97167 OT EVAL HIGH COMPLEX 60 MIN: CPT

## 2022-06-12 PROCEDURE — 700102 HCHG RX REV CODE 250 W/ 637 OVERRIDE(OP): Performed by: INTERNAL MEDICINE

## 2022-06-12 PROCEDURE — 36415 COLL VENOUS BLD VENIPUNCTURE: CPT

## 2022-06-12 PROCEDURE — A9270 NON-COVERED ITEM OR SERVICE: HCPCS | Performed by: INTERNAL MEDICINE

## 2022-06-12 PROCEDURE — 99233 SBSQ HOSP IP/OBS HIGH 50: CPT | Performed by: INTERNAL MEDICINE

## 2022-06-12 PROCEDURE — C9803 HOPD COVID-19 SPEC COLLECT: HCPCS | Performed by: INTERNAL MEDICINE

## 2022-06-12 PROCEDURE — 94760 N-INVAS EAR/PLS OXIMETRY 1: CPT

## 2022-06-12 PROCEDURE — 700111 HCHG RX REV CODE 636 W/ 250 OVERRIDE (IP): Performed by: INTERNAL MEDICINE

## 2022-06-12 PROCEDURE — 82306 VITAMIN D 25 HYDROXY: CPT

## 2022-06-12 PROCEDURE — 80069 RENAL FUNCTION PANEL: CPT

## 2022-06-12 PROCEDURE — 770006 HCHG ROOM/CARE - MED/SURG/GYN SEMI*

## 2022-06-12 PROCEDURE — 93010 ELECTROCARDIOGRAM REPORT: CPT | Performed by: INTERNAL MEDICINE

## 2022-06-12 PROCEDURE — 83735 ASSAY OF MAGNESIUM: CPT

## 2022-06-12 PROCEDURE — 93005 ELECTROCARDIOGRAM TRACING: CPT | Performed by: INTERNAL MEDICINE

## 2022-06-12 PROCEDURE — 71045 X-RAY EXAM CHEST 1 VIEW: CPT

## 2022-06-12 PROCEDURE — 85025 COMPLETE CBC W/AUTO DIFF WBC: CPT

## 2022-06-12 RX ADMIN — DULOXETINE 90 MG: 30 CAPSULE, DELAYED RELEASE ORAL at 05:49

## 2022-06-12 RX ADMIN — OMEPRAZOLE 40 MG: 20 CAPSULE, DELAYED RELEASE ORAL at 05:49

## 2022-06-12 RX ADMIN — SENNOSIDES AND DOCUSATE SODIUM 2 TABLET: 50; 8.6 TABLET ORAL at 17:45

## 2022-06-12 RX ADMIN — ENOXAPARIN SODIUM 40 MG: 40 INJECTION SUBCUTANEOUS at 17:45

## 2022-06-12 RX ADMIN — DIBASIC SODIUM PHOSPHATE, MONOBASIC POTASSIUM PHOSPHATE AND MONOBASIC SODIUM PHOSPHATE 250 MG: 852; 155; 130 TABLET ORAL at 09:19

## 2022-06-12 RX ADMIN — OXYCODONE HYDROCHLORIDE 5 MG: 5 TABLET ORAL at 09:34

## 2022-06-12 RX ADMIN — LOVASTATIN 20 MG: 20 TABLET ORAL at 21:37

## 2022-06-12 RX ADMIN — ANASTROZOLE 1 MG: 1 TABLET ORAL at 05:49

## 2022-06-12 RX ADMIN — GABAPENTIN 100 MG: 100 CAPSULE ORAL at 17:45

## 2022-06-12 RX ADMIN — ONDANSETRON 4 MG: 2 INJECTION INTRAMUSCULAR; INTRAVENOUS at 19:24

## 2022-06-12 RX ADMIN — Medication 1 MG: at 21:37

## 2022-06-12 RX ADMIN — GABAPENTIN 100 MG: 100 CAPSULE ORAL at 13:37

## 2022-06-12 RX ADMIN — SENNOSIDES AND DOCUSATE SODIUM 2 TABLET: 50; 8.6 TABLET ORAL at 05:49

## 2022-06-12 ASSESSMENT — PAIN DESCRIPTION - PAIN TYPE
TYPE: SURGICAL PAIN

## 2022-06-12 ASSESSMENT — ENCOUNTER SYMPTOMS
CONSTIPATION: 0
WEAKNESS: 1
PALPITATIONS: 0
COUGH: 0
SHORTNESS OF BREATH: 0
DIZZINESS: 0
CHILLS: 0
HEADACHES: 0
DIARRHEA: 0

## 2022-06-12 ASSESSMENT — COGNITIVE AND FUNCTIONAL STATUS - GENERAL
DRESSING REGULAR UPPER BODY CLOTHING: A LOT
TOILETING: A LOT
SUGGESTED CMS G CODE MODIFIER DAILY ACTIVITY: CK
DAILY ACTIVITIY SCORE: 14
DRESSING REGULAR LOWER BODY CLOTHING: A LOT
EATING MEALS: A LITTLE
PERSONAL GROOMING: A LITTLE
HELP NEEDED FOR BATHING: A LOT

## 2022-06-12 ASSESSMENT — ACTIVITIES OF DAILY LIVING (ADL): TOILETING: INDEPENDENT

## 2022-06-12 NOTE — PROGRESS NOTES
Notified Dr. Romo regarding patient's morning BP of 77/51 and HR 72, patient asymptomatic. No new orders given.  Will continue to observe the patient.

## 2022-06-12 NOTE — PROGRESS NOTES
Ortho Surgery Progress Note      S:  Patient doing well.  States the nerve block is still working.  Evaluated by physiatry today.    O:  Vitals:    06/11/22 1812   BP:    Pulse: 91   Resp: 18   Temp:    SpO2: 98%       RLE - splint in place    A:  S/p ORIF R ankle trimalleolar fracture 6/10/22, also has R proximal humerus fracture    Plan -   - NWB RLE, maintain splint, elevate RLE  - NWB RUE, continue sling  - ok to start DVT ppx today, and would recommend for two weeks  - PT/OT for mobility  - dispo per primary team  - f/u with Dr. Simpson in 2 weeks at [facility]

## 2022-06-12 NOTE — ASSESSMENT & PLAN NOTE
Most likely combination of dehydration and antihypertensive management which was present on admission.  Since then the patient's condition has stabilized and she is actually hypertensive

## 2022-06-12 NOTE — PROGRESS NOTES
Dr has reviewed pt documentation of VS and noted BP taken on LLE was WNL as compared to UE. Dr did manual BP on LLE to verify, and found it to be WNL - just slightly elevated r/t pain needs. RN has administered PRN pain med - will recheck BP . OT will work with pt now that BP and pain better managed.

## 2022-06-12 NOTE — PROGRESS NOTES
[facility] Medicine Daily Progress Note    Date of Service  6/12/2022    Chief Complaint  Rashmi Parra is a 82 y.o. female admitted 6/9/2022 with   Chief Complaint   Patient presents with   • Fall     Pt reports that her  fell on her. Denies head injury, neck, or back pain. -LOC - thinners.   • Laceration     Pt noted to have a large laceration to L forearm. Bleeding controlled    • Leg Injury     Pt noted to have obvious deformities to R ankle     Hospital Course  82-year-old female with past medical history of arthritis, COPD, dyslipidemia, hypertension, depression, T2DM admitted on 6/10/22 for ground-level fall at home with laceration to left forearm and right ankle injury.  Patient was in her kitchen and had tripped and fallen onto the ground with severe right ankle pain and patient was not able to get back up.  Pain was severe.  In the ED imaging showed trimalleolar fracture of the right ankle, reduced in the ER.  Orthopedic surgery was consulted and planning on surgical intervention.    Interval Problem Update  Patient stated she was feeling well despite low BP readings.    Personally checked manual BP on patient's left arm has lower BP (78/68). Significantly lower than left leg (190/57 by machine). Reviewed prior BP checks, patient was being check on her left leg after night RN saw lower BP on left arm.  Will need to continue BP readings on left leg, patient likely has atherosclerosis.    Patient is s/p ORIF right ankle and closed treatment of right humerus with Dr. Simpson.  --ordered PT/OT, referral for SNF vs IRF made.    I have personally seen and examined the patient at bedside. I discussed the plan of care with patient, bedside RN, charge RN,  and pharmacy.    Consultants/Specialty  orthopedics    Code Status  Full Code    Disposition  Patient is not medically cleared for discharge.   Anticipate discharge to SNF vs IRF.  I have placed the appropriate orders for post-discharge  needs.    Review of Systems  Review of Systems   Constitutional: Negative for chills and malaise/fatigue.   Respiratory: Negative for cough and shortness of breath.    Cardiovascular: Negative for chest pain and palpitations.   Gastrointestinal: Negative for constipation and diarrhea.   Musculoskeletal: Positive for joint pain (right shoulder and arm pain).   Neurological: Positive for weakness. Negative for dizziness and headaches.   All other systems reviewed and are negative.       Physical Exam  Temp:  [36.6 °C (97.8 °F)-37.6 °C (99.7 °F)] 36.7 °C (98.1 °F)  Pulse:  [72-91] 85  Resp:  [16-18] 18  BP: ()/(40-59) 176/57  SpO2:  [95 %-100 %] 97 %    Physical Exam  Vitals and nursing note reviewed.   Constitutional:       General: She is not in acute distress.     Appearance: She is not ill-appearing.      Comments: Frail appearing elderly female   HENT:      Mouth/Throat:      Mouth: Mucous membranes are dry.      Pharynx: No oropharyngeal exudate.   Cardiovascular:      Rate and Rhythm: Normal rate and regular rhythm.      Pulses: Normal pulses.      Heart sounds: Murmur (diastolic 2/4) heard.   Pulmonary:      Effort: Pulmonary effort is normal. No respiratory distress.      Breath sounds: Normal breath sounds. No wheezing.   Abdominal:      General: Abdomen is flat. Bowel sounds are normal. There is no distension.      Palpations: Abdomen is soft.   Musculoskeletal:         General: Tenderness (right shoulder and arm) present.      Comments: Sarcopenic  Right leg in cast   Skin:     General: Skin is warm.      Capillary Refill: Capillary refill takes less than 2 seconds.      Coloration: Skin is not jaundiced.      Findings: No erythema.   Neurological:      Mental Status: She is alert and oriented to person, place, and time. Mental status is at baseline.      Motor: Weakness present.      Comments: Right foot pt not able to move toes, but moves leg external and internal rotation.   Psychiatric:          Mood and Affect: Mood normal.         Behavior: Behavior normal.         Fluids    Intake/Output Summary (Last 24 hours) at 6/12/2022 1527  Last data filed at 6/12/2022 1500  Gross per 24 hour   Intake 480 ml   Output 3100 ml   Net -2620 ml       Laboratory  Recent Labs     06/10/22  0955 06/11/22 0317 06/12/22  0201   WBC 8.0 8.1 7.3   RBC 4.25 3.91* 3.25*   HEMOGLOBIN 13.5 12.1 10.3*   HEMATOCRIT 40.7 37.0 31.7*   MCV 95.8 94.6 97.5   MCH 31.8 30.9 31.7   MCHC 33.2* 32.7* 32.5*   RDW 46.9 45.0 48.0   PLATELETCT 189 189 159*   MPV 10.7 10.6 11.0     Recent Labs     06/10/22  0955 06/11/22 0317 06/12/22  0201   SODIUM 140 138 140   POTASSIUM 4.5 4.2 3.8   CHLORIDE 105 105 108   CO2 26 24 23   GLUCOSE 137* 166* 131*   BUN 23* 13 17   CREATININE 0.65 0.55 0.59   CALCIUM 8.8 8.3* 7.8*     Recent Labs     06/10/22  0955   APTT 27.0   INR 1.05               Imaging  DX-CHEST-PORTABLE (1 VIEW)   Final Result      1.  Slight hypoinflation. No focal consolidation or pleural effusions.   2.  Chronic scarring in the right upper lobe.         DX-ANKLE 3+ VIEWS RIGHT   Final Result      Fluoroscopic image(s) obtained during right ankle ORIF. Please see the patient's chart for full procedural details.      Fluoroscopy time 1 minute 4 seconds.         DX-HUMERUS 2+ RIGHT   Final Result      1.  Nondisplaced right greater tuberosity fracture      2.  Multiple intra-articular ossific body within the subcoracoid recess of the glenohumeral joint      3.  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 Reduction previously demonstrated talar dislocation.      DX-ANKLE 2- VIEWS RIGHT   Final Result         Interval reduction of trimalleolar ankle fracture      DX-ANKLE 2- VIEWS RIGHT   Final Result         Displaced trimalleolar fracture with dorsal dislocation of the talus      DX-PORTABLE FLUORO > 1 HOUR    (Results  Pending)        Assessment/Plan  * Trimalleolar fracture of ankle, closed, right, initial encounter- (present on admission)  Assessment & Plan  - Post  falling on her leg  -Has been reduced in the ER, orthopedic surgery for surgical intervention in the morning    S/p ORIF in OR with Dr. Simpson  -PT/OT postoperative evaluation pending  Ordered for SNF and IRF referrals.    Hypotension  Assessment & Plan  Patient has low BP readings. After extensive examination, patient has poor BP readings on BUE. Patient's LLE showed signficant hypertension.    Personally checked manual BP on patient's left arm has lower BP (78/68). Significantly lower than left leg (190/57 by machine). Reviewed prior BP checks, patient was being check on her left leg after night RN saw lower BP on left arm. patient likely has atherosclerosis.  CXR and ECG reviewed, no significant signs to indicate hypotension    IVF stopped  Take BP readings on LLE only  PRN IV hydralazine on board    Closed nondisplaced fracture of greater tuberosity of right humerus- (present on admission)  Assessment & Plan  Non-operative management  Continue sling  PT/OT pending  Pain control    Fall- (present on admission)  Assessment & Plan  - Nonsyncopal  -Causing right ankle fracture  -PT/OT to evaluate postoperatively  - right arm pain complaint after admission, checking XR of Right shoulder and humerus.     XR showed Right greater tuberosity of humerus acute fracture and acute right trimalleolar fractures.  PT/OT pending.    Hypophosphatemia  Assessment & Plan  1.8 level, lower since eating  Replacing and recheck lab in AM    Hypomagnesemia- (present on admission)  Assessment & Plan  Mag 1.6, replacing via IV  Recheck in AM, replace if low    Dehydration- (present on admission)  Assessment & Plan  Was hypotensive after surgery.  BP improved after IVF. Continue until patient is tolerating diet, and drinking fluids.    Type 2 diabetes mellitus with diabetic  mononeuropathy, without long-term current use of insulin (HCC)- (present on admission)  Assessment & Plan  - Hold home metformin  -Mild hyperglycemia but no need for coverage    Moderate episode of recurrent major depressive disorder (HCC)- (present on admission)  Assessment & Plan  - Continue home Cymbalta    Nocturnal hypoxia- (present on admission)  Assessment & Plan  - Patient does wear oxygen at baseline, continue oxygen with continuous pulse ox monitoring    Other hyperlipidemia- (present on admission)  Assessment & Plan  - Continue home statin    Ott's esophagus with dysplasia- (present on admission)  Assessment & Plan  - Continue home PPI       VTE prophylaxis: enoxaparin ppx    I have performed a physical exam and reviewed and updated ROS and Plan today (6/12/2022). In review of yesterday's note (6/11/2022), there are no changes except as documented above.

## 2022-06-12 NOTE — PROGRESS NOTES
"Pt upright in bed having meal, calm, cooperative, pleasant affect, A&O x4. RN has reviewed POC which is for increased mobility, work w/ therapy, RN to monitor BP (  has placed orders for evaluation of Cxr / EKG) RN encouraged CDB w/ \"I.S.\" will reinforce t/o day. Fall and safety precautions in place, pt gave stated understanding she is to use call light for all ADL needs, pt uses call light appropriately. Pt has Lovenox for VTE, EDU don jabier \"ankle pumps\" while at rest to promote circulation. Pt is independent w/ turns for skin integrity. Hourly rounds ongoing, will continue to monitor BP Q 4.  "

## 2022-06-12 NOTE — DISCHARGE PLANNING
Case Management Discharge Planning    Admission Date: 6/9/2022  GMLOS: 4  ALOS: 2    6-Clicks ADL Score: 20  6-Clicks Mobility Score: 10  PT and/or OT Eval ordered: Yes  Post-acute Referrals Ordered: Yes  Post-acute Choice Obtained: No  Has referral(s) been sent to post-acute provider:  Yes      Anticipated Discharge Dispo: Discharge Disposition: Discharged to home/self care (01)    DME Needed: No    Action(s) Taken: OTHER, CM RN met with pts , dtr and pt at bedside. Discussed SNF choice and given SNF list. Dtr looking over facilities. Pt currently in room working with therapy. Assessment completed with pt and pts .    1527: CM RN followed back up with pt about SNF choice, Pts  and dtr took form home to review. Informed pt to tell Bedside RN if family brings completed form back.     Escalations Completed: None    Next Steps: CM RN to follow up with SNF choices, informed will be back later today for choices and to please choose at least 3.     Barriers to Discharge: Pending Placement    Is the patient up for discharge tomorrow: No     Care Transition Team Assessment  Pt on 3 liters of oxygen at BL with [employer] ordered by PCP on 5/5/22. Per family has a FWW at home. Lives in 1 story home with spouse. Pt has dtr that lives in the [facility]. Verified Emergency contact info with both contacts. Per Spouse also has mobile phone that is [phone].    Information Source  Orientation Level: Oriented X4      Elopement Risk  Legal Hold: No  Ambulatory or Self Mobile in Wheelchair: No-Not an Elopement Risk  Elopement Risk: Not at Risk for Elopement    Interdisciplinary Discharge Planning  Lives with - Patient's Self Care Capacity: Spouse  Patient or legal guardian wants to designate a caregiver: No  Housing / Facility: 1 Story House  Prior Services: None    Discharge Preparedness  What is your plan after discharge?: Skilled nursing facility  What are your discharge supports?: Spouse, Child (dtr)  Prior  Functional Level: Ambulatory, Uses Walker    Functional Assesment  Prior Functional Level: Ambulatory, Uses Walker    Finances  Financial Barriers to Discharge: No  Prescription Coverage: Yes    Vision / Hearing Impairment  Right Eye Vision: Impaired, Wears Glasses  Left Eye Vision: Impaired, Wears Glasses              Domestic Abuse  Have you ever been the victim of abuse or violence?: No  Physical Abuse or Sexual Abuse: No  Verbal Abuse or Emotional Abuse: No  Possible Abuse/Neglect Reported to:: Not Applicable    Psychological Assessment  History of Substance Abuse: None    Discharge Risks or Barriers  Patient risk factors: Vulnerable adult    Anticipated Discharge Information  Discharge Disposition: D/T to SNF with Medicare cert in anticipation of skilled care (03)

## 2022-06-12 NOTE — THERAPY
"Occupational Therapy   Initial Evaluation     Patient Name: Rashmi Parra  Age:  82 y.o., Sex:  female  Medical Record #: [id number]  Today's Date: 6/12/2022     Precautions: Fall Risk, Non Weight Bearing Right Lower Extremity, Non Weight Bearing Right Upper Extremity  Comments: non-op R humerus fx    Assessment  Patient is 82 y.o. female admitted after fall. PMHx: Barrets esophagus, hyperlipidemia, MDD, SOB, chronic pain, emphysema and hx of hip replacement. Pt was previously independent w/ADL's and some IADl's, although dtr reports pt has been having functional decline. This admission pt is dx w/trimalleloar fx s/p ORIF NWB, closed non-displaced fracture of right humerus, and hypomagnesemia, dehydration. Pt is NWB RU/LE making physical mobility very difficulty pt is also very right side dominant and today she had significant difficulty attempting to coordination partial stands at EOB using L side body. Pt does have some functional use of her R dominant UE for self feeding, but overall is requiring a significant amount of assist for ADL's participation. Pts family was present through out eval, discussed needs for post acute placement and potential modifications such as use of WC, BSC, and adaptive equipment for ADL tasks. OT will follow in this setting.     Plan  Recommend Occupational Therapy 4 times per week until therapy goals are met for the following treatments:  Adaptive Equipment, Self Care/Activities of Daily Living, Therapeutic Activities and Therapeutic Exercises.    DC Equipment Recommendations: Unable to determine at this time  Discharge Recommendations: Recommend post-acute placement for additional occupational therapy services prior to discharge home     Subjective    \"I don't know how to use my left side\"      Objective     06/12/22 1239   Charge Group   OT Evaluation OT Evaluation High   Total Time Spent   OT Time Spent Yes   OT Evaluation (Minutes) 39   OT Total Time Spent (Calculated) 39   Initial " Contact Note    Initial Contact Note Order Received and Verified, Occupational Therapy Evaluation in Progress with Full Report to Follow.   Prior Living Situation   Prior Services None   Housing / Facility 1 Story House   Steps Into Home 1   Steps In Home 0   Bathroom Set up Walk In Shower   Equipment Owned Front-Wheel Walker   Lives with - Patient's Self Care Capacity Spouse   Comments pts family at bedside supportive but not able to physically assist pt   Prior Level of ADL Function   Self Feeding Independent   Grooming / Hygiene Independent   Bathing Independent   Dressing Independent   Toileting Independent   Prior Level of IADL Function   Medication Management Independent   Laundry Requires Assist   Kitchen Mobility Requires Assist   Finances Requires Assist   Home Management Requires Assist   Shopping Requires Assist   Prior Level Of Mobility Independent With Device in Community   Driving / Transportation Relatives / Others Provide Transportation   Precautions   Precautions Fall Risk;Non Weight Bearing Right Lower Extremity;Non Weight Bearing Right Upper Extremity   Comments non-op R humerus fx   Pain 0 - 10 Group   Location Ankle   Location Orientation Right   Therapist Pain Assessment During Activity;Nurse Notified;5   Cognition    Cognition / Consciousness WDL   Level of Consciousness Alert   Comments pleasant and cooperative   Passive ROM Upper Body   Passive ROM Upper Body X   Comments RUE limited by pain proximally; LUE WFL   Active ROM Upper Body   Active ROM Upper Body  X   Dominant Hand Right   Comments RUE limited by pain proximally; LUE WFL   Strength Upper Body   Upper Body Strength  X   Comments RUE NT; LUE 3+/5   Sensation Upper Body   Upper Extremity Sensation  Not Tested   Upper Body Muscle Tone   Upper Body Muscle Tone  WDL   Neurological Concerns   Neurological Concerns No   Coordination Upper Body   Coordination X   Comments RUE grossly limited by injury and pain LUE WFL   Balance Assessment    Sitting Balance (Static) Fair   Sitting Balance (Dynamic) Fair   Standing Balance (Static) Dependent   Standing Balance (Dynamic) Dependent   Weight Shift Sitting Poor   Weight Shift Standing Absent   Comments EOB attempts to WB on L side for standing unable   Bed Mobility    Supine to Sit Maximal Assist   Sit to Supine Maximal Assist   ADL Assessment   Grooming Minimal Assist;Seated   Upper Body Dressing Maximal Assist   Lower Body Dressing Maximal Assist   Toileting Maximal Assist   How much help from another person does the patient currently need...   6 Clicks Daily Activity Score 14   Functional Mobility   Sit to Stand Total Assist   Bed, Chair, Wheelchair Transfer Unable to Participate   Mobility EOB   Visual Perception   Visual Perception  Not Tested   Edema / Skin Assessment   Edema / Skin  Not Assessed   Activity Tolerance   Comments c/o fatigue   Patient / Family Goals   Patient / Family Goal #1 to get stronger   Short Term Goals   Short Term Goal # 1 pt will complete grooming seated w/se tup   Short Term Goal # 2 pt will complete txf to BSC w/min A   Short Term Goal # 3 pt will complete UB dressing w/SBA   Education Group   Role of Occupational Therapist Patient Response Patient;Family;Demonstration;Verbal Demonstration;Action Demonstration   Problem List   Problem List Decreased Upper Extremity Strength Right;Decreased Upper Extremity AROM Right;Decreased Activity Tolerance;Decreased Upper Extremity Strength Left;Impaired Postural Control / Balance;Limited Knowledge of Post Op Precautions;Impaired Coordination Right Upper Extremity

## 2022-06-12 NOTE — PROGRESS NOTES
Assumed care of patient. Patient updated on plan of care. Call light is within reach and fall precautions are in place. All needs met at this time.

## 2022-06-12 NOTE — OP REPORT
DATE OF SERVICE:  06/10/2022     PREOPERATIVE DIAGNOSES:  1.  Right ankle trimalleolar fracture dislocation.  2.  Right proximal humerus greater tuberosity fracture.     POSTOPERATIVE DIAGNOSES:  1.  Right ankle trimalleolar fracture dislocation and syndesmotic disruption.  2.  Right proximal humerus greater tuberosity fracture.     PROCEDURES PERFORMED:  1.  Open reduction and internal fixation of right ankle trimalleolar fracture   with fixation of posterior lip, and ORIF syndesmosis.  2.  Closed treatment of right proximal humerus fracture.     SURGEON:  Anuj Simpson MD     ASSISTANT:  Angel Gunter PA-C (an assistant was necessary for positioning,   retraction, and facilitation of all critical portions of the procedure).     ANESTHESIOLOGIST:  Cuong Carmona MD     ANESTHESIA:  General along with adductor canal, popliteal and sciatic nerve   block.     COMPLICATIONS:  None.     ESTIMATED BLOOD LOSS:  50 mL.     SPECIMENS:  None.     DISPOSITION:  Stable.     INDICATIONS FOR PROCEDURE:  The patient is an 82-year-old female who presented   to the hospital after a fall and she had immediate right ankle pain.  X-rays   were significant for a right ankle trimalleolar fracture dislocation.  She   underwent closed reduction by the emergency department physician.  She was   admitted due to mobility issues.  I had a long discussion with her regarding   the pros and cons of nonoperative and operative management and given her   unstable ankle injury, I did recommend ORIF for better stability and fixation.    I discussed the risks of surgery as outlined in my preoperative clinical   note and the patient understood her options and elected to proceed with   surgery for the right ankle.     DESCRIPTION OF PROCEDURE:  The patient was met in the preoperative area and   the surgical site was marked.  Once again, the procedure and the risks were   discussed with her and she elected to proceed.  Consent was obtained.  She    underwent a nerve block by the anesthesia team including the popliteal sciatic   and an adductor canal block as well.  She was then brought to the operating   room and underwent general anesthesia.  She received preoperative antibiotics   as well.  An ipsilateral bump was placed underneath her hip, the right lower   extremity was then prepped and draped in the usual sterile manner.  A timeout   was performed confirming the correct patient, correct site and correct   procedure.  We then began by venous exsanguinating the right lower extremity   using Esmarch and inflating the tourniquet at 250 mmHg.  I then began by   making a longitudinal incision over the lateral border of the fibula.    Dissection was carried down to the fracture itself, which was an oblique   fracture.  The fracture edges were cleaned off and then I achieved the   reduction using a lobster claw clamp.  We then placed a lag screw in standard   AO fashion to fix the lateral malleolus.  This was a 2.7 mm screw, which had   good purchase.  We then chose using Arthrex 5-hole distal fibular locking   plate due to the patient's bone quality.  This was fit appropriately in the   lateral border of the fibula and then I drilled and placed a cortical screw   proximally to fix the plate to the bone.  Once I was satisfied with this, I   then filled 4 locking screws distally in the lateral malleolus.  I ensured   that these screws were unicortical as to not penetrate the mortise.  At this   point, I place 2 additional cortical screws proximally to finish the fixation   of the fibula itself.  We then turned our attention to the medial malleolus,   longitudinal incision was made over the medial malleolus itself and we did   identify the fracture site.  The fracture edges were cleaned off.  I did   irrigate the joint through this fracture.  Using a point-to-point clamp, I was   able to get an anatomic reduction of the medial malleolus and then I place 2    parallel guidewires in the anterior and posterior portion of the medial   malleolus into the body of the tibia.  Once I was happy with the positioning   based on fluoroscopy, we then drilled and placed 2 separate 4.0 mm cannulated   cancellous screws, partially threaded in order to fix the medial malleolus.    At this point, there was also a large posterior malleolar fragment that did   get a very nice indirect reduction from fixation of the medial and lateral   malleoli.  However, given that this was such a larger fragment, I did think   that the posterior lip did warrant independent fixation so, I made a stab   incision over the anterior distal tibia and then I dissected down next to the   anterior tibialis tendon down to bone.  I then placed a K-wire from   anteromedial to posterolateral which was parallel to the plafond in order to   fixate that posterior lip fragment.  I placed another K-wire for derotational   purposes.  I then drilled and placed a 4.0 mm partially threaded cannulated   screw to fix the posterior malleolar fragment.  This had excellent purchase   and this did restore the plafond with the posterior malleolar fragment.  I   then removed the K-wires.  At this point, we had fixed the lateral, posterior   and medial malleoli with internal fixation.  We did take fluoroscopic images   and I stressed the ankle.  Unfortunately, there was still gapping at the   medial clear space indicating a syndesmotic disruption; therefore, through the   lateral malleolar plate I did drill quadricortical and then placed an Arthrex   TightRope through the fibula and the tibia itself and then flipped the   button on the medial cortex of the tibia as confirmed with fluoroscopy.  We   then tightened down our TightRope to stabilize the syndesmosis.  So at this   point, I was very satisfied with the fixation of the trimalleolar ankle   fracture as well as the syndesmosis.  Further stress views demonstrated   stability of  the ankle.  We took final fluoroscopic images, confirming   anatomic reduction with a congruent mortise.  So at this point, let the   tourniquet down.  Hemostasis was achieved.  Copious irrigation was used.  We   closed the deep dermal layer with 2-0 Vicryl and staples used for the skin.    Sterile dressings were applied.  The patient was awakened from general   anesthesia without complications and taken to PACU in stable condition.  We   did place a post-mold sugar tong splint.  Of note, the patient also did have a   right proximal humerus greater tuberosity fracture, which was nondisplaced.    Care was taken to position the patient knowing that she had this fracture in   the right upper extremity.  We did place a sling on the patient for closed   treatment of the right proximal humerus fracture.        ______________________________  Anuj Simpson MD RN/PRECIOUS/NIRMAL    DD:  06/12/2022 11:44  DT:  06/12/2022 15:20    Job#:  [id number]

## 2022-06-13 ENCOUNTER — HOSPITAL ENCOUNTER (INPATIENT)
Facility: REHABILITATION | Age: 82
End: 2022-06-13
Attending: PHYSICAL MEDICINE & REHABILITATION | Admitting: PHYSICAL MEDICINE & REHABILITATION
Payer: MEDICARE

## 2022-06-13 LAB
ALBUMIN SERPL BCP-MCNC: 3.1 G/DL (ref 3.2–4.9)
BUN SERPL-MCNC: 13 MG/DL (ref 8–22)
CALCIUM SERPL-MCNC: 8 MG/DL (ref 8.4–10.2)
CHLORIDE SERPL-SCNC: 104 MMOL/L (ref 96–112)
CO2 SERPL-SCNC: 24 MMOL/L (ref 20–33)
CREAT SERPL-MCNC: 0.47 MG/DL (ref 0.5–1.4)
ERYTHROCYTE [DISTWIDTH] IN BLOOD BY AUTOMATED COUNT: 47.9 FL (ref 35.9–50)
GFR SERPLBLD CREATININE-BSD FMLA CKD-EPI: 94 ML/MIN/1.73 M 2
GLUCOSE SERPL-MCNC: 123 MG/DL (ref 65–99)
HCT VFR BLD AUTO: 33.2 % (ref 37–47)
HGB BLD-MCNC: 10.8 G/DL (ref 12–16)
MAGNESIUM SERPL-MCNC: 1.6 MG/DL (ref 1.5–2.5)
MCH RBC QN AUTO: 31.7 PG (ref 27–33)
MCHC RBC AUTO-ENTMCNC: 32.5 G/DL (ref 33.6–35)
MCV RBC AUTO: 97.4 FL (ref 81.4–97.8)
PHOSPHATE SERPL-MCNC: 2.1 MG/DL (ref 2.5–4.5)
PLATELET # BLD AUTO: 180 K/UL (ref 164–446)
PMV BLD AUTO: 10.9 FL (ref 9–12.9)
POTASSIUM SERPL-SCNC: 4.1 MMOL/L (ref 3.6–5.5)
RBC # BLD AUTO: 3.41 M/UL (ref 4.2–5.4)
SODIUM SERPL-SCNC: 137 MMOL/L (ref 135–145)
WBC # BLD AUTO: 8.5 K/UL (ref 4.8–10.8)

## 2022-06-13 PROCEDURE — 770006 HCHG ROOM/CARE - MED/SURG/GYN SEMI*

## 2022-06-13 PROCEDURE — A9270 NON-COVERED ITEM OR SERVICE: HCPCS | Performed by: INTERNAL MEDICINE

## 2022-06-13 PROCEDURE — 700111 HCHG RX REV CODE 636 W/ 250 OVERRIDE (IP): Performed by: INTERNAL MEDICINE

## 2022-06-13 PROCEDURE — 0240U HCHG SARS-COV-2 COVID-19 NFCT DS RESP RNA 3 TRGT MIC: CPT

## 2022-06-13 PROCEDURE — 700102 HCHG RX REV CODE 250 W/ 637 OVERRIDE(OP): Performed by: INTERNAL MEDICINE

## 2022-06-13 PROCEDURE — 83735 ASSAY OF MAGNESIUM: CPT

## 2022-06-13 PROCEDURE — 99232 SBSQ HOSP IP/OBS MODERATE 35: CPT | Performed by: INTERNAL MEDICINE

## 2022-06-13 PROCEDURE — 36415 COLL VENOUS BLD VENIPUNCTURE: CPT

## 2022-06-13 PROCEDURE — 85027 COMPLETE CBC AUTOMATED: CPT

## 2022-06-13 PROCEDURE — 80069 RENAL FUNCTION PANEL: CPT

## 2022-06-13 RX ORDER — AMLODIPINE BESYLATE 5 MG/1
5 TABLET ORAL
Status: DISCONTINUED | OUTPATIENT
Start: 2022-06-14 | End: 2022-06-15 | Stop reason: HOSPADM

## 2022-06-13 RX ORDER — MAGNESIUM SULFATE HEPTAHYDRATE 40 MG/ML
2 INJECTION, SOLUTION INTRAVENOUS ONCE
Status: COMPLETED | OUTPATIENT
Start: 2022-06-13 | End: 2022-06-13

## 2022-06-13 RX ADMIN — SENNOSIDES AND DOCUSATE SODIUM 2 TABLET: 50; 8.6 TABLET ORAL at 04:50

## 2022-06-13 RX ADMIN — GABAPENTIN 100 MG: 100 CAPSULE ORAL at 04:51

## 2022-06-13 RX ADMIN — GABAPENTIN 100 MG: 100 CAPSULE ORAL at 17:11

## 2022-06-13 RX ADMIN — Medication 1 MG: at 21:14

## 2022-06-13 RX ADMIN — GABAPENTIN 100 MG: 100 CAPSULE ORAL at 11:32

## 2022-06-13 RX ADMIN — SENNOSIDES AND DOCUSATE SODIUM 2 TABLET: 50; 8.6 TABLET ORAL at 17:11

## 2022-06-13 RX ADMIN — ENOXAPARIN SODIUM 40 MG: 40 INJECTION SUBCUTANEOUS at 17:12

## 2022-06-13 RX ADMIN — OMEPRAZOLE 40 MG: 20 CAPSULE, DELAYED RELEASE ORAL at 04:51

## 2022-06-13 RX ADMIN — ANASTROZOLE 1 MG: 1 TABLET ORAL at 04:51

## 2022-06-13 RX ADMIN — LOVASTATIN 20 MG: 20 TABLET ORAL at 21:14

## 2022-06-13 RX ADMIN — DULOXETINE 90 MG: 30 CAPSULE, DELAYED RELEASE ORAL at 04:50

## 2022-06-13 RX ADMIN — POLYETHYLENE GLYCOL 3350 1 PACKET: 17 POWDER, FOR SOLUTION ORAL at 15:39

## 2022-06-13 RX ADMIN — ACETAMINOPHEN 325MG 650 MG: 325 TABLET ORAL at 15:27

## 2022-06-13 RX ADMIN — MAGNESIUM SULFATE 2 G: 2 INJECTION INTRAVENOUS at 07:54

## 2022-06-13 ASSESSMENT — PATIENT HEALTH QUESTIONNAIRE - PHQ9
2. FEELING DOWN, DEPRESSED, IRRITABLE, OR HOPELESS: NOT AT ALL
SUM OF ALL RESPONSES TO PHQ9 QUESTIONS 1 AND 2: 0
1. LITTLE INTEREST OR PLEASURE IN DOING THINGS: NOT AT ALL

## 2022-06-13 ASSESSMENT — ENCOUNTER SYMPTOMS
SHORTNESS OF BREATH: 0
PALPITATIONS: 0
WEAKNESS: 1
HEADACHES: 0
CHILLS: 0
COUGH: 0
CONSTIPATION: 0
DIZZINESS: 0
DIARRHEA: 0

## 2022-06-13 ASSESSMENT — PAIN DESCRIPTION - PAIN TYPE
TYPE: ACUTE PAIN
TYPE: SURGICAL PAIN
TYPE: ACUTE PAIN

## 2022-06-13 NOTE — DISCHARGE PLANNING
Following for post acute services. Call out to spouse Michael discussed goals and expectation for IRF level of care. Discussed need for ongoing support when Aleyda returns home likely at a wheel chair level.  Discussed length of stay, Discussed financial responsibility with medicare and [place]. Discussed covid policy and visitation. Discussed also Skilled nursing programs. Michael to discuss with daughters ( one additional person on the call suspect ist was a daughter) choice pending for post acute services. Will follow.

## 2022-06-13 NOTE — PROGRESS NOTES
Received bedside report.  Assumed pt care.   Assessment and chart check complete.  Pt is [id number], no signs of distress, denies nausea/vomiting  Dressing CDI, CMS intact  Fall precautions in place, treaded socks on pt, bed in low position.   Call light within reach.   Educated pt to call if needing anything.   Hourly rounding.

## 2022-06-13 NOTE — DISCHARGE PLANNING
Case Management Discharge Planning    Admission Date: 6/9/2022  GMLOS: 4  ALOS: 3    6-Clicks ADL Score: 14  6-Clicks Mobility Score: 10  PT and/or OT Eval ordered: Yes  Post-acute Referrals Ordered: Yes  Post-acute Choice Obtained: No  Has referral(s) been sent to post-acute provider:  No      Anticipated Discharge Dispo: Discharge Disposition: D/T to SNF with Medicare cert in anticipation of skilled care (03)    DME Needed: No    Action(s) Taken: LSW spoke with patient's  via phone. Patient's  is on his way to visit the patient and will provide the SNF choice from to RN or LSW, bedside RN aware.    1:45pm - LSW met with Michael, patient's  and Mariela, patient's daughter at bedside.    Michael stated his choice is [facility] and his second choice is [facility], choice form signed by Michael and faxed to [facility].    Escalations Completed: None    Medically Clear: Yes    Next Steps: Awaiting SNF choice form from [facility]. LSW will initiate the referral as soon as the choice form is received.    Barriers to Discharge: Pending Placement    Is the patient up for discharge tomorrow: No

## 2022-06-13 NOTE — PROGRESS NOTES
Pt sitting upright in bed when received. Full sensation on both right upper arm and right lower leg extremities. Cap refill less than 3 seconds. Pt has removed sling to arm stating that she feels that it is not working. Pt was educated on the need to keep her arm in the correct position, pt verbalized understanding. Pt complains of nausea, Zofran given. Verbalizes needs well and demonstrates use of call bell. Call bell in reach.    Chart check completed.

## 2022-06-13 NOTE — DISCHARGE PLANNING
Received Choice form at 1255  Agency/Facility Name: (1) [facility] (2) [facility]  Referral sent per Choice form @ 1350    Note:  Per Delmer at [facility] this morning, facility will not have any available beds until Friday unless something changes.

## 2022-06-14 ENCOUNTER — APPOINTMENT (OUTPATIENT)
Dept: RADIOLOGY | Facility: MEDICAL CENTER | Age: 82
DRG: 493 | End: 2022-06-14
Attending: HOSPITALIST
Payer: MEDICARE

## 2022-06-14 LAB
FLUAV RNA SPEC QL NAA+PROBE: NEGATIVE
FLUBV RNA SPEC QL NAA+PROBE: NEGATIVE
MAGNESIUM SERPL-MCNC: 1.8 MG/DL (ref 1.5–2.5)
PHOSPHATE SERPL-MCNC: 2.4 MG/DL (ref 2.5–4.5)
SARS-COV-2 RNA RESP QL NAA+PROBE: NOTDETECTED
SPECIMEN SOURCE: NORMAL

## 2022-06-14 PROCEDURE — A9270 NON-COVERED ITEM OR SERVICE: HCPCS | Performed by: INTERNAL MEDICINE

## 2022-06-14 PROCEDURE — 97110 THERAPEUTIC EXERCISES: CPT

## 2022-06-14 PROCEDURE — 94760 N-INVAS EAR/PLS OXIMETRY 1: CPT

## 2022-06-14 PROCEDURE — 770006 HCHG ROOM/CARE - MED/SURG/GYN SEMI*

## 2022-06-14 PROCEDURE — 700111 HCHG RX REV CODE 636 W/ 250 OVERRIDE (IP): Performed by: INTERNAL MEDICINE

## 2022-06-14 PROCEDURE — 73120 X-RAY EXAM OF HAND: CPT | Mod: RT

## 2022-06-14 PROCEDURE — 97530 THERAPEUTIC ACTIVITIES: CPT

## 2022-06-14 PROCEDURE — 99232 SBSQ HOSP IP/OBS MODERATE 35: CPT | Performed by: HOSPITALIST

## 2022-06-14 PROCEDURE — 36415 COLL VENOUS BLD VENIPUNCTURE: CPT

## 2022-06-14 PROCEDURE — 84100 ASSAY OF PHOSPHORUS: CPT

## 2022-06-14 PROCEDURE — 83735 ASSAY OF MAGNESIUM: CPT

## 2022-06-14 PROCEDURE — 700102 HCHG RX REV CODE 250 W/ 637 OVERRIDE(OP): Performed by: INTERNAL MEDICINE

## 2022-06-14 RX ADMIN — ACETAMINOPHEN 325MG 650 MG: 325 TABLET ORAL at 18:35

## 2022-06-14 RX ADMIN — GABAPENTIN 100 MG: 100 CAPSULE ORAL at 12:22

## 2022-06-14 RX ADMIN — Medication 1 MG: at 20:39

## 2022-06-14 RX ADMIN — AMLODIPINE BESYLATE 5 MG: 5 TABLET ORAL at 05:24

## 2022-06-14 RX ADMIN — SENNOSIDES AND DOCUSATE SODIUM 2 TABLET: 50; 8.6 TABLET ORAL at 05:23

## 2022-06-14 RX ADMIN — ANASTROZOLE 1 MG: 1 TABLET ORAL at 05:24

## 2022-06-14 RX ADMIN — ACETAMINOPHEN 325MG 650 MG: 325 TABLET ORAL at 09:02

## 2022-06-14 RX ADMIN — GABAPENTIN 100 MG: 100 CAPSULE ORAL at 18:34

## 2022-06-14 RX ADMIN — GABAPENTIN 100 MG: 100 CAPSULE ORAL at 05:24

## 2022-06-14 RX ADMIN — SENNOSIDES AND DOCUSATE SODIUM 2 TABLET: 50; 8.6 TABLET ORAL at 18:34

## 2022-06-14 RX ADMIN — ENOXAPARIN SODIUM 40 MG: 40 INJECTION SUBCUTANEOUS at 18:35

## 2022-06-14 RX ADMIN — DULOXETINE 90 MG: 30 CAPSULE, DELAYED RELEASE ORAL at 05:23

## 2022-06-14 RX ADMIN — OMEPRAZOLE 40 MG: 20 CAPSULE, DELAYED RELEASE ORAL at 05:24

## 2022-06-14 RX ADMIN — LOVASTATIN 20 MG: 20 TABLET ORAL at 20:39

## 2022-06-14 ASSESSMENT — ENCOUNTER SYMPTOMS
COUGH: 0
ABDOMINAL PAIN: 0
CHILLS: 0
SEIZURES: 0
EYES NEGATIVE: 1
CONSTITUTIONAL NEGATIVE: 1
CONSTIPATION: 0
FEVER: 0
HEADACHES: 0
RESPIRATORY NEGATIVE: 1
PSYCHIATRIC NEGATIVE: 1
DIARRHEA: 0
NAUSEA: 0
PALPITATIONS: 0
DIAPHORESIS: 0
WHEEZING: 0
GASTROINTESTINAL NEGATIVE: 1
NEUROLOGICAL NEGATIVE: 1
LOSS OF CONSCIOUSNESS: 0
CARDIOVASCULAR NEGATIVE: 1
DOUBLE VISION: 0
BLOOD IN STOOL: 0
BRUISES/BLEEDS EASILY: 0
VOMITING: 0
HEARTBURN: 0
FOCAL WEAKNESS: 0
DIZZINESS: 0
NERVOUS/ANXIOUS: 0
HEMOPTYSIS: 0

## 2022-06-14 ASSESSMENT — COGNITIVE AND FUNCTIONAL STATUS - GENERAL
WALKING IN HOSPITAL ROOM: TOTAL
SUGGESTED CMS G CODE MODIFIER MOBILITY: CM
CLIMB 3 TO 5 STEPS WITH RAILING: TOTAL
STANDING UP FROM CHAIR USING ARMS: TOTAL
TURNING FROM BACK TO SIDE WHILE IN FLAT BAD: A LOT
MOBILITY SCORE: 7
MOVING FROM LYING ON BACK TO SITTING ON SIDE OF FLAT BED: UNABLE
MOVING TO AND FROM BED TO CHAIR: UNABLE

## 2022-06-14 ASSESSMENT — GAIT ASSESSMENTS: GAIT LEVEL OF ASSIST: UNABLE TO PARTICIPATE

## 2022-06-14 ASSESSMENT — PAIN DESCRIPTION - PAIN TYPE
TYPE: ACUTE PAIN
TYPE: ACUTE PAIN

## 2022-06-14 NOTE — DISCHARGE PLANNING
Following for post acute services choice in chart for skilled nursing will follow if choice is for IRF with [facility].

## 2022-06-14 NOTE — DISCHARGE PLANNING
Follow up for post acute services spoke with daughter Nadya and she tells me that the plan was for the skilled nursing program they did not think that she could tolerate the 3 hours of therapy a day based on her recovery from a past hip fracture. Discussed that the family would not be able to provide the additional support that they would like to. Discussed transition skilled to rehab program. Discussed patient choice for rehab per attending.. Nadya will discuss with sister Mariela and father and inform CM about their choice.

## 2022-06-14 NOTE — PROGRESS NOTES
[facility] Medicine Daily Progress Note    Date of Service  6/13/2022    Chief Complaint  Rashmi Parra is a 82 y.o. female admitted 6/9/2022 with   Chief Complaint   Patient presents with   • Fall     Pt reports that her  fell on her. Denies head injury, neck, or back pain. -LOC - thinners.   • Laceration     Pt noted to have a large laceration to L forearm. Bleeding controlled    • Leg Injury     Pt noted to have obvious deformities to R ankle     Hospital Course  82-year-old female with past medical history of arthritis, COPD, dyslipidemia, hypertension, depression, T2DM admitted on 6/10/22 for ground-level fall at home with laceration to left forearm and right ankle injury.  Patient was in her kitchen and had tripped and fallen onto the ground with severe right ankle pain and patient was not able to get back up.  Pain was severe.  In the ED imaging showed trimalleolar fracture of the right ankle, reduced in the ER.  Orthopedic surgery was consulted and planning on surgical intervention.    XR showed Right greater tuberosity of humerus acute fracture and acute right trimalleolar fractures.    On 6/1022, patient underwent ORIF in OR with Dr. Simpson.    While hospitalized, patient had hypotensive readings. This was found out to be erroneous readings from BUE. Patient is actually hypertensive when taking BP from LLE.  Suspecting she has severe atherosclerotic disease, particularly noticeable on her BUE.    Interval Problem Update  Patient stated she was feeling well and hungry. Helped patient to sit straight to eat breakfast.  Denied any acute complaints today.  Mag 1.6, replacing via IV  --ordered PT/OT, referral for SNF vs IRF made.    I have personally seen and examined the patient at bedside. I discussed the plan of care with patient, bedside RN, charge RN,  and pharmacy.    Consultants/Specialty  orthopedics    Code Status  Full Code    Disposition  Patient is medically cleared for discharge.    Anticipate discharge to SNF vs IRF.  I have placed the appropriate orders for post-discharge needs.    Review of Systems  Review of Systems   Constitutional: Negative for chills and malaise/fatigue.   Respiratory: Negative for cough and shortness of breath.    Cardiovascular: Negative for chest pain and palpitations.   Gastrointestinal: Negative for constipation and diarrhea.   Musculoskeletal: Positive for joint pain (right shoulder and arm pain).   Neurological: Positive for weakness. Negative for dizziness and headaches.   All other systems reviewed and are negative.       Physical Exam  Temp:  [36.8 °C (98.3 °F)-37 °C (98.6 °F)] 37 °C (98.6 °F)  Pulse:  [] 95  Resp:  [18] 18  BP: ()/(45-91) 171/63  SpO2:  [91 %-100 %] 98 %    Physical Exam  Vitals and nursing note reviewed.   Constitutional:       General: She is not in acute distress.     Appearance: She is not ill-appearing.      Comments: Frail appearing elderly female   HENT:      Mouth/Throat:      Mouth: Mucous membranes are dry.      Pharynx: No oropharyngeal exudate.   Cardiovascular:      Rate and Rhythm: Normal rate and regular rhythm.      Pulses: Normal pulses.      Heart sounds: Murmur (diastolic 2/4) heard.   Pulmonary:      Effort: Pulmonary effort is normal. No respiratory distress.      Breath sounds: Normal breath sounds. No wheezing.   Abdominal:      General: Abdomen is flat. Bowel sounds are normal. There is no distension.      Palpations: Abdomen is soft.   Musculoskeletal:         General: Tenderness (right shoulder and arm) present.      Comments: Sarcopenic  Right leg in cast   Skin:     General: Skin is warm.      Capillary Refill: Capillary refill takes less than 2 seconds.      Coloration: Skin is not jaundiced.      Findings: No erythema.   Neurological:      Mental Status: She is alert and oriented to person, place, and time. Mental status is at baseline.      Motor: Weakness present.      Comments: Right foot pt not  able to move toes, but moves leg external and internal rotation.   Psychiatric:         Mood and Affect: Mood normal.         Behavior: Behavior normal.         Fluids    Intake/Output Summary (Last 24 hours) at 6/13/2022 1815  Last data filed at 6/13/2022 1318  Gross per 24 hour   Intake 240 ml   Output 1900 ml   Net -1660 ml       Laboratory  Recent Labs     06/11/22 0317 06/12/22  0201 06/13/22  0156   WBC 8.1 7.3 8.5   RBC 3.91* 3.25* 3.41*   HEMOGLOBIN 12.1 10.3* 10.8*   HEMATOCRIT 37.0 31.7* 33.2*   MCV 94.6 97.5 97.4   MCH 30.9 31.7 31.7   MCHC 32.7* 32.5* 32.5*   RDW 45.0 48.0 47.9   PLATELETCT 189 159* 180   MPV 10.6 11.0 10.9     Recent Labs     06/11/22 0317 06/12/22  0201 06/13/22  0156   SODIUM 138 140 137   POTASSIUM 4.2 3.8 4.1   CHLORIDE 105 108 104   CO2 24 23 24   GLUCOSE 166* 131* 123*   BUN 13 17 13   CREATININE 0.55 0.59 0.47*   CALCIUM 8.3* 7.8* 8.0*                   Imaging  DX-CHEST-PORTABLE (1 VIEW)   Final Result      1.  Slight hypoinflation. No focal consolidation or pleural effusions.   2.  Chronic scarring in the right upper lobe.         DX-PORTABLE FLUORO > 1 HOUR   Final Result      Portable fluoroscopy utilized for 1 minute, 3.7 seconds.         INTERPRETING LOCATION: [address] [place] [address], [facility]      DX-ANKLE 3+ VIEWS RIGHT   Final Result      Fluoroscopic image(s) obtained during right ankle ORIF. Please see the patient's chart for full procedural details.      Fluoroscopy time 1 minute 4 seconds.         DX-HUMERUS 2+ RIGHT   Final Result      1.  Nondisplaced right greater tuberosity fracture      2.  Multiple intra-articular ossific body within the subcoracoid recess of the glenohumeral joint      3.  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 Reduction previously demonstrated talar dislocation.      DX-ANKLE 2- VIEWS RIGHT   Final Result         Interval  reduction of trimalleolar ankle fracture      DX-ANKLE 2- VIEWS RIGHT   Final Result         Displaced trimalleolar fracture with dorsal dislocation of the talus           Assessment/Plan  * Trimalleolar fracture of ankle, closed, right, initial encounter- (present on admission)  Assessment & Plan  - Post  falling on her leg  -Has been reduced in the ER, orthopedic surgery for surgical intervention in the morning    S/p ORIF in OR with Dr. Simpson  -PT/OT postoperative evaluation pending  Ordered for SNF and IRF referrals.    Hypotension- (present on admission)  Assessment & Plan  Patient has low BP readings. After extensive examination, patient has poor BP readings on BUE. Patient's LLE showed signficant hypertension.    Personally checked manual BP on patient's left arm has lower BP (78/68). Significantly lower than left leg (190/57 by machine). Reviewed prior BP checks, patient was being check on her left leg after night RN saw lower BP on left arm. patient likely has atherosclerosis.  CXR and ECG reviewed, no significant signs to indicate hypotension    IVF stopped  Take BP readings on LLE only  PRN IV hydralazine on board  Trial amlodipine 5mg    Closed nondisplaced fracture of greater tuberosity of right humerus- (present on admission)  Assessment & Plan  Non-operative management  Continue sling  PT/OT pending  Pain control    Fall- (present on admission)  Assessment & Plan  - Nonsyncopal  -Causing right ankle fracture  -PT/OT to evaluate postoperatively  - right arm pain complaint after admission, checking XR of Right shoulder and humerus.     XR showed Right greater tuberosity of humerus acute fracture and acute right trimalleolar fractures.  PT/OT recommending post acute placement, SNF order placed, pending.    Hypophosphatemia  Assessment & Plan  1.8 level, lower since eating  Replacing and recheck lab in AM  Improved levels after PO replacements, continue diet    Hypomagnesemia- (present on  admission)  Assessment & Plan  Mag 1.6, replacing via IV  Recheck in AM, replace if low    Dehydration- (present on admission)  Assessment & Plan  Was hypotensive after surgery.  BP improved after IVF. Continue until patient is tolerating diet, and drinking fluids.    Type 2 diabetes mellitus with diabetic mononeuropathy, without long-term current use of insulin (HCC)- (present on admission)  Assessment & Plan  - Hold home metformin  -Mild hyperglycemia but no need for coverage    Moderate episode of recurrent major depressive disorder (HCC)- (present on admission)  Assessment & Plan  - Continue home Cymbalta    Nocturnal hypoxia- (present on admission)  Assessment & Plan  - Patient does wear oxygen at baseline, continue oxygen with continuous pulse ox monitoring    Other hyperlipidemia- (present on admission)  Assessment & Plan  - Continue home statin    Ott's esophagus with dysplasia- (present on admission)  Assessment & Plan  - Continue home PPI       VTE prophylaxis: enoxaparin ppx    I have performed a physical exam and reviewed and updated ROS and Plan today (6/13/2022). In review of yesterday's note (6/12/2022), there are no changes except as documented above.

## 2022-06-14 NOTE — DISCHARGE PLANNING
Agency/Facility Name: [facility]  Outcome: Left a voicemail regarding bed availability. DPA requested a call back.

## 2022-06-14 NOTE — THERAPY
Physical Therapy   Daily Treatment     Patient Name: Rashmi Parra  Age:  82 y.o., Sex:  female  Medical Record #: [id number]  Today's Date: 6/14/2022     Precautions  Precautions: (P) Fall Risk;Non Weight Bearing Right Upper Extremity;Non Weight Bearing Right Lower Extremity  Comments: (P) non-op R humerus fx    Assessment    Pt is progressing slower than expected and is primarily limited due to WB precautions in R LE/R UE. Pt was able to participate in in supine therapeutic exercises and was provided with active assist for SLR for R LE. Pt was provided with education regarding intensity, duration, and frequency when performing self. Pt was able to tolerate marching in place while in seated position and was able to perform long arc quads with minimal active assist for R LE. Pt was provided with cues and sequencing during bed mobility to use L UE in order to assist with upper trunk mobility and scooting to EOB. Pt was able to sit at EOB with no carlo LOB for 20 mins and able to brush her teeth and perform ADL's. Pt was left up at EOB. RN and CNA were aware of positioning. Recommend post-acute placement for continued physical therapy services prior to discharge home.       Plan    Continue current treatment plan.    DC Equipment Recommendations: (P) Unable to determine at this time  Discharge Recommendations: (P) Recommend post-acute placement for additional physical therapy services prior to discharge home    Objective       06/14/22 1105   Precautions   Precautions Fall Risk;Non Weight Bearing Right Upper Extremity;Non Weight Bearing Right Lower Extremity   Comments non-op R humerus fx   Vitals   O2 (LPM) 3   O2 Delivery Device Nasal Cannula   Pain 0 - 10 Group   Location Arm;Ankle   Location Orientation Right   Description Aching   Therapist Pain Assessment Prior to Activity;Post Activity;During Activity;Nurse Notified  (c/o minimal pain; not rated)   Cognition    Cognition / Consciousness WDL   Level of  Consciousness Alert   Comments pleasant/cooperative   Supine Lower Body Exercise   Supine Lower Body Exercises Yes   Straight Leg Raises 1 set of 10;Bilateral   Heel Slide 1 set of 10;Bilateral   Gluteal Isometrics 1 set of 10;Bilateral   Sitting Lower Body Exercises   Sitting Lower Body Exercises Yes   Long Arc Quad 1 set of 10;Bilateral   Marching Reciprocal;1 set of 10   Balance   Sitting Balance (Static) Fair +   Sitting Balance (Dynamic) Fair   Weight Shift Sitting Poor   Skilled Intervention Verbal Cuing;Sequencing;Facilitation   Comments requires assist for weight shifting to scoot to EOB   Gait Analysis   Gait Level Of Assist Unable to Participate   Weight Bearing Status NWB R LE and R UE   Bed Mobility    Supine to Sit Moderate Assist   Sit to Supine Moderate Assist   Scooting Maximal Assist   Skilled Intervention Verbal Cuing;Sequencing   Comments cues and demo to use L UE to bring trunk to sitting positiong; cues to use L UE to scoot hips forward to EOB   Functional Mobility   Sit to Stand Unable to Participate   Bed, Chair, Wheelchair Transfer Unable to Participate   How much difficulty does the patient currently have...   Turning over in bed (including adjusting bedclothes, sheets and blankets)? 2   Sitting down on and standing up from a chair with arms (e.g., wheelchair, bedside commode, etc.) 1   Moving from lying on back to sitting on the side of the bed? 1   How much help from another person does the patient currently need...   Moving to and from a bed to a chair (including a wheelchair)? 1   Need to walk in a hospital room? 1   Climbing 3-5 steps with a railing? 1   6 clicks Mobility Score 7   Activity Tolerance   Sitting in Chair NT   Sitting Edge of Bed 20 mins   Standing NT   Comments limited due to fatigue and pain   Patient / Family Goals    Patient / Family Goal #1 not stated   Short Term Goals    Short Term Goal # 1 Min A with bed mob in 6 V   Goal Outcome # 1 Progressing as expected   Short  Term Goal # 2 mod A with transfers in 6 V   Goal Outcome # 2 Progressing slower than expected   Education Group   Education Provided Role of Physical Therapist   Role of Physical Therapist Patient Response Patient;Acceptance;Explanation;Demonstration;Verbal Demonstration;Action Demonstration   Anticipated Discharge Equipment and Recommendations   DC Equipment Recommendations Unable to determine at this time   Discharge Recommendations Recommend post-acute placement for additional physical therapy services prior to discharge home   Interdisciplinary Plan of Care Collaboration   IDT Collaboration with  Nursing   Patient Position at End of Therapy Seated;Edge of Bed;Tray Table within Reach;Call Light within Reach;Phone within Reach   Collaboration Comments aware of visit and recs; RN aware of patient position   Session Information   Date / Session Number  6/14-2 (2/5, 6/17)

## 2022-06-14 NOTE — PROGRESS NOTES
Hospital Medicine Daily Progress Note    Date of Service  6/14/2022    Chief Complaint  Rashmi Parra is a 82 y.o. female admitted 6/9/2022 with inability to ambulate    Hospital Course  Ms. Maynard came into the hospital after her  fell on top of her and broke her ankle.  The patient is trilevel fracture has been repaired.  At this point she is complaining today of right hand pain fingers 3 and 4.  These may also be fractured but less likely.  X-ray will be obtained to evaluate.  Patient this point evaluate by PT OT postoperatively and found to require physical therapy at a inpatient level.  Skilled referrals have been placed and we are pending.  Rehab has also been contacted as she would be a good choice for rehab.  Family follow-up she was not able to do 3 hours.  Patient feels she would be able to do rehab.    Interval Problem Update  Hand x-ray today  Repeat magnesium and phosphorus level  Continue blood pressure management  Diabetic management  Antidepressant management  Nutritional support  Pain management    I have discussed this patient's plan of care and discharge plan at IDT rounds today with Case Management, Nursing, Nursing leadership, and other members of the IDT team.    Consultants/Specialty  orthopedics    Code Status  Full Code    Disposition  Patient is medically cleared for discharge.   Anticipate discharge to to skilled nursing facility.  I have placed the appropriate orders for post-discharge needs.    Review of Systems  Review of Systems   Constitutional: Negative.  Negative for chills, diaphoresis and fever.   HENT: Negative.    Eyes: Negative.  Negative for double vision.   Respiratory: Negative.  Negative for cough, hemoptysis and wheezing.    Cardiovascular: Negative.  Negative for chest pain, palpitations and leg swelling.   Gastrointestinal: Negative.  Negative for abdominal pain, blood in stool, constipation, diarrhea, heartburn, nausea and vomiting.   Genitourinary: Negative.   Negative for frequency, hematuria and urgency.   Musculoskeletal: Positive for joint pain (Right hand fingers #3 and 4, and right ankle).   Skin: Negative.  Negative for itching and rash.   Neurological: Negative.  Negative for dizziness, focal weakness, seizures, loss of consciousness and headaches.   Endo/Heme/Allergies: Negative.  Does not bruise/bleed easily.   Psychiatric/Behavioral: Negative.  Negative for suicidal ideas. The patient is not nervous/anxious.    All other systems reviewed and are negative.       Physical Exam  Temp:  [36.4 °C (97.5 °F)-36.9 °C (98.4 °F)] 36.4 °C (97.5 °F)  Pulse:  [74-89] 89  Resp:  [18] 18  BP: (147-163)/(53-61) 149/55  SpO2:  [97 %-99 %] 97 %    Physical Exam  Vitals and nursing note reviewed. Exam conducted with a chaperone present.   Constitutional:       Appearance: Normal appearance. She is well-developed and normal weight.   HENT:      Head: Normocephalic and atraumatic.      Right Ear: External ear normal.      Left Ear: External ear normal.      Nose: Nose normal.      Mouth/Throat:      Mouth: Mucous membranes are moist.      Pharynx: Oropharynx is clear.   Eyes:      Extraocular Movements: Extraocular movements intact.      Conjunctiva/sclera: Conjunctivae normal.      Pupils: Pupils are equal, round, and reactive to light.   Neck:      Thyroid: No thyromegaly.      Vascular: No JVD.   Cardiovascular:      Rate and Rhythm: Normal rate and regular rhythm.      Pulses: Normal pulses.      Heart sounds: Normal heart sounds.   Pulmonary:      Effort: Pulmonary effort is normal.      Breath sounds: Normal breath sounds.   Chest:      Chest wall: No tenderness.   Abdominal:      General: Abdomen is flat. Bowel sounds are normal. There is no distension.      Palpations: Abdomen is soft. There is no mass.      Tenderness: There is no abdominal tenderness. There is no guarding or rebound.   Musculoskeletal:         General: Tenderness (Right ankle, right hand) present. Normal  range of motion.      Cervical back: Normal range of motion and neck supple.   Lymphadenopathy:      Cervical: No cervical adenopathy.   Skin:     General: Skin is warm and dry.      Capillary Refill: Capillary refill takes less than 2 seconds.      Findings: No rash.   Neurological:      General: No focal deficit present.      Mental Status: She is alert and oriented to person, place, and time. Mental status is at baseline.      Cranial Nerves: No cranial nerve deficit.      Deep Tendon Reflexes: Reflexes are normal and symmetric.   Psychiatric:         Mood and Affect: Mood normal.         Behavior: Behavior normal.         Thought Content: Thought content normal.         Judgment: Judgment normal.         Fluids    Intake/Output Summary (Last 24 hours) at 6/14/2022 1408  Last data filed at 6/14/2022 1300  Gross per 24 hour   Intake 240 ml   Output 1750 ml   Net -1510 ml       Laboratory  Recent Labs     06/12/22  0201 06/13/22  0156   WBC 7.3 8.5   RBC 3.25* 3.41*   HEMOGLOBIN 10.3* 10.8*   HEMATOCRIT 31.7* 33.2*   MCV 97.5 97.4   MCH 31.7 31.7   MCHC 32.5* 32.5*   RDW 48.0 47.9   PLATELETCT 159* 180   MPV 11.0 10.9     Recent Labs     06/12/22  0201 06/13/22  0156   SODIUM 140 137   POTASSIUM 3.8 4.1   CHLORIDE 108 104   CO2 23 24   GLUCOSE 131* 123*   BUN 17 13   CREATININE 0.59 0.47*   CALCIUM 7.8* 8.0*                   Imaging  DX-CHEST-PORTABLE (1 VIEW)   Final Result      1.  Slight hypoinflation. No focal consolidation or pleural effusions.   2.  Chronic scarring in the right upper lobe.         DX-PORTABLE FLUORO > 1 HOUR   Final Result      Portable fluoroscopy utilized for 1 minute, 3.7 seconds.         INTERPRETING LOCATION: [address] [place] [address], [facility]      DX-ANKLE 3+ VIEWS RIGHT   Final Result      Fluoroscopic image(s) obtained during right ankle ORIF. Please see the patient's chart for full procedural details.      Fluoroscopy time 1 minute 4 seconds.         DX-HUMERUS 2+ RIGHT   Final Result       1.  Nondisplaced right greater tuberosity fracture      2.  Multiple intra-articular ossific body within the subcoracoid recess of the glenohumeral joint      3.  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 Reduction previously demonstrated talar dislocation.      DX-ANKLE 2- VIEWS RIGHT   Final Result         Interval reduction of trimalleolar ankle fracture      DX-ANKLE 2- VIEWS RIGHT   Final Result         Displaced trimalleolar fracture with dorsal dislocation of the talus      DX-HAND 2- RIGHT    (Results Pending)        Assessment/Plan  * Trimalleolar fracture of ankle, closed, right, initial encounter- (present on admission)  Assessment & Plan  Patient's  apparently fell on her leg resulting in the fracture  She is status post ORIF  PT OT recommends at this point placement  Patient would like to go to rehab versus skilled, per family they do not believe she can do 3 hours/day  Pending acceptance patient medically cleared at this point      Hypotension- (present on admission)  Assessment & Plan  Most likely combination of dehydration and antihypertensive management which was present on admission.  Since then the patient's condition has stabilized and she is actually hypertensive      Closed nondisplaced fracture of greater tuberosity of right humerus- (present on admission)  Assessment & Plan  Continue with nonoperative management  Patient will need placement to a skilled facility versus rehab    Fall- (present on admission)  Assessment & Plan  Apparently patient's  fell on her landed on her ankle causing a trimalleolar fracture.    Dehydration- (present on admission)  Assessment & Plan  Status post hydration and now patient is hypertensive    Type 2 diabetes mellitus with diabetic mononeuropathy, without long-term current use of insulin (HCC)- (present on admission)  Assessment &  Plan  -accus with sliding scale coverage  -diabetic diet  -diabetic education  -follow glycohemoglobin levels long term  -continue with oral antihyperglycemics  -monitor for hypoglycemic episodes and adjust control if he should get low    Nocturnal hypoxia- (present on admission)  Assessment & Plan  Continue nocturnal oxygen support    Hypophosphatemia  Assessment & Plan  Patient was given phosphorus replacement  Recheck phosphorus level    Hypomagnesemia- (present on admission)  Assessment & Plan  Status post magnesium replacement  Recheck magnesium level    Moderate episode of recurrent major depressive disorder (HCC)- (present on admission)  Assessment & Plan  Currently not suicidal homicidal  Cymbalta at this point controlling her depression    Other hyperlipidemia- (present on admission)  Assessment & Plan  Low-fat low-cholesterol diet  Statin  Fasting lipid panel    Ott's esophagus with dysplasia- (present on admission)  Assessment & Plan  PPI therapy         VTE prophylaxis: SCDs/TEDs    I have performed a physical exam and reviewed and updated ROS and Plan today (6/14/2022). In review of yesterday's note (6/13/2022), there are no changes except as documented above.

## 2022-06-14 NOTE — DISCHARGE PLANNING
Case Management Discharge Planning        Anticipated Discharge Dispo: Discharge Disposition: D/T to SNF with Medicare cert in anticipation of skilled care (03)      Action(s) Taken: LSW completed chart review. Per review, [facility] is following. Per note by [place], family is not sure if pt can tolerate 3 hours. Per [place], family will get back to him with choice.    LSW received call from Dr. Tejada. Per Dr. Tejada, pt is oriented and making her own decisions. Per MD, pt would like [facility] rehab. MD requested this LSW follow up with [facility] for placement today.    Per DPA, she left a VM with [facility] for bed availability. Per RN, [facility] transport would be better for pt.    1330: Per bedside RN, [facility] rep spoke with pt and family and they would like to go there.     1400: LSW [facility]. Per Shavon, she spoke with pt and family and she is checking to see if they have a bed tomorrow. LSW requested Shavon give this LSW a call when she finds out about a bed.     1430: LSW received call from Shavon. Per Shavon, they can accept pt tomorrow. [facility] up transport for 1500 tomorrow.     1445: PASRR in manual review.    Escalations Completed: Provider    Medically Clear: Yes    Next Steps: LSW to follow and assist as needed with transfer to SNF/rehab.    Barriers to Discharge: Pending Placement

## 2022-06-14 NOTE — PROGRESS NOTES
Pt is sitting upright in bed when received. No complaints of pain at this time. Pt removed right arm sling and keeps arm position in bed with pillows. Pt educated on the need for sling and verbalized understanding. Dressings are clean dry and intact. No complaints of numbness or tingling on effected side, is able to move fingers and toes. Demonstrates use of call bell. Call bell in reach    Chart check completed

## 2022-06-14 NOTE — DISCHARGE PLANNING
Follow up for post acute services per Dr. Tejada patient choice is for IRF with [facility]. Dr. Donahue to review per admission meeting.

## 2022-06-15 VITALS
RESPIRATION RATE: 18 BRPM | HEIGHT: 63 IN | OXYGEN SATURATION: 92 % | BODY MASS INDEX: 26.76 KG/M2 | HEART RATE: 87 BPM | DIASTOLIC BLOOD PRESSURE: 54 MMHG | SYSTOLIC BLOOD PRESSURE: 158 MMHG | TEMPERATURE: 97.4 F | WEIGHT: 151.01 LBS

## 2022-06-15 PROCEDURE — A9270 NON-COVERED ITEM OR SERVICE: HCPCS | Performed by: INTERNAL MEDICINE

## 2022-06-15 PROCEDURE — 99239 HOSP IP/OBS DSCHRG MGMT >30: CPT | Performed by: HOSPITALIST

## 2022-06-15 PROCEDURE — 700102 HCHG RX REV CODE 250 W/ 637 OVERRIDE(OP): Performed by: INTERNAL MEDICINE

## 2022-06-15 RX ORDER — ONDANSETRON 4 MG/1
4 TABLET, ORALLY DISINTEGRATING ORAL EVERY 4 HOURS PRN
Qty: 10 TABLET | Refills: 0 | Status: SHIPPED
Start: 2022-06-15 | End: 2022-10-26

## 2022-06-15 RX ORDER — AMLODIPINE BESYLATE 5 MG/1
5 TABLET ORAL DAILY
Qty: 30 TABLET | Status: SHIPPED
Start: 2022-06-16 | End: 2022-12-12

## 2022-06-15 RX ORDER — ENOXAPARIN SODIUM 100 MG/ML
40 INJECTION SUBCUTANEOUS DAILY
Status: SHIPPED
Start: 2022-06-15 | End: 2022-12-12

## 2022-06-15 RX ORDER — OMEPRAZOLE 40 MG/1
40 CAPSULE, DELAYED RELEASE ORAL DAILY
Qty: 30 CAPSULE | Status: SHIPPED
Start: 2022-06-16 | End: 2022-10-26

## 2022-06-15 RX ORDER — OXYCODONE HYDROCHLORIDE 5 MG/1
5 TABLET ORAL EVERY 6 HOURS PRN
Qty: 12 TABLET | Refills: 0 | Status: SHIPPED | OUTPATIENT
Start: 2022-06-15 | End: 2022-06-18

## 2022-06-15 RX ADMIN — DULOXETINE 90 MG: 30 CAPSULE, DELAYED RELEASE ORAL at 04:46

## 2022-06-15 RX ADMIN — SENNOSIDES AND DOCUSATE SODIUM 2 TABLET: 50; 8.6 TABLET ORAL at 04:46

## 2022-06-15 RX ADMIN — OMEPRAZOLE 40 MG: 20 CAPSULE, DELAYED RELEASE ORAL at 04:46

## 2022-06-15 RX ADMIN — GABAPENTIN 100 MG: 100 CAPSULE ORAL at 11:41

## 2022-06-15 RX ADMIN — ANASTROZOLE 1 MG: 1 TABLET ORAL at 04:46

## 2022-06-15 RX ADMIN — GABAPENTIN 100 MG: 100 CAPSULE ORAL at 04:46

## 2022-06-15 RX ADMIN — AMLODIPINE BESYLATE 5 MG: 5 TABLET ORAL at 04:46

## 2022-06-15 ASSESSMENT — PAIN DESCRIPTION - PAIN TYPE: TYPE: ACUTE PAIN

## 2022-06-15 NOTE — DISCHARGE PLANNING
Follow up for post acute services. No update on choice from family. Chart review indicates skilled nursing for post acute care. [facility] no longer following.

## 2022-06-15 NOTE — PROGRESS NOTES
Pt supine in bed when received. No complaints of pain. Dressings are clean dry and intact. Full sensation in effected limbs, able to move fingers and toes. Verbalizes needs well and demonstrates use of call bell. Call bell in reach.    Chart check completed

## 2022-06-15 NOTE — DISCHARGE SUMMARY
Discharge Summary    CHIEF COMPLAINT ON ADMISSION  Chief Complaint   Patient presents with   • Fall     Pt reports that her  fell on her. Denies head injury, neck, or back pain. -LOC - thinners.   • Laceration     Pt noted to have a large laceration to L forearm. Bleeding controlled    • Leg Injury     Pt noted to have obvious deformities to R ankle       Reason for Admission  EMS     CODE STATUS  Full Code    HPI & HOSPITAL COURSE  Ms. Aleyda Li is an 82-year-old female comes to the hospital with a ground-level fall when her  fell on her leg.  The patient was complaining of right lower extremity pain as well as right hand pain.  The patient was evaluated and found to have a trimalleolar fracture.  Orthopedics was consulted.  Patient went to the surgical suite and patient was evaluated by surgery and found to have a right ankle trimalleolar fracture along with a right proximal humeral greater tuberosity fracture.  Patient underwent open reduction internal fixation of the right ankle trimalar fracture with fixation of the posterior lip.  Closed treatment of the right proximal humeral fracture was recommended.  Orthopedics at this point recommends nonweightbearing of the right lower extremity as well as the right upper extremity.  Patient is to continue with PT OT and then follow-up with orthopedics as Dr. Simpson in 2 weeks at the [facility] fracture clinic.  Patient was evaluate by physical therapy and Occupational Therapy and they recommended continued inpatient management.  Patient was accepted at the skilled facility and will be transferred there at 1300 today for ongoing treatment and management.  Patient currently is alert awake oriented x3 pleasant cooperative female understands her discharge plan instructions.      Therefore, she is discharged in good and stable condition to skilled nursing facility.    The patient met 2-midnight criteria for an inpatient stay at the time of discharge.      FOLLOW  UP ITEMS POST DISCHARGE  Follow-up with orthopedics Dr. Simpson in 2 weeks    DISCHARGE DIAGNOSES  Principal Problem:    Trimalleolar fracture of ankle, closed, right, initial encounter POA: Yes  Active Problems:    Closed nondisplaced fracture of greater tuberosity of right humerus POA: Yes    Hypotension POA: Yes    Nocturnal hypoxia POA: Yes    Type 2 diabetes mellitus with diabetic mononeuropathy, without long-term current use of insulin (HCC) POA: Yes    Dehydration POA: Yes    Fall POA: Yes    Ott's esophagus with dysplasia POA: Yes      Overview: Dr Wesley    Other hyperlipidemia POA: Yes    Moderate episode of recurrent major depressive disorder (HCC) POA: Yes    Hypomagnesemia POA: Yes    Hypophosphatemia POA: Clinically Undetermined  Resolved Problems:    * No resolved hospital problems. *      FOLLOW UP  Future Appointments   Date Time Provider Department Center   7/5/2022 11:15 AM MAIN LAB [facility] None   7/13/2022  2:20 PM DAYANA Zaman [facility] DEBORAH [place]   7/18/2022 11:30 AM PFT-RM2 [facility] None   10/25/2022  1:50 PM Leona Osorio M.D. [facility] None     No follow-up provider specified.    MEDICATIONS ON DISCHARGE     Medication List      START taking these medications      Instructions   amLODIPine 5 MG Tabs  Start taking on: June 16, 2022  Commonly known as: NORVASC   Take 1 Tablet by mouth every day.  Dose: 5 mg     enoxaparin 40 MG/0.4ML Sosy inj  Commonly known as: Lovenox   Inject 40 mg under the skin every day at 6 PM.  Dose: 40 mg     omeprazole 40 MG delayed-release capsule  Start taking on: June 16, 2022  Commonly known as: PRILOSEC   Take 1 Capsule by mouth every day.  Dose: 40 mg     ondansetron 4 MG Tbdp  Commonly known as: ZOFRAN ODT   Take 1 Tablet by mouth every four hours as needed for Nausea.  Dose: 4 mg     oxyCODONE immediate-release 5 MG Tabs  Commonly known as: ROXICODONE   Take 1 Tablet by mouth every 6 hours as needed for Severe Pain for up to 3 days.  Dose: 5 mg        CHANGE  how you take these medications      Instructions   acetaminophen 500 MG Tabs  What changed: Another medication with the same name was removed. Continue taking this medication, and follow the directions you see here.  Commonly known as: TYLENOL   Take 1-2 Tablets by mouth every 6 hours as needed.  Dose: 500-1,000 mg     DULoxetine 60 MG Cpep delayed-release capsule  What changed: Another medication with the same name was removed. Continue taking this medication, and follow the directions you see here.  Commonly known as: CYMBALTA   Take 1 Capsule by mouth every day.  Dose: 60 mg        CONTINUE taking these medications      Instructions   alendronate 35 MG tablet  Commonly known as: FOSAMAX   alendronate 35 mg tablet   Take 1 tablet every week by oral route.     anastrozole 1 MG Tabs  Commonly known as: ARIMIDEX   Take 1 mg by mouth.  Dose: 1 mg     fluticasone 50 MCG/ACT nasal spray  Commonly known as: FLONASE   fluticasone propionate 50 mcg/actuation nasal spray,suspension     gabapentin 100 MG Caps  Commonly known as: NEURONTIN   Take 100 mg by mouth.  Dose: 100 mg     lovastatin 20 MG Tabs  Commonly known as: MEVACOR   Take 20 mg by mouth every evening.  Dose: 20 mg     melatonin 5 mg Tabs   Take 0.25 Tabs by mouth every bedtime.  Dose: 0.25 Tablet     metFORMIN 500 MG Tabs  Commonly known as: GLUCOPHAGE   Take 1 tablet by mouth every day.  Dose: 500 mg     Myrbetriq 50 MG Tb24  Generic drug: Mirabegron ER   TAKE 1 TABLET EVERY EVENINGFOR FREQUENT URINATION,    URINARY INCONTINENCE,      URINARY URGENCY.        STOP taking these medications    B COMPLEX PO     CITRACAL PO     Dexilant 60 MG Cpdr delayed-release capsule  Generic drug: Dexlansoprazole     fluconazole 150 MG tablet  Commonly known as: DIFLUCAN     ibuprofen 200 MG Tabs  Commonly known as: MOTRIN     MAGNESIUM PO     MULTIVITAMIN PO     nystatin 364681 UNIT/ML Susp  Commonly known as: MYCOSTATIN     polyethylene glycol/lytes 17 g Pack  Commonly known  as: MIRALAX     VITAMIN D PO     Xiidra 5 % Soln  Generic drug: Lifitegrast            Allergies  No Known Allergies    DIET  Orders Placed This Encounter   Procedures   • Diet Order Diet: Regular     Standing Status:   Standing     Number of Occurrences:   1     Order Specific Question:   Diet:     Answer:   Regular [1]       ACTIVITY  As tolerated.  Exercise encouraged.  Nonweightbearing right lower extremity and right upper extremity    LINES, DRAINS, AND WOUNDS  This is an automated list. Peripheral IVs will be removed prior to discharge.     External Urinary Catheter (Female Wick) (Active)   Collection Container Suction container 06/11/22 2020   Output (mL) 1200 mL 06/15/22 0400      Wound 09/30/21 Incision Hip Right dermabond, zipline, acticoat, 4x4, tegaderm (Active)       Wound 06/10/22 Incision Ankle Right (Active)   Site Assessment RENÉE 06/14/22 2100   Periwound Assessment RENÉE 06/14/22 2100   Margins RENÉE 06/14/22 2100   Closure RENÉE 06/14/22 2100   Drainage Amount None 06/14/22 0800   Dressing Options Cast 06/14/22 0800   Dressing Changed Observed 06/14/22 0800   Dressing Status Clean;Dry;Intact 06/14/22 0800       Wound 06/10/22 Skin Tear Arm (Active)   Wound Image   06/10/22 1900   Site Assessment RENÉE 06/14/22 2100   Periwound Assessment RENÉE 06/14/22 2100   Margins RENÉE 06/14/22 2100   Closure RENÉE 06/14/22 2100   Drainage Amount None 06/14/22 0800   Dressing Options Dry Gauze;Coban 06/14/22 0800   Dressing Changed Observed 06/14/22 0800   Dressing Status Clean;Dry;Intact 06/14/22 0800   Dressing Change/Treatment Frequency Every 48 hrs, and As Needed 06/14/22 2100                  MENTAL STATUS ON TRANSFER             CONSULTATIONS  Orthopedics Dr. Simpson    PROCEDURES  ORIF right ankle trimalleolar fracture on 6/10/2022  Sling placement for right proximal humeral fracture with nonweightbearing status    LABORATORY  Lab Results   Component Value Date    SODIUM 137 06/13/2022    POTASSIUM 4.1 06/13/2022     CHLORIDE 104 06/13/2022    CO2 24 06/13/2022    GLUCOSE 123 (H) 06/13/2022    BUN 13 06/13/2022    CREATININE 0.47 (L) 06/13/2022        Lab Results   Component Value Date    WBC 8.5 06/13/2022    HEMOGLOBIN 10.8 (L) 06/13/2022    HEMATOCRIT 33.2 (L) 06/13/2022    PLATELETCT 180 06/13/2022        Total time of the discharge process exceeds 37 minutes.

## 2022-06-15 NOTE — DISCHARGE PLANNING
Case Management Discharge Planning      Anticipated Discharge Dispo: Discharge Disposition: D/T to SNF with Medicare cert in anticipation of skilled care (03) [facility]    Action(s) Taken:     Discussed pt in morning rounds. MD signed cobra and provided hard script. LSW informed MD and charge RN, transport requested for 1500.    LSW faxed transport forms to [facility], requested [facility] 1500.    LSW submitted additional info for pasrr.     0940: LSW called Shavon ([phone]) at [facility] to confirm admission today. Per Shavon, requested transport for 1430 today. LSW messaged Sinan with [facility] to change transport to 1430.     0945: PASRR completed. PASRR: [id number].    1005: LSW met with pt at bedside to discuss d/c to [facility]. Pt aware of transfer and agreeable. Pt signed cobra consent to transfer. Pt called spouse, spouse is aware of transfer.     1015: Transport confirmed for 1430.     1305: LSW placed completed transport packet and placed in pt's chart.    Medically Clear: Yes    Next Steps: LSW to follow up with pasrr and complete packet    Barriers to Discharge: None

## 2022-06-15 NOTE — DISCHARGE PLANNING
DC Transport Scheduled    Received request at: 0902    Transport Company Scheduled:  [employer]   Spoke with Nellie at [facility] to schedule transport.      Scheduled Date: 06/15/2022  Scheduled Time: 1430    Destination: [facility]     Notified care team of scheduled transport via Voalte.     If there are any changes needed to the DC transportation scheduled, please contact [facility] at ext. [phone] between the hours of [phone] Mon-Fri. If outside those hours, contact the ED Case Manager at ext. [phone].

## 2022-06-15 NOTE — DISCHARGE INSTRUCTIONS
Discharge Instructions    Discharged to other by ambulance with escort. Discharged via ambulance, hospital escort: Yes.  Special equipment needed: Walker    Be sure to schedule a follow-up appointment with your primary care doctor or any specialists as instructed.     Discharge Plan:   Diet Plan: Discussed  Activity Level: Discussed  Confirmed Follow up Appointment: Patient to Call and Schedule Appointment  Confirmed Symptoms Management: Discussed  Medication Reconciliation Updated: Yes    I understand that a diet low in cholesterol, fat, and sodium is recommended for good health. Unless I have been given specific instructions below for another diet, I accept this instruction as my diet prescription.   Other diet: Home diet as tolerated    Special Instructions: Discharge instructions for the Orthopedic Patient    Follow up with Primary Care Physician within 2 weeks of discharge to home, regarding:  Review of medications and diagnostic testing.  Surveillance for medical complications.  Workup and treatment of osteoporosis, if appropriate.     -Is this a Hip/Knee/Shoulder Joint Replacement patient? No    -Is this patient being discharged with medication to prevent blood clots?  Yes, Lovenox   Enoxaparin injection  What is this medicine?  ENOXAPARIN (ee nox a PA rin) is used after knee, hip, or abdominal surgeries to prevent blood clotting. It is also used to treat existing blood clots in the lungs or in the veins.  This medicine may be used for other purposes; ask your health care provider or pharmacist if you have questions.  COMMON BRAND NAME(S): Lovenox  What should I tell my health care provider before I take this medicine?  They need to know if you have any of these conditions:  bleeding disorders, hemorrhage, or hemophilia  infection of the heart or heart valves  kidney or liver disease  previous stroke  prosthetic heart valve  recent surgery or delivery of a baby  ulcer in the stomach or intestine,  diverticulitis, or other bowel disease  an unusual or allergic reaction to enoxaparin, heparin, pork or pork products, other medicines, foods, dyes, or preservatives  pregnant or trying to get pregnant  breast-feeding  How should I use this medicine?  This medicine is for injection under the skin. It is usually given by a health-care professional. You or a family member may be trained on how to give the injections. If you are to give yourself injections, make sure you understand how to use the syringe, measure the dose if necessary, and give the injection. To avoid bruising, do not rub the site where this medicine has been injected. Do not take your medicine more often than directed. Do not stop taking except on the advice of your doctor or health care professional.  Make sure you receive a puncture-resistant container to dispose of the needles and syringes once you have finished with them. Do not reuse these items. Return the container to your doctor or health care professional for proper disposal.  Talk to your pediatrician regarding the use of this medicine in children. Special care may be needed.  Overdosage: If you think you have taken too much of this medicine contact a poison control center or emergency room at once.  NOTE: This medicine is only for you. Do not share this medicine with others.  What if I miss a dose?  If you miss a dose, take it as soon as you can. If it is almost time for your next dose, take only that dose. Do not take double or extra doses.  What may interact with this medicine?  aspirin and aspirin-like medicines  certain medicines that treat or prevent blood clots  dipyridamole  NSAIDs, medicines for pain and inflammation, like ibuprofen or naproxen  This list may not describe all possible interactions. Give your health care provider a list of all the medicines, herbs, non-prescription drugs, or dietary supplements you use. Also tell them if you smoke, drink alcohol, or use illegal  drugs. Some items may interact with your medicine.  What should I watch for while using this medicine?  Visit your healthcare professional for regular checks on your progress. You may need blood work done while you are taking this medicine. Your condition will be monitored carefully while you are receiving this medicine. It is important not to miss any appointments.  If you are going to need surgery or other procedure, tell your healthcare professional that you are using this medicine.  Using this medicine for a long time may weaken your bones and increase the risk of bone fractures.  Avoid sports and activities that might cause injury while you are using this medicine. Severe falls or injuries can cause unseen bleeding. Be careful when using sharp tools or knives. Consider using an electric razor. Take special care brushing or flossing your teeth. Report any injuries, bruising, or red spots on the skin to your healthcare professional.  Wear a medical ID bracelet or chain. Carry a card that describes your disease and details of your medicine and dosage times.  What side effects may I notice from receiving this medicine?  Side effects that you should report to your doctor or health care professional as soon as possible:  allergic reactions like skin rash, itching or hives, swelling of the face, lips, or tongue  bone pain  signs and symptoms of bleeding such as bloody or black, tarry stools; red or dark-brown urine; spitting up blood or brown material that looks like coffee grounds; red spots on the skin; unusual bruising or bleeding from the eye, gums, or nose  signs and symptoms of a blood clot such as chest pain; shortness of breath; pain, swelling, or warmth in the leg  signs and symptoms of a stroke such as changes in vision; confusion; trouble speaking or understanding; severe headaches; sudden numbness or weakness of the face, arm or leg; trouble walking; dizziness; loss of coordination  Side effects that  usually do not require medical attention (report to your doctor or health care professional if they continue or are bothersome):  hair loss  pain, redness, or irritation at site where injected  This list may not describe all possible side effects. Call your doctor for medical advice about side effects. You may report side effects to FDA at 4-946-DHI-2977.  Where should I keep my medicine?  Keep out of the reach of children.  Store at room temperature between 15 and 30 degrees C (59 and 86 degrees F). Do not freeze. If your injections have been specially prepared, you may need to store them in the refrigerator. Ask your pharmacist. Throw away any unused medicine after the expiration date.  NOTE: This sheet is a summary. It may not cover all possible information. If you have questions about this medicine, talk to your doctor, pharmacist, or health care provider.  © 2020 Elsevier/Gold Standard (2018-12-13 11:25:34)    Is patient discharged on Warfarin / Coumadin?   No     Depression / Suicide Risk    As you are discharged from this [facility] facility, it is important to learn how to keep safe from harming yourself.    Recognize the warning signs:  Abrupt changes in personality, positive or negative- including increase in energy   Giving away possessions  Change in eating patterns- significant weight changes-  positive or negative  Change in sleeping patterns- unable to sleep or sleeping all the time   Unwillingness or inability to communicate  Depression  Unusual sadness, discouragement and loneliness  Talk of wanting to die  Neglect of personal appearance   Rebelliousness- reckless behavior  Withdrawal from people/activities they love  Confusion- inability to concentrate     If you or a loved one observes any of these behaviors or has concerns about self-harm, here's what you can do:  Talk about it- your feelings and reasons for harming yourself  Remove any means that you might use to hurt yourself (examples:  pills, rope, extension cords, firearm)  Get professional help from the community (Mental Health, Substance Abuse, psychological counseling)  Do not be alone:Call your Safe Contact- someone whom you trust who will be there for you.  Call your local CRISIS HOTLINE 002-1368 or 141-445-0622  Call your local Children's Mobile Crisis Response Team [place] (256) 466-6351 or www.Yhat  Call the toll free National Suicide Prevention Hotlines   National Suicide Prevention Lifeline 655-080-FTWI (0000)  National Hope Line Network 800-SUICIDE (041-2652)

## 2022-06-16 NOTE — DOCUMENTATION QUERY
[facility]                                                                       Query Response Note      PATIENT:               LINNEA HOFFMANN  ACCT #:                  [id number]  MRN:                     [id number]  :                      1940  ADMIT DATE:       2022 10:57 PM  DISCH DATE:        6/15/2022 3:26 PM  RESPONDING  PROVIDER #:        [id number]           QUERY TEXT:    Based on the documentation and the noted clinical findings can the type of fracture be further specified?          The patient's Clinical Indicators include:  82 year old female admitted after her  fell on her lower leg and she suffered a right trimalleolar fracture, she is now post ORIF of the right ankle.  She also complained of pain in her right 4th and 5th fingers.  Xrays did not show any fracture of the 4th finger, decreased bone mineralization was noted in the right hand.  Demineralization is not noted in the ankle xrays.  Patient home meds include Fosamax, Citracal and vitamin D supplementation.  Medical management for right hand finger pain.      Thank you,  Kassidy Tellez RN, BSN  Clinical    [facility]  Connect via [employer] [id number]  [email or web address]  Options provided:   -- Fracture due to trauma alone   -- Fracture is pathological (due to weakening of bone due to osteoporosis, malignancy, osteomalacia, etc.   -- Fracture due to a combination of pathological process and mild trauma that alone would likely not have caused the fracture   -- Other etiology of fracture, (please document other etiology of fracture   -- Unable to determine      Query created by: Kassidy Tellez on 2022 5:50 PM    RESPONSE TEXT:    Fracture due to a combination of pathological process and mild trauma that alone would likely not have caused the fracture          Electronically signed by:  DAYRON BLOOM MD  6/16/2022 10:39 AM

## 2022-07-13 ENCOUNTER — APPOINTMENT (OUTPATIENT)
Dept: MEDICAL GROUP | Facility: MEDICAL CENTER | Age: 82
End: 2022-07-13
Payer: MEDICARE

## 2022-08-29 ENCOUNTER — HOSPITAL ENCOUNTER (OUTPATIENT)
Facility: MEDICAL CENTER | Age: 82
End: 2022-08-29
Attending: INTERNAL MEDICINE
Payer: MEDICARE

## 2022-08-29 PROCEDURE — 82306 VITAMIN D 25 HYDROXY: CPT

## 2022-08-30 LAB — 25(OH)D3 SERPL-MCNC: 57 NG/ML (ref 30–100)

## 2022-09-21 ENCOUNTER — OFFICE VISIT (OUTPATIENT)
Dept: MEDICAL GROUP | Facility: MEDICAL CENTER | Age: 82
End: 2022-09-21
Payer: MEDICARE

## 2022-09-21 VITALS
HEIGHT: 63 IN | SYSTOLIC BLOOD PRESSURE: 110 MMHG | OXYGEN SATURATION: 95 % | TEMPERATURE: 97.2 F | DIASTOLIC BLOOD PRESSURE: 82 MMHG | BODY MASS INDEX: 26.75 KG/M2 | HEART RATE: 89 BPM

## 2022-09-21 DIAGNOSIS — Z09 HOSPITAL DISCHARGE FOLLOW-UP: ICD-10-CM

## 2022-09-21 PROCEDURE — 99213 OFFICE O/P EST LOW 20 MIN: CPT | Performed by: NURSE PRACTITIONER

## 2022-09-21 ASSESSMENT — PATIENT HEALTH QUESTIONNAIRE - PHQ9
7. TROUBLE CONCENTRATING ON THINGS, SUCH AS READING THE NEWSPAPER OR WATCHING TELEVISION: NOT AT ALL
6. FEELING BAD ABOUT YOURSELF - OR THAT YOU ARE A FAILURE OR HAVE LET YOURSELF OR YOUR FAMILY DOWN: NOT AL ALL
1. LITTLE INTEREST OR PLEASURE IN DOING THINGS: NOT AT ALL
5. POOR APPETITE OR OVEREATING: NOT AT ALL
4. FEELING TIRED OR HAVING LITTLE ENERGY: NOT AT ALL
SUM OF ALL RESPONSES TO PHQ QUESTIONS 1-9: 0
SUM OF ALL RESPONSES TO PHQ9 QUESTIONS 1 AND 2: 0
3. TROUBLE FALLING OR STAYING ASLEEP OR SLEEPING TOO MUCH: NOT AT ALL
9. THOUGHTS THAT YOU WOULD BE BETTER OFF DEAD, OR OF HURTING YOURSELF: NOT AT ALL
8. MOVING OR SPEAKING SO SLOWLY THAT OTHER PEOPLE COULD HAVE NOTICED. OR THE OPPOSITE, BEING SO FIGETY OR RESTLESS THAT YOU HAVE BEEN MOVING AROUND A LOT MORE THAN USUAL: NOT AT ALL
2. FEELING DOWN, DEPRESSED, IRRITABLE, OR HOPELESS: NOT AT ALL

## 2022-09-21 NOTE — ASSESSMENT & PLAN NOTE
Hospitalized 6/9-6/15 for ankle and humerus fracture. Discharged to skilled nursing ([facility]) for the past 3 months, discharged yesterday.     Today is the first day she has been on her own, she was able to shower and get dressed and get to the appointment. This took longer than usual but she was able to. Her  is helping. Son is here from [place] to help. Her oldest daughter lives close to them but she is in [place] for 3 weeks.     She has followed up with orthopedics and they have signed off. She is taking tylenol as needed which is helping.     Home health will be coming this week.

## 2022-10-11 ENCOUNTER — TELEPHONE (OUTPATIENT)
Dept: SLEEP MEDICINE | Facility: MEDICAL CENTER | Age: 82
End: 2022-10-11
Payer: MEDICARE

## 2022-10-11 DIAGNOSIS — J98.4 MIXED RESTRICTIVE AND OBSTRUCTIVE LUNG DISEASE (HCC): ICD-10-CM

## 2022-10-11 DIAGNOSIS — J43.9 MIXED RESTRICTIVE AND OBSTRUCTIVE LUNG DISEASE (HCC): ICD-10-CM

## 2022-10-11 NOTE — TELEPHONE ENCOUNTER
----- Message from Aliza Sue, Med Ass't sent at 10/11/2022  2:46 PM PDT -----  Regarding: REQUESTING ANOTHER PFT ORDER  PT IS REQUESTING AN ADDITIONAL PFT TO BE ORDERED AS SHE FRACTURED HER FOOT AND WAS IN A CARE FACILITY FOR A FEW MONTHS AND MISSED THE APPOINTMENT AND  NOW THE ORDER FOR IT IS .   THANK YOU -S.C

## 2022-10-14 ENCOUNTER — TELEPHONE (OUTPATIENT)
Dept: HEALTH INFORMATION MANAGEMENT | Facility: OTHER | Age: 82
End: 2022-10-14
Payer: MEDICARE

## 2022-10-25 ENCOUNTER — APPOINTMENT (OUTPATIENT)
Dept: SLEEP MEDICINE | Facility: MEDICAL CENTER | Age: 82
End: 2022-10-25
Payer: MEDICARE

## 2022-10-26 ENCOUNTER — OFFICE VISIT (OUTPATIENT)
Dept: CARDIOLOGY | Facility: MEDICAL CENTER | Age: 82
End: 2022-10-26
Payer: MEDICARE

## 2022-10-26 VITALS
HEIGHT: 63 IN | OXYGEN SATURATION: 91 % | WEIGHT: 153 LBS | RESPIRATION RATE: 16 BRPM | BODY MASS INDEX: 27.11 KG/M2 | SYSTOLIC BLOOD PRESSURE: 100 MMHG | HEART RATE: 94 BPM | DIASTOLIC BLOOD PRESSURE: 60 MMHG

## 2022-10-26 DIAGNOSIS — E78.5 DYSLIPIDEMIA: ICD-10-CM

## 2022-10-26 DIAGNOSIS — I05.0 MILD MITRAL STENOSIS: ICD-10-CM

## 2022-10-26 DIAGNOSIS — R01.1 HEART MURMUR, SYSTOLIC: ICD-10-CM

## 2022-10-26 PROCEDURE — 99214 OFFICE O/P EST MOD 30 MIN: CPT | Performed by: INTERNAL MEDICINE

## 2022-10-26 ASSESSMENT — ENCOUNTER SYMPTOMS
PALPITATIONS: 0
DIZZINESS: 0
MYALGIAS: 0
SHORTNESS OF BREATH: 0
LOSS OF CONSCIOUSNESS: 0
COUGH: 0

## 2022-10-26 ASSESSMENT — FIBROSIS 4 INDEX: FIB4 SCORE: 2.78

## 2022-10-26 NOTE — PROGRESS NOTES
"Chief Complaint   Patient presents with    Hyperlipidemia     F/V Dx: Other hyperlipidemia    Other     F/V Dx: Abnormal cardiac function test       Subjective     Aleyda Parra is a 82 y.o. female who presents today for follow-up for cardiac care    Since 7/8/2021 appointment the patient has had no cardiac symptoms including chest pain, palpitations, shortness of breath.  Hospitalized [facility] 6/15/2022 for GLF with right ankle fracture requiring ORIF and right humeral injury.  Underwent rehab and is returned home.  Functionally impaired, requiring walker for ambulation.     The patient has medical problems including mild mitral stenosis, LV outflow gradient, COPD on nocturnal O2, prior tobacco use, labile hypertension, hypercholesterolemia my DM and prior orthopedic surgeries.    The patient smoked cigarettes 25 pack years, quit 27 years ago.  No history of TIA, stroke, seizures, DVT or pulmonary emboli.     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, estrogen receptor positive (HCC) 11/6/2019    Psychiatric problem 2015    anxiety    Shortness of breath     Snoring     no sleep study    Urinary incontinence     Wears glasses      Past Surgical History:   Procedure Laterality Date    ORIF, ANKLE Right 6/10/2022    Procedure: ORIF, ANKLE - TRIMALLEOLAR;  Surgeon: Anuj Simpson M.D.;  Location: SURGERY [facility];  Service: Orthopedics    AR TOTAL HIP ARTHROPLASTY Right 9/30/2021    Procedure: ARTHROPLASTY, HIP, TOTAL, ANTERIOR APPROACH;  Surgeon: Lewis Serrano M.D.;  Location: SURGERY [facility];  Service: " Orthopedics    WIDE EXCISION Right 8/16/2019    Procedure: WIDE EXCISION, LESION- RE-EXCISION FOR EXCISION FOR MARGINS RIGHT CHEST WALL INSTRMUSCULAR;  Surgeon: Andre Shelton M.D.;  Location: [facility];  Service: General    INCISION AND DRAINAGE GENERAL Bilateral 8/16/2019    Procedure: INCISION AND DRAINAGE- OF BILATERAL CHEST SEROMAS WITH DRAIN PLACEMENT;  Surgeon: Andre Shelton M.D.;  Location: [facility];  Service: General    PB MASTECTOMY, MODIFIED RADICAL Right 7/17/2019    Procedure: MASTECTOMY, MODIFIED RADICAL;  Surgeon: Andre Shelton M.D.;  Location: [facility];  Service: General    NODE BIOPSY SENTINEL Right 7/17/2019    Procedure: INTRA OPERATIVE SENTINEL NODE IDENTIFICATION AND BIOPSY;  Surgeon: Andre Shelton M.D.;  Location: SURGERY [facility];  Service: General    AXILLARY NODE DISSECTION  7/17/2019    Procedure: LYMPHADENECTOMY, AXILLARY;  Surgeon: Andre Shelton M.D.;  Location: [facility];  Service: General    MASTECTOMY Left 7/17/2019    Procedure: TOTAL MASTECTOMY;  Surgeon: Andre Shelton M.D.;  Location: [facility];  Service: General    FLAP CLOSURE Bilateral 7/17/2019    Procedure: WIDE V FLAP;  Surgeon: Andre Shelton M.D.;  Location: [facility];  Service: General    LEFORT COLPOCLESIS  1/21/2019    Procedure: LEFORT COLPOCLESIS;  Surgeon: Minnie Bañuelos M.D.;  Location: [facility];  Service: Labor and Delivery    BLADDER SLING FEMALE  1/21/2019    Procedure: BLADDER SLING FEMALE- TOT;  Surgeon: Minnie Bañuelos M.D.;  Location: SURGERY [facility];  Service: Labor and Delivery    KNEE ARTHROPLASTY TOTAL Right 11/2/2015    Procedure: KNEE ARTHROPLASTY TOTAL;  Surgeon: Venkatesh Cardoza M.D.;  Location: [facility];  Service:     ABDOMINAL HYSTERECTOMY TOTAL  1994    OTHER ORTHOPEDIC SURGERY  1993    L ankle    GYN SURGERY  1992    abdominal hysterectomy    CHOLECYSTECTOMY   1964    CATARACT EXTRACTION WITH IOL Bilateral      Family History   Problem Relation Age of Onset    Heart Disease Neg Hx      Social History     Socioeconomic History    Marital status:      Spouse name: Not on file    Number of children: Not on file    Years of education: Not on file    Highest education level: Some college, no degree   Occupational History    Not on file   Tobacco Use    Smoking status: Former     Packs/day: 1.00     Years: 30.00     Pack years: 30.00     Types: Cigarettes     Quit date: 1992     Years since quittin.8    Smokeless tobacco: Former   Vaping Use    Vaping Use: Never used   Substance and Sexual Activity    Alcohol use: Yes     Alcohol/week: 0.6 oz     Types: 1 Glasses of wine per week     Comment: occ    Drug use: No    Sexual activity: Not on file   Other Topics Concern    Not on file   Social History Narrative    Not on file     Social Determinants of Health     Financial Resource Strain: Not on file   Food Insecurity: Not on file   Transportation Needs: Not on file   Physical Activity: Not on file   Stress: Not on file   Social Connections: Not on file   Intimate Partner Violence: Not on file   Housing Stability: Not on file     No Known Allergies  Outpatient Encounter Medications as of 10/26/2022   Medication Sig Dispense Refill    Dexlansoprazole (DEXILANT PO) Take  by mouth.      MYRBETRIQ 50 MG TABLET SR 24 HR TAKE 1 TABLET EVERY EVENINGFOR FREQUENT URINATION,    URINARY INCONTINENCE,      URINARY URGENCY. 90 Tablet 3    enoxaparin (LOVENOX) 40 MG/0.4ML Solution Prefilled Syringe inj Inject 40 mg under the skin every day at 6 PM.      DULoxetine (CYMBALTA) 60 MG Cap DR Particles delayed-release capsule Take 1 Capsule by mouth every day. 90 Capsule 3    fluticasone (FLONASE) 50 MCG/ACT nasal spray fluticasone propionate 50 mcg/actuation nasal spray,suspension      acetaminophen (TYLENOL) 500 MG Tab Take 1-2 Tablets by mouth every 6 hours as needed. 30 Tablet 0  "   gabapentin (NEURONTIN) 100 MG Cap Take 100 mg by mouth.      anastrozole (ARIMIDEX) 1 MG Tab Take 1 mg by mouth.      lovastatin (MEVACOR) 20 MG Tab Take 20 mg by mouth every evening.      Melatonin 5 MG Tab Take 5 mg by mouth at bedtime.      amLODIPine (NORVASC) 5 MG Tab Take 1 Tablet by mouth every day. (Patient not taking: Reported on 10/26/2022) 30 Tablet     omeprazole (PRILOSEC) 40 MG delayed-release capsule Take 1 Capsule by mouth every day. (Patient not taking: Reported on 10/26/2022) 30 Capsule     ondansetron (ZOFRAN ODT) 4 MG TABLET DISPERSIBLE Take 1 Tablet by mouth every four hours as needed for Nausea. (Patient not taking: Reported on 10/26/2022) 10 Tablet 0    [DISCONTINUED] alendronate (FOSAMAX) 35 MG tablet alendronate 35 mg tablet   Take 1 tablet every week by oral route. (Patient not taking: Reported on 10/26/2022)      metFORMIN (GLUCOPHAGE) 500 MG Tab Take 1 tablet by mouth every day. (Patient not taking: Reported on 10/26/2022) 90 tablet 1     No facility-administered encounter medications on file as of 10/26/2022.     Review of Systems   Respiratory:  Negative for cough and shortness of breath.    Cardiovascular:  Negative for chest pain and palpitations.   Musculoskeletal:  Negative for myalgias.   Neurological:  Negative for dizziness and loss of consciousness.            Objective     /60 (BP Location: Left arm, Patient Position: Sitting, BP Cuff Size: Adult)   Pulse 94   Resp 16   Ht 1.6 m (5' 3\")   Wt 69.4 kg (153 lb)   LMP  (LMP Unknown)   SpO2 91%   BMI 27.10 kg/m²     Physical Exam  Vitals reviewed.   Constitutional:       General: She is not in acute distress.     Appearance: She is well-developed.   Eyes:      Conjunctiva/sclera: Conjunctivae normal.      Pupils: Pupils are equal, round, and reactive to light.   Neck:      Vascular: No JVD.   Cardiovascular:      Rate and Rhythm: Normal rate and regular rhythm.      Pulses:           Carotid pulses are 1+ on the " right side and 1+ on the left side.       Radial pulses are 1+ on the right side and 1+ on the left side.      Heart sounds: Murmur heard.     No friction rub. No gallop.      Comments:    Pulmonary:      Effort: Pulmonary effort is normal. No accessory muscle usage or respiratory distress.      Breath sounds: Normal breath sounds. No wheezing or rales.   Musculoskeletal:      Cervical back: Normal range of motion and neck supple.      Right lower leg: No edema.      Left lower leg: No edema.   Skin:     General: Skin is warm and dry.      Findings: No rash.      Nails: There is no clubbing.   Neurological:      Mental Status: She is alert and oriented to person, place, and time.      Coordination: Coordination abnormal.      Comments: Significant limited mobility.   Psychiatric:         Behavior: Behavior normal.            CHEST CT SCAN 6/6/2021  Hyperexpanded and emphysematous lungs.  Focal opacity posterior base right lower lobe consistent with atelectasis/possible pneumonia.  Interstitial opacities within the posterior aspects of the right upper lobe and right lower lobe which may be posttreatment changes.  Wedge compression T9 vertebral body similar to 5/13/2021 chest x-rays.  Atherosclerotic changes.    ECHOCARDIOGRAM 4/22/2021  No prior study is available for comparison.   Hyperdynamic left ventricular systolic function.  Left ventricular ejection fraction is visually estimated to be greater   than 75%.  Unable to estimate pulmonary artery pressure due to an inadequate   tricuspid regurgitant jet.     PFTs 6/16/2021  Baseline spirometry shows low normal FVC at 1.96 L or 81% predicted and low normal FEV1 at 1.46 L or 80% predicted.  FEV1/FVC ratio is normal at 75.  Total lung capacity is also low normal at 3.93 L or 80% predicted.  Diffusion capacity is mildly reduced at 74% predicted.  Overall pulmonary function testing shows mild restrictive defect with mildly reduced DLCO.  This could be suggestive of  early interstitial process.  Correlate clinically and with imaging.     EK2021 sinus rhythm, rate 77.  Personally reviewed.  Assessment & Plan     1. Mild mitral stenosis        2. Heart murmur, systolic        3. Dyslipidemia            Medical Decision Making: Today's Assessment/Status/Plan:   Assessment  1.  Abnormal echocardiogram with mild mitral stenosis, mitral annular calcification, increased left ventricular intracavitary gradient.  2.  H/O shortness of breath, probably multifactorial.  3.  General functional debility, multifactorial.  4.  Nocturnal hypoxemia requiring O2 therapy.  5.  Severe kyphosis.  6.?  Restrictive lung disease by PFTs.  7.  Diabetes mellitus.  8.  Intermittent hypertension  9.  Osteoarthritis, diffuse, bilateral hips.  10.  S/P right total knee replacement.  11.  S/P ORIF right ankle 2022  12.  Breast cancer 2019, right side, bilateral mastectomy, radiation therapy, hormonal therapy with anastrozole.  13.  General frailty with functional debility.     Recommendation Discussion  1.  Cardiac status is stable, with no specific cardiac symptoms.  2.  Recommended that she obtain a home arm BP cuff, keep a daily BP log and provide that to her PCP Tere RICKETTS at follow-up appointment but currently no blood pressure medication is recommended.  3.  Encouraged the patient to maintain adequate daily fluid and electrolyte intake.  4.  Reviewed most recent lipid panel which is normal, on lovastatin  5.  RTC 8 months sooner if necessary.

## 2022-11-09 ENCOUNTER — PATIENT MESSAGE (OUTPATIENT)
Dept: HEALTH INFORMATION MANAGEMENT | Facility: OTHER | Age: 82
End: 2022-11-09

## 2022-11-09 NOTE — TELEPHONE ENCOUNTER
Refill done. Please call patient and remind her to get her annual blood work done soon, this is fasting.   STEVEN Zaman.

## 2022-11-11 ENCOUNTER — TELEPHONE (OUTPATIENT)
Dept: MEDICAL GROUP | Facility: MEDICAL CENTER | Age: 82
End: 2022-11-11
Payer: MEDICARE

## 2022-11-11 NOTE — CARE PLAN
The patient is Stable - Low risk of patient condition declining or worsening    Shift Goals  Clinical Goals: No falls during shift  Patient Goals: rest and comfort  Family Goals: no family present    Progress made toward(s) clinical / shift goals:    Fall precautions in place    Problem: Knowledge Deficit - Standard  Goal: Patient and family/care givers will demonstrate understanding of plan of care, disease process/condition, diagnostic tests and medications  Outcome: Progressing     Problem: Fall Risk  Goal: Patient will remain free from falls  Outcome: Progressing     Problem: Skin Integrity  Goal: Skin integrity is maintained or improved  Outcome: Progressing     Problem: Pain - Standard  Goal: Alleviation of pain or a reduction in pain to the patient’s comfort goal  Outcome: Progressing       Patient is not progressing towards the following goals:      
The patient is Stable - Low risk of patient condition declining or worsening    Shift Goals  Clinical Goals: Pain Control 3/10, Rest and sleep  Patient Goals: Pain Control 3/10, Rest and sleep    Progress made toward(s) clinical / shift goals: Pain 2/10, Rested and slept    Problem: Knowledge Deficit - Standard  Goal: Patient and family/care givers will demonstrate understanding of plan of care, disease process/condition, diagnostic tests and medications  Outcome: Progressing     Problem: Fall Risk  Goal: Patient will remain free from falls  Outcome: Progressing     Problem: Skin Integrity  Goal: Skin integrity is maintained or improved  Outcome: Progressing     Problem: Pain - Standard  Goal: Alleviation of pain or a reduction in pain to the patient’s comfort goal  Outcome: Progressing       Patient is not progressing towards the following goals:      
The patient is Stable - Low risk of patient condition declining or worsening    Shift Goals  Clinical Goals: Pain will decrease to 3/10 this shift  Patient Goals: Pt able to ambulate with non weight bearing status  Family Goals: no family present    Progress made toward(s) clinical / shift goals:  Tylenol given for pain control. Able to ambulate to bedside commode with max assist. Rest comfortably.    Patient is not progressing towards the following goals:      Problem: Fall Risk  Goal: Patient will remain free from falls  Outcome: Progressing     Problem: Pain - Standard  Goal: Alleviation of pain or a reduction in pain to the patient’s comfort goal  Outcome: Progressing     
The patient is Stable - Low risk of patient condition declining or worsening    Shift Goals  Clinical Goals: Patient will remain free from falls and manage pain.  Patient Goals: Pain controlled.    Progress made toward(s) clinical / shift goals:  Patient uses call light for needs.  Pain has maintained.      Patient is not progressing towards the following goals:  N/A.      Problem: Knowledge Deficit - Standard  Goal: Patient and family/care givers will demonstrate understanding of plan of care, disease process/condition, diagnostic tests and medications  Outcome: Progressing     Problem: Fall Risk  Goal: Patient will remain free from falls  Outcome: Progressing     Problem: Skin Integrity  Goal: Skin integrity is maintained or improved  Outcome: Progressing     Problem: Pain - Standard  Goal: Alleviation of pain or a reduction in pain to the patient’s comfort goal  Outcome: Progressing          
The patient is Stable - Low risk of patient condition declining or worsening    Shift Goals  Clinical Goals: Pt will remain free from falls during shift  Patient Goals: rest and comfort  Family Goals: no family present    Progress made toward(s) clinical / shift goals:    Fall precautions in place    Problem: Knowledge Deficit - Standard  Goal: Patient and family/care givers will demonstrate understanding of plan of care, disease process/condition, diagnostic tests and medications  Outcome: Progressing     Problem: Fall Risk  Goal: Patient will remain free from falls  Outcome: Progressing     Problem: Skin Integrity  Goal: Skin integrity is maintained or improved  Outcome: Progressing     Problem: Pain - Standard  Goal: Alleviation of pain or a reduction in pain to the patient’s comfort goal  Outcome: Progressing       Patient is not progressing towards the following goals:      
The patient is Stable - Low risk of patient condition declining or worsening    Shift Goals  Clinical Goals: no falls through shift  Patient Goals: rest and comfort  Family Goals: no family present    Progress made toward(s) clinical / shift goals:    Fall precautions in place    Problem: Knowledge Deficit - Standard  Goal: Patient and family/care givers will demonstrate understanding of plan of care, disease process/condition, diagnostic tests and medications  Outcome: Progressing     Problem: Fall Risk  Goal: Patient will remain free from falls  Outcome: Progressing     Problem: Skin Integrity  Goal: Skin integrity is maintained or improved  Outcome: Progressing     Problem: Pain - Standard  Goal: Alleviation of pain or a reduction in pain to the patient’s comfort goal  Outcome: Progressing       Patient is not progressing towards the following goals:      
The patient is Stable - Low risk of patient condition declining or worsening    Shift Goals  Clinical Goals: patient will remain free from falls during shift  Patient Goals: rest    Progress made toward(s) clinical / shift goals:  Patient calls approprietly, bed alarm on, bed in low and locked position, call light within reach.     Patient is not progressing towards the following goals: NA    Problem: Knowledge Deficit - Standard  Goal: Patient and family/care givers will demonstrate understanding of plan of care, disease process/condition, diagnostic tests and medications  Outcome: Progressing     Problem: Fall Risk  Goal: Patient will remain free from falls  Outcome: Progressing     Problem: Skin Integrity  Goal: Skin integrity is maintained or improved  Outcome: Progressing         
The patient is Stable - Low risk of patient condition declining or worsening    Shift Goals  Clinical Goals: pt low BP will be furthur resolved to WNL with admin of fluids, and Dr ordered exams  Patient Goals: pain controlled to 3- 4 out of 10  Family Goals: no family present    Progress made toward(s) clinical / shift goals:      Patient is not progressing towards the following goals:      
The patient is Stable - Low risk of patient condition declining or worsening    Shift Goals  Clinical Goals: pt low BP will be furthur resolved to WNL with admin of fluids, and Dr ordered exams  Patient Goals: pain controlled to 3- 4 out of 10  Family Goals: no family present    Progress made toward(s) clinical / shift goals:  BP has been WNL since taking BP on LLE as well as with pain managed    Patient is not progressing towards the following goals:will need more PT / OT at [facility] for improved mobility      
The patient is Stable - Low risk of patient condition declining or worsening    Shift Goals  Clinical Goals: zero falls  Patient Goals: Work with PT  Family Goals: no family present    Progress made toward(s) clinical / shift goals:  Pt has had zero falls this shift. Worked with PT, sat on edge of bed for lunch meal.     Problem: Knowledge Deficit - Standard  Goal: Patient and family/care givers will demonstrate understanding of plan of care, disease process/condition, diagnostic tests and medications  Outcome: Progressing     Problem: Fall Risk  Goal: Patient will remain free from falls  Outcome: Progressing     Problem: Skin Integrity  Goal: Skin integrity is maintained or improved  Outcome: Progressing     Problem: Pain - Standard  Goal: Alleviation of pain or a reduction in pain to the patient’s comfort goal  Outcome: Progressing       Patient is not progressing towards the following goals:      
The patient is Stable - Low risk of patient condition declining or worsening    Shift Goals  Clinical Goals: zero falls  Patient Goals: zero falls  Family Goals: no family present    Progress made toward(s) clinical / shift goals:  Pt had zero falls this shift.     Problem: Knowledge Deficit - Standard  Goal: Patient and family/care givers will demonstrate understanding of plan of care, disease process/condition, diagnostic tests and medications  Outcome: Progressing     Problem: Fall Risk  Goal: Patient will remain free from falls  Outcome: Progressing     Problem: Skin Integrity  Goal: Skin integrity is maintained or improved  Outcome: Progressing     Problem: Pain - Standard  Goal: Alleviation of pain or a reduction in pain to the patient’s comfort goal  Outcome: Progressing       Patient is not progressing towards the following goals:      
tremors

## 2022-11-17 NOTE — PROGRESS NOTES
Subjective:   Rashmi Parra is a 82 y.o. female here today for hospital discharge follow up    Hospital discharge follow-up  Hospitalized 6/9-6/15 for ankle and humerus fracture. Discharged to skilled nursing ([facility]) for the past 3 months, discharged yesterday.     Today is the first day she has been on her own, she was able to shower and get dressed and get to the appointment. This took longer than usual but she was able to. Her  is helping. Son is here from [place] to help. Her oldest daughter lives close to them but she is in [place] for 3 weeks.     She has followed up with orthopedics and they have signed off. She is taking tylenol as needed which is helping.     Home health will be coming this week.        Current medicines (including changes today)  Current Outpatient Medications   Medication Sig Dispense Refill    amLODIPine (NORVASC) 5 MG Tab Take 1 Tablet by mouth every day. (Patient not taking: Reported on 10/26/2022) 30 Tablet     enoxaparin (LOVENOX) 40 MG/0.4ML Solution Prefilled Syringe inj Inject 40 mg under the skin every day at 6 PM.      DULoxetine (CYMBALTA) 60 MG Cap DR Particles delayed-release capsule Take 1 Capsule by mouth every day. 90 Capsule 3    fluticasone (FLONASE) 50 MCG/ACT nasal spray fluticasone propionate 50 mcg/actuation nasal spray,suspension      acetaminophen (TYLENOL) 500 MG Tab Take 1-2 Tablets by mouth every 6 hours as needed. 30 Tablet 0    gabapentin (NEURONTIN) 100 MG Cap Take 100 mg by mouth.      anastrozole (ARIMIDEX) 1 MG Tab Take 1 mg by mouth.      lovastatin (MEVACOR) 20 MG Tab Take 20 mg by mouth every evening.      Melatonin 5 MG Tab Take 5 mg by mouth at bedtime.      metFORMIN (GLUCOPHAGE) 500 MG Tab Take 1 tablet by mouth once daily 90 Tablet 0    Dexlansoprazole (DEXILANT PO) Take  by mouth.      MYRBETRIQ 50 MG TABLET SR 24 HR TAKE 1 TABLET EVERY EVENINGFOR FREQUENT URINATION,    URINARY INCONTINENCE,      URINARY URGENCY. 90 Tablet 3  "    No current facility-administered medications for this visit.     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    ROS   No chest pain, no shortness of breath, no abdominal pain  Positive ROS as per HPI.  All other systems reviewed and are negative.     Objective:     /82 (BP Location: Right arm, Patient Position: Sitting, BP Cuff Size: Adult)   Pulse 89   Temp 36.2 °C (97.2 °F) (Temporal)   Ht 1.6 m (5' 3\")   SpO2 95%  Body mass index is 26.75 kg/m².     Physical Exam:  Constitutional: Alert, no distress.  Skin: Warm, dry, good turgor, no rashes in visible areas.  Eye: Equal, round and reactive, conjunctiva clear, lids normal.  ENMT: Mask in place  Respiratory: Unlabored respiratory effort, lungs clear to auscultation, no wheezes, no ronchi.  Cardiovascular: Normal S1, S2, no murmur, no edema.  Psych: Alert and oriented x3, normal affect and mood.        Assessment and Plan:   The following treatment plan was discussed    1. Hospital discharge follow-up  Stable after discharge, feeling well  Has had appropriate follow up with ortho who has signed off  Continue with home health care for additional needs  May benefit from home PT, she will notify me if needed    Followup: Return in about 3 months (around 12/21/2022).    The MA is performing the above orders under the direction of Dr. Mandujano    Please note that this dictation was created using voice recognition software. I have made every reasonable attempt to correct obvious errors, but I expect that there are errors of grammar and possibly " content that I did not discover before finalizing the note.

## 2022-11-18 ENCOUNTER — TELEPHONE (OUTPATIENT)
Dept: MEDICAL GROUP | Facility: MEDICAL CENTER | Age: 82
End: 2022-11-18
Payer: MEDICARE

## 2022-11-18 DIAGNOSIS — K22.719 BARRETT'S ESOPHAGUS WITH DYSPLASIA: ICD-10-CM

## 2022-11-18 RX ORDER — DEXLANSOPRAZOLE 60 MG/1
1 CAPSULE, DELAYED RELEASE ORAL EVERY EVENING
Qty: 90 CAPSULE | Refills: 3 | Status: SHIPPED | OUTPATIENT
Start: 2022-11-18 | End: 2022-12-09 | Stop reason: SDUPTHER

## 2022-11-18 NOTE — TELEPHONE ENCOUNTER
Geovanna called from [facility] physical therapy requesting a verbal for continuation of physical therapy for additional 3 weeks called [phone] left voice mail. With authorizations verbal order applied

## 2022-11-29 ENCOUNTER — NON-PROVIDER VISIT (OUTPATIENT)
Dept: SLEEP MEDICINE | Facility: MEDICAL CENTER | Age: 82
End: 2022-11-29
Attending: INTERNAL MEDICINE
Payer: MEDICARE

## 2022-11-29 VITALS — HEIGHT: 63 IN | WEIGHT: 141 LBS | BODY MASS INDEX: 24.98 KG/M2

## 2022-11-29 PROCEDURE — 99999 PULMONARY FUNCTION TESTS: CPT | Performed by: INTERNAL MEDICINE

## 2022-11-29 ASSESSMENT — PULMONARY FUNCTION TESTS
FVC_LLN: 1.98
FEV1/FVC_PREDICTED: 77
FEV1/FVC_PERCENT_CHANGE: 250
FVC_PERCENT_PREDICTED: 73
FEV1/FVC_PERCENT_PREDICTED: 99
FEV1_PERCENT_CHANGE: 5
FEV1/FVC: 74
FVC: 1.75
FVC: 1.79
FEV1/FVC: 76.54
FVC_PERCENT_PREDICTED: 75
FEV1_LLN: 1.50
FEV1_PREDICTED: 1.8
FVC_PREDICTED: 2.37
FEV1/FVC_PERCENT_LLN: 64
FEV1/FVC_PERCENT_PREDICTED: 99
FEV1/FVC_PERCENT_CHANGE: 2
FEV1: 1.37
FEV1_PERCENT_CHANGE: 2
FEV1: 1.3
FEV1_PERCENT_PREDICTED: 72
FEV1/FVC_PERCENT_PREDICTED: 96
FEV1/FVC_PERCENT_PREDICTED: 101
FEV1/FVC: 74
FEV1/FVC_PERCENT_PREDICTED: 76
FEV1_PERCENT_PREDICTED: 76
FEV1/FVC: 77

## 2022-11-29 ASSESSMENT — FIBROSIS 4 INDEX: FIB4 SCORE: 2.78

## 2022-11-29 NOTE — PROCEDURES
Tech: Malina Drake, RT  Good patient effort & cooperation.  Test was performed on the [employer] Body Plethysmograph- Elite DX system.  The predicted sets used for Spirometry are GLI-2012, for Lung Volumes are ITS, and for DLCO is GLI 2017.  The results of this test meet the ATS standards for acceptability and repeatability.  The DLCO was uncorrected for Hb.  A bronchodilator of Ventolin HFA 2 puffs via spacer was administered.  DLCO was performed during dilation period.    Ordering physician: Dr Osorio  Interpreting physician: Rosemary Darby DO     Results     Spirometry  FVC is 1.75/73% predicted with no significant bronchodilator response  FEV1 is 1.30/72% with no significant bronchodilator response  FEV1/FVC ratio is 74%    Lung volumes  TLC is 3.46/70%  RV is 1.71/71%    Diffusion capacity  DLCO is 64%, uncorrected for hemoglobin  DL/VA is 111%    Interpretation  1.  There is no obstruction  2.  There is no significant bronchodilator response  3.  Flow volume loop consistent with obstruction, likely in the small airways  4.  There is mild restriction  5.  DLCO is mildly reduced, however adjusted for ventilation is normal    Rosemary Darby DO   Pulmonary and Critical Care

## 2022-12-05 NOTE — PROGRESS NOTES
Patient left message wanted to get labs ordered so she can have them done in April 2023 for June appt 2023 massage sent to Mrs Roth to order labs for patient

## 2022-12-07 ENCOUNTER — HOSPITAL ENCOUNTER (OUTPATIENT)
Dept: LAB | Facility: MEDICAL CENTER | Age: 82
End: 2022-12-07
Attending: NURSE PRACTITIONER
Payer: MEDICARE

## 2022-12-07 DIAGNOSIS — Z13.29 SCREENING FOR THYROID DISORDER: ICD-10-CM

## 2022-12-07 DIAGNOSIS — E11.41 TYPE 2 DIABETES MELLITUS WITH DIABETIC MONONEUROPATHY, WITHOUT LONG-TERM CURRENT USE OF INSULIN (HCC): ICD-10-CM

## 2022-12-07 DIAGNOSIS — Z13.228 SCREENING FOR METABOLIC DISORDER: ICD-10-CM

## 2022-12-07 DIAGNOSIS — Z13.21 ENCOUNTER FOR VITAMIN DEFICIENCY SCREENING: ICD-10-CM

## 2022-12-07 DIAGNOSIS — Z13.0 ENCOUNTER FOR SCREENING FOR HEMATOLOGIC DISORDER: ICD-10-CM

## 2022-12-07 DIAGNOSIS — E78.49 OTHER HYPERLIPIDEMIA: ICD-10-CM

## 2022-12-07 DIAGNOSIS — R20.0 NUMBNESS IN LEFT LEG: ICD-10-CM

## 2022-12-07 LAB
25(OH)D3 SERPL-MCNC: 71 NG/ML (ref 30–100)
ALBUMIN SERPL BCP-MCNC: 4.2 G/DL (ref 3.2–4.9)
ALBUMIN/GLOB SERPL: 1.4 G/DL
ALP SERPL-CCNC: 104 U/L (ref 30–99)
ALT SERPL-CCNC: 10 U/L (ref 2–50)
ANION GAP SERPL CALC-SCNC: 10 MMOL/L (ref 7–16)
AST SERPL-CCNC: 21 U/L (ref 12–45)
BASOPHILS # BLD AUTO: 0.4 % (ref 0–1.8)
BASOPHILS # BLD: 0.02 K/UL (ref 0–0.12)
BILIRUB SERPL-MCNC: 0.4 MG/DL (ref 0.1–1.5)
BUN SERPL-MCNC: 29 MG/DL (ref 8–22)
CALCIUM SERPL-MCNC: 9.2 MG/DL (ref 8.4–10.2)
CHLORIDE SERPL-SCNC: 103 MMOL/L (ref 96–112)
CHOLEST SERPL-MCNC: 173 MG/DL (ref 100–199)
CO2 SERPL-SCNC: 25 MMOL/L (ref 20–33)
CREAT SERPL-MCNC: 0.78 MG/DL (ref 0.5–1.4)
CREAT UR-MCNC: 92.92 MG/DL
EOSINOPHIL # BLD AUTO: 0 K/UL (ref 0–0.51)
EOSINOPHIL NFR BLD: 0 % (ref 0–6.9)
ERYTHROCYTE [DISTWIDTH] IN BLOOD BY AUTOMATED COUNT: 47.5 FL (ref 35.9–50)
EST. AVERAGE GLUCOSE BLD GHB EST-MCNC: 143 MG/DL
FERRITIN SERPL-MCNC: 22.8 NG/ML (ref 10–291)
GFR SERPLBLD CREATININE-BSD FMLA CKD-EPI: 75 ML/MIN/1.73 M 2
GLOBULIN SER CALC-MCNC: 2.9 G/DL (ref 1.9–3.5)
GLUCOSE SERPL-MCNC: 125 MG/DL (ref 65–99)
HBA1C MFR BLD: 6.6 % (ref 4–5.6)
HCT VFR BLD AUTO: 42.2 % (ref 37–47)
HDLC SERPL-MCNC: 80 MG/DL
HGB BLD-MCNC: 13.6 G/DL (ref 12–16)
IMM GRANULOCYTES # BLD AUTO: 0.01 K/UL (ref 0–0.11)
IMM GRANULOCYTES NFR BLD AUTO: 0.2 % (ref 0–0.9)
IRON SATN MFR SERPL: 15 % (ref 15–55)
IRON SERPL-MCNC: 60 UG/DL (ref 40–170)
LDLC SERPL CALC-MCNC: 78 MG/DL
LYMPHOCYTES # BLD AUTO: 0.92 K/UL (ref 1–4.8)
LYMPHOCYTES NFR BLD: 17.3 % (ref 22–41)
MCH RBC QN AUTO: 29.6 PG (ref 27–33)
MCHC RBC AUTO-ENTMCNC: 32.2 G/DL (ref 33.6–35)
MCV RBC AUTO: 91.7 FL (ref 81.4–97.8)
MICROALBUMIN UR-MCNC: 20 MG/DL
MICROALBUMIN/CREAT UR: 215 MG/G (ref 0–30)
MONOCYTES # BLD AUTO: 0.53 K/UL (ref 0–0.85)
MONOCYTES NFR BLD AUTO: 9.9 % (ref 0–13.4)
NEUTROPHILS # BLD AUTO: 3.85 K/UL (ref 2–7.15)
NEUTROPHILS NFR BLD: 72.2 % (ref 44–72)
NRBC # BLD AUTO: 0 K/UL
NRBC BLD-RTO: 0 /100 WBC
PLATELET # BLD AUTO: 267 K/UL (ref 164–446)
PMV BLD AUTO: 10.5 FL (ref 9–12.9)
POTASSIUM SERPL-SCNC: 5 MMOL/L (ref 3.6–5.5)
PROT SERPL-MCNC: 7.1 G/DL (ref 6–8.2)
RBC # BLD AUTO: 4.6 M/UL (ref 4.2–5.4)
SODIUM SERPL-SCNC: 138 MMOL/L (ref 135–145)
TIBC SERPL-MCNC: 405 UG/DL (ref 250–450)
TRIGL SERPL-MCNC: 75 MG/DL (ref 0–149)
TSH SERPL DL<=0.005 MIU/L-ACNC: 0.95 UIU/ML (ref 0.38–5.33)
UIBC SERPL-MCNC: 345 UG/DL (ref 110–370)
VIT B12 SERPL-MCNC: 782 PG/ML (ref 211–911)
WBC # BLD AUTO: 5.3 K/UL (ref 4.8–10.8)

## 2022-12-07 PROCEDURE — 82306 VITAMIN D 25 HYDROXY: CPT | Mod: GA

## 2022-12-07 PROCEDURE — 85025 COMPLETE CBC W/AUTO DIFF WBC: CPT

## 2022-12-07 PROCEDURE — 82728 ASSAY OF FERRITIN: CPT

## 2022-12-07 PROCEDURE — 36415 COLL VENOUS BLD VENIPUNCTURE: CPT

## 2022-12-07 PROCEDURE — 80053 COMPREHEN METABOLIC PANEL: CPT

## 2022-12-07 PROCEDURE — 82043 UR ALBUMIN QUANTITATIVE: CPT

## 2022-12-07 PROCEDURE — 83540 ASSAY OF IRON: CPT

## 2022-12-07 PROCEDURE — 83550 IRON BINDING TEST: CPT

## 2022-12-07 PROCEDURE — 83036 HEMOGLOBIN GLYCOSYLATED A1C: CPT | Mod: GA

## 2022-12-07 PROCEDURE — 84443 ASSAY THYROID STIM HORMONE: CPT

## 2022-12-07 PROCEDURE — 82570 ASSAY OF URINE CREATININE: CPT

## 2022-12-07 PROCEDURE — 82607 VITAMIN B-12: CPT

## 2022-12-07 PROCEDURE — 80061 LIPID PANEL: CPT

## 2022-12-12 ENCOUNTER — OFFICE VISIT (OUTPATIENT)
Dept: MEDICAL GROUP | Facility: MEDICAL CENTER | Age: 82
End: 2022-12-12
Payer: MEDICARE

## 2022-12-12 VITALS
SYSTOLIC BLOOD PRESSURE: 80 MMHG | HEART RATE: 84 BPM | TEMPERATURE: 97.8 F | OXYGEN SATURATION: 93 % | DIASTOLIC BLOOD PRESSURE: 52 MMHG

## 2022-12-12 DIAGNOSIS — I95.89 OTHER SPECIFIED HYPOTENSION: ICD-10-CM

## 2022-12-12 PROCEDURE — 99214 OFFICE O/P EST MOD 30 MIN: CPT | Performed by: NURSE PRACTITIONER

## 2022-12-12 NOTE — ASSESSMENT & PLAN NOTE
Chronic. BP 80/52 in office today. BP running low at home as well.     Drinking a smoothie in the morning. Feels like she is eating well, but  feels she isn't eating a lot.     Amlodipine stopped a few months ago due to low BP    She denies significant weight loss, declined to be weighed today.     Feels like she is staying hydrated but could drink more water.

## 2022-12-23 NOTE — TELEPHONE ENCOUNTER
Received request via: Patient    Was the patient seen in the last year in this department? Yes    Does the patient have an active prescription (recently filled or refills available) for medication(s) requested? No    Does the patient have retirement Plus and need 100 day supply (blood pressure, diabetes and cholesterol meds only)? Patient does not have SCP

## 2022-12-29 RX ORDER — GABAPENTIN 100 MG/1
100 CAPSULE ORAL
Qty: 90 CAPSULE | OUTPATIENT
Start: 2022-12-29

## 2022-12-29 NOTE — TELEPHONE ENCOUNTER
I have never prescribed this for her, we will discuss this at her upcoming appointment.     STEVEN Zaman.

## 2022-12-31 NOTE — PROGRESS NOTES
Subjective:   Rashmi Parra is a 82 y.o. female here today for low blood pressure    Hypotension  Chronic. BP 80/52 in office today. BP running low at home as well.     Drinking a smoothie in the morning. Feels like she is eating well, but  feels she isn't eating a lot.     Taking amlodipine 5 mg daily for HTN.     She denies significant weight loss, declined to be weighed today.     Feels like she is staying hydrated but could drink more water.        Current medicines (including changes today)  Current Outpatient Medications   Medication Sig Dispense Refill    Dexlansoprazole 60 MG CAPSULE DELAYED RELEASE delayed-release capsule Take 1 Capsule by mouth every evening. 90 Capsule 3    metFORMIN (GLUCOPHAGE) 500 MG Tab Take 1 tablet by mouth once daily 90 Tablet 0    MYRBETRIQ 50 MG TABLET SR 24 HR TAKE 1 TABLET EVERY EVENINGFOR FREQUENT URINATION,    URINARY INCONTINENCE,      URINARY URGENCY. 90 Tablet 3    DULoxetine (CYMBALTA) 60 MG Cap DR Particles delayed-release capsule Take 1 Capsule by mouth every day. 90 Capsule 3    fluticasone (FLONASE) 50 MCG/ACT nasal spray fluticasone propionate 50 mcg/actuation nasal spray,suspension      acetaminophen (TYLENOL) 500 MG Tab Take 1-2 Tablets by mouth every 6 hours as needed. 30 Tablet 0    gabapentin (NEURONTIN) 100 MG Cap Take 100 mg by mouth.      anastrozole (ARIMIDEX) 1 MG Tab Take 1 mg by mouth.      lovastatin (MEVACOR) 20 MG Tab Take 20 mg by mouth every evening.      Melatonin 5 MG Tab Take 5 mg by mouth at bedtime.       No current facility-administered medications for this visit.     She  has a past medical history of Arthritis, Bowel habit changes, Breath shortness, Cataract, COPD (chronic obstructive pulmonary disease) (HCC), Diabetes (HCC), Heart burn, Heart murmur, Hiatus hernia syndrome, High cholesterol, Hypertension, Indigestion, Malignant neoplasm of overlapping sites of breast in female, estrogen receptor positive (HCC) (11/6/2019),  Psychiatric problem (2015), Shortness of breath, Snoring, Urinary incontinence, and Wears glasses.    ROS   No chest pain, no shortness of breath, no abdominal pain  Positive ROS as per HPI.  All other systems reviewed and are negative.     Objective:     BP (!) 80/52 (BP Location: Right arm, Patient Position: Sitting, BP Cuff Size: Adult)   Pulse 84   Temp 36.6 °C (97.8 °F) (Temporal)   SpO2 93%  There is no height or weight on file to calculate BMI.     Physical Exam:  Constitutional: Alert, no distress.  Skin: Warm, dry, good turgor, no rashes in visible areas.  Eye: Equal, round and reactive, conjunctiva clear, lids normal.  ENMT: Mask in place  Respiratory: Unlabored respiratory effort, lungs clear to auscultation, no wheezes, no ronchi.  Cardiovascular: Normal S1, S2, no murmur, no edema.  Psych: Alert and oriented x3, normal affect and mood.        Assessment and Plan:   The following treatment plan was discussed    1. Other specified hypotension  Unstable  Continue off amlodipine  Continue to monitor BP at home BID  Increase water intake  Increase physical activity  Reviewed medications, none that would cause Hypotension      Followup: Return in about 4 weeks (around 1/9/2023) for blood pressure.    The MA is performing the above orders under the direction of Dr. Mandujano    Please note that this dictation was created using voice recognition software. I have made every reasonable attempt to correct obvious errors, but I expect that there are errors of grammar and possibly content that I did not discover before finalizing the note.

## 2023-01-12 ENCOUNTER — TELEPHONE (OUTPATIENT)
Dept: MEDICAL GROUP | Facility: MEDICAL CENTER | Age: 83
End: 2023-01-12
Payer: MEDICARE

## 2023-01-13 DIAGNOSIS — K22.719 BARRETT'S ESOPHAGUS WITH DYSPLASIA: ICD-10-CM

## 2023-01-13 RX ORDER — DEXLANSOPRAZOLE 60 MG/1
1 CAPSULE, DELAYED RELEASE ORAL EVERY EVENING
Qty: 90 CAPSULE | Refills: 3 | Status: SHIPPED | OUTPATIENT
Start: 2023-01-13 | End: 2024-02-23 | Stop reason: SDUPTHER

## 2023-01-13 NOTE — TELEPHONE ENCOUNTER
Geovanna willson called left voice mail regarding verbal order for patient to continue reciving care from them I did call her at [phone] to give the okay to continue care no answer left voice mail to return my call if she still needed the verbal

## 2023-01-16 ENCOUNTER — OFFICE VISIT (OUTPATIENT)
Dept: MEDICAL GROUP | Facility: MEDICAL CENTER | Age: 83
End: 2023-01-16
Payer: MEDICARE

## 2023-01-16 VITALS
TEMPERATURE: 97.7 F | SYSTOLIC BLOOD PRESSURE: 100 MMHG | HEIGHT: 63 IN | OXYGEN SATURATION: 96 % | DIASTOLIC BLOOD PRESSURE: 70 MMHG | WEIGHT: 136 LBS | BODY MASS INDEX: 24.1 KG/M2 | HEART RATE: 84 BPM

## 2023-01-16 DIAGNOSIS — Z12.83 ENCOUNTER FOR SCREENING FOR MALIGNANT NEOPLASM OF SKIN: ICD-10-CM

## 2023-01-16 DIAGNOSIS — R26.89 BALANCE PROBLEM: ICD-10-CM

## 2023-01-16 DIAGNOSIS — F33.1 MODERATE EPISODE OF RECURRENT MAJOR DEPRESSIVE DISORDER (HCC): ICD-10-CM

## 2023-01-16 DIAGNOSIS — R54 AGE-RELATED PHYSICAL DEBILITY: ICD-10-CM

## 2023-01-16 DIAGNOSIS — R80.9 MICROALBUMINURIA: ICD-10-CM

## 2023-01-16 DIAGNOSIS — D48.9 NEOPLASM OF UNCERTAIN BEHAVIOR: ICD-10-CM

## 2023-01-16 DIAGNOSIS — E11.41 TYPE 2 DIABETES MELLITUS WITH DIABETIC MONONEUROPATHY, WITHOUT LONG-TERM CURRENT USE OF INSULIN (HCC): ICD-10-CM

## 2023-01-16 DIAGNOSIS — M79.2 NERVE PAIN: ICD-10-CM
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
## 2023-01-16 PROCEDURE — 99214 OFFICE O/P EST MOD 30 MIN: CPT | Performed by: NURSE PRACTITIONER

## 2023-01-16 RX ORDER — DULOXETIN HYDROCHLORIDE 30 MG/1
30 CAPSULE, DELAYED RELEASE ORAL DAILY
Qty: 90 CAPSULE | Refills: 3 | Status: ON HOLD | OUTPATIENT
Start: 2023-01-16 | End: 2023-05-31

## 2023-01-16 RX ORDER — GABAPENTIN 100 MG/1
100 CAPSULE ORAL NIGHTLY PRN
Qty: 90 CAPSULE | Refills: 3 | Status: SHIPPED | OUTPATIENT
Start: 2023-01-16 | End: 2023-01-16

## 2023-01-16 RX ORDER — GABAPENTIN 100 MG/1
100 CAPSULE ORAL NIGHTLY PRN
Qty: 90 CAPSULE | Refills: 3 | Status: SHIPPED | OUTPATIENT
Start: 2023-01-16 | End: 2023-07-04

## 2023-01-16 ASSESSMENT — PATIENT HEALTH QUESTIONNAIRE - PHQ9: CLINICAL INTERPRETATION OF PHQ2 SCORE: 0

## 2023-01-16 ASSESSMENT — FIBROSIS 4 INDEX: FIB4 SCORE: 2.06

## 2023-01-17 NOTE — ASSESSMENT & PLAN NOTE
Chronic. Has not been able to stop use of her walker since her last orthopedic surgery. Has been getting home health PT which has been helpful. Would like to continue outpatient PT for balance and strengthening.

## 2023-01-17 NOTE — PROGRESS NOTES
Subjective:   Rashmi Parra is a 82 y.o. female here today for routine follow up    Type 2 diabetes mellitus with diabetic mononeuropathy, without long-term current use of insulin (HCC)  Chronic. Currently taking metformin 500 mg daily. Recent A1C 6.6. Microalbumin elevated. Renal function and GFR normal.     Moderate episode of recurrent major depressive disorder (HCC)  Chronic. Prescribed cymbalta 90 mg daily, only recently started taking additional 30 mg daily with 60 mg, feels like mood has improved.     Balance problem  Chronic. Has not been able to stop use of her walker since her last orthopedic surgery. Has been getting home health PT which has been helpful. Would like to continue outpatient PT for balance and strengthening.        Current medicines (including changes today)  Current Outpatient Medications   Medication Sig Dispense Refill    gabapentin (NEURONTIN) 100 MG Cap Take 1 Capsule by mouth at bedtime as needed (nerve pain). 90 Capsule 3    Empagliflozin 10 MG Tab Take 1 Tablet by mouth every day. 90 Tablet 3    DULoxetine (CYMBALTA) 30 MG Cap DR Particles Take 1 Capsule by mouth every day. Total of 90 mg 90 Capsule 3    Dexlansoprazole 60 MG CAPSULE DELAYED RELEASE delayed-release capsule Take 1 Capsule by mouth every evening. 90 Capsule 3    metFORMIN (GLUCOPHAGE) 500 MG Tab Take 1 tablet by mouth once daily 90 Tablet 0    MYRBETRIQ 50 MG TABLET SR 24 HR TAKE 1 TABLET EVERY EVENINGFOR FREQUENT URINATION,    URINARY INCONTINENCE,      URINARY URGENCY. 90 Tablet 3    DULoxetine (CYMBALTA) 60 MG Cap DR Particles delayed-release capsule Take 1 Capsule by mouth every day. 90 Capsule 3    fluticasone (FLONASE) 50 MCG/ACT nasal spray fluticasone propionate 50 mcg/actuation nasal spray,suspension      acetaminophen (TYLENOL) 500 MG Tab Take 1-2 Tablets by mouth every 6 hours as needed. 30 Tablet 0    anastrozole (ARIMIDEX) 1 MG Tab Take 1 mg by mouth.      lovastatin (MEVACOR) 20 MG Tab Take 20 mg by  "mouth every evening.      Melatonin 5 MG Tab Take 5 mg by mouth at bedtime.       No current facility-administered medications for this visit.     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    ROS   No chest pain, no shortness of breath, no abdominal pain  Positive ROS as per HPI.  All other systems reviewed and are negative.     Objective:     /70 (BP Location: Left arm, Patient Position: Sitting, BP Cuff Size: Adult)   Pulse 84   Temp 36.5 °C (97.7 °F) (Temporal)   Ht 1.6 m (5' 3\")   Wt 61.7 kg (136 lb)   SpO2 96%  Body mass index is 24.09 kg/m².     Physical Exam:  Constitutional: Alert, no distress.  Skin: Warm, dry, good turgor, no rashes in visible areas.  Eye: Equal, round and reactive, conjunctiva clear, lids normal.  ENMT: Lips without lesions, good dentition, oropharynx clear.  Neck: Trachea midline, no masses, no thyromegaly. No cervical or supraclavicular lymphadenopathy  Respiratory: Unlabored respiratory effort, lungs clear to auscultation, no wheezes, no ronchi.  Cardiovascular: Normal S1, S2, no murmur, no edema.  Abdomen: Soft, non-tender, no masses, no hepatosplenomegaly.  Psych: Alert and oriented x3, normal affect and mood.        Assessment and Plan:   The following treatment plan was discussed    1. Balance problem  Unstable  Continue PT, new referral placed  Continue walker use to prevent falls  - Referral to Physical Therapy    2. Age-related physical debility  - Referral to Physical Therapy    3. Nerve pain  Stable on gabapentin 100 mg nightly as needed  - gabapentin " (NEURONTIN) 100 MG Cap; Take 1 Capsule by mouth at bedtime as needed (nerve pain).  Dispense: 90 Capsule; Refill: 3    4. Neoplasm of uncertain behavior  Unstable  Mold on left eyebrow, follow up with derm for removal  - Referral to Dermatology    5. Encounter for screening for malignant neoplasm of skin  - Referral to Dermatology    6. Moderate episode of recurrent major depressive disorder (HCC)  Stable on cymbalta  - DULoxetine (CYMBALTA) 30 MG Cap DR Particles; Take 1 Capsule by mouth every day. Total of 90 mg  Dispense: 90 Capsule; Refill: 3    7. Type 2 diabetes mellitus with diabetic mononeuropathy, without long-term current use of insulin (HCC)  Stable on metformin, new microalbuminuria, add jardiance 10 mg daily  Monitor BP at home for decrease due to BP effect  - Empagliflozin 10 MG Tab; Take 1 Tablet by mouth every day.  Dispense: 90 Tablet; Refill: 3    8. Microalbuminuria  Unstable  Add jardiance  - Empagliflozin 10 MG Tab; Take 1 Tablet by mouth every day.  Dispense: 90 Tablet; Refill: 3      Followup: Return in about 4 weeks (around 2/13/2023) for after adding jardiance, monitor BP, repeat microabumin.    The MA is performing the above orders under the direction of Dr. Mandujano    Please note that this dictation was created using voice recognition software. I have made every reasonable attempt to correct obvious errors, but I expect that there are errors of grammar and possibly content that I did not discover before finalizing the note.

## 2023-01-17 NOTE — ASSESSMENT & PLAN NOTE
Chronic. Prescribed cymbalta 90 mg daily, only recently started taking additional 30 mg daily with 60 mg, feels like mood has improved.

## 2023-01-17 NOTE — ASSESSMENT & PLAN NOTE
Chronic. Currently taking metformin 500 mg daily. Recent A1C 6.6. Microalbumin elevated. Renal function and GFR normal.

## 2023-02-10 ENCOUNTER — DOCUMENTATION (OUTPATIENT)
Dept: MEDICAL GROUP | Facility: MEDICAL CENTER | Age: 83
End: 2023-02-10
Payer: MEDICARE

## 2023-03-02 ENCOUNTER — OFFICE VISIT (OUTPATIENT)
Dept: SLEEP MEDICINE | Facility: MEDICAL CENTER | Age: 83
End: 2023-03-02
Attending: INTERNAL MEDICINE
Payer: MEDICARE

## 2023-03-02 VITALS
DIASTOLIC BLOOD PRESSURE: 60 MMHG | BODY MASS INDEX: 24.1 KG/M2 | SYSTOLIC BLOOD PRESSURE: 106 MMHG | HEART RATE: 70 BPM | OXYGEN SATURATION: 91 % | RESPIRATION RATE: 16 BRPM | HEIGHT: 63 IN | WEIGHT: 136 LBS

## 2023-03-02 DIAGNOSIS — R06.09 DOE (DYSPNEA ON EXERTION): ICD-10-CM

## 2023-03-02 DIAGNOSIS — J98.4 RESTRICTIVE LUNG DISEASE: ICD-10-CM

## 2023-03-02 DIAGNOSIS — Z85.3 HISTORY OF BREAST CANCER: ICD-10-CM

## 2023-03-02 PROCEDURE — 99214 OFFICE O/P EST MOD 30 MIN: CPT | Performed by: INTERNAL MEDICINE

## 2023-03-02 ASSESSMENT — ENCOUNTER SYMPTOMS
NAUSEA: 0
VOMITING: 0
DIARRHEA: 0
DIZZINESS: 0
WHEEZING: 0
DOUBLE VISION: 0
TREMORS: 0
DIAPHORESIS: 0
PHOTOPHOBIA: 0
PND: 0
SPEECH CHANGE: 0
WEIGHT LOSS: 0
FEVER: 0
HEMOPTYSIS: 0
SINUS PAIN: 0
EYE REDNESS: 0
ABDOMINAL PAIN: 0
SPUTUM PRODUCTION: 0
PALPITATIONS: 0
STRIDOR: 0
BLURRED VISION: 0
CHILLS: 0
ORTHOPNEA: 0
WEAKNESS: 0
BACK PAIN: 0
CLAUDICATION: 0
MYALGIAS: 0
NECK PAIN: 0
HEADACHES: 0
FALLS: 0
EYE DISCHARGE: 0
SORE THROAT: 0
DEPRESSION: 0
HEARTBURN: 0
EYE PAIN: 0
COUGH: 0
FOCAL WEAKNESS: 0
SHORTNESS OF BREATH: 0
CONSTIPATION: 0

## 2023-03-02 ASSESSMENT — FIBROSIS 4 INDEX: FIB4 SCORE: 2.06

## 2023-03-03 NOTE — PROGRESS NOTES
"Chief Complaint   Patient presents with    Follow-Up         HPI: This patient is a 82 y.o. female whom is followed in our clinic for MONTIEL last seen by me on 9/20/22.  PMHx is significant for gastroesophageal reflux disease complicated by Ott's esophagus, breast cancer, ER positive, status post bilateral mastectomy and radiation therapy completed November 2018 currently on anastrozole, urinary incontinence, cataracts.  She is a former tobacco smoker with roughly 30-pack-year history and quit at the age of 54. Pt presented to me with MONTIEL over 6 mos after not being able to exercise due to covid for 9-12 mos. She was seen by cardiology and LV fx was hyperdynamic but no TR jet to estimate RVSP in April. We ordered PFTs and CT chest. PFTS showed restriction based on low FVC and low TLC (both 80% pred) but FEV1 also reduced at 81% pred. Her DLCO was reduced mildly at 74% predicted. Her CT showed focal fibrosis in RUL c/w hx of XRT and some emphysema.  Sxs were mild and stable at our last visit and plan was to try prn MICHAEL which does not recall ever using. She had repeat PFT in November which showed mild declined in FVC, FEV1, TLC and DLCO however pt had also been in rehab after a fall and was reportedly quit weak. She seems confused today about why she was ever referred to me and her  states the pt is quite fatigued with any activity. Pt denies wheezing but does have dry cough in am that improves with water. She is not doing any formal activity at present and is ambulating with a walker.     Past Medical History:   Diagnosis Date    Arthritis     osteo, generalized    Bowel habit changes     constipation    Breath shortness     Cataract     removed bilat    COPD (chronic obstructive pulmonary disease) (HCC)     Diabetes (HCC)     \"pre\", oral meds    Heart burn     Heart murmur     Hiatus hernia syndrome     High cholesterol     Hypertension     Indigestion     Malignant neoplasm of overlapping sites of breast in " female, estrogen receptor positive (HCC) 2019    Psychiatric problem 2015    anxiety    Shortness of breath     Snoring     no sleep study    Urinary incontinence     Wears glasses        Social History     Socioeconomic History    Marital status:      Spouse name: Not on file    Number of children: Not on file    Years of education: Not on file    Highest education level: Some college, no degree   Occupational History    Not on file   Tobacco Use    Smoking status: Former     Packs/day: 1.00     Years: 30.00     Pack years: 30.00     Types: Cigarettes     Quit date: 1992     Years since quittin.1    Smokeless tobacco: Former   Vaping Use    Vaping Use: Never used   Substance and Sexual Activity    Alcohol use: Yes     Alcohol/week: 0.6 oz     Types: 1 Glasses of wine per week     Comment: occ    Drug use: No    Sexual activity: Not on file   Other Topics Concern    Not on file   Social History Narrative    Not on file     Social Determinants of Health     Financial Resource Strain: Not on file   Food Insecurity: Not on file   Transportation Needs: Not on file   Physical Activity: Not on file   Stress: Not on file   Social Connections: Not on file   Intimate Partner Violence: Not on file   Housing Stability: Not on file       Family History   Problem Relation Age of Onset    Heart Disease Neg Hx        Current Outpatient Medications on File Prior to Visit   Medication Sig Dispense Refill    metFORMIN (GLUCOPHAGE) 500 MG Tab Take 1 tablet by mouth once daily 90 Tablet 3    gabapentin (NEURONTIN) 100 MG Cap Take 1 Capsule by mouth at bedtime as needed (nerve pain). 90 Capsule 3    Empagliflozin 10 MG Tab Take 1 Tablet by mouth every day. 90 Tablet 3    DULoxetine (CYMBALTA) 30 MG Cap DR Particles Take 1 Capsule by mouth every day. Total of 90 mg 90 Capsule 3    Dexlansoprazole 60 MG CAPSULE DELAYED RELEASE delayed-release capsule Take 1 Capsule by mouth every evening. 90 Capsule 3    MYRBETRIQ 50  "MG TABLET SR 24 HR TAKE 1 TABLET EVERY EVENINGFOR FREQUENT URINATION,    URINARY INCONTINENCE,      URINARY URGENCY. 90 Tablet 3    DULoxetine (CYMBALTA) 60 MG Cap DR Particles delayed-release capsule Take 1 Capsule by mouth every day. 90 Capsule 3    fluticasone (FLONASE) 50 MCG/ACT nasal spray fluticasone propionate 50 mcg/actuation nasal spray,suspension      acetaminophen (TYLENOL) 500 MG Tab Take 1-2 Tablets by mouth every 6 hours as needed. 30 Tablet 0    anastrozole (ARIMIDEX) 1 MG Tab Take 1 mg by mouth.      lovastatin (MEVACOR) 20 MG Tab Take 20 mg by mouth every evening.      Melatonin 5 MG Tab Take 5 mg by mouth at bedtime.       No current facility-administered medications on file prior to visit.       Patient has no known allergies.      ROS:   Review of Systems   Constitutional:  Negative for chills, diaphoresis, fever, malaise/fatigue and weight loss.   HENT:  Negative for congestion, ear discharge, ear pain, hearing loss, nosebleeds, sinus pain, sore throat and tinnitus.    Eyes:  Negative for blurred vision, double vision, photophobia, pain, discharge and redness.   Respiratory:  Negative for cough, hemoptysis, sputum production, shortness of breath, wheezing and stridor.    Cardiovascular:  Negative for chest pain, palpitations, orthopnea, claudication, leg swelling and PND.   Gastrointestinal:  Negative for abdominal pain, constipation, diarrhea, heartburn, nausea and vomiting.   Genitourinary:  Negative for dysuria and urgency.   Musculoskeletal:  Negative for back pain, falls, joint pain, myalgias and neck pain.   Skin:  Negative for itching and rash.   Neurological:  Negative for dizziness, tremors, speech change, focal weakness, weakness and headaches.   Endo/Heme/Allergies:  Negative for environmental allergies.   Psychiatric/Behavioral:  Negative for depression.      /60   Pulse 70   Resp 16   Ht 1.6 m (5' 3\")   Wt 61.7 kg (136 lb)   SpO2 91%   Physical Exam  Constitutional:      "  General: She is not in acute distress.     Appearance: Normal appearance. She is well-developed and normal weight.   HENT:      Head: Normocephalic and atraumatic.      Right Ear: External ear normal.      Left Ear: External ear normal.      Nose: Nose normal. No congestion.      Mouth/Throat:      Mouth: Mucous membranes are moist.      Pharynx: Oropharynx is clear. No oropharyngeal exudate.   Eyes:      General: No scleral icterus.     Extraocular Movements: Extraocular movements intact.      Conjunctiva/sclera: Conjunctivae normal.      Pupils: Pupils are equal, round, and reactive to light.   Neck:      Vascular: No JVD.      Trachea: No tracheal deviation.   Cardiovascular:      Rate and Rhythm: Normal rate and regular rhythm.      Heart sounds: Normal heart sounds. No murmur heard.    No friction rub. No gallop.   Pulmonary:      Effort: Pulmonary effort is normal. No accessory muscle usage or respiratory distress.      Breath sounds: No wheezing or rales.      Comments: Kyphosis    Scattered basilar crackles RLL  Abdominal:      General: There is no distension.      Palpations: Abdomen is soft.      Tenderness: There is no abdominal tenderness.   Musculoskeletal:         General: No tenderness or deformity. Normal range of motion.      Cervical back: Normal range of motion and neck supple.      Right lower leg: No edema.      Left lower leg: No edema.   Lymphadenopathy:      Cervical: No cervical adenopathy.   Skin:     General: Skin is warm and dry.      Findings: No rash.      Nails: There is no clubbing.   Neurological:      Mental Status: She is alert and oriented to person, place, and time.      Cranial Nerves: No cranial nerve deficit.      Gait: Gait normal.   Psychiatric:         Behavior: Behavior normal.       PFTs as reviewed by me personally: as per hPI    Imaging as reviewed by me personally:  as per HPI    Assessment:  1. MONTIEL (dyspnea on exertion)        2. Restrictive lung disease        3.  History of breast cancer            Plan:  Pt denies any progression and we did discuss this could likely be deconditioning. She does not feel subjectively that her breathing is poor but given decline in PFTs and 's concerns we opted for f/u wit repeat PFTs in 6 mos.   She did have decline in PFTs but I suspect this is MSK weakness after her fall. Encouraged her efforts at activity; f/u PFTs as per above  Mild changes on CT c/w XRT  Return in about 6 months (around 9/2/2023) for ortiz.

## 2023-04-04 ENCOUNTER — APPOINTMENT (RX ONLY)
Dept: URBAN - METROPOLITAN AREA CLINIC 35 | Facility: CLINIC | Age: 83
Setting detail: DERMATOLOGY
End: 2023-04-04

## 2023-04-04 PROBLEM — D48.5 NEOPLASM OF UNCERTAIN BEHAVIOR OF SKIN: Status: ACTIVE | Noted: 2023-04-04

## 2023-04-04 PROCEDURE — 11102 TANGNTL BX SKIN SINGLE LES: CPT

## 2023-04-04 PROCEDURE — ? BIOPSY BY SHAVE METHOD

## 2023-04-04 PROCEDURE — ? SURGICAL DECISION MAKING

## 2023-04-04 PROCEDURE — 11103 TANGNTL BX SKIN EA SEP/ADDL: CPT

## 2023-04-04 NOTE — PROCEDURE: MIPS QUALITY
Quality 226: Preventive Care And Screening: Tobacco Use: Screening And Cessation Intervention: Patient screened for tobacco use and is an ex/non-smoker
Quality 111:Pneumonia Vaccination Status For Older Adults: Pneumococcal vaccine (PPSV23) administered on or after patient’s 60th birthday and before the end of the measurement period
Quality 130: Documentation Of Current Medications In The Medical Record: Current Medications Documented
Detail Level: Generalized

## 2023-04-04 NOTE — PROCEDURE: SURGICAL DECISION MAKING
Risk Assessment Explanation (Does Not Render In The Note): Clinical determination of the probability and/or consequences of an event, such as surgery. Clinical assessment of the level of risk is affected by the nature of the event under consideration for the patient. Modifier 57 is used to indicate an Evaluation and Management (E/M) service resulted in the initial decision to perform surgery either the day before a major surgery (90 day global) or the day of a major surgery.
Discussion: We discussed not only the risks of the procedure but also the likely parson that further treatment will be required to treat lesion. This could involve another visit here to destroy or excise  lesion, a visit to a Mohs or general or plastic surgeon, scarring, limited  activity, cosmetic imperfections.
Identified Risk Factors Documented?: yes
Complexity (Necessary For Coding; Major - 90 Day Global With Some Exceptions; Minor - 10 Day Global): minor
Date Of Surgery - Today Or Tomorrow?: today

## 2023-04-04 NOTE — PROCEDURE: BIOPSY BY SHAVE METHOD
Detail Level: Detailed
Depth Of Biopsy: dermis
Was A Bandage Applied: Yes
Size Of Lesion In Cm: 0.2
X Size Of Lesion In Cm: 0
Biopsy Type: H and E
Biopsy Method: Dermablade
Anesthesia Type: 1% lidocaine with epinephrine and a 1:10 solution of 8.4% sodium bicarbonate
Hemostasis: Aluminum Chloride
Wound Care: Petrolatum
Dressing: Band-Aid
Destruction After The Procedure: No
Type Of Destruction Used: Curettage
Curettage Text: The wound bed was treated with curettage after the biopsy was performed.
Electrodesiccation And Curettage Text: The wound bed was treated with electrodesiccation and curettage after the biopsy was
Lab: [id number]
Lab Facility: 
Consent: Written consent was obtained and risks were reviewed including but not limited to scarring, infection, bleeding, scabbing, incomplete removal, nerve damage and allergy to anesthesia.
Post-Care Instructions: I reviewed with the patient in detail post-care instructions. Patient is to keep the biopsy site dry overnight, and then apply white petrolatum twice daily until healed. Patient may apply hydrogen peroxide soaks to remove any crusting.
Notification Instructions: Patient will be notified of biopsy results. However, patient instructed to call the office if not contacted within 2 weeks.
Billing Type: Third-Party Bill
Information: Selecting Yes will display possible errors in your note based on the variables you have selected. This validation is only offered as a suggestion for you. PLEASE NOTE THAT THE VALIDATION TEXT WILL BE REMOVED WHEN YOU FINALIZE YOUR NOTE. IF YOU WANT TO FAX A PRELIMINARY NOTE YOU WILL NEED TO TOGGLE THIS TO 'NO' IF YOU DO NOT WANT IT IN YOUR FAXED NOTE.
Size Of Lesion In Cm: 1
Anesthesia Volume In Cc: 1.5
Dressing: pressure dressing

## 2023-05-08 ENCOUNTER — OFFICE VISIT (OUTPATIENT)
Dept: MEDICAL GROUP | Facility: MEDICAL CENTER | Age: 83
End: 2023-05-08
Payer: MEDICARE

## 2023-05-08 VITALS
SYSTOLIC BLOOD PRESSURE: 100 MMHG | OXYGEN SATURATION: 91 % | HEART RATE: 80 BPM | RESPIRATION RATE: 15 BRPM | BODY MASS INDEX: 24.8 KG/M2 | WEIGHT: 140 LBS | TEMPERATURE: 98.6 F | DIASTOLIC BLOOD PRESSURE: 66 MMHG | HEIGHT: 63 IN

## 2023-05-08 DIAGNOSIS — I05.0 MILD MITRAL STENOSIS: ICD-10-CM

## 2023-05-08 DIAGNOSIS — I95.89 OTHER SPECIFIED HYPOTENSION: ICD-10-CM

## 2023-05-08 DIAGNOSIS — M62.81 MUSCLE WEAKNESS: ICD-10-CM

## 2023-05-08 DIAGNOSIS — R53.83 FATIGUE, UNSPECIFIED TYPE: ICD-10-CM

## 2023-05-08 DIAGNOSIS — E11.41 TYPE 2 DIABETES MELLITUS WITH DIABETIC MONONEUROPATHY, WITHOUT LONG-TERM CURRENT USE OF INSULIN (HCC): ICD-10-CM

## 2023-05-08 DIAGNOSIS — Z78.0 POSTMENOPAUSAL: ICD-10-CM

## 2023-05-08 DIAGNOSIS — Z13.21 ENCOUNTER FOR VITAMIN DEFICIENCY SCREENING: ICD-10-CM

## 2023-05-08 DIAGNOSIS — E78.5 DYSLIPIDEMIA: ICD-10-CM

## 2023-05-08 DIAGNOSIS — Z91.81 RISK FOR FALLS: ICD-10-CM

## 2023-05-08 PROCEDURE — 99214 OFFICE O/P EST MOD 30 MIN: CPT | Performed by: NURSE PRACTITIONER

## 2023-05-08 ASSESSMENT — FIBROSIS 4 INDEX: FIB4 SCORE: 2.06

## 2023-05-08 NOTE — ASSESSMENT & PLAN NOTE
Chronic problem.  Patient is here today with her daughter and  physical therapist who has several requests today regarding medication changes and lab orders.  PT is requesting full thyroid panel including thyroid antibodies, MMA testing (B12 in December within normal limits), fasting insulin.  Patient is agreeable with additional work-up.  I discussed that some of these tests may not be indicated and may not be covered by insurance, they verbalized understanding.  Also requesting order for continuous glucose monitor so the patient does not need to poke her finger several times per day for monitoring purposes.

## 2023-05-08 NOTE — ASSESSMENT & PLAN NOTE
Chronic.  Stable on lovastatin 20 mg nightly.  PT is requesting that lovastatin be stopped as this may be a cause of her worsening weakness, patient has been on this medication for quite some time and this is likely not a side effect, however discussed that a short-term trial off medication to see if her weakness improves is reasonable.

## 2023-05-08 NOTE — ASSESSMENT & PLAN NOTE
Chronic.  Stable on metformin 500 mg once daily.  Last A1c 6.6.  Patient's PT is requesting discontinuation of metformin as she believes this may be a potential cause of her fatigue and may be lowering her B12 levels despite normal B12 level in December.  Requesting MMA testing for further evaluation of possible vitamin B deficiency.

## 2023-05-08 NOTE — ASSESSMENT & PLAN NOTE
Chronic problem, Chronic. /66 in office today. BP running low at home as well, PT at home has been monitoring reading 70s-90s/50-60s.     Amlodipine stopped a few months ago due to low BP    She denies significant weight loss, weight stable in office.     Not staying well hydrated. PT working with her on this, going to start having CNA come several nights per week.     Breakfast: protein drink  Lunch: sandwhich half  Dinner: family brings dinner over.

## 2023-05-08 NOTE — PROGRESS NOTES
Subjective:   Rashmi Parra is a 82 y.o. female here today for lab request, medication change request    Hypotension  Chronic problem, Chronic. /66 in office today. BP running low at home as well, PT at home has been monitoring reading 70s-90s/50-60s.     Amlodipine stopped a few months ago due to low BP    She denies significant weight loss, weight stable in office.     Not staying well hydrated. PT working with her on this, going to start having CNA come several nights per week.     Breakfast: protein drink  Lunch: sandwhich half  Dinner: family brings dinner over.     Fatigue  Chronic problem.  Patient is here today with her daughter and  physical therapist who has several requests today regarding medication changes and lab orders.  PT is requesting full thyroid panel including thyroid antibodies, MMA testing (B12 in December within normal limits), fasting insulin.  Patient is agreeable with additional work-up.  I discussed that some of these tests may not be indicated and may not be covered by insurance, they verbalized understanding.  Also requesting order for continuous glucose monitor so the patient does not need to poke her finger several times per day for monitoring purposes.    Type 2 diabetes mellitus with diabetic mononeuropathy, without long-term current use of insulin (HCC)  Chronic.  Stable on metformin 500 mg once daily.  Last A1c 6.6.  Patient's PT is requesting discontinuation of metformin as she believes this may be a potential cause of her fatigue and may be lowering her B12 levels despite normal B12 level in December.  Requesting MMA testing for further evaluation of possible vitamin B deficiency.     Dyslipidemia  Chronic.  Stable on lovastatin 20 mg nightly.  PT is requesting that lovastatin be stopped as this may be a cause of her worsening weakness, patient has been on this medication for quite some time and this is likely not a side effect, however discussed that a  short-term trial off medication to see if her weakness improves is reasonable.        Current medicines (including changes today)  Current Outpatient Medications   Medication Sig Dispense Refill    metFORMIN (GLUCOPHAGE) 500 MG Tab Take 1 tablet by mouth once daily 90 Tablet 3    gabapentin (NEURONTIN) 100 MG Cap Take 1 Capsule by mouth at bedtime as needed (nerve pain). 90 Capsule 3    DULoxetine (CYMBALTA) 30 MG Cap DR Particles Take 1 Capsule by mouth every day. Total of 90 mg 90 Capsule 3    Dexlansoprazole 60 MG CAPSULE DELAYED RELEASE delayed-release capsule Take 1 Capsule by mouth every evening. 90 Capsule 3    MYRBETRIQ 50 MG TABLET SR 24 HR TAKE 1 TABLET EVERY EVENINGFOR FREQUENT URINATION,    URINARY INCONTINENCE,      URINARY URGENCY. 90 Tablet 3    DULoxetine (CYMBALTA) 60 MG Cap DR Particles delayed-release capsule Take 1 Capsule by mouth every day. 90 Capsule 3    fluticasone (FLONASE) 50 MCG/ACT nasal spray fluticasone propionate 50 mcg/actuation nasal spray,suspension      acetaminophen (TYLENOL) 500 MG Tab Take 1-2 Tablets by mouth every 6 hours as needed. 30 Tablet 0    anastrozole (ARIMIDEX) 1 MG Tab Take 1 mg by mouth.      lovastatin (MEVACOR) 20 MG Tab Take 20 mg by mouth every evening.      Melatonin 5 MG Tab Take 5 mg by mouth at bedtime.       No current facility-administered medications for this visit.     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    ROS   No chest pain, no shortness of breath, no abdominal pain  Positive ROS as per HPI.  All other systems reviewed and are  "negative.     Objective:     /66 (Patient Position: Sitting)   Pulse 80   Temp 37 °C (98.6 °F) (Temporal)   Resp 15   Ht 1.6 m (5' 3\")   Wt 63.5 kg (140 lb)   SpO2 91%  Body mass index is 24.8 kg/m².     Physical Exam:  Constitutional: Alert, no distress.  Skin: Warm, dry, good turgor, no rashes in visible areas.  Eye: Equal, round and reactive, conjunctiva clear, lids normal.  ENMT: Lips without lesions, good dentition  Respiratory: Unlabored respiratory effort  Cardiovascular: Normal S1, S2, no murmur  Psych: Alert and oriented x3, normal affect and mood.      Assessment and Plan:   The following treatment plan was discussed    1. Other specified hypotension  Unstable, cause unknown. Likely due to poor hydration, lack of physical activity.   Discussed that this may be a side effect of gabapentin  History of mitral stenosis  Repeat Echo to [facility] for change  - EC-ECHOCARDIOGRAM COMPLETE W/O CONT; Future    2. Mild mitral stenosis  Stable, repeat echo due to worsening fatigue  Continue follow up with cardiology  - EC-ECHOCARDIOGRAM COMPLETE W/O CONT; Future    3. Encounter for vitamin deficiency screening  Requested test to [facility] for b deficiency, serum b12 normal in Dec 2022  - METHYLMALONIC ACID, SERUM; Future    4. Postmenopausal  Overdue for DEXA, previously declined testing, agreeable to this today.   - DS-BONE DENSITY STUDY (DEXA); Future    5. Muscle weakness  Unstable  DDX includes statin intolerance, age related debility, uncontrolled daytime hypoxia (patient refusing to wear home O2 during the day)    6. Fatigue, unspecified type  Unstable  Additional labs ordered per patient's PT request, discussed that these likely will not give any helpful information, TSH was normal 5 months ago  I think likely cause of her fatigue is her lack of physical activity, refusal to wear her daytime oxygen as prescribed causing persistent hypoxia  - TRIIDOTHYRONINE; Future  - TSH; Future  - FREE THYROXINE; Future  - " THYROID PEROXIDASE  (TPO) AB; Future  - ANTITHYROGLOBULIN AB; Future    7. Type 2 diabetes mellitus with diabetic mononeuropathy, without long-term current use of insulin (HCC)  Stable on metformin  Requested fasting insulin order, likely elevated as she has known insulin resistance and T2DM  - INSULIN FASTING; Future    8. Dyslipidemia  Stable on lovastatin  Ok to trial off medication to see if weakness and fatigue improve.       Followup: Return in about 2 months (around 7/8/2023) for Lab Review.    The MA is performing the above orders under the direction of Dr. Mandujano    Please note that this dictation was created using voice recognition software. I have made every reasonable attempt to correct obvious errors, but I expect that there are errors of grammar and possibly content that I did not discover before finalizing the note.

## 2023-05-19 ENCOUNTER — HOSPITAL ENCOUNTER (OUTPATIENT)
Dept: LAB | Facility: MEDICAL CENTER | Age: 83
End: 2023-05-19
Attending: NURSE PRACTITIONER
Payer: MEDICARE

## 2023-05-19 DIAGNOSIS — Z13.21 ENCOUNTER FOR VITAMIN DEFICIENCY SCREENING: ICD-10-CM

## 2023-05-19 DIAGNOSIS — E11.41 TYPE 2 DIABETES MELLITUS WITH DIABETIC MONONEUROPATHY, WITHOUT LONG-TERM CURRENT USE OF INSULIN (HCC): ICD-10-CM

## 2023-05-19 DIAGNOSIS — R53.83 FATIGUE, UNSPECIFIED TYPE: ICD-10-CM

## 2023-05-19 LAB
T3 SERPL-MCNC: 90.9 NG/DL (ref 60–181)
T4 FREE SERPL-MCNC: 1.07 NG/DL (ref 0.93–1.7)
THYROPEROXIDASE AB SERPL-ACNC: <9 IU/ML (ref 0–9)
TSH SERPL DL<=0.005 MIU/L-ACNC: 1.73 UIU/ML (ref 0.38–5.33)

## 2023-05-19 PROCEDURE — 86800 THYROGLOBULIN ANTIBODY: CPT

## 2023-05-19 PROCEDURE — 83921 ORGANIC ACID SINGLE QUANT: CPT

## 2023-05-19 PROCEDURE — 84480 ASSAY TRIIODOTHYRONINE (T3): CPT

## 2023-05-19 PROCEDURE — 83525 ASSAY OF INSULIN: CPT

## 2023-05-19 PROCEDURE — 36415 COLL VENOUS BLD VENIPUNCTURE: CPT

## 2023-05-19 PROCEDURE — 84439 ASSAY OF FREE THYROXINE: CPT

## 2023-05-19 PROCEDURE — 84443 ASSAY THYROID STIM HORMONE: CPT

## 2023-05-19 PROCEDURE — 86376 MICROSOMAL ANTIBODY EACH: CPT

## 2023-05-21 LAB — INSULIN P FAST SERPL-ACNC: 15 UIU/ML (ref 3–25)

## 2023-05-22 LAB
METHYLMALONATE SERPL-SCNC: 0.23 UMOL/L (ref 0–0.4)
THYROGLOB AB SERPL-ACNC: <0.9 IU/ML (ref 0–4)

## 2023-05-25 ENCOUNTER — APPOINTMENT (OUTPATIENT)
Dept: RADIOLOGY | Facility: MEDICAL CENTER | Age: 83
DRG: 643 | End: 2023-05-25
Attending: EMERGENCY MEDICINE
Payer: MEDICARE

## 2023-05-25 ENCOUNTER — HOSPITAL ENCOUNTER (INPATIENT)
Facility: MEDICAL CENTER | Age: 83
LOS: 5 days | DRG: 643 | End: 2023-05-31
Attending: EMERGENCY MEDICINE | Admitting: STUDENT IN AN ORGANIZED HEALTH CARE EDUCATION/TRAINING PROGRAM
Payer: MEDICARE

## 2023-05-25 DIAGNOSIS — R79.89 ELEVATED TROPONIN: ICD-10-CM

## 2023-05-25 DIAGNOSIS — F33.9 RECURRENT MAJOR DEPRESSIVE DISORDER, REMISSION STATUS UNSPECIFIED (HCC): ICD-10-CM

## 2023-05-25 DIAGNOSIS — I73.9 PAD (PERIPHERAL ARTERY DISEASE) (HCC): ICD-10-CM

## 2023-05-25 DIAGNOSIS — R55 SYNCOPE AND COLLAPSE: ICD-10-CM

## 2023-05-25 DIAGNOSIS — S00.03XA SCALP HEMATOMA, INITIAL ENCOUNTER: ICD-10-CM

## 2023-05-25 LAB
ALBUMIN SERPL BCP-MCNC: 3.8 G/DL (ref 3.2–4.9)
ALBUMIN/GLOB SERPL: 1.5 G/DL
ALP SERPL-CCNC: 96 U/L (ref 30–99)
ALT SERPL-CCNC: 15 U/L (ref 2–50)
ANION GAP SERPL CALC-SCNC: 10 MMOL/L (ref 7–16)
APPEARANCE UR: CLEAR
AST SERPL-CCNC: 27 U/L (ref 12–45)
BASOPHILS # BLD AUTO: 0.2 % (ref 0–1.8)
BASOPHILS # BLD: 0.01 K/UL (ref 0–0.12)
BILIRUB SERPL-MCNC: 0.3 MG/DL (ref 0.1–1.5)
BILIRUB UR QL STRIP.AUTO: NEGATIVE
BUN SERPL-MCNC: 27 MG/DL (ref 8–22)
CALCIUM ALBUM COR SERPL-MCNC: 9.2 MG/DL (ref 8.5–10.5)
CALCIUM SERPL-MCNC: 9 MG/DL (ref 8.5–10.5)
CHLORIDE SERPL-SCNC: 100 MMOL/L (ref 96–112)
CO2 SERPL-SCNC: 25 MMOL/L (ref 20–33)
COLOR UR: YELLOW
CREAT SERPL-MCNC: 0.68 MG/DL (ref 0.5–1.4)
EKG IMPRESSION: NORMAL
EOSINOPHIL # BLD AUTO: 0.02 K/UL (ref 0–0.51)
EOSINOPHIL NFR BLD: 0.3 % (ref 0–6.9)
ERYTHROCYTE [DISTWIDTH] IN BLOOD BY AUTOMATED COUNT: 46.6 FL (ref 35.9–50)
GFR SERPLBLD CREATININE-BSD FMLA CKD-EPI: 86 ML/MIN/1.73 M 2
GLOBULIN SER CALC-MCNC: 2.6 G/DL (ref 1.9–3.5)
GLUCOSE SERPL-MCNC: 96 MG/DL (ref 65–99)
GLUCOSE UR STRIP.AUTO-MCNC: NEGATIVE MG/DL
HCT VFR BLD AUTO: 42 % (ref 37–47)
HGB BLD-MCNC: 14.2 G/DL (ref 12–16)
IMM GRANULOCYTES # BLD AUTO: 0.02 K/UL (ref 0–0.11)
IMM GRANULOCYTES NFR BLD AUTO: 0.3 % (ref 0–0.9)
KETONES UR STRIP.AUTO-MCNC: ABNORMAL MG/DL
LACTATE SERPL-SCNC: 0.9 MMOL/L (ref 0.5–2)
LEUKOCYTE ESTERASE UR QL STRIP.AUTO: NEGATIVE
LYMPHOCYTES # BLD AUTO: 0.75 K/UL (ref 1–4.8)
LYMPHOCYTES NFR BLD: 12.2 % (ref 22–41)
MAGNESIUM SERPL-MCNC: 1.8 MG/DL (ref 1.5–2.5)
MCH RBC QN AUTO: 31.1 PG (ref 27–33)
MCHC RBC AUTO-ENTMCNC: 33.8 G/DL (ref 32.2–35.5)
MCV RBC AUTO: 91.9 FL (ref 81.4–97.8)
MICRO URNS: ABNORMAL
MONOCYTES # BLD AUTO: 0.58 K/UL (ref 0–0.85)
MONOCYTES NFR BLD AUTO: 9.4 % (ref 0–13.4)
NEUTROPHILS # BLD AUTO: 4.78 K/UL (ref 1.82–7.42)
NEUTROPHILS NFR BLD: 77.6 % (ref 44–72)
NITRITE UR QL STRIP.AUTO: NEGATIVE
NRBC # BLD AUTO: 0 K/UL
NRBC BLD-RTO: 0 /100 WBC (ref 0–0.2)
PH UR STRIP.AUTO: 6.5 [PH] (ref 5–8)
PLATELET # BLD AUTO: 238 K/UL (ref 164–446)
PMV BLD AUTO: 9.9 FL (ref 9–12.9)
POTASSIUM SERPL-SCNC: 4.2 MMOL/L (ref 3.6–5.5)
PROT SERPL-MCNC: 6.4 G/DL (ref 6–8.2)
PROT UR QL STRIP: NEGATIVE MG/DL
RBC # BLD AUTO: 4.57 M/UL (ref 4.2–5.4)
RBC UR QL AUTO: NEGATIVE
SODIUM SERPL-SCNC: 135 MMOL/L (ref 135–145)
SP GR UR STRIP.AUTO: 1.01
TROPONIN T SERPL-MCNC: 40 NG/L (ref 6–19)
TROPONIN T SERPL-MCNC: 44 NG/L (ref 6–19)
UROBILINOGEN UR STRIP.AUTO-MCNC: 0.2 MG/DL
WBC # BLD AUTO: 6.2 K/UL (ref 4.8–10.8)

## 2023-05-25 PROCEDURE — 81003 URINALYSIS AUTO W/O SCOPE: CPT

## 2023-05-25 PROCEDURE — 72125 CT NECK SPINE W/O DYE: CPT

## 2023-05-25 PROCEDURE — 70450 CT HEAD/BRAIN W/O DYE: CPT

## 2023-05-25 PROCEDURE — 85652 RBC SED RATE AUTOMATED: CPT

## 2023-05-25 PROCEDURE — 71045 X-RAY EXAM CHEST 1 VIEW: CPT

## 2023-05-25 PROCEDURE — G0378 HOSPITAL OBSERVATION PER HR: HCPCS

## 2023-05-25 PROCEDURE — 85025 COMPLETE CBC W/AUTO DIFF WBC: CPT

## 2023-05-25 PROCEDURE — 99285 EMERGENCY DEPT VISIT HI MDM: CPT

## 2023-05-25 PROCEDURE — 84484 ASSAY OF TROPONIN QUANT: CPT | Mod: 91

## 2023-05-25 PROCEDURE — 93005 ELECTROCARDIOGRAM TRACING: CPT | Performed by: EMERGENCY MEDICINE

## 2023-05-25 PROCEDURE — 83735 ASSAY OF MAGNESIUM: CPT

## 2023-05-25 PROCEDURE — 99223 1ST HOSP IP/OBS HIGH 75: CPT | Mod: GC | Performed by: STUDENT IN AN ORGANIZED HEALTH CARE EDUCATION/TRAINING PROGRAM

## 2023-05-25 PROCEDURE — 80053 COMPREHEN METABOLIC PANEL: CPT

## 2023-05-25 PROCEDURE — 83605 ASSAY OF LACTIC ACID: CPT

## 2023-05-25 PROCEDURE — 36415 COLL VENOUS BLD VENIPUNCTURE: CPT

## 2023-05-25 ASSESSMENT — FIBROSIS 4 INDEX: FIB4 SCORE: 2.09

## 2023-05-26 ENCOUNTER — APPOINTMENT (OUTPATIENT)
Dept: CARDIOLOGY | Facility: MEDICAL CENTER | Age: 83
DRG: 643 | End: 2023-05-26
Attending: STUDENT IN AN ORGANIZED HEALTH CARE EDUCATION/TRAINING PROGRAM
Payer: MEDICARE

## 2023-05-26 PROBLEM — F32.A DEPRESSION: Status: ACTIVE | Noted: 2023-05-26

## 2023-05-26 PROBLEM — N32.81 OVERACTIVE BLADDER: Status: ACTIVE | Noted: 2023-05-26

## 2023-05-26 PROBLEM — J96.11 CHRONIC RESPIRATORY FAILURE WITH HYPOXIA (HCC): Status: ACTIVE | Noted: 2021-02-10

## 2023-05-26 LAB
ALBUMIN SERPL BCP-MCNC: 3.5 G/DL (ref 3.2–4.9)
ALBUMIN/GLOB SERPL: 1.3 G/DL
ALP SERPL-CCNC: 86 U/L (ref 30–99)
ALT SERPL-CCNC: 14 U/L (ref 2–50)
ANION GAP SERPL CALC-SCNC: 10 MMOL/L (ref 7–16)
AST SERPL-CCNC: 25 U/L (ref 12–45)
BASOPHILS # BLD AUTO: 0.2 % (ref 0–1.8)
BASOPHILS # BLD: 0.01 K/UL (ref 0–0.12)
BILIRUB SERPL-MCNC: 0.4 MG/DL (ref 0.1–1.5)
BUN SERPL-MCNC: 23 MG/DL (ref 8–22)
CALCIUM ALBUM COR SERPL-MCNC: 9.5 MG/DL (ref 8.5–10.5)
CALCIUM SERPL-MCNC: 9.1 MG/DL (ref 8.5–10.5)
CHLORIDE SERPL-SCNC: 103 MMOL/L (ref 96–112)
CO2 SERPL-SCNC: 27 MMOL/L (ref 20–33)
CREAT SERPL-MCNC: 0.68 MG/DL (ref 0.5–1.4)
CRP SERPL HS-MCNC: 0.53 MG/DL (ref 0–0.75)
EOSINOPHIL # BLD AUTO: 0.01 K/UL (ref 0–0.51)
EOSINOPHIL NFR BLD: 0.2 % (ref 0–6.9)
ERYTHROCYTE [DISTWIDTH] IN BLOOD BY AUTOMATED COUNT: 46.8 FL (ref 35.9–50)
ERYTHROCYTE [SEDIMENTATION RATE] IN BLOOD BY WESTERGREN METHOD: 20 MM/HOUR (ref 0–25)
GFR SERPLBLD CREATININE-BSD FMLA CKD-EPI: 86 ML/MIN/1.73 M 2
GLOBULIN SER CALC-MCNC: 2.6 G/DL (ref 1.9–3.5)
GLUCOSE SERPL-MCNC: 92 MG/DL (ref 65–99)
HCT VFR BLD AUTO: 40 % (ref 37–47)
HGB BLD-MCNC: 13.4 G/DL (ref 12–16)
IMM GRANULOCYTES # BLD AUTO: 0.01 K/UL (ref 0–0.11)
IMM GRANULOCYTES NFR BLD AUTO: 0.2 % (ref 0–0.9)
LV EJECT FRACT  99904: 75
LV EJECT FRACT MOD 2C 99903: 81.21
LV EJECT FRACT MOD 4C 99902: 82
LV EJECT FRACT MOD BP 99901: 82.47
LYMPHOCYTES # BLD AUTO: 1.03 K/UL (ref 1–4.8)
LYMPHOCYTES NFR BLD: 17.4 % (ref 22–41)
MAGNESIUM SERPL-MCNC: 1.8 MG/DL (ref 1.5–2.5)
MCH RBC QN AUTO: 30.9 PG (ref 27–33)
MCHC RBC AUTO-ENTMCNC: 33.5 G/DL (ref 32.2–35.5)
MCV RBC AUTO: 92.4 FL (ref 81.4–97.8)
MONOCYTES # BLD AUTO: 0.64 K/UL (ref 0–0.85)
MONOCYTES NFR BLD AUTO: 10.8 % (ref 0–13.4)
NEUTROPHILS # BLD AUTO: 4.22 K/UL (ref 1.82–7.42)
NEUTROPHILS NFR BLD: 71.2 % (ref 44–72)
NRBC # BLD AUTO: 0 K/UL
NRBC BLD-RTO: 0 /100 WBC (ref 0–0.2)
NT-PROBNP SERPL IA-MCNC: 385 PG/ML (ref 0–125)
PLATELET # BLD AUTO: 233 K/UL (ref 164–446)
PMV BLD AUTO: 10.3 FL (ref 9–12.9)
POTASSIUM SERPL-SCNC: 4.4 MMOL/L (ref 3.6–5.5)
PROT SERPL-MCNC: 6.1 G/DL (ref 6–8.2)
RBC # BLD AUTO: 4.33 M/UL (ref 4.2–5.4)
SODIUM SERPL-SCNC: 140 MMOL/L (ref 135–145)
T4 FREE SERPL-MCNC: 1.13 NG/DL (ref 0.93–1.7)
TSH SERPL DL<=0.005 MIU/L-ACNC: 1.45 UIU/ML (ref 0.38–5.33)
VIT B12 SERPL-MCNC: 791 PG/ML (ref 211–911)
WBC # BLD AUTO: 5.9 K/UL (ref 4.8–10.8)

## 2023-05-26 PROCEDURE — 93306 TTE W/DOPPLER COMPLETE: CPT | Mod: 26 | Performed by: INTERNAL MEDICINE

## 2023-05-26 PROCEDURE — 84439 ASSAY OF FREE THYROXINE: CPT

## 2023-05-26 PROCEDURE — A9270 NON-COVERED ITEM OR SERVICE: HCPCS | Performed by: STUDENT IN AN ORGANIZED HEALTH CARE EDUCATION/TRAINING PROGRAM

## 2023-05-26 PROCEDURE — 86140 C-REACTIVE PROTEIN: CPT

## 2023-05-26 PROCEDURE — 97162 PT EVAL MOD COMPLEX 30 MIN: CPT

## 2023-05-26 PROCEDURE — 82652 VIT D 1 25-DIHYDROXY: CPT

## 2023-05-26 PROCEDURE — 36415 COLL VENOUS BLD VENIPUNCTURE: CPT

## 2023-05-26 PROCEDURE — 85025 COMPLETE CBC W/AUTO DIFF WBC: CPT

## 2023-05-26 PROCEDURE — 83735 ASSAY OF MAGNESIUM: CPT

## 2023-05-26 PROCEDURE — 93306 TTE W/DOPPLER COMPLETE: CPT

## 2023-05-26 PROCEDURE — 700102 HCHG RX REV CODE 250 W/ 637 OVERRIDE(OP): Performed by: STUDENT IN AN ORGANIZED HEALTH CARE EDUCATION/TRAINING PROGRAM

## 2023-05-26 PROCEDURE — 97535 SELF CARE MNGMENT TRAINING: CPT

## 2023-05-26 PROCEDURE — 700105 HCHG RX REV CODE 258: Performed by: STUDENT IN AN ORGANIZED HEALTH CARE EDUCATION/TRAINING PROGRAM

## 2023-05-26 PROCEDURE — 83880 ASSAY OF NATRIURETIC PEPTIDE: CPT

## 2023-05-26 PROCEDURE — 97166 OT EVAL MOD COMPLEX 45 MIN: CPT

## 2023-05-26 PROCEDURE — 99233 SBSQ HOSP IP/OBS HIGH 50: CPT | Performed by: INTERNAL MEDICINE

## 2023-05-26 PROCEDURE — 700102 HCHG RX REV CODE 250 W/ 637 OVERRIDE(OP): Performed by: INTERNAL MEDICINE

## 2023-05-26 PROCEDURE — 96372 THER/PROPH/DIAG INJ SC/IM: CPT

## 2023-05-26 PROCEDURE — 770020 HCHG ROOM/CARE - TELE (206)

## 2023-05-26 PROCEDURE — 84443 ASSAY THYROID STIM HORMONE: CPT

## 2023-05-26 PROCEDURE — 82607 VITAMIN B-12: CPT

## 2023-05-26 PROCEDURE — 700111 HCHG RX REV CODE 636 W/ 250 OVERRIDE (IP): Performed by: STUDENT IN AN ORGANIZED HEALTH CARE EDUCATION/TRAINING PROGRAM

## 2023-05-26 PROCEDURE — A9270 NON-COVERED ITEM OR SERVICE: HCPCS | Performed by: INTERNAL MEDICINE

## 2023-05-26 PROCEDURE — 80053 COMPREHEN METABOLIC PANEL: CPT

## 2023-05-26 RX ORDER — SODIUM CHLORIDE, SODIUM LACTATE, POTASSIUM CHLORIDE, CALCIUM CHLORIDE 600; 310; 30; 20 MG/100ML; MG/100ML; MG/100ML; MG/100ML
INJECTION, SOLUTION INTRAVENOUS CONTINUOUS
Status: DISCONTINUED | OUTPATIENT
Start: 2023-05-26 | End: 2023-05-28

## 2023-05-26 RX ORDER — OMEPRAZOLE 20 MG/1
20 CAPSULE, DELAYED RELEASE ORAL EVERY EVENING
Status: DISCONTINUED | OUTPATIENT
Start: 2023-05-26 | End: 2023-05-31 | Stop reason: HOSPADM

## 2023-05-26 RX ORDER — MIDODRINE HYDROCHLORIDE 5 MG/1
5 TABLET ORAL
Status: DISCONTINUED | OUTPATIENT
Start: 2023-05-26 | End: 2023-05-27

## 2023-05-26 RX ORDER — LOVASTATIN 20 MG/1
20 TABLET ORAL NIGHTLY
Status: DISCONTINUED | OUTPATIENT
Start: 2023-05-26 | End: 2023-05-31 | Stop reason: HOSPADM

## 2023-05-26 RX ORDER — CHOLECALCIFEROL (VITAMIN D3) 125 MCG
5 CAPSULE ORAL
Status: DISCONTINUED | OUTPATIENT
Start: 2023-05-26 | End: 2023-05-31 | Stop reason: HOSPADM

## 2023-05-26 RX ORDER — GABAPENTIN 100 MG/1
100 CAPSULE ORAL NIGHTLY PRN
Status: DISCONTINUED | OUTPATIENT
Start: 2023-05-26 | End: 2023-05-31 | Stop reason: HOSPADM

## 2023-05-26 RX ORDER — SODIUM CHLORIDE, SODIUM LACTATE, POTASSIUM CHLORIDE, AND CALCIUM CHLORIDE .6; .31; .03; .02 G/100ML; G/100ML; G/100ML; G/100ML
500 INJECTION, SOLUTION INTRAVENOUS ONCE
Status: COMPLETED | OUTPATIENT
Start: 2023-05-26 | End: 2023-05-26

## 2023-05-26 RX ORDER — AMOXICILLIN 250 MG
2 CAPSULE ORAL 2 TIMES DAILY
Status: DISCONTINUED | OUTPATIENT
Start: 2023-05-26 | End: 2023-05-31 | Stop reason: HOSPADM

## 2023-05-26 RX ORDER — ENOXAPARIN SODIUM 100 MG/ML
40 INJECTION SUBCUTANEOUS DAILY
Status: DISCONTINUED | OUTPATIENT
Start: 2023-05-26 | End: 2023-05-31 | Stop reason: HOSPADM

## 2023-05-26 RX ORDER — BISACODYL 10 MG
10 SUPPOSITORY, RECTAL RECTAL
Status: DISCONTINUED | OUTPATIENT
Start: 2023-05-26 | End: 2023-05-31 | Stop reason: HOSPADM

## 2023-05-26 RX ORDER — POLYETHYLENE GLYCOL 3350 17 G/17G
1 POWDER, FOR SOLUTION ORAL
Status: DISCONTINUED | OUTPATIENT
Start: 2023-05-26 | End: 2023-05-31 | Stop reason: HOSPADM

## 2023-05-26 RX ADMIN — LOVASTATIN 20 MG: 20 TABLET ORAL at 01:27

## 2023-05-26 RX ADMIN — Medication 5 MG: at 01:27

## 2023-05-26 RX ADMIN — DOCUSATE SODIUM 50 MG AND SENNOSIDES 8.6 MG 2 TABLET: 8.6; 5 TABLET, FILM COATED ORAL at 17:49

## 2023-05-26 RX ADMIN — SODIUM CHLORIDE, POTASSIUM CHLORIDE, SODIUM LACTATE AND CALCIUM CHLORIDE: 600; 310; 30; 20 INJECTION, SOLUTION INTRAVENOUS at 21:39

## 2023-05-26 RX ADMIN — SODIUM CHLORIDE, POTASSIUM CHLORIDE, SODIUM LACTATE AND CALCIUM CHLORIDE: 600; 310; 30; 20 INJECTION, SOLUTION INTRAVENOUS at 01:32

## 2023-05-26 RX ADMIN — ENOXAPARIN SODIUM 40 MG: 100 INJECTION SUBCUTANEOUS at 01:28

## 2023-05-26 RX ADMIN — ENOXAPARIN SODIUM 40 MG: 100 INJECTION SUBCUTANEOUS at 17:49

## 2023-05-26 RX ADMIN — LOVASTATIN 20 MG: 20 TABLET ORAL at 21:36

## 2023-05-26 RX ADMIN — DOCUSATE SODIUM 50 MG AND SENNOSIDES 8.6 MG 2 TABLET: 8.6; 5 TABLET, FILM COATED ORAL at 05:13

## 2023-05-26 RX ADMIN — Medication 5 MG: at 21:36

## 2023-05-26 RX ADMIN — SODIUM CHLORIDE, POTASSIUM CHLORIDE, SODIUM LACTATE AND CALCIUM CHLORIDE: 600; 310; 30; 20 INJECTION, SOLUTION INTRAVENOUS at 07:23

## 2023-05-26 RX ADMIN — OMEPRAZOLE 20 MG: 20 CAPSULE, DELAYED RELEASE ORAL at 17:49

## 2023-05-26 RX ADMIN — SODIUM CHLORIDE, POTASSIUM CHLORIDE, SODIUM LACTATE AND CALCIUM CHLORIDE 500 ML: 600; 310; 30; 20 INJECTION, SOLUTION INTRAVENOUS at 06:21

## 2023-05-26 RX ADMIN — SODIUM CHLORIDE, POTASSIUM CHLORIDE, SODIUM LACTATE AND CALCIUM CHLORIDE: 600; 310; 30; 20 INJECTION, SOLUTION INTRAVENOUS at 13:55

## 2023-05-26 RX ADMIN — MIDODRINE HYDROCHLORIDE 5 MG: 5 TABLET ORAL at 17:49

## 2023-05-26 ASSESSMENT — COGNITIVE AND FUNCTIONAL STATUS - GENERAL
DRESSING REGULAR LOWER BODY CLOTHING: A LOT
STANDING UP FROM CHAIR USING ARMS: A LOT
DAILY ACTIVITIY SCORE: 15
MOVING TO AND FROM BED TO CHAIR: UNABLE
MOVING TO AND FROM BED TO CHAIR: A LITTLE
CLIMB 3 TO 5 STEPS WITH RAILING: A LOT
MOVING FROM LYING ON BACK TO SITTING ON SIDE OF FLAT BED: A LOT
WALKING IN HOSPITAL ROOM: A LOT
TURNING FROM BACK TO SIDE WHILE IN FLAT BAD: A LITTLE
MOVING FROM LYING ON BACK TO SITTING ON SIDE OF FLAT BED: UNABLE
DRESSING REGULAR UPPER BODY CLOTHING: A LITTLE
DRESSING REGULAR UPPER BODY CLOTHING: A LITTLE
HELP NEEDED FOR BATHING: A LOT
SUGGESTED CMS G CODE MODIFIER DAILY ACTIVITY: CK
HELP NEEDED FOR BATHING: A LOT
TURNING FROM BACK TO SIDE WHILE IN FLAT BAD: A LOT
PERSONAL GROOMING: A LITTLE
EATING MEALS: A LITTLE
TOILETING: A LOT
MOBILITY SCORE: 12
MOBILITY SCORE: 13
EATING MEALS: A LITTLE
CLIMB 3 TO 5 STEPS WITH RAILING: TOTAL
DAILY ACTIVITIY SCORE: 16
STANDING UP FROM CHAIR USING ARMS: A LITTLE
DRESSING REGULAR LOWER BODY CLOTHING: A LOT
WALKING IN HOSPITAL ROOM: A LITTLE
TOILETING: A LITTLE
SUGGESTED CMS G CODE MODIFIER MOBILITY: CL
SUGGESTED CMS G CODE MODIFIER DAILY ACTIVITY: CK
PERSONAL GROOMING: A LITTLE
SUGGESTED CMS G CODE MODIFIER MOBILITY: CL

## 2023-05-26 ASSESSMENT — ENCOUNTER SYMPTOMS
MYALGIAS: 1
WEAKNESS: 0
COUGH: 0
SPUTUM PRODUCTION: 0
DEPRESSION: 0
BLURRED VISION: 0
ABDOMINAL PAIN: 0
FALLS: 1
DIARRHEA: 0
DIZZINESS: 0
FEVER: 0
SHORTNESS OF BREATH: 0
CHILLS: 0
BLOOD IN STOOL: 0
DOUBLE VISION: 0
VOMITING: 0
HEMOPTYSIS: 0
HEADACHES: 0
PALPITATIONS: 0
CONSTIPATION: 0
NAUSEA: 0

## 2023-05-26 ASSESSMENT — LIFESTYLE VARIABLES
CONSUMPTION TOTAL: NEGATIVE
ON A TYPICAL DAY WHEN YOU DRINK ALCOHOL HOW MANY DRINKS DO YOU HAVE: 0
AVERAGE NUMBER OF DAYS PER WEEK YOU HAVE A DRINK CONTAINING ALCOHOL: 0
DOES PATIENT WANT TO STOP DRINKING: CANNOT ASSESS
EVER HAD A DRINK FIRST THING IN THE MORNING TO STEADY YOUR NERVES TO GET RID OF A HANGOVER: NO
TOTAL SCORE: 0
HAVE PEOPLE ANNOYED YOU BY CRITICIZING YOUR DRINKING: NO
EVER FELT BAD OR GUILTY ABOUT YOUR DRINKING: NO
ALCOHOL_USE: NO
HAVE YOU EVER FELT YOU SHOULD CUT DOWN ON YOUR DRINKING: NO
HOW MANY TIMES IN THE PAST YEAR HAVE YOU HAD 5 OR MORE DRINKS IN A DAY: 0
TOTAL SCORE: 0
TOTAL SCORE: 0

## 2023-05-26 ASSESSMENT — ACTIVITIES OF DAILY LIVING (ADL): TOILETING: INDEPENDENT

## 2023-05-26 ASSESSMENT — PATIENT HEALTH QUESTIONNAIRE - PHQ9
1. LITTLE INTEREST OR PLEASURE IN DOING THINGS: NOT AT ALL
SUM OF ALL RESPONSES TO PHQ9 QUESTIONS 1 AND 2: 0
2. FEELING DOWN, DEPRESSED, IRRITABLE, OR HOPELESS: NOT AT ALL
1. LITTLE INTEREST OR PLEASURE IN DOING THINGS: NOT AT ALL
SUM OF ALL RESPONSES TO PHQ9 QUESTIONS 1 AND 2: 0
2. FEELING DOWN, DEPRESSED, IRRITABLE, OR HOPELESS: NOT AT ALL

## 2023-05-26 ASSESSMENT — FIBROSIS 4 INDEX
FIB4 SCORE: 2.41
FIB4 SCORE: 2.46
FIB4 SCORE: 2.41

## 2023-05-26 ASSESSMENT — PAIN DESCRIPTION - PAIN TYPE: TYPE: ACUTE PAIN

## 2023-05-26 ASSESSMENT — GAIT ASSESSMENTS: GAIT LEVEL OF ASSIST: UNABLE TO PARTICIPATE

## 2023-05-26 NOTE — ED NOTES
"Pt daughter now at bedside. Pt resting in bed. States pain is \" . 5\" but states she feels like she will need something for pain. Plan to move pt to blue pod. Report given to Joanne HOOPER   "

## 2023-05-26 NOTE — ASSESSMENT & PLAN NOTE
Patient continues to have moderate mitral stenosis.  Does not appear to be in heart failure.  She would not be a candidate for valvuloplasty due to the calcifications.    I discussed with Dr. Torrez of cardiology.  May or may not be related to mitral valve stenosis but patient is not a candidate for open heart surgery and valve replacement.

## 2023-05-26 NOTE — THERAPY
Physical Therapy   Initial Evaluation     Patient Name: Rashmi Parra  Age:  83 y.o., Sex:  female  Medical Record #: [id number]  Today's Date: 5/26/2023     Precautions: Fall Risk    Assessment  Patient is 83 y.o. female admitted s/p syncope, reported minimal intake that day and baseline low BP. PMH includes orthostatic hypotension, chronic RD, depression, breast CA s/p B mastectomies and radiation. Pt with odd presentation today however attributes this to minimal sleep last night. She is generally lethargic with poor initiation, required max cueing to attend initially keeping eyes mostly closed. She requires assistance for bed mob and STS although reports ind with mob at home as she intermittently cares for her  whom has PD. BP initially soft in supine in 80s/50s however elevated with activity to 110s/60s. Anticipate need for placement prior to DC home at this time.     Plan    Physical Therapy Initial Treatment Plan   Treatment Plan : Bed Mobility, Equipment, Gait Training, Neuro Re-Education / Balance, Self Care / Home Evaluation, Stair Training, Therapeutic Activities, Therapeutic Exercise  Treatment Frequency: 4 Times per Week  Duration: Until Therapy Goals Met    DC Equipment Recommendations: Unable to determine at this time  Discharge Recommendations: Recommend post-acute placement for additional physical therapy services prior to discharge home     Objective    Prior Living Situation   Prior Services Intermittent Physical Support for ADL Per Service;Intermittent Physical Support for ADL Per Family   Housing / Facility 1 Story House   Steps Into Home 2   Equipment Owned 4-Wheel Walker   Lives with - Patient's Self Care Capacity Spouse;Attendant / Paid Care Giver   Comments Pt reports children assist with housekeeping, per RN pt has hired caregivers during the day. Pts  has PD and unable to provide any assistance.   Prior Level of Functional Mobility   Bed Mobility Independent   Transfer Status  Independent   Ambulation Independent   Ambulation Distance Household distances   Assistive Devices Used 4-Wheel Walker   Stairs Independent   History of Falls   History of Falls Yes   Date of Last Fall 05/25/23  (related to syncope)   Cognition    Cognition / Consciousness X   Level of Consciousness   (lethargic)   New Learning Impaired   Sequencing Impaired   Initiation Impaired   Comments difficulty waking initially and keeping eyes open, pt very slow to initiate all fxnl tasks, poor evidence of new learning/ STM recall requiring repetitive instruction   Active ROM Lower Body    Active ROM Lower Body  WDL   Strength Lower Body   Lower Body Strength  X   Gross Strength Generalized Weakness, Equal Bilaterally   Comments BLE strength 3/5   Balance Assessment   Sitting Balance (Static) Poor +   Sitting Balance (Dynamic) Poor +   Standing Balance (Static) Poor +   Standing Balance (Dynamic) Poor   Weight Shift Sitting Poor   Weight Shift Standing Poor   Comments w/ FWW   Bed Mobility    Supine to Sit Moderate Assist   Sit to Supine Moderate Assist   Scooting Moderate Assist   Gait Analysis   Gait Level Of Assist Unable to Participate   Weight Bearing Status no restrictions   Functional Mobility   Sit to Stand Minimal Assist   Bed, Chair, Wheelchair Transfer Unable to Participate  (deferred 2/2 fatigue)   Comments took side steps near EOB   How much difficulty does the patient currently have...   Turning over in bed (including adjusting bedclothes, sheets and blankets)? 2   Sitting down on and standing up from a chair with arms (e.g., wheelchair, bedside commode, etc.) 1   Moving from lying on back to sitting on the side of the bed? 1   How much help from another person does the patient currently need...   Moving to and from a bed to a chair (including a wheelchair)? 3   Need to walk in a hospital room? 3   Climbing 3-5 steps with a railing? 2   6 clicks Mobility Score 12   Activity Tolerance   Sitting in Chair NT    Sitting Edge of Bed 10 mins   Standing 1 min total   Short Term Goals    Short Term Goal # 1 Pt will perform supine<>sit with HOB flat and SPV within 6 visits.   Short Term Goal # 2 Pt will perform bed<>chair transfers with FWW and SPV within 6 visits.   Short Term Goal # 3 Pt will amb >50ft with FWW and SPV within 6 visits.   Short Term Goal # 4 Pt will ascend/descend 1 step with FWW and SPV within 6 visits.

## 2023-05-26 NOTE — ED NOTES
Med Rec complete per Pt's pharmacy records.    Unknown last doses.  Allergies reviewed.  Home Pharmacy:  [facility]

## 2023-05-26 NOTE — CARE PLAN
The patient is Stable - Low risk of patient condition declining or worsening    Shift Goals  Clinical Goals: Syncope workup, tele, PT  Patient Goals: Rest, feel better  Family Goals: RENÉE      Problem: Knowledge Deficit - Standard  Goal: Patient and family/care givers will demonstrate understanding of plan of care, disease process/condition, diagnostic tests and medications  Outcome: Progressing     Problem: Fall Risk  Goal: Patient will remain free from falls  Outcome: Progressing     Problem: Pain - Standard  Goal: Alleviation of pain or a reduction in pain to the patient’s comfort goal  Outcome: Progressing

## 2023-05-26 NOTE — PROGRESS NOTES
Orders placed for discharge lounge. Patient is dressed and ambulating in hallway. Belongings have been gathered and prepared for transport.

## 2023-05-26 NOTE — ASSESSMENT & PLAN NOTE
As per presentation patient reports having felt a little dizzy prior to the event.  She states that she was going to the refrigerator to get some water.      Likely combination of medication effects and dehydration.    Question of secondary adrenal insufficiency.     all IV fluids, midodrine, and steroids are currently on hold for now    Consider restarting fludrocortisone if patient continues to have presyncope symptoms.  Continue telemetry monitoring

## 2023-05-26 NOTE — ASSESSMENT & PLAN NOTE
Likely due to poor oral fluid intake.  Adrenal insufficiency at least primary has been ruled out.  Status post IV fluid hydration  Continue to encourage oral intake.

## 2023-05-26 NOTE — PROGRESS NOTES
4 Eyes Skin Assessment Completed by RUFUS Harden and RUFUS Dickson.    Head Bruising and Swelling  Ears WDL  Nose WDL  Mouth WDL  Neck WDL  Breast/Chest WDL  Shoulder Blades WDL  Spine WDL  (R) Arm/Elbow/Hand WDL  (L) Arm/Elbow/Hand WDL  Abdomen WDL  Groin WDL  Scrotum/Coccyx/Buttocks WDL  (R) Leg Redness and Edema  (L) Leg Redness and Edema  (R) Heel/Foot/Toe Redness and Edema  (L) Heel/Foot/Toe Redness and Edema          Devices In Places Tele Box, Blood Pressure Cuff, Pulse Ox, and Nasal Cannula      Interventions In Place Q2 Turns and Pressure Redistribution Mattress    Possible Skin Injury No    Pictures Uploaded Into Epic N/A  Wound Consult Placed N/A  RN Wound Prevention Protocol Ordered No

## 2023-05-26 NOTE — THERAPY
Occupational Therapy   Initial Evaluation     Patient Name: Rashmi Parra  Age:  83 y.o., Sex:  female  Medical Record #: [id number]  Today's Date: 5/26/2023     Precautions  Precautions: Fall Risk  Comments: cognition impaired    Assessment  Patient is 83 y.o. female admitted for syncope w/GLF, found to have HoTN and chronic respiratory failure. Pt lethargic, difficulty keeping eyes open, and very delayed responses and command following; poor sequencing, as appeared unable to sequencing use of utensils for eating, and delayed with STS/steps. Pt reports  has Parkinson's disease and can provide only little to no assistance. Reports has 2 dtrs who intermittently assist with IADLs. Per RN, pt has caregivers who assist during the day. Currently limited by decreased functional mobility, activity tolerance, cognition, strength, AROM, coordination, balance, and lethargy which are currently affecting pt's ability to complete ADLs/IADLs at baseline. Will continue to follow.    Plan    Occupational Therapy Initial Treatment Plan   Treatment Interventions: Self Care / Activities of Daily Living, Cognitive Skill Development, Neuro Re-Education / Balance, Therapeutic Exercises, Adaptive Equipment, Therapeutic Activity  Treatment Frequency: 3 Times per Week  Duration: Until Therapy Goals Met    DC Equipment Recommendations: Unable to determine at this time     Objective     05/26/23 0835   Prior Living Situation   Prior Services Intermittent Physical Support for ADL Per Family   Housing / Facility 1 Story House   Steps Into Home 2   Bathroom Set up Walk In Shower;Shower Chair;Grab Bars  (doesn't use seat)   Equipment Owned 4-Wheel Walker;Tub / Shower Seat;Grab Bar(s) In Tub / Shower   Lives with - Patient's Self Care Capacity Spouse   Comments Pt reports  has Parkinson's and can provide only little to no assistance. Reports has 2 dtrs who intermittently assist with IADLs. Per RN, pt has caregivers who assist during  the day.   Prior Level of ADL Function   Self Feeding Independent   Grooming / Hygiene Independent   Bathing Independent   Dressing Independent   Toileting Independent   Prior Level of IADL Function   Medication Management Independent   Laundry Requires Assist   Kitchen Mobility Requires Assist   Finances Independent   Home Management Requires Assist   Shopping Dependent   Prior Level Of Mobility Independent With Device in Home   History of Falls   History of Falls Yes   Date of Last Fall   (syncope and collapse)   Precautions   Precautions Fall Risk   Comments cognition impaired   Vitals   O2 (LPM) 3   O2 Delivery Device Silicone Nasal Cannula   Vitals Comments BP in supine 89/52, sitting 118/71, standing 111/67; reports only wears O2 at home at night   Pain 0 - 10 Group   Therapist Pain Assessment Post Activity Pain Same as Prior to Activity;During Activity;Nurse Notified;0   Cognition    Cognition / Consciousness X   Speech/ Communication Delayed Responses   Level of Consciousness   (lethargic)   Safety Awareness Impaired   New Learning Impaired   Attention Impaired   Sequencing Impaired   Initiation Impaired   Comments cooperative with max encouragement. Lethargic, difficulty keeping eyes open and very delayed responses and command following. Poor sequencing, appeared unable to sequencing use of utensils for eating, and delayed with STS/steps   Passive ROM Upper Body   Passive ROM Upper Body WDL   Active ROM Upper Body   Active ROM Upper Body  X   Comments limited by lethargy and cognition   Strength Upper Body   Upper Body Strength  X   Gross Strength Generalized Weakness, Equal Bilaterally.    Comments limited by lethargy and cognition   Coordination Upper Body   Coordination X   Comments limited by lethargy and cognition   Balance Assessment   Sitting Balance (Static) Fair -   Sitting Balance (Dynamic) Poor +   Standing Balance (Static) Poor +   Standing Balance (Dynamic) Poor   Weight Shift Sitting Poor    Weight Shift Standing Poor   Comments w/FWW   Bed Mobility    Supine to Sit Moderate Assist   Sit to Supine Moderate Assist   Scooting Moderate Assist   Comments req extra time; HOB elevated   ADL Assessment   Eating Minimal Assist  (cutting food; unclear if able to sequence use of utensil to feed self)   Grooming Minimal Assist;Seated   Lower Body Dressing Maximal Assist   Toileting Moderate Assist  (declined; purewick, likely could get to [facility])   mRS Prior to admission   Prior to admission mRS 0   Modified [place] (mRS)   Modified [place] Score 4   Functional Mobility   Sit to Stand Minimal Assist   Bed, Chair, Wheelchair Transfer Unable to Participate   Toilet Transfers Unable to Participate   Mobility w/FWW; steps at EOB. Limited by fatigue/cognition   Edema / Skin Assessment   Edema / Skin  Not Assessed   Activity Tolerance   Comments Limited by fatigue/cognition   Patient / Family Goals   Patient / Family Goal #1 to go home   Short Term Goals   Short Term Goal # 1 LB dressing w/SPV   Short Term Goal # 2 standing g/h with CGA   Short Term Goal # 3 toilet txf with CGA   Education Group   Education Provided Role of Occupational Therapist;Pathology of bedrest   Role of Occupational Therapist Patient Response Patient;Acceptance;Explanation;Reinforcement Needed;No Learning Evidence   Pathology of Bedrest Patient Response Patient;Acceptance;Explanation;Reinforcement Needed;No Learning Evidence   Occupational Therapy Initial Treatment Plan    Treatment Interventions Self Care / Activities of Daily Living;Cognitive Skill Development;Neuro Re-Education / Balance;Therapeutic Exercises;Adaptive Equipment;Therapeutic Activity   Treatment Frequency 3 Times per Week   Duration Until Therapy Goals Met   Problem List   Problem List Decreased Active Daily Living Skills;Decreased Homemaking Skills;Decreased Upper Extremity Strength Right;Decreased Upper Extremity Strength Left;Decreased Functional Mobility;Decreased Activity  Tolerance;Safety Awareness Deficits / Cognition;Impaired Cognitive Function;Impaired Postural Control / Balance

## 2023-05-26 NOTE — ED PROVIDER NOTES
ED Provider Note    CHIEF COMPLAINT  Chief Complaint   Patient presents with    T-5000 Head Injury     Patient bib ems from home for a GLF, unknown LOC, -thinners. Patient does not recall event, c/o right sided back pain and small hematoma to head.        HPI  Rashmi Parra is a 83 y.o. female who presents for evaluation of ground-level fall.  Patient was apparently witnessed falling by her  but does not remember exactly what happened.  There was a presumed loss of consciousness but this too is unclear.  Patient does remember hitting the ground with her head and endorses a small amount of pain to the back of her head as well as the back of her neck.  She also notes bilateral flank pain which started gradually during the ambulance trip in.  She noted it started on the left and now is on the right.  She notes no difficulty moving her lower extremities or upper extremities and no numbness or tingling to extremities or face.  She notes no difficulty speaking or swallowing and has no visual symptoms.  EXTERNAL RECORDS REVIEWED  Noted last office visit on the eighth of this month for hypotension.  Patient apparently has a chronic issue with blood pressure running low at home.  Her amlodipine was stopped several months ago for this same reason.  It was also thought at that time that she was not hydrating very well.  Patient noted to have type 2 diabetes, hyper lipidemia, COPD, and history of breast cancer.  ROS  Constitutional: No fevers or chills  Skin: No rashes  HEENT: No sore throat, runny nose.  Posterior head pain.  No double vision or blurry vision  Neck: No neck pain  Chest: No pain or rashes  Pulm: No shortness of breath, cough, wheezing, stridor, or pain with inspiration/expiration  Gastrointestinal: No nausea, vomiting, diarrhea, constipation, bloating, melena, hematochezia or abdominal pain.  Genitourinary: No dysuria or hematuria  Musculoskeletal: No pain, swelling, or weakness  Neurologic: No  sensory or focal motor changes to extremities. No confusion or disorientation currently, but amnestic to events  Heme: No bleeding or bruising problems.   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    SOCIAL HISTORY  Social History     Tobacco Use    Smoking status: Former     Packs/day: 1.00     Years: 30.00     Pack years: 30.00     Types: Cigarettes     Quit date: 1992     Years since quittin.4    Smokeless tobacco: Former   Vaping Use    Vaping Use: Never used   Substance and Sexual Activity    Alcohol use: Yes     Alcohol/week: 0.6 oz     Types: 1 Glasses of wine per week     Comment: occ    Drug use: No    Sexual activity: Not on file       SURGICAL HISTORY   has a past surgical history that includes other orthopedic surgery (); gyn surgery (); cholecystectomy (); knee arthroplasty total (Right, 2015); cataract extraction with iol (Bilateral); lefort colpoclesis (2019); bladder sling female (2019); mastectomy, modified radical (Right, 2019); node biopsy sentinel (Right, 2019); axillary node dissection (2019); mastectomy (Left, 2019); flap closure (Bilateral, 2019); wide excision (Right, 2019); incision and drainage general (Bilateral, 2019); abdominal hysterectomy total (); total hip arthroplasty (Right,  "9/30/2021); and orif, ankle (Right, 6/10/2022).    CURRENT MEDICATIONS  Home Medications       Reviewed by Rosalva Coy (Pharmacy Tech) on 05/25/23 at 2309  Med List Status: Complete     Medication Last Dose Status   acetaminophen (TYLENOL) 500 MG Tab UNK Active   anastrozole (ARIMIDEX) 1 MG Tab UNK Active   Dexlansoprazole 60 MG CAPSULE DELAYED RELEASE delayed-release capsule UNK Active   DULoxetine (CYMBALTA) 30 MG Cap DR Particles UNK Active   DULoxetine (CYMBALTA) 60 MG Cap DR Particles delayed-release capsule UNK Active   gabapentin (NEURONTIN) 100 MG Cap UNK Active   lovastatin (MEVACOR) 20 MG Tab UNK Active   Melatonin 5 MG Tab UNK Active   metFORMIN (GLUCOPHAGE) 500 MG Tab UNK Active   MYRBETRIQ 50 MG TABLET SR 24 HR UNK Active                     ALLERGIES  No Known Allergies    PHYSICAL EXAM  VITAL SIGNS: BP 93/55   Pulse 76   Temp 36.2 °C (97.2 °F) (Temporal)   Resp 20   Ht 1.6 m (5' 3\")   Wt 61.2 kg (135 lb)   LMP  (LMP Unknown)   SpO2 94%   BMI 23.91 kg/m²    Gen: Alert in no apparent distress.  HEENT: No signs of trauma, Bilateral external ears normal, Nose normal. Conjunctiva normal, Non-icteric.   Neck:  No tenderness, Supple, No masses  Lymphatic: No cervical lymphadenopathy noted.   Cardiovascular: Regular rate and rhythm, no murmurs.  Capillary refill less than 3 seconds to all extremities, 2+ distal pulses.  Thorax & Lungs: Normal breath sounds, No respiratory distress, No wheezing bilateral chest rise  Abdomen: Bowel sounds normal, Soft, No tenderness, No masses, No pulsatile masses. No Guarding or rebound  Skin: Warm, Dry, No erythema, No rash noted to exposed areas.   Back: No bony tenderness, No CVA tenderness.   Extremities: Intact distal pulses, No edema  Neurologic: Alert , no facial droop, grossly normal coordination and strength  Psychiatric: Affect pleasant    INITIAL IMPRESSION  Patient arrives after ground-level fall which may have been the result of syncope.  Patient " does have a history of orthostatic hypotension and poor hydration but it is unclear if this is the inciting problem today.  The patient does not recall the events very well but states she does remember hitting the ground.  She is nontoxic-appearing but does have a hematoma requiring CT imaging of her head and cervical spine.  Additionally, medical work-up will be obtained out of the possibility of a cardiac event such as bradycardia or tachyarrhythmia.  Patient has no known arrhythmias or coronary artery disease.  She does not have any shortness of breath or chest pain currently and is normotensive and nontachycardic.  I feel these findings make PE very unlikely in this setting.  I do not feel CT angiography will be necessary.  LABS  Results for orders placed or performed during the hospital encounter of 05/25/23   CBC WITH DIFFERENTIAL   Result Value Ref Range    WBC 6.2 4.8 - 10.8 K/uL    RBC 4.57 4.20 - 5.40 M/uL    Hemoglobin 14.2 12.0 - 16.0 g/dL    Hematocrit 42.0 37.0 - 47.0 %    MCV 91.9 81.4 - 97.8 fL    MCH 31.1 27.0 - 33.0 pg    MCHC 33.8 32.2 - 35.5 g/dL    RDW 46.6 35.9 - 50.0 fL    Platelet Count 238 164 - 446 K/uL    MPV 9.9 9.0 - 12.9 fL    Neutrophils-Polys 77.60 (H) 44.00 - 72.00 %    Lymphocytes 12.20 (L) 22.00 - 41.00 %    Monocytes 9.40 0.00 - 13.40 %    Eosinophils 0.30 0.00 - 6.90 %    Basophils 0.20 0.00 - 1.80 %    Immature Granulocytes 0.30 0.00 - 0.90 %    Nucleated RBC 0.00 0.00 - 0.20 /100 WBC    Neutrophils (Absolute) 4.78 1.82 - 7.42 K/uL    Lymphs (Absolute) 0.75 (L) 1.00 - 4.80 K/uL    Monos (Absolute) 0.58 0.00 - 0.85 K/uL    Eos (Absolute) 0.02 0.00 - 0.51 K/uL    Baso (Absolute) 0.01 0.00 - 0.12 K/uL    Immature Granulocytes (abs) 0.02 0.00 - 0.11 K/uL    NRBC (Absolute) 0.00 K/uL   COMP METABOLIC PANEL   Result Value Ref Range    Sodium 135 135 - 145 mmol/L    Potassium 4.2 3.6 - 5.5 mmol/L    Chloride 100 96 - 112 mmol/L    Co2 25 20 - 33 mmol/L    Anion Gap 10.0 7.0 - 16.0     Glucose 96 65 - 99 mg/dL    Bun 27 (H) 8 - 22 mg/dL    Creatinine 0.68 0.50 - 1.40 mg/dL    Calcium 9.0 8.5 - 10.5 mg/dL    AST(SGOT) 27 12 - 45 U/L    ALT(SGPT) 15 2 - 50 U/L    Alkaline Phosphatase 96 30 - 99 U/L    Total Bilirubin 0.3 0.1 - 1.5 mg/dL    Albumin 3.8 3.2 - 4.9 g/dL    Total Protein 6.4 6.0 - 8.2 g/dL    Globulin 2.6 1.9 - 3.5 g/dL    A-G Ratio 1.5 g/dL   TROPONIN   Result Value Ref Range    Troponin T 40 (H) 6 - 19 ng/L   LACTIC ACID   Result Value Ref Range    Lactic Acid 0.9 0.5 - 2.0 mmol/L   URINALYSIS (UA)    Specimen: Urine   Result Value Ref Range    Color Yellow     Character Clear     Specific Gravity 1.010 <1.035    Ph 6.5 5.0 - 8.0    Glucose Negative Negative mg/dL    Ketones Trace (A) Negative mg/dL    Protein Negative Negative mg/dL    Bilirubin Negative Negative    Urobilinogen, Urine 0.2 Negative    Nitrite Negative Negative    Leukocyte Esterase Negative Negative    Occult Blood Negative Negative    Micro Urine Req see below    MAGNESIUM   Result Value Ref Range    Magnesium 1.8 1.5 - 2.5 mg/dL   CORRECTED CALCIUM   Result Value Ref Range    Correct Calcium 9.2 8.5 - 10.5 mg/dL   ESTIMATED GFR   Result Value Ref Range    GFR (CKD-EPI) 86 >60 mL/min/1.73 m 2   TROPONIN   Result Value Ref Range    Troponin T 44 (H) 6 - 19 ng/L   EKG (NOW)   Result Value Ref Range    Report       [facility] Emergency Dept.    Test Date:  2023  Pt Name:    LINNEA HOFFMANN                 Department: ER  MRN:        [id number]                      Room:        16  Gender:     Female                       Technician: [id number]  :        [id number]                   Requested By:CHENG OTTO  Order #:    [id number]                    Reading MD: Cheng Otto MD    Measurements  Intervals                                Axis  Rate:       68                           P:          45  DC:         159                          QRS:        2  QRSD:       94                           T:           24  QT:         430  QTc:        458    Interpretive Statements  Sinus rhythm  Left atrial enlargement  Probable left ventricular hypertrophy  Compared to ECG 06/12/2022 11:09:50  Atrial abnormality now present  Electronically Signed On 5- 22:53:58 PDT by Feroz Rosado MD       I have independently interpreted this EKG  Sinus rhythm with a rate of 68.  Intervals appear to be within normal limits, there are no ST or T wave changes to suggest ischemia.  There is no apparent ectopy.  There do not appear to be any changes in the intervals compared to her last EKG performed in 2022 but she may meet criteria for left ventricular hypertrophy now.  RADIOLOGY  DX-CHEST-PORTABLE (1 VIEW)   Final Result         1.  Interstitial pulmonary parenchymal prominence suggest chronic underlying lung disease, component of interstitial edema and/or infiltrates not excluded.   2.  Atherosclerosis      CT-CSPINE WITHOUT PLUS RECONS   Final Result      No CT evidence of acute cervical spine abnormality.      Thoracic kyphosis, cervical lordosis is exaggerated and there is multilevel facet ankylosis which could be degenerative favored over ankylosing spondylitis or rheumatoid arthritis      CT-HEAD W/O   Final Result      1.  No evidence of acute intracranial process.      2.  Cerebral atrophy as well as periventricular chronic small vessel ischemic change.         EC-ECHOCARDIOGRAM COMPLETE W/O CONT    (Results Pending)     I have independently interpreted the diagnostic imaging associated with this visit and am waiting the final reading from the radiologist.   My preliminary interpretation is a follows: Diagnostic chest x-ray: There are no convincing opacities to suggest infiltrates or effusions.  Cardiomediastinal silhouette appears to be within normal limits.  There is no bony abnormalities.    HYDRATION: Based on the patient's presentation of Dehydration and Eldery ALOC the patient was given IV fluids. IV Hydration was  used because oral hydration was not adequate alone. Upon recheck following hydration, the patient was stable.    Critical Care Note  Upon my evaluation, this patient had high probability of imminent and life-threatening deterioration due to elevated troponin, syncopal event, possible cardiac event, which required my direct attention, intervention, and personal management. I personally provided 30 minutes of critical care time exclusive of time spent on separately billable procedures. Time includes review of laboratory data, radiology results, discussion with consultants, and monitoring for potential decompensation.      COURSE & MEDICAL DECISION MAKING  Pertinent Labs & Imaging studies reviewed. (See chart for details)  ED observation? No  Patient has for evaluation of head injury after ground-level fall which may be the result of a syncopal event.  Her head injury was nonemergent and she did not have any laceration repair.  Her CT of the head as well as the CT of her neck did not demonstrate any significant or emergent pathology.  Her labs were generally reassuring but her troponin was elevated and the repeat troponin was trending up.  Notable that the second troponin was erroneously drawn early but was still trending up.  This is concerning for possible recent cardiac event although it does not meet criteria for ACS.  Notable that she did not have any symptoms of ACS between her arrival and reevaluation after the diagnostics had returned.  After discussion with the patient and her daughter, I feel it best served to perform an extended evaluation as an inpatient, possibly including an echo or other cardiac related testing.  Patient was discussed with the resident working with the hospitalist will evaluate the patient in the emergency department.  Patient remained hemodynamically stable and did not experience any deterioration in her condition otherwise.        I have discussed management of the patient with the  following physicians and TAN's: Hospitalist    Escalation of care considered, and ultimately not performed: CT imaging of the chest    Barriers to care at this time, including but not limited to: None.     Decision tools and Rx drugs considered including, but not limited to : Henefer syncope rule    Discussion of management with other QHP or appropriate source(s): None      FINAL IMPRESSION  1. Syncope and collapse    2. Elevated troponin    3. Scalp hematoma, initial encounter        Electronically signed by: Feroz Rosdao M.D., 5/25/2023 7:42 PM

## 2023-05-26 NOTE — CARE PLAN
The patient is Stable - Low risk of patient condition declining or worsening      Shift Goals  Clinical Goals: Echo, PT/OT consult  Patient Goals: Pain control  Family Goals: RENÉE    Progress made toward(s) clinical / shift goals:    Problem: Knowledge Deficit - Standard  Goal: Patient and family/care givers will demonstrate understanding of plan of care, disease process/condition, diagnostic tests and medications  Description: Target End Date:  1-3 days or as soon as patient condition allows    Document in Patient Education    1.  Patient and family/caregiver oriented to unit, equipment, visitation policy and means for communicating concern  2.  Complete/review Learning Assessment  3.  Assess knowledge level of disease process/condition, treatment plan, diagnostic tests and medications  4.  Explain disease process/condition, treatment plan, diagnostic tests and medications  Outcome: Progressing     Problem: Fall Risk  Goal: Patient will remain free from falls  Description: Target End Date:  Prior to discharge or change in level of care    Document interventions on the [facility] Fall Risk Assessment    1.  Assess for fall risk factors  2.  Implement fall precautions  Outcome: Progressing     Problem: Pain - Standard  Goal: Alleviation of pain or a reduction in pain to the patient’s comfort goal  Description: Target End Date:  Prior to discharge or change in level of care    Document on Vitals flowsheet    1.  Document pain using the appropriate pain scale per order or unit policy  2.  Educate and implement non-pharmacologic comfort measures (i.e. relaxation, distraction, massage, cold/heat therapy, etc.)  3.  Pain management medications as ordered  4.  Reassess pain after pain med administration per policy  5.  If opiods administered assess patient's response to pain medication is appropriate per POSS sedation scale  6.  Follow pain management plan developed in collaboration with patient and interdisciplinary team  (including palliative care or pain specialists if applicable)  Outcome: Progressing

## 2023-05-26 NOTE — RESPIRATORY CARE
COPD EDUCATION by COPD CLINICAL EDUCATOR  5/26/2023 at 6:29 AM by Og Painter RRT     Patient reviewed by COPD education team. Patient does not have a history or diagnosis of COPD and is a non-smoker.  Therefore, patient does not qualify for the COPD program. Per PFT 6/16/21  FEV1/FVC ratio is normal at 75.  Total lung capacity is also low normal at 3.93 L or 80% predicted.Overall PFT shows mild restrictive defect with mildly reduced DLCO.  This could be suggestive of early interstitial process. No inhalers.       COPD Assessment  COPD Clinical Specialists ONLY  COPD Education Initiated: No--Quick Screen (Per PFT 6/16/21  FEV1/FVC ratio is normal at 75.  Total lung capacity is also low normal at 3.93 L or 80% predicted.Overall PFT shows mild restrictive defect with mildly reduced DLCO.  This could be suggestive of early interstitial process. No inhalers.)    PFT Results        Meds to Beds  Would the patient like to opt in for Bedside Medication Delivery at Discharge?: Yes, interested

## 2023-05-26 NOTE — PROGRESS NOTES
[facility] Medicine Daily Progress Note    Date of Service  5/26/2023    Chief Complaint  Rashmi Parra is a 83 y.o. female admitted 5/25/2023 with hypotension, fatigue, ground-level fall    Hospital Course  No notes on file    Patient is a 83-year-old female with a past medical history of orthostatic hypotension, chronic hypoxic respiratory failure requiring 3 L of oxygen at night and with exertion, depression, breast cancer status post bilateral mastectomy and radiation therapy and overactive bladder who presented to the hospital after experiencing a fall around 6 PM today.     Patient reported that she was getting up from her chair to go to the bathroom after sitting down for a long time and while she was walking to the bathroom she became lightheaded and fell back and hit her head.  Patient is unsure if she lost consciousness during this facility.  She doesn't remember the time around her fall well, but patient denies any chest pain or heart palpitations, or worsening shortness of breath before her fall.  Patient says she walks with a walker and was using a walker at this time.  Patient says she was not using her oxygen during this time.  Of note patient and her daughter note that sometimes her oxygen drops down to the mid 80s at home even when she has oxygen on.  Patient says that all she ate and drank today was 1 bottle of Ensure.     Patient reports feeling fatigued for fevers and having shortness of breath for which she sees pulmonology at [facility].  She reports a 20 pound weight loss over the past year.     No other complaints at this time.    Interval Problem Update  Patient was seen and examined at bedside.  I have personally reviewed and interpreted vitals, labs, and imaging.    5/26.  Afebrile.  Intermittent hypotension.  On 3 L nasal cannula.  Patient is lethargic on exam.  Denies fevers, chills, chest pain, shortness of breath.  She does point to her legs when complaining of pain but cannot localize or  describe which leg hurts.  Echocardiogram pending with concern for syncope, pulmonary pretension, mitral stenosis.  Also follow-up on echo and monitor volume status closely as patient is on IV fluids and has hypotension.  Patient appears very debilitated, minimal oral intake, lethargic.  Patient will likely be a placement issue and will transfer out of CDU.  PT/OT consult.  Can possibly roll inpatient depending on echo results.    I have discussed this patient's plan of care and discharge plan at IDT rounds today with Case Management, Nursing, Nursing leadership, and other members of the IDT team.    Consultants/Specialty  None    Code Status  DNAR/DNI    Disposition  The patient is not medically cleared for discharge to home or a post-acute facility.  Anticipate discharge to: skilled nursing facility    I have placed the appropriate orders for post-discharge needs.    Review of Systems  Review of Systems   Constitutional:  Positive for malaise/fatigue. Negative for chills and fever.   Respiratory:  Negative for cough and shortness of breath.    Cardiovascular:  Negative for chest pain, palpitations and leg swelling.   Gastrointestinal:  Negative for abdominal pain, constipation, diarrhea, nausea and vomiting.   Genitourinary:  Negative for dysuria, frequency and urgency.   Musculoskeletal:  Positive for falls and myalgias.   Neurological:  Negative for dizziness, weakness and headaches.   All other systems reviewed and are negative.  Patient is lethargic.    Physical Exam  Temp:  [36.1 °C (97 °F)-36.8 °C (98.3 °F)] 36.5 °C (97.7 °F)  Pulse:  [68-77] 75  Resp:  [14-20] 14  BP: ()/(50-70) 102/55  SpO2:  [92 %-98 %] 97 %    Physical Exam  Vitals and nursing note reviewed.   Constitutional:       Appearance: Normal appearance. She is ill-appearing.   HENT:      Head: Normocephalic and atraumatic.      Right Ear: External ear normal.      Left Ear: External ear normal.      Nose: Nose normal.      Mouth/Throat:       Mouth: Mucous membranes are dry.      Pharynx: Oropharynx is clear. No oropharyngeal exudate or posterior oropharyngeal erythema.   Eyes:      Extraocular Movements: Extraocular movements intact.      Conjunctiva/sclera: Conjunctivae normal.   Cardiovascular:      Rate and Rhythm: Normal rate and regular rhythm.      Pulses: Normal pulses.      Heart sounds: Normal heart sounds. No murmur heard.  Pulmonary:      Effort: Pulmonary effort is normal. No respiratory distress.      Breath sounds: Normal breath sounds. No stridor. No wheezing or rales.   Abdominal:      General: Abdomen is flat. Bowel sounds are normal. There is no distension.      Palpations: Abdomen is soft. There is no mass.      Tenderness: There is no abdominal tenderness.   Musculoskeletal:      Cervical back: Normal range of motion.      Right lower leg: No edema.      Left lower leg: No edema.   Skin:     General: Skin is warm.   Neurological:      General: No focal deficit present.      Mental Status: She is lethargic.      Cranial Nerves: No cranial nerve deficit.   Psychiatric:         Mood and Affect: Mood normal.         Behavior: Behavior normal.         Fluids    Intake/Output Summary (Last 24 hours) at 5/26/2023 1107  Last data filed at 5/26/2023 0500  Gross per 24 hour   Intake 527.61 ml   Output --   Net 527.61 ml       Laboratory  Recent Labs     05/25/23 1956 05/26/23  0114   WBC 6.2 5.9   RBC 4.57 4.33   HEMOGLOBIN 14.2 13.4   HEMATOCRIT 42.0 40.0   MCV 91.9 92.4   MCH 31.1 30.9   MCHC 33.8 33.5   RDW 46.6 46.8   PLATELETCT 238 233   MPV 9.9 10.3     Recent Labs     05/25/23 1956 05/26/23  0114   SODIUM 135 140   POTASSIUM 4.2 4.4   CHLORIDE 100 103   CO2 25 27   GLUCOSE 96 92   BUN 27* 23*   CREATININE 0.68 0.68   CALCIUM 9.0 9.1                   Imaging  DX-CHEST-PORTABLE (1 VIEW)   Final Result         1.  Interstitial pulmonary parenchymal prominence suggest chronic underlying lung disease, component of interstitial edema  and/or infiltrates not excluded.   2.  Atherosclerosis      CT-CSPINE WITHOUT PLUS RECONS   Final Result      No CT evidence of acute cervical spine abnormality.      Thoracic kyphosis, cervical lordosis is exaggerated and there is multilevel facet ankylosis which could be degenerative favored over ankylosing spondylitis or rheumatoid arthritis      CT-HEAD W/O   Final Result      1.  No evidence of acute intracranial process.      2.  Cerebral atrophy as well as periventricular chronic small vessel ischemic change.         EC-ECHOCARDIOGRAM COMPLETE W/O CONT    (Results Pending)        Assessment/Plan  * Syncope and collapse- (present on admission)  Assessment & Plan  Unclear if patient truly had syncope as patient cannot remember.    -Orthostatic hypotension versus hypoxia from not using oxygen versus hypotension.  -Place patient on telemetry, ordered echocardiogram, ordered EKG, B12, vitamin D and repeat TSH/free T4 for further evaluation.  -Recheck orthostatic vitals daytime after fluid hydration.  -Ordered PT/OT for further evaluation.    Monitor on telemetry.  Continue syncope work-up.  Echo pending    Overactive bladder- (present on admission)  Assessment & Plan  Unable to continue home MYRBETRIQ 50 MG TABLET SR 24 HR due to medication being off formulary.    Patient using PureWick    Depression- (present on admission)  Assessment & Plan  Continue home Cymbalta.    Hypotension- (present on admission)  Assessment & Plan  Unclear etiology.  Patient reports losing 20 pounds in the past year.    -Patient asymptomatic during hospital stay with MAP of 61.  -Ordered echocardiogram for further evaluation.    Continue IV fluids.  Try to keep MAPS greater than 65.  Encourage oral intake.  Follow-up on echo    Dehydration- (present on admission)  Assessment & Plan  Continue cautious fluid replacement in setting of unknown heart function.    -Encourage adequate p.o. intake.  -Currently on LR 80 mL/h    Mild mitral  stenosis- (present on admission)  Assessment & Plan  I have moderate mitral stenosis in the past.  Unclear if this has gotten worse recently.    -Ordered echocardiogram for further evaluation    Chronic respiratory failure with hypoxia (HCC)- (present on admission)  Assessment & Plan  Currently following with and on pulmonology and found to have localized scarring in her lungs along with a reduced DLCO.    -Will need to be assessed for home oxygen needs upon discharge.  -Suspicion for pulmonary hypertension on CT scan.  Ordered echocardiogram for further evaluation.         VTE prophylaxis: enoxaparin ppx    I have performed a physical exam and reviewed and updated ROS and Plan today (5/26/2023). In review of yesterday's note (5/25/2023), there are no changes except as documented above.

## 2023-05-26 NOTE — PROGRESS NOTES
Patient transferred to [facility]-. Bedside report given to RUFUS Cruz. All belongings collected and transferred with patient. Patient's daughter notified of transfer via phone call.

## 2023-05-26 NOTE — PROGRESS NOTES
"Patient having some hypotension this AM: 82/51, 84/50, 87/55. Patient is reporting she normally has low blood pressure, and the only thing she feels currently is \"tired\". Reported to provider Sultan Mays, who instructed this RN to continue on current course of care with ordered continuous IV fluid hydration, per provider Davon will hold off on bolus fluids at this time.    0615: Patient BP  76/52, 77/50. MAP of 61 and 59. Patient is sleeping, reports she is very tired. Reported to Dr. Mays who came to bedside. Verbal order for 500ml bolus of LR to be given. Order placed and followed.   "

## 2023-05-26 NOTE — PROGRESS NOTES
Family at bedside, asking if we can talk to the doctor about certain lab values that they are concerned about: Vitamin D level, hsCRP, homocystine, ESR. Will discuss with provider

## 2023-05-26 NOTE — ED TRIAGE NOTES
"Chief Complaint   Patient presents with    T-5000 Head Injury     Patient bib ems from home for a GLF, unknown LOC, -thinners. Patient does not recall event, c/o right sided back pain and small hematoma to head.      /68   Pulse 73   Temp 36.2 °C (97.2 °F) (Temporal)   Resp 20   Ht 1.6 m (5' 3\")   Wt 61.2 kg (135 lb)   SpO2 92%     "

## 2023-05-26 NOTE — ASSESSMENT & PLAN NOTE
Patient reports losing 20 pounds in the past year.  May be contributing.    As per  Dr. Torrez, who felt that the mitral valve stenosis may or may not be related to patient's hypotension but less likely.  Either way, patient would not be a candidate for surgery.    I have discontinued patient's Myrbetriq which can have anticholinergic effects,patient's duloxetine dose has been decreased.

## 2023-05-26 NOTE — ASSESSMENT & PLAN NOTE
Continue home Cymbalta.  I have decreased patient's dose due to concern for orthostatic hypotension

## 2023-05-26 NOTE — ASSESSMENT & PLAN NOTE
Discontinue home MYRBETRIQ 50 MG TABLET SR 24 HR due to anticholinergic effects contributing.  Patient reports having had a bladder surgery previously.    Patient using PureWick

## 2023-05-26 NOTE — ASSESSMENT & PLAN NOTE
Currently following with and on pulmonology and found to have localized scarring in her lungs along with a reduced DLCO.    Currently on 2 LPM oxygen nasal cannula.  Baseline need is intermittent as needed for exertion and sleeping.  She is on 3 LPM at bedtime.    Suspicion for pulmonary hypertension on CT scan.  No evidence of pulmonary hypertension on echocardiogram.    Wean off oxygen as tolerated.

## 2023-05-26 NOTE — H&P
[facility] Internal Medicine History & Physical Note    Date of Service  5/26/2023    [facility] Team: CAITLIN   Attending: Hudson Zamorano M.d.  Senior Resident: Dr. Sultan Mays  Contact Number: [phone]    Primary Care Physician  DAYANA Zaman    Consultants  N/A    Code Status  DNAR/DNI    Chief Complaint  Chief Complaint   Patient presents with    T-5000 Head Injury     Patient bib ems from home for a GLF, unknown LOC, -thinners. Patient does not recall event, c/o right sided back pain and small hematoma to head.        History of Presenting Illness (HPI): Patient is a 83-year-old female with a past medical history of orthostatic hypotension, chronic hypoxic respiratory failure requiring 3 L of oxygen at night and with exertion, depression, breast cancer status post bilateral mastectomy and radiation therapy and overactive bladder who presented to the hospital after experiencing a fall around 6 PM today.    Patient reported that she was getting up from her chair to go to the bathroom after sitting down for a long time and while she was walking to the bathroom she became lightheaded and fell back and hit her head.  Patient is unsure if she lost consciousness during this facility.  She doesn't remember the time around her fall well, but patient denies any chest pain or heart palpitations, or worsening shortness of breath before her fall.  Patient says she walks with a walker and was using a walker at this time.  Patient says she was not using her oxygen during this time.  Of note patient and her daughter note that sometimes her oxygen drops down to the mid 80s at home even when she has oxygen on.  Patient says that all she ate and drank today was 1 bottle of Ensure.    Patient reports feeling fatigued for fevers and having shortness of breath for which she sees pulmonology at [facility].  She reports a 20 pound weight loss over the past year.    No other complaints at this time.    I discussed the plan of care with  patient.    Review of Systems  Review of Systems   Constitutional:  Negative for chills and fever.   HENT:  Negative for ear pain, hearing loss and tinnitus.    Eyes:  Negative for blurred vision and double vision.   Respiratory:  Negative for cough, hemoptysis, sputum production and shortness of breath.    Cardiovascular:  Negative for chest pain, palpitations and leg swelling.   Gastrointestinal:  Negative for blood in stool, constipation, diarrhea, melena, nausea and vomiting.   Genitourinary:  Negative for dysuria and urgency.   Musculoskeletal:  Positive for falls.   Skin:  Negative for itching and rash.   Psychiatric/Behavioral:  Negative for depression and suicidal ideas.        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    Surgical History   has a past surgical history that includes other orthopedic surgery (1993); gyn surgery (1992); cholecystectomy (1964); knee arthroplasty total (Right, 11/2/2015); cataract extraction with iol (Bilateral); lefort colpoclesis (1/21/2019); bladder sling female (1/21/2019); pr mastectomy, modified radical (Right, 7/17/2019); node biopsy sentinel (Right, 7/17/2019); axillary node dissection (7/17/2019); mastectomy (Left, 7/17/2019); flap closure (Bilateral, 7/17/2019); wide excision (Right, 8/16/2019); incision and drainage general (Bilateral, 8/16/2019); abdominal hysterectomy total (1994); pr total hip arthroplasty (Right, 9/30/2021); and orif, ankle (Right, 6/10/2022).     Family History  family history is not on file.   Family history  reviewed with patient.     Social History  Tobacco: History of smoking for 25 years 1 pack/day.  Quit roughly 30 years ago.  Alcohol: Denies any history of heavy use last drink on Sunday.  Recreational drugs (illegal or prescription): Denies any previous use.  Employment: N/A  Living Situation: Lives in a house with her   Recent Travel: N/A  Primary Care Provider: Reviewed    Other (stressors, spirituality, exposures): N/A    Allergies  No Known Allergies    Medications  Prior to Admission Medications   Prescriptions Last Dose Informant Patient Reported? Taking?   DULoxetine (CYMBALTA) 30 MG Cap DR Particles UNK at [facility] Patient's Home Pharmacy No No   Sig: Take 1 Capsule by mouth every day. Total of 90 mg   Patient taking differently: Take 30 mg by mouth every day. 60MG TAKEN WITH 30MG= 90MG   DULoxetine (CYMBALTA) 60 MG Cap DR Particles delayed-release capsule UNK at [facility] Patient's Home Pharmacy No No   Sig: Take 1 Capsule by mouth every day.   Patient taking differently: Take 60 mg by mouth every day. 60MG TAKEN WITH 30MG=90MG   Dexlansoprazole 60 MG CAPSULE DELAYED RELEASE delayed-release capsule UNK at [facility] Patient's Home Pharmacy No No   Sig: Take 1 Capsule by mouth every evening.   MYRBETRIQ 50 MG TABLET SR 24 HR UNK at [facility] Patient's Home Pharmacy No No   Sig: TAKE 1 TABLET EVERY EVENINGFOR FREQUENT URINATION,    URINARY INCONTINENCE,      URINARY URGENCY.   Melatonin 5 MG Tab UNK at [facility] Patient's Home Pharmacy Yes No   Sig: Take 5 mg by mouth at bedtime.   acetaminophen (TYLENOL) 500 MG Tab UNK at [facility] Patient's Home Pharmacy No No   Sig: Take 1-2 Tablets by mouth every 6 hours as needed.   anastrozole (ARIMIDEX) 1 MG Tab UNK at [facility] Patient's Home Pharmacy Yes No   Sig: Take 1 mg by mouth every day.   gabapentin (NEURONTIN) 100 MG Cap UNK at [facility] Patient's Home Pharmacy No No   Sig: Take 1 Capsule by mouth at bedtime as needed (nerve pain).   lovastatin (MEVACOR) 20 MG Tab UNK at [facility] Patient's Home Pharmacy  Yes No   Sig: Take 20 mg by mouth every evening.   metFORMIN (GLUCOPHAGE) 500 MG Tab UNK at [facility] Patient's Home Pharmacy No No   Sig: Take 1 tablet by mouth once daily      Facility-Administered Medications: None       Physical Exam  Temp:  [36.1 °C (97 °F)-36.8 °C (98.3 °F)] 36.1 °C (97 °F)  Pulse:  [68-77] 77  Resp:  [14-20] 20  BP: ()/(50-70) 87/55  SpO2:  [92 %-98 %] 95 %  Blood Pressure : 96/63   Temperature: 36.2 °C (97.2 °F)   Pulse: 72   Respiration: 20   Pulse Oximetry: 98 %       Physical Exam  Constitutional:       General: She is not in acute distress.     Appearance: Normal appearance. She is not ill-appearing, toxic-appearing or diaphoretic.   HENT:      Head: Normocephalic and atraumatic.      Mouth/Throat:      Mouth: Mucous membranes are moist.      Pharynx: Oropharynx is clear. No oropharyngeal exudate or posterior oropharyngeal erythema.   Eyes:      General: No scleral icterus.        Right eye: No discharge.         Left eye: No discharge.   Cardiovascular:      Rate and Rhythm: Normal rate and regular rhythm.      Pulses: Normal pulses.      Heart sounds: Normal heart sounds. No murmur heard.     No friction rub. No gallop.   Pulmonary:      Effort: Pulmonary effort is normal. No respiratory distress.      Breath sounds: Normal breath sounds. No stridor. No wheezing or rhonchi.   Abdominal:      General: There is no distension.      Palpations: There is no mass.      Tenderness: There is no abdominal tenderness.      Hernia: No hernia is present.   Musculoskeletal:         General: Swelling and tenderness present.      Right lower leg: Edema present.      Left lower leg: Edema present.   Skin:     General: Skin is warm and dry.      Coloration: Skin is not jaundiced or pale.      Findings: No bruising or erythema.   Neurological:      General: No focal deficit present.      Mental Status: She is alert and oriented to person, place, and time.   Psychiatric:         Mood and Affect: Mood  normal.         Behavior: Behavior normal.         Laboratory:  Recent Labs     05/25/23 1956 05/26/23  0114   WBC 6.2 5.9   RBC 4.57 4.33   HEMOGLOBIN 14.2 13.4   HEMATOCRIT 42.0 40.0   MCV 91.9 92.4   MCH 31.1 30.9   MCHC 33.8 33.5   RDW 46.6 46.8   PLATELETCT 238 233   MPV 9.9 10.3     Recent Labs     05/25/23 1956 05/26/23  0114   SODIUM 135 140   POTASSIUM 4.2 4.4   CHLORIDE 100 103   CO2 25 27   GLUCOSE 96 92   BUN 27* 23*   CREATININE 0.68 0.68   CALCIUM 9.0 9.1     Recent Labs     05/25/23 1956 05/26/23  0114   ALTSGPT 15 14   ASTSGOT 27 25   ALKPHOSPHAT 96 86   TBILIRUBIN 0.3 0.4   GLUCOSE 96 92         Recent Labs     05/26/23  0114   NTPROBNP 385*         Recent Labs     05/25/23 1956 05/25/23  2135   TROPONINT 40* 44*       Imaging:  DX-CHEST-PORTABLE (1 VIEW)   Final Result         1.  Interstitial pulmonary parenchymal prominence suggest chronic underlying lung disease, component of interstitial edema and/or infiltrates not excluded.   2.  Atherosclerosis      CT-CSPINE WITHOUT PLUS RECONS   Final Result      No CT evidence of acute cervical spine abnormality.      Thoracic kyphosis, cervical lordosis is exaggerated and there is multilevel facet ankylosis which could be degenerative favored over ankylosing spondylitis or rheumatoid arthritis      CT-HEAD W/O   Final Result      1.  No evidence of acute intracranial process.      2.  Cerebral atrophy as well as periventricular chronic small vessel ischemic change.         EC-ECHOCARDIOGRAM COMPLETE W/O CONT    (Results Pending)       EKG:  My impression is: Sinus rhythm with possible left atrial enlargement and possible left ventricular hypertrophy with QTc of 458.    Assessment/Plan:  Problem Representation:   I anticipate this patient is appropriate for observation status at this time because evaluation of underlying causes of syncope.    * Syncope and collapse- (present on admission)  Assessment & Plan  Unclear if patient truly had syncope as  patient cannot remember.    -Orthostatic hypotension versus hypoxia from not using oxygen versus hypotension.  -Place patient on telemetry, ordered echocardiogram, ordered EKG, B12, vitamin D and repeat TSH/free T4 for further evaluation.  -Recheck orthostatic vitals daytime after fluid hydration.  -Ordered PT/OT for further evaluation.      Overactive bladder  Assessment & Plan  Unable to continue home MYRBETRIQ 50 MG TABLET SR 24 HR due to medication being off formulary.    Depression  Assessment & Plan  Continue home Cymbalta.    Hypotension- (present on admission)  Assessment & Plan  Unclear etiology.  Patient reports losing 20 pounds in the past year.    -Patient asymptomatic during hospital stay with MAP of 61.  -Ordered echocardiogram for further evaluation.    Dehydration- (present on admission)  Assessment & Plan  Continue cautious fluid replacement in setting of unknown heart function.    -Encourage adequate p.o. intake.  -Currently on LR 80 mL/h    Mild mitral stenosis- (present on admission)  Assessment & Plan  I have moderate mitral stenosis in the past.  Unclear if this has gotten worse recently.    -Ordered echocardiogram for further evaluation    Shortness of breath- (present on admission)  Assessment & Plan  Currently following with and on pulmonology and found to have localized scarring in her lungs along with a reduced DLCO.    -Will need to be assessed for home oxygen needs upon discharge.  -Suspicion for pulmonary hypertension on CT scan.  Ordered echocardiogram for further evaluation.          VTE prophylaxis: enoxaparin ppx

## 2023-05-27 PROBLEM — E27.2 ADRENAL CRISIS SYNDROME (HCC): Status: ACTIVE | Noted: 2023-05-27

## 2023-05-27 PROBLEM — I34.81 MITRAL ANNULAR CALCIFICATION: Status: ACTIVE | Noted: 2023-05-27
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
## 2023-05-27 PROBLEM — E27.49 CORTISOL DEFICIENCY (HCC): Status: ACTIVE | Noted: 2023-05-27

## 2023-05-27 PROBLEM — E44.0 MODERATE PROTEIN-CALORIE MALNUTRITION (HCC): Status: ACTIVE | Noted: 2023-05-27

## 2023-05-27 LAB
ANION GAP SERPL CALC-SCNC: 11 MMOL/L (ref 7–16)
BASOPHILS # BLD AUTO: 0.2 % (ref 0–1.8)
BASOPHILS # BLD: 0.01 K/UL (ref 0–0.12)
BUN SERPL-MCNC: 17 MG/DL (ref 8–22)
CALCIUM SERPL-MCNC: 8.7 MG/DL (ref 8.5–10.5)
CHLORIDE SERPL-SCNC: 106 MMOL/L (ref 96–112)
CO2 SERPL-SCNC: 25 MMOL/L (ref 20–33)
CORTIS SERPL-MCNC: 17 UG/DL (ref 0–23)
CORTIS SERPL-MCNC: 2.7 UG/DL (ref 0–23)
CORTIS SERPL-MCNC: 36.9 UG/DL (ref 0–23)
CORTIS SERPL-MCNC: 44.1 UG/DL (ref 0–23)
CREAT SERPL-MCNC: 0.76 MG/DL (ref 0.5–1.4)
EOSINOPHIL # BLD AUTO: 0.02 K/UL (ref 0–0.51)
EOSINOPHIL NFR BLD: 0.3 % (ref 0–6.9)
ERYTHROCYTE [DISTWIDTH] IN BLOOD BY AUTOMATED COUNT: 46.5 FL (ref 35.9–50)
GFR SERPLBLD CREATININE-BSD FMLA CKD-EPI: 77 ML/MIN/1.73 M 2
GLUCOSE SERPL-MCNC: 105 MG/DL (ref 65–99)
HCT VFR BLD AUTO: 39.5 % (ref 37–47)
HGB BLD-MCNC: 13 G/DL (ref 12–16)
IMM GRANULOCYTES # BLD AUTO: 0.02 K/UL (ref 0–0.11)
IMM GRANULOCYTES NFR BLD AUTO: 0.3 % (ref 0–0.9)
LYMPHOCYTES # BLD AUTO: 1.32 K/UL (ref 1–4.8)
LYMPHOCYTES NFR BLD: 19.9 % (ref 22–41)
MAGNESIUM SERPL-MCNC: 1.6 MG/DL (ref 1.5–2.5)
MCH RBC QN AUTO: 30.8 PG (ref 27–33)
MCHC RBC AUTO-ENTMCNC: 32.9 G/DL (ref 32.2–35.5)
MCV RBC AUTO: 93.6 FL (ref 81.4–97.8)
MONOCYTES # BLD AUTO: 0.71 K/UL (ref 0–0.85)
MONOCYTES NFR BLD AUTO: 10.7 % (ref 0–13.4)
NEUTROPHILS # BLD AUTO: 4.55 K/UL (ref 1.82–7.42)
NEUTROPHILS NFR BLD: 68.6 % (ref 44–72)
NRBC # BLD AUTO: 0 K/UL
NRBC BLD-RTO: 0 /100 WBC (ref 0–0.2)
PHOSPHATE SERPL-MCNC: 2.9 MG/DL (ref 2.5–4.5)
PLATELET # BLD AUTO: 224 K/UL (ref 164–446)
PMV BLD AUTO: 9.8 FL (ref 9–12.9)
POTASSIUM SERPL-SCNC: 3.9 MMOL/L (ref 3.6–5.5)
RBC # BLD AUTO: 4.22 M/UL (ref 4.2–5.4)
SODIUM SERPL-SCNC: 142 MMOL/L (ref 135–145)
WBC # BLD AUTO: 6.6 K/UL (ref 4.8–10.8)

## 2023-05-27 PROCEDURE — A9270 NON-COVERED ITEM OR SERVICE: HCPCS | Performed by: STUDENT IN AN ORGANIZED HEALTH CARE EDUCATION/TRAINING PROGRAM

## 2023-05-27 PROCEDURE — A9270 NON-COVERED ITEM OR SERVICE: HCPCS | Performed by: INTERNAL MEDICINE

## 2023-05-27 PROCEDURE — 84100 ASSAY OF PHOSPHORUS: CPT

## 2023-05-27 PROCEDURE — 99223 1ST HOSP IP/OBS HIGH 75: CPT | Performed by: PHYSICAL MEDICINE & REHABILITATION

## 2023-05-27 PROCEDURE — 99291 CRITICAL CARE FIRST HOUR: CPT | Performed by: STUDENT IN AN ORGANIZED HEALTH CARE EDUCATION/TRAINING PROGRAM

## 2023-05-27 PROCEDURE — 85025 COMPLETE CBC W/AUTO DIFF WBC: CPT

## 2023-05-27 PROCEDURE — 700111 HCHG RX REV CODE 636 W/ 250 OVERRIDE (IP): Performed by: STUDENT IN AN ORGANIZED HEALTH CARE EDUCATION/TRAINING PROGRAM

## 2023-05-27 PROCEDURE — 700102 HCHG RX REV CODE 250 W/ 637 OVERRIDE(OP): Performed by: STUDENT IN AN ORGANIZED HEALTH CARE EDUCATION/TRAINING PROGRAM

## 2023-05-27 PROCEDURE — 700102 HCHG RX REV CODE 250 W/ 637 OVERRIDE(OP): Performed by: INTERNAL MEDICINE

## 2023-05-27 PROCEDURE — 36415 COLL VENOUS BLD VENIPUNCTURE: CPT

## 2023-05-27 PROCEDURE — 82024 ASSAY OF ACTH: CPT

## 2023-05-27 PROCEDURE — 83735 ASSAY OF MAGNESIUM: CPT

## 2023-05-27 PROCEDURE — 80048 BASIC METABOLIC PNL TOTAL CA: CPT

## 2023-05-27 PROCEDURE — 770020 HCHG ROOM/CARE - TELE (206)

## 2023-05-27 PROCEDURE — 700105 HCHG RX REV CODE 258: Performed by: STUDENT IN AN ORGANIZED HEALTH CARE EDUCATION/TRAINING PROGRAM

## 2023-05-27 PROCEDURE — 82533 TOTAL CORTISOL: CPT | Mod: 91

## 2023-05-27 RX ORDER — MIDODRINE HYDROCHLORIDE 5 MG/1
10 TABLET ORAL
Status: DISCONTINUED | OUTPATIENT
Start: 2023-05-27 | End: 2023-05-29

## 2023-05-27 RX ORDER — COSYNTROPIN 0.25 MG/ML
250 INJECTION, POWDER, FOR SOLUTION INTRAMUSCULAR; INTRAVENOUS ONCE
Status: COMPLETED | OUTPATIENT
Start: 2023-05-27 | End: 2023-05-27

## 2023-05-27 RX ORDER — POTASSIUM CHLORIDE 20 MEQ/1
40 TABLET, EXTENDED RELEASE ORAL ONCE
Status: COMPLETED | OUTPATIENT
Start: 2023-05-27 | End: 2023-05-27

## 2023-05-27 RX ORDER — ONDANSETRON 2 MG/ML
4 INJECTION INTRAMUSCULAR; INTRAVENOUS EVERY 4 HOURS PRN
Status: DISCONTINUED | OUTPATIENT
Start: 2023-05-27 | End: 2023-05-31 | Stop reason: HOSPADM

## 2023-05-27 RX ORDER — MAGNESIUM SULFATE HEPTAHYDRATE 40 MG/ML
4 INJECTION, SOLUTION INTRAVENOUS ONCE
Status: COMPLETED | OUTPATIENT
Start: 2023-05-27 | End: 2023-05-27

## 2023-05-27 RX ADMIN — POLYETHYLENE GLYCOL 3350 1 PACKET: 17 POWDER, FOR SOLUTION ORAL at 04:58

## 2023-05-27 RX ADMIN — COSYNTROPIN 250 MCG: 0.25 INJECTION, POWDER, LYOPHILIZED, FOR SOLUTION INTRAVENOUS at 16:18

## 2023-05-27 RX ADMIN — Medication 5 MG: at 20:08

## 2023-05-27 RX ADMIN — DOCUSATE SODIUM 50 MG AND SENNOSIDES 8.6 MG 2 TABLET: 8.6; 5 TABLET, FILM COATED ORAL at 04:58

## 2023-05-27 RX ADMIN — HYDROCORTISONE SODIUM SUCCINATE 100 MG: 100 INJECTION, POWDER, FOR SOLUTION INTRAMUSCULAR; INTRAVENOUS at 17:26

## 2023-05-27 RX ADMIN — ONDANSETRON 4 MG: 2 INJECTION INTRAMUSCULAR; INTRAVENOUS at 09:17

## 2023-05-27 RX ADMIN — LOVASTATIN 20 MG: 20 TABLET ORAL at 20:08

## 2023-05-27 RX ADMIN — ENOXAPARIN SODIUM 40 MG: 100 INJECTION SUBCUTANEOUS at 17:25

## 2023-05-27 RX ADMIN — SODIUM CHLORIDE, POTASSIUM CHLORIDE, SODIUM LACTATE AND CALCIUM CHLORIDE: 600; 310; 30; 20 INJECTION, SOLUTION INTRAVENOUS at 09:06

## 2023-05-27 RX ADMIN — DOCUSATE SODIUM 50 MG AND SENNOSIDES 8.6 MG 2 TABLET: 8.6; 5 TABLET, FILM COATED ORAL at 17:25

## 2023-05-27 RX ADMIN — MIDODRINE HYDROCHLORIDE 10 MG: 5 TABLET ORAL at 20:08

## 2023-05-27 RX ADMIN — POTASSIUM CHLORIDE 40 MEQ: 1500 TABLET, EXTENDED RELEASE ORAL at 08:55

## 2023-05-27 RX ADMIN — HYDROCORTISONE SODIUM SUCCINATE 50 MG: 100 INJECTION, POWDER, FOR SOLUTION INTRAMUSCULAR; INTRAVENOUS at 22:14

## 2023-05-27 RX ADMIN — MAGNESIUM SULFATE HEPTAHYDRATE 4 G: 4 INJECTION, SOLUTION INTRAVENOUS at 09:05

## 2023-05-27 RX ADMIN — MIDODRINE HYDROCHLORIDE 10 MG: 5 TABLET ORAL at 14:19

## 2023-05-27 RX ADMIN — OMEPRAZOLE 20 MG: 20 CAPSULE, DELAYED RELEASE ORAL at 17:25

## 2023-05-27 ASSESSMENT — ENCOUNTER SYMPTOMS
FEVER: 0
MYALGIAS: 1
COUGH: 0
VOMITING: 0
CONSTIPATION: 0
DIZZINESS: 1
NAUSEA: 0
PALPITATIONS: 0
CHILLS: 0
DIARRHEA: 0
SHORTNESS OF BREATH: 0
ABDOMINAL PAIN: 0
FALLS: 1
HEADACHES: 0
FOCAL WEAKNESS: 0
WEAKNESS: 1

## 2023-05-27 ASSESSMENT — PAIN DESCRIPTION - PAIN TYPE: TYPE: ACUTE PAIN

## 2023-05-27 ASSESSMENT — FIBROSIS 4 INDEX: FIB4 SCORE: 2.51

## 2023-05-27 NOTE — DISCHARGE PLANNING
[facility] Transitional Care Coordination    Referral from:  Dr. Maria  Insurance Provider on Facesheet:  Medicare/[employer]  Potential Rehab diagnosis:  Debility    Chart review indicates patient has ongoing medical management and therapy needs to possibly meet inpatient rehab facility criteria with the goal of returning to community.      D/C Support:  Spouse    Physiatry to consult per protocol.    Last Covid test:    Thank you for the referral.

## 2023-05-27 NOTE — DISCHARGE PLANNING
Care Transition Team Assessment    LSW met with pt and pt's family at bedside to discuss post-acute placement. Pt's dtrs reported their first choice is for [facility]. PMR referral already placed by MD Maria. Pt and family asked that referrals be sent to [facility] as well in case pt is declined by acute rehab. Choice forms faxed to [facility].    Chart review completed to obtain the information used in this assessment. Pt lives with her spouse in a single story house that has two steps to enter. Prior to this hospitalization pt was independent with ADLs, however requires assistance with some IADLs. Pt's support system includes her two daughters who intermittently assist as well as paid caregivers who assist during the day. Pt uses a 4WW at baseline and has a shower chair and grab bars in the bathroom. Pt is retired. No financial, SA, or MH concerns at this time. Pt has ACP documents scanned into her chart and her DPOA is her spouse, Michael Parra.    Information Source  Orientation Level: Oriented X4  Information Given By: Other (Comments) (chart review)  Who is responsible for making decisions for patient? : Patient    Readmission Evaluation  Is this a readmission?: No    Elopement Risk  Legal Hold: No  Ambulatory or Self Mobile in Wheelchair: No-Not an Elopement Risk  Elopement Risk: Not at Risk for Elopement    Interdisciplinary Discharge Planning  Lives with - Patient's Self Care Capacity: Spouse, Attendant / Paid Care Giver  Patient or legal guardian wants to designate a caregiver: Yes  Caregiver name: Maggie Shaq and Luzjosue Lugoon  Caregiver contact info: [phone] and [phone]  Support Systems: Home Care Staff, Spouse / Significant Other, Family Member(s), Children  Housing / Facility: [address] [facility]  Prior Services: Intermittent Physical Support for ADL Per Service, Intermittent Physical Support for ADL Per Family  Durable Medical Equipment: Walker    Discharge Preparedness  What is your  plan after discharge?: Other (comment) (acute rehab vs SNF)  What are your discharge supports?: Child, Other (comment) (care givers)  Prior Functional Level: Ambulatory, Needs Assist with Activities of Daily Living, Uses Walker  Difficulity with ADLs: None  Difficulity with IADLs: Cooking, Shopping, Laundry    Functional Assesment  Prior Functional Level: Ambulatory, Needs Assist with Activities of Daily Living, Uses Walker    Finances  Financial Barriers to Discharge: No  Prescription Coverage: Yes    Vision / Hearing Impairment  Vision Impairment : No  Right Eye Vision: Wears Glasses  Left Eye Vision: Wears Glasses  Hearing Impairment : Yes  Hearing Impairment: Both Ears, Hearing Device Not Available    Advance Directive  Advance Directive?: DPOA for Health Care, Living Will  Durable Power of  Name and Contact : Michael Parra    Domestic Abuse  Have you ever been the victim of abuse or violence?: No  Physical Abuse or Sexual Abuse: No  Verbal Abuse or Emotional Abuse: No  Possible Abuse/Neglect Reported to:: Not Applicable    Psychological Assessment  History of Substance Abuse: None  History of Psychiatric Problems: No  Non-compliant with Treatment: No  Newly Diagnosed Illness: No    Discharge Risks or Barriers  Discharge risks or barriers?: No    Anticipated Discharge Information  Discharge Disposition: Disch to IP rehab facility or distinct part unit (62)

## 2023-05-27 NOTE — DISCHARGE PLANNING
Received Choice form at 1334  Agency/Facility Name: [facility]  Referral sent per Choice form @ 1338     Received Choice form at 4384  Agency/Facility Name: [facility]   Referral sent per Choice form @ 0055

## 2023-05-27 NOTE — PROGRESS NOTES
[facility] Medicine Daily Progress Note    Date of Service  5/27/2023    Chief Complaint  Rashmi Parra is a 83 y.o. female admitted 5/25/2023 with hypotension, fatigue, ground-level fall    Hospital Course  Patient is a 83-year-old female with a past medical history of orthostatic hypotension, chronic hypoxic respiratory failure requiring 3 L of oxygen at night and with exertion, depression, breast cancer status post bilateral mastectomy and radiation therapy and overactive bladder who presented to the hospital after experiencing a fall around 6 PM today.     Patient reported that she was getting up from her chair to go to the bathroom after sitting down for a long time and while she was walking to the bathroom she became lightheaded and fell back and hit her head.  Patient is unsure if she lost consciousness during this facility.  She doesn't remember the time around her fall well, but patient denies any chest pain or heart palpitations, or worsening shortness of breath before her fall.  Patient says she walks with a walker and was using a walker at this time.  Patient says she was not using her oxygen during this time.  Of note patient and her daughter note that sometimes her oxygen drops down to the mid 80s at home even when she has oxygen on.  Patient says that all she ate and drank today was 1 bottle of Ensure.     Patient reports feeling fatigued for fevers and having shortness of breath for which she sees pulmonology at [facility].  She reports a 20 pound weight loss over the past year.     No other complaints at this time.      Interval Problem Update  5/26.  Afebrile.  Intermittent hypotension.  On 3 L nasal cannula.  Patient is lethargic on exam.  Denies fevers, chills, chest pain, shortness of breath.  She does point to her legs when complaining of pain but cannot localize or describe which leg hurts.  Echocardiogram pending with concern for syncope, pulmonary pretension, mitral stenosis.  Also follow-up on  echo and monitor volume status closely as patient is on IV fluids and has hypotension.  Patient appears very debilitated, minimal oral intake, lethargic.  Patient will likely be a placement issue and will transfer out of CDU.  PT/OT consult.  Can possibly roll inpatient depending on echo results.    Above per previous hospitalist.    5/27: Patient was seen and examined on the telemetry floor.  She continues on telemetry monitoring due to her hypotension.  She is on IV fluids LR at 83 mils per hour.  I have increased her midodrine to 10 mg 3 times a day due to soft blood pressures in the 90s/50s.  I gathered more history from the patient's daughters at bedside.  They state that she has been having low blood pressure issues for the past 2 years or so following her radiation treatment for breast cancer.  No doctors has been able to figure it out.  I checked a cortisol level, which was low at 2.7.  I have ordered a cosyntropin test.  I have ordered hydrocortisone 100 mg IV with 50 mg every 6 hours for the next 24 hours.  I have requested PMR consultation for inpatient rehabitation.    Echocardiogram shows ejection fraction of 75% with patient reports having had a bladder surgery previously.  And moderate mitral stenosis.  Moderately dilated left atrium.      Chest x-ray performed on 5/25/2023 per my review shows bilateral interstitial infiltrates right greater than the left.  No significant pleural effusions.  Calcified aorta noted.      I have discussed this patient's plan of care and discharge plan at IDT rounds today with Case Management, Nursing, Nursing leadership, and other members of the IDT team.    Consultants/Specialty  None    Code Status  DNAR/DNI    Disposition  The patient is not medically cleared for discharge to home or a post-acute facility.  Anticipate discharge to: skilled nursing facility    I have placed the appropriate orders for post-discharge needs.    Review of Systems  Review of Systems    Constitutional:  Positive for malaise/fatigue. Negative for chills and fever.   Respiratory:  Negative for cough and shortness of breath.    Cardiovascular:  Negative for chest pain, palpitations and leg swelling.   Gastrointestinal:  Negative for abdominal pain, constipation, diarrhea, nausea and vomiting.   Genitourinary:  Negative for dysuria, frequency and urgency.   Musculoskeletal:  Positive for falls and myalgias.   Neurological:  Positive for dizziness and weakness. Negative for focal weakness and headaches.   All other systems reviewed and are negative.      Physical Exam  Temp:  [36.6 °C (97.9 °F)-37.1 °C (98.8 °F)] 37 °C (98.6 °F)  Pulse:  [68-87] 73  Resp:  [16-18] 16  BP: ()/(51-88) 100/68  SpO2:  [90 %-96 %] 96 %    Physical Exam  Vitals and nursing note reviewed.   Constitutional:       Appearance: Normal appearance. She is underweight. She is ill-appearing.      Comments: Mild bitemporal wasting   HENT:      Head: Normocephalic and atraumatic.      Right Ear: External ear normal.      Left Ear: External ear normal.      Nose: Nose normal.      Mouth/Throat:      Mouth: Mucous membranes are moist.      Pharynx: Oropharynx is clear. No oropharyngeal exudate or posterior oropharyngeal erythema.   Eyes:      General:         Right eye: No discharge.         Left eye: No discharge.      Conjunctiva/sclera: Conjunctivae normal.   Cardiovascular:      Rate and Rhythm: Normal rate and regular rhythm.      Pulses: Normal pulses.      Heart sounds: Normal heart sounds. No murmur heard.  Pulmonary:      Effort: Pulmonary effort is normal. No respiratory distress.      Breath sounds: Normal breath sounds. No stridor. No wheezing or rales.   Abdominal:      General: Abdomen is flat. Bowel sounds are normal. There is no distension.      Palpations: Abdomen is soft. There is no mass.      Tenderness: There is no abdominal tenderness.   Musculoskeletal:      Cervical back: Normal range of motion.      Right  lower leg: No edema.      Left lower leg: No edema.      Comments: Subcutaneous tissue and muscle wasting, moderately decreased  strength bilaterally   Skin:     General: Skin is warm.   Neurological:      General: No focal deficit present.      Mental Status: She is oriented to person, place, and time. She is lethargic.      Cranial Nerves: No cranial nerve deficit.      Motor: Weakness present.   Psychiatric:         Mood and Affect: Mood normal.         Behavior: Behavior normal.         Fluids    Intake/Output Summary (Last 24 hours) at 5/27/2023 1532  Last data filed at 5/27/2023 1100  Gross per 24 hour   Intake 350 ml   Output 1900 ml   Net -1550 ml       Laboratory  Recent Labs     05/25/23 1956 05/26/23  0114 05/27/23  0048   WBC 6.2 5.9 6.6   RBC 4.57 4.33 4.22   HEMOGLOBIN 14.2 13.4 13.0   HEMATOCRIT 42.0 40.0 39.5   MCV 91.9 92.4 93.6   MCH 31.1 30.9 30.8   MCHC 33.8 33.5 32.9   RDW 46.6 46.8 46.5   PLATELETCT 238 233 224   MPV 9.9 10.3 9.8     Recent Labs     05/25/23 1956 05/26/23  0114 05/27/23  0048   SODIUM 135 140 142   POTASSIUM 4.2 4.4 3.9   CHLORIDE 100 103 106   CO2 25 27 25   GLUCOSE 96 92 105*   BUN 27* 23* 17   CREATININE 0.68 0.68 0.76   CALCIUM 9.0 9.1 8.7                   Imaging  EC-ECHOCARDIOGRAM COMPLETE W/O CONT   Final Result      DX-CHEST-PORTABLE (1 VIEW)   Final Result         1.  Interstitial pulmonary parenchymal prominence suggest chronic underlying lung disease, component of interstitial edema and/or infiltrates not excluded.   2.  Atherosclerosis      CT-CSPINE WITHOUT PLUS RECONS   Final Result      No CT evidence of acute cervical spine abnormality.      Thoracic kyphosis, cervical lordosis is exaggerated and there is multilevel facet ankylosis which could be degenerative favored over ankylosing spondylitis or rheumatoid arthritis      CT-HEAD W/O   Final Result      1.  No evidence of acute intracranial process.      2.  Cerebral atrophy as well as periventricular  chronic small vessel ischemic change.              Assessment/Plan  * Adrenal crisis syndrome (HCC)- (present on admission)  Assessment & Plan  Following patient's admission, patient's blood pressures decreased to 76/52.  She is requiring IV fluids as well as oral pressor support.  Patient has low cortisol level, which is low at 2.7 indicating that the patient has adrenal insufficiency either secondary or primary.    Based upon history gathered from the patient's family, I suspect that this is a chronic issue in the past 2 years that has been acutely exacerbated upon her presentation.    Patient continues on LR at 83 m/h.  I have ordered ACTH and cosyntropin testing.  I have ordered hydrocortisone 100 mg IV followed by 50 mg IV every 6 hours for the next 24 hours.  We will change to oral hydrocortisone 20 mg in the morning and 10 mg in the evening when she is hemodynamically stabilized.    Syncope and collapse- (present on admission)  Assessment & Plan  As per presentation patient reports having felt a little dizzy prior to the event.  She states that she was going to the refrigerator to get some water.  Suspect adrenal insufficiency along with dehydration.    Continue IV fluids and steroids    Hypotension- (present on admission)  Assessment & Plan  Patient reports losing 20 pounds in the past year.    Cortisol level is low at 2.7.  Concern for adrenal crisis syndrome as etiology.    Continue IV fluids  I have increased midodrine to 10 mg 3 times a day  I have ordered hydrocortisone 100 mg IV followed by 50 mg IV every 6 hours  I have discontinued patient's Myrbetriq which can have anticholinergic effects    Mitral annular calcification- (present on admission)  Assessment & Plan  Severe mitral annular calcification on echocardiogram.  This appears to have progressed.    She does not appear to be in heart failure   Outpatient follow-up cardiology    Moderate mitral stenosis- (present on admission)  Assessment &  Plan  Patient continues to have moderate mitral stenosis.  Does not appear to be in heart failure.  She would not be a candidate for valvuloplasty due to the calcifications.    Outpatient follow-up with patient's cardiologist    Moderate protein-calorie malnutrition (HCC)- (present on admission)  Assessment & Plan  Subcutaneous tissue and muscle wasting with mild decreased  strength.  Patient has lost 20 pounds in the past year.    Nutrition consult    Cortisol deficiency (HCC)- (present on admission)  Assessment & Plan  Cortisol level is 2.7.  Concerning for adrenal insufficiency.    I have ordered a cosyntropin test as well as stress dose hydrocortisone    Overactive bladder- (present on admission)  Assessment & Plan  Discontinue home MYRBETRIQ 50 MG TABLET SR 24 HR due to anticholinergic effects contributing.  Patient reports having had a bladder surgery previously.    Patient using PureWick    Depression- (present on admission)  Assessment & Plan  Continue home Cymbalta.    Hypomagnesemia- (present on admission)  Assessment & Plan  Magnesium of 1.6 today.  I have ordered magnesium sulfate 4 g IV for replacement.  Goal greater than 2.0.    Fall at home- (present on admission)  Assessment & Plan  Likely due to hypotensive episode.    Continue to treat adrenal insufficiency, IV fluids    Dehydration- (present on admission)  Assessment & Plan  Likely due to poor oral fluid intake.  Also concerning for adrenal crisis syndrome.    Continue IV fluids LR at 83 m/h.  Continue to encourage oral intake.    Chronic respiratory failure with hypoxia (HCC)- (present on admission)  Assessment & Plan  Currently following with and on pulmonology and found to have localized scarring in her lungs along with a reduced DLCO.    Currently on 2 LPM oxygen nasal cannula.  Baseline need is intermittent as needed for exertion and sleeping.    Suspicion for pulmonary hypertension on CT scan.  No evidence of pulmonary hypertension on  echocardiogram.    Ambulatory oxygen testing on discharge.  Wean off oxygen as tolerated.         VTE prophylaxis: SCDs/TEDs and enoxaparin ppx    I have performed a physical exam and reviewed and updated ROS and Plan today (5/27/2023). In review of yesterday's note (5/26/2023), there are no changes except as documented above.      Patient is critically ill.   The patient continues to have: Adrenal crisis syndrome with hypotension and shock with acute respiratory failure with hypoxia.  The vital organ system that is affected is the: Cardiovascular and neurologic  If untreated there is a high chance of deterioration into: Shock, coma, fulminant respiratory failure, cardiac arrest arrest and eventually death.   The critical care that I am providing today is: Extensive data review and frequent monitoring of patient's vital signs and clinical status at bedside.  Time spent coordinating care with the bedside nurse.  I have started the patient on stress dose hydrocortisone 100 mg IV.  I have increased patient's midodrine to 10 mg 3 times a day.  The critical that has been undertaken is medically complex.   There has been no overlap in critical care time.   Critical Care Time not including procedures: 39 minutes

## 2023-05-27 NOTE — ASSESSMENT & PLAN NOTE
At this point, patient's blood pressure continues to fluctuate when tested from the upper and lower extremity    Ordered ETA of the entire aorta    I obtained further history from the daughter who states that the patient's blood pressure sometimes drops after taking duloxetine.  She is very dependent on the duloxetine for her depression.    I discussed the case with the clinical pharmacist, who stated there are case reports of orthostasis with duloxetine.    I discussed with the patient and she is agreeable to trying to come down on her duloxetine at this time.        Patient's blood pressures appear to be much more elevated when measured on the legs compared to the arms.  She has noticed evidence of presyncopal symptoms at this time    I have discontinued patient's fludrocortisone.  Consider restarting if hypotensive again.

## 2023-05-27 NOTE — CONSULTS
Physical Medicine and Rehabilitation Consultation              Date of initial consultation: 5/27/2023  Requesting provider: ordered by Milo Maria M.D. at 05/27/23 0593   Consulting provider: Lyn Hdz D.O.  Reason for consultation: assess for acute inpatient rehab appropriateness  LOS: 1 Day(s)    Chief complaint: syncopal episodes     HPI: The patient is a 83 y.o. female with a past medical history of chronic hypoxia on 3 L, orthostatic hypotension, depresion, and breast cancer s/p mastectomy and radiation;  who presented on 5/25/2023  7:35 PM with after a fall/syncopal episode. Per documentation, patient reported atttempt to stand to walk to the bathroom when she became lightheaded and fell back, hitting her head. Does not know if she LOC. Of note, patient has had 20 lb weight loss over the last year. Upon eval in the ED CT head was negative for acute process, CXR showed chronic underlying lung disease, she has required 2 L o2. Echo obtained on 5/26  shows EF 75%.  BNP up to 385. Hospital course has been notable for orthostatic hypotension. Cortisol level 2.7, adrenal insufficiency work up on going, is on hydrocortisone q 6hrs. Patient has been able to participate with therapy, but she was limited by fatigue and orthostatic hypotension.     Patient seen and examined at bedside. Patient is very fatigued, falls asleep many times during exam. Reports she has help at home for laundry and cleaning, does not have caregiving help for ADLs, is unsure if she can upgrade level of caregiving she pays for.  cannot help. Greatest complaint is fatigue, denies HA, lightheadedness, SOB, CP, abdominal pain, or changes in numbness/tingling/weakness. Reports Left foot has chronically been weaker than R.       Social Hx:  Patient lives with   in a 1 story house with 2 CHIDI,  and wife have paid RN caregiver.  has PD and cannot assist with caregiving.   2 CHIDI  At prior level of function MOD I  "with 4WW.     Tobacco: former smoker, quit 30 years ago   Alcohol: Denies   Drugs: Denies     THERAPY:  Restrictions: Fall Risk   PT: Functional mobility   5/26 Mod A bed mobility, Min A sit to stand, unable to participate in transfers     OT: ADLs  5/26 Mod A bed mobility, Max A lower body dressing     SLP:   None     IMAGING:  DX-CHEST-PORTABLE (1 VIEW)   Final Result           1.  Interstitial pulmonary parenchymal prominence suggest chronic underlying lung disease, component of interstitial edema and/or infiltrates not excluded.   2.  Atherosclerosis       CT-CSPINE WITHOUT PLUS RECONS   Final Result       No CT evidence of acute cervical spine abnormality.       Thoracic kyphosis, cervical lordosis is exaggerated and there is multilevel facet ankylosis which could be degenerative favored over ankylosing spondylitis or rheumatoid arthritis       CT-HEAD W/O   Final Result       1.  No evidence of acute intracranial process.       2.  Cerebral atrophy as well as periventricular chronic small vessel ischemic change.       PROCEDURES:  None     PMH:  Past Medical History:   Diagnosis Date    Arthritis     osteo, generalized    Bowel habit changes     constipation    Breath shortness     Cataract     removed bilat    COPD (chronic obstructive pulmonary disease) ([facility]ly [facility])     Diabetes (HCC)     \"pre\", oral meds    Heart burn     Heart murmur     Hiatus hernia syndrome     High cholesterol     Hypertension     Indigestion     Malignant neoplasm of overlapping sites of breast in female, estrogen receptor positive (Formerly [facility]) 11/6/2019    Psychiatric problem 2015    anxiety    Shortness of breath     Snoring     no sleep study    Urinary incontinence     Wears glasses        PSH:  Past Surgical History:   Procedure Laterality Date    ORIF, ANKLE Right 6/10/2022    Procedure: ORIF, ANKLE - TRIMALLEOLAR;  Surgeon: Anuj Simpson M.D.;  Location: SURGERY [facility];  Service: Orthopedics    OK TOTAL HIP ARTHROPLASTY Right 9/30/2021 "    Procedure: ARTHROPLASTY, HIP, TOTAL, ANTERIOR APPROACH;  Surgeon: Lewis Serrano M.D.;  Location: [facility];  Service: Orthopedics    WIDE EXCISION Right 8/16/2019    Procedure: WIDE EXCISION, LESION- RE-EXCISION FOR EXCISION FOR MARGINS RIGHT CHEST WALL INSTRMUSCULAR;  Surgeon: Andre Shelton M.D.;  Location: [facility];  Service: General    INCISION AND DRAINAGE GENERAL Bilateral 8/16/2019    Procedure: INCISION AND DRAINAGE- OF BILATERAL CHEST SEROMAS WITH DRAIN PLACEMENT;  Surgeon: Andre Shelton M.D.;  Location: [facility];  Service: General    PB MASTECTOMY, MODIFIED RADICAL Right 7/17/2019    Procedure: MASTECTOMY, MODIFIED RADICAL;  Surgeon: Andre Shelton M.D.;  Location: [facility];  Service: General    NODE BIOPSY SENTINEL Right 7/17/2019    Procedure: INTRA OPERATIVE SENTINEL NODE IDENTIFICATION AND BIOPSY;  Surgeon: Andre Shelton M.D.;  Location: [facility];  Service: General    AXILLARY NODE DISSECTION  7/17/2019    Procedure: LYMPHADENECTOMY, AXILLARY;  Surgeon: Andre Shelton M.D.;  Location: [facility];  Service: General    MASTECTOMY Left 7/17/2019    Procedure: TOTAL MASTECTOMY;  Surgeon: Andre Shelton M.D.;  Location: [facility];  Service: General    FLAP CLOSURE Bilateral 7/17/2019    Procedure: WIDE V FLAP;  Surgeon: Andre Shelton M.D.;  Location: [facility];  Service: General    LEFORT COLPOCLESIS  1/21/2019    Procedure: LEFORT COLPOCLESIS;  Surgeon: Minnie Bañuelos M.D.;  Location: [facility];  Service: Labor and Delivery    BLADDER SLING FEMALE  1/21/2019    Procedure: BLADDER SLING FEMALE- TOT;  Surgeon: Minnie Bañuelos M.D.;  Location: [facility];  Service: Labor and Delivery    KNEE ARTHROPLASTY TOTAL Right 11/2/2015    Procedure: KNEE ARTHROPLASTY TOTAL;  Surgeon: Venkatesh Cardoza M.D.;  Location: [facility];  Service:     ABDOMINAL  "HYSTERECTOMY TOTAL  1994    OTHER ORTHOPEDIC SURGERY  1993    L ankle    GYN SURGERY  1992    abdominal hysterectomy    CHOLECYSTECTOMY  1964    CATARACT EXTRACTION WITH IOL Bilateral        FHX:  Family History   Problem Relation Age of Onset    Heart Disease Neg Hx        Medications:  Current Facility-Administered Medications   Medication Dose    ondansetron (ZOFRAN) syringe/vial injection 4 mg  4 mg    midodrine (PROAMATINE) tablet 10 mg  10 mg    cosyntropin (CORTROSYN) injection 250 mcg  250 mcg    hydrocortisone sodium succinate PF (Solu-CORTEF) 100 MG injection 100 mg  100 mg    Or    hydrocortisone sodium succinate PF (Solu-CORTEF) 100 MG injection 50 mg  50 mg    senna-docusate (PERICOLACE or SENOKOT S) 8.6-50 MG per tablet 2 Tablet  2 Tablet    And    polyethylene glycol/lytes (MIRALAX) PACKET 1 Packet  1 Packet    And    magnesium hydroxide (MILK OF MAGNESIA) suspension 30 mL  30 mL    And    bisacodyl (DULCOLAX) suppository 10 mg  10 mg    lactated ringers infusion      enoxaparin (Lovenox) inj 40 mg  40 mg    omeprazole (PRILOSEC) capsule 20 mg  20 mg    lovastatin (MEVACOR) tablet 20 mg  20 mg    gabapentin (NEURONTIN) capsule 100 mg  100 mg    melatonin tablet 5 mg  5 mg       Allergies:  No Known Allergies      Physical Exam:  Vitals: BP 90/58   Pulse 79   Temp 36.6 °C (97.9 °F) (Temporal)   Resp 17   Ht 1.6 m (5' 2.99\")   Wt 64.2 kg (141 lb 8.6 oz)   SpO2 94%   Gen: NAD, laying comfortably in bed, sleeping but wakens to voice   Head:  NC/AT  Eyes/ Nose/ Mouth: PERRLA, moist mucous membranes  Cardio: RRR, good distal perfusion, warm extremities  Pulm: normal respiratory effort, no cyanosis   Abd: Soft NTND, negative borborygmi    Ext: No peripheral edema. No calf tenderness. No clubbing.    Mental status:  A&Ox4 (person, place, date, situation) answers questions appropriately follows commands  Speech: fluent, no aphasia or dysarthria    CRANIAL NERVES:  2,3: visual acuity grossly intact, " PERRL  3,4,6: EOMI bilaterally, no nystagmus or diplopia  5: sensation intact to light touch bilaterally and symmetric  7: no facial asymmetry  8: hearing grossly intact      Motor:      Upper Extremity  Myotome R L   Shoulder flexion C5 4/5 4/5   Elbow flexion C5 5/5 5/5   Wrist extension C6 5/5 5/5   Elbow extension C7 5/5 5/5   Finger flexion C8 5/5 5/5   Finger abduction T1 5/5 5/5     Lower Extremity Myotome R L   Hip flexion L2 4/5 4/5   Knee extension L3 5/5 5/5   Ankle dorsiflexion L4 5/5 3/5   Toe extension L5 5/5 4/5   Ankle plantarflexion S1 5/5 5/5       Negative Pronator drift bilaterally    Sensory:   intact to light touch through out      DTRs: 2+ in bilateral  biceps  No clonus at bilateral ankles  Negative Noriega b/l     Tone: no spasticity noted    Coordination:   intact fine motor with fingers bilaterally      Labs: Reviewed and significant for   Recent Labs     05/25/23 1956 05/26/23 0114 05/27/23 0048   RBC 4.57 4.33 4.22   HEMOGLOBIN 14.2 13.4 13.0   HEMATOCRIT 42.0 40.0 39.5   PLATELETCT 238 233 224     Recent Labs     05/25/23 1956 05/26/23 0114 05/27/23 0048   SODIUM 135 140 142   POTASSIUM 4.2 4.4 3.9   CHLORIDE 100 103 106   CO2 25 27 25   GLUCOSE 96 92 105*   BUN 27* 23* 17   CREATININE 0.68 0.68 0.76   CALCIUM 9.0 9.1 8.7     Recent Results (from the past 24 hour(s))   EC-ECHOCARDIOGRAM COMPLETE W/O CONT    Collection Time: 05/26/23  4:06 PM   Result Value Ref Range    Eject.Frac. MOD BP 82.47     Eject.Frac. MOD 4C 82     Eject.Frac. MOD 2C 81.21     Left Ventrical Ejection Fraction 75    CBC WITH DIFFERENTIAL    Collection Time: 05/27/23 12:48 AM   Result Value Ref Range    WBC 6.6 4.8 - 10.8 K/uL    RBC 4.22 4.20 - 5.40 M/uL    Hemoglobin 13.0 12.0 - 16.0 g/dL    Hematocrit 39.5 37.0 - 47.0 %    MCV 93.6 81.4 - 97.8 fL    MCH 30.8 27.0 - 33.0 pg    MCHC 32.9 32.2 - 35.5 g/dL    RDW 46.5 35.9 - 50.0 fL    Platelet Count 224 164 - 446 K/uL    MPV 9.8 9.0 - 12.9 fL     Neutrophils-Polys 68.60 44.00 - 72.00 %    Lymphocytes 19.90 (L) 22.00 - 41.00 %    Monocytes 10.70 0.00 - 13.40 %    Eosinophils 0.30 0.00 - 6.90 %    Basophils 0.20 0.00 - 1.80 %    Immature Granulocytes 0.30 0.00 - 0.90 %    Nucleated RBC 0.00 0.00 - 0.20 /100 WBC    Neutrophils (Absolute) 4.55 1.82 - 7.42 K/uL    Lymphs (Absolute) 1.32 1.00 - 4.80 K/uL    Monos (Absolute) 0.71 0.00 - 0.85 K/uL    Eos (Absolute) 0.02 0.00 - 0.51 K/uL    Baso (Absolute) 0.01 0.00 - 0.12 K/uL    Immature Granulocytes (abs) 0.02 0.00 - 0.11 K/uL    NRBC (Absolute) 0.00 K/uL   Basic Metabolic Panel    Collection Time: 05/27/23 12:48 AM   Result Value Ref Range    Sodium 142 135 - 145 mmol/L    Potassium 3.9 3.6 - 5.5 mmol/L    Chloride 106 96 - 112 mmol/L    Co2 25 20 - 33 mmol/L    Glucose 105 (H) 65 - 99 mg/dL    Bun 17 8 - 22 mg/dL    Creatinine 0.76 0.50 - 1.40 mg/dL    Calcium 8.7 8.5 - 10.5 mg/dL    Anion Gap 11.0 7.0 - 16.0   MAGNESIUM    Collection Time: 05/27/23 12:48 AM   Result Value Ref Range    Magnesium 1.6 1.5 - 2.5 mg/dL   PHOSPHORUS    Collection Time: 05/27/23 12:48 AM   Result Value Ref Range    Phosphorus 2.9 2.5 - 4.5 mg/dL   ESTIMATED GFR    Collection Time: 05/27/23 12:48 AM   Result Value Ref Range    GFR (CKD-EPI) 77 >60 mL/min/1.73 m 2   CORTISOL    Collection Time: 05/27/23 12:48 AM   Result Value Ref Range    Cortisol 2.7 0.0 - 23.0 ug/dL         ASSESSMENT:  Patient is a 83 y.o. female admitted with syncopal episodes      [facility] Code / Diagnosis to Support: 0016 - Debility (Non-Cardiac, Non-Pulmonary)    Rehabilitation: Impaired ADLs and mobility  Patient is a fair candidate for inpatient rehab based on needs for PT, OT; however need confirmation DC support from paid caregivers ( cannot help due to PD)     Barriers to transfer include: Insurance authorization, TCCs to verify disposition, medical clearance and bed availability     Additional Recommendations:  Syncope   - recent syncope and near syncope  episodes   - Echo obtained, EF 75%   - may be secondary to ortho static hypotension   - recommend orthostatic hypotension measurements with therapies   - continue with PT/OT; will need to show improved tolerance with therapies, was too fatigued for gait     Orthostatic hypotension   - history of orthostatic hypotension   - now in IVFs to support HTN   - started on midodrine 10mg TID   - SWAPNIL hose ordered       Adrenal insufficiency   - Cortisol 2.7   -  work up on going,  pending repeat cortisol testing after cortosyn injection    Dispo:  - patient is currently functioning below their level of baseline, recommend post acute rehab  - recommend IRF level therapy with 3hr of therapy 5 days per week if able to show improved tolerance with therapies   - piror to acceptance to IRF, will need confirmation of help for ADLs and mobility from paid care giving services    - TCC to assist with insurance auth and DC support         Medical Complexity:  Syncope   Orthostatic hypotension   Adrenal insufficiency   Impaired mobility and ADLs     DVT PPX: Lovenox       Thank you for allowing us to participate in the care of this patient.     Patient was seen for 81  minutes on unit/floor of which > 50% of time was spent on counseling and coordination of care regarding the above, including prognosis, risk reduction, benefits of treatment, and options for next stage of care.    Lyn Hdz D.O.   Physical Medicine and Rehabilitation     Please note that this dictation was created using voice recognition software. I have made every reasonable attempt to correct obvious errors, but there may be errors of grammar and possibly content that I did not discover before finalizing the note.

## 2023-05-27 NOTE — ASSESSMENT & PLAN NOTE
Severe mitral annular calcification on echocardiogram.  This appears to have progressed.    She does not appear to be in heart failure   Outpatient follow-up cardiology

## 2023-05-27 NOTE — ASSESSMENT & PLAN NOTE
A.m. cortisol level this morning 2.1, which is low.  Concerning for adrenal insufficiency.  However cosyntropin testing was negative.      Primary adrenal deficiency has been ruled out.    Follow-up ACTH levels to consider secondary adrenal insufficiency.

## 2023-05-27 NOTE — CARE PLAN
"The patient is Stable - Low risk of patient condition declining or worsening    Shift Goals  Clinical Goals: Monitor VS; ECHO done; safety  Patient Goals: get betterand go home  Family Goals: RENÉE    Progress made toward(s) clinical / shift goals:   patient remains free from fallat this time, aox4, no pain was reported during rounding, call light within reach and bed alarm was kept on,BP (!) 81/51   Pulse 83   Temp 37 °C (98.6 °F) (Temporal)   Resp 18   Ht 1.6 m (5' 2.99\")   Wt 64.9 kg (143 lb 1.3 oz)   SpO2 90%  MD aware of BP, will continue to monitor           "

## 2023-05-27 NOTE — HOSPITAL COURSE
Patient is a 83-year-old female with a past medical history of orthostatic hypotension, chronic hypoxic respiratory failure requiring 3 L of oxygen at night and with exertion, depression, breast cancer status post bilateral mastectomy and radiation therapy and overactive bladder who presented to the hospital after experiencing a fall around 6 PM today.     Patient reported that she was getting up from her chair to go to the bathroom after sitting down for a long time and while she was walking to the bathroom she became lightheaded and fell back and hit her head.  Patient is unsure if she lost consciousness during this facility.  Patient later stated that she started to feel lightheaded as she was going to the refrigerator to get some to drink because she was feeling thirsty.  Patient says she walks with a walker and was using a walker at this time.  Patient says she was not using her oxygen during this time.  Of note patient and her daughter note that sometimes her oxygen drops down to the mid 80s at home even when she has oxygen on.  Patient says that all she ate and drank today was 1 bottle of Ensure.     Patient reports feeling fatigued for fevers and having shortness of breath for which she sees pulmonology at [facility].  She reports a 20 pound weight loss over the past year.     No other complaints at this time.

## 2023-05-28 ENCOUNTER — APPOINTMENT (OUTPATIENT)
Dept: RADIOLOGY | Facility: MEDICAL CENTER | Age: 83
DRG: 643 | End: 2023-05-28
Attending: STUDENT IN AN ORGANIZED HEALTH CARE EDUCATION/TRAINING PROGRAM
Payer: MEDICARE

## 2023-05-28 PROBLEM — J96.21 ACUTE ON CHRONIC RESPIRATORY FAILURE WITH HYPOXIA (HCC): Status: ACTIVE | Noted: 2021-02-10

## 2023-05-28 PROBLEM — I95.1 ORTHOSTATIC HYPOTENSION: Status: ACTIVE | Noted: 2023-05-27

## 2023-05-28 LAB
ALBUMIN SERPL BCP-MCNC: 3.2 G/DL (ref 3.2–4.9)
ALBUMIN/GLOB SERPL: 1.2 G/DL
ALP SERPL-CCNC: 85 U/L (ref 30–99)
ALT SERPL-CCNC: 12 U/L (ref 2–50)
ANION GAP SERPL CALC-SCNC: 11 MMOL/L (ref 7–16)
AST SERPL-CCNC: 18 U/L (ref 12–45)
BASOPHILS # BLD AUTO: 0.1 % (ref 0–1.8)
BASOPHILS # BLD: 0.01 K/UL (ref 0–0.12)
BILIRUB SERPL-MCNC: 0.3 MG/DL (ref 0.1–1.5)
BUN SERPL-MCNC: 17 MG/DL (ref 8–22)
CALCIUM ALBUM COR SERPL-MCNC: 9.4 MG/DL (ref 8.5–10.5)
CALCIUM SERPL-MCNC: 8.8 MG/DL (ref 8.5–10.5)
CHLORIDE SERPL-SCNC: 102 MMOL/L (ref 96–112)
CO2 SERPL-SCNC: 26 MMOL/L (ref 20–33)
CORTIS SERPL-MCNC: 40.4 UG/DL (ref 0–23)
CREAT SERPL-MCNC: 0.81 MG/DL (ref 0.5–1.4)
CRP SERPL HS-MCNC: 1.28 MG/DL (ref 0–0.75)
EOSINOPHIL # BLD AUTO: 0 K/UL (ref 0–0.51)
EOSINOPHIL NFR BLD: 0 % (ref 0–6.9)
ERYTHROCYTE [DISTWIDTH] IN BLOOD BY AUTOMATED COUNT: 44.9 FL (ref 35.9–50)
ERYTHROCYTE [SEDIMENTATION RATE] IN BLOOD BY WESTERGREN METHOD: 8 MM/HOUR (ref 0–25)
GFR SERPLBLD CREATININE-BSD FMLA CKD-EPI: 72 ML/MIN/1.73 M 2
GLOBULIN SER CALC-MCNC: 2.7 G/DL (ref 1.9–3.5)
GLUCOSE SERPL-MCNC: 189 MG/DL (ref 65–99)
HCT VFR BLD AUTO: 41.7 % (ref 37–47)
HGB BLD-MCNC: 14.2 G/DL (ref 12–16)
IMM GRANULOCYTES # BLD AUTO: 0.01 K/UL (ref 0–0.11)
IMM GRANULOCYTES NFR BLD AUTO: 0.1 % (ref 0–0.9)
LYMPHOCYTES # BLD AUTO: 0.63 K/UL (ref 1–4.8)
LYMPHOCYTES NFR BLD: 8.2 % (ref 22–41)
MAGNESIUM SERPL-MCNC: 2.1 MG/DL (ref 1.5–2.5)
MCH RBC QN AUTO: 31.2 PG (ref 27–33)
MCHC RBC AUTO-ENTMCNC: 34.1 G/DL (ref 32.2–35.5)
MCV RBC AUTO: 91.6 FL (ref 81.4–97.8)
MONOCYTES # BLD AUTO: 0.14 K/UL (ref 0–0.85)
MONOCYTES NFR BLD AUTO: 1.8 % (ref 0–13.4)
NEUTROPHILS # BLD AUTO: 6.88 K/UL (ref 1.82–7.42)
NEUTROPHILS NFR BLD: 89.8 % (ref 44–72)
NRBC # BLD AUTO: 0 K/UL
NRBC BLD-RTO: 0 /100 WBC (ref 0–0.2)
PLATELET # BLD AUTO: 269 K/UL (ref 164–446)
PMV BLD AUTO: 10.3 FL (ref 9–12.9)
POTASSIUM SERPL-SCNC: 4 MMOL/L (ref 3.6–5.5)
PROT SERPL-MCNC: 5.9 G/DL (ref 6–8.2)
RBC # BLD AUTO: 4.55 M/UL (ref 4.2–5.4)
SODIUM SERPL-SCNC: 139 MMOL/L (ref 135–145)
WBC # BLD AUTO: 7.7 K/UL (ref 4.8–10.8)

## 2023-05-28 PROCEDURE — 770020 HCHG ROOM/CARE - TELE (206)

## 2023-05-28 PROCEDURE — 70553 MRI BRAIN STEM W/O & W/DYE: CPT

## 2023-05-28 PROCEDURE — 83735 ASSAY OF MAGNESIUM: CPT

## 2023-05-28 PROCEDURE — 99222 1ST HOSP IP/OBS MODERATE 55: CPT | Performed by: INTERNAL MEDICINE

## 2023-05-28 PROCEDURE — 71045 X-RAY EXAM CHEST 1 VIEW: CPT

## 2023-05-28 PROCEDURE — 700111 HCHG RX REV CODE 636 W/ 250 OVERRIDE (IP): Performed by: STUDENT IN AN ORGANIZED HEALTH CARE EDUCATION/TRAINING PROGRAM

## 2023-05-28 PROCEDURE — 36415 COLL VENOUS BLD VENIPUNCTURE: CPT

## 2023-05-28 PROCEDURE — 82533 TOTAL CORTISOL: CPT

## 2023-05-28 PROCEDURE — 700105 HCHG RX REV CODE 258: Performed by: STUDENT IN AN ORGANIZED HEALTH CARE EDUCATION/TRAINING PROGRAM

## 2023-05-28 PROCEDURE — 82024 ASSAY OF ACTH: CPT

## 2023-05-28 PROCEDURE — 86140 C-REACTIVE PROTEIN: CPT

## 2023-05-28 PROCEDURE — 85652 RBC SED RATE AUTOMATED: CPT

## 2023-05-28 PROCEDURE — 700105 HCHG RX REV CODE 258: Performed by: NURSE PRACTITIONER

## 2023-05-28 PROCEDURE — 700102 HCHG RX REV CODE 250 W/ 637 OVERRIDE(OP)

## 2023-05-28 PROCEDURE — A9270 NON-COVERED ITEM OR SERVICE: HCPCS | Performed by: STUDENT IN AN ORGANIZED HEALTH CARE EDUCATION/TRAINING PROGRAM

## 2023-05-28 PROCEDURE — 82088 ASSAY OF ALDOSTERONE: CPT

## 2023-05-28 PROCEDURE — 84244 ASSAY OF RENIN: CPT

## 2023-05-28 PROCEDURE — 700117 HCHG RX CONTRAST REV CODE 255: Performed by: STUDENT IN AN ORGANIZED HEALTH CARE EDUCATION/TRAINING PROGRAM

## 2023-05-28 PROCEDURE — 700102 HCHG RX REV CODE 250 W/ 637 OVERRIDE(OP): Performed by: STUDENT IN AN ORGANIZED HEALTH CARE EDUCATION/TRAINING PROGRAM

## 2023-05-28 PROCEDURE — A9270 NON-COVERED ITEM OR SERVICE: HCPCS

## 2023-05-28 PROCEDURE — 99233 SBSQ HOSP IP/OBS HIGH 50: CPT | Performed by: STUDENT IN AN ORGANIZED HEALTH CARE EDUCATION/TRAINING PROGRAM

## 2023-05-28 PROCEDURE — A9579 GAD-BASE MR CONTRAST NOS,1ML: HCPCS | Performed by: STUDENT IN AN ORGANIZED HEALTH CARE EDUCATION/TRAINING PROGRAM

## 2023-05-28 PROCEDURE — 85025 COMPLETE CBC W/AUTO DIFF WBC: CPT

## 2023-05-28 PROCEDURE — 80053 COMPREHEN METABOLIC PANEL: CPT

## 2023-05-28 RX ORDER — DULOXETIN HYDROCHLORIDE 30 MG/1
30 CAPSULE, DELAYED RELEASE ORAL DAILY
Status: DISCONTINUED | OUTPATIENT
Start: 2023-05-28 | End: 2023-05-31 | Stop reason: HOSPADM

## 2023-05-28 RX ORDER — SODIUM CHLORIDE 9 MG/ML
500 INJECTION, SOLUTION INTRAVENOUS ONCE
Status: COMPLETED | OUTPATIENT
Start: 2023-05-28 | End: 2023-05-28

## 2023-05-28 RX ORDER — MIDODRINE HYDROCHLORIDE 5 MG/1
10 TABLET ORAL ONCE
Status: COMPLETED | OUTPATIENT
Start: 2023-05-28 | End: 2023-05-28

## 2023-05-28 RX ORDER — METHYLPREDNISOLONE SODIUM SUCCINATE 40 MG/ML
40 INJECTION, POWDER, LYOPHILIZED, FOR SOLUTION INTRAMUSCULAR; INTRAVENOUS EVERY 6 HOURS
Status: DISCONTINUED | OUTPATIENT
Start: 2023-05-28 | End: 2023-05-28

## 2023-05-28 RX ORDER — FLUDROCORTISONE ACETATE 0.1 MG/1
0.1 TABLET ORAL EVERY MORNING
Status: DISCONTINUED | OUTPATIENT
Start: 2023-05-28 | End: 2023-05-29

## 2023-05-28 RX ADMIN — METHYLPREDNISOLONE SODIUM SUCCINATE 40 MG: 40 INJECTION, POWDER, FOR SOLUTION INTRAMUSCULAR; INTRAVENOUS at 14:06

## 2023-05-28 RX ADMIN — MIDODRINE HYDROCHLORIDE 10 MG: 5 TABLET ORAL at 08:56

## 2023-05-28 RX ADMIN — LOVASTATIN 20 MG: 20 TABLET ORAL at 20:19

## 2023-05-28 RX ADMIN — DOCUSATE SODIUM 50 MG AND SENNOSIDES 8.6 MG 2 TABLET: 8.6; 5 TABLET, FILM COATED ORAL at 16:53

## 2023-05-28 RX ADMIN — ENOXAPARIN SODIUM 40 MG: 100 INJECTION SUBCUTANEOUS at 16:54

## 2023-05-28 RX ADMIN — HYDROCORTISONE SODIUM SUCCINATE 50 MG: 100 INJECTION, POWDER, FOR SOLUTION INTRAMUSCULAR; INTRAVENOUS at 05:48

## 2023-05-28 RX ADMIN — DULOXETINE HYDROCHLORIDE 30 MG: 30 CAPSULE, DELAYED RELEASE ORAL at 16:53

## 2023-05-28 RX ADMIN — SODIUM CHLORIDE 500 ML: 9 INJECTION, SOLUTION INTRAVENOUS at 22:56

## 2023-05-28 RX ADMIN — OMEPRAZOLE 20 MG: 20 CAPSULE, DELAYED RELEASE ORAL at 16:53

## 2023-05-28 RX ADMIN — FLUDROCORTISONE ACETATE 0.1 MG: 0.1 TABLET ORAL at 16:53

## 2023-05-28 RX ADMIN — SODIUM CHLORIDE, POTASSIUM CHLORIDE, SODIUM LACTATE AND CALCIUM CHLORIDE: 600; 310; 30; 20 INJECTION, SOLUTION INTRAVENOUS at 12:55

## 2023-05-28 RX ADMIN — MIDODRINE HYDROCHLORIDE 10 MG: 5 TABLET ORAL at 23:16

## 2023-05-28 RX ADMIN — Medication 5 MG: at 20:19

## 2023-05-28 RX ADMIN — GADOTERIDOL 13 ML: 279.3 INJECTION, SOLUTION INTRAVENOUS at 11:50

## 2023-05-28 RX ADMIN — MIDODRINE HYDROCHLORIDE 10 MG: 5 TABLET ORAL at 14:05

## 2023-05-28 RX ADMIN — MIDODRINE HYDROCHLORIDE 10 MG: 5 TABLET ORAL at 16:53

## 2023-05-28 ASSESSMENT — ENCOUNTER SYMPTOMS
CONSTIPATION: 0
WEAKNESS: 1
FALLS: 1
DIZZINESS: 1
NAUSEA: 0
ABDOMINAL PAIN: 0
MYALGIAS: 1
FOCAL WEAKNESS: 0
DIARRHEA: 0
CHILLS: 0
FEVER: 0
COUGH: 0
HEADACHES: 0
SHORTNESS OF BREATH: 0
PALPITATIONS: 0
VOMITING: 0

## 2023-05-28 ASSESSMENT — FIBROSIS 4 INDEX: FIB4 SCORE: 1.62

## 2023-05-28 NOTE — CARE PLAN
"The patient is Stable - Low risk of patient condition declining or worsening    Shift Goals  Clinical Goals: Pt's MAP will remain above 65 this shift  Patient Goals: \"sleep\"  Family Goals: RENÉE    Progress made toward(s) clinical / shift goals:  Pt's MAP has sustained above 65 so far this shift, pt has been weaned down to 1 L NC when their baseline in 3 L NC, pt continues to be A and O x4 this shift, pt has remained free from falls on this admit    Patient is not progressing towards the following goals:      Problem: Fall Risk  Goal: Patient will remain free from falls  5/27/2023 2239 by Zahra Fox R.N.  Outcome: Progressing    Problem: Hemodynamics  Goal: Patient's hemodynamics, fluid balance and neurologic status will be stable or improve  Outcome: Progressing     Problem: Respiratory  Goal: Patient will achieve/maintain optimum respiratory ventilation and gas exchange  Outcome: Progressing     "

## 2023-05-28 NOTE — CONSULTS
Cardiology Consult Note:    Claudia Torrez M.D.  Date & Time note created:    5/28/2023   12:47 PM     Referring MD:  Dr. Maria    Patient ID:   Name:             Rashmi Parra   YOB: 1940  Age:                 83 y.o.  female   MRN:               [id number]                                                             Chief Complaint / Reason for consult:  Syncope.    History of Present Illness:    This is an 83-year-old patient with mitral stenosis, COPD, prior tobacco use, labile hypertension, hyperlipidemia, presented to the hospital with a syncopal episode.  Patient reports that she was in the kitchen getting water to drink.  She suddenly felt a rush of warm sensation across her body.  She then went down to the ground and lost consciousness.  No palpitation no chest pain or shortness of breath reported.  Patient does have a history of possible adrenal insufficiency crisis as well.    I personally interpreted the images of her transthoracic echocardiogram which showed normal LV function, calcified mitral valve with mean gradient of 5.4 mmHg across the mitral valve.    I have personally interpreted EKG today with patient, there is no evidence of acute coronary syndrome, no evidence of prior infarct, normal GA and QT interval, no significant conduction disease. Sinus rhythm.    I personally interpreted all telemetry tracings which do not show evidence of heart block, pauses.    Review of Systems:      Constitutional: Denies fevers, Denies weight changes  Eyes: Denies changes in vision, no eye pain  Ears/Nose/Throat/Mouth: Denies nasal congestion or sore throat   Cardiovascular: no chest pain, no palpitations   Respiratory: no shortness of breath , Denies cough  Gastrointestinal/Hepatic: Denies abdominal pain, nausea, vomiting, diarrhea, constipation or GI bleeding   Genitourinary: Denies dysuria or frequency  Musculoskeletal/Rheum: Denies  joint pain and swelling   Skin: Denies  "rash  Neurological: Denies headache, confusion, memory loss or focal weakness/parasthesias  Psychiatric: denies mood disorder   Endocrine: Bindu thyroid problems  Heme/Oncology/Lymph Nodes: Denies enlarged lymph nodes, denies brusing or known bleeding disorder  All other systems were reviewed and are negative (AMA/CMS criteria)                Past Medical History:   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, estrogen receptor positive (HCC) 11/6/2019    Psychiatric problem 2015    anxiety    Shortness of breath     Snoring     no sleep study    Urinary incontinence     Wears glasses      Active Hospital Problems    Diagnosis     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]     Adrenal crisis syndrome (HCC) [E27.2]     Moderate protein-calorie malnutrition (HCC) [E44.0]     Mitral annular calcification [I34.81]     Depression [F32.A]     Overactive bladder [N32.81]     Syncope and collapse [R55]     Hypotension [I95.9]     Hypomagnesemia [E83.42]     Dehydration [E86.0]     Fall at home [W19.XXXA, Y92.009]     Moderate mitral stenosis [I05.0]     Chronic respiratory failure with hypoxia (HCC) [J96.11]        Past Surgical History:  Past Surgical History:   Procedure Laterality Date    ORIF, ANKLE Right 6/10/2022    Procedure: ORIF, ANKLE - TRIMALLEOLAR;  Surgeon: Anuj Simpson M.D.;  Location: SURGERY [facility];  Service: Orthopedics    GA TOTAL HIP ARTHROPLASTY Right 9/30/2021    Procedure: ARTHROPLASTY, HIP, TOTAL, ANTERIOR APPROACH;  Surgeon: Lewis Serrano M.D.;  Location: SURGERY [facility];  Service: Orthopedics    WIDE " EXCISION Right 8/16/2019    Procedure: WIDE EXCISION, LESION- RE-EXCISION FOR EXCISION FOR MARGINS RIGHT CHEST WALL INSTRMUSCULAR;  Surgeon: Andre Shelton M.D.;  Location: [facility];  Service: General    INCISION AND DRAINAGE GENERAL Bilateral 8/16/2019    Procedure: INCISION AND DRAINAGE- OF BILATERAL CHEST SEROMAS WITH DRAIN PLACEMENT;  Surgeon: Andre Shelton M.D.;  Location: [facility];  Service: General    PB MASTECTOMY, MODIFIED RADICAL Right 7/17/2019    Procedure: MASTECTOMY, MODIFIED RADICAL;  Surgeon: Andre Shelton M.D.;  Location: [facility];  Service: General    NODE BIOPSY SENTINEL Right 7/17/2019    Procedure: INTRA OPERATIVE SENTINEL NODE IDENTIFICATION AND BIOPSY;  Surgeon: Andre Shelton M.D.;  Location: [facility];  Service: General    AXILLARY NODE DISSECTION  7/17/2019    Procedure: LYMPHADENECTOMY, AXILLARY;  Surgeon: Andre Shelton M.D.;  Location: [facility];  Service: General    MASTECTOMY Left 7/17/2019    Procedure: TOTAL MASTECTOMY;  Surgeon: Andre Shelton M.D.;  Location: [facility];  Service: General    FLAP CLOSURE Bilateral 7/17/2019    Procedure: WIDE V FLAP;  Surgeon: Andre Shelton M.D.;  Location: [facility];  Service: General    LEFORT COLPOCLESIS  1/21/2019    Procedure: LEFORT COLPOCLESIS;  Surgeon: Minnie Bañuelos M.D.;  Location: [facility];  Service: Labor and Delivery    BLADDER SLING FEMALE  1/21/2019    Procedure: BLADDER SLING FEMALE- TOT;  Surgeon: Minnie Bañuelos M.D.;  Location: [facility];  Service: Labor and Delivery    KNEE ARTHROPLASTY TOTAL Right 11/2/2015    Procedure: KNEE ARTHROPLASTY TOTAL;  Surgeon: Venkatesh Cardoza M.D.;  Location: [facility];  Service:     ABDOMINAL HYSTERECTOMY TOTAL  1994    OTHER ORTHOPEDIC SURGERY  1993    L ankle    GYN SURGERY  1992    abdominal hysterectomy    CHOLECYSTECTOMY  1964    CATARACT  EXTRACTION WITH IOL Bilateral        Hospital Medications:    Current Facility-Administered Medications:     methylPREDNISolone (SOLU-MEDROL) 40 MG injection 40 mg, 40 mg, Intravenous, Q6HRS, Milo Maria M.D.    ondansetron (ZOFRAN) syringe/vial injection 4 mg, 4 mg, Intravenous, Q4HRS PRN, Milo Maria M.D., 4 mg at 05/27/23 0917    midodrine (PROAMATINE) tablet 10 mg, 10 mg, Oral, TID WITH MEALS, Milo Maria M.D., 10 mg at 05/28/23 0856    senna-docusate (PERICOLACE or SENOKOT S) 8.6-50 MG per tablet 2 Tablet, 2 Tablet, Oral, BID, 2 Tablet at 05/27/23 1725 **AND** polyethylene glycol/lytes (MIRALAX) PACKET 1 Packet, 1 Packet, Oral, QDAY PRN, 1 Packet at 05/27/23 0458 **AND** magnesium hydroxide (MILK OF MAGNESIA) suspension 30 mL, 30 mL, Oral, QDAY PRN **AND** bisacodyl (DULCOLAX) suppository 10 mg, 10 mg, Rectal, QDAY PRN, Sultan SUSAN Mays M.D.    lactated ringers infusion, , Intravenous, Continuous, Sultan SUSAN Mays M.D., Last Rate: 83 mL/hr at 05/27/23 0906, New Bag at 05/27/23 0906    enoxaparin (Lovenox) inj 40 mg, 40 mg, Subcutaneous, DAILY AT 1800, Sultan SUSAN Mays M.D., 40 mg at 05/27/23 1725    omeprazole (PRILOSEC) capsule 20 mg, 20 mg, Oral, Q EVENING, Sultan SUSAN Mays M.D., 20 mg at 05/27/23 1725    lovastatin (MEVACOR) tablet 20 mg, 20 mg, Oral, Nightly, Sultan SUSAN Mays M.D., 20 mg at 05/27/23 2008    gabapentin (NEURONTIN) capsule 100 mg, 100 mg, Oral, HS PRN, Sultan SUSAN Mays M.D.    melatonin tablet 5 mg, 5 mg, Oral, QHS, Sultan SUSAN Mays M.D., 5 mg at 05/27/23 2008    Current Outpatient Medications:  Medications Prior to Admission   Medication Sig Dispense Refill Last Dose    metFORMIN (GLUCOPHAGE) 500 MG Tab Take 1 tablet by mouth once daily 90 Tablet 3 UNK at UNK    gabapentin (NEURONTIN) 100 MG Cap Take 1 Capsule by mouth at bedtime as needed (nerve pain). 90 Capsule 3 UNK at UNK    DULoxetine (CYMBALTA) 30 MG Cap DR Particles Take 1 Capsule by mouth every day. Total of 90 mg (Patient taking differently:  Take 30 mg by mouth every day. 60MG TAKEN WITH 30MG= 90MG) 90 Capsule 3 UNK at UNK    Dexlansoprazole 60 MG CAPSULE DELAYED RELEASE delayed-release capsule Take 1 Capsule by mouth every evening. 90 Capsule 3 UNK at UNK    MYRBETRIQ 50 MG TABLET SR 24 HR TAKE 1 TABLET EVERY EVENINGFOR FREQUENT URINATION,    URINARY INCONTINENCE,      URINARY URGENCY. 90 Tablet 3 UNK at UNK    DULoxetine (CYMBALTA) 60 MG Cap DR Particles delayed-release capsule Take 1 Capsule by mouth every day. (Patient taking differently: Take 60 mg by mouth every day. 60MG TAKEN WITH 30MG=90MG) 90 Capsule 3 UNK at UNK    acetaminophen (TYLENOL) 500 MG Tab Take 1-2 Tablets by mouth every 6 hours as needed. 30 Tablet 0 UNK at UNK    anastrozole (ARIMIDEX) 1 MG Tab Take 1 mg by mouth every day.   UNK at UNK    lovastatin (MEVACOR) 20 MG Tab Take 20 mg by mouth every evening.   UNK at UNK    Melatonin 5 MG Tab Take 5 mg by mouth at bedtime.   UNK at UNK       Medication Allergy:  No Known Allergies    Family History:  Family History   Problem Relation Age of Onset    Heart Disease Neg Hx        Social History:  Social History     Socioeconomic History    Marital status:      Spouse name: Not on file    Number of children: Not on file    Years of education: Not on file    Highest education level: Some college, no degree   Occupational History    Not on file   Tobacco Use    Smoking status: Former     Packs/day: 1.00     Years: 30.00     Pack years: 30.00     Types: Cigarettes     Quit date: 1992     Years since quittin.4    Smokeless tobacco: Former   Vaping Use    Vaping Use: Never used   Substance and Sexual Activity    Alcohol use: Yes     Alcohol/week: 0.6 oz     Types: 1 Glasses of wine per week     Comment: occ    Drug use: No    Sexual activity: Not on file   Other Topics Concern    Not on file   Social History Narrative    Not on file     Social Determinants of Health     Financial Resource Strain: Not on file   Food Insecurity:  "Not on file   Transportation Needs: Unknown (2/9/2021)    PRAPARE - Transportation     Lack of Transportation (Medical): Not on file     Lack of Transportation (Non-Medical): No   Physical Activity: Inactive (2/9/2021)    Exercise Vital Sign     Days of Exercise per Week: 0 days     Minutes of Exercise per Session: 0 min   Stress: Stress Concern Present (2/9/2021)    [employer] - Occupational Stress Questionnaire     Feeling of Stress : To some extent   Social Connections: Unknown (2/9/2021)    Social Connection and Isolation Panel [NHANES]     Frequency of Communication with Friends and Family: More than three times a week     Frequency of Social Gatherings with Friends and Family: Twice a week     Attends Jew Services: 1 to 4 times per year     Active Member of Clubs or Organizations: Not on file     Attends Club or Organization Meetings: Not on file     Marital Status:    Intimate Partner Violence: Not on file   Housing Stability: Low Risk  (2/9/2021)    Housing Stability Vital Sign     Unable to Pay for Housing in the Last Year: No     Number of Places Lived in the Last Year: 1     Unstable Housing in the Last Year: No         Physical Exam:  Vitals/ General Appearance:   Weight/BMI: Body mass index is 23.4 kg/m².  BP (!) 171/68   Pulse 67   Temp 36.6 °C (97.9 °F) (Temporal)   Resp 18   Ht 1.6 m (5' 2.99\")   Wt 59.9 kg (132 lb 0.9 oz)   SpO2 91%   Vitals:    05/27/23 2328 05/28/23 0254 05/28/23 0830 05/28/23 1214   BP: (!) 88/57 (!) 83/59 (!) 83/55 (!) 171/68   Pulse: 66 65 67    Resp: 16 16 16 18   Temp: 36.3 °C (97.3 °F) 36.4 °C (97.5 °F) 36.5 °C (97.7 °F) 36.6 °C (97.9 °F)   TempSrc: Temporal Temporal Temporal Temporal   SpO2: 95% 96% 93% 91%   Weight:       Height:         Oxygen Therapy:  Pulse Oximetry: 91 %, O2 (LPM): 1, O2 Delivery Device: Silicone Nasal Cannula    Constitutional:   No acute distress  HENMT:  Normocephalic, Atraumatic.  Eyes:  EOMI, No " discharge.  Neck:  no JVD.  Cardiovascular:  Normal heart rate, Normal rhythm.  Extremitites with intact distal pulses, no cyanosis, or edema.  Lungs:  No respiratory distress.  Abdomen: Soft, No tenderness, No guarding, No rebound.  Skin: No significant rash.  Neurologic: Alert & oriented x 3, No focal deficits noted, cranial nerves II through X are intact.  Psychiatric: Affect normal, Judgment normal, Mood normal.      MDM (Data Review):     Records reviewed and summarized in current documentation    Lab Data Review:  Recent Results (from the past 24 hour(s))   CORTISOL    Collection Time: 05/27/23  4:12 PM   Result Value Ref Range    Cortisol 17.0 0.0 - 23.0 ug/dL   CORTISOL    Collection Time: 05/27/23  4:51 PM   Result Value Ref Range    Cortisol 36.9 (H) 0.0 - 23.0 ug/dL   CORTISOL    Collection Time: 05/27/23  5:19 PM   Result Value Ref Range    Cortisol 44.1 (H) 0.0 - 23.0 ug/dL   CBC WITH DIFFERENTIAL    Collection Time: 05/28/23 12:31 AM   Result Value Ref Range    WBC 7.7 4.8 - 10.8 K/uL    RBC 4.55 4.20 - 5.40 M/uL    Hemoglobin 14.2 12.0 - 16.0 g/dL    Hematocrit 41.7 37.0 - 47.0 %    MCV 91.6 81.4 - 97.8 fL    MCH 31.2 27.0 - 33.0 pg    MCHC 34.1 32.2 - 35.5 g/dL    RDW 44.9 35.9 - 50.0 fL    Platelet Count 269 164 - 446 K/uL    MPV 10.3 9.0 - 12.9 fL    Neutrophils-Polys 89.80 (H) 44.00 - 72.00 %    Lymphocytes 8.20 (L) 22.00 - 41.00 %    Monocytes 1.80 0.00 - 13.40 %    Eosinophils 0.00 0.00 - 6.90 %    Basophils 0.10 0.00 - 1.80 %    Immature Granulocytes 0.10 0.00 - 0.90 %    Nucleated RBC 0.00 0.00 - 0.20 /100 WBC    Neutrophils (Absolute) 6.88 1.82 - 7.42 K/uL    Lymphs (Absolute) 0.63 (L) 1.00 - 4.80 K/uL    Monos (Absolute) 0.14 0.00 - 0.85 K/uL    Eos (Absolute) 0.00 0.00 - 0.51 K/uL    Baso (Absolute) 0.01 0.00 - 0.12 K/uL    Immature Granulocytes (abs) 0.01 0.00 - 0.11 K/uL    NRBC (Absolute) 0.00 K/uL   MAGNESIUM    Collection Time: 05/28/23 12:31 AM   Result Value Ref Range    Magnesium  2.1 1.5 - 2.5 mg/dL   Comp Metabolic Panel    Collection Time: 05/28/23 12:31 AM   Result Value Ref Range    Sodium 139 135 - 145 mmol/L    Potassium 4.0 3.6 - 5.5 mmol/L    Chloride 102 96 - 112 mmol/L    Co2 26 20 - 33 mmol/L    Anion Gap 11.0 7.0 - 16.0    Glucose 189 (H) 65 - 99 mg/dL    Bun 17 8 - 22 mg/dL    Creatinine 0.81 0.50 - 1.40 mg/dL    Calcium 8.8 8.5 - 10.5 mg/dL    AST(SGOT) 18 12 - 45 U/L    ALT(SGPT) 12 2 - 50 U/L    Alkaline Phosphatase 85 30 - 99 U/L    Total Bilirubin 0.3 0.1 - 1.5 mg/dL    Albumin 3.2 3.2 - 4.9 g/dL    Total Protein 5.9 (L) 6.0 - 8.2 g/dL    Globulin 2.7 1.9 - 3.5 g/dL    A-G Ratio 1.2 g/dL   CORRECTED CALCIUM    Collection Time: 05/28/23 12:31 AM   Result Value Ref Range    Correct Calcium 9.4 8.5 - 10.5 mg/dL   ESTIMATED GFR    Collection Time: 05/28/23 12:31 AM   Result Value Ref Range    GFR (CKD-EPI) 72 >60 mL/min/1.73 m 2   CRP QUANTITIVE (NON-CARDIAC)    Collection Time: 05/28/23 12:31 AM   Result Value Ref Range    Stat C-Reactive Protein 1.28 (H) 0.00 - 0.75 mg/dL   CORTISOL    Collection Time: 05/28/23 10:18 AM   Result Value Ref Range    Cortisol 40.4 (H) 0.0 - 23.0 ug/dL       Imaging/Procedures Review:    Chest Xray:  Reviewed    EKG:   As in HPI.     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  No further invasive cardiac work-up warranted during this hospital stay.  Patient will follow  closely with her primary cardiologist for further care in the outpatient setting.  Perhaps an event monitor long-term is reasonable.    Will sign off at this time, please call us with further questions.  Patient will be followed in the outpatient cardiology clinic for further cardiac care.    Thank you for referring this patient to our cardiology service.    Claudia Torrez MD.   [facility].

## 2023-05-28 NOTE — PROGRESS NOTES
[facility] Medicine Daily Progress Note    Date of Service  5/28/2023    Chief Complaint  Rashmi Parra is a 83 y.o. female admitted 5/25/2023 with hypotension, fatigue, ground-level fall    Hospital Course  Patient is a 83-year-old female with a past medical history of orthostatic hypotension, chronic hypoxic respiratory failure requiring 3 L of oxygen at night and with exertion, depression, breast cancer status post bilateral mastectomy and radiation therapy and overactive bladder who presented to the hospital after experiencing a fall around 6 PM today.     Patient reported that she was getting up from her chair to go to the bathroom after sitting down for a long time and while she was walking to the bathroom she became lightheaded and fell back and hit her head.  Patient is unsure if she lost consciousness during this facility.  Patient later stated that she started to feel lightheaded as she was going to the refrigerator to get some to drink because she was feeling thirsty.  Patient says she walks with a walker and was using a walker at this time.  Patient says she was not using her oxygen during this time.  Of note patient and her daughter note that sometimes her oxygen drops down to the mid 80s at home even when she has oxygen on.  Patient says that all she ate and drank today was 1 bottle of Ensure.     Patient reports feeling fatigued for fevers and having shortness of breath for which she sees pulmonology at [facility].  She reports a 20 pound weight loss over the past year.     No other complaints at this time.      Interval Problem Update  5/26.  Afebrile.  Intermittent hypotension.  On 3 L nasal cannula.  Patient is lethargic on exam.  Denies fevers, chills, chest pain, shortness of breath.  She does point to her legs when complaining of pain but cannot localize or describe which leg hurts.  Echocardiogram pending with concern for syncope, pulmonary pretension, mitral stenosis.  Also follow-up on echo and  monitor volume status closely as patient is on IV fluids and has hypotension.  Patient appears very debilitated, minimal oral intake, lethargic.  Patient will likely be a placement issue and will transfer out of CDU.  PT/OT consult.  Can possibly roll inpatient depending on echo results.    Above per previous hospitalist.    5/27: Patient was seen and examined on the telemetry floor.  She continues on telemetry monitoring due to her hypotension.  She is on IV fluids LR at 83 mils per hour.  I have increased her midodrine to 10 mg 3 times a day due to soft blood pressures in the 90s/50s.  I gathered more history from the patient's daughters at bedside.  They state that she has been having low blood pressure issues for the past 2 years or so following her radiation treatment for breast cancer.  No doctors has been able to figure it out.  I checked a cortisol level, which was low at 2.7.  I have ordered a cosyntropin test.  I have ordered hydrocortisone 100 mg IV with 50 mg every 6 hours for the next 24 hours.  I have requested PMR consultation for inpatient rehabitation.    Echocardiogram shows ejection fraction of 75% with patient reports having had a bladder surgery previously.  And moderate mitral stenosis.  Moderately dilated left atrium.      Chest x-ray performed on 5/25/2023 per my review shows bilateral interstitial infiltrates right greater than the left.  No significant pleural effusions.  Calcified aorta noted.    5/28: Patient was seen and examined on the telemetry floor.  She continues on continuous telemetry monitoring.  Patient's blood pressures continue to dip down to 83/55 despite being on stress dose steroids.  Cosyntropin testing was negative.  MRI brain with and without contrast unremarkable.  Continues on 1 LPM oxygen via nasal cannula.  Stress dosing steroids discontinued.      I have discussed this patient's plan of care and discharge plan at IDT rounds today with Case Management, Nursing,  Nursing leadership, and other members of the IDT team.    Consultants/Specialty  None    Code Status  DNAR/DNI    Disposition  The patient is not medically cleared for discharge to home or a post-acute facility.  Anticipate discharge to: an inpatient rehabilitation hospital    I have placed the appropriate orders for post-discharge needs.    Review of Systems  Review of Systems   Constitutional:  Positive for malaise/fatigue. Negative for chills and fever.   Respiratory:  Negative for cough and shortness of breath.    Cardiovascular:  Negative for chest pain, palpitations and leg swelling.   Gastrointestinal:  Negative for abdominal pain, constipation, diarrhea, nausea and vomiting.   Genitourinary:  Negative for dysuria, frequency and urgency.   Musculoskeletal:  Positive for falls and myalgias.   Neurological:  Positive for dizziness and weakness. Negative for focal weakness and headaches.   All other systems reviewed and are negative.      Physical Exam  Temp:  [36.3 °C (97.3 °F)-36.7 °C (98.1 °F)] 36.6 °C (97.9 °F)  Pulse:  [65-68] 67  Resp:  [16-18] 18  BP: ()/(55-68) 171/68  SpO2:  [91 %-96 %] 91 %    Physical Exam  Vitals and nursing note reviewed.   Constitutional:       Appearance: Normal appearance. She is underweight. She is ill-appearing.      Comments: Mild bitemporal wasting   HENT:      Head: Normocephalic and atraumatic.      Right Ear: External ear normal.      Left Ear: External ear normal.      Nose: Nose normal.      Mouth/Throat:      Mouth: Mucous membranes are moist.      Pharynx: Oropharynx is clear. No oropharyngeal exudate or posterior oropharyngeal erythema.   Eyes:      General:         Right eye: No discharge.         Left eye: No discharge.      Conjunctiva/sclera: Conjunctivae normal.   Cardiovascular:      Rate and Rhythm: Normal rate and regular rhythm.      Pulses: Normal pulses.      Heart sounds: Normal heart sounds. No murmur heard.  Pulmonary:      Effort: Pulmonary effort  is normal. No respiratory distress.      Breath sounds: Normal breath sounds. No stridor. No wheezing or rales.   Abdominal:      General: Abdomen is flat. Bowel sounds are normal. There is no distension.      Palpations: Abdomen is soft. There is no mass.      Tenderness: There is no abdominal tenderness.   Musculoskeletal:      Cervical back: Normal range of motion.      Right lower leg: No edema.      Left lower leg: No edema.      Comments: Subcutaneous tissue and muscle wasting, moderately decreased  strength bilaterally   Skin:     General: Skin is warm.   Neurological:      General: No focal deficit present.      Mental Status: She is oriented to person, place, and time. She is lethargic.      Cranial Nerves: No cranial nerve deficit.      Motor: Weakness present.   Psychiatric:         Mood and Affect: Mood normal.         Behavior: Behavior normal.         Fluids    Intake/Output Summary (Last 24 hours) at 5/28/2023 1549  Last data filed at 5/28/2023 0900  Gross per 24 hour   Intake 880 ml   Output 2000 ml   Net -1120 ml         Laboratory  Recent Labs     05/26/23  0114 05/27/23  0048 05/28/23  0031   WBC 5.9 6.6 7.7   RBC 4.33 4.22 4.55   HEMOGLOBIN 13.4 13.0 14.2   HEMATOCRIT 40.0 39.5 41.7   MCV 92.4 93.6 91.6   MCH 30.9 30.8 31.2   MCHC 33.5 32.9 34.1   RDW 46.8 46.5 44.9   PLATELETCT 233 224 269   MPV 10.3 9.8 10.3       Recent Labs     05/26/23  0114 05/27/23  0048 05/28/23  0031   SODIUM 140 142 139   POTASSIUM 4.4 3.9 4.0   CHLORIDE 103 106 102   CO2 27 25 26   GLUCOSE 92 105* 189*   BUN 23* 17 17   CREATININE 0.68 0.76 0.81   CALCIUM 9.1 8.7 8.8                     Imaging  MR-BRAIN-WITH & W/O   Final Result      1.  No acute intracranial abnormality.   2.  No evidence of a gross pituitary or sellar abnormality.   3.  Atrophy.   4.  Mild white matter disease, likely representing microvascular ischemic changes.      DX-CHEST-PORTABLE (1 VIEW)   Final Result      Linear atelectasis in the right  upper lobe. No focal consolidation. No effusions.         EC-ECHOCARDIOGRAM COMPLETE W/O CONT   Final Result      DX-CHEST-PORTABLE (1 VIEW)   Final Result         1.  Interstitial pulmonary parenchymal prominence suggest chronic underlying lung disease, component of interstitial edema and/or infiltrates not excluded.   2.  Atherosclerosis      CT-CSPINE WITHOUT PLUS RECONS   Final Result      No CT evidence of acute cervical spine abnormality.      Thoracic kyphosis, cervical lordosis is exaggerated and there is multilevel facet ankylosis which could be degenerative favored over ankylosing spondylitis or rheumatoid arthritis      CT-HEAD W/O   Final Result      1.  No evidence of acute intracranial process.      2.  Cerebral atrophy as well as periventricular chronic small vessel ischemic change.                Assessment/Plan  * Orthostatic hypotension- (present on admission)  Assessment & Plan  At this point, patient's blood pressure continues to fluctuate with systolics into the 80s despite being on stress dose steroids.  Cosyntropin testing was negative.  MRI of the brain with and without contrast is unremarkable.  ACTH is still pending.    I obtained further history from the daughter who states that the patient's blood pressure sometimes drops after taking duloxetine.  She is very dependent on the duloxetine for her depression.    I discussed the case with the clinical pharmacist, who stated there are case reports of orthostasis with duloxetine.    I discussed with the patient and she is agreeable to trying to come down on her duloxetine at this time.    At this point, adrenal insufficiency at least primary has been ruled out.  ACTH level still pending.      Plan will be to discontinue her IV fluids and steroids.  Continue midodrine 10 mg 3 times a day  Follow-up ACTH levels  Repeat a.m. cortisol level as today's level was pulled at 10 AM  We will start fludrocortisone if the patient continues to have  hypertension.    Syncope and collapse- (present on admission)  Assessment & Plan  As per presentation patient reports having felt a little dizzy prior to the event.  She states that she was going to the refrigerator to get some water.  Suspect adrenal insufficiency along with dehydration.    Continue IV fluids and steroids    Hypotension- (present on admission)  Assessment & Plan  Patient reports losing 20 pounds in the past year.  May be contributing.    I discussed the case with Dr. Torrez, who felt that the mitral valve stenosis may or may not be related to patient's hypotension but less likely.  Either way, patient would not be a candidate for surgery.    A.m. cortisol level was pulled at 10 AM but was high.  Cosyntropin testing was negative.    Discontinue IV fluids  Continue midodrine to 10 mg 3 times a day  Discontinue steroids  I have discontinued patient's Myrbetriq which can have anticholinergic effects    Acute on chronic respiratory failure with hypoxia (HCC)- (present on admission)  Assessment & Plan  Currently following with and on pulmonology and found to have localized scarring in her lungs along with a reduced DLCO.    Currently on 1 LPM oxygen nasal cannula.  Baseline need is intermittent as needed for exertion and sleeping.  She is on 3 LPM at bedtime.    Suspicion for pulmonary hypertension on CT scan.  No evidence of pulmonary hypertension on echocardiogram.    Ambulatory oxygen testing on discharge.  Wean off oxygen as tolerated.    Mitral annular calcification- (present on admission)  Assessment & Plan  Severe mitral annular calcification on echocardiogram.  This appears to have progressed.    She does not appear to be in heart failure   Outpatient follow-up cardiology    Moderate mitral stenosis- (present on admission)  Assessment & Plan  Patient continues to have moderate mitral stenosis.  Does not appear to be in heart failure.  She would not be a candidate for valvuloplasty due to the  calcifications.    I discussed with Dr. Torrez of cardiology.  May or may not be related to mitral valve stenosis but patient is not a candidate for open heart surgery and valve replacement.    Moderate protein-calorie malnutrition (HCC)- (present on admission)  Assessment & Plan  Subcutaneous tissue and muscle wasting with mild decreased  strength.  Patient has lost 20 pounds in the past year.    Nutrition consult    Cortisol deficiency (HCC)- (present on admission)  Assessment & Plan  Cortisol level is 2.7.  Concerning for adrenal insufficiency.  However cosyntropin testing was negative.    Cortisol deficiency has been ruled out  Primary there is no deficiency has been ruled out adrenal insufficiency has been ruled out    Overactive bladder- (present on admission)  Assessment & Plan  Discontinue home MYRBETRIQ 50 MG TABLET SR 24 HR due to anticholinergic effects contributing.  Patient reports having had a bladder surgery previously.    Patient using PureWick    Depression- (present on admission)  Assessment & Plan  Continue home Cymbalta.    Hypomagnesemia- (present on admission)  Assessment & Plan  Magnesium of 2.1 today.  Resolved      Fall at home- (present on admission)  Assessment & Plan  Likely due to hypotensive episode.    Continue to treat adrenal insufficiency, IV fluids    Dehydration- (present on admission)  Assessment & Plan  Likely due to poor oral fluid intake.  Adrenal insufficiency at least primary has been ruled out.  Appears to be well-hydrated now.  Discontinue IV fluids  Continue to encourage oral intake.         VTE prophylaxis: SCDs/TEDs and enoxaparin ppx    I have performed a physical exam and reviewed and updated ROS and Plan today (5/28/2023). In review of yesterday's note (5/27/2023), there are no changes except as documented above.        Patient is medically complex and high risk of deterioration and death.

## 2023-05-28 NOTE — PROGRESS NOTES
Bedside report received from RUFUS Wade. Pt on 1 L NC. Patient A&O x 4. Pt does not complain of pain at this time. POC discussed with patient. Patient verbalizes understanding. Call light and belongings within reach. Bed locked in lowest position, alarm and fall precautions in place.

## 2023-05-28 NOTE — CARE PLAN
Problem: Knowledge Deficit - Standard  Goal: Patient and family/care givers will demonstrate understanding of plan of care, disease process/condition, diagnostic tests and medications  Outcome: Progressing     Problem: Fall Risk  Goal: Patient will remain free from falls  Outcome: Progressing     Problem: Pain - Standard  Goal: Alleviation of pain or a reduction in pain to the patient’s comfort goal  Outcome: Progressing     Problem: Skin Integrity  Goal: Skin integrity is maintained or improved  Outcome: Progressing

## 2023-05-29 LAB
ALBUMIN SERPL BCP-MCNC: 3.3 G/DL (ref 3.2–4.9)
ALBUMIN/GLOB SERPL: 1.4 G/DL
ALP SERPL-CCNC: 80 U/L (ref 30–99)
ALT SERPL-CCNC: 13 U/L (ref 2–50)
ANION GAP SERPL CALC-SCNC: 10 MMOL/L (ref 7–16)
AST SERPL-CCNC: 19 U/L (ref 12–45)
BASOPHILS # BLD AUTO: 0.1 % (ref 0–1.8)
BASOPHILS # BLD: 0.01 K/UL (ref 0–0.12)
BILIRUB SERPL-MCNC: 0.2 MG/DL (ref 0.1–1.5)
BUN SERPL-MCNC: 21 MG/DL (ref 8–22)
CALCIUM ALBUM COR SERPL-MCNC: 8.7 MG/DL (ref 8.5–10.5)
CALCIUM SERPL-MCNC: 8.1 MG/DL (ref 8.5–10.5)
CHLORIDE SERPL-SCNC: 104 MMOL/L (ref 96–112)
CO2 SERPL-SCNC: 24 MMOL/L (ref 20–33)
CORTIS SERPL-MCNC: 2.1 UG/DL (ref 0–23)
CREAT SERPL-MCNC: 0.66 MG/DL (ref 0.5–1.4)
EOSINOPHIL # BLD AUTO: 0 K/UL (ref 0–0.51)
EOSINOPHIL NFR BLD: 0 % (ref 0–6.9)
ERYTHROCYTE [DISTWIDTH] IN BLOOD BY AUTOMATED COUNT: 46.1 FL (ref 35.9–50)
GFR SERPLBLD CREATININE-BSD FMLA CKD-EPI: 86 ML/MIN/1.73 M 2
GLOBULIN SER CALC-MCNC: 2.4 G/DL (ref 1.9–3.5)
GLUCOSE SERPL-MCNC: 155 MG/DL (ref 65–99)
HCT VFR BLD AUTO: 39.2 % (ref 37–47)
HGB BLD-MCNC: 13.1 G/DL (ref 12–16)
IMM GRANULOCYTES # BLD AUTO: 0.03 K/UL (ref 0–0.11)
IMM GRANULOCYTES NFR BLD AUTO: 0.3 % (ref 0–0.9)
LYMPHOCYTES # BLD AUTO: 1.11 K/UL (ref 1–4.8)
LYMPHOCYTES NFR BLD: 11.5 % (ref 22–41)
MAGNESIUM SERPL-MCNC: 1.9 MG/DL (ref 1.5–2.5)
MCH RBC QN AUTO: 30.8 PG (ref 27–33)
MCHC RBC AUTO-ENTMCNC: 33.4 G/DL (ref 32.2–35.5)
MCV RBC AUTO: 92 FL (ref 81.4–97.8)
MONOCYTES # BLD AUTO: 0.5 K/UL (ref 0–0.85)
MONOCYTES NFR BLD AUTO: 5.2 % (ref 0–13.4)
NEUTROPHILS # BLD AUTO: 7.97 K/UL (ref 1.82–7.42)
NEUTROPHILS NFR BLD: 82.9 % (ref 44–72)
NRBC # BLD AUTO: 0 K/UL
NRBC BLD-RTO: 0 /100 WBC (ref 0–0.2)
PLATELET # BLD AUTO: 283 K/UL (ref 164–446)
PMV BLD AUTO: 10.3 FL (ref 9–12.9)
POTASSIUM SERPL-SCNC: 4 MMOL/L (ref 3.6–5.5)
PROT SERPL-MCNC: 5.7 G/DL (ref 6–8.2)
RBC # BLD AUTO: 4.26 M/UL (ref 4.2–5.4)
SODIUM SERPL-SCNC: 138 MMOL/L (ref 135–145)
WBC # BLD AUTO: 9.6 K/UL (ref 4.8–10.8)

## 2023-05-29 PROCEDURE — 83735 ASSAY OF MAGNESIUM: CPT

## 2023-05-29 PROCEDURE — 82533 TOTAL CORTISOL: CPT

## 2023-05-29 PROCEDURE — A9270 NON-COVERED ITEM OR SERVICE: HCPCS | Performed by: STUDENT IN AN ORGANIZED HEALTH CARE EDUCATION/TRAINING PROGRAM

## 2023-05-29 PROCEDURE — 85025 COMPLETE CBC W/AUTO DIFF WBC: CPT

## 2023-05-29 PROCEDURE — 97530 THERAPEUTIC ACTIVITIES: CPT

## 2023-05-29 PROCEDURE — 80053 COMPREHEN METABOLIC PANEL: CPT

## 2023-05-29 PROCEDURE — 36415 COLL VENOUS BLD VENIPUNCTURE: CPT

## 2023-05-29 PROCEDURE — 700111 HCHG RX REV CODE 636 W/ 250 OVERRIDE (IP): Performed by: STUDENT IN AN ORGANIZED HEALTH CARE EDUCATION/TRAINING PROGRAM

## 2023-05-29 PROCEDURE — 770020 HCHG ROOM/CARE - TELE (206)

## 2023-05-29 PROCEDURE — 700105 HCHG RX REV CODE 258

## 2023-05-29 PROCEDURE — 700102 HCHG RX REV CODE 250 W/ 637 OVERRIDE(OP): Performed by: STUDENT IN AN ORGANIZED HEALTH CARE EDUCATION/TRAINING PROGRAM

## 2023-05-29 PROCEDURE — 99232 SBSQ HOSP IP/OBS MODERATE 35: CPT | Performed by: STUDENT IN AN ORGANIZED HEALTH CARE EDUCATION/TRAINING PROGRAM

## 2023-05-29 RX ORDER — LANOLIN ALCOHOL/MO/W.PET/CERES
400 CREAM (GRAM) TOPICAL DAILY
Status: DISCONTINUED | OUTPATIENT
Start: 2023-05-29 | End: 2023-05-31 | Stop reason: HOSPADM

## 2023-05-29 RX ORDER — SODIUM CHLORIDE 9 MG/ML
500 INJECTION, SOLUTION INTRAVENOUS ONCE
Status: COMPLETED | OUTPATIENT
Start: 2023-05-29 | End: 2023-05-29

## 2023-05-29 RX ADMIN — FLUDROCORTISONE ACETATE 0.1 MG: 0.1 TABLET ORAL at 04:43

## 2023-05-29 RX ADMIN — LOVASTATIN 20 MG: 20 TABLET ORAL at 22:04

## 2023-05-29 RX ADMIN — MAGNESIUM GLUCONATE 500 MG ORAL TABLET 400 MG: 500 TABLET ORAL at 09:53

## 2023-05-29 RX ADMIN — Medication 5 MG: at 22:04

## 2023-05-29 RX ADMIN — DOCUSATE SODIUM 50 MG AND SENNOSIDES 8.6 MG 2 TABLET: 8.6; 5 TABLET, FILM COATED ORAL at 04:43

## 2023-05-29 RX ADMIN — SODIUM CHLORIDE 500 ML: 9 INJECTION, SOLUTION INTRAVENOUS at 00:30

## 2023-05-29 RX ADMIN — MIDODRINE HYDROCHLORIDE 10 MG: 5 TABLET ORAL at 09:53

## 2023-05-29 RX ADMIN — OMEPRAZOLE 20 MG: 20 CAPSULE, DELAYED RELEASE ORAL at 17:22

## 2023-05-29 RX ADMIN — DULOXETINE HYDROCHLORIDE 30 MG: 30 CAPSULE, DELAYED RELEASE ORAL at 04:43

## 2023-05-29 RX ADMIN — MAGNESIUM HYDROXIDE 30 ML: 400 SUSPENSION ORAL at 16:23

## 2023-05-29 RX ADMIN — DOCUSATE SODIUM 50 MG AND SENNOSIDES 8.6 MG 2 TABLET: 8.6; 5 TABLET, FILM COATED ORAL at 17:22

## 2023-05-29 RX ADMIN — POLYETHYLENE GLYCOL 3350 1 PACKET: 17 POWDER, FOR SOLUTION ORAL at 04:43

## 2023-05-29 RX ADMIN — ENOXAPARIN SODIUM 40 MG: 100 INJECTION SUBCUTANEOUS at 17:21

## 2023-05-29 ASSESSMENT — ENCOUNTER SYMPTOMS
WEAKNESS: 1
FEVER: 0
DIARRHEA: 0
ABDOMINAL PAIN: 0
DIZZINESS: 0
CHILLS: 0
FOCAL WEAKNESS: 0
COUGH: 0
SHORTNESS OF BREATH: 0
HEADACHES: 0
NAUSEA: 0
VOMITING: 0
PALPITATIONS: 0
MYALGIAS: 0
FALLS: 0
CONSTIPATION: 0

## 2023-05-29 ASSESSMENT — COGNITIVE AND FUNCTIONAL STATUS - GENERAL
MOBILITY SCORE: 13
STANDING UP FROM CHAIR USING ARMS: A LITTLE
MOVING TO AND FROM BED TO CHAIR: UNABLE
CLIMB 3 TO 5 STEPS WITH RAILING: A LOT
WALKING IN HOSPITAL ROOM: A LOT
TURNING FROM BACK TO SIDE WHILE IN FLAT BAD: UNABLE
SUGGESTED CMS G CODE MODIFIER MOBILITY: CL

## 2023-05-29 ASSESSMENT — FIBROSIS 4 INDEX: FIB4 SCORE: 1.56

## 2023-05-29 ASSESSMENT — GAIT ASSESSMENTS
DEVIATION: SHUFFLED GAIT
GAIT LEVEL OF ASSIST: MINIMAL ASSIST
ASSISTIVE DEVICE: FRONT WHEEL WALKER

## 2023-05-29 ASSESSMENT — PAIN DESCRIPTION - PAIN TYPE: TYPE: ACUTE PAIN

## 2023-05-29 NOTE — PROGRESS NOTES
NOC HOSPITALIST CROSS COVER    Notified by RN regarding hypotension with pressures as low as 68/38.  Patient was seen evaluated bedside and is asymptomatic.  Orders placed for 1 L normal saline given as two 500 mL boluses.  Patient also given an additional dose of 10 mg midodrine.  Following administration of midodrine and IV fluids patient's blood pressure improved to 80s over 50s with maps above 65.  We will continue to monitor overnight.      A/P:  #Hypotension  -1 L IV fluid, 10 mg midodrine        -----------------------------------------------------------------------------------------------------------    Electronically signed by:  ANGEL Aranda PA-C  Hospitalist Services

## 2023-05-29 NOTE — THERAPY
Physical Therapy   Daily Treatment     Patient Name: Rashmi Parra  Age:  83 y.o., Sex:  female  Medical Record #: [id number]  Today's Date: 5/29/2023     Precautions  Precautions: Fall Risk    Assessment    Rec'd pt alert, in bed, agreeable to work w/ PT.  She is able to sit eob w/o physical assist, but does utilize the side rail to elevate her trunk.  Once eob, she is able to maintain her sitting balance w/o assist.  SBA to stand, at which time, pt requested to use the bathroom.  She is unable to ambulate to the toilet, but she did take several steps w/ min assist to the bedside commode.  Nsg agreeable to assist pt back to bed or to the chair, however pt refused the chair, requesting to return to bed.  Per nsg, pt needed assist x2 to return to the bed.    Plan    Treatment Plan Status: Continue Current Treatment Plan  Type of Treatment: Bed Mobility, Equipment, Gait Training, Neuro Re-Education / Balance, Self Care / Home Evaluation, Stair Training, Therapeutic Activities, Therapeutic Exercise  Treatment Frequency: 4 Times per Week  Treatment Duration: Until Therapy Goals Met    DC Equipment Recommendations: Unable to determine at this time  Discharge Recommendations: Recommend post-acute placement for additional physical therapy services prior to discharge home        Objective       05/29/23 1101   Balance   Sitting Balance (Static) Fair -   Sitting Balance (Dynamic) Fair -   Standing Balance (Static) Fair -   Standing Balance (Dynamic) Poor +   Weight Shift Sitting Fair   Weight Shift Standing Poor   Skilled Intervention Verbal Cuing;Tactile Cuing   Comments w/ fww   Bed Mobility    Supine to Sit Supervised   Skilled Intervention Verbal Cuing;Compensatory Strategies   Comments w/ use of side rail   Gait Analysis   Gait Level Of Assist Minimal Assist   Assistive Device Front Wheel Walker   Distance (Feet)   (2-3 steps)   Deviation Shuffled Gait   Skilled Intervention Verbal Cuing;Tactile  Cuing;Sequencing;Facilitation   Functional Mobility   Sit to Stand Standby Assist   Bed, Chair, Wheelchair Transfer Minimal Assist   Skilled Intervention Verbal Cuing;Tactile Cuing;Sequencing;Facilitation   Short Term Goals    Short Term Goal # 1 Pt will perform supine<>sit with HOB flat and SPV within 6 visits.   Goal Outcome # 1 goal not met   Short Term Goal # 2 Pt will perform bed<>chair transfers with FWW and SPV within 6 visits.   Goal Outcome # 2 Goal not met   Short Term Goal # 3 Pt will amb >50ft with FWW and SPV within 6 visits.   Goal Outcome # 3 Goal not met   Short Term Goal # 4 Pt will ascend/descend 1 step with FWW and SPV within 6 visits.   Goal Outcome # 4 Goal not met   Physical Therapy Treatment Plan   Physical Therapy Treatment Plan Continue Current Treatment Plan   Anticipated Discharge Equipment and Recommendations   DC Equipment Recommendations Unable to determine at this time   Discharge Recommendations Recommend post-acute placement for additional physical therapy services prior to discharge home

## 2023-05-29 NOTE — CARE PLAN
The patient is Watcher - Medium risk of patient condition declining or worsening    Shift Goals  Clinical Goals: monitor BP and ambulate  Patient Goals: get up in chair  Family Goals: get patient moving    Progress made toward(s) clinical / shift goals:  Pt was able to walk in the hallway    Patient is not progressing towards the following goals: Pt still having low blood pressures      Problem: Hemodynamics  Goal: Patient's hemodynamics, fluid balance and neurologic status will be stable or improve  Outcome: Not Met   Pt is still having low blood pressures, adjusting medications.  Problem: Hemodynamics  Goal: Patient's hemodynamics, fluid balance and neurologic status will be stable or improve  Outcome: Not Met     Problem: Knowledge Deficit - Standard  Goal: Patient and family/care givers will demonstrate understanding of plan of care, disease process/condition, diagnostic tests and medications  Outcome: Progressing     Problem: Pain - Standard  Goal: Alleviation of pain or a reduction in pain to the patient’s comfort goal  Outcome: Progressing     Problem: Skin Integrity  Goal: Skin integrity is maintained or improved  Outcome: Progressing     Problem: Respiratory  Goal: Patient will achieve/maintain optimum respiratory ventilation and gas exchange  Outcome: Progressing     Problem: Fall Risk  Goal: Patient will remain free from falls  Outcome: Met

## 2023-05-29 NOTE — PROGRESS NOTES
[facility] Medicine Daily Progress Note    Date of Service  5/29/2023    Chief Complaint  Rashmi Parra is a 83 y.o. female admitted 5/25/2023 with hypotension, fatigue, ground-level fall    Hospital Course  Patient is a 83-year-old female with a past medical history of orthostatic hypotension, chronic hypoxic respiratory failure requiring 3 L of oxygen at night and with exertion, depression, breast cancer status post bilateral mastectomy and radiation therapy and overactive bladder who presented to the hospital after experiencing a fall around 6 PM today.     Patient reported that she was getting up from her chair to go to the bathroom after sitting down for a long time and while she was walking to the bathroom she became lightheaded and fell back and hit her head.  Patient is unsure if she lost consciousness during this facility.  Patient later stated that she started to feel lightheaded as she was going to the refrigerator to get some to drink because she was feeling thirsty.  Patient says she walks with a walker and was using a walker at this time.  Patient says she was not using her oxygen during this time.  Of note patient and her daughter note that sometimes her oxygen drops down to the mid 80s at home even when she has oxygen on.  Patient says that all she ate and drank today was 1 bottle of Ensure.     Patient reports feeling fatigued for fevers and having shortness of breath for which she sees pulmonology at [facility].  She reports a 20 pound weight loss over the past year.     No other complaints at this time.      Interval Problem Update  5/26.  Afebrile.  Intermittent hypotension.  On 3 L nasal cannula.  Patient is lethargic on exam.  Denies fevers, chills, chest pain, shortness of breath.  She does point to her legs when complaining of pain but cannot localize or describe which leg hurts.  Echocardiogram pending with concern for syncope, pulmonary pretension, mitral stenosis.  Also follow-up on echo and  monitor volume status closely as patient is on IV fluids and has hypotension.  Patient appears very debilitated, minimal oral intake, lethargic.  Patient will likely be a placement issue and will transfer out of CDU.  PT/OT consult.  Can possibly roll inpatient depending on echo results.    Above per previous hospitalist.    5/27: Patient was seen and examined on the telemetry floor.  She continues on telemetry monitoring due to her hypotension.  She is on IV fluids LR at 83 mils per hour.  I have increased her midodrine to 10 mg 3 times a day due to soft blood pressures in the 90s/50s.  I gathered more history from the patient's daughters at bedside.  They state that she has been having low blood pressure issues for the past 2 years or so following her radiation treatment for breast cancer.  No doctors has been able to figure it out.  I checked a cortisol level, which was low at 2.7.  I have ordered a cosyntropin test.  I have ordered hydrocortisone 100 mg IV with 50 mg every 6 hours for the next 24 hours.  I have requested PMR consultation for inpatient rehabitation.     Echocardiogram shows ejection fraction of 75% with patient reports having had a bladder surgery previously.  And moderate mitral stenosis.  Moderately dilated left atrium.      Chest x-ray performed on 5/25/2023 per my review shows bilateral interstitial infiltrates right greater than the left.  No significant pleural effusions.  Calcified aorta noted.    5/28: Patient was seen and examined on the telemetry floor.  She continues on continuous telemetry monitoring.  Patient's blood pressures continue to dip down to 83/55 despite being on stress dose steroids.  Cosyntropin testing was negative.  MRI brain with and without contrast unremarkable.  Continues on 1 LPM oxygen via nasal cannula.  Stress dosing steroids discontinued.      5/29: Patient was seen and examined on the telemetry floor.  She continues on telemetry monitoring.  No significant  events overnight.  Patient's blood pressures are much more elevated when checked on her leg compared to her arms.  Likely result of previous mastectomies.  She continues on 2 LPM oxygen nasal cannula.  Patient is awaiting insurance authorization for inpatient rehabilitation, who may accept the patient.  A.m. cortisol level is again low at 2.1.  I have discontinued patient's fludrocortisone.      I have discussed this patient's plan of care and discharge plan at IDT rounds today with Case Management, Nursing, Nursing leadership, and other members of the IDT team.    Consultants/Specialty  None    Code Status  DNAR/DNI    Disposition  The patient is medically cleared for discharge to home or a post-acute facility.  Anticipate discharge to: an inpatient rehabilitation hospital    I have placed the appropriate orders for post-discharge needs.    Review of Systems  Review of Systems   Constitutional:  Positive for malaise/fatigue. Negative for chills and fever.   Respiratory:  Negative for cough and shortness of breath.    Cardiovascular:  Negative for chest pain, palpitations and leg swelling.   Gastrointestinal:  Negative for abdominal pain, constipation, diarrhea, nausea and vomiting.   Genitourinary:  Negative for dysuria, frequency and urgency.   Musculoskeletal:  Negative for falls and myalgias.   Neurological:  Positive for weakness. Negative for dizziness, focal weakness and headaches.   All other systems reviewed and are negative.      Physical Exam  Temp:  [36.4 °C (97.5 °F)-36.9 °C (98.4 °F)] 36.4 °C (97.5 °F)  Pulse:  [58-65] 62  Resp:  [16-18] 18  BP: ()/(38-70) 163/70  SpO2:  [95 %-97 %] 95 %    Physical Exam  Vitals and nursing note reviewed.   Constitutional:       Appearance: Normal appearance. She is underweight. She is ill-appearing.      Comments: Mild bitemporal wasting   HENT:      Head: Normocephalic and atraumatic.      Right Ear: External ear normal.      Left Ear: External ear normal.       Nose: Nose normal.      Mouth/Throat:      Mouth: Mucous membranes are moist.      Pharynx: Oropharynx is clear. No oropharyngeal exudate or posterior oropharyngeal erythema.   Eyes:      General:         Right eye: No discharge.         Left eye: No discharge.      Conjunctiva/sclera: Conjunctivae normal.   Cardiovascular:      Rate and Rhythm: Normal rate and regular rhythm.      Pulses: Normal pulses.      Heart sounds: Normal heart sounds. No murmur heard.  Pulmonary:      Effort: Pulmonary effort is normal. No respiratory distress.      Breath sounds: Normal breath sounds. No stridor. No wheezing or rales.   Abdominal:      General: Abdomen is flat. Bowel sounds are normal. There is no distension.      Palpations: Abdomen is soft. There is no mass.      Tenderness: There is no abdominal tenderness.   Musculoskeletal:      Cervical back: Normal range of motion.      Right lower leg: No edema.      Left lower leg: No edema.      Comments: Subcutaneous tissue and muscle wasting, moderately decreased  strength bilaterally   Skin:     General: Skin is warm.   Neurological:      General: No focal deficit present.      Mental Status: She is oriented to person, place, and time. She is lethargic.      Cranial Nerves: No cranial nerve deficit.      Motor: Weakness present.   Psychiatric:         Mood and Affect: Mood normal.         Behavior: Behavior normal.         Fluids    Intake/Output Summary (Last 24 hours) at 5/29/2023 1245  Last data filed at 5/29/2023 0600  Gross per 24 hour   Intake no documentation   Output 450 ml   Net -450 ml         Laboratory  Recent Labs     05/27/23  0048 05/28/23  0031 05/29/23 0228   WBC 6.6 7.7 9.6   RBC 4.22 4.55 4.26   HEMOGLOBIN 13.0 14.2 13.1   HEMATOCRIT 39.5 41.7 39.2   MCV 93.6 91.6 92.0   MCH 30.8 31.2 30.8   MCHC 32.9 34.1 33.4   RDW 46.5 44.9 46.1   PLATELETCT 224 269 283   MPV 9.8 10.3 10.3       Recent Labs     05/27/23  0048 05/28/23  0031 05/29/23 0228   SODIUM  142 139 138   POTASSIUM 3.9 4.0 4.0   CHLORIDE 106 102 104   CO2 25 26 24   GLUCOSE 105* 189* 155*   BUN 17 17 21   CREATININE 0.76 0.81 0.66   CALCIUM 8.7 8.8 8.1*                     Imaging  MR-BRAIN-WITH & W/O   Final Result      1.  No acute intracranial abnormality.   2.  No evidence of a gross pituitary or sellar abnormality.   3.  Atrophy.   4.  Mild white matter disease, likely representing microvascular ischemic changes.      DX-CHEST-PORTABLE (1 VIEW)   Final Result      Linear atelectasis in the right upper lobe. No focal consolidation. No effusions.         EC-ECHOCARDIOGRAM COMPLETE W/O CONT   Final Result      DX-CHEST-PORTABLE (1 VIEW)   Final Result         1.  Interstitial pulmonary parenchymal prominence suggest chronic underlying lung disease, component of interstitial edema and/or infiltrates not excluded.   2.  Atherosclerosis      CT-CSPINE WITHOUT PLUS RECONS   Final Result      No CT evidence of acute cervical spine abnormality.      Thoracic kyphosis, cervical lordosis is exaggerated and there is multilevel facet ankylosis which could be degenerative favored over ankylosing spondylitis or rheumatoid arthritis      CT-HEAD W/O   Final Result      1.  No evidence of acute intracranial process.      2.  Cerebral atrophy as well as periventricular chronic small vessel ischemic change.                Assessment/Plan  * Orthostatic hypotension- (present on admission)  Assessment & Plan  At this point, patient's blood pressure continues to fluctuate with systolics into the 80s despite being on stress dose steroids.  Cosyntropin testing was negative.  MRI of the brain with and without contrast is unremarkable.  ACTH is still pending.    I obtained further history from the daughter who states that the patient's blood pressure sometimes drops after taking duloxetine.  She is very dependent on the duloxetine for her depression.    I discussed the case with the clinical pharmacist, who stated there are  case reports of orthostasis with duloxetine.    I discussed with the patient and she is agreeable to trying to come down on her duloxetine at this time.    At this point, primary adrenal insufficiency has been ruled out.  ACTH level still pending.  Secondary adrenal insufficiency may be possible as the patient's morning cortisol level was low at 3.2.    Patient's blood pressures appear to be much more elevated when measured on the legs compared to the arms.  She has noticed evidence of presyncopal symptoms at this time    Patient is midodrine and IV fluids have been discontinued.    Follow-up ACTH levels  I have discontinued patient's fludrocortisone.  Consider restarting if hypotensive again.    Syncope and collapse- (present on admission)  Assessment & Plan  As per presentation patient reports having felt a little dizzy prior to the event.  She states that she was going to the refrigerator to get some water.      Likely combination of medication effects and dehydration.    Question of secondary adrenal insufficiency.    I have discontinued all IV fluids, midodrine, and steroids for now.    Consider restarting fludrocortisone if patient continues to have presyncope symptoms.  Continue telemetry monitoring    Hypotension- (present on admission)  Assessment & Plan  Patient reports losing 20 pounds in the past year.  May be contributing.    I discussed the case with Dr. Torrez, who felt that the mitral valve stenosis may or may not be related to patient's hypotension but less likely.  Either way, patient would not be a candidate for surgery.    I have discontinued patient's Myrbetriq which can have anticholinergic effects, I have decreased patient's duloxetine.  I discontinued patient's fludrocortisone as the blood pressures in her legs appear to be very elevated.    Acute on chronic respiratory failure with hypoxia (HCC)- (present on admission)  Assessment & Plan  Currently following with and on pulmonology and found to  have localized scarring in her lungs along with a reduced DLCO.    Currently on 2 LPM oxygen nasal cannula.  Baseline need is intermittent as needed for exertion and sleeping.  She is on 3 LPM at bedtime.    Suspicion for pulmonary hypertension on CT scan.  No evidence of pulmonary hypertension on echocardiogram.    Ambulatory oxygen testing on discharge if going home.  However, likely going to PM&R  Wean off oxygen as tolerated.    Mitral annular calcification- (present on admission)  Assessment & Plan  Severe mitral annular calcification on echocardiogram.  This appears to have progressed.    She does not appear to be in heart failure   Outpatient follow-up cardiology    Moderate mitral stenosis- (present on admission)  Assessment & Plan  Patient continues to have moderate mitral stenosis.  Does not appear to be in heart failure.  She would not be a candidate for valvuloplasty due to the calcifications.    I discussed with Dr. Torrez of cardiology.  May or may not be related to mitral valve stenosis but patient is not a candidate for open heart surgery and valve replacement.    Moderate protein-calorie malnutrition (HCC)- (present on admission)  Assessment & Plan  Subcutaneous tissue and muscle wasting with mild decreased  strength.  Patient has lost 20 pounds in the past year.    Nutrition consult    Cortisol deficiency (HCC)- (present on admission)  Assessment & Plan  A.m. cortisol level this morning 3.2, which is low.  Concerning for adrenal insufficiency.  However cosyntropin testing was negative.  I personally reviewed the MRI of the brain, which has not hypothalamic or pituitary abnormalities, which could cause ACTH derangement.    Primary adrenal deficiency has been ruled out.    Follow-up ACTH levels to consider secondary adrenal insufficiency.    Overactive bladder- (present on admission)  Assessment & Plan  Discontinue home MYRBETRIQ 50 MG TABLET SR 24 HR due to anticholinergic effects  contributing.  Patient reports having had a bladder surgery previously.    Patient using PureWick    Depression- (present on admission)  Assessment & Plan  Continue home Cymbalta.  I have decreased patient's dose due to concern for orthostatic hypotension    Hypomagnesemia- (present on admission)  Assessment & Plan  Magnesium of 2.1 today.  Resolved      Fall at home- (present on admission)  Assessment & Plan  Likely due to hypotensive episode.    Continue to treat adrenal insufficiency, IV fluids    Dehydration- (present on admission)  Assessment & Plan  Likely due to poor oral fluid intake.  Adrenal insufficiency at least primary has been ruled out.  Appears to be well-hydrated now.  Discontinue IV fluids  Continue to encourage oral intake.         VTE prophylaxis: SCDs/TEDs and enoxaparin ppx    I have performed a physical exam and reviewed and updated ROS and Plan today (5/29/2023). In review of yesterday's note (5/28/2023), there are no changes except as documented above.

## 2023-05-29 NOTE — DIETARY
"Nutrition services: Day 3 of admit.  Rashmi Parra is a 83 y.o. female with admitting DX of orthostatic hypotension.   Consult received for failure to thrive.     Assessment:  Height: 160 cm (5' 2.99\")  Weight: 63.3 kg (139 lb 8.8 oz)  Body mass index is 24.73 kg/m²., BMI classification: Normal  Diet/Intake: CHO consistent    Evaluation:   RD able to visit pt at bedside. Pt reports good appetite however hospital food is not appealing. Pt with no changes prior to admission. RD offered pt snacks or supplements to be added to trays. Pt requested yogurt.   Pt reports UBW of ~132 lbs. Pt states the number on the scale has not changed however her pants fit more loosely these days. Pt noticed loose pants starting last year. Per chart review pt was 153 lbs 10/26/22. Pt with a -9.15% wt loss in 7 months. Wt loss is not significant per ASPEN guidelines.   Per NFPE pt is with mild muscle and fat wasting in the temporal, orbital and interosseous region.   Labs: Glu 155, Ca 8.1  Meds: magnesium oxide, melatonin, zofran, bowel regimen  +BM 5/27    Malnutrition Risk: Per ASPEN guidelines, pt meets criteria for moderate malnutrition in the context of acute illness as evidence by pt with mild muscle and fat loss in multiple regions.     Recommendations/Plan:  Encourage intake of >/= 50%.   Document intake of all PO as % taken in ADL's to provide interdisciplinary communication across all shifts.   Monitor weight.  Nutrition rep will continue to see patient for ongoing meal and snack preferences.     RD following.   "

## 2023-05-30 ENCOUNTER — APPOINTMENT (OUTPATIENT)
Dept: RADIOLOGY | Facility: MEDICAL CENTER | Age: 83
DRG: 643 | End: 2023-05-30
Attending: HOSPITALIST
Payer: MEDICARE

## 2023-05-30 LAB
ACTH PLAS-MCNC: 10.3 PG/ML (ref 7.2–63.3)
ACTH PLAS-MCNC: 2.2 PG/ML (ref 7.2–63.3)
ALBUMIN SERPL BCP-MCNC: 3.2 G/DL (ref 3.2–4.9)
ALBUMIN/GLOB SERPL: 1.5 G/DL
ALDOST SERPL-MCNC: 4.4 NG/DL
ALP SERPL-CCNC: 75 U/L (ref 30–99)
ALT SERPL-CCNC: 14 U/L (ref 2–50)
ANION GAP SERPL CALC-SCNC: 8 MMOL/L (ref 7–16)
AST SERPL-CCNC: 24 U/L (ref 12–45)
BASOPHILS # BLD AUTO: 0.2 % (ref 0–1.8)
BASOPHILS # BLD: 0.01 K/UL (ref 0–0.12)
BILIRUB SERPL-MCNC: 0.3 MG/DL (ref 0.1–1.5)
BUN SERPL-MCNC: 18 MG/DL (ref 8–22)
CALCIUM ALBUM COR SERPL-MCNC: 9 MG/DL (ref 8.5–10.5)
CALCIUM SERPL-MCNC: 8.4 MG/DL (ref 8.5–10.5)
CHLORIDE SERPL-SCNC: 106 MMOL/L (ref 96–112)
CO2 SERPL-SCNC: 27 MMOL/L (ref 20–33)
CREAT SERPL-MCNC: 0.66 MG/DL (ref 0.5–1.4)
EOSINOPHIL # BLD AUTO: 0.03 K/UL (ref 0–0.51)
EOSINOPHIL NFR BLD: 0.5 % (ref 0–6.9)
ERYTHROCYTE [DISTWIDTH] IN BLOOD BY AUTOMATED COUNT: 46.7 FL (ref 35.9–50)
GFR SERPLBLD CREATININE-BSD FMLA CKD-EPI: 86 ML/MIN/1.73 M 2
GLOBULIN SER CALC-MCNC: 2.2 G/DL (ref 1.9–3.5)
GLUCOSE SERPL-MCNC: 99 MG/DL (ref 65–99)
HCT VFR BLD AUTO: 38 % (ref 37–47)
HGB BLD-MCNC: 12.8 G/DL (ref 12–16)
IMM GRANULOCYTES # BLD AUTO: 0.01 K/UL (ref 0–0.11)
IMM GRANULOCYTES NFR BLD AUTO: 0.2 % (ref 0–0.9)
LYMPHOCYTES # BLD AUTO: 1.31 K/UL (ref 1–4.8)
LYMPHOCYTES NFR BLD: 22.5 % (ref 22–41)
MAGNESIUM SERPL-MCNC: 1.9 MG/DL (ref 1.5–2.5)
MCH RBC QN AUTO: 31 PG (ref 27–33)
MCHC RBC AUTO-ENTMCNC: 33.7 G/DL (ref 32.2–35.5)
MCV RBC AUTO: 92 FL (ref 81.4–97.8)
MONOCYTES # BLD AUTO: 0.57 K/UL (ref 0–0.85)
MONOCYTES NFR BLD AUTO: 9.8 % (ref 0–13.4)
NEUTROPHILS # BLD AUTO: 3.9 K/UL (ref 1.82–7.42)
NEUTROPHILS NFR BLD: 66.8 % (ref 44–72)
NRBC # BLD AUTO: 0 K/UL
NRBC BLD-RTO: 0 /100 WBC (ref 0–0.2)
PLATELET # BLD AUTO: 233 K/UL (ref 164–446)
PMV BLD AUTO: 9.9 FL (ref 9–12.9)
POTASSIUM SERPL-SCNC: 3.8 MMOL/L (ref 3.6–5.5)
PROT SERPL-MCNC: 5.4 G/DL (ref 6–8.2)
RBC # BLD AUTO: 4.13 M/UL (ref 4.2–5.4)
SODIUM SERPL-SCNC: 141 MMOL/L (ref 135–145)
WBC # BLD AUTO: 5.8 K/UL (ref 4.8–10.8)

## 2023-05-30 PROCEDURE — A9270 NON-COVERED ITEM OR SERVICE: HCPCS | Performed by: STUDENT IN AN ORGANIZED HEALTH CARE EDUCATION/TRAINING PROGRAM

## 2023-05-30 PROCEDURE — 700102 HCHG RX REV CODE 250 W/ 637 OVERRIDE(OP): Performed by: STUDENT IN AN ORGANIZED HEALTH CARE EDUCATION/TRAINING PROGRAM

## 2023-05-30 PROCEDURE — 770020 HCHG ROOM/CARE - TELE (206)

## 2023-05-30 PROCEDURE — A9270 NON-COVERED ITEM OR SERVICE: HCPCS | Performed by: HOSPITALIST

## 2023-05-30 PROCEDURE — 700117 HCHG RX CONTRAST REV CODE 255: Performed by: HOSPITALIST

## 2023-05-30 PROCEDURE — 85025 COMPLETE CBC W/AUTO DIFF WBC: CPT

## 2023-05-30 PROCEDURE — 97535 SELF CARE MNGMENT TRAINING: CPT

## 2023-05-30 PROCEDURE — 71275 CT ANGIOGRAPHY CHEST: CPT

## 2023-05-30 PROCEDURE — 36415 COLL VENOUS BLD VENIPUNCTURE: CPT

## 2023-05-30 PROCEDURE — 700111 HCHG RX REV CODE 636 W/ 250 OVERRIDE (IP): Performed by: STUDENT IN AN ORGANIZED HEALTH CARE EDUCATION/TRAINING PROGRAM

## 2023-05-30 PROCEDURE — 83735 ASSAY OF MAGNESIUM: CPT

## 2023-05-30 PROCEDURE — 700102 HCHG RX REV CODE 250 W/ 637 OVERRIDE(OP): Performed by: HOSPITALIST

## 2023-05-30 PROCEDURE — 80053 COMPREHEN METABOLIC PANEL: CPT

## 2023-05-30 PROCEDURE — 99232 SBSQ HOSP IP/OBS MODERATE 35: CPT | Performed by: HOSPITALIST

## 2023-05-30 RX ORDER — LISINOPRIL 10 MG/1
10 TABLET ORAL
Status: DISCONTINUED | OUTPATIENT
Start: 2023-05-30 | End: 2023-05-31 | Stop reason: HOSPADM

## 2023-05-30 RX ADMIN — ENOXAPARIN SODIUM 40 MG: 100 INJECTION SUBCUTANEOUS at 16:32

## 2023-05-30 RX ADMIN — LISINOPRIL 10 MG: 10 TABLET ORAL at 09:34

## 2023-05-30 RX ADMIN — DOCUSATE SODIUM 50 MG AND SENNOSIDES 8.6 MG 2 TABLET: 8.6; 5 TABLET, FILM COATED ORAL at 16:33

## 2023-05-30 RX ADMIN — DULOXETINE HYDROCHLORIDE 30 MG: 30 CAPSULE, DELAYED RELEASE ORAL at 05:17

## 2023-05-30 RX ADMIN — LOVASTATIN 20 MG: 20 TABLET ORAL at 21:03

## 2023-05-30 RX ADMIN — Medication 5 MG: at 21:03

## 2023-05-30 RX ADMIN — DOCUSATE SODIUM 50 MG AND SENNOSIDES 8.6 MG 2 TABLET: 8.6; 5 TABLET, FILM COATED ORAL at 05:17

## 2023-05-30 RX ADMIN — IOHEXOL 90 ML: 350 INJECTION, SOLUTION INTRAVENOUS at 16:23

## 2023-05-30 RX ADMIN — MAGNESIUM GLUCONATE 500 MG ORAL TABLET 400 MG: 500 TABLET ORAL at 05:17

## 2023-05-30 RX ADMIN — OMEPRAZOLE 20 MG: 20 CAPSULE, DELAYED RELEASE ORAL at 16:33

## 2023-05-30 ASSESSMENT — ENCOUNTER SYMPTOMS
ABDOMINAL PAIN: 0
HEADACHES: 0
NAUSEA: 0
DIZZINESS: 0
FOCAL WEAKNESS: 0
DIARRHEA: 0
FEVER: 0
SHORTNESS OF BREATH: 0
CHILLS: 0
CONSTIPATION: 0
FALLS: 0
VOMITING: 0
COUGH: 0
MYALGIAS: 0
WEAKNESS: 1
PALPITATIONS: 0

## 2023-05-30 ASSESSMENT — COGNITIVE AND FUNCTIONAL STATUS - GENERAL
DRESSING REGULAR LOWER BODY CLOTHING: A LOT
DRESSING REGULAR UPPER BODY CLOTHING: A LITTLE
TOILETING: A LOT
PERSONAL GROOMING: A LITTLE
EATING MEALS: A LITTLE
SUGGESTED CMS G CODE MODIFIER DAILY ACTIVITY: CK
DAILY ACTIVITIY SCORE: 15
HELP NEEDED FOR BATHING: A LOT

## 2023-05-30 ASSESSMENT — FIBROSIS 4 INDEX: FIB4 SCORE: 2.31

## 2023-05-30 ASSESSMENT — PAIN DESCRIPTION - PAIN TYPE
TYPE: ACUTE PAIN
TYPE: ACUTE PAIN

## 2023-05-30 NOTE — DISCHARGE PLANNING
Agency/Facility Name: [facility]  Outcome: DPA left v-mail requesting update on referral.     0859-  Agency[facility]Facility Name: [facility]  Outcome: DPA left v-mail requesting update on referral.     1014-  Agency/Facility Name: [facility]  Spoke To: Bernie  Outcome: SNF informed DPA they've accepted Pt, and have a bed available today. SNF can provide transport at 1300 today, if Pt can go by WC. SNF asking if Pt is currently receiving cancer treatment.     RN CM notified.     1024-  Agency[facility]Facility Name: [facility]  Outcome: DPA left v-mail informing SNF that Pt is able to go by WC for transport at 1300, and Pt is not currently receiving cancer treatment.     RN CM notified.     1027-  Agency/Facility Name: [facility]  Spoke To: Bernie  Outcome: DPA informed SNF that transport will have to be cancelled for today, as Pt is being monitored for orthostasis.    RN CM notified.

## 2023-05-30 NOTE — THERAPY
"Occupational Therapy  Daily Treatment     Patient Name: Rashmi Parra  Age:  83 y.o., Sex:  female  Medical Record #: [id number]  Today's Date: 5/30/2023     Precautions  Precautions: (P) Fall Risk  Comments: (P) /    Assessment    Pt seen for OT tx session. Pt made good progress towards acute OT goals and had increased level of arousal throughout session. Pt tolerated standing grooming for 10 min at sink and performed functional mobility w/ min A w/ FWW. Pt demo'd impaired balance, functional mobility, and activity tolerance impacting functional independence. Will continue to follow.     Plan    Treatment Plan Status: (P) Continue Current Treatment Plan  Type of Treatment: Self Care / Activities of Daily Living, Cognitive Skill Development, Neuro Re-Education / Balance, Therapeutic Exercises, Adaptive Equipment, Therapeutic Activity  Treatment Frequency: 3 Times per Week  Treatment Duration: Until Therapy Goals Met    DC Equipment Recommendations: (P) Unable to determine at this time  Discharge Recommendations: (P) Recommend post-acute placement for additional occupational therapy services prior to discharge home    Subjective    \"How about we go to the R and get on that swing?\"     Objective       05/30/23 1510   Treatment Charges   Charges Yes   OT Self Care / ADL (Units) 3   Total Time Spent   OT Self Care / ADL (Minutes) 40   Precautions   Precautions Fall Risk   Comments /   Vitals   O2 (LPM) 1   O2 Delivery Device Nasal Cannula   Pain 0 - 10 Group   Therapist Pain Assessment Post Activity Pain Same as Prior to Activity;Nurse Notified   Cognition    Cognition / Consciousness X   Level of Consciousness Alert   Comments Increased level of arousal, pt pleasent and cooperative, occasionally asking the same questions multiple times   Active ROM Upper Body   Comments WFL   Strength Upper Body   Comments generalized weakness   Balance   Sitting Balance (Static) Fair   Sitting Balance (Dynamic) Fair -   Standing " Balance (Static) Fair -   Standing Balance (Dynamic) Fair -   Weight Shift Sitting Fair   Weight Shift Standing Fair   Skilled Intervention Verbal Cuing;Tactile Cuing;Facilitation   Comments w/ FWW   Bed Mobility    Supine to Sit Supervised   Sit to Supine   (NT in chair post)   Scooting Supervised   Skilled Intervention Verbal Cuing;Tactile Cuing;Facilitation   Activities of Daily Living   Grooming Minimal Assist;Standing  (~10 min oral care, washed face, brushed hair)   Upper Body Dressing Minimal Assist   Toileting Maximal Assist  (incont of BM)   Skilled Intervention Verbal Cuing;Tactile Cuing;Facilitation   How much help from another person does the patient currently need...   Putting on and taking off regular lower body clothing? 2   Bathing (including washing, rinsing, and drying)? 2   Toileting, which includes using a toilet, bedpan, or urinal? 2   Putting on and taking off regular upper body clothing? 3   Taking care of personal grooming such as brushing teeth? 3   Eating meals? 3   6 Clicks Daily Activity Score 15   Functional Mobility   Sit to Stand Minimal Assist   Bed, Chair, Wheelchair Transfer Minimal Assist   Mobility from bed-> sink-> chair w/ FWW   Skilled Intervention Verbal Cuing;Tactile Cuing;Facilitation   Activity Tolerance   Sitting in Chair 10+ min (up post)   Sitting Edge of Bed 5 min   Standing 10-15 min total   Patient / Family Goals   Patient / Family Goal #1 to go home   Goal #1 Outcome Progressing as expected   Short Term Goals   Short Term Goal # 1 LB dressing w/SPV   Goal Outcome # 1 Progressing as expected   Short Term Goal # 2 standing g/h with CGA   Goal Outcome # 2 Progressing as expected   Short Term Goal # 3 toilet txf with CGA   Goal Outcome # 3 Progressing as expected   Education Group   Role of Occupational Therapist Patient Response Patient;Acceptance;Explanation;Reinforcement Needed;No Learning Evidence   ADL Patient Response  Patient;Acceptance;Explanation;Demonstration;Verbal Demonstration;Action Demonstration   Pathology of Bedrest Patient Response Patient;Acceptance;Explanation;Reinforcement Needed;No Learning Evidence   Occupational Therapy Treatment Plan    O.T. Treatment Plan Continue Current Treatment Plan   Anticipated Discharge Equipment and Recommendations   DC Equipment Recommendations Unable to determine at this time   Discharge Recommendations Recommend post-acute placement for additional occupational therapy services prior to discharge home   Interdisciplinary Plan of Care Collaboration   IDT Collaboration with  Nursing   Patient Position at End of Therapy Seated;Call Light within Reach;Tray Table within Reach;Phone within Reach   Collaboration Comments report given   Session Information   Date / Session Number  5/30, #2 (2/3, 6/1)

## 2023-05-30 NOTE — DISCHARGE PLANNING
[facility] Transitional Care Coordination    Physiatry consult complete.  Dr. Hdz recommending fair candidate pending confirmation of return to community support.      Telephone call to spouse Michael Parra to discuss IRF referral.  Ruddy reported daughter Maggie present, requested TCC speak with Maggie.  Explained specifics of inpatient rehab, answered questions/concerns.  Maggie endorsed IPR for her Mother, states goals stable blood pressure, increased strength for safe return home.  Maggie/sister will provide 24/7 support if needed, however, not able to continue close support indefinitely.  Couple has hired CNA 3hrs/day/5days/week;  PT 2x/week.  Reviewed Medicare/[employer] insurance.  Reviewed [facility] COVID testing and visitor policies.  Provided TCC contact information.  Maggie aware potential admission today pending medical clearance.       Message to Dr. Foss seeking status of medical clearance for IPR.      1222 - Per CM Sonja, not medically clear for IPR today.  Would welcome OT update as clinically appropriate.

## 2023-05-30 NOTE — PROGRESS NOTES
[facility] Medicine Daily Progress Note    Date of Service  5/30/2023    Chief Complaint  Rashmi Parra is a 83 y.o. female admitted 5/25/2023 with hypotension, fatigue, ground-level fall    Hospital Course  Patient is a 83-year-old female with a past medical history of orthostatic hypotension, chronic hypoxic respiratory failure requiring 3 L of oxygen at night and with exertion, depression, breast cancer status post bilateral mastectomy and radiation therapy and overactive bladder who presented to the hospital after experiencing a fall around 6 PM today.     Patient reported that she was getting up from her chair to go to the bathroom after sitting down for a long time and while she was walking to the bathroom she became lightheaded and fell back and hit her head.  Patient is unsure if she lost consciousness during this facility.  Patient later stated that she started to feel lightheaded as she was going to the refrigerator to get some to drink because she was feeling thirsty.  Patient says she walks with a walker and was using a walker at this time.  Patient says she was not using her oxygen during this time.  Of note patient and her daughter note that sometimes her oxygen drops down to the mid 80s at home even when she has oxygen on.  Patient says that all she ate and drank today was 1 bottle of Ensure.     Patient reports feeling fatigued for fevers and having shortness of breath for which she sees pulmonology at [facility].  She reports a 20 pound weight loss over the past year.     No other complaints at this time.      Interval Problem Update          5/29: Patient was seen and examined on the telemetry floor.  She continues on telemetry monitoring.  No significant events overnight.  Patient's blood pressures are much more elevated when checked on her leg compared to her arms.  Likely result of previous mastectomies.  She continues on 2 LPM oxygen nasal cannula.  Patient is awaiting insurance authorization for  inpatient rehabilitation, who may accept the patient.  A.m. cortisol level is again low at 2.1.  I have discontinued patient's fludrocortisone.      5/30       Patient's blood pressures taking from her lower extremities are in the 190s systolic    When taken from her arms her blood pressure systolic is in the low 90s    The pressures pressures remained consistent in all 3 positions(supine, sitting , standing) when taken from the lower extremity similarly from the upper extremity    I am concerned about some arterial stenosis versus aneurysm to explain the significant difference in blood pressures from upper extremity to lower extremity    I have ordered a CTA of the entire aorta    Cortisol level is very low at 2.1        I have discussed this patient's plan of care and discharge plan at IDT rounds today with Case Management, Nursing, Nursing leadership, and other members of the IDT team.    Consultants/Specialty  None    Code Status  DNAR/DNI    Disposition  The patient is not medically cleared for discharge to home or a post-acute facility.  Anticipate discharge to: skilled nursing facility    I have placed the appropriate orders for post-discharge needs.    Review of Systems  Review of Systems   Constitutional:  Positive for malaise/fatigue. Negative for chills and fever.   Respiratory:  Negative for cough and shortness of breath.    Cardiovascular:  Negative for chest pain, palpitations and leg swelling.   Gastrointestinal:  Negative for abdominal pain, constipation, diarrhea, nausea and vomiting.   Genitourinary:  Negative for dysuria, frequency and urgency.   Musculoskeletal:  Negative for falls and myalgias.   Neurological:  Positive for weakness. Negative for dizziness, focal weakness and headaches.   All other systems reviewed and are negative.      Physical Exam  Temp:  [36.1 °C (97 °F)-37 °C (98.6 °F)] 36.4 °C (97.5 °F)  Pulse:  [60-77] 77  Resp:  [16-18] 16  BP: ()/() 87/57  SpO2:  [94 %-96  %] 96 %    Physical Exam  Vitals and nursing note reviewed.   Constitutional:       Appearance: Normal appearance. She is underweight. She is ill-appearing.      Comments: Mild bitemporal wasting   HENT:      Head: Normocephalic and atraumatic.      Right Ear: External ear normal.      Left Ear: External ear normal.      Nose: Nose normal.      Mouth/Throat:      Mouth: Mucous membranes are moist.      Pharynx: Oropharynx is clear. No oropharyngeal exudate or posterior oropharyngeal erythema.   Eyes:      General:         Right eye: No discharge.         Left eye: No discharge.      Conjunctiva/sclera: Conjunctivae normal.   Cardiovascular:      Rate and Rhythm: Normal rate and regular rhythm.      Pulses: Normal pulses.      Heart sounds: Normal heart sounds. No murmur heard.  Pulmonary:      Effort: Pulmonary effort is normal. No respiratory distress.      Breath sounds: Normal breath sounds. No stridor. No wheezing or rales.   Abdominal:      General: Abdomen is flat. Bowel sounds are normal. There is no distension.      Palpations: Abdomen is soft. There is no mass.      Tenderness: There is no abdominal tenderness.   Musculoskeletal:      Cervical back: Normal range of motion.      Right lower leg: No edema.      Left lower leg: No edema.   Skin:     General: Skin is warm.   Neurological:      General: No focal deficit present.      Mental Status: She is oriented to person, place, and time.      Cranial Nerves: No cranial nerve deficit.      Motor: Weakness present.   Psychiatric:         Mood and Affect: Mood normal.         Behavior: Behavior normal.         Fluids    Intake/Output Summary (Last 24 hours) at 5/30/2023 1310  Last data filed at 5/30/2023 0315  Gross per 24 hour   Intake --   Output 300 ml   Net -300 ml         Laboratory  Recent Labs     05/28/23  0031 05/29/23  0228 05/30/23  0440   WBC 7.7 9.6 5.8   RBC 4.55 4.26 4.13*   HEMOGLOBIN 14.2 13.1 12.8   HEMATOCRIT 41.7 39.2 38.0   MCV 91.6 92.0  92.0   MCH 31.2 30.8 31.0   MCHC 34.1 33.4 33.7   RDW 44.9 46.1 46.7   PLATELETCT 269 283 233   MPV 10.3 10.3 9.9       Recent Labs     05/28/23  0031 05/29/23  0228 05/30/23  0440   SODIUM 139 138 141   POTASSIUM 4.0 4.0 3.8   CHLORIDE 102 104 106   CO2 26 24 27   GLUCOSE 189* 155* 99   BUN 17 21 18   CREATININE 0.81 0.66 0.66   CALCIUM 8.8 8.1* 8.4*                     Imaging  MR-BRAIN-WITH & W/O   Final Result      1.  No acute intracranial abnormality.   2.  No evidence of a gross pituitary or sellar abnormality.   3.  Atrophy.   4.  Mild white matter disease, likely representing microvascular ischemic changes.      DX-CHEST-PORTABLE (1 VIEW)   Final Result      Linear atelectasis in the right upper lobe. No focal consolidation. No effusions.         EC-ECHOCARDIOGRAM COMPLETE W/O CONT   Final Result      DX-CHEST-PORTABLE (1 VIEW)   Final Result         1.  Interstitial pulmonary parenchymal prominence suggest chronic underlying lung disease, component of interstitial edema and/or infiltrates not excluded.   2.  Atherosclerosis      CT-CSPINE WITHOUT PLUS RECONS   Final Result      No CT evidence of acute cervical spine abnormality.      Thoracic kyphosis, cervical lordosis is exaggerated and there is multilevel facet ankylosis which could be degenerative favored over ankylosing spondylitis or rheumatoid arthritis      CT-HEAD W/O   Final Result      1.  No evidence of acute intracranial process.      2.  Cerebral atrophy as well as periventricular chronic small vessel ischemic change.         CT-CTA COMPLETE THORACOABDOMINAL AORTA    (Results Pending)          Assessment/Plan  * Orthostatic hypotension- (present on admission)  Assessment & Plan  At this point, patient's blood pressure continues to fluctuate when tested from the upper and lower extremity    Ordered ETA of the entire aorta    I obtained further history from the daughter who states that the patient's blood pressure sometimes drops after taking  duloxetine.  She is very dependent on the duloxetine for her depression.    I discussed the case with the clinical pharmacist, who stated there are case reports of orthostasis with duloxetine.    I discussed with the patient and she is agreeable to trying to come down on her duloxetine at this time.        Patient's blood pressures appear to be much more elevated when measured on the legs compared to the arms.  She has noticed evidence of presyncopal symptoms at this time    I have discontinued patient's fludrocortisone.  Consider restarting if hypotensive again.    Mitral annular calcification- (present on admission)  Assessment & Plan  Severe mitral annular calcification on echocardiogram.  This appears to have progressed.    She does not appear to be in heart failure   Outpatient follow-up cardiology    Moderate protein-calorie malnutrition (HCC)- (present on admission)  Assessment & Plan  Subcutaneous tissue and muscle wasting with mild decreased  strength.  Patient has lost 20 pounds in the past year.    Nutrition consult    Cortisol deficiency (HCC)- (present on admission)  Assessment & Plan  A.m. cortisol level this morning 2.1, which is low.  Concerning for adrenal insufficiency.  However cosyntropin testing was negative.      Primary adrenal deficiency has been ruled out.    Follow-up ACTH levels to consider secondary adrenal insufficiency.    Overactive bladder- (present on admission)  Assessment & Plan  Discontinue home MYRBETRIQ 50 MG TABLET SR 24 HR due to anticholinergic effects contributing.  Patient reports having had a bladder surgery previously.    Patient using PureWick    Depression- (present on admission)  Assessment & Plan  Continue home Cymbalta.  I have decreased patient's dose due to concern for orthostatic hypotension    Syncope and collapse- (present on admission)  Assessment & Plan  As per presentation patient reports having felt a little dizzy prior to the event.  She states that she  was going to the refrigerator to get some water.      Likely combination of medication effects and dehydration.    Question of secondary adrenal insufficiency.     all IV fluids, midodrine, and steroids are currently on hold for now    Consider restarting fludrocortisone if patient continues to have presyncope symptoms.  Continue telemetry monitoring    Hypotension- (present on admission)  Assessment & Plan  Patient reports losing 20 pounds in the past year.  May be contributing.    As per  Dr. Torrez, who felt that the mitral valve stenosis may or may not be related to patient's hypotension but less likely.  Either way, patient would not be a candidate for surgery.    I have discontinued patient's Myrbetriq which can have anticholinergic effects,patient's duloxetine dose has been decreased.          Hypomagnesemia- (present on admission)  Assessment & Plan  Resolved      Fall at home- (present on admission)  Assessment & Plan  Likely due to hypotensive episode.    Continue to treat adrenal insufficiency, IV fluids    Dehydration- (present on admission)  Assessment & Plan  Likely due to poor oral fluid intake.  Adrenal insufficiency at least primary has been ruled out.  Status post IV fluid hydration  Continue to encourage oral intake.    Moderate mitral stenosis- (present on admission)  Assessment & Plan  Patient continues to have moderate mitral stenosis.  Does not appear to be in heart failure.  She would not be a candidate for valvuloplasty due to the calcifications.    I discussed with Dr. Torrez of cardiology.  May or may not be related to mitral valve stenosis but patient is not a candidate for open heart surgery and valve replacement.    Acute on chronic respiratory failure with hypoxia (HCC)- (present on admission)  Assessment & Plan  Currently following with and on pulmonology and found to have localized scarring in her lungs along with a reduced DLCO.    Currently on 2 LPM oxygen nasal cannula.  Baseline need is  intermittent as needed for exertion and sleeping.  She is on 3 LPM at bedtime.    Suspicion for pulmonary hypertension on CT scan.  No evidence of pulmonary hypertension on echocardiogram.    Wean off oxygen as tolerated.         VTE prophylaxis: SCDs/TEDs and enoxaparin ppx    I have performed a physical exam and reviewed and updated ROS and Plan today (5/30/2023). In review of yesterday's note (5/29/2023), there are no changes except as documented above.

## 2023-05-30 NOTE — PROGRESS NOTES
This RN messaged JAMARCUS Kumari. Pt had a BP of 187/75 with no PRN meds for BP.    2056- Per JAMARCUS Kumari we've been having a hard time keeping her BP up. Recheck BP in an hour.    2204- This RN messaged JAMARCUS Kumari know pt's BP was 172/59.    2204- No new orders received .    0512- This RN messaged YIN Aranda to let him know pt has a BP of 185-64 with no PRN BP meds ordered This RN let YIN Aranda know pt was prior having low Bps.    0520- Per YIN Aranda lets monitor. PA doesn't want to drop it. Can hold the morning Midodrine.

## 2023-05-30 NOTE — DISCHARGE PLANNING
Case Management Discharge Planning    Admission Date: 5/25/2023  GMLOS: 5.1  ALOS: 4    6-Clicks ADL Score: 16  6-Clicks Mobility Score: 13  PT and/or OT Eval ordered: Yes  Post-acute Referrals Ordered: Yes  Post-acute Choice Obtained: Yes  Has referral(s) been sent to post-acute provider:  Yes      Anticipated Discharge Dispo: Discharge Disposition: Disch to  rehab facility or distinct part unit (62)    DME Needed: No    Action(s) Taken: Referral(s) sent    Escalations Completed: None    Medically Clear: No    Next Steps: Pt not medically cleared today. Discussed with Katelyn from [facility]; they are still reviewing this patient and requesting updated OT notes. Voalte to OT with request for updated recs.     Barriers to Discharge: Medical clearance and Outpatient referrals pending    Is the patient up for discharge tomorrow: No

## 2023-05-31 ENCOUNTER — HOSPITAL ENCOUNTER (INPATIENT)
Facility: REHABILITATION | Age: 83
LOS: 17 days | DRG: 312 | End: 2023-06-17
Attending: PHYSICAL MEDICINE & REHABILITATION | Admitting: PHYSICAL MEDICINE & REHABILITATION
Payer: MEDICARE

## 2023-05-31 VITALS
HEIGHT: 63 IN | SYSTOLIC BLOOD PRESSURE: 90 MMHG | OXYGEN SATURATION: 94 % | TEMPERATURE: 98.2 F | HEART RATE: 88 BPM | RESPIRATION RATE: 14 BRPM | WEIGHT: 137.13 LBS | BODY MASS INDEX: 24.3 KG/M2 | DIASTOLIC BLOOD PRESSURE: 61 MMHG

## 2023-05-31 DIAGNOSIS — I10 ESSENTIAL HYPERTENSION: ICD-10-CM
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
## 2023-05-31 PROBLEM — R55 SYNCOPE AND COLLAPSE: Status: RESOLVED | Noted: 2023-05-25 | Resolved: 2023-05-31

## 2023-05-31 PROBLEM — I73.9 PAD (PERIPHERAL ARTERY DISEASE) (HCC): Status: ACTIVE | Noted: 2023-05-31

## 2023-05-31 PROBLEM — N32.81 OVERACTIVE BLADDER: Status: RESOLVED | Noted: 2023-05-26 | Resolved: 2023-05-31

## 2023-05-31 PROBLEM — E86.0 DEHYDRATION: Status: RESOLVED | Noted: 2022-06-10 | Resolved: 2023-05-31

## 2023-05-31 PROBLEM — I95.1 ORTHOSTATIC HYPOTENSION: Status: ACTIVE | Noted: 2023-05-31

## 2023-05-31 PROBLEM — I95.9 HYPOTENSION: Status: RESOLVED | Noted: 2022-06-12 | Resolved: 2023-05-31
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
## 2023-05-31 PROBLEM — I95.1 ORTHOSTATIC HYPOTENSION: Status: RESOLVED | Noted: 2023-05-27 | Resolved: 2023-05-31

## 2023-05-31 PROBLEM — E83.42 HYPOMAGNESEMIA: Status: RESOLVED | Noted: 2022-06-11 | Resolved: 2023-05-31

## 2023-05-31 LAB
1,25(OH)2D3 SERPL-MCNC: 69.8 PG/ML (ref 19.9–79.3)
ALBUMIN SERPL BCP-MCNC: 3.2 G/DL (ref 3.2–4.9)
ALBUMIN/GLOB SERPL: 1.4 G/DL
ALP SERPL-CCNC: 85 U/L (ref 30–99)
ALT SERPL-CCNC: 15 U/L (ref 2–50)
ANION GAP SERPL CALC-SCNC: 7 MMOL/L (ref 7–16)
AST SERPL-CCNC: 21 U/L (ref 12–45)
BASOPHILS # BLD AUTO: 0.2 % (ref 0–1.8)
BASOPHILS # BLD: 0.01 K/UL (ref 0–0.12)
BILIRUB SERPL-MCNC: 0.3 MG/DL (ref 0.1–1.5)
BUN SERPL-MCNC: 16 MG/DL (ref 8–22)
CALCIUM ALBUM COR SERPL-MCNC: 9.3 MG/DL (ref 8.5–10.5)
CALCIUM SERPL-MCNC: 8.7 MG/DL (ref 8.5–10.5)
CHLORIDE SERPL-SCNC: 105 MMOL/L (ref 96–112)
CO2 SERPL-SCNC: 30 MMOL/L (ref 20–33)
CREAT SERPL-MCNC: 0.65 MG/DL (ref 0.5–1.4)
EOSINOPHIL # BLD AUTO: 0.03 K/UL (ref 0–0.51)
EOSINOPHIL NFR BLD: 0.5 % (ref 0–6.9)
ERYTHROCYTE [DISTWIDTH] IN BLOOD BY AUTOMATED COUNT: 44.4 FL (ref 35.9–50)
FLUAV RNA SPEC QL NAA+PROBE: NEGATIVE
FLUBV RNA SPEC QL NAA+PROBE: NEGATIVE
GFR SERPLBLD CREATININE-BSD FMLA CKD-EPI: 87 ML/MIN/1.73 M 2
GLOBULIN SER CALC-MCNC: 2.3 G/DL (ref 1.9–3.5)
GLUCOSE SERPL-MCNC: 118 MG/DL (ref 65–99)
HCT VFR BLD AUTO: 37.8 % (ref 37–47)
HGB BLD-MCNC: 12.9 G/DL (ref 12–16)
IMM GRANULOCYTES # BLD AUTO: 0.02 K/UL (ref 0–0.11)
IMM GRANULOCYTES NFR BLD AUTO: 0.3 % (ref 0–0.9)
LYMPHOCYTES # BLD AUTO: 1.17 K/UL (ref 1–4.8)
LYMPHOCYTES NFR BLD: 20.2 % (ref 22–41)
MAGNESIUM SERPL-MCNC: 1.9 MG/DL (ref 1.5–2.5)
MCH RBC QN AUTO: 31 PG (ref 27–33)
MCHC RBC AUTO-ENTMCNC: 34.1 G/DL (ref 32.2–35.5)
MCV RBC AUTO: 90.9 FL (ref 81.4–97.8)
MONOCYTES # BLD AUTO: 0.62 K/UL (ref 0–0.85)
MONOCYTES NFR BLD AUTO: 10.7 % (ref 0–13.4)
NEUTROPHILS # BLD AUTO: 3.93 K/UL (ref 1.82–7.42)
NEUTROPHILS NFR BLD: 68.1 % (ref 44–72)
NRBC # BLD AUTO: 0 K/UL
NRBC BLD-RTO: 0 /100 WBC (ref 0–0.2)
PLATELET # BLD AUTO: 248 K/UL (ref 164–446)
PMV BLD AUTO: 10.4 FL (ref 9–12.9)
POTASSIUM SERPL-SCNC: 3.6 MMOL/L (ref 3.6–5.5)
PROT SERPL-MCNC: 5.5 G/DL (ref 6–8.2)
RBC # BLD AUTO: 4.16 M/UL (ref 4.2–5.4)
RENIN PLAS-CCNC: 0.8 NG/ML/HR
RSV RNA SPEC QL NAA+PROBE: NEGATIVE
SARS-COV-2 RNA RESP QL NAA+PROBE: NOTDETECTED
SODIUM SERPL-SCNC: 142 MMOL/L (ref 135–145)
SPECIMEN SOURCE: NORMAL
WBC # BLD AUTO: 5.8 K/UL (ref 4.8–10.8)

## 2023-05-31 PROCEDURE — 700111 HCHG RX REV CODE 636 W/ 250 OVERRIDE (IP): Performed by: PHYSICAL MEDICINE & REHABILITATION

## 2023-05-31 PROCEDURE — 94760 N-INVAS EAR/PLS OXIMETRY 1: CPT

## 2023-05-31 PROCEDURE — A9270 NON-COVERED ITEM OR SERVICE: HCPCS | Performed by: PHYSICAL MEDICINE & REHABILITATION

## 2023-05-31 PROCEDURE — 99239 HOSP IP/OBS DSCHRG MGMT >30: CPT | Performed by: HOSPITALIST

## 2023-05-31 PROCEDURE — 99223 1ST HOSP IP/OBS HIGH 75: CPT | Performed by: PHYSICAL MEDICINE & REHABILITATION

## 2023-05-31 PROCEDURE — A9270 NON-COVERED ITEM OR SERVICE: HCPCS | Performed by: STUDENT IN AN ORGANIZED HEALTH CARE EDUCATION/TRAINING PROGRAM

## 2023-05-31 PROCEDURE — 85025 COMPLETE CBC W/AUTO DIFF WBC: CPT

## 2023-05-31 PROCEDURE — 80053 COMPREHEN METABOLIC PANEL: CPT

## 2023-05-31 PROCEDURE — 700102 HCHG RX REV CODE 250 W/ 637 OVERRIDE(OP): Performed by: PHYSICAL MEDICINE & REHABILITATION

## 2023-05-31 PROCEDURE — 0241U HCHG SARS-COV-2 COVID-19 NFCT DS RESP RNA 4 TRGT MIC: CPT

## 2023-05-31 PROCEDURE — 36415 COLL VENOUS BLD VENIPUNCTURE: CPT

## 2023-05-31 PROCEDURE — 700102 HCHG RX REV CODE 250 W/ 637 OVERRIDE(OP): Performed by: STUDENT IN AN ORGANIZED HEALTH CARE EDUCATION/TRAINING PROGRAM

## 2023-05-31 PROCEDURE — 770010 HCHG ROOM/CARE - REHAB SEMI PRIVAT*

## 2023-05-31 PROCEDURE — 83735 ASSAY OF MAGNESIUM: CPT

## 2023-05-31 RX ORDER — LOVASTATIN 20 MG/1
20 TABLET ORAL NIGHTLY
Status: DISCONTINUED | OUTPATIENT
Start: 2023-05-31 | End: 2023-06-17 | Stop reason: HOSPADM

## 2023-05-31 RX ORDER — TRAMADOL HYDROCHLORIDE 50 MG/1
50 TABLET ORAL EVERY 4 HOURS PRN
Status: DISCONTINUED | OUTPATIENT
Start: 2023-05-31 | End: 2023-06-17 | Stop reason: HOSPADM

## 2023-05-31 RX ORDER — POLYETHYLENE GLYCOL 3350 17 G/17G
1 POWDER, FOR SOLUTION ORAL
Status: DISCONTINUED | OUTPATIENT
Start: 2023-05-31 | End: 2023-06-03

## 2023-05-31 RX ORDER — LISINOPRIL 10 MG/1
10 TABLET ORAL
Status: CANCELLED | OUTPATIENT
Start: 2023-06-01

## 2023-05-31 RX ORDER — ONDANSETRON 4 MG/1
4 TABLET, ORALLY DISINTEGRATING ORAL 4 TIMES DAILY PRN
Status: DISCONTINUED | OUTPATIENT
Start: 2023-05-31 | End: 2023-06-17 | Stop reason: HOSPADM

## 2023-05-31 RX ORDER — LOVASTATIN 20 MG/1
20 TABLET ORAL NIGHTLY
Status: CANCELLED | OUTPATIENT
Start: 2023-05-31

## 2023-05-31 RX ORDER — ALUMINA, MAGNESIA, AND SIMETHICONE 2400; 2400; 240 MG/30ML; MG/30ML; MG/30ML
20 SUSPENSION ORAL
Status: DISCONTINUED | OUTPATIENT
Start: 2023-05-31 | End: 2023-06-17 | Stop reason: HOSPADM

## 2023-05-31 RX ORDER — BISACODYL 10 MG
10 SUPPOSITORY, RECTAL RECTAL
Status: DISCONTINUED | OUTPATIENT
Start: 2023-05-31 | End: 2023-06-03

## 2023-05-31 RX ORDER — DULOXETIN HYDROCHLORIDE 30 MG/1
30 CAPSULE, DELAYED RELEASE ORAL DAILY
Status: CANCELLED | OUTPATIENT
Start: 2023-06-01

## 2023-05-31 RX ORDER — CHOLECALCIFEROL (VITAMIN D3) 125 MCG
5 CAPSULE ORAL
Status: DISCONTINUED | OUTPATIENT
Start: 2023-05-31 | End: 2023-05-31

## 2023-05-31 RX ORDER — ACETAMINOPHEN 325 MG/1
650 TABLET ORAL EVERY 4 HOURS PRN
Status: DISCONTINUED | OUTPATIENT
Start: 2023-05-31 | End: 2023-06-17 | Stop reason: HOSPADM

## 2023-05-31 RX ORDER — GABAPENTIN 100 MG/1
100 CAPSULE ORAL NIGHTLY PRN
Status: CANCELLED | OUTPATIENT
Start: 2023-05-31

## 2023-05-31 RX ORDER — DULOXETIN HYDROCHLORIDE 30 MG/1
30 CAPSULE, DELAYED RELEASE ORAL DAILY
Qty: 30 CAPSULE | Status: SHIPPED
Start: 2023-06-01 | End: 2023-06-29

## 2023-05-31 RX ORDER — LANOLIN ALCOHOL/MO/W.PET/CERES
4.5 CREAM (GRAM) TOPICAL
Status: DISCONTINUED | OUTPATIENT
Start: 2023-05-31 | End: 2023-06-17 | Stop reason: HOSPADM

## 2023-05-31 RX ORDER — DULOXETIN HYDROCHLORIDE 30 MG/1
30 CAPSULE, DELAYED RELEASE ORAL DAILY
Status: DISCONTINUED | OUTPATIENT
Start: 2023-06-01 | End: 2023-06-17 | Stop reason: HOSPADM

## 2023-05-31 RX ORDER — OMEPRAZOLE 20 MG/1
20 CAPSULE, DELAYED RELEASE ORAL DAILY
Status: DISCONTINUED | OUTPATIENT
Start: 2023-05-31 | End: 2023-06-05

## 2023-05-31 RX ORDER — ENOXAPARIN SODIUM 100 MG/ML
40 INJECTION SUBCUTANEOUS DAILY
Status: CANCELLED | OUTPATIENT
Start: 2023-05-31

## 2023-05-31 RX ORDER — GABAPENTIN 100 MG/1
100 CAPSULE ORAL NIGHTLY PRN
Status: DISCONTINUED | OUTPATIENT
Start: 2023-05-31 | End: 2023-06-17 | Stop reason: HOSPADM

## 2023-05-31 RX ORDER — LISINOPRIL 5 MG/1
10 TABLET ORAL
Status: DISCONTINUED | OUTPATIENT
Start: 2023-06-01 | End: 2023-06-01

## 2023-05-31 RX ORDER — ECHINACEA PURPUREA EXTRACT 125 MG
2 TABLET ORAL PRN
Status: DISCONTINUED | OUTPATIENT
Start: 2023-05-31 | End: 2023-06-17 | Stop reason: HOSPADM

## 2023-05-31 RX ORDER — AMOXICILLIN 250 MG
2 CAPSULE ORAL 2 TIMES DAILY
Status: DISCONTINUED | OUTPATIENT
Start: 2023-05-31 | End: 2023-06-03

## 2023-05-31 RX ORDER — ENOXAPARIN SODIUM 100 MG/ML
40 INJECTION SUBCUTANEOUS DAILY
Status: DISCONTINUED | OUTPATIENT
Start: 2023-05-31 | End: 2023-06-17 | Stop reason: HOSPADM

## 2023-05-31 RX ORDER — ONDANSETRON 2 MG/ML
4 INJECTION INTRAMUSCULAR; INTRAVENOUS 4 TIMES DAILY PRN
Status: DISCONTINUED | OUTPATIENT
Start: 2023-05-31 | End: 2023-06-17 | Stop reason: HOSPADM

## 2023-05-31 RX ORDER — TRAZODONE HYDROCHLORIDE 50 MG/1
50 TABLET ORAL
Status: DISCONTINUED | OUTPATIENT
Start: 2023-05-31 | End: 2023-06-17 | Stop reason: HOSPADM

## 2023-05-31 RX ORDER — CHOLECALCIFEROL (VITAMIN D3) 125 MCG
5 CAPSULE ORAL
Status: CANCELLED | OUTPATIENT
Start: 2023-05-31

## 2023-05-31 RX ORDER — HYDRALAZINE HYDROCHLORIDE 25 MG/1
25 TABLET, FILM COATED ORAL EVERY 8 HOURS PRN
Status: DISCONTINUED | OUTPATIENT
Start: 2023-05-31 | End: 2023-06-17 | Stop reason: HOSPADM

## 2023-05-31 RX ADMIN — ENOXAPARIN SODIUM 40 MG: 100 INJECTION SUBCUTANEOUS at 17:15

## 2023-05-31 RX ADMIN — SENNOSIDES AND DOCUSATE SODIUM 2 TABLET: 50; 8.6 TABLET ORAL at 20:54

## 2023-05-31 RX ADMIN — MAGNESIUM GLUCONATE 500 MG ORAL TABLET 400 MG: 500 TABLET ORAL at 05:57

## 2023-05-31 RX ADMIN — Medication 4.5 MG: at 20:53

## 2023-05-31 RX ADMIN — LOVASTATIN 20 MG: 20 TABLET ORAL at 20:53

## 2023-05-31 RX ADMIN — DOCUSATE SODIUM 50 MG AND SENNOSIDES 8.6 MG 2 TABLET: 8.6; 5 TABLET, FILM COATED ORAL at 05:53

## 2023-05-31 RX ADMIN — DULOXETINE HYDROCHLORIDE 30 MG: 30 CAPSULE, DELAYED RELEASE ORAL at 05:53

## 2023-05-31 RX ADMIN — OMEPRAZOLE 20 MG: 20 CAPSULE, DELAYED RELEASE ORAL at 13:57

## 2023-05-31 ASSESSMENT — LIFESTYLE VARIABLES
EVER_SMOKED: YES
EVER FELT BAD OR GUILTY ABOUT YOUR DRINKING: NO
HAVE PEOPLE ANNOYED YOU BY CRITICIZING YOUR DRINKING: NO
AVERAGE NUMBER OF DAYS PER WEEK YOU HAVE A DRINK CONTAINING ALCOHOL: 0
CONSUMPTION TOTAL: NEGATIVE
TOTAL SCORE: 0
HOW MANY TIMES IN THE PAST YEAR HAVE YOU HAD 5 OR MORE DRINKS IN A DAY: 0
ON A TYPICAL DAY WHEN YOU DRINK ALCOHOL HOW MANY DRINKS DO YOU HAVE: 0
HAVE YOU EVER FELT YOU SHOULD CUT DOWN ON YOUR DRINKING: NO
ALCOHOL_USE: NO
TOTAL SCORE: 0
TOTAL SCORE: 0
EVER HAD A DRINK FIRST THING IN THE MORNING TO STEADY YOUR NERVES TO GET RID OF A HANGOVER: NO

## 2023-05-31 ASSESSMENT — PATIENT HEALTH QUESTIONNAIRE - PHQ9
SUM OF ALL RESPONSES TO PHQ9 QUESTIONS 1 AND 2: 0
1. LITTLE INTEREST OR PLEASURE IN DOING THINGS: NOT AT ALL
1. LITTLE INTEREST OR PLEASURE IN DOING THINGS: NOT AT ALL
SUM OF ALL RESPONSES TO PHQ9 QUESTIONS 1 AND 2: 0
2. FEELING DOWN, DEPRESSED, IRRITABLE, OR HOPELESS: NOT AT ALL
2. FEELING DOWN, DEPRESSED, IRRITABLE, OR HOPELESS: NOT AT ALL

## 2023-05-31 ASSESSMENT — PAIN DESCRIPTION - PAIN TYPE
TYPE: ACUTE PAIN
TYPE: ACUTE PAIN

## 2023-05-31 ASSESSMENT — FIBROSIS 4 INDEX: FIB4 SCORE: 1.84

## 2023-05-31 NOTE — PROGRESS NOTES
4 Eyes Skin Assessment Completed by Gale GLASS, RN and RUFUS Barron.    Head WDL  Ears WDL  Nose WDL  Mouth WDL  Neck WDL  Breast/Chest Incision healed, bilateral mastectomy  Shoulder Blades WDL  Spine WDL  (R) Arm/Elbow/Hand Bruising  (L) Arm/Elbow/Hand Bruising  Abdomen WDL  Groin WDL  Scrotum/Coccyx/Buttocks Blanching, Pink  (R) Leg WDL  (L) Leg Scab  (R) Heel/Foot/Toe Blanching  (L) Heel/Foot/Toe Blanching          Devices In Places Nasal Cannula, grey foam protectors in place.      Interventions In Place Pillows, float heels    Possible Skin Injury No    Pictures Uploaded Into Epic Yes  Wound Consult Placed N/A  RN Wound Prevention Protocol Ordered No

## 2023-05-31 NOTE — PROGRESS NOTES
Patient admitted to facility at 1150 via wheelchair; accompanied by hospital transport. Patient assisted to room and positioned in bed for comfort and safety, call light within reach. Patient assisted with stowing belongings and oriented to room and facility. Admission assessment performed and documented in computer. Admission paperwork completed, signed copies placed in chart. Will continue to monitor.

## 2023-05-31 NOTE — PROGRESS NOTES
This RN messaged YIN Aranda pt's BP was 87/58. Pt reports no symptoms     2211- Per YIN Aranda as long as MAP stays greater than 65 and pt is asymptomatic it's okay.

## 2023-05-31 NOTE — CARE PLAN
"The patient is Watcher - Medium risk of patient condition declining or worsening    Shift Goals  Clinical Goals: safety    Problem: Knowledge Deficit - Standard  Goal: Patient and family/care givers will demonstrate understanding of plan of care, disease process/condition, diagnostic tests and medications  Note: Patient is a new admit. Educated on safety precautions and fall prevention measures. Covid testing is done, patient is to remain in isolation untill negative test result.       Problem: Fall Risk - Rehab  Goal: Patient will remain free from falls  Note: Anastasia Issa Fall risk Assessment Score: 19    High fall risk Interventions   - Alarming seatbelt  - Bed and strip alarm   - Yellow sign by the door   - Yellow wrist band \"Fall risk\"  - Room near to the nurse station  - Do not leave patient unattended in the bathroom  - Fall risk education provided     "

## 2023-05-31 NOTE — DISCHARGE SUMMARY
Discharge Summary    CHIEF COMPLAINT ON ADMISSION  Chief Complaint   Patient presents with    T-5000 Head Injury     Patient bib ems from home for a GLF, unknown LOC, -thinners. Patient does not recall event, c/o right sided back pain and small hematoma to head.        Reason for Admission  TBI     Admission Date  5/25/2023    CODE STATUS  DNAR/DNI    HPI & HOSPITAL COURSE    Patient is a 83-year-old female with a past medical history of orthostatic hypotension, chronic hypoxic respiratory failure requiring 3 L of oxygen at night and with exertion, depression, breast cancer status post bilateral mastectomy and radiation therapy and overactive bladder who presented to the hospital after experiencing a fall around 6 PM today.     Patient reported that she was getting up from her chair to go to the bathroom after sitting down for a long time and while she was walking to the bathroom she became lightheaded and fell back and hit her head.  Patient is unsure if she lost consciousness during this facility.  Patient later stated that she started to feel lightheaded as she was going to the refrigerator to get some to drink because she was feeling thirsty.  Patient says she walks with a walker and was using a walker at this time.  Patient says she was not using her oxygen during this time.  Of note patient and her daughter note that sometimes her oxygen drops down to the mid 80s at home even when she has oxygen on.  Patient says that all she ate and drank today was 1 bottle of Ensure.     Patient reports feeling fatigued for fevers and having shortness of breath for which she sees pulmonology at [facility].  She reports a 20 pound weight loss over the past year.     No other complaints at this time.      During her hospital stay, patient was very weak -->she was seen by PT OT and they did recommend postacute therapy. she ad evidence of hypotension and orthostatic hypotension for which patient was treated with IV fluids.  For  short period of time patient was treated with midodrine and Florinef.    Once patient was euvolemic we will trialed the patient off of midodrine and Florinef .    Cymbalta was cut in half from 60 mg to 30 mg p.o. daily due to its association with orthostasis.    The patients blood pressure in the upper extremities remained low normal but patient no longer complained of any dizziness or unsteadiness.    Patient's blood pressure in her lower extremities were markedly higher.  I ordered a CT of the abdomen and it showed evidence of aortic stenosis in the lower abdomen.  This issue was likely ongoing for several years as per patient and family.    I made a referral to vascular surgery as I believe patient has PAD.  Patient will benefit from completing work-up including arterial studies of the lower extremities (CT angiogram / ABIs/arterial US).  We will defer this for further work-up to the vascular surgeon in the outpatient setting    I would recommend continuing on checking patient's blood pressures in the upper extremities as this is a better reflection of her brain perfusion.  Orthostatics should be checked periodically as well    Consider saline boluses as necessary for dehydration.  If saline boluses are insufficient,Florinef can be reinitiated as patient does have low cortisol level.    Please follow-up on vascular surgery referral to make sure that patient does not miss her appointment    Therefore, she is discharged in good and stable condition to home with close outpatient follow-up.    The patient met 2-midnight criteria for an inpatient stay at the time of discharge.    Discharge Date  5/31/2023    FOLLOW UP ITEMS POST DISCHARGE      DISCHARGE DIAGNOSES  Principal Problem (Resolved):    Orthostatic hypotension (POA: Yes)  Active Problems:    Acute on chronic respiratory failure with hypoxia (HCC) (POA: Yes)    Moderate mitral stenosis (POA: Yes)    Depression (POA: Yes)    Cortisol deficiency (HCC) (POA:  Yes)    Moderate protein-calorie malnutrition (HCC) (POA: Yes)    Mitral annular calcification (POA: Yes)    PAD (peripheral artery disease) (HCC) (POA: Yes)  Resolved Problems:    Dehydration (POA: Yes)    Fall at home (POA: Yes)    Hypomagnesemia (POA: Yes)    Hypotension (POA: Yes)    Syncope and collapse (POA: Yes)    Overactive bladder (POA: Yes)      FOLLOW UP  Future Appointments   Date Time Provider Department Center   6/26/2023  1:00 PM Gerardo Barnd M.D. [facility] None   6/29/2023  4:20 PM DAYANA Zaman [facility]   7/5/2023  1:15 PM [facility] EXAM 9 ECHO [facility]   7/18/2023  3:15 PM S DALTON BD [facility]   9/5/2023  [facility]   9/5/2023  3:30 PM Leona Osorio M.D. [facility] None     No follow-up provider specified.    MEDICATIONS ON DISCHARGE     Medication List        CHANGE how you take these medications        Instructions   DULoxetine 30 MG Cpep  Start taking on: June 1, 2023  What changed:   additional instructions  Another medication with the same name was removed. Continue taking this medication, and follow the directions you see here.  Commonly known as: CYMBALTA   Take 1 Capsule by mouth every day.  Dose: 30 mg            CONTINUE taking these medications        Instructions   acetaminophen 500 MG Tabs  Commonly known as: TYLENOL   Take 1-2 Tablets by mouth every 6 hours as needed.  Dose: 500-1,000 mg     anastrozole 1 MG Tabs  Commonly known as: Arimidex   Take 1 mg by mouth every day.  Dose: 1 mg     Dexlansoprazole 60 MG Cpdr delayed-release capsule   Take 1 Capsule by mouth every evening.  Dose: 1 Capsule     gabapentin 100 MG Caps  Commonly known as: NEURONTIN   Take 1 Capsule by mouth at bedtime as needed (nerve pain).  Dose: 100 mg     lovastatin 20 MG Tabs  Commonly known as: MEVACOR   Take 20 mg by mouth every evening.  Dose: 20 mg     melatonin 5 mg Tabs   Take 5 mg by mouth at bedtime.  Dose: 5 mg     metFORMIN 500 MG  Tabs  Commonly known as: GLUCOPHAGE   Take 1 tablet by mouth once daily     Myrbetriq 50 MG Tb24  Generic drug: Mirabegron ER   TAKE 1 TABLET EVERY EVENINGFOR FREQUENT URINATION,    URINARY INCONTINENCE,      URINARY URGENCY.              Allergies  No Known Allergies    DIET  No orders of the defined types were placed in this encounter.      ACTIVITY  As tolerated.  Weight bearing as tolerated    CONSULTATIONS  Dr debbi [facility]    PROCEDURES  30/2023 4:09 PM     HISTORY/REASON FOR EXAM:  Differential blood pressure between upper and lower extremities..        TECHNIQUE/EXAM DESCRIPTION:  CT angiogram without and with contrast, with reconstructions.     Initial precontrast thick helical sections were obtained from the top of the aortic arch through the diaphragmatic domes. Following this, thin-section postcontrast helical images were obtained from the lung apices through the iliac crests following the   bolus administration of 90 mL of nonionic Omnipaque 350 contrast.  CT angiographic technique was utilized.     Parasagittal reconstructed images of the aorta were generated utilizing multiplanar volume reformat technique.     Low dose optimization technique was utilized for this CT exam including automated exposure control and adjustment of the mA and/or kV according to patient size.     COMPARISON: None available.     FINDINGS:     Aorta: Pre-contrast images demonstrate no evidence of displaced calcified intima or intramural hematoma. Moderate atherosclerotic plaquing of the aortic arch and descending thoracic aorta and abdominal. There is slightly more pronounced atherosclerotic   plaquing near the bifurcation of the abdominal aorta is causes less than 50% narrowing near the origins of the common iliac arteries..  Aortic arch: Branching pattern is conventional.  Diameter: The ascending and descending aorta are of normal caliber.  Dissection: Absent.  Celiac artery origin: Widely patent.  Superior  mesenteric artery origin: Widely patent.  Renal artery origins: Widely patent.  Inferior mesenteric artery: Patent.  Common iliac arteries: Nonaneurysmal and patent.     Heart: Normal size. No pericardial effusion. Coronary artery origins are conventional.     3D angiographic/MIP images of the vasculature confirm the vascular findings as described above.     Lungs: No opacity or pleural fluid identified.  Liver: Homogeneous enhancement. No masses identified.  Biliary system: No intrahepatic or extrahepatic ductal dilatation. The gallbladder is grossly unremarkable.  Spleen: Unremarkable.  Adrenal glands: Unremarkable.  Pancreas: Unremarkable.  Kidneys: Symmetric enhancement. No solid mass is identified.  Bowel: Unremarkable. No pneumoperitoneum.  Ascites: None.  Lymph nodes: No adenopathy is identified.  Bones: Two chronic sclerotic vertebral body compression fractures in the lower thoracic spine.     IMPRESSION:     1.  No aortic aneurysm or dissection identified.     2.  Moderate atherosclerotic plaquing of the thoracic and abdominal aorta most pronounced at the bifurcation of the abdominal aorta with narrowing near the origins of the common iliac arteries of approximately 50%.     3.  Two moderate chronic compression fractures in the lower thoracic spine.             LABORATORY  Lab Results   Component Value Date    SODIUM 142 05/31/2023    POTASSIUM 3.6 05/31/2023    CHLORIDE 105 05/31/2023    CO2 30 05/31/2023    GLUCOSE 118 (H) 05/31/2023    BUN 16 05/31/2023    CREATININE 0.65 05/31/2023        Lab Results   Component Value Date    WBC 5.8 05/31/2023    HEMOGLOBIN 12.9 05/31/2023    HEMATOCRIT 37.8 05/31/2023    PLATELETCT 248 05/31/2023        Total time of the discharge process exceeds 38  minutes.

## 2023-05-31 NOTE — PREADMISSION SCREENING NOTE
"  Pre-Admission Screening Form    Patient Information:   Name: Rashmi Parra     MRN: [id number]       : 1940      Age: 83 y.o.   Gender: female      Race: White [7]       Marital Status:  [2]  Family Contact: AidaMichael,Mariela Benton        Relationship: Spouse [17]  Daughter [2]  Daughter [2]  Home Phone: [phone]           Cell Phone: [phone]  Advanced Directives: Copy in Chart  Code Status:  DNAR/DNI  Current Attending Provider: Sukumar Foss M.D.  Referring Physician: Dr. Milo Maria      Physiatrist Consult: Dr. Lyn Hdz       Referral Date: 2023  Primary Payor Source:  MEDICARE  Secondary Payor Source:  [facility]    Medical Information:   Date of Admission to Acute Care [place]2023  Room Number: T814/02  Rehabilitation Diagnosis: 0016 - Debility (Non-Cardiac, Non-Pulmonary)  Immunization History   Administered Date(s) Administered    Influenza (IM) Preservative Free - HISTORICAL DATA 10/10/2011    Influenza Seasonal Injectable - Historical Data 10/11/2012, 10/16/2014    Influenza Vaccine Adult HD 10/16/2015, 10/26/2016, 11/10/2017, 10/23/2018, 2019, 10/04/2021    Influenza Vaccine Quad Inj (Pf) 10/12/2015    PFIZER PURPLE CAP SARS-COV-2 VACCINATION (12+) 2021, 2021    Pneumococcal Conjugate Vaccine (Prevnar/PCV-13) 10/08/2015    Pneumococcal polysaccharide vaccine (PPSV-23) 10/09/2012    QUANTIFERON GOLD - HISTORICAL DATA 2022, 2022    Tuberculin Skin Test 2022, 2022    Zoster Vaccine Live (ZVL) (Zostavax) - HISTORICAL DATA 10/14/2012     No Known Allergies  Past Medical History:   Diagnosis Date    Arthritis     osteo, generalized    Bowel habit changes     constipation    Breath shortness     Cataract     removed bilat    COPD (chronic obstructive pulmonary disease) (HCC)     Diabetes (HCC)     \"pre\", oral meds    Heart burn     Heart murmur     Hiatus " hernia syndrome     High cholesterol     Hypertension     Indigestion     Malignant neoplasm of overlapping sites of breast in female, estrogen receptor positive (HCC) 11/6/2019    Psychiatric problem 2015    anxiety    Shortness of breath     Snoring     no sleep study    Urinary incontinence     Wears glasses      Past Surgical History:   Procedure Laterality Date    ORIF, ANKLE Right 6/10/2022    Procedure: ORIF, ANKLE - TRIMALLEOLAR;  Surgeon: Anuj Simpson M.D.;  Location: [facility];  Service: Orthopedics    WA TOTAL HIP ARTHROPLASTY Right 9/30/2021    Procedure: ARTHROPLASTY, HIP, TOTAL, ANTERIOR APPROACH;  Surgeon: Lewis Serrano M.D.;  Location: [facility];  Service: Orthopedics    WIDE EXCISION Right 8/16/2019    Procedure: WIDE EXCISION, LESION- RE-EXCISION FOR EXCISION FOR MARGINS RIGHT CHEST WALL INSTRMUSCULAR;  Surgeon: Andre Shelton M.D.;  Location: [facility];  Service: General    INCISION AND DRAINAGE GENERAL Bilateral 8/16/2019    Procedure: INCISION AND DRAINAGE- OF BILATERAL CHEST SEROMAS WITH DRAIN PLACEMENT;  Surgeon: Andre Shelton M.D.;  Location: [facility];  Service: General    PB MASTECTOMY, MODIFIED RADICAL Right 7/17/2019    Procedure: MASTECTOMY, MODIFIED RADICAL;  Surgeon: Andre Shelton M.D.;  Location: [facility];  Service: General    NODE BIOPSY SENTINEL Right 7/17/2019    Procedure: INTRA OPERATIVE SENTINEL NODE IDENTIFICATION AND BIOPSY;  Surgeon: Andre Shelton M.D.;  Location: [facility];  Service: General    AXILLARY NODE DISSECTION  7/17/2019    Procedure: LYMPHADENECTOMY, AXILLARY;  Surgeon: Andre Shelton M.D.;  Location: [facility];  Service: General    MASTECTOMY Left 7/17/2019    Procedure: TOTAL MASTECTOMY;  Surgeon: Andre Shelton M.D.;  Location: [facility];  Service: General    FLAP CLOSURE Bilateral 7/17/2019    Procedure: WIDE V FLAP;  Surgeon: Andre Shelton M.D.;   Location: [facility];  Service: General    LEFORT COLPOCLESIS  1/21/2019    Procedure: LEFORT COLPOCLESIS;  Surgeon: Minnie Bañuelos M.D.;  Location: [facility];  Service: Labor and Delivery    BLADDER SLING FEMALE  1/21/2019    Procedure: BLADDER SLING FEMALE- TOT;  Surgeon: Minnie Bañuelos M.D.;  Location: [facility];  Service: Labor and Delivery    KNEE ARTHROPLASTY TOTAL Right 11/2/2015    Procedure: KNEE ARTHROPLASTY TOTAL;  Surgeon: Venkatesh Cardoza M.D.;  Location: [facility];  Service:     ABDOMINAL HYSTERECTOMY TOTAL  1994    OTHER ORTHOPEDIC SURGERY  1993    L ankle    GYN SURGERY  1992    abdominal hysterectomy    CHOLECYSTECTOMY  1964    CATARACT EXTRACTION WITH IOL Bilateral        History Leading to Admission, Conditions that Caused the Need for Rehab (CMS):     Sultan SUSAN Mays M.D.  Resident  [facility] Medicine  H&P     Attested Addendum  Date of Service:  5/25/2023 11:40 PM    Attested Addendum      Attestation signed by Hudson Zamorano M.D. at 5/26/2023  5:53 AM     DOS 5/25  I have seen and examined the patient and reviewed the case by the resident physician, Dr Mays. I have also reviewed the patient's history, ROS, physical exam, laboratory and radiology findings. I agree with the general assessments, plans, and discussions as outlined by the resident notes below and in the orders except as noted in the attending comments below. I was physically present/availble for the critical portions of the exam/evaluation as necessary and appropriate. I am actively involved in the patient's care. Please see Resident Physician History and Physical for additional details.     Additional Attending Comments:   Patient coming in with syncope, says she has been had much to eat or drink all day, she does have baseline low blood pressure in the 90s systolic  At baseline.  Interestingly though, she states she syncopized without any symptoms prior to the episode.   No symptoms when she regained consciousness.  She does have a history of lung disease and is on 3 L oxygen at baseline which she is currently at this time.  Echocardiogram in 2021 showed an EF of 75% and moderate aortic regurg.  We will repeat an echocardiogram to evaluate for any worsening valvular abnormality.  She will need close monitoring of her hemodynamics and respiratory status telemetry.  Becomes significantly hypotensive I will order further IV fluid boluses.     [facility] Internal Medicine History & Physical Note     Date of Service  5/26/2023     R Team: CAITLIN   Attending: Hudson Zamorano M.d.  Senior Resident: Dr. Sultan Mays  Contact Number: [phone]     Primary Care Physician  DAYANA Zaman     Consultants  N/A     Code Status  DNAR/DNI     Chief Complaint    Chief Complaint  Patient presents with   T-5000 Head Injury      Patient bib ems from home for a GLF, unknown LOC, -thinners. Patient does not recall event, c/o right sided back pain and small hematoma to head.      History of Presenting Illness (HPI): Patient is a 83-year-old female with a past medical history of orthostatic hypotension, chronic hypoxic respiratory failure requiring 3 L of oxygen at night and with exertion, depression, breast cancer status post bilateral mastectomy and radiation therapy and overactive bladder who presented to the hospital after experiencing a fall around 6 PM today.     Patient reported that she was getting up from her chair to go to the bathroom after sitting down for a long time and while she was walking to the bathroom she became lightheaded and fell back and hit her head.  Patient is unsure if she lost consciousness during this facility.  She doesn't remember the time around her fall well, but patient denies any chest pain or heart palpitations, or worsening shortness of breath before her fall.  Patient says she walks with a walker and was using a walker at this time.  Patient says she was not  using her oxygen during this time.  Of note patient and her daughter note that sometimes her oxygen drops down to the mid 80s at home even when she has oxygen on.  Patient says that all she ate and drank today was 1 bottle of Ensure.     Patient reports feeling fatigued for fevers and having shortness of breath for which she sees pulmonology at [facility].  She reports a 20 pound weight loss over the past year.     No other complaints at this time.     I discussed the plan of care with patient.     Review of Systems  Review of Systems   Constitutional:  Negative for chills and fever.   HENT:  Negative for ear pain, hearing loss and tinnitus.    Eyes:  Negative for blurred vision and double vision.   Respiratory:  Negative for cough, hemoptysis, sputum production and shortness of breath.    Cardiovascular:  Negative for chest pain, palpitations and leg swelling.   Gastrointestinal:  Negative for blood in stool, constipation, diarrhea, melena, nausea and vomiting.   Genitourinary:  Negative for dysuria and urgency.   Musculoskeletal:  Positive for falls.   Skin:  Negative for itching and rash.   Psychiatric/Behavioral:  Negative for depression and suicidal ideas.       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     Surgical History   has a past surgical history that includes other orthopedic surgery (1993); gyn surgery (1992); cholecystectomy (1964); knee arthroplasty total (Right, 11/2/2015); cataract extraction with iol (Bilateral); lefort colpoclesis (1/21/2019); bladder sling female  (1/21/2019); pr mastectomy, modified radical (Right, 7/17/2019); node biopsy sentinel (Right, 7/17/2019); axillary node dissection (7/17/2019); mastectomy (Left, 7/17/2019); flap closure (Bilateral, 7/17/2019); wide excision (Right, 8/16/2019); incision and drainage general (Bilateral, 8/16/2019); abdominal hysterectomy total (1994); pr total hip arthroplasty (Right, 9/30/2021); and orif, ankle (Right, 6/10/2022).      Family History  family history is not on file.   Family history reviewed with patient.      Social History  Tobacco: History of smoking for 25 years 1 pack/day.  Quit roughly 30 years ago.  Alcohol: Denies any history of heavy use last drink on Sunday.  Recreational drugs (illegal or prescription): Denies any previous use.  Employment: N/A  Living Situation: Lives in a house with her   Recent Travel: N/A  Primary Care Provider: Reviewed    Other (stressors, spirituality, exposures): N/A     Allergies  No Known Allergies    Assessment/Plan:  Problem Representation:   I anticipate this patient is appropriate for observation status at this time because evaluation of underlying causes of syncope.     * Syncope and collapse- (present on admission)  Assessment & Plan  Unclear if patient truly had syncope as patient cannot remember.     -Orthostatic hypotension versus hypoxia from not using oxygen versus hypotension.  -Place patient on telemetry, ordered echocardiogram, ordered EKG, B12, vitamin D and repeat TSH/free T4 for further evaluation.  -Recheck orthostatic vitals daytime after fluid hydration.  -Ordered PT/OT for further evaluation.     Overactive bladder  Assessment & Plan  Unable to continue home MYRBETRIQ 50 MG TABLET SR 24 HR due to medication being off formulary.     Depression  Assessment & Plan  Continue home Cymbalta.     Hypotension- (present on admission)  Assessment & Plan  Unclear etiology.  Patient reports losing 20 pounds in the past year.     -Patient asymptomatic during  hospital stay with MAP of 61.  -Ordered echocardiogram for further evaluation.     Dehydration- (present on admission)  Assessment & Plan  Continue cautious fluid replacement in setting of unknown heart function.     -Encourage adequate p.o. intake.  -Currently on LR 80 mL/h     Mild mitral stenosis- (present on admission)  Assessment & Plan  I have moderate mitral stenosis in the past.  Unclear if this has gotten worse recently.     -Ordered echocardiogram for further evaluation     Shortness of breath- (present on admission)  Assessment & Plan  Currently following with and on pulmonology and found to have localized scarring in her lungs along with a reduced DLCO.     -Will need to be assessed for home oxygen needs upon discharge.  -Suspicion for pulmonary hypertension on CT scan.  Ordered echocardiogram for further evaluation.     VTE prophylaxis: enoxaparin ppx     Cosigned by: Hudson Zamorano M.D. at 5/26/2023  5:53 AM        Lyn Hdz D.O.  Physician  Physical Medicine & Rehab  Consults     Signed  Date of Service:  5/27/2023  2:04 PM  Consult Orders  IP Consult For Physiatry [[id number]] ordered by Milo Maria M.D. at 05/27/23 1407                                                    Physical Medicine and Rehabilitation Consultation                                                                            Date of initial consultation: 5/27/2023  Requesting provider: ordered by Milo Maria M.D. at 05/27/23 1407   Consulting provider: Lyn Hdz D.O.  Reason for consultation: assess for acute inpatient rehab appropriateness  LOS: 1 Day(s)     Chief complaint: syncopal episodes      HPI: The patient is a 83 y.o. female with a past medical history of chronic hypoxia on 3 L, orthostatic hypotension, depresion, and breast cancer s/p mastectomy and radiation;  who presented on 5/25/2023  7:35 PM with after a fall/syncopal episode. Per documentation, patient reported atttempt to stand to walk to the  bathroom when she became lightheaded and fell back, hitting her head. Does not know if she LOC. Of note, patient has had 20 lb weight loss over the last year. Upon eval in the ED CT head was negative for acute process, CXR showed chronic underlying lung disease, she has required 2 L o2. Echo obtained on 5/26  shows EF 75%.  BNP up to 385. Hospital course has been notable for orthostatic hypotension. Cortisol level 2.7, adrenal insufficiency work up on going, is on hydrocortisone q 6hrs. Patient has been able to participate with therapy, but she was limited by fatigue and orthostatic hypotension.      Patient seen and examined at bedside. Patient is very fatigued, falls asleep many times during exam. Reports she has help at home for laundry and cleaning, does not have caregiving help for ADLs, is unsure if she can upgrade level of caregiving she pays for.  cannot help. Greatest complaint is fatigue, denies HA, lightheadedness, SOB, CP, abdominal pain, or changes in numbness/tingling/weakness. Reports Left foot has chronically been weaker than R.      Social Hx:  Patient lives with   in a 1 story house with 2 CHIDI,  and wife have paid RN caregiver.  has PD and cannot assist with caregiving.   2 CHIDI  At prior level of function MOD I with 4WW.      Tobacco: former smoker, quit 30 years ago   Alcohol: Denies   Drugs: Denies      THERAPY:  Restrictions: Fall Risk   PT: Functional mobility   5/26 Mod A bed mobility, Min A sit to stand, unable to participate in transfers      OT: ADLs  5/26 Mod A bed mobility, Max A lower body dressing      SLP:   None      ASSESSMENT:  Patient is a 83 y.o. female admitted with syncopal episodes       [facility] Code / Diagnosis to Support: 0016 - Debility (Non-Cardiac, Non-Pulmonary)     Rehabilitation: Impaired ADLs and mobility  Patient is a fair candidate for inpatient rehab based on needs for PT, OT; however need confirmation DC support from paid caregivers (  cannot help due to PD)      Barriers to transfer include: Insurance authorization, TCCs to verify disposition, medical clearance and bed availability      Additional Recommendations:  Syncope   - recent syncope and near syncope episodes   - Echo obtained, EF 75%   - may be secondary to ortho static hypotension   - recommend orthostatic hypotension measurements with therapies   - continue with PT/OT; will need to show improved tolerance with therapies, was too fatigued for gait      Orthostatic hypotension   - history of orthostatic hypotension   - now in IVFs to support HTN   - started on midodrine 10mg TID   - SWAPNIL hose ordered        Adrenal insufficiency   - Cortisol 2.7   -  work up on going,  pending repeat cortisol testing after cortosyn injection     Dispo:  - patient is currently functioning below their level of baseline, recommend post acute rehab  - recommend IRF level therapy with 3hr of therapy 5 days per week if able to show improved tolerance with therapies   - piror to acceptance to IRF, will need confirmation of help for ADLs and mobility from paid care giving services    - TCC to assist with insurance auth and DC support      Medical Complexity:  Syncope   Orthostatic hypotension   Adrenal insufficiency   Impaired mobility and ADLs      DVT PPX: Lovenox      Thank you for allowing us to participate in the care of this patient.      Patient was seen for 81  minutes on unit/floor of which > 50% of time was spent on counseling and coordination of care regarding the above, including prognosis, risk reduction, benefits of treatment, and options for next stage of care.     Lyn Hdz D.O.   Physical Medicine and Rehabilitation          Claudia Torrez M.D.  Physician  Cardiology  Consults     Signed  Date of Service:  5/28/2023 12:47 PM    Cardiology Consult Note:    Claudia Torrez M.D.  Date & Time note created:    5/28/2023   12:47 PM     Referring MD:  Dr. Maria     Patient ID:   Name:              Rashmi Parra Neelam   YOB: 1940  Age:                 83 y.o.  female              MRN:               [id number]                                                             Chief Complaint / Reason for consult:  Syncope.     History of Present Illness:    This is an 83-year-old patient with mitral stenosis, COPD, prior tobacco use, labile hypertension, hyperlipidemia, presented to the hospital with a syncopal episode.  Patient reports that she was in the kitchen getting water to drink.  She suddenly felt a rush of warm sensation across her body.  She then went down to the ground and lost consciousness.  No palpitation no chest pain or shortness of breath reported.  Patient does have a history of possible adrenal insufficiency crisis as well.     I personally interpreted the images of her transthoracic echocardiogram which showed normal LV function, calcified mitral valve with mean gradient of 5.4 mmHg across the mitral valve.     I have personally interpreted EKG today with patient, there is no evidence of acute coronary syndrome, no evidence of prior infarct, normal NH and QT interval, no significant conduction disease. Sinus rhythm.     I personally interpreted all telemetry tracings which do not show evidence of heart block, pauses.     Review of Systems:      Constitutional: Denies fevers, Denies weight changes  Eyes: Denies changes in vision, no eye pain  Ears/Nose/Throat/Mouth: Denies nasal congestion or sore throat   Cardiovascular: no chest pain, no palpitations   Respiratory: no shortness of breath , Denies cough  Gastrointestinal/Hepatic: Denies abdominal pain, nausea, vomiting, diarrhea, constipation or GI bleeding   Genitourinary: Denies dysuria or frequency  Musculoskeletal/Rheum: Denies  joint pain and swelling   Skin: Denies rash  Neurological: Denies headache, confusion, memory loss or focal weakness/parasthesias  Psychiatric: denies mood disorder   Endocrine: Bindu thyroid  problems  Heme/Oncology/Lymph Nodes: Denies enlarged lymph nodes, denies brusing or known bleeding disorder  All other systems were reviewed and are negative (AMA/CMS criteria)               MDM (Assessment and Plan):     Active Hospital Problems    Diagnosis     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  No further invasive cardiac work-up warranted during this hospital stay.  Patient will follow closely with her primary cardiologist for further care in the outpatient setting.  Perhaps an event monitor long-term is reasonable.     Will sign off at this time, please call us with further questions.  Patient will be followed in the outpatient cardiology clinic for further cardiac care.     Thank you for referring this patient to our cardiology service.     Claudia Torrez MD.   [facility].       Co-morbidities:  See above  Potential Risk - Complications: Deep Vein Thrombosis, Malnutrition, Pain, Perceptual Impairment, Pneumonia, Pressure Ulcer, and Infection  Level of Risk: High    Ongoing Medical Management Needed (Medical/Nursing Needs):   Patient Active Problem List    Diagnosis Date Noted    Cortisol deficiency (HCC) 05/27/2023    Orthostatic hypotension 05/27/2023    Moderate protein-calorie malnutrition (HCC) 05/27/2023    Mitral annular calcification 05/27/2023    Depression 05/26/2023    Overactive bladder 05/26/2023    Syncope and collapse 05/25/2023    Muscle weakness  "05/08/2023    Fatigue 05/08/2023    Risk for falls 05/08/2023    Balance problem 01/16/2023    Age-related physical debility 01/16/2023    Heart murmur, systolic 10/26/2022    Dyslipidemia 10/26/2022    Hospital discharge follow-up 09/21/2022    Hypotension 06/12/2022    Hypophosphatemia 06/12/2022    Closed nondisplaced fracture of greater tuberosity of right humerus 06/11/2022    Hypomagnesemia 06/11/2022    Trimalleolar fracture of ankle, closed, right, initial encounter 06/10/2022    Dehydration 06/10/2022    Fall at home 06/10/2022    Type 2 diabetes mellitus with diabetic mononeuropathy, without long-term current use of insulin (HCC) 04/21/2022    Numbness in left leg 04/21/2022    History of right hip replacement 11/11/2021    Moderate episode of recurrent major depressive disorder (HCC) 07/12/2021    Moderate mitral stenosis 07/10/2021    Chronic pain of both knees 06/11/2021    Other emphysema (HCC) 06/11/2021    Burning sensation of throat 04/09/2021    Nocturnal hypoxia 04/09/2021    Ott's esophagus with dysplasia 02/10/2021    Mixed stress and urge urinary incontinence 02/10/2021    Hip pain, right 02/10/2021    Acute on chronic respiratory failure with hypoxia (HCC) 02/10/2021    Other hyperlipidemia 02/10/2021    Primary malignant neoplasm of right female breast (HCC) 11/06/2019    Localized primary osteoarthritis of lower leg 11/02/2015     Andreia Pelayo R.N.  Registered Nurse  Progress Notes     Signed  Date of Service:  5/31/2023  5:36 AM    Rhythm: Sinus Rhythm  Rate: 69-78  Ectopy: -  .16/.08/.38    Current Vital Signs:   Temperature: (S) 36.8 °C (98.2 °F) Pulse: 88 Respiration: 14 Blood Pressure : 90/61 (Rn notified )  Weight: 62.2 kg (137 lb 2 oz) Height: 160 cm (5' 2.99\")  Pulse Oximetry: 94 % O2 (LPM): 1      Completed Laboratory Reports:  Recent Labs     05/29/23  0228 05/30/23  0440 05/31/23  0257   WBC 9.6 5.8 5.8   HEMOGLOBIN 13.1 12.8 12.9   HEMATOCRIT 39.2 38.0 37.8   PLATELETCT " 283 233 248   SODIUM 138 141 142   POTASSIUM 4.0 3.8 3.6   BUN 21 18 16   CREATININE 0.66 0.66 0.65   ALBUMIN 3.3 3.2 3.2   GLUCOSE 155* 99 118*     Additional Labs: Not Applicable    Prior Living Situation:   Housing / Facility: 1 Story House  Steps Into Home: 2  Lives with - Patient's Self Care Capacity: Spouse, Attendant / Paid Care Giver  Equipment Owned: 4-Wheel Walker    Prior Level of Function / Living Situation:   Physical Therapy: Prior Services: Intermittent Physical Support for ADL Per Service, Intermittent Physical Support for ADL Per Family  Housing / Facility: 1 Story House  Steps Into Home: 2  Bathroom Set up: Walk In Shower, Shower Chair, Grab Bars (doesn't use seat)  Equipment Owned: 4-Wheel Walker  Lives with - Patient's Self Care Capacity: Spouse, Attendant / Paid Care Giver  Bed Mobility: Independent  Transfer Status: Independent  Ambulation: Independent  Assistive Devices Used: 4-Wheel Walker  Stairs: Independent  Current Level of Function:   Gait Level Of Assist: Minimal Assist  Assistive Device: Front Wheel Walker  Distance (Feet):  (2-3 steps)  Deviation: Shuffled Gait  Weight Bearing Status: no restrictions  Skilled Intervention: Verbal Cuing, Tactile Cuing, Sequencing, Facilitation  Supine to Sit: Supervised  Sit to Supine:  (NT in chair post)  Scooting: Supervised  Skilled Intervention: Verbal Cuing, Tactile Cuing, Facilitation  Comments: w/ use of side rail  Sit to Stand: Minimal Assist  Bed, Chair, Wheelchair Transfer: Minimal Assist  Toilet Transfers: Unable to Participate  Skilled Intervention: Verbal Cuing, Tactile Cuing, Facilitation  Sitting in Chair: 10+ min (up post)  Sitting Edge of Bed: 5 min  Standing: 10-15 min total  Occupational Therapy:   Self Feeding: Independent  Grooming / Hygiene: Independent  Bathing: Independent  Dressing: Independent  Toileting: Independent  Medication Management: Independent  Laundry: Requires Assist  Kitchen Mobility: Requires Assist  Finances:  Independent  Home Management: Requires Assist  Shopping: Dependent  Prior Level Of Mobility: Independent With Device in Home  Prior Services: Intermittent Physical Support for ADL Per Service, Intermittent Physical Support for ADL Per Family  Housing / Facility: 1 Story House  Current Level of Function:   Eating: Minimal Assist (cutting food; unclear if able to sequence use of utensil to feed self)  Upper Body Dressing: Minimal Assist  Lower Body Dressing: Maximal Assist  Toileting: Maximal Assist (incont of BM)  Skilled Intervention: Verbal Cuing, Tactile Cuing, Facilitation  Speech Language Pathology:      Rehabilitation Prognosis/Potential: Good  Estimated Length of Stay: 7-10 days    Nursing:      Incontinent    Scope/Intensity of Services Recommended:  Physical Therapy: 1.5 hr / day  5 days / week. Therapeutic Interventions Required: Maximize Endurance, Mobility, Strength, and Safety  Occupational Therapy: 1.5 hr / day 5 days / week. Therapeutic Interventions Required: Maximize Self Care, ADLs, IADLs, and Energy Conservation  Rehabilitation Nursin/7. Therapeutic Interventions Required: Monitor Pain, Skin, Vital Signs, Intake and Output, Labs, Safety, Family Training, and DVT Prophylaxis; Bowel and bladder regimen; Infection control; ADL's.  Rehabilitation Physician: 3 - 5 days / week. Therapeutic Interventions Required: Medical Management  Respiratory Care: Consult. Therapeutic Interventions Required: Pulmonary Toileting, O2 Weaning, and Respiratory care per protocol  Dietician: Consult. Therapeutic Interventions Required: Nutritional evaluation with recommendations to promote optimal health and healing.     She requires 24-hour rehabilitation nursing to manage bowel and bladder function, skin care, nutrition and fluid intake, pulmonary hygiene, pain control, safety, medication management, and patient/family goals. In addition, rehabilitation nursing will reiterate and reinforce therapy skills and  equipment use, including ADLs, as well as provide education to the patient and family. Rashmi Parra is willing to participate in and is able to tolerate the proposed plan of care.    Rehabilitation Goals and Plan (Expected frequency & duration of treatment in the IRF):   Return to the Community, Modified Independent Level of Care, Outpatient Support, and Family Able to Provide 24/7 Assistance  Anticipated Date of Rehabilitation Admission: 5/31/2023  Patient/Family oriented IRF level of care/facility/plan: Yes  Patient/Family willing to participate in IRF care/facility/plan: Yes  Patient able to tolerate IRF level of care proposed: Yes  Patient has potential to benefit IRF level of care proposed: Yes  Comments: Not Applicable    Special Needs or Precautions - Medical Necessity:  Safety Concerns/Precautions:  Fall Risk / High Risk for Falls, Balance, and Bed / Chair Alarm  Pain Management  Requires Oxygen    Diet:   DIET ORDERS (From admission to next 24h)       Start     Ordered    05/26/23 0003  Diet Order Diet: Consistent CHO (Diabetic)  ALL MEALS        Question:  Diet:  Answer:  Consistent CHO (Diabetic)    05/26/23 0006                    Anticipated Discharge Destination / Patient/Family Goal:  Destination: Home with Assistance Support System: Spouse, Family , and Attendant  Anticipated home health services: OT, PT, Nursing, Social Work, and Aide  Previously used HH service/ provider: Not Applicable  Anticipated DME Needs:  To be determined  Outpatient Services:  To be determined  Alternative resources to address additional identified needs:   Future Appointments   Date Time Provider Department Center   6/26/2023  1:00 PM Gerardo Brand M.D. [facility] None   6/29/2023  4:20 PM DAYANA Zaman [facility] DEBORAH Pooel   7/5/2023  1:15 PM [facility] 9 [facility]   7/18/2023  3:15 PM ALBERTO HOFFMAN BD 1 [facility]   9/5/2023  2:30 PM PFT-RM2 [facility] None   9/5/2023  3:30 PM Leona Osorio  M.D. [facility] None       Pre-Screen Completed: 5/31/2023 9:55 AM Katelyn Andrews R.N.

## 2023-05-31 NOTE — H&P
Physical Medicine & Rehabilitation  History and Physical (H&P)  &     Post Admission Physician Evaluation (DURAN)       Date of Admission: 5/31/2023  Date of Service: 5/31/2023   Rashmi Parra  [id number]    [facility] Code to Support Admission: 0016 - Debility (Non-Cardiac, Non-Pulmonary)  Etiologic Diagnosis: Orthostatic hypotension; low cortisol    _______________________________________________    Chief Complaint: Decreased mobility, weakness    History of Present Illness:  Adapted from the PM&R Consult by Dr. Hdz:   : The patient is a 83 y.o. female with a past medical history of chronic hypoxia on 3 L, orthostatic hypotension, depresion, and breast cancer s/p mastectomy and radiation;  who presented on 5/25/2023  7:35 PM with after a fall/syncopal episode. Per documentation, patient reported atttempt to stand to walk to the bathroom when she became lightheaded and fell back, hitting her head. Does not know if she LOC. Of note, patient has had 20 lb weight loss over the last year. Upon eval in the ED CT head was negative for acute process, CXR showed chronic underlying lung disease, she has required 2 L o2. Echo obtained on 5/26  shows EF 75%.  BNP up to 385. Hospital course has been notable for orthostatic hypotension. Cortisol level 2.7, adrenal insufficiency work-up negative and was taken off of steroid support. Patient has been able to participate with therapy, but she was limited by fatigue and orthostatic hypotension. Patient has been weaned from Midodrine and Florinef. She was found to have significant aortic stenosis in the lower abdomen on CT scan so outpatient referral to Vascular was made.     Patient tolerated transfer to [facility]. She reports she is very fatigued. She denies NVD. Denies dizziness. Denies SOB. No recent fever or chills.     Review of Systems:     Comprehensive 14 point ROS was reviewed and all were negative except as noted elsewhere in this document.     Past Medical History:  Past Medical  "History:   Diagnosis Date    Arthritis     osteo, generalized    Bowel habit changes     constipation    Breath shortness     Cataract     removed bilat    COPD (chronic obstructive pulmonary disease) (HCC)     Diabetes (HCC)     \"pre\", oral meds    Heart burn     Heart murmur     Hiatus hernia syndrome     High cholesterol     Hypertension     Indigestion     Malignant neoplasm of overlapping sites of breast in female, estrogen receptor positive (HCC) 11/6/2019    Psychiatric problem 2015    anxiety    Shortness of breath     Snoring     no sleep study    Urinary incontinence     Wears glasses        Past Surgical History:  Past Surgical History:   Procedure Laterality Date    ORIF, ANKLE Right 6/10/2022    Procedure: ORIF, ANKLE - TRIMALLEOLAR;  Surgeon: Anuj Simpson M.D.;  Location: [facility];  Service: Orthopedics    DE TOTAL HIP ARTHROPLASTY Right 9/30/2021    Procedure: ARTHROPLASTY, HIP, TOTAL, ANTERIOR APPROACH;  Surgeon: Lewis Serrano M.D.;  Location: [facility];  Service: Orthopedics    WIDE EXCISION Right 8/16/2019    Procedure: WIDE EXCISION, LESION- RE-EXCISION FOR EXCISION FOR MARGINS RIGHT CHEST WALL INSTRMUSCULAR;  Surgeon: Andre Shelton M.D.;  Location: [facility];  Service: General    INCISION AND DRAINAGE GENERAL Bilateral 8/16/2019    Procedure: INCISION AND DRAINAGE- OF BILATERAL CHEST SEROMAS WITH DRAIN PLACEMENT;  Surgeon: Andre Shelton M.D.;  Location: [facility];  Service: General    PB MASTECTOMY, MODIFIED RADICAL Right 7/17/2019    Procedure: MASTECTOMY, MODIFIED RADICAL;  Surgeon: Andre Shelton M.D.;  Location: [facility];  Service: General    NODE BIOPSY SENTINEL Right 7/17/2019    Procedure: INTRA OPERATIVE SENTINEL NODE IDENTIFICATION AND BIOPSY;  Surgeon: Andre Shelton M.D.;  Location: [facility];  Service: General    AXILLARY NODE DISSECTION  7/17/2019    Procedure: LYMPHADENECTOMY, AXILLARY;  Surgeon: " Andre Shelton M.D.;  Location: [facility];  Service: General    MASTECTOMY Left 7/17/2019    Procedure: TOTAL MASTECTOMY;  Surgeon: Andre Shelton M.D.;  Location: [facility];  Service: General    FLAP CLOSURE Bilateral 7/17/2019    Procedure: WIDE V FLAP;  Surgeon: Andre Shelton M.D.;  Location: [facility];  Service: General    LEFORT COLPOCLESIS  1/21/2019    Procedure: LEFORT COLPOCLESIS;  Surgeon: Minnie Bañuelos M.D.;  Location: [facility];  Service: Labor and Delivery    BLADDER SLING FEMALE  1/21/2019    Procedure: BLADDER SLING FEMALE- TOT;  Surgeon: Minnie Bañuelos M.D.;  Location: [facility];  Service: Labor and Delivery    KNEE ARTHROPLASTY TOTAL Right 11/2/2015    Procedure: KNEE ARTHROPLASTY TOTAL;  Surgeon: Venkatesh Cardoza M.D.;  Location: [facility];  Service:     ABDOMINAL HYSTERECTOMY TOTAL  1994    OTHER ORTHOPEDIC SURGERY  1993    L ankle    GYN SURGERY  1992    abdominal hysterectomy    CHOLECYSTECTOMY  1964    CATARACT EXTRACTION WITH IOL Bilateral        Family History:  Family History   Problem Relation Age of Onset    Heart Disease Neg Hx        Medications:  Current Facility-Administered Medications   Medication Dose    Respiratory Therapy Consult      Pharmacy Consult Request ...Pain Management Review 1 Each  1 Each    hydrALAZINE (APRESOLINE) tablet 25 mg  25 mg    acetaminophen (Tylenol) tablet 650 mg  650 mg    senna-docusate (PERICOLACE or SENOKOT S) 8.6-50 MG per tablet 2 Tablet  2 Tablet    And    polyethylene glycol/lytes (MIRALAX) PACKET 1 Packet  1 Packet    And    magnesium hydroxide (MILK OF MAGNESIA) suspension 30 mL  30 mL    And    bisacodyl (DULCOLAX) suppository 10 mg  10 mg    omeprazole (PRILOSEC) capsule 20 mg  20 mg    mag hydrox-al hydrox-simeth (MAALOX PLUS ES or MYLANTA DS) suspension 20 mL  20 mL    ondansetron (ZOFRAN ODT) dispertab 4 mg  4 mg    Or    ondansetron (ZOFRAN)  syringe/vial injection 4 mg  4 mg    traZODone (DESYREL) tablet 50 mg  50 mg    sodium chloride (OCEAN) 0.65 % nasal spray 2 Spray  2 Spray    traMADol (Ultram) 50 MG tablet 50 mg  50 mg    [START ON 6/1/2023] DULoxetine (CYMBALTA) capsule 30 mg  30 mg    enoxaparin (Lovenox) inj 40 mg  40 mg    gabapentin (NEURONTIN) capsule 100 mg  100 mg    [START ON 6/1/2023] lisinopril (PRINIVIL) tablet 10 mg  10 mg    lovastatin (MEVACOR) tablet 20 mg  20 mg    melatonin tablet 4.5 mg  4.5 mg       Allergies:  Patient has no known allergies.    Psychosocial History:  Housing / Facility: 1 Story House  Steps Into Home: 2  Lives with - Patient's Self Care Capacity: Spouse, Attendant / Paid Care Giver  Equipment Owned: 4-Wheel Walker     Prior Level of Function / Living Situation:   Physical Therapy: Prior Services: Intermittent Physical Support for ADL Per Service, Intermittent Physical Support for ADL Per Family  Housing / Facility: 1 Story House  Steps Into Home: 2  Bathroom Set up: Walk In Shower, Shower Chair, Grab Bars (doesn't use seat)  Equipment Owned: 4-Wheel Walker  Lives with - Patient's Self Care Capacity: Spouse, Attendant / Paid Care Giver  Bed Mobility: Independent  Transfer Status: Independent  Ambulation: Independent  Assistive Devices Used: 4-Wheel Walker  Stairs: Independent  Current Level of Function:   Gait Level Of Assist: Minimal Assist  Assistive Device: Front Wheel Walker  Distance (Feet):  (2-3 steps)  Deviation: Shuffled Gait  Weight Bearing Status: no restrictions  Skilled Intervention: Verbal Cuing, Tactile Cuing, Sequencing, Facilitation  Supine to Sit: Supervised  Sit to Supine:  (NT in chair post)  Scooting: Supervised  Skilled Intervention: Verbal Cuing, Tactile Cuing, Facilitation  Comments: w/ use of side rail  Sit to Stand: Minimal Assist  Bed, Chair, Wheelchair Transfer: Minimal Assist  Toilet Transfers: Unable to Participate  Skilled Intervention: Verbal Cuing, Tactile Cuing,  "Facilitation  Sitting in Chair: 10+ min (up post)  Sitting Edge of Bed: 5 min  Standing: 10-15 min total  Occupational Therapy:   Self Feeding: Independent  Grooming / Hygiene: Independent  Bathing: Independent  Dressing: Independent  Toileting: Independent  Medication Management: Independent  Laundry: Requires Assist  Kitchen Mobility: Requires Assist  Finances: Independent  Home Management: Requires Assist  Shopping: Dependent  Prior Level Of Mobility: Independent With Device in Home  Prior Services: Intermittent Physical Support for ADL Per Service, Intermittent Physical Support for ADL Per Family  Housing / Facility: [address]  Current Level of Function:   Eating: Minimal Assist (cutting food; unclear if able to sequence use of utensil to feed self)  Upper Body Dressing: Minimal Assist  Lower Body Dressing: Maximal Assist  Toileting: Maximal Assist (incont of BM)  Skilled Intervention: Verbal Cuing, Tactile Cuing, Facilitation    CURRENT LEVEL OF FUNCTION:   Same as level of function prior to admission to [facility]    Physical Examination:     VITAL SIGNS:   height is 1.6 m (5' 3\") and weight is 59.9 kg (132 lb). Her axillary temperature is 37.2 °C (98.9 °F). Her blood pressure is 82/52 (abnormal) and her pulse is 60. Her respiration is 16 and oxygen saturation is 94%.   GENERAL: No apparent distress, thin  HEENT: Normocephalic/atraumatic, EOMI, and PERRL  CARDIAC: Regular rate and rhythm, normal S1, S2   LUNGS: Clear to auscultation   ABDOMINAL: bowel sounds present, soft, and nontender    EXTREMITIES: no contractures, spasticity, or edema.  NEURO:  Mental status: answers questions appropriately follows commands  Speech: fluent, no aphasia or dysarthria  CRANIAL NERVES: Face with mild left droop  Motor:   5/5 BUE  4+/5 BLE  Sensory: intact to light touch through out    Radiology:             CTA  5/30/23  IMPRESSION:     1.  No aortic aneurysm or dissection identified.     2.  Moderate " atherosclerotic plaquing of the thoracic and abdominal aorta most pronounced at the bifurcation of the abdominal aorta with narrowing near the origins of the common iliac arteries of approximately 50%.     3.  Two moderate chronic compression fractures in the lower thoracic spine.       Laboratory Values:  Recent Labs     05/29/23 0228 05/30/23 0440 05/31/23 0257   SODIUM 138 141 142   POTASSIUM 4.0 3.8 3.6   CHLORIDE 104 106 105   CO2 24 27 30   GLUCOSE 155* 99 118*   BUN 21 18 16   CREATININE 0.66 0.66 0.65   CALCIUM 8.1* 8.4* 8.7     Recent Labs     05/29/23 0228 05/30/23 0440 05/31/23 0257   WBC 9.6 5.8 5.8   RBC 4.26 4.13* 4.16*   HEMOGLOBIN 13.1 12.8 12.9   HEMATOCRIT 39.2 38.0 37.8   MCV 92.0 92.0 90.9   MCH 30.8 31.0 31.0   MCHC 33.4 33.7 34.1   RDW 46.1 46.7 44.4   PLATELETCT 283 233 248   MPV 10.3 9.9 10.4           Primary Rehabilitation Diagnosis:    This patient is a 83 y.o. female admitted for acute inpatient rehabilitation with   Orthostatic hypotension.    Impairments:   ADLs/IADLs  Mobility    Secondary Diagnosis/Medical Co-morbidities Affecting Function  HTN  HLD  Hypocalcemia  Low Cortisol  PAD  Depression    Relevant Changes Since Preadmission Evaluation:    Status unchanged    The patient's rehabilitation potential is Good  The patient's medical prognosis is fair    Rehabilitation Plan:   Discussion and Recommendations:   1. The patient requires an acute inpatient rehabilitation program with a coordinated program of care at an intensity and frequency not available at a lower level of care. This recommendation is substantiated by the patient's medical physicians who recommend that the patient's intervention and assessment of medical issues needs to be done at an acute level of care for patient's safety and maximum outcome.   2. A coordinated program of care will be supplied by an interdisciplinary team of physical therapy, occupational therapy, rehab physician, rehab nursing, and, if  needed, speech therapy and rehab psychology. Rehab team presents a patient-specific rehabilitation and education program concentrating on prevention of future problems related to accessibility, mobility, skin, bowel, bladder, sexuality, and psychosocial and medical/surgical problems.   3. Need for Rehabilitation Physician: The rehab physician will be evaluating the patient on a multi-weekly basis to help coordinate the program of care. The rehab physician communicates between medical physicians, therapists, and nurses to maximize the patient's potential outcome. Specific areas in which the rehab physician will be providing daily assessment include the following:   A. Assessing the patient's heart rate and blood pressure response (vitals monitoring) to activity and making adjustments in medications or conservative measures as needed.   B. The rehab physician will be assessing the frequency at which the program can be increased to allow the patient to reach optimal functional outcome.   C. The rehab physician will also provide assessments in daily skin care, especially in light of patient's impairments in mobility.   D. The rehab physician will provide special expertise in understanding how to work with functional impairment and recommend appropriate interventions, compensatory techniques, and education that will facilitate the patient's outcome.   4. Rehab R.N.   The rehab RN will be working with patient to carry over in room mobility and activities of daily living when the patient is not in 3 hours of skilled therapy. Rehab nursing will be working in conjunction with rehab physician to address all the medical issues above and continue to assess laboratory work and discuss abnormalities with the treating physicians, assess vitals, and response to activity, and discuss and report abnormalities with the rehab physician. Rehab RN will also continue daily skin care, supervise bladder/bowel program, instruct in medication  administration, and ensure patient safety.   5. Rehab Therapy: Therapies to treat at intensity and frequency of (may change after completion of evaluation by all therapeutic disciplines):       PT:  Physical therapy to address mobility, transfer, gait training and evaluation for adaptive equipment needs 1 and 1/2 hour/day at least 5 days/week for the duration of the ELOS (see below)       OT:  Occupational therapy to address ADLs, self-care, home management training, functional mobility/transfers and assistive device evaluation, and community re-integration 1 and 1/2 hour/day at least 5 days/week for the duration of the ELOS (see below).     Medical management / Rehabilitation Issues/ Adverse Potential as part of rehabilitation plan     Rehabilitation Issues/Adverse Potential  1.  Debility - Patient with decreased mobility and weakness 2/2 orthostatic hypotension, low cortisol and multiple falls. Patient demonstrates functional deficits in strength, balance, coordination, and ADL's. Patient is admitted to [facility] for comprehensive rehabilitation therapy as described below.   Rehabilitation nursing monitors bowel and bladder control, educates on medication administration, co-morbidities and monitors patient safety.    2.  Neurostimulants: None at this time but continue to assess daily for need to initiate should status change.    3.  DVT prophylaxis:  Patient is on Lovenox for anticoagulation upon transfer. Encourage OOB. Monitor daily for signs and symptoms of DVT including but not limited to swelling and pain to prevent the development of DVT that may interfere with therapies.    4.  GI prophylaxis:  On prilosec to prevent gastritis/dyspepsia which may interfere with therapies.    5.  Pain: No issues with pain currently / Controlled with APAP/Tramadol    6.  Nutrition/Dysphagia: Dietician monitors nutrient intake, recommend supplements prn and provide nutrition education to pt/family to  promote optimal nutrition for wound healing/recovery.     7.  Bladder/bowel:  Start bowel and bladder program as described below, to prevent constipation, urinary retention (which may lead to UTI), and urinary incontinence (which will impact upon pt's functional independence).   - Post void bladder scans, I&O cath for PVRs >400  - up to commode after meal     8.  Skin/dermal ulcer prophylaxis: Monitor for new skin conditions with q.2 h. turns as required to prevent the development of skin breakdown.     9.  Cognition/Behavior: As needed psychologist provides adjustment counseling to illness and psychosocial barriers that may be potential barriers to rehabilitation.     10. Respiratory therapy: RT performs O2 management prn, breathing retraining, pulmonary hygiene and bronchospasm management prn to optimize participation in therapies.     Medical Co-Morbidities/Adverse Potential Affecting Function:   Debility - Patient with decreased mobility and weakness 2/2 orthostatic hypotension, low cortisol and multiple falls  -PT and OT for mobility and ADLs. Per guidelines, 15 hours per week between PT, OT and/or SLP.  -Follow-up PCP    HTN - Patient on Lisinopril. Was previously having orthostatic hypotension requiring Midodrine and Florinef. Will monitor    HLD - Patient on Lovastatin 20 mg QHS    Hypocalcemia - Check AM CMP    Low Cortisol - Negative ACTH test. Will monitor    PAD - Aortic stenosis on CTA of abdomen. Follow-up Vascular    Depression - Patient on Cymbalta 30 mg daily    Pain - APAP/Tramadol PRN    Skin - Patient at risk for skin breakdown due to debility in areas including sacrum, achilles, elbows and head in addition to other sites. Nursing to assess skin daily.     GI Ppx - Patient on Prilosec for GERD prophylaxis. Patient on Senna-docusate for constipation prophylaxis.   Last BM: 05/31/23 (per report)    DVT Ppx - Patient Lovenox on transfer.    I personally performed a complete drug regimen review and  no potential clinically significant medication issues were identified.     Goals/Expected Level of Function Based on Current Medical and Functional Status:  (may change based on patient's medical status and rate of impairment recovery)  Transfers:   Modified Independent  Mobility/Gait:   Modified Independent  ADL's:   Modified Independent    DISPOSITION: Discharge to pre-morbid independent living setting with the supportive care of patient's family.    ELOS: 10-14 days  ____________________________________    T. Toño Will MD/PhD  [facility] - Physical Medicine & Rehabilitation   [facility] - Brain Injury Medicine   ____________________________________    Pt was seen today for 75 min, and entire time spent in face-to-face contact was >50% in counseling and coordination of care as detailed in A/P above.

## 2023-05-31 NOTE — DISCHARGE INSTRUCTIONS
Discharge Instructions    Discharged to other by medical transportation with escort. Discharged via wheelchair, hospital escort: Yes.  Special equipment needed: Oxygen    Be sure to schedule a follow-up appointment with your primary care doctor or any specialists as instructed.     Discharge Plan:   Diet Plan: Discussed  Activity Level: Discussed  Confirmed Follow up Appointment: Appointment Scheduled  Confirmed Symptoms Management: Discussed  Medication Reconciliation Updated: Yes    I understand that a diet low in cholesterol, fat, and sodium is recommended for good health. Unless I have been given specific instructions below for another diet, I accept this instruction as my diet prescription.   Other diet: Diabetic     Special Instructions: None    -Is this patient being discharged with medication to prevent blood clots?  No    Is patient discharged on Warfarin / Coumadin?   No     Orthostatic Hypotension  Blood pressure is a measurement of how strongly, or weakly, your blood is pressing against the walls of your arteries. Orthostatic hypotension is a sudden drop in blood pressure that happens when you quickly change positions, such as when you get up from sitting or lying down.  Arteries are blood vessels that carry blood from your heart throughout your body. When blood pressure is too low, you may not get enough blood to your brain or to the rest of your organs. This can cause weakness, light-headedness, rapid heartbeat, and fainting. This can last for just a few seconds or for up to a few minutes. Orthostatic hypotension is usually not a serious problem. However, if it happens frequently or gets worse, it may be a sign of something more serious.  What are the causes?  This condition may be caused by:  Sudden changes in posture, such as standing up quickly after you have been sitting or lying down.  Blood loss.  Loss of body fluids (dehydration).  Heart problems.  Hormone (endocrine)  "problems.  Pregnancy.  Severe infection.  Lack of certain nutrients.  Severe allergic reactions (anaphylaxis).  Certain medicines, such as blood pressure medicine or medicines that make the body lose excess fluids (diuretics). Sometimes, this condition can be caused by not taking medicine as directed, such as taking too much of a certain medicine.  What increases the risk?  The following factors may make you more likely to develop this condition:  Age. Risk increases as you get older.  Conditions that affect the heart or the central nervous system.  Taking certain medicines, such as blood pressure medicine or diuretics.  Being pregnant.  What are the signs or symptoms?  Symptoms of this condition may include:  Weakness.  Light-headedness.  Dizziness.  Blurred vision.  Fatigue.  Rapid heartbeat.  Fainting, in severe cases.  How is this diagnosed?  This condition is diagnosed based on:  Your medical history.  Your symptoms.  Your blood pressure measurement. Your health care provider will check your blood pressure when you are:  Lying down.  Sitting.  Standing.  A blood pressure reading is recorded as two numbers, such as \"120 over 80\" (or 120/80). The first (\"top\") number is called the systolic pressure. It is a measure of the pressure in your arteries as your heart beats. The second (\"bottom\") number is called the diastolic pressure. It is a measure of the pressure in your arteries when your heart relaxes between beats. Blood pressure is measured in a unit called mm Hg. Healthy blood pressure for most adults is 120/80. If your blood pressure is below 90/60, you may be diagnosed with hypotension.  Other information or tests that may be used to diagnose orthostatic hypotension include:  Your other vital signs, such as your heart rate and temperature.  Blood tests.  Tilt table test. For this test, you will be safely secured to a table that moves you from a lying position to an upright position. Your heart rhythm and " blood pressure will be monitored during the test.  How is this treated?  This condition may be treated by:  Changing your diet. This may involve eating more salt (sodium) or drinking more water.  Taking medicines to raise your blood pressure.  Changing the dosage of certain medicines you are taking that might be lowering your blood pressure.  Wearing compression stockings. These stockings help to prevent blood clots and reduce swelling in your legs.  In some cases, you may need to go to the hospital for:  Fluid replacement. This means you will receive fluids through an IV.  Blood replacement. This means you will receive donated blood through an IV (transfusion).  Treating an infection or heart problems, if this applies.  Monitoring. You may need to be monitored while medicines that you are taking wear off.  Follow these instructions at home:  Eating and drinking    Drink enough fluid to keep your urine pale yellow.  Eat a healthy diet, and follow instructions from your health care provider about eating or drinking restrictions. A healthy diet includes:  Fresh fruits and vegetables.  Whole grains.  Lean meats.  Low-fat dairy products.  Eat extra salt only as directed. Do not add extra salt to your diet unless your health care provider told you to do that.  Eat frequent, small meals.  Avoid standing up suddenly after eating.  Medicines  Take over-the-counter and prescription medicines only as told by your health care provider.  Follow instructions from your health care provider about changing the dosage of your current medicines, if this applies.  Do not stop or adjust any of your medicines on your own.  General instructions    Wear compression stockings as told by your health care provider.  Get up slowly from lying down or sitting positions. This gives your blood pressure a chance to adjust.  Avoid hot showers and excessive heat as directed by your health care provider.  Return to your normal activities as told by  your health care provider. Ask your health care provider what activities are safe for you.  Do not use any products that contain nicotine or tobacco, such as cigarettes, e-cigarettes, and chewing tobacco. If you need help quitting, ask your health care provider.  Keep all follow-up visits as told by your health care provider. This is important.  Contact a health care provider if you:  Vomit.  Have diarrhea.  Have a fever for more than 2-3 days.  Feel more thirsty than usual.  Feel weak and tired.  Get help right away if you:  Have chest pain.  Have a fast or irregular heartbeat.  Develop numbness in any part of your body.  Cannot move your arms or your legs.  Have trouble speaking.  Become sweaty or feel light-headed.  Faint.  Feel short of breath.  Have trouble staying awake.  Feel confused.  Summary  Orthostatic hypotension is a sudden drop in blood pressure that happens when you quickly change positions.  Orthostatic hypotension is usually not a serious problem.  It is diagnosed by having your blood pressure taken lying down, sitting, and then standing.  It may be treated by changing your diet or adjusting your medicines.  This information is not intended to replace advice given to you by your health care provider. Make sure you discuss any questions you have with your health care provider.  Document Released: 12/08/2003 Document Revised: 06/13/2019 Document Reviewed: 06/13/2019  [employer] Patient Education © 2020 [employer]    Syncope  Syncope is when you pass out (faint) for a short time. It is caused by a sudden decrease in blood flow to the brain. Signs that you may be about to pass out include:  Feeling dizzy or light-headed.  Feeling sick to your stomach (nauseous).  Seeing all white or all black.  Having cold, clammy skin.  If you pass out, get help right away. Call your local emergency services (911 in the [place] Do not drive yourself to the hospital.  Follow these instructions at home:  Watch for any  changes in your symptoms. Take these actions to stay safe and help with your symptoms:  Lifestyle  Do not drive, use machinery, or play sports until your doctor says it is okay.  Do not drink alcohol.  Do not use any products that contain nicotine or tobacco, such as cigarettes and e-cigarettes. If you need help quitting, ask your doctor.  Drink enough fluid to keep your pee (urine) pale yellow.  General instructions  Take over-the-counter and prescription medicines only as told by your doctor.  If you are taking blood pressure or heart medicine, sit up and stand up slowly. Spend a few minutes getting ready to sit and then stand. This can help you feel less dizzy.  Have someone stay with you until you feel stable.  If you start to feel like you might pass out, lie down right away and raise (elevate) your feet above the level of your heart. Breathe deeply and steadily. Wait until all of the symptoms are gone.  Keep all follow-up visits as told by your doctor. This is important.  Get help right away if:  You have a very bad headache.  You pass out once or more than once.  You have pain in your chest, belly, or back.  You have a very fast or uneven heartbeat (palpitations).  It hurts to breathe.  You are bleeding from your mouth or your bottom (rectum).  You have black or tarry poop (stool).  You have jerky movements that you cannot control (seizure).  You are confused.  You have trouble walking.  You are very weak.  You have vision problems.  These symptoms may be an emergency. Do not wait to see if the symptoms will go away. Get medical help right away. Call your local emergency services (911 in the [place] Do not drive yourself to the hospital.  Summary  Syncope is when you pass out (faint) for a short time. It is caused by a sudden decrease in blood flow to the brain.  Signs that you may be about to faint include feeling dizzy, light-headed, or sick to your stomach, seeing all white or all black, or having cold,  clammy skin.  If you start to feel like you might pass out, lie down right away and raise (elevate) your feet above the level of your heart. Breathe deeply and steadily. Wait until all of the symptoms are gone.  This information is not intended to replace advice given to you by your health care provider. Make sure you discuss any questions you have with your health care provider.  Document Released: 06/05/2009 Document Revised: 01/30/2019 Document Reviewed: 01/30/2019  [employer] Patient Education © 2020 [employer]

## 2023-05-31 NOTE — DISCHARGE PLANNING
Case Management Discharge Planning    Admission Date: 5/25/2023  GMLOS: 5.1  ALOS: 5    6-Clicks ADL Score: 15  6-Clicks Mobility Score: 13  PT and/or OT Eval ordered: Yes  Post-acute Referrals Ordered: Yes  Post-acute Choice Obtained: Yes  Has referral(s) been sent to post-acute provider:  Yes      Anticipated Discharge Dispo: Discharge Disposition: Disch to  rehab facility or distinct part unit (62)    DME Needed: No    Action(s) Taken: Acceptance Received    Escalations Completed: None    Medically Clear: Yes    Next Steps: Per MD, pt medically cleared today. Message to Katelyn at [facility] regarding acceptance and they can accept this patient today. Transport set for 1130-12 via GMT per Katelyn. Update to MD and RN.    Update to Maninder SILVER reviewed/signed     Barriers to Discharge: None    Is the patient up for discharge tomorrow: No- DC today

## 2023-05-31 NOTE — FLOWSHEET NOTE
05/31/23 1200   Events/Summary/Plan   Events/Summary/Plan RT Consult   Vital Signs   Pulse 82   Respiration 16   Pulse Oximetry 95 %   $ Pulse Oximetry (Spot Check) Yes   Respiratory Assessment   Level of Consciousness Alert   Chest Exam   Work Of Breathing / Effort Within Normal Limits   Breath Sounds   RUL Breath Sounds Clear   RML Breath Sounds Clear   RLL Breath Sounds Diminished   SANDRO Breath Sounds Clear   LLL Breath Sounds Diminished   Oxygen   O2 (LPM) 1   O2 Delivery Device Silicone Nasal Cannula   Smoking History   Have you ever smoked Yes   Have you smoked in the last 12 months No   Confirm Quit Date   (States she quit 30yrs ago)

## 2023-05-31 NOTE — PROGRESS NOTES
Report given to roberto at [facility]. Recent VS given and questions answered. Pt's PIV removed and tele box removed and placed at nurse's station. Discharge education gone over with patient and questions answered. Pt's belongings are gathered

## 2023-05-31 NOTE — DISCHARGE PLANNING
[facility] Transitional Care Coordination    Dr. Toño Will will accept Aleyda to inpatient rehab.  GMT transport 1130a/12 noon today.  Nursing to call report to [id number].  Voalte update to juan Bal.  KARIE hager.  Telephone call to daughter Maggie New with update.  Reviewed [facility] COVID testing and visitor policies.  Provided [facility] Nurses station contact number for updates to inform visitation.      TCC will follow to assist as needed with transition to [facility].

## 2023-05-31 NOTE — DISCHARGE PLANNING
Agency/Facility Name: [facility]  Spoke To: Bernie  Outcome: SNF asking for update, DPA has no update yet per RN CM.

## 2023-05-31 NOTE — CARE PLAN
Problem: Knowledge Deficit - Standard  Goal: Patient and family/care givers will demonstrate understanding of plan of care, disease process/condition, diagnostic tests and medications  Outcome: Progressing     Problem: Pain - Standard  Goal: Alleviation of pain or a reduction in pain to the patient’s comfort goal  Outcome: Progressing     Problem: Skin Integrity  Goal: Skin integrity is maintained or improved  Outcome: Progressing     Problem: Respiratory  Goal: Patient will achieve/maintain optimum respiratory ventilation and gas exchange  Outcome: Progressing   The patient is Stable - Low risk of patient condition declining or worsening    Shift Goals  Clinical Goals: monitor bp adn ambulate  Patient Goals: ambulate  Family Goals: get patient out of bed

## 2023-06-01 ENCOUNTER — APPOINTMENT (OUTPATIENT)
Dept: PHYSICAL THERAPY | Facility: REHABILITATION | Age: 83
DRG: 312 | End: 2023-06-01
Attending: PHYSICAL MEDICINE & REHABILITATION
Payer: MEDICARE

## 2023-06-01 ENCOUNTER — APPOINTMENT (OUTPATIENT)
Dept: OCCUPATIONAL THERAPY | Facility: REHABILITATION | Age: 83
DRG: 312 | End: 2023-06-01
Attending: PHYSICAL MEDICINE & REHABILITATION
Payer: MEDICARE

## 2023-06-01 ENCOUNTER — APPOINTMENT (OUTPATIENT)
Dept: RADIOLOGY | Facility: MEDICAL CENTER | Age: 83
DRG: 312 | End: 2023-06-01
Attending: PHYSICAL MEDICINE & REHABILITATION
Payer: MEDICARE

## 2023-06-01 ENCOUNTER — APPOINTMENT (OUTPATIENT)
Dept: RADIOLOGY | Facility: REHABILITATION | Age: 83
DRG: 312 | End: 2023-06-01
Attending: PHYSICAL MEDICINE & REHABILITATION
Payer: MEDICARE

## 2023-06-01 ENCOUNTER — APPOINTMENT (OUTPATIENT)
Dept: OCCUPATIONAL THERAPY | Facility: REHABILITATION | Age: 83
End: 2023-06-01
Attending: PHYSICAL MEDICINE & REHABILITATION
Payer: MEDICARE

## 2023-06-01 ENCOUNTER — APPOINTMENT (OUTPATIENT)
Dept: PHYSICAL THERAPY | Facility: REHABILITATION | Age: 83
End: 2023-06-01
Attending: PHYSICAL MEDICINE & REHABILITATION
Payer: MEDICARE

## 2023-06-01 PROBLEM — I95.9 HYPOTENSION: Status: ACTIVE | Noted: 2023-06-01

## 2023-06-01 LAB
25(OH)D3 SERPL-MCNC: 58 NG/ML (ref 30–100)
ALBUMIN SERPL BCP-MCNC: 3.3 G/DL (ref 3.2–4.9)
ALBUMIN/GLOB SERPL: 1.4 G/DL
ALP SERPL-CCNC: 83 U/L (ref 30–99)
ALT SERPL-CCNC: 21 U/L (ref 2–50)
ANION GAP SERPL CALC-SCNC: 12 MMOL/L (ref 7–16)
AST SERPL-CCNC: 26 U/L (ref 12–45)
BASOPHILS # BLD AUTO: 0.2 % (ref 0–1.8)
BASOPHILS # BLD: 0.01 K/UL (ref 0–0.12)
BILIRUB SERPL-MCNC: 0.3 MG/DL (ref 0.1–1.5)
BUN SERPL-MCNC: 14 MG/DL (ref 8–22)
CALCIUM ALBUM COR SERPL-MCNC: 9.2 MG/DL (ref 8.5–10.5)
CALCIUM SERPL-MCNC: 8.6 MG/DL (ref 8.5–10.5)
CHLORIDE SERPL-SCNC: 101 MMOL/L (ref 96–112)
CO2 SERPL-SCNC: 24 MMOL/L (ref 20–33)
CREAT SERPL-MCNC: 0.61 MG/DL (ref 0.5–1.4)
EOSINOPHIL # BLD AUTO: 0.04 K/UL (ref 0–0.51)
EOSINOPHIL NFR BLD: 0.8 % (ref 0–6.9)
ERYTHROCYTE [DISTWIDTH] IN BLOOD BY AUTOMATED COUNT: 46.1 FL (ref 35.9–50)
EST. AVERAGE GLUCOSE BLD GHB EST-MCNC: 140 MG/DL
GFR SERPLBLD CREATININE-BSD FMLA CKD-EPI: 88 ML/MIN/1.73 M 2
GLOBULIN SER CALC-MCNC: 2.4 G/DL (ref 1.9–3.5)
GLUCOSE SERPL-MCNC: 130 MG/DL (ref 65–99)
HBA1C MFR BLD: 6.5 % (ref 4–5.6)
HCT VFR BLD AUTO: 41.1 % (ref 37–47)
HGB BLD-MCNC: 13.2 G/DL (ref 12–16)
IMM GRANULOCYTES # BLD AUTO: 0.02 K/UL (ref 0–0.11)
IMM GRANULOCYTES NFR BLD AUTO: 0.4 % (ref 0–0.9)
LYMPHOCYTES # BLD AUTO: 0.8 K/UL (ref 1–4.8)
LYMPHOCYTES NFR BLD: 15.7 % (ref 22–41)
MCH RBC QN AUTO: 30.2 PG (ref 27–33)
MCHC RBC AUTO-ENTMCNC: 32.1 G/DL (ref 32.2–35.5)
MCV RBC AUTO: 94.1 FL (ref 81.4–97.8)
MONOCYTES # BLD AUTO: 0.51 K/UL (ref 0–0.85)
MONOCYTES NFR BLD AUTO: 10 % (ref 0–13.4)
NEUTROPHILS # BLD AUTO: 3.71 K/UL (ref 1.82–7.42)
NEUTROPHILS NFR BLD: 72.9 % (ref 44–72)
NRBC # BLD AUTO: 0 K/UL
NRBC BLD-RTO: 0 /100 WBC (ref 0–0.2)
PLATELET # BLD AUTO: 242 K/UL (ref 164–446)
PMV BLD AUTO: 10.5 FL (ref 9–12.9)
POTASSIUM SERPL-SCNC: 3.9 MMOL/L (ref 3.6–5.5)
PROT SERPL-MCNC: 5.7 G/DL (ref 6–8.2)
RBC # BLD AUTO: 4.37 M/UL (ref 4.2–5.4)
SODIUM SERPL-SCNC: 137 MMOL/L (ref 135–145)
TSH SERPL DL<=0.005 MIU/L-ACNC: 0.65 UIU/ML (ref 0.38–5.33)
WBC # BLD AUTO: 5.1 K/UL (ref 4.8–10.8)

## 2023-06-01 PROCEDURE — 84443 ASSAY THYROID STIM HORMONE: CPT

## 2023-06-01 PROCEDURE — 85025 COMPLETE CBC W/AUTO DIFF WBC: CPT

## 2023-06-01 PROCEDURE — 94760 N-INVAS EAR/PLS OXIMETRY 1: CPT

## 2023-06-01 PROCEDURE — 770010 HCHG ROOM/CARE - REHAB SEMI PRIVAT*

## 2023-06-01 PROCEDURE — 71045 X-RAY EXAM CHEST 1 VIEW: CPT

## 2023-06-01 PROCEDURE — 97162 PT EVAL MOD COMPLEX 30 MIN: CPT

## 2023-06-01 PROCEDURE — 80053 COMPREHEN METABOLIC PANEL: CPT

## 2023-06-01 PROCEDURE — 83036 HEMOGLOBIN GLYCOSYLATED A1C: CPT

## 2023-06-01 PROCEDURE — 97535 SELF CARE MNGMENT TRAINING: CPT

## 2023-06-01 PROCEDURE — 700102 HCHG RX REV CODE 250 W/ 637 OVERRIDE(OP): Performed by: PHYSICAL MEDICINE & REHABILITATION

## 2023-06-01 PROCEDURE — 700105 HCHG RX REV CODE 258: Performed by: HOSPITALIST

## 2023-06-01 PROCEDURE — 97530 THERAPEUTIC ACTIVITIES: CPT

## 2023-06-01 PROCEDURE — 82306 VITAMIN D 25 HYDROXY: CPT

## 2023-06-01 PROCEDURE — 99223 1ST HOSP IP/OBS HIGH 75: CPT | Performed by: HOSPITALIST

## 2023-06-01 PROCEDURE — 700102 HCHG RX REV CODE 250 W/ 637 OVERRIDE(OP): Performed by: HOSPITALIST

## 2023-06-01 PROCEDURE — 97116 GAIT TRAINING THERAPY: CPT

## 2023-06-01 PROCEDURE — 36415 COLL VENOUS BLD VENIPUNCTURE: CPT

## 2023-06-01 PROCEDURE — 97166 OT EVAL MOD COMPLEX 45 MIN: CPT

## 2023-06-01 PROCEDURE — A9270 NON-COVERED ITEM OR SERVICE: HCPCS | Performed by: PHYSICAL MEDICINE & REHABILITATION

## 2023-06-01 PROCEDURE — 99232 SBSQ HOSP IP/OBS MODERATE 35: CPT | Performed by: PHYSICAL MEDICINE & REHABILITATION

## 2023-06-01 PROCEDURE — A9270 NON-COVERED ITEM OR SERVICE: HCPCS | Performed by: HOSPITALIST

## 2023-06-01 RX ORDER — SODIUM CHLORIDE 9 MG/ML
1000 INJECTION, SOLUTION INTRAVENOUS ONCE
Status: COMPLETED | OUTPATIENT
Start: 2023-06-01 | End: 2023-06-02

## 2023-06-01 RX ORDER — HYDROCORTISONE 10 MG/1
10 TABLET ORAL NIGHTLY
Status: DISCONTINUED | OUTPATIENT
Start: 2023-06-01 | End: 2023-06-03

## 2023-06-01 RX ORDER — HYDROCORTISONE 10 MG/1
20 TABLET ORAL DAILY
Status: DISCONTINUED | OUTPATIENT
Start: 2023-06-02 | End: 2023-06-03

## 2023-06-01 RX ADMIN — LOVASTATIN 20 MG: 20 TABLET ORAL at 21:12

## 2023-06-01 RX ADMIN — SODIUM CHLORIDE 1000 ML: 9 INJECTION, SOLUTION INTRAVENOUS at 13:31

## 2023-06-01 RX ADMIN — Medication 4.5 MG: at 21:13

## 2023-06-01 RX ADMIN — OMEPRAZOLE 20 MG: 20 CAPSULE, DELAYED RELEASE ORAL at 08:11

## 2023-06-01 RX ADMIN — DULOXETINE HYDROCHLORIDE 30 MG: 30 CAPSULE, DELAYED RELEASE ORAL at 08:11

## 2023-06-01 RX ADMIN — SENNOSIDES AND DOCUSATE SODIUM 2 TABLET: 50; 8.6 TABLET ORAL at 08:11

## 2023-06-01 RX ADMIN — HYDROCORTISONE 10 MG: 10 TABLET ORAL at 21:12

## 2023-06-01 RX ADMIN — LISINOPRIL 10 MG: 5 TABLET ORAL at 05:47

## 2023-06-01 RX ADMIN — SENNOSIDES AND DOCUSATE SODIUM 2 TABLET: 50; 8.6 TABLET ORAL at 21:14

## 2023-06-01 SDOH — ECONOMIC STABILITY: TRANSPORTATION INSECURITY
IN THE PAST 12 MONTHS, HAS THE LACK OF TRANSPORTATION KEPT YOU FROM MEDICAL APPOINTMENTS OR FROM GETTING MEDICATIONS?: NO

## 2023-06-01 ASSESSMENT — BRIEF INTERVIEW FOR MENTAL STATUS (BIMS)
WHAT YEAR IS IT: CORRECT
ASKED TO RECALL BLUE: YES, NO CUE REQUIRED
BIMS SUMMARY SCORE: 15
INITIAL REPETITION OF BED BLUE SOCK - FIRST ATTEMPT: 3
ASKED TO RECALL BED: YES, NO CUE REQUIRED
ASKED TO RECALL SOCK: YES, NO CUE REQUIRED
WHAT MONTH IS IT: ACCURATE WITHIN 5 DAYS
WHAT DAY OF THE WEEK IS IT: CORRECT

## 2023-06-01 ASSESSMENT — ACTIVITIES OF DAILY LIVING (ADL)
TOILETING: INDEPENDENT
BED_CHAIR_WHEELCHAIR_TRANSFER_DESCRIPTION: ADAPTIVE EQUIPMENT;INCREASED TIME;INITIAL PREPARATION FOR TASK;SUPERVISION FOR SAFETY;VERBAL CUEING
BED_CHAIR_WHEELCHAIR_TRANSFER_DESCRIPTION: INCREASED TIME;SET-UP OF EQUIPMENT

## 2023-06-01 ASSESSMENT — GAIT ASSESSMENTS
ASSISTIVE DEVICE: FRONT WHEEL WALKER
ASSISTIVE DEVICE: FRONT WHEEL WALKER
DEVIATION: ANTALGIC;SHUFFLED GAIT;DECREASED HEEL STRIKE;DECREASED TOE OFF
GAIT LEVEL OF ASSIST: MINIMAL ASSIST
GAIT LEVEL OF ASSIST: MINIMAL ASSIST
DISTANCE (FEET): 50
DEVIATION: SHUFFLED GAIT;DECREASED HEEL STRIKE;DECREASED TOE OFF

## 2023-06-01 ASSESSMENT — PAIN DESCRIPTION - PAIN TYPE: TYPE: ACUTE PAIN

## 2023-06-01 NOTE — ASSESSMENT & PLAN NOTE
H/O bilat mastectomy and blood pressures need to be measured on legs  SBP had been consistently >150, so Midodrine discontinued  Blood pressure trends stabilizing on Norvasc

## 2023-06-01 NOTE — DIETARY
"Nutrition services: Day 1 of admit.  Rashmi Parra is a 83 y.o. female with admitting DX of Syncope and collapse [R55]  Orthostatic hypotension [I95.1]    Consult received for dx of moderate protein-calorie malnutrition present on admit. RD visited pt at bedside. RD reviewed 20-lb wt loss in past year reported in H&P; pt reports wt loss from 170 lb (per chart review, wt was 170 lb >3 yrs ago). She is unsure her current wt. Pt not concerned about PO intake here, <50% of meal trays vs % at [facility]. Pt is agreeable to BID protein snacks between meals to optimize PO intake. She prefers meats chopped with extra gravy, and reports that she avoids breads due to they \"lodge in [her] throat\", admits to occasional coughing with fluid intake. MD Will also at bedside for part of visit.    Assessment:  Height: 160 cm (5' 3\")  Weight: 59.9 kg (132 lb)  Body mass index is 23.38 kg/m²., BMI classification: Normal  Diet/Intake: Consistent CHO; 25-50% x 2 meals this admit per ADLs    Evaluation:   Dx includes orthostatic hypotension, moderate protein-calorie malnutrition, DLD, depression  Nutrition-focused physical exam: pt presents with sunken orbital region with periorbital fat loss, decreased muscle tone + depression to interosseous, squaring of shoulder region with protruding acromion process.  Per chart hx review, pt weighed 153 lb 10/26/2022: 13.7% wt loss in 7 months is significant.  Labs: glu 130, A1c 6.5%  MAR: senna  No documented wounds or edema    Malnutrition Risk: Agree w/ dx of moderate malnutrition: Pt meets criteria for moderate malnutrition in the setting of chronic disease likely realated to increased kcal/protein needs without compensatory intake due to \"chronic underlying lung disease\" noted in H&P AEB pt with moderate fat wasting, moderate muscle wasting, and 13.7% wt loss x 7 months per chart review.    Recommendations/Plan:  Consult to SLP. Briefly reviewed concerns/plan w/ nursing team and MD " Suman.  Update LDAs based on pt interview. Provided chopped meats with extra gravy/sauce; do not send bread products. Provide BID snacks to bolster nutritional intake.  Encourage intake of meals and snacks >50%.  Document intake of all PO as % taken in ADL's to provide interdisciplinary communication across all shifts.   Monitor weight.  Nutrition rep will continue to see patient for ongoing meal and snack preferences.     RD following.

## 2023-06-01 NOTE — CARE PLAN
"  Problem: Discharge Barriers/Planning  Goal: Patient's continuum of care needs are met  Note: Chest X ray was completed today. It shows \"No acute cardiopulmonary disease\".       Problem: Hemodynamics  Goal: Patient's hemodynamics, fluid balance and neurologic status will be stable or improve  Note: Pt. Continues with low blood pressure. Pt. denies dizziness. 1000 ml of NS @ 100 ml/hr is being infused as ordered by Hospitalist.    The patient is Stable - Low risk of patient condition declining or worsening    Shift Goals  Clinical Goals: safety, pain management, sleep  Patient Goals: sleep, pain control  "

## 2023-06-01 NOTE — THERAPY
Occupational Therapy   Initial Evaluation     Patient Name: Rashmi Parra  Age:  83 y.o., Sex:  female  Medical Record #: [id number]  Today's Date: 6/1/2023     Subjective    Pt in bed upon arrival, agreeable to OT evaluation this date.     Objective       06/01/23 0701   OT Charge Group   OT Self Care / ADL (Units) 1   OT Evaluation OT Evaluation Mod   OT Total Time Spent   OT Individual Total Time Spent (Mins) 60   Prior Living Situation   Prior Services Intermittent Physical Support for ADL Per Service;Housekeeping / Homemaker Services;Intermittent Physical Support for ADL Per Family   Housing / Facility 1 Story House   Bathroom Set up Walk In Shower;Grab Bars;Shower Chair  (pt reports not using chair)   Equipment Owned Front-Wheel Walker;4-Wheel Walker;Grab Bar(s) In Tub / Shower;Grab Bar(s) By Toilet   Lives with - Patient's Self Care Capacity Spouse   Prior Level of ADL Function   Self Feeding Independent   Grooming / Hygiene Independent   Bathing Independent   Dressing Independent   Toileting Independent   Prior Level of IADL Function   Medication Management Independent   Laundry Requires Assist   Kitchen Mobility Independent   Finances Independent   Home Management Requires Assist   Shopping Requires Assist   Prior Level Of Mobility Independent With Device in Home   Prior Functioning: Everyday Activities   Self Care Independent   Indoor Mobility (Ambulation) Independent   Stairs Independent   Functional Cognition Independent   Prior Device Use Walker   Vitals   Patient BP Position Sitting  (taken on L arm)   Blood Pressure  (!) 86/58   O2 (LPM) 2   O2 Delivery Device Nasal Cannula   Vitals Comments pt asymptomatic in sitting, alerted RN and CNA   Cognitive Pattern Assessment   Cognitive Pattern Assessment Used BIMS   Brief Interview for Mental Status (BIMS)   Repetition of Three Words (First Attempt) 3   Temporal Orientation: Year Correct   Temporal Orientation: Month Accurate within 5 days   Temporal  "Orientation: Day Correct   Recall: \"Sock\" Yes, no cue required   Recall: \"Blue\" Yes, no cue required   Recall: \"Bed\" Yes, no cue required   BIMS Summary Score 15   Confusion Assessment Method (CAM)   Is there evidence of an acute change in mental status from the patient's baseline? No   Inattention Behavior not present   Disorganized thinking Behavior not present   Altered level of consciousness Behavior not present   Vision Screen   Vision Not tested   Balance Assessment   Sitting Balance (Static) Fair -   Sitting Balance (Dynamic) Fair -   Bed Mobility    Supine to Sit Minimal Assist   Sit to Stand Minimal Assist   Eating   Assistance Needed Supervision   Physical Assistance Level No physical assistance   CARE Score - Eating 4   Eating Discharge Goal   Discharge Goal 6   Oral Hygiene   Assistance Needed Supervision   Physical Assistance Level No physical assistance   CARE Score - Oral Hygiene 4   Oral Hygiene Discharge Goal   Discharge Goal 6   Shower/Bathe Self   Assistance Needed Physical assistance   Physical Assistance Level 26%-50%   CARE Score - Shower/Bathe Self 3   Shower/Bathe Self Discharge Goal   Discharge Goal 4   Upper Body Dressing   Assistance Needed Physical assistance   Physical Assistance Level 25% or less   CARE Score - Upper Body Dressing 3   Upper Body Dressing Discharge Goal   Discharge Goal 6   Lower Body Dressing   Assistance Needed Physical assistance   Physical Assistance Level 76% or more   CARE Score - Lower Body Dressing 2   Lower Body Dressing Discharge Goal   Discharge Goal 6   Putting On/Taking Off Footwear   Assistance Needed Physical assistance   Physical Assistance Level 76% or more   CARE Score - Putting On/Taking Off Footwear 2   Putting On/Taking Off Footwear Discharge Goal   Discharge Goal 6   Toileting Hygiene   Assistance Needed Physical assistance   Physical Assistance Level 76% or more   CARE Score - Toileting Hygiene 2   Toileting Hygiene Discharge Goal   Discharge Goal " 6   Toilet Transfer   Assistance Needed Physical assistance   Physical Assistance Level 26%-50%   CARE Score - Toilet Transfer 3   Toilet Transfer Discharge Goal   Discharge Goal 4   Hearing, Speech, and Vision   Ability to Hear Adequate   Expression of Ideas and Wants Without difficulty   Understanding Verbal and Non-Verbal Content Understands   Functional Level of Assist   Eating Supervision   Grooming Supervision;Seated   Bathing Moderate Assist   Upper Body Dressing Minimal Assist   Lower Body Dressing Maximal Assist   Toileting Maximal Assist   Bed, Chair, Wheelchair Transfer Minimal Assist   Toilet Transfers Minimal Assist   Comprehension Modified Independent   Expression Independent   Problem Solving Minimal Assist   Memory Modified Independent   Precautions   Precautions Fall Risk;Other (See Comments)   Comments orthostatic, double mastectomy   Current Discharge Plan   Current Discharge Plan Return to Prior Living Situation   Benefit    Therapy Benefit Patient Would Benefit from Inpatient Rehab Occupational Therapy to Maximize [place] with ADLs, IADLs and Functional Mobility.   Interdisciplinary Plan of Care Collaboration   IDT Collaboration with  Certified Nursing Assistant;Nursing   Patient Position at End of Therapy Seated;Chair Alarm On;Self Releasing Lap Belt Applied;Tray Table within Reach;Call Light within Reach   Collaboration Comments re BP reading in arm at end of session   Strengths & Barriers   Strengths Able to follow instructions;Alert and oriented;Manages pain appropriately;Motivated for self care and independence;Pleasant and cooperative;Willingly participates in therapeutic activities   Barriers Decreased endurance;Generalized weakness;Fatigue;Hypotension;Orthostatic hypotension;Impaired activity tolerance;Impaired balance;Limited mobility       Assessment  Patient is 83 y.o. female with a diagnosis of Orthostatic hypotension.      Pt lives in a 1 story home with a bathroom set-up  including: Walk-in Shower, GB in shower, GB near toilet, Shower chair, and states has higher toilet with arms attached unclear if versa frame vs over toilet commode . Pt reports [place] with ADLs and requires A for most IADLs including recent hiring of caregiver for cooking and home management. Pt reports  completes finances and she manages her own medication. Spouse likely not able to provide much physical A for patient upon dc.    At time of evaluation patient presents below functional baseline w/ primary barriers being overall poor activity tolerance and endurance, fatigue, impaired balance, inattention to tasks, and poor initiation of tasks. They will benefit from daily skilled OT to maximize independence w/ ADLs, IADLs, and functional mobility.     Plan    Complete 90 min shower next OT session.    Recommend Occupational Therapy  minutes per day 5-7 days per week for 10-14 days for the following treatments:  OT Self Care/ADL, OT Cognitive Skill Dev, OT Neuro Re-Ed/Balance, OT Therapeutic Activity, OT Evaluation, and OT Therapeutic Exercise.    Passport items to be completed:  Perform bathroom transfers, complete dressing, complete feeding, get ready for the day, prepare a simple meal, participate in household tasks, adapt home for safety needs, demonstrate home exercise program, complete caregiver training     Goals:  Long term and short term goals have been discussed with patient and they are in agreement.    Occupational Therapy Goals (Active)       Problem: Bathing       Dates: Start:  06/01/23         Goal: STG-Within one week, patient will bathe with Min A overall using AE/DME as needed.       Dates: Start:  06/01/23               Problem: Dressing       Dates: Start:  06/01/23         Goal: STG-Within one week, patient will dress UB with SBA overall using AE/DME as needed.       Dates: Start:  06/01/23            Goal: STG-Within one week, patient will dress LB with Mod A overall using  AE/DME as needed.       Dates: Start:  06/01/23               Problem: Functional Transfers       Dates: Start:  06/01/23         Goal: STG-Within one week, patient will transfer to toilet with CGA and LRD overall utilizing DME.AE as needed.       Dates: Start:  06/01/23               Problem: OT Long Term Goals       Dates: Start:  06/01/23         Goal: LTG-By discharge, patient will complete basic self care tasks with supervision overall using AE/DME as needed.       Dates: Start:  06/01/23            Goal: LTG-By discharge, patient will perform bathroom transfers with supervision and LRD overall using AE/DME as needed.       Dates: Start:  06/01/23               Problem: Toileting       Dates: Start:  06/01/23         Goal: STG-Within one week, patient will complete toileting tasks with min A overall using AE/DME as needed.       Dates: Start:  06/01/23

## 2023-06-01 NOTE — THERAPY
Physical Therapy   Daily Treatment     Patient Name: Rashmi Parra  Age:  83 y.o., Sex:  female  Medical Record #: [id number]  Today's Date: 6/1/2023     Precautions  Precautions: (P) Fall Risk  Comments: (P) orthostatic, double mastectomy causes inaccurate readings    Subjective    Patient in bed and agreeable to therapy.     Objective       06/01/23 1331   PT Charge Group   PT Gait Training (Units) 2   PT Therapeutic Activities (Units) 1   PT Total Time Spent   PT Individual Total Time Spent (Mins) 45   Precautions   Precautions Fall Risk   Comments orthostatic, double mastectomy causes inaccurate readings   Gait Functional Level of Assist    Gait Level Of Assist Minimal Assist   Assistive Device Front Wheel Walker   Distance (Feet)   (20 ftx1, 150 ftx1)   Deviation Shuffled Gait;Decreased Heel Strike;Decreased Toe Off  (slow, effortful, hevay forward lean on FWW)   Transfer Functional Level of Assist   Bed, Chair, Wheelchair Transfer Minimal Assist   Bed Chair Wheelchair Transfer Description Adaptive equipment;Increased time;Initial preparation for task;Supervision for safety;Verbal cueing  (reach pivot vs. stand step transfer with FWW)   Bed Mobility    Supine to Sit Minimal Assist   Sit to Stand Contact Guard Assist  (varied performance)   Interdisciplinary Plan of Care Collaboration   IDT Collaboration with  Certified Nursing Assistant;Nursing;Physical Therapist;Family / Caregiver   Patient Position at End of Therapy Other (Comments)  (with CNA in bathroom)   Collaboration Comments handoff of care to CNA, POC with primary PT, family education, CLOF with nursing     Timed Up and Go performed with FWW and CGA: 138.21 seconds    Assessment    Patient tolerated session well, extra time required for IV management however good participation in gait training. TUG performance indicates fall risk at this time. Patient asymptomatic throughout session.    Strengths: Able to follow instructions, Adequate strength,  Independent prior level of function, Motivated for self care and independence, Pleasant and cooperative, Willingly participates in therapeutic activities, Supportive family  Barriers: Fatigue, Decreased endurance, Generalized weakness, Home accessibility, Hypotension, Impaired balance, Impaired carryover of learning, Impaired activity tolerance, Limited mobility, Visual impairment    Plan    Bed mobility training, transfer training, gait training with FWW progressing to LRAD, stair/curb negotiation, endurance training, general strengthening, balance, family training.    Passport items to be completed:  Get in/out of bed safely, in/out of a vehicle, safely use mobility device, walk or wheel around home/community, navigate up and down stairs, show how to get up/down from the ground, ensure home is accessible, demonstrate HEP, complete caregiver training    Physical Therapy Problems (Active)       Problem: Mobility       Dates: Start:  06/01/23         Goal: STG-Within one week, patient will ambulate household distances >/= 50' c/ Donald or better and LRAD (FWW vs 4WW).        Dates: Start:  06/01/23            Goal: STG-Within one week, patient will ambulate up/down a curb with ModA or better, LRAD.       Dates: Start:  06/01/23               Problem: Mobility Transfers       Dates: Start:  06/01/23         Goal: STG-Within one week, patient will perform bed mobility with Liliam.        Dates: Start:  06/01/23            Goal: STG-Within one week, patient will transfer bed to chair c/ Donald or better.        Dates: Start:  06/01/23               Problem: PT-Long Term Goals       Dates: Start:  06/01/23         Goal: LTG-By discharge, patient will ambulate >/= 100' in a single effort c/ LRAD at SBA or better.        Dates: Start:  06/01/23            Goal: LTG-By discharge, patient will transfer one surface to another c/ Liliam or better.        Dates: Start:  06/01/23            Goal: LTG-By discharge, patient will ambulate  up/down 2 stairs c/ BHR to safely access home environment at CGA or better.        Dates: Start:  06/01/23            Goal: LTG-By discharge, patient will transfer in/out of a car with Donald or better, LRAD.        Dates: Start:  06/01/23

## 2023-06-01 NOTE — DISCHARGE PLANNING
CASE MANAGEMENT INITIAL ASSESSMENT    Admit Date:  5/31/2023     I met with patient to discuss role of case management / discharge planning / team conference.   Patient is a  83 y.o. female transferred from [facility].    Diagnosis: Syncope and collapse [R55]  Orthostatic hypotension [I95.1]    Co-morbidities:   Patient Active Problem List    Diagnosis Date Noted    Hypotension 06/01/2023    Orthostatic hypotension 05/31/2023    PAD (peripheral artery disease) (Formerly [facility]) 05/31/2023    Cortisol deficiency (Formerly [facility]) 05/27/2023    Moderate protein-calorie malnutrition (Formerly [facility]) 05/27/2023    Mitral annular calcification 05/27/2023    Depression 05/26/2023    Muscle weakness 05/08/2023    Fatigue 05/08/2023    Risk for falls 05/08/2023    Balance problem 01/16/2023    Age-related physical debility 01/16/2023    Heart murmur, systolic 10/26/2022    Dyslipidemia 10/26/2022    Hospital discharge follow-up 09/21/2022    Hypophosphatemia 06/12/2022    Closed nondisplaced fracture of greater tuberosity of right humerus 06/11/2022    Trimalleolar fracture of ankle, closed, right, initial encounter 06/10/2022    Type 2 diabetes mellitus with diabetic mononeuropathy, without long-term current use of insulin ([facility]) 04/21/2022    Numbness in left leg 04/21/2022    History of right hip replacement 11/11/2021    Moderate episode of recurrent major depressive disorder ([facility]) 07/12/2021    Moderate mitral stenosis 07/10/2021    Chronic pain of both knees 06/11/2021    Other emphysema ([facility]) 06/11/2021    Burning sensation of throat 04/09/2021    Nocturnal hypoxia 04/09/2021    Ott's esophagus with dysplasia 02/10/2021    Mixed stress and urge urinary incontinence 02/10/2021    Hip pain, right 02/10/2021    Acute on chronic respiratory failure with hypoxia ([facility]) 02/10/2021    Other hyperlipidemia 02/10/2021    Primary malignant neoplasm of right female breast (Former[facility]) 11/06/2019    Localized primary osteoarthritis of lower leg 11/02/2015     Prior Living  Situation:  Housing / Facility: 1 Story House  Lives with - Patient's Self Care Capacity: Spouse    Prior Level of Function:  Medication Management: Independent  Finances: Independent  Home Management: Requires Assist  Shopping: Requires Assist  Prior Level Of Mobility: Independent With Device in Home    Support Systems:  Primary : Michael Parra-spouse: [phone] or daughter Maggie: [phone]    Previous Services Utilized:   Equipment Owned: 4-Wheel Walker  Prior Services: Intermittent Physical Support for ADL Per Service, Housekeeping / Homemaker Services, Intermittent Physical Support for ADL Per Family    Other Information:        Primary Payor Source: Medicare A, Medicare B  Secondary Payor Source:  ([facility])  Primary Care Practitioner : JAMARCUS Segura    Patient / Family Goal:  Patient / Family Goal: Return home    Plan:  1. Continue to follow patient through hospitalization and provide discharge planning in collaboration with patient, family, physicians and ancillary services.     2. Utilize community resources to ensure a safe discharge.

## 2023-06-01 NOTE — CONSULTS
"DATE OF SERVICE:  6/1/2023    REQUESTING PHYSICIAN:  Toño Will MD    CHIEF COMPLAINT / REASON FOR CONSULTATION:   Hypotension    HISTORY OF PRESENT ILLNESS:  This is an 82 y/o female with a PMH significant for of chronic hypoxia on O2 supplementation, depresion, and breast cancer s/p mastectomy and radiation who presented on 5/25 with after a fall/syncopal episode.  Per documentation, patient reported atttempt to stand to walk to the bathroom when she became lightheaded and fell back, hitting her head.  Does not know if she had LOC.  Upon eval in the ED CT head was negative for acute process, CXR showed chronic underlying lung disease.  An echo showed an EF of 75%.  BNP was 385.  Hospital course has been notable for orthostatic hypotension.  Cortisol levels were labile ranging between 2.1 and 44.12.7.  ACTH was wnl and then a repeat showed low at 2.2.  Was taken off steroid support.  Patient has been able to participate with therapy, but she was limited by fatigue and orthostatic hypotension.  Patient was weaned off the Midodrine and Florinef.  She was found to have significant aortic stenosis in the lower abdomen on CT scan so outpatient referral to Vascular was made.       Because of the patient's weakness and debility, Rehab was consulted, evaluated the patient, and was deemed a good Rehab candidate.  The patient was transferred over to the Rehab facility on 5/31/2022.      The patient denies fever, chills, nausea, vomiting, headaches, blurry vision, or chest pain.    REVIEW OF SYSTEMS: All review of systems are negative pre AMA and CMS criteria except for that stated in the HPI.    PAST MEDICAL HISTORY:  Past Medical History:   Diagnosis Date    Arthritis     osteo, generalized    Bowel habit changes     constipation    Breath shortness     Cataract     removed bilat    COPD (chronic obstructive pulmonary disease) (HCC)     Diabetes (HCC)     \"pre\", oral meds    Heart burn     Heart murmur     Hiatus " hernia syndrome     High cholesterol     Hypertension     Indigestion     Malignant neoplasm of overlapping sites of breast in female, estrogen receptor positive (HCC) 11/6/2019    Psychiatric problem 2015    anxiety    Shortness of breath     Snoring     no sleep study    Urinary incontinence     Wears glasses        PAST SURGICAL HISTORY:  Past Surgical History:   Procedure Laterality Date    ORIF, ANKLE Right 6/10/2022    Procedure: ORIF, ANKLE - TRIMALLEOLAR;  Surgeon: Anuj Simpson M.D.;  Location: [facility];  Service: Orthopedics    NM TOTAL HIP ARTHROPLASTY Right 9/30/2021    Procedure: ARTHROPLASTY, HIP, TOTAL, ANTERIOR APPROACH;  Surgeon: Lewis Serrano M.D.;  Location: [facility];  Service: Orthopedics    WIDE EXCISION Right 8/16/2019    Procedure: WIDE EXCISION, LESION- RE-EXCISION FOR EXCISION FOR MARGINS RIGHT CHEST WALL INSTRMUSCULAR;  Surgeon: Andre Shelton M.D.;  Location: [facility];  Service: General    INCISION AND DRAINAGE GENERAL Bilateral 8/16/2019    Procedure: INCISION AND DRAINAGE- OF BILATERAL CHEST SEROMAS WITH DRAIN PLACEMENT;  Surgeon: Andre Shelton M.D.;  Location: [facility];  Service: General    PB MASTECTOMY, MODIFIED RADICAL Right 7/17/2019    Procedure: MASTECTOMY, MODIFIED RADICAL;  Surgeon: Andre Shelton M.D.;  Location: [facility];  Service: General    NODE BIOPSY SENTINEL Right 7/17/2019    Procedure: INTRA OPERATIVE SENTINEL NODE IDENTIFICATION AND BIOPSY;  Surgeon: Andre Shelton M.D.;  Location: [facility];  Service: General    AXILLARY NODE DISSECTION  7/17/2019    Procedure: LYMPHADENECTOMY, AXILLARY;  Surgeon: Andre Shelton M.D.;  Location: [facility];  Service: General    MASTECTOMY Left 7/17/2019    Procedure: TOTAL MASTECTOMY;  Surgeon: Andre Shelton M.D.;  Location: [facility];  Service: General    FLAP CLOSURE Bilateral 7/17/2019    Procedure: WIDE V FLAP;   Surgeon: Andre Shelton M.D.;  Location: [facility];  Service: General    LEFORT COLPOCLESIS  2019    Procedure: LEFORT COLPOCLESIS;  Surgeon: Minnie Bañuelos M.D.;  Location: SURGERY [facility];  Service: Labor and Delivery    BLADDER SLING FEMALE  2019    Procedure: BLADDER SLING FEMALE- TOT;  Surgeon: Minnie Bañuelos M.D.;  Location: SUR[facility]ERY [facility];  Service: Labor and Delivery    KNEE ARTHROPLASTY TOTAL Right 2015    Procedure: KNEE ARTHROPLASTY TOTAL;  Surgeon: Venkatesh Cardoza M.D.;  Location: SURGERY [facility];  Service:     ABDOMINAL HYSTERECTOMY TOTAL      OTHER ORTHOPEDIC SURGERY      L ankle    GYN SURGERY      abdominal hysterectomy    CHOLECYSTECTOMY  1964    CATARACT EXTRACTION WITH IOL Bilateral        No Known Allergies    CURRENT MEDICATIONS:    Current Facility-Administered Medications:     Respiratory Therapy Consult    Pharmacy Consult Request    hydrALAZINE    acetaminophen    senna-docusate **AND** polyethylene glycol/lytes **AND** magnesium hydroxide **AND** bisacodyl    omeprazole    mag hydrox-al hydrox-simeth    ondansetron **OR** ondansetron    traZODone    sodium chloride    traMADol    DULoxetine    enoxaparin (LOVENOX) injection    gabapentin    lovastatin    melatonin    Social History     Socioeconomic History    Marital status:     Highest education level: Some college, no degree   Tobacco Use    Smoking status: Former     Packs/day: 1.00     Years: 30.00     Pack years: 30.00     Types: Cigarettes     Quit date: 1992     Years since quittin.4    Smokeless tobacco: Former   Vaping Use    Vaping Use: Never used   Substance and Sexual Activity    Alcohol use: Yes     Alcohol/week: 0.6 oz     Types: 1 Glasses of wine per week     Comment: occ    Drug use: No     Social Determinants of Health     Transportation Needs: Unknown (2021)    PRAPARE - Transportation     Lack of Transportation (Non-Medical):  No   Physical Activity: Inactive (2/9/2021)    Exercise Vital Sign     Days of Exercise per Week: 0 days     Minutes of Exercise per Session: 0 min   Stress: Stress Concern Present (2/9/2021)    [employer] [place] [employer] - Occupational Stress Questionnaire     Feeling of Stress : To some extent   Social Connections: Unknown (2/9/2021)    Social Connection and Isolation Panel [NHANES]     Frequency of Communication with Friends and Family: More than three times a week     Frequency of Social Gatherings with Friends and Family: Twice a week     Attends Gnosticism Services: 1 to 4 times per year     Marital Status:    Housing Stability: Low Risk  (2/9/2021)    Housing Stability Vital Sign     Unable to Pay for Housing in the Last Year: No     Number of Places Lived in the Last Year: 1     Unstable Housing in the Last Year: No       FAMILY HISTORY:  was reviewed and is not pertinent to this consultation.    PHYSICAL EXAMINATION:  VITAL SIGNS:  Temp is 98.8, blood pressure is 86/58, heart rate is 75, respiratory rate is 18.  GENERAL:  Patient was lying in bed in no distress.  HEENT:  Pupils were equal, round and reactive to light and accomodation.  Oral mucosa was pink and moist.  NECK:  Soft.  Supple.  No JVD.  HEART:  Regular rate and rhythm.  Normal S1 and S2.  No murmurs were appreciated.  LUNGS:  Are clear to auscultation bilaterally.  ABDOMEN:  Soft, non tender, non distended.  Bowels sound were positive in all four quadrants.  EXTREMITIES:  No clubbing, cyanosis.  There was no lower extremity edema.  NEUROLOGIC:  Cranial nerves two through twelve were grossly intact.    LABS:  Lab Results   Component Value Date/Time    SODIUM 137 06/01/2023 05:25 AM    POTASSIUM 3.9 06/01/2023 05:25 AM    CHLORIDE 101 06/01/2023 05:25 AM    CO2 24 06/01/2023 05:25 AM    GLUCOSE 130 (H) 06/01/2023 05:25 AM    BUN 14 06/01/2023 05:25 AM    CREATININE 0.61 06/01/2023 05:25 AM      Lab Results   Component Value  Date/Time    WBC 5.1 06/01/2023 05:25 AM    RBC 4.37 06/01/2023 05:25 AM    HEMOGLOBIN 13.2 06/01/2023 05:25 AM    HEMATOCRIT 41.1 06/01/2023 05:25 AM    MCV 94.1 06/01/2023 05:25 AM    MCH 30.2 06/01/2023 05:25 AM    MCHC 32.1 (L) 06/01/2023 05:25 AM    MPV 10.5 06/01/2023 05:25 AM    NEUTSPOLYS 72.90 (H) 06/01/2023 05:25 AM    LYMPHOCYTES 15.70 (L) 06/01/2023 05:25 AM    MONOCYTES 10.00 06/01/2023 05:25 AM    EOSINOPHILS 0.80 06/01/2023 05:25 AM    BASOPHILS 0.20 06/01/2023 05:25 AM      Lab Results   Component Value Date/Time    PROTHROMBTM 12.9 06/10/2022 09:55 AM    INR 1.05 06/10/2022 09:55 AM        Depression  Cont Cymbalta    Dyslipidemia  Cont Lovastatin    Hypotension  Was on Midodrine and Florinef at [facility] and was taken off  Was on steroids at [facility] and was taken off  Cortisol: 40.4 --> 2.1 (5/29)  ACTH: 10.3 --> 2.2 (5/28)  TSH: wnl (6/1)  Doubt infection: has been afebrile, no leukocytosis  Likely 2nd to AI  Will recheck orthostatics  Will d/c Lisinopril   Will give IVF's for support x 1 liter  Will start Cortef bid  Note: pt is on Cymbalta and dose was cut in half (was taking at home)  Cont to monitor      This case has been discussed with the attending Physiatrist.    Thank you for the consultation.  Will follow the patient with you.

## 2023-06-01 NOTE — THERAPY
Occupational Therapy  Daily Treatment     Patient Name: Rashmi Parra  Age:  83 y.o., Sex:  female  Medical Record #: [id number]  Today's Date: 6/1/2023     Precautions  Precautions: Fall Risk, Other (See Comments)  Comments: orthostatic, double mastectomy    Subjective    Pt pleasant and willing to participate in OT  Pt reported she is easily distracted     Objective     06/01/23 1031   OT Charge Group   Charges Yes   OT Self Care / ADL (Units) 2   Precautions   Precautions Fall Risk;Other (See Comments)   Comments orthostatic, double mastectomy   Vitals   O2 (LPM) 2   O2 Delivery Device Nasal Cannula   Pain   Intervention Declines   Cognition    Level of Consciousness Alert   Comments Per pt, she is easily distracted   ABS (Agitated Behavior Scale)   Agitated Behavior Scale Performed No   Sleep/Wake Cycle   Sleep & Rest Awake;Out of bed   Functional Level of Assist   Lower Body Dressing Minimal Assist   Lower Body Dressing Description Assistive devices;Reacher;Shoe horn;Sock aid;Increased time;Initial preparation for task;Set-up of equipment;Supervision for safety;Verbal cueing  (Pt reports she has LB AE at home she uses regularly for LB dressing. Provided min A to initiate shoe tying on L shoe and loading sock onto aide (mostly d/t sock being too tight). Pt requires extra time.)   Interdisciplinary Plan of Care Collaboration   Patient Position at End of Therapy Seated;Chair Alarm On;Self Releasing Lap Belt Applied;Call Light within Reach;Tray Table within Reach;Phone within Reach   Strengths & Barriers   Strengths Able to follow instructions;Alert and oriented;Manages pain appropriately;Motivated for self care and independence;Pleasant and cooperative;Willingly participates in therapeutic activities   Barriers Decreased endurance;Generalized weakness;Fatigue;Hypotension;Orthostatic hypotension;Impaired activity tolerance;Impaired balance;Limited mobility     Assessment    Pt seen for OT tx, addressing LB dressing  w/ AE at w/c level. Pt tolerated session well w/ decreased assist and extra time. Pt progressing toward goals. Continue OT POC.    Strengths: Able to follow instructions, Alert and oriented, Manages pain appropriately, Motivated for self care and independence, Pleasant and cooperative, Willingly participates in therapeutic activities    Barriers: Decreased endurance, Generalized weakness, Fatigue, Hypotension, Orthostatic hypotension, Impaired activity tolerance, Impaired balance, Limited mobility    Plan    ADLs w/ AE  Functional transfers  UE strengthening  Standing balance/tolerance    Passport items to be completed:  Perform bathroom transfers, complete dressing, complete feeding, get ready for the day, prepare a simple meal, participate in household tasks, adapt home for safety needs, demonstrate home exercise program, complete caregiver training     Occupational Therapy Goals (Active)       Problem: Bathing       Dates: Start:  06/01/23         Goal: STG-Within one week, patient will bathe with Min A overall using AE/DME as needed.       Dates: Start:  06/01/23               Problem: Dressing       Dates: Start:  06/01/23         Goal: STG-Within one week, patient will dress UB with SBA overall using AE/DME as needed.       Dates: Start:  06/01/23            Goal: STG-Within one week, patient will dress LB with Mod A overall using AE/DME as needed.       Dates: Start:  06/01/23               Problem: Functional Transfers       Dates: Start:  06/01/23         Goal: STG-Within one week, patient will transfer to toilet with CGA and LRD overall utilizing DME.AE as needed.       Dates: Start:  06/01/23               Problem: OT Long Term Goals       Dates: Start:  06/01/23         Goal: LTG-By discharge, patient will complete basic self care tasks with supervision overall using AE/DME as needed.       Dates: Start:  06/01/23            Goal: LTG-By discharge, patient will perform bathroom transfers with supervision  and LRD overall using AE/DME as needed.       Dates: Start:  06/01/23               Problem: Toileting       Dates: Start:  06/01/23         Goal: STG-Within one week, patient will complete toileting tasks with min A overall using AE/DME as needed.       Dates: Start:  06/01/23

## 2023-06-01 NOTE — THERAPY
"Physical Therapy   Initial Evaluation     Patient Name: Rashmi Parra  Age:  83 y.o., Sex:  female  Medical Record #: [id number]  Today's Date: 6/1/2023     Subjective    \"I didn't do anything yesterday except sit around.\" Pt in w/c at arrival, agreeable to PT eval.      Objective       06/01/23 0831   PT Charge Group   PT Therapeutic Activities (Units) 1   PT Evaluation PT Evaluation Mod   PT Total Time Spent   PT Individual Total Time Spent (Mins) 60   Prior Living Situation   Prior Services Intermittent Physical Support for ADL Per Service;Housekeeping / Homemaker Services;Intermittent Physical Support for ADL Per Family   Housing / Facility 1 Story House   Steps Into Home 2   Steps In Home 0   Rail Both Rail (Steps into Home)  (in garage)   Equipment Owned 4-Wheel Walker   Lives with - Patient's Self Care Capacity Spouse   Comments 2 daughters live nearby and can assist as needed   Prior Level of Functional Mobility   Bed Mobility Independent   Transfer Status Independent   Ambulation Independent   Distance Ambulation (Feet) 40  (in home only)   Assistive Devices Used 4-Wheel Walker   Stairs Required Assist  (SBA <> CGA from daughters)   Prior Functioning: Everyday Activities   Self Care Independent   Indoor Mobility (Ambulation) Independent   Stairs Needed some help  (dtr SBA on steps per pt)   Functional Cognition Independent   Prior Device Use Walker   Vitals   Pulse 74   Patient BP Position Sitting   Blood Pressure  (!) 72/50  (RUE)   Pulse Oximetry 97 %   O2 (LPM) 2   O2 Delivery Device Nasal Cannula   Vitals Comments concurrent RLE BP: 193/102, no sx of hypotension   Passive ROM Lower Body   Passive ROM Lower Body WDL   Active ROM Lower Body    Active ROM Lower Body  X   Gross Active ROM Gross Active Range of Motion Impaired,  but Appears Adequate for Functional Mobility.   Strength Lower Body   Lower Body Strength  X   Gross Strength Generalized Weakness, Equal Bilaterally   Sensation Lower Body   Lower " Extremity Sensation   WDL   Balance Assessment   Sitting Balance (Static) Fair   Sitting Balance (Dynamic) Fair -   Standing Balance (Static) Trace +   Standing Balance (Dynamic) Trace   Weight Shift Sitting Fair   Weight Shift Standing Poor   Bed Mobility    Supine to Sit Minimal Assist   Sit to Stand Moderate Assist   Scooting Minimal Assist   Rolling Standby Assist   Roll Left and Right   Assistance Needed Incidental touching   CARE Score - Roll Left and Right 4   Roll Left and Right Discharge Goal   Discharge Goal 6   Sit to Lying   Assistance Needed Physical assistance   Physical Assistance Level 26%-50%   CARE Score - Sit to Lying 3   Sit to Lying Discharge Goal   Discharge Goal 6   Lying to Sitting on Side of Bed   Assistance Needed Physical assistance   Physical Assistance Level 26%-50%   CARE Score - Lying to Sitting on Side of Bed 3   Lying to Sitting on Side of Bed Discharge Goal   Discharge Goal 6   Sit to Stand   Assistance Needed Physical assistance   Physical Assistance Level 51%-75%   CARE Score - Sit to Stand 2   Sit to Stand Discharge Goal   Discharge Goal 6   Chair/Bed-to-Chair Transfer   Assistance Needed Physical assistance   Physical Assistance Level 26%-50%   CARE Score - Chair/Bed-to-Chair Transfer 3   Chair/Bed-to-Chair Transfer Discharge Goal   Discharge Goal 6   Toilet Transfer   Assistance Needed Physical assistance   Physical Assistance Level 26%-50%   CARE Score - Toilet Transfer 3   Toilet Transfer Discharge Goal   Discharge Goal 4   Car Transfer   Reason if not Attempted Safety concerns   CARE Score - Car Transfer 88   Car Transfer Discharge Goal   Discharge Goal 4   Walk 10 Feet   Assistance Needed Physical assistance   Physical Assistance Level 26%-50%   CARE Score - Walk 10 Feet 3   Walk 10 Feet Discharge Goal   Discharge Goal 4   Walk 50 Feet with Two Turns   Assistance Needed Physical assistance   Physical Assistance Level 26%-50%   CARE Score - Walk 50 Feet with Two Turns 3    Walk 50 Feet with Two Turns Discharge Goal   Discharge Goal 4   Walk 150 Feet   Reason if not Attempted Safety concerns   CARE Score - Walk 150 Feet 88   Walk 150 Feet Discharge Goal   Discharge Goal 4   Walking 10 Feet on Uneven Surfaces   Reason if not Attempted Safety concerns   CARE Score - Walking 10 Feet on Uneven Surfaces 88   Walking 10 Feet on Uneven Surfaces Discharge Goal   Discharge Goal 4   1 Step (Curb)   Reason if not Attempted Safety concerns   CARE Score - 1 Step (Curb) 88   1 Step (Curb) Discharge Goal   Discharge Goal 4   4 Steps   Reason if not Attempted Safety concerns   CARE Score - 4 Steps 88   4 Steps Discharge Goal   Discharge Goal 4   12 Steps   Reason if not Attempted Safety concerns   CARE Score - 12 Steps 88   12 Steps Discharge Goal   Discharge Goal 9   Picking Up Object   Reason if not Attempted Safety concerns   CARE Score - Picking Up Object 88   Picking Up Object Discharge Goal   Discharge Goal 6   Wheel 50 Feet with Two Turns   Reason if not Attempted Activity not applicable   CARE Score - Wheel 50 Feet with Two Turns 9   Wheel 50 Feet with Two Turns Discharge Goal   Discharge Goal 9   Wheel 150 Feet   Reason if not Attempted Activity not applicable   CARE Score - Wheel 150 Feet 9   Wheel 150 Feet Discharge Goal   Discharge Goal 9   Gait Functional Level of Assist    Gait Level Of Assist Minimal Assist   Assistive Device Front Wheel Walker   Distance (Feet) 50   # of Times Distance was Traveled 1   Deviation Antalgic;Shuffled Gait;Decreased Heel Strike;Decreased Toe Off  (slow, effortful, hevay forward lean on FWW)   Wheelchair Functional Level of Assist   Wheelchair Assist Total Assist   Distance Wheelchair (Feet or Distance) 10   Wheelchair Description Extra time;Assistance with steering;Leg rest management  (limited assessment due to needing to use bathroom)   Stairs Functional Level of Assist   Level of Assist with Stairs Unable to Participate   Transfer Functional Level of  Assist   Bed, Chair, Wheelchair Transfer Moderate Assist   Bed Chair Wheelchair Transfer Description Increased time;Set-up of equipment  (lifting assist, then steadying during turn and sit; stand step around mode)   Problem List    Problems Impaired Bed Mobility;Impaired Transfers;Impaired Ambulation;Functional Strength Deficit;Functional ROM Deficit;Impaired Balance;Impaired Coordination;Impaired Vision;Decreased Activity Tolerance;Safety Awareness Deficits / Cognition   Precautions   Precautions Fall Risk;Other (See Comments)  (hypotension, BP reads impaired BUE due to hx of mastectomy)   Comments impaired/inaccurate BP read due to double mastectomy   Current Discharge Plan   Current Discharge Plan Return to Prior Living Situation   Interdisciplinary Plan of Care Collaboration   IDT Collaboration with  Physician;Occupational Therapist;Nursing   Patient Position at End of Therapy Seated  (in bathroom)   Collaboration Comments BP, handoff to RN for toileting needs   Benefit   Therapy Benefit Patient Would Benefit from Inpatient Rehabilitation Physical Therapy to Maximize Functional [place] with ADLs, IADLs and Mobility.   Strengths & Barriers   Strengths Able to follow instructions;Adequate strength;Independent prior level of function;Motivated for self care and independence;Pleasant and cooperative;Willingly participates in therapeutic activities;Supportive family   Barriers Fatigue;Decreased endurance;Generalized weakness;Home accessibility;Hypotension;Impaired balance;Impaired carryover of learning;Impaired activity tolerance;Limited mobility;Visual impairment     Time spent to assist pt with toileting, ADLs. Able to complete pericare at SetupA, stand at sink for hand hygiene and oral care at SBA. MaxA for threading pants and assist for pulling up, plus steadying for balance.     Patient education: reviewed PT plan of care, rehab expectations, mobility needs and patient passport, orientation to unit, role of  "PT, fall risk and use of call light. Also discussed 4WW and recommendation for pt's daughters to bring in to therapy for practice.      Assessment    The patient is a 83 y.o. female admitted [facility] 5/31/2023 with severe functional debility after ground level fall and syncope. History significant for R foot fracture requiring prolonged stay in subacute rehab (3.5 months) in 2022, prior R TKA and R BAKARI. Pt reports activity levels have declined since these injuries/surgeries.    Patient's PMH includes:  Past Medical History:   Diagnosis Date    Arthritis     osteo, generalized    Bowel habit changes     constipation    Breath shortness     Cataract     removed bilat    COPD (chronic obstructive pulmonary disease) (formerly [facility])     Diabetes (formerly [facility])     \"pre\", oral meds    Heart burn     Heart murmur     Hiatus hernia syndrome     High cholesterol     Hypertension     Indigestion     Malignant neoplasm of overlapping sites of breast in female, estrogen receptor positive (formerly [facility]) 11/6/2019    Psychiatric problem 2015    anxiety    Shortness of breath     Snoring     no sleep study    Urinary incontinence     Wears glasses          PT evaluation performed today; functional performance at today's assessment is as above. Primary barriers are deconditioning, decreased balance and postural control, decreased attention and memory, and slow and inefficient gait speed.     Additional factors influencing patient status and prognosis include: prior level of function, history of double mastectomy and impaired BP reads on BUE, and the above comorbidities. Pt assists with care of her  who has PD and also has assist from her daughters and a part time caregiver in the mornings.     The patient is performing well below their baseline level of function and will benefit from an interdisciplinary high intensity rehabilitation program to maximize functional independence, decrease burden of care, and support a safe " return to home with family support.     Plan  Recommend Physical Therapy  minutes per day 5-7 days per week for 2-3 weeks for the following treatments:  PT Group Therapy, PT E Stim Attended, PT Orthotics Training, PT Gait Training, PT Self Care/Home Eval, PT Therapeutic Exercises, PT Neuro Re-Ed/Balance, PT Aquatic Therapy, PT Therapeutic Activity, PT Manual Therapy, and PT Evaluation.    Passport items to be completed:  Get in/out of bed safely, in/out of a vehicle, safely use mobility device, walk or wheel around home/community, navigate up and down stairs, show how to get up/down from the ground, ensure home is accessible, demonstrate HEP, complete caregiver training    Goals:  Long term and short term goals have been discussed with patient and they are in agreement.      Physical Therapy Problems (Active)       Problem: Mobility       Dates: Start:  06/01/23         Goal: STG-Within one week, patient will ambulate household distances >/= 50' c/ Donald or better and LRAD (FWW vs 4WW).        Dates: Start:  06/01/23            Goal: STG-Within one week, patient will ambulate up/down a curb with ModA or better, LRAD.       Dates: Start:  06/01/23               Problem: Mobility Transfers       Dates: Start:  06/01/23         Goal: STG-Within one week, patient will perform bed mobility with Liliam.        Dates: Start:  06/01/23            Goal: STG-Within one week, patient will transfer bed to chair c/ Donald or better.        Dates: Start:  06/01/23               Problem: PT-Long Term Goals       Dates: Start:  06/01/23         Goal: LTG-By discharge, patient will ambulate >/= 100' in a single effort c/ LRAD at SBA or better.        Dates: Start:  06/01/23            Goal: LTG-By discharge, patient will transfer one surface to another c/ Liliam or better.        Dates: Start:  06/01/23            Goal: LTG-By discharge, patient will ambulate up/down 2 stairs c/ BHR to safely access home environment at CGA or better.         Dates: Start:  06/01/23            Goal: LTG-By discharge, patient will transfer in/out of a car with Donald or better, LRAD.        Dates: Start:  06/01/23

## 2023-06-01 NOTE — CARE PLAN
The patient is Stable - Low risk of patient condition declining or worsening    Shift Goals  Clinical Goals: safety    Progress made toward(s) clinical / shift goals:     Problem: Fall Risk - Rehab  Goal: Patient will remain free from falls  Outcome: Progressing  Note: Pt is A&O x 4 bed & WC alarms in place  Pt remains free from falls.   Reinforce use of call light when needs to get out of bed/WC.

## 2023-06-01 NOTE — ASSESSMENT & PLAN NOTE
Had hx of orthostatic hypotension at home  Was on Midodrine and Florinef at [facility] -- was taken off  Cortisol: 40.4 (5/28) --> 2.1 (5/29)  ACTH: 10.3 (5/27) --> 2.2 (5/28)  Likely 2nd to AI  ? Also 2nd to orthostatics  Will recheck orthostatics  Will recheck cortisol  Will d/c Lisinopril  Cont to monitor

## 2023-06-01 NOTE — PROGRESS NOTES
Received shift report from day RN Gale and assumed care of patient.  Patient awake, calm and stable, currently positioned in bed for comfort and safety; call light within reach.  Denies pain or discomfort at this time.  Will continue to monitor.

## 2023-06-01 NOTE — FLOWSHEET NOTE
06/01/23 1538   Events/Summary/Plan   Events/Summary/Plan SpO2 check   Vital Signs   Pulse 70   Respiration 16   Pulse Oximetry 95 %   $ Pulse Oximetry (Spot Check) Yes   Respiratory Assessment   Respiratory Pattern Within Normal Limits   Level of Consciousness Alert   Chest Exam   Work Of Breathing / Effort Within Normal Limits   Oxygen   O2 (LPM) 2   O2 Delivery Device Nasal Cannula

## 2023-06-01 NOTE — PROGRESS NOTES
"  Physical Medicine & Rehabilitation Progress Note    Encounter Date: 6/1/2023    Chief Complaint: Decreased mobility, hypotension    Interval Events (Subjective):  Patient sitting up in room. She had a reoccurrence of severe orthostatic hypotension into 70s and had to be placed back to bed. Discussed would consult hospitalist. She may need fluids. She reports weight loss over last year as well. Denies fever or chills. She reports a few choking events. Discussed would consult SLP    Objective:  VITAL SIGNS: BP (!) 76/52   Pulse 76   Temp 37.1 °C (98.8 °F) (Oral)   Resp 18   Ht 1.6 m (5' 3\")   Wt 59.9 kg (132 lb)   LMP  (LMP Unknown)   SpO2 94%   BMI 23.38 kg/m²   Gen: NAD  Psych: Mood and affect appropriate  CV: RRR, 1+ edema  Resp: CTAB, no upper airway sounds  Abd: NTND  Neuro: AOx3, following commands    Laboratory Values:  Recent Results (from the past 72 hour(s))   CBC WITH DIFFERENTIAL    Collection Time: 05/30/23  4:40 AM   Result Value Ref Range    WBC 5.8 4.8 - 10.8 K/uL    RBC 4.13 (L) 4.20 - 5.40 M/uL    Hemoglobin 12.8 12.0 - 16.0 g/dL    Hematocrit 38.0 37.0 - 47.0 %    MCV 92.0 81.4 - 97.8 fL    MCH 31.0 27.0 - 33.0 pg    MCHC 33.7 32.2 - 35.5 g/dL    RDW 46.7 35.9 - 50.0 fL    Platelet Count 233 164 - 446 K/uL    MPV 9.9 9.0 - 12.9 fL    Neutrophils-Polys 66.80 44.00 - 72.00 %    Lymphocytes 22.50 22.00 - 41.00 %    Monocytes 9.80 0.00 - 13.40 %    Eosinophils 0.50 0.00 - 6.90 %    Basophils 0.20 0.00 - 1.80 %    Immature Granulocytes 0.20 0.00 - 0.90 %    Nucleated RBC 0.00 0.00 - 0.20 /100 WBC    Neutrophils (Absolute) 3.90 1.82 - 7.42 K/uL    Lymphs (Absolute) 1.31 1.00 - 4.80 K/uL    Monos (Absolute) 0.57 0.00 - 0.85 K/uL    Eos (Absolute) 0.03 0.00 - 0.51 K/uL    Baso (Absolute) 0.01 0.00 - 0.12 K/uL    Immature Granulocytes (abs) 0.01 0.00 - 0.11 K/uL    NRBC (Absolute) 0.00 K/uL   MAGNESIUM    Collection Time: 05/30/23  4:40 AM   Result Value Ref Range    Magnesium 1.9 1.5 - 2.5 mg/dL "   Comp Metabolic Panel    Collection Time: 05/30/23  4:40 AM   Result Value Ref Range    Sodium 141 135 - 145 mmol/L    Potassium 3.8 3.6 - 5.5 mmol/L    Chloride 106 96 - 112 mmol/L    Co2 27 20 - 33 mmol/L    Anion Gap 8.0 7.0 - 16.0    Glucose 99 65 - 99 mg/dL    Bun 18 8 - 22 mg/dL    Creatinine 0.66 0.50 - 1.40 mg/dL    Calcium 8.4 (L) 8.5 - 10.5 mg/dL    AST(SGOT) 24 12 - 45 U/L    ALT(SGPT) 14 2 - 50 U/L    Alkaline Phosphatase 75 30 - 99 U/L    Total Bilirubin 0.3 0.1 - 1.5 mg/dL    Albumin 3.2 3.2 - 4.9 g/dL    Total Protein 5.4 (L) 6.0 - 8.2 g/dL    Globulin 2.2 1.9 - 3.5 g/dL    A-G Ratio 1.5 g/dL   CORRECTED CALCIUM    Collection Time: 05/30/23  4:40 AM   Result Value Ref Range    Correct Calcium 9.0 8.5 - 10.5 mg/dL   ESTIMATED GFR    Collection Time: 05/30/23  4:40 AM   Result Value Ref Range    GFR (CKD-EPI) 86 >60 mL/min/1.73 m 2   CBC WITH DIFFERENTIAL    Collection Time: 05/31/23  2:57 AM   Result Value Ref Range    WBC 5.8 4.8 - 10.8 K/uL    RBC 4.16 (L) 4.20 - 5.40 M/uL    Hemoglobin 12.9 12.0 - 16.0 g/dL    Hematocrit 37.8 37.0 - 47.0 %    MCV 90.9 81.4 - 97.8 fL    MCH 31.0 27.0 - 33.0 pg    MCHC 34.1 32.2 - 35.5 g/dL    RDW 44.4 35.9 - 50.0 fL    Platelet Count 248 164 - 446 K/uL    MPV 10.4 9.0 - 12.9 fL    Neutrophils-Polys 68.10 44.00 - 72.00 %    Lymphocytes 20.20 (L) 22.00 - 41.00 %    Monocytes 10.70 0.00 - 13.40 %    Eosinophils 0.50 0.00 - 6.90 %    Basophils 0.20 0.00 - 1.80 %    Immature Granulocytes 0.30 0.00 - 0.90 %    Nucleated RBC 0.00 0.00 - 0.20 /100 WBC    Neutrophils (Absolute) 3.93 1.82 - 7.42 K/uL    Lymphs (Absolute) 1.17 1.00 - 4.80 K/uL    Monos (Absolute) 0.62 0.00 - 0.85 K/uL    Eos (Absolute) 0.03 0.00 - 0.51 K/uL    Baso (Absolute) 0.01 0.00 - 0.12 K/uL    Immature Granulocytes (abs) 0.02 0.00 - 0.11 K/uL    NRBC (Absolute) 0.00 K/uL   MAGNESIUM    Collection Time: 05/31/23  2:57 AM   Result Value Ref Range    Magnesium 1.9 1.5 - 2.5 mg/dL   Comp Metabolic Panel     Collection Time: 05/31/23  2:57 AM   Result Value Ref Range    Sodium 142 135 - 145 mmol/L    Potassium 3.6 3.6 - 5.5 mmol/L    Chloride 105 96 - 112 mmol/L    Co2 30 20 - 33 mmol/L    Anion Gap 7.0 7.0 - 16.0    Glucose 118 (H) 65 - 99 mg/dL    Bun 16 8 - 22 mg/dL    Creatinine 0.65 0.50 - 1.40 mg/dL    Calcium 8.7 8.5 - 10.5 mg/dL    AST(SGOT) 21 12 - 45 U/L    ALT(SGPT) 15 2 - 50 U/L    Alkaline Phosphatase 85 30 - 99 U/L    Total Bilirubin 0.3 0.1 - 1.5 mg/dL    Albumin 3.2 3.2 - 4.9 g/dL    Total Protein 5.5 (L) 6.0 - 8.2 g/dL    Globulin 2.3 1.9 - 3.5 g/dL    A-G Ratio 1.4 g/dL   CORRECTED CALCIUM    Collection Time: 05/31/23  2:57 AM   Result Value Ref Range    Correct Calcium 9.3 8.5 - 10.5 mg/dL   ESTIMATED GFR    Collection Time: 05/31/23  2:57 AM   Result Value Ref Range    GFR (CKD-EPI) 87 >60 mL/min/1.73 m 2   CoV-2, Flu A/B, And RSV by PCR ([email or web address])    Collection Time: 05/31/23 12:43 PM    Specimen: Nasopharyngeal; Respirate   Result Value Ref Range    Influenza virus A RNA Negative Negative    Influenza virus B, PCR Negative Negative    RSV, PCR Negative Negative    SARS-CoV-2 by PCR NotDetected     SARS-CoV-2 Source NP Swab    CBC with Differential    Collection Time: 06/01/23  5:25 AM   Result Value Ref Range    WBC 5.1 4.8 - 10.8 K/uL    RBC 4.37 4.20 - 5.40 M/uL    Hemoglobin 13.2 12.0 - 16.0 g/dL    Hematocrit 41.1 37.0 - 47.0 %    MCV 94.1 81.4 - 97.8 fL    MCH 30.2 27.0 - 33.0 pg    MCHC 32.1 (L) 32.2 - 35.5 g/dL    RDW 46.1 35.9 - 50.0 fL    Platelet Count 242 164 - 446 K/uL    MPV 10.5 9.0 - 12.9 fL    Neutrophils-Polys 72.90 (H) 44.00 - 72.00 %    Lymphocytes 15.70 (L) 22.00 - 41.00 %    Monocytes 10.00 0.00 - 13.40 %    Eosinophils 0.80 0.00 - 6.90 %    Basophils 0.20 0.00 - 1.80 %    Immature Granulocytes 0.40 0.00 - 0.90 %    Nucleated RBC 0.00 0.00 - 0.20 /100 WBC    Neutrophils (Absolute) 3.71 1.82 - 7.42 K/uL    Lymphs (Absolute) 0.80 (L) 1.00 - 4.80 K/uL    Monos (Absolute) 0.51 0.00  - 0.85 K/uL    Eos (Absolute) 0.04 0.00 - 0.51 K/uL    Baso (Absolute) 0.01 0.00 - 0.12 K/uL    Immature Granulocytes (abs) 0.02 0.00 - 0.11 K/uL    NRBC (Absolute) 0.00 K/uL   Comp Metabolic Panel (CMP)    Collection Time: 06/01/23  5:25 AM   Result Value Ref Range    Sodium 137 135 - 145 mmol/L    Potassium 3.9 3.6 - 5.5 mmol/L    Chloride 101 96 - 112 mmol/L    Co2 24 20 - 33 mmol/L    Anion Gap 12.0 7.0 - 16.0    Glucose 130 (H) 65 - 99 mg/dL    Bun 14 8 - 22 mg/dL    Creatinine 0.61 0.50 - 1.40 mg/dL    Calcium 8.6 8.5 - 10.5 mg/dL    AST(SGOT) 26 12 - 45 U/L    ALT(SGPT) 21 2 - 50 U/L    Alkaline Phosphatase 83 30 - 99 U/L    Total Bilirubin 0.3 0.1 - 1.5 mg/dL    Albumin 3.3 3.2 - 4.9 g/dL    Total Protein 5.7 (L) 6.0 - 8.2 g/dL    Globulin 2.4 1.9 - 3.5 g/dL    A-G Ratio 1.4 g/dL   HEMOGLOBIN A1C    Collection Time: 06/01/23  5:25 AM   Result Value Ref Range    Glycohemoglobin 6.5 (H) 4.0 - 5.6 %    Est Avg Glucose 140 mg/dL   TSH with Reflex to FT4    Collection Time: 06/01/23  5:25 AM   Result Value Ref Range    TSH 0.650 0.380 - 5.330 uIU/mL   Vitamin D, 25-hydroxy (blood)    Collection Time: 06/01/23  5:25 AM   Result Value Ref Range    25-Hydroxy   Vitamin D 25 58 30 - 100 ng/mL   CORRECTED CALCIUM    Collection Time: 06/01/23  5:25 AM   Result Value Ref Range    Correct Calcium 9.2 8.5 - 10.5 mg/dL   ESTIMATED GFR    Collection Time: 06/01/23  5:25 AM   Result Value Ref Range    GFR (CKD-EPI) 88 >60 mL/min/1.73 m 2       Medications:  Scheduled Medications   Medication Dose Frequency    NS  1,000 mL Once    Pharmacy Consult Request  1 Each PHARMACY TO DOSE    senna-docusate  2 Tablet BID    omeprazole  20 mg DAILY    DULoxetine  30 mg DAILY    enoxaparin (LOVENOX) injection  40 mg DAILY AT 1800    lovastatin  20 mg Nightly    melatonin  4.5 mg QHS     PRN medications: Respiratory Therapy Consult, hydrALAZINE, acetaminophen, senna-docusate **AND** polyethylene glycol/lytes **AND** magnesium hydroxide  **AND** bisacodyl, mag hydrox-al hydrox-simeth, ondansetron **OR** ondansetron, traZODone, sodium chloride, traMADol, gabapentin    Diet:  Current Diet Order   Procedures    Diet Order Diet: Consistent CHO (Diabetic)       Medical Decision Making and Plan:  Debility - Patient with decreased mobility and weakness 2/2 orthostatic hypotension, low cortisol and multiple falls  -PT and OT for mobility and ADLs. Per guidelines, 15 hours per week between PT, OT and/or SLP.  -Follow-up PCP     Dysphagia - Patient with new dysphagia of choking on foods. Consult SLP. Will check CXR. Patient at high risk for further decline and respiratory failure due to age and debility.     HTN - Patient on Lisinopril. Was previously having orthostatic hypotension requiring Midodrine and Florinef. Will monitor  -Severe orthostatic hypotension on admission into the 70s. Start IV fluids,consult hospitalist and monitor. May need to restart Florinef. Will check CXR. High risk for falls and further injury. Will recheck labs including cortisol per hospitalist     HLD - Patient on Lovastatin 20 mg QHS     Hypocalcemia - Check AM CMP     Low Cortisol - Negative ACTH test. Will monitor - repeat 6/2     PAD - Aortic stenosis on CTA of abdomen. Follow-up Vascular     Depression - Patient on Cymbalta 30 mg daily     Pain - APAP/Tramadol PRN     Skin - Patient at risk for skin breakdown due to debility in areas including sacrum, achilles, elbows and head in addition to other sites. Nursing to assess skin daily.      GI Ppx - Patient on Prilosec for GERD prophylaxis. Patient on Senna-docusate for constipation prophylaxis.      DVT Ppx - Patient Lovenox on transfer.  ____________________________________    T. Toño Will MD/PhD  [facility] - Physical Medicine & Rehabilitation   [facility] - [facility] Injury Medicine   ____________________________________

## 2023-06-02 ENCOUNTER — APPOINTMENT (OUTPATIENT)
Dept: SPEECH THERAPY | Facility: REHABILITATION | Age: 83
DRG: 312 | End: 2023-06-02
Attending: PHYSICAL MEDICINE & REHABILITATION
Payer: MEDICARE

## 2023-06-02 ENCOUNTER — APPOINTMENT (OUTPATIENT)
Dept: OCCUPATIONAL THERAPY | Facility: REHABILITATION | Age: 83
DRG: 312 | End: 2023-06-02
Attending: PHYSICAL MEDICINE & REHABILITATION
Payer: MEDICARE

## 2023-06-02 ENCOUNTER — APPOINTMENT (OUTPATIENT)
Dept: RADIOLOGY | Facility: REHABILITATION | Age: 83
DRG: 312 | End: 2023-06-02
Attending: PHYSICAL MEDICINE & REHABILITATION
Payer: MEDICARE

## 2023-06-02 PROBLEM — R93.89 ABNORMAL CXR: Status: ACTIVE | Noted: 2023-06-02

## 2023-06-02 LAB
ANION GAP SERPL CALC-SCNC: 10 MMOL/L (ref 7–16)
BASOPHILS # BLD AUTO: 0.1 % (ref 0–1.8)
BASOPHILS # BLD: 0.01 K/UL (ref 0–0.12)
BUN SERPL-MCNC: 13 MG/DL (ref 8–22)
CALCIUM SERPL-MCNC: 8.4 MG/DL (ref 8.5–10.5)
CHLORIDE SERPL-SCNC: 105 MMOL/L (ref 96–112)
CO2 SERPL-SCNC: 29 MMOL/L (ref 20–33)
CREAT SERPL-MCNC: 0.55 MG/DL (ref 0.5–1.4)
D DIMER PPP IA.FEU-MCNC: 0.68 UG/ML (FEU) (ref 0–0.5)
EOSINOPHIL # BLD AUTO: 0.04 K/UL (ref 0–0.51)
EOSINOPHIL NFR BLD: 0.5 % (ref 0–6.9)
ERYTHROCYTE [DISTWIDTH] IN BLOOD BY AUTOMATED COUNT: 45.7 FL (ref 35.9–50)
GFR SERPLBLD CREATININE-BSD FMLA CKD-EPI: 90 ML/MIN/1.73 M 2
GLUCOSE SERPL-MCNC: 108 MG/DL (ref 65–99)
HCT VFR BLD AUTO: 38.1 % (ref 37–47)
HGB BLD-MCNC: 12.6 G/DL (ref 12–16)
IMM GRANULOCYTES # BLD AUTO: 0.02 K/UL (ref 0–0.11)
IMM GRANULOCYTES NFR BLD AUTO: 0.3 % (ref 0–0.9)
LYMPHOCYTES # BLD AUTO: 0.97 K/UL (ref 1–4.8)
LYMPHOCYTES NFR BLD: 12.4 % (ref 22–41)
MCH RBC QN AUTO: 31 PG (ref 27–33)
MCHC RBC AUTO-ENTMCNC: 33.1 G/DL (ref 32.2–35.5)
MCV RBC AUTO: 93.6 FL (ref 81.4–97.8)
MONOCYTES # BLD AUTO: 0.69 K/UL (ref 0–0.85)
MONOCYTES NFR BLD AUTO: 8.8 % (ref 0–13.4)
NEUTROPHILS # BLD AUTO: 6.08 K/UL (ref 1.82–7.42)
NEUTROPHILS NFR BLD: 77.9 % (ref 44–72)
NRBC # BLD AUTO: 0 K/UL
NRBC BLD-RTO: 0 /100 WBC (ref 0–0.2)
PLATELET # BLD AUTO: 246 K/UL (ref 164–446)
PMV BLD AUTO: 10.6 FL (ref 9–12.9)
POTASSIUM SERPL-SCNC: 3.6 MMOL/L (ref 3.6–5.5)
RBC # BLD AUTO: 4.07 M/UL (ref 4.2–5.4)
SODIUM SERPL-SCNC: 144 MMOL/L (ref 135–145)
WBC # BLD AUTO: 7.8 K/UL (ref 4.8–10.8)

## 2023-06-02 PROCEDURE — 80048 BASIC METABOLIC PNL TOTAL CA: CPT

## 2023-06-02 PROCEDURE — 700102 HCHG RX REV CODE 250 W/ 637 OVERRIDE(OP): Performed by: HOSPITALIST

## 2023-06-02 PROCEDURE — 36415 COLL VENOUS BLD VENIPUNCTURE: CPT

## 2023-06-02 PROCEDURE — A9270 NON-COVERED ITEM OR SERVICE: HCPCS | Performed by: PHYSICAL MEDICINE & REHABILITATION

## 2023-06-02 PROCEDURE — 92610 EVALUATE SWALLOWING FUNCTION: CPT

## 2023-06-02 PROCEDURE — 99232 SBSQ HOSP IP/OBS MODERATE 35: CPT | Performed by: HOSPITALIST

## 2023-06-02 PROCEDURE — 85379 FIBRIN DEGRADATION QUANT: CPT

## 2023-06-02 PROCEDURE — 700111 HCHG RX REV CODE 636 W/ 250 OVERRIDE (IP): Performed by: PHYSICAL MEDICINE & REHABILITATION

## 2023-06-02 PROCEDURE — 97535 SELF CARE MNGMENT TRAINING: CPT

## 2023-06-02 PROCEDURE — 700102 HCHG RX REV CODE 250 W/ 637 OVERRIDE(OP): Performed by: PHYSICAL MEDICINE & REHABILITATION

## 2023-06-02 PROCEDURE — 94760 N-INVAS EAR/PLS OXIMETRY 1: CPT

## 2023-06-02 PROCEDURE — 770010 HCHG ROOM/CARE - REHAB SEMI PRIVAT*

## 2023-06-02 PROCEDURE — 99233 SBSQ HOSP IP/OBS HIGH 50: CPT | Performed by: PHYSICAL MEDICINE & REHABILITATION

## 2023-06-02 PROCEDURE — A9270 NON-COVERED ITEM OR SERVICE: HCPCS | Performed by: HOSPITALIST

## 2023-06-02 PROCEDURE — 97116 GAIT TRAINING THERAPY: CPT

## 2023-06-02 PROCEDURE — 97112 NEUROMUSCULAR REEDUCATION: CPT

## 2023-06-02 PROCEDURE — 97110 THERAPEUTIC EXERCISES: CPT

## 2023-06-02 PROCEDURE — 85025 COMPLETE CBC W/AUTO DIFF WBC: CPT

## 2023-06-02 PROCEDURE — 71045 X-RAY EXAM CHEST 1 VIEW: CPT

## 2023-06-02 RX ORDER — SODIUM CHLORIDE 9 MG/ML
1000 INJECTION, SOLUTION INTRAVENOUS ONCE
Status: DISCONTINUED | OUTPATIENT
Start: 2023-06-02 | End: 2023-06-02

## 2023-06-02 RX ADMIN — HYDROCORTISONE 10 MG: 10 TABLET ORAL at 20:33

## 2023-06-02 RX ADMIN — Medication 4.5 MG: at 20:32

## 2023-06-02 RX ADMIN — DULOXETINE HYDROCHLORIDE 30 MG: 30 CAPSULE, DELAYED RELEASE ORAL at 09:25

## 2023-06-02 RX ADMIN — SENNOSIDES AND DOCUSATE SODIUM 2 TABLET: 50; 8.6 TABLET ORAL at 20:33

## 2023-06-02 RX ADMIN — OMEPRAZOLE 20 MG: 20 CAPSULE, DELAYED RELEASE ORAL at 09:25

## 2023-06-02 RX ADMIN — LOVASTATIN 20 MG: 20 TABLET ORAL at 20:33

## 2023-06-02 RX ADMIN — ENOXAPARIN SODIUM 40 MG: 100 INJECTION SUBCUTANEOUS at 18:03

## 2023-06-02 RX ADMIN — HYDROCORTISONE 20 MG: 10 TABLET ORAL at 09:25

## 2023-06-02 RX ADMIN — SENNOSIDES AND DOCUSATE SODIUM 2 TABLET: 50; 8.6 TABLET ORAL at 09:25

## 2023-06-02 ASSESSMENT — ACTIVITIES OF DAILY LIVING (ADL)
BED_CHAIR_WHEELCHAIR_TRANSFER_DESCRIPTION: ADAPTIVE EQUIPMENT;INCREASED TIME;INITIAL PREPARATION FOR TASK;VERBAL CUEING
TOILET_TRANSFER_DESCRIPTION: GRAB BAR;INCREASED TIME;INITIAL PREPARATION FOR TASK;REQUIRES LIFT;SET-UP OF EQUIPMENT;SUPERVISION FOR SAFETY;VERBAL CUEING

## 2023-06-02 ASSESSMENT — ENCOUNTER SYMPTOMS
NAUSEA: 0
FEVER: 0
ABDOMINAL PAIN: 0
CHILLS: 0
NERVOUS/ANXIOUS: 0
SHORTNESS OF BREATH: 0
VOMITING: 0
DIARRHEA: 0

## 2023-06-02 ASSESSMENT — PAIN DESCRIPTION - PAIN TYPE: TYPE: ACUTE PAIN

## 2023-06-02 ASSESSMENT — GAIT ASSESSMENTS
GAIT LEVEL OF ASSIST: MINIMAL ASSIST
DEVIATION: SHUFFLED GAIT;DECREASED HEEL STRIKE;DECREASED TOE OFF
DISTANCE (FEET): 20
ASSISTIVE DEVICE: FRONT WHEEL WALKER

## 2023-06-02 NOTE — THERAPY
Speech Language Pathology   Initial Assessment     Patient Name: Rashmi Parra  AGE:  83 y.o., SEX:  female  Medical Record #: [id number]  Today's Date: 6/2/2023     Subjective    Pt pleasant and cooperative during evaluation.  Per notes, pt choked on a piece of meat during last night's dinner.  Pt was given heimlich maneuver, and obstruction was dislodged.     Objective       06/02/23 0801   Evaluation Charges   SLP Oral Pharyngeal Evaluation Oral Pharyngeal Evaluation   SLP Total Time Spent   SLP Individual Total Time Spent (Mins) 30   Prior Living Situation   Prior Services Intermittent Physical Support for ADL Per Service;Intermittent Physical Support for ADL Per Family   Housing / Facility 1 Story House   Lives with - Patient's Self Care Capacity Spouse   Prior Level Of Function   Communication Within Functional Limits   Swallow Within Functional Limits   Dentition Intact   Dentures None   Hearing Within Functional Limits for Evaluation   Hearing Aid None   Vision Reading ;Wears Corrective Lenses   Patient's Primary Language English   Labial Function   Labial Function (WDL) WDL   Lingual Function   Lingual Function (WDL) WDL   Laryngeal Function   Voice Quality Within Functional Limits   Volutional Cough Within Functional Limits   Swallowing   Thin Liquid Within Functional Limits   Masticated Foods Minimal   Dysphagia Strategies / Recommendations   Compensatory Strategies Alternate Solids / Liquids;Single Sips / Bites;Multiple Swallows;No Talking During Eating / Drinking;Monitor During Meals   Diet / Liquid Recommendation Minced & Moist (5) - (Dysphagia II);Thin (0)   Medication Administration  Whole with Liquid Wash  (1 at a time)   Therapy Interventions MBSS Evaluation;Dysphagia Therapy By Speech Language Pathologist;Therapeutic Dining For Meals   Swallowing/Nutritional Status   Swallowing/Nutritional Status Modified food consistency   Functional Level of Assist   Eating Supervision   Eating Description  Verbal cueing;Modified diet;Increased time   Problem List   Problem List Dysphagia   Current Discharge Plan   Current Discharge Plan Return to Prior Living Situation   Benefit   Therapy Benefit Patient would benefit from Inpatient Rehab Speech-Language Pathology to address above identified deficits.   Interdisciplinary Plan of Care Collaboration   IDT Collaboration with  Nursing;Physician   Collaboration Comments Diet downgrade   Strengths & Barriers   Strengths Pleasant and cooperative;Able to follow instructions;Willingly participates in therapeutic activities   Barriers Aspiration risk;Generalized weakness;Orthostatic hypotension   Speech Language Pathologist Assigned   Assigned SLP / Extension CL/MP 60 swallow only         Assessment    Patient is 83 y.o. female with a diagnosis of orthostatic hypotension.  Additional factors influencing patient status/progress (ie: cognitive factors, co-morbidities, social support, etc):  Bedside swallow evaluation completed this day.  Oral motor examination was unremarkable.  Pt stated that she has had previous swallowing difficulty.  Pt stated that she avoids breads and dry meats at home.  Pt tolerated thin liquid by cup, and by straw, without overt s/s of aspiration.  Pt presented with minimal oral residue following level 5 textures.  Residue cleared with thin liquid wash.  Recommend level 5 textures/ thin liquids, meds whole with thin, and therapeutic dining.  Recommend small bites/sips, alternate bites/sips, and to refrain from speaking while eating.      Plan  Recommend Speech Therapy 30-60 minutes per day 5-6 days per week for 3 weeks for the following treatments:  SLP Swallowing Dysfunction Treatment, SLP Oral Pharyngeal Evaluation, SLP Video Swallow Evaluation, and SLP Group Treatment.    Passport items to be completed:  Express basic needs, understand food/liquid recommendations, consistently follow swallow precautions, manage finances, manage medications, arrive to  therapy appointments on time, complete daily memory log entries, solve problems related to safety situations, review education related to hospitalization, complete caregiver training     Goals:  Long term and short term goals have been discussed with patient and they are in agreement.    Speech Therapy Problems (Active)       Problem: Speech/Swallowing LTGs       Dates: Start:  06/02/23         Goal: LTG-By discharge, patient will safely swallow level 6 textures and thin liquids without aspiration.         Dates: Start:  06/02/23               Problem: Swallowing STGs       Dates: Start:  06/02/23         Goal: STG-Within one week, patient will participate in an MBS to fully assess swallow function.        Dates: Start:  06/02/23            Goal: STG-Within one week, patient will recall and utilize swallow strategies with 80% accuracy given min verbal cues.         Dates: Start:  06/02/23

## 2023-06-02 NOTE — CARE PLAN
The patient is Watcher - Medium risk of patient condition declining or worsening    Shift Goals  Clinical Goals: safety, pain management, sleep  Patient Goals: sleep, pain control    Progress made toward(s) clinical / shift goals:      Problem: Risk for Aspiration  Goal: Patient's risk for aspiration will be absent or decrease  Outcome: Not Progressing  Note: Received shift report from day RN Gisele and assumed care of patient.  Was told at handoff report that pt had choked on a large piece of meat with her dinner. She was given the Heimlich maneuver, after several attempts, the food was successfully dislodged. The RN placed a speech/swallow evaluation for tomorrow. Patient awake, calm and stable, currently positioned in bed for comfort and safety; call light within reach.  Denies pain or discomfort at this time.  Will continue to monitor.        Problem: Fall Risk - Rehab  Goal: Patient will remain free from falls  Outcome: Progressing  Note: Pt is A&O x 4 bed & WC alarms in place  Pt remains free from falls.   Reinforce use of call light when needs to get out of bed/WC.

## 2023-06-02 NOTE — PROGRESS NOTES
"  Physical Medicine & Rehabilitation Progress Note    Encounter Date: 6/2/2023    Chief Complaint: Decreased mobility, hypotension    Interval Events (Subjective):  Patient sitting up in room. She had a choking event with dinner. She reportedly required heimlich maneuver. She has since had repeat CXR with possible opacity. Discussed case with hospitalist. SLP evaluated at breakfast and downgraded diet. AM labs without leukocytosis.     Objective:  VITAL SIGNS: BP (!) 77/51   Pulse 93   Temp 36.7 °C (98 °F) (Oral)   Resp 16   Ht 1.6 m (5' 3\")   Wt 59.9 kg (132 lb)   LMP  (LMP Unknown)   SpO2 95%   BMI 23.38 kg/m²   Gen: NAD  Psych: Mood and affect appropriate  CV: RRR, 1+ edema  Resp: CTAB, no upper airway sounds  Abd: NTND  Neuro: AOx3, following commands  Unchanged from 6/1/23 including lung exam    Laboratory Values:  Recent Results (from the past 72 hour(s))   CBC WITH DIFFERENTIAL    Collection Time: 05/31/23  2:57 AM   Result Value Ref Range    WBC 5.8 4.8 - 10.8 K/uL    RBC 4.16 (L) 4.20 - 5.40 M/uL    Hemoglobin 12.9 12.0 - 16.0 g/dL    Hematocrit 37.8 37.0 - 47.0 %    MCV 90.9 81.4 - 97.8 fL    MCH 31.0 27.0 - 33.0 pg    MCHC 34.1 32.2 - 35.5 g/dL    RDW 44.4 35.9 - 50.0 fL    Platelet Count 248 164 - 446 K/uL    MPV 10.4 9.0 - 12.9 fL    Neutrophils-Polys 68.10 44.00 - 72.00 %    Lymphocytes 20.20 (L) 22.00 - 41.00 %    Monocytes 10.70 0.00 - 13.40 %    Eosinophils 0.50 0.00 - 6.90 %    Basophils 0.20 0.00 - 1.80 %    Immature Granulocytes 0.30 0.00 - 0.90 %    Nucleated RBC 0.00 0.00 - 0.20 /100 WBC    Neutrophils (Absolute) 3.93 1.82 - 7.42 K/uL    Lymphs (Absolute) 1.17 1.00 - 4.80 K/uL    Monos (Absolute) 0.62 0.00 - 0.85 K/uL    Eos (Absolute) 0.03 0.00 - 0.51 K/uL    Baso (Absolute) 0.01 0.00 - 0.12 K/uL    Immature Granulocytes (abs) 0.02 0.00 - 0.11 K/uL    NRBC (Absolute) 0.00 K/uL   MAGNESIUM    Collection Time: 05/31/23  2:57 AM   Result Value Ref Range    Magnesium 1.9 1.5 - 2.5 mg/dL "   Comp Metabolic Panel    Collection Time: 05/31/23  2:57 AM   Result Value Ref Range    Sodium 142 135 - 145 mmol/L    Potassium 3.6 3.6 - 5.5 mmol/L    Chloride 105 96 - 112 mmol/L    Co2 30 20 - 33 mmol/L    Anion Gap 7.0 7.0 - 16.0    Glucose 118 (H) 65 - 99 mg/dL    Bun 16 8 - 22 mg/dL    Creatinine 0.65 0.50 - 1.40 mg/dL    Calcium 8.7 8.5 - 10.5 mg/dL    AST(SGOT) 21 12 - 45 U/L    ALT(SGPT) 15 2 - 50 U/L    Alkaline Phosphatase 85 30 - 99 U/L    Total Bilirubin 0.3 0.1 - 1.5 mg/dL    Albumin 3.2 3.2 - 4.9 g/dL    Total Protein 5.5 (L) 6.0 - 8.2 g/dL    Globulin 2.3 1.9 - 3.5 g/dL    A-G Ratio 1.4 g/dL   CORRECTED CALCIUM    Collection Time: 05/31/23  2:57 AM   Result Value Ref Range    Correct Calcium 9.3 8.5 - 10.5 mg/dL   ESTIMATED GFR    Collection Time: 05/31/23  2:57 AM   Result Value Ref Range    GFR (CKD-EPI) 87 >60 mL/min/1.73 m 2   CoV-2, Flu A/B, And RSV by PCR ([employer])    Collection Time: 05/31/23 12:43 PM    Specimen: Nasopharyngeal; Respirate   Result Value Ref Range    Influenza virus A RNA Negative Negative    Influenza virus B, PCR Negative Negative    RSV, PCR Negative Negative    SARS-CoV-2 by PCR NotDetected     SARS-CoV-2 Source NP Swab    CBC with Differential    Collection Time: 06/01/23  5:25 AM   Result Value Ref Range    WBC 5.1 4.8 - 10.8 K/uL    RBC 4.37 4.20 - 5.40 M/uL    Hemoglobin 13.2 12.0 - 16.0 g/dL    Hematocrit 41.1 37.0 - 47.0 %    MCV 94.1 81.4 - 97.8 fL    MCH 30.2 27.0 - 33.0 pg    MCHC 32.1 (L) 32.2 - 35.5 g/dL    RDW 46.1 35.9 - 50.0 fL    Platelet Count 242 164 - 446 K/uL    MPV 10.5 9.0 - 12.9 fL    Neutrophils-Polys 72.90 (H) 44.00 - 72.00 %    Lymphocytes 15.70 (L) 22.00 - 41.00 %    Monocytes 10.00 0.00 - 13.40 %    Eosinophils 0.80 0.00 - 6.90 %    Basophils 0.20 0.00 - 1.80 %    Immature Granulocytes 0.40 0.00 - 0.90 %    Nucleated RBC 0.00 0.00 - 0.20 /100 WBC    Neutrophils (Absolute) 3.71 1.82 - 7.42 K/uL    Lymphs (Absolute) 0.80 (L) 1.00 - 4.80 K/uL     Monos (Absolute) 0.51 0.00 - 0.85 K/uL    Eos (Absolute) 0.04 0.00 - 0.51 K/uL    Baso (Absolute) 0.01 0.00 - 0.12 K/uL    Immature Granulocytes (abs) 0.02 0.00 - 0.11 K/uL    NRBC (Absolute) 0.00 K/uL   Comp Metabolic Panel (CMP)    Collection Time: 06/01/23  5:25 AM   Result Value Ref Range    Sodium 137 135 - 145 mmol/L    Potassium 3.9 3.6 - 5.5 mmol/L    Chloride 101 96 - 112 mmol/L    Co2 24 20 - 33 mmol/L    Anion Gap 12.0 7.0 - 16.0    Glucose 130 (H) 65 - 99 mg/dL    Bun 14 8 - 22 mg/dL    Creatinine 0.61 0.50 - 1.40 mg/dL    Calcium 8.6 8.5 - 10.5 mg/dL    AST(SGOT) 26 12 - 45 U/L    ALT(SGPT) 21 2 - 50 U/L    Alkaline Phosphatase 83 30 - 99 U/L    Total Bilirubin 0.3 0.1 - 1.5 mg/dL    Albumin 3.3 3.2 - 4.9 g/dL    Total Protein 5.7 (L) 6.0 - 8.2 g/dL    Globulin 2.4 1.9 - 3.5 g/dL    A-G Ratio 1.4 g/dL   HEMOGLOBIN A1C    Collection Time: 06/01/23  5:25 AM   Result Value Ref Range    Glycohemoglobin 6.5 (H) 4.0 - 5.6 %    Est Avg Glucose 140 mg/dL   TSH with Reflex to FT4    Collection Time: 06/01/23  5:25 AM   Result Value Ref Range    TSH 0.650 0.380 - 5.330 uIU/mL   Vitamin D, 25-hydroxy (blood)    Collection Time: 06/01/23  5:25 AM   Result Value Ref Range    25-Hydroxy   Vitamin D 25 58 30 - 100 ng/mL   CORRECTED CALCIUM    Collection Time: 06/01/23  5:25 AM   Result Value Ref Range    Correct Calcium 9.2 8.5 - 10.5 mg/dL   ESTIMATED GFR    Collection Time: 06/01/23  5:25 AM   Result Value Ref Range    GFR (CKD-EPI) 88 >60 mL/min/1.73 m 2   CBC WITH DIFFERENTIAL    Collection Time: 06/02/23  5:32 AM   Result Value Ref Range    WBC 7.8 4.8 - 10.8 K/uL    RBC 4.07 (L) 4.20 - 5.40 M/uL    Hemoglobin 12.6 12.0 - 16.0 g/dL    Hematocrit 38.1 37.0 - 47.0 %    MCV 93.6 81.4 - 97.8 fL    MCH 31.0 27.0 - 33.0 pg    MCHC 33.1 32.2 - 35.5 g/dL    RDW 45.7 35.9 - 50.0 fL    Platelet Count 246 164 - 446 K/uL    MPV 10.6 9.0 - 12.9 fL    Neutrophils-Polys 77.90 (H) 44.00 - 72.00 %    Lymphocytes 12.40 (L) 22.00 -  41.00 %    Monocytes 8.80 0.00 - 13.40 %    Eosinophils 0.50 0.00 - 6.90 %    Basophils 0.10 0.00 - 1.80 %    Immature Granulocytes 0.30 0.00 - 0.90 %    Nucleated RBC 0.00 0.00 - 0.20 /100 WBC    Neutrophils (Absolute) 6.08 1.82 - 7.42 K/uL    Lymphs (Absolute) 0.97 (L) 1.00 - 4.80 K/uL    Monos (Absolute) 0.69 0.00 - 0.85 K/uL    Eos (Absolute) 0.04 0.00 - 0.51 K/uL    Baso (Absolute) 0.01 0.00 - 0.12 K/uL    Immature Granulocytes (abs) 0.02 0.00 - 0.11 K/uL    NRBC (Absolute) 0.00 K/uL   Basic Metabolic Panel    Collection Time: 06/02/23  5:32 AM   Result Value Ref Range    Sodium 144 135 - 145 mmol/L    Potassium 3.6 3.6 - 5.5 mmol/L    Chloride 105 96 - 112 mmol/L    Co2 29 20 - 33 mmol/L    Glucose 108 (H) 65 - 99 mg/dL    Bun 13 8 - 22 mg/dL    Creatinine 0.55 0.50 - 1.40 mg/dL    Calcium 8.4 (L) 8.5 - 10.5 mg/dL    Anion Gap 10.0 7.0 - 16.0   D-DIMER    Collection Time: 06/02/23  5:32 AM   Result Value Ref Range    D-Dimer 0.68 (H) 0.00 - 0.50 ug/mL (FEU)   ESTIMATED GFR    Collection Time: 06/02/23  5:32 AM   Result Value Ref Range    GFR (CKD-EPI) 90 >60 mL/min/1.73 m 2       Medications:  Scheduled Medications   Medication Dose Frequency    NS  1,000 mL Once    hydrocortisone  20 mg DAILY    hydrocortisone  10 mg Nightly    Pharmacy Consult Request  1 Each PHARMACY TO DOSE    senna-docusate  2 Tablet BID    omeprazole  20 mg DAILY    DULoxetine  30 mg DAILY    enoxaparin (LOVENOX) injection  40 mg DAILY AT 1800    lovastatin  20 mg Nightly    melatonin  4.5 mg QHS     PRN medications: Respiratory Therapy Consult, hydrALAZINE, acetaminophen, senna-docusate **AND** polyethylene glycol/lytes **AND** magnesium hydroxide **AND** bisacodyl, mag hydrox-al hydrox-simeth, ondansetron **OR** ondansetron, traZODone, sodium chloride, traMADol, gabapentin    Diet:  Current Diet Order   Procedures    Diet Order Diet: Level 5 - Minced and Moist (meds whole 1 at a time with thin); Liquid level: Level 0 - Thin; Second  Modifier: (optional): Consistent CHO (Diabetic)       Medical Decision Making and Plan:  Debility - Patient with decreased mobility and weakness 2/2 orthostatic hypotension, low cortisol and multiple falls  -PT and OT for mobility and ADLs. Per guidelines, 15 hours per week between PT, OT and/or SLP.  -Follow-up PCP     Dysphagia - Patient with new dysphagia of choking on foods. Consult SLP. Will check CXR. Patient at high risk for further decline and respiratory failure due to age and debility.   -Choking event 6/1 with dinner. Repeat CXR, AM labs. Discussed with hospitalist and consider Abx    HTN - Patient on Lisinopril. Was previously having orthostatic hypotension requiring Midodrine and Florinef. Will monitor  -Severe orthostatic hypotension on admission into the 70s. Start IV fluids,consult hospitalist and monitor. May need to restart Florinef. Will check CXR. High risk for falls and further injury. Will recheck labs including cortisol per hospitalist     HLD - Patient on Lovastatin 20 mg QHS     Hypocalcemia - Check AM CMP     Low Cortisol - Negative ACTH test. Will monitor - repeat 6/2     PAD - Aortic stenosis on CTA of abdomen. Follow-up Vascular     Depression - Patient on Cymbalta 30 mg daily     Pain - APAP/Tramadol PRN     Skin - Patient at risk for skin breakdown due to debility in areas including sacrum, achilles, elbows and head in addition to other sites. Nursing to assess skin daily.      GI Ppx - Patient on Prilosec for GERD prophylaxis. Patient on Senna-docusate for constipation prophylaxis.      DVT Ppx - Patient Lovenox on transfer.  ____________________________________    T. Toño Will MD/PhD  [facility] - Physical Medicine & Rehabilitation   [facility] - Brain Injury Medicine   ____________________________________    Total time:  50 minutes. Time spent included pre-rounding review of vitals and tests, unit/floor time, face-to-face time with the patient including physical examination, care  coordination, counseling of patient and/or family, ordering medications/procedures/tests, discussion with CM, PT, OT, SLP and/or other healthcare providers, and documentation in the electronic medical record. Topics discussed included choking event, ongoing dysphagia, SLP downgraded diet, and discussed case with hospitalist.

## 2023-06-02 NOTE — ASSESSMENT & PLAN NOTE
Has had no cough  Afebrile w/ normal WBC and PCT  CXR streaky airspace disease in RUL, suspect pneumonitis  No compelling indication to initiate empiric antimicrobial therapy at this time  Continue to monitor

## 2023-06-02 NOTE — PROGRESS NOTES
[facility] Medicine Daily Progress Note    Date of Service  6/2/2023    Chief Complaint:  Hypotension    Interval History:  No complaints.  Doing ok.    Review of Systems  Review of Systems   Constitutional:  Negative for chills and fever.   Respiratory:  Negative for shortness of breath.    Cardiovascular:  Negative for chest pain.   Gastrointestinal:  Negative for abdominal pain, diarrhea, nausea and vomiting.   Psychiatric/Behavioral:  The patient is not nervous/anxious.         Physical Exam  Temp:  [36.3 °C (97.3 °F)-37 °C (98.6 °F)] 36.3 °C (97.3 °F)  Pulse:  [66-93] 91  Resp:  [16-18] 18  BP: ()/(51-75) 174/75  SpO2:  [94 %-97 %] 95 %    Physical Exam  Vitals and nursing note reviewed.   Constitutional:       Appearance: Normal appearance.   HENT:      Head: Atraumatic.   Eyes:      Conjunctiva/sclera: Conjunctivae normal.      Pupils: Pupils are equal, round, and reactive to light.   Cardiovascular:      Rate and Rhythm: Normal rate and regular rhythm.   Pulmonary:      Effort: Pulmonary effort is normal.      Breath sounds: Normal breath sounds.   Abdominal:      General: Bowel sounds are normal.      Palpations: Abdomen is soft.   Musculoskeletal:      Cervical back: Normal range of motion and neck supple.      Right lower leg: No edema.      Left lower leg: No edema.   Skin:     General: Skin is warm and dry.   Neurological:      Mental Status: She is alert and oriented to person, place, and time.   Psychiatric:         Mood and Affect: Mood normal.         Behavior: Behavior normal.         Fluids    Intake/Output Summary (Last 24 hours) at 6/2/2023 1338  Last data filed at 6/2/2023 1300  Gross per 24 hour   Intake 360 ml   Output --   Net 360 ml       Laboratory  Recent Labs     05/31/23  0257 06/01/23  0525 06/02/23  0532   WBC 5.8 5.1 7.8   RBC 4.16* 4.37 4.07*   HEMOGLOBIN 12.9 13.2 12.6   HEMATOCRIT 37.8 41.1 38.1   MCV 90.9 94.1 93.6   MCH 31.0 30.2 31.0   MCHC 34.1 32.1* 33.1   RDW 44.4 46.1  45.7   PLATELETCT 248 242 246   MPV 10.4 10.5 10.6     Recent Labs     05/31/23  0257 06/01/23  0525 06/02/23  0532   SODIUM 142 137 144   POTASSIUM 3.6 3.9 3.6   CHLORIDE 105 101 105   CO2 30 24 29   GLUCOSE 118* 130* 108*   BUN 16 14 13   CREATININE 0.65 0.61 0.55   CALCIUM 8.7 8.6 8.4*                   Imaging    Assessment/Plan  Abnormal CXR  Assessment & Plan  Has been afebrile  No leukocytosis (6/2)  Denies cough  Pt asymptomatic  CXR (6/1, 2 pm): unremarkable  CXR (6/1, 6 pm): showed developing left opacities - likely pneumonitis  CXR (6/2): pending  Note: pt was choking and likely aspirated at dinner on 6/1  Will hold off on abx for now -- pt asymptomatic  Cont to monitor    Hypotension  Assessment & Plan  BP is always low when taken on the arms  BP is usually higher in the legs  BP (6/2): 77/51 (8 am, legs) --> 174/75 (noon, legs)  Was on Midodrine and Florinef at [facility] and was taken off  Was on steroids at [facility] and was taken off  On 2 liters O2 NC  Cortisol: 40.4 --> 2.1 (5/29)  ACTH: 10.3 --> 2.2 (5/28)  TSH: wnl (6/1)  D-Dimer: 0.68  Doubt infection: has been afebrile, no leukocytosis  Likely 2nd to AI  Off Lisinopril  S/P IVF's x 1 liter (6/1)  On Cortef bid  Will check orthostatics -- using legs  Will have nursing take BP on the left leg only  Note: pt has hx of b/l mastectomy and BP needs to be taken on the legs  Note: pt is on Cymbalta and dose was cut in half (was taking at home)  Cont to monitor    Depression- (present on admission)  Assessment & Plan  Cont Cymbalta    Dyslipidemia- (present on admission)  Assessment & Plan  Cont Lovastatin

## 2023-06-02 NOTE — THERAPY
"Occupational Therapy  Daily Treatment     Patient Name: Rashmi Parra  Age:  83 y.o., Sex:  female  Medical Record #: [id number]  Today's Date: 6/2/2023     Precautions  Precautions: Fall Risk  Comments: orthostatic, double mastectomy causes inaccurate readings         Subjective    \"I would prefer to eat in my room, I'm shy.\" Pt requesting to stay in room for lunch- pt was evaled for speech this morning and is now in T-dine due to choking incident yesterday.      Objective       06/02/23 1001   OT Charge Group   OT Self Care / ADL (Units) 6   OT Total Time Spent   OT Individual Total Time Spent (Mins) 90   Vitals   O2 (LPM) 3   O2 Delivery Device Nasal Cannula   Cognition    Level of Consciousness Alert   Functional Level of Assist   Grooming Supervision;Seated  (brush teeth, brush hair)   Grooming Description Increased time;Initial preparation for task;Seated in wheelchair at sink;Supervision for safety;Set-up of equipment   Bathing Moderate Assist   Bathing Description Grab bar;Hand held shower;Assit with back;Assit wtih lower extremities;Assit with perineal;Increased time;Initial preparation for task;Set-up of equipment;Supervision for safety;Verbal cueing   Upper Body Dressing Minimal Assist   Upper Body Dressing Description Increased time;Initial preparation for task;Set-up of equipment;Supervision for safety;Verbal cueing  (doff/don pull over shirt; assist to pull down shirt in back)   Lower Body Dressing Maximal Assist   Lower Body Dressing Description Assist with threading into pant leg;Increased time;Initial preparation for task;Set-up of equipment;Supervision for safety;Verbal cueing;Grab bar  (doff/don brief/pants/socks and SWAPNIL hose)   Toileting Maximal Assist   Toileting Description Assist for hygiene;Assist to pull pants up;Assist to pull pants down;Grab bar;Increased time;Initial preparation for task;Supervision for safety;Set-up of equipment;Verbal cueing   Toilet Transfers Minimal Assist   Toilet " Transfer Description Grab bar;Increased time;Initial preparation for task;Requires lift;Set-up of equipment;Supervision for safety;Verbal cueing   Tub / Shower Transfers Moderate Assist   Tub Shower Transfer Description Grab bar;Shower bench;Increased time;Requires lift;Set-up of equipment;Supervision for safety;Verbal cueing;Initial preparation for task   Interdisciplinary Plan of Care Collaboration   Patient Position at End of Therapy Seated;Chair Alarm On;Self Releasing Lap Belt Applied  (taken to T-dine)         Assessment    Pt tolerated session fair. Pt required 90 minutes for toileting/shower and dressing task due to increased time for all tasks due to fatigue. Pt reported loosing strength from sitting up in the chair too long. Pt required assist for STS's and having increased difficulty maintaining standing balance at end of session to assist with pant mgmt- pt needing to hold herself up while therapist assisted with donning pants/brief over hips. Pt will benefit on standing balance with removing 1 arm at a time to simulate pant mgmt. Pt will benefit from use of AE for LBD and bathing next session for increased independence with ADL's.     Strengths: Able to follow instructions, Alert and oriented, Manages pain appropriately, Motivated for self care and independence, Pleasant and cooperative, Willingly participates in therapeutic activities  Barriers: Decreased endurance, Generalized weakness, Fatigue, Hypotension, Orthostatic hypotension, Impaired activity tolerance, Impaired balance, Limited mobility    Plan    ADLs w/ AE  Functional transfers  UE strengthening  Standing balance/tolerance     Passport items to be completed:  Perform bathroom transfers, complete dressing, complete feeding, get ready for the day, prepare a simple meal, participate in household tasks, adapt home for safety needs, demonstrate home exercise program, complete caregiver training        Occupational Therapy Goals (Active)        Problem: Bathing       Dates: Start:  06/01/23         Goal: STG-Within one week, patient will bathe with Min A overall using AE/DME as needed.       Dates: Start:  06/01/23               Problem: Dressing       Dates: Start:  06/01/23         Goal: STG-Within one week, patient will dress UB with SBA overall using AE/DME as needed.       Dates: Start:  06/01/23            Goal: STG-Within one week, patient will dress LB with Mod A overall using AE/DME as needed.       Dates: Start:  06/01/23               Problem: Functional Transfers       Dates: Start:  06/01/23         Goal: STG-Within one week, patient will transfer to toilet with CGA and LRD overall utilizing DME.AE as needed.       Dates: Start:  06/01/23               Problem: OT Long Term Goals       Dates: Start:  06/01/23         Goal: LTG-By discharge, patient will complete basic self care tasks with supervision overall using AE/DME as needed.       Dates: Start:  06/01/23            Goal: LTG-By discharge, patient will perform bathroom transfers with supervision and LRD overall using AE/DME as needed.       Dates: Start:  06/01/23               Problem: Toileting       Dates: Start:  06/01/23         Goal: STG-Within one week, patient will complete toileting tasks with min A overall using AE/DME as needed.       Dates: Start:  06/01/23

## 2023-06-02 NOTE — CARE PLAN
"The patient is Watcher - Medium risk of patient condition declining or worsening      Problem: Fall Risk - Rehab  Goal: Patient will remain free from falls  Outcome: Progressing  Note: Anastasia Issa Fall risk Assessment Score: 21  Pt has used call light appropriately and waits for assistance.   High fall risk Interventions   - Alarming seatbelt  - Wander guard  - Bed and strip alarm   - Yellow sign by the door   - Yellow wrist band \"Fall risk\"  - Room near to the nurse station  - Do not leave patient unattended in the bathroom  - Fall risk education provided           Problem: Nutrition  Goal: Patient's nutritional and fluid intake will be adequate or improve  Outcome: Progressing  Note: Patient has poor appetite this shift <50% of meals. Pt encouraged to eat at least 50%. Pt also encouraged to increase fluids.  "

## 2023-06-02 NOTE — PROGRESS NOTES
Received shift report and assumed care of patient.  Patient awake, calm and stable, currently positioned in bed for comfort and safety; call light within reach. 3/10 abdominal pain at this time.  Will continue to monitor.

## 2023-06-02 NOTE — PROGRESS NOTES
Received shift report from day RN Gisele and assumed care of patient.  Was told at handoff report that pt had choked on a large piece of meat with her dinner. She was given the Heimlich maneuver, after several attempts, the food was successfully dislodged. The RN placed a speech/swallow evaluation for tomorrow. Patient awake, calm and stable, currently positioned in bed for comfort and safety; call light within reach.  Denies pain or discomfort at this time.  Will continue to monitor.

## 2023-06-02 NOTE — FLOWSHEET NOTE
06/02/23 0709   Events/Summary/Plan   Events/Summary/Plan 02 pulse ox check   Vital Signs   Pulse 76   Respiration 16   Pulse Oximetry 95 %   $ Pulse Oximetry (Spot Check) Yes   Respiratory Assessment   Respiratory Pattern Within Normal Limits   Level of Consciousness Alert   Oxygen   O2 (LPM) 4  (02 titrated to 3L)   O2 Delivery Device Nasal Cannula

## 2023-06-02 NOTE — THERAPY
"Physical Therapy   Daily Treatment     Patient Name: Rashmi Parra  Age:  83 y.o., Sex:  female  Medical Record #: [id number]  Today's Date: 6/2/2023     Precautions  Precautions: Fall Risk  Comments: orthostatic, double mastectomy causes inaccurate readings    Subjective    \"I'm ready to do something.\" Pt in w/c at arrival, agreeable to PT tx.      Objective       06/02/23 1331   PT Charge Group   PT Gait Training (Units) 2   PT Therapeutic Exercise (Units) 1   PT Neuromuscular Re-Education / Balance (Units) 1   PT Total Time Spent   PT Individual Total Time Spent (Mins) 60   Vitals   Pulse 87   Pulse Oximetry 97 %   O2 (LPM) 2   O2 Delivery Device Nasal Cannula   Vitals Comments poor perfusion limits read   Gait Functional Level of Assist    Gait Level Of Assist Minimal Assist   Assistive Device Front Wheel Walker   Distance (Feet) 20   # of Times Distance was Traveled 2   Deviation Shuffled Gait;Decreased Heel Strike;Decreased Toe Off   Transfer Functional Level of Assist   Bed, Chair, Wheelchair Transfer Moderate Assist   Bed Chair Wheelchair Transfer Description Adaptive equipment;Increased time;Initial preparation for task;Verbal cueing  (lifting and initial steadying assist)   Sitting Lower Body Exercises   Sitting Lower Body Exercises Yes   Nustep Resistance Level 2;Time (See Comments)  (10'00\" CELIA/TIMUR, SPM: 50)   Bed Mobility    Sit to Stand Moderate Assist  (from standard w/c for attaining balance due to posterior lean)   Neuro-Muscular Treatments   Neuro-Muscular Treatments Anterior weight shift;Weight Shift Right;Weight Shift Left;Postural Facilitation;Postural Changes;Sequencing;Verbal Cuing;Tactile Cuing   Comments unsupported seated balance and trunk control:  dowel OH reaches/shoulder flx 1 x 8, forward chest press 1 x 8, lateral reaches 1 x 8 each, balloon toss c/ dowel hits x 20 reps, ball catch & toss/dual task naming activity 1 x 10 reps   Interdisciplinary Plan of Care Collaboration   IDT " Collaboration with  Nursing;Certified Nursing Assistant   Patient Position at End of Therapy In Bed   Collaboration Comments handoff for orthostatics at end fo session     Session focused on progression of trunk control, CV endurance, STS and gait.  From high perch at EOM:  STS 1 x 5 c/ cues for UE placement, cues at trunk for forward lean  Seated TE as above  Gait c/ FWW, cues for step length/amplitude, pt cued to count steps out loud; hand over hand/MC for FWW management during turns and backing to w/c  Rest breaks btw therapy activities.     Assessment    Aleyda with decreased trunk control and functional endurance c/ increased posterior and R sided lean in unsupported sitting. Good response to dual task and bimanual motor activities in seated to promote righting responses and trunk mm facilitation.     Strengths: Able to follow instructions, Adequate strength, Independent prior level of function, Motivated for self care and independence, Pleasant and cooperative, Willingly participates in therapeutic activities, Supportive family  Barriers: Fatigue, Decreased endurance, Generalized weakness, Home accessibility, Hypotension, Impaired balance, Impaired carryover of learning, Impaired activity tolerance, Limited mobility, Visual impairment    Plan    Progress seated and standing tolerance, gait c/ cues for step length, CV endurance. Consider SaraSteady or standing frame to promote upright tolerance. Manual and TE for trunk and hip ROM/mobility, posture.     Passport items to be completed:  Get in/out of bed safely, in/out of a vehicle, safely use mobility device, walk or wheel around home/community, navigate up and down stairs, show how to get up/down from the ground, ensure home is accessible, demonstrate HEP, complete caregiver training    Physical Therapy Problems (Active)       Problem: Mobility       Dates: Start:  06/01/23         Goal: STG-Within one week, patient will ambulate household distances >/= 50' c/  Donald or better and LRAD (FWW vs 4WW).        Dates: Start:  06/01/23            Goal: STG-Within one week, patient will ambulate up/down a curb with ModA or better, LRAD.       Dates: Start:  06/01/23               Problem: Mobility Transfers       Dates: Start:  06/01/23         Goal: STG-Within one week, patient will perform bed mobility with Liliam.        Dates: Start:  06/01/23            Goal: STG-Within one week, patient will transfer bed to chair c/ Donald or better.        Dates: Start:  06/01/23               Problem: PT-Long Term Goals       Dates: Start:  06/01/23         Goal: LTG-By discharge, patient will ambulate >/= 100' in a single effort c/ LRAD at SBA or better.        Dates: Start:  06/01/23            Goal: LTG-By discharge, patient will transfer one surface to another c/ Liliam or better.        Dates: Start:  06/01/23            Goal: LTG-By discharge, patient will ambulate up/down 2 stairs c/ BHR to safely access home environment at CGA or better.        Dates: Start:  06/01/23            Goal: LTG-By discharge, patient will transfer in/out of a car with Donald or better, LRAD.        Dates: Start:  06/01/23

## 2023-06-02 NOTE — FLOWSHEET NOTE
06/02/23 0715   Events/Summary/Plan   Events/Summary/Plan 02 pulse ox check   Vital Signs   Pulse 76   Respiration 18   Pulse Oximetry 94 %   $ Pulse Oximetry (Spot Check) Yes   Respiratory Assessment   Respiratory Pattern Within Normal Limits   Level of Consciousness Alert   Oxygen   O2 (LPM) 3   O2 Delivery Device Nasal Cannula

## 2023-06-02 NOTE — PROGRESS NOTES
Rapid Response was called @ 1721. Patient was choking with a piece of meat. CNA and two RNs performed Heimlich maneuver. A piece of meat was ejected after the maneuver. Patient was able to cough and talk after the piece of meat was ejected. VSS. CN notified Dr. Serrano. STAT Chest X-ray was ordered. Swallow Evaluation was ordered today.

## 2023-06-03 ENCOUNTER — APPOINTMENT (OUTPATIENT)
Dept: PHYSICAL THERAPY | Facility: REHABILITATION | Age: 83
DRG: 312 | End: 2023-06-03
Attending: PHYSICAL MEDICINE & REHABILITATION
Payer: MEDICARE

## 2023-06-03 ENCOUNTER — APPOINTMENT (OUTPATIENT)
Dept: SPEECH THERAPY | Facility: REHABILITATION | Age: 83
DRG: 312 | End: 2023-06-03
Attending: PHYSICAL MEDICINE & REHABILITATION
Payer: MEDICARE

## 2023-06-03 PROCEDURE — 700102 HCHG RX REV CODE 250 W/ 637 OVERRIDE(OP): Performed by: PHYSICAL MEDICINE & REHABILITATION

## 2023-06-03 PROCEDURE — 92526 ORAL FUNCTION THERAPY: CPT

## 2023-06-03 PROCEDURE — 97116 GAIT TRAINING THERAPY: CPT

## 2023-06-03 PROCEDURE — 700111 HCHG RX REV CODE 636 W/ 250 OVERRIDE (IP): Performed by: PHYSICAL MEDICINE & REHABILITATION

## 2023-06-03 PROCEDURE — 97535 SELF CARE MNGMENT TRAINING: CPT

## 2023-06-03 PROCEDURE — 94760 N-INVAS EAR/PLS OXIMETRY 1: CPT

## 2023-06-03 PROCEDURE — 770010 HCHG ROOM/CARE - REHAB SEMI PRIVAT*

## 2023-06-03 PROCEDURE — A9270 NON-COVERED ITEM OR SERVICE: HCPCS | Performed by: HOSPITALIST

## 2023-06-03 PROCEDURE — 700102 HCHG RX REV CODE 250 W/ 637 OVERRIDE(OP): Performed by: HOSPITALIST

## 2023-06-03 PROCEDURE — 97110 THERAPEUTIC EXERCISES: CPT

## 2023-06-03 PROCEDURE — 97530 THERAPEUTIC ACTIVITIES: CPT

## 2023-06-03 PROCEDURE — 99232 SBSQ HOSP IP/OBS MODERATE 35: CPT | Performed by: HOSPITALIST

## 2023-06-03 PROCEDURE — A9270 NON-COVERED ITEM OR SERVICE: HCPCS | Performed by: PHYSICAL MEDICINE & REHABILITATION

## 2023-06-03 RX ORDER — POLYETHYLENE GLYCOL 3350 17 G/17G
1 POWDER, FOR SOLUTION ORAL DAILY
Status: DISCONTINUED | OUTPATIENT
Start: 2023-06-03 | End: 2023-06-17 | Stop reason: HOSPADM

## 2023-06-03 RX ORDER — AMOXICILLIN 250 MG
2 CAPSULE ORAL 2 TIMES DAILY PRN
Status: DISCONTINUED | OUTPATIENT
Start: 2023-06-03 | End: 2023-06-17 | Stop reason: HOSPADM

## 2023-06-03 RX ORDER — POLYETHYLENE GLYCOL 3350 17 G/17G
1 POWDER, FOR SOLUTION ORAL
Status: DISCONTINUED | OUTPATIENT
Start: 2023-06-03 | End: 2023-06-03

## 2023-06-03 RX ORDER — MIDODRINE HYDROCHLORIDE 2.5 MG/1
2.5 TABLET ORAL
Status: DISCONTINUED | OUTPATIENT
Start: 2023-06-03 | End: 2023-06-06

## 2023-06-03 RX ORDER — BISACODYL 10 MG
10 SUPPOSITORY, RECTAL RECTAL
Status: DISCONTINUED | OUTPATIENT
Start: 2023-06-03 | End: 2023-06-17 | Stop reason: HOSPADM

## 2023-06-03 RX ADMIN — LOVASTATIN 20 MG: 20 TABLET ORAL at 20:34

## 2023-06-03 RX ADMIN — Medication 4.5 MG: at 20:35

## 2023-06-03 RX ADMIN — OMEPRAZOLE 20 MG: 20 CAPSULE, DELAYED RELEASE ORAL at 08:48

## 2023-06-03 RX ADMIN — DULOXETINE HYDROCHLORIDE 30 MG: 30 CAPSULE, DELAYED RELEASE ORAL at 08:48

## 2023-06-03 RX ADMIN — HYDROCORTISONE 20 MG: 10 TABLET ORAL at 08:48

## 2023-06-03 RX ADMIN — ENOXAPARIN SODIUM 40 MG: 100 INJECTION SUBCUTANEOUS at 17:12

## 2023-06-03 ASSESSMENT — GAIT ASSESSMENTS
DISTANCE (FEET): 50
DEVIATION: BRADYKINETIC;SHUFFLED GAIT
ASSISTIVE DEVICE: FRONT WHEEL WALKER
GAIT LEVEL OF ASSIST: MINIMAL ASSIST

## 2023-06-03 ASSESSMENT — ENCOUNTER SYMPTOMS
NAUSEA: 0
BLURRED VISION: 0
FEVER: 0
DIZZINESS: 0
VOMITING: 0
HEADACHES: 0
SHORTNESS OF BREATH: 0
HALLUCINATIONS: 0
PALPITATIONS: 0

## 2023-06-03 ASSESSMENT — ACTIVITIES OF DAILY LIVING (ADL)
BED_CHAIR_WHEELCHAIR_TRANSFER_DESCRIPTION: ADAPTIVE EQUIPMENT;INCREASED TIME
TOILET_TRANSFER_DESCRIPTION: GRAB BAR;INCREASED TIME
BED_CHAIR_WHEELCHAIR_TRANSFER_DESCRIPTION: ADAPTIVE EQUIPMENT;INCREASED TIME;REQUIRES LIFT

## 2023-06-03 NOTE — CARE PLAN
"The patient is Stable - Low risk of patient condition declining or worsening    Shift Goals  Clinical Goals: safety, pain mamagement, sleep  Patient Goals: rest, pain control    Progress made toward(s) clinical / shift goals:      Problem: Fall Risk - Rehab  Goal: Patient will remain free from falls  Outcome: Progressing  Note: Anastasia Issa Fall risk Assessment Score: 23    High fall risk Interventions   - Alarming seatbelt  - Bed and strip alarm   - Yellow sign by the door   - Yellow wrist band \"Fall risk\"  - Room near to the nurse station  - Do not leave patient unattended in the bathroom  - Fall risk education provided    Pt calls appropriately when in need of assistance.     Problem: Pain - Standard  Goal: Alleviation of pain or a reduction in pain to the patient’s comfort goal  Outcome: Progressing  Note: Pt denies pain or discomfort tonight. Sleeps good. Will continue to monitor.       Patient is not progressing towards the following goals:      "

## 2023-06-03 NOTE — THERAPY
"Occupational Therapy  Daily Treatment     Patient Name: Rashmi Parra  Age:  83 y.o., Sex:  female  Medical Record #: [id number]  Today's Date: 6/3/2023     Precautions  Precautions: Fall Risk  Comments: orthostatic, double mastectomy causes inaccurate readings    Subjective    \"If you can't smell it, maybe it didn't happen.\"  (Bowel incontinence with mat activity)     Objective       06/03/23 0931   OT Charge Group   Charges Yes   OT Self Care / ADL (Units) 3   OT Therapy Activity (Units) 1   OT Total Time Spent   OT Individual Total Time Spent (Mins) 60   Precautions   Precautions Fall Risk   Pain   Intervention Declines   Cognition    Level of Consciousness Alert   Sleep/Wake Cycle   Sleep & Rest Awake   Functional Level of Assist   Grooming Supervision;Seated   Grooming Description Increased time;Seated in wheelchair at sink   Lower Body Dressing Moderate Assist   Lower Body Dressing Description Increased time;Supervision for safety;Initial preparation for task   Toileting Maximal Assist   Toileting Description Assist for hygiene;Assist to pull pants up;Assist to pull pants down;Assist for standing balance;Increased time  (clothing and hygiene assist provided secondary to bowel incontinence)   Bed, Chair, Wheelchair Transfer Moderate Assist   Bed Chair Wheelchair Transfer Description Adaptive equipment;Increased time   Toilet Transfers Minimal Assist   Toilet Transfer Description Grab bar;Increased time   Neuro-Muscular Treatments   Neuro-Muscular Treatments Anterior weight shift;Compensatory Strategies;Postural Facilitation   Comments Unsupported seated balance EOM with multidirectional weight shift with CGA/Min A righting support  (activity ended due to bowel incontinence)   Balance   Sitting Balance (Static) Fair   Sitting Balance (Dynamic) Fair -   Weight Shift Sitting Fair   Skilled Intervention Compensatory Strategies   Bed Mobility    Supine to Sit Minimal Assist   Scooting Minimal Assist   Rolling " Standby Assist   Skilled Intervention Compensatory Strategies   Interdisciplinary Plan of Care Collaboration   IDT Collaboration with  Nursing   Patient Position at End of Therapy In Bed       Assessment    Patient pleasant and cooperative with treatment but limited by bowel incontinence without forewarning during mat activity.     Strengths: Able to follow instructions, Alert and oriented, Manages pain appropriately, Motivated for self care and independence, Pleasant and cooperative, Willingly participates in therapeutic activities  Barriers: Decreased endurance, Generalized weakness, Fatigue, Hypotension, Orthostatic hypotension, Impaired activity tolerance, Impaired balance, Limited mobility    Plan   Continue OT POC.       Passport items to be completed:  Perform bathroom transfers, complete dressing, complete feeding, get ready for the day, prepare a simple meal, participate in household tasks, adapt home for safety needs, demonstrate home exercise program, complete caregiver training     Occupational Therapy Goals (Active)       Problem: Bathing       Dates: Start:  06/01/23         Goal: STG-Within one week, patient will bathe with Min A overall using AE/DME as needed.       Dates: Start:  06/01/23               Problem: Dressing       Dates: Start:  06/01/23         Goal: STG-Within one week, patient will dress UB with SBA overall using AE/DME as needed.       Dates: Start:  06/01/23            Goal: STG-Within one week, patient will dress LB with Mod A overall using AE/DME as needed.       Dates: Start:  06/01/23               Problem: Functional Transfers       Dates: Start:  06/01/23         Goal: STG-Within one week, patient will transfer to toilet with CGA and LRD overall utilizing DME.AE as needed.       Dates: Start:  06/01/23               Problem: OT Long Term Goals       Dates: Start:  06/01/23         Goal: LTG-By discharge, patient will complete basic self care tasks with supervision overall  using AE/DME as needed.       Dates: Start:  06/01/23            Goal: LTG-By discharge, patient will perform bathroom transfers with supervision and LRD overall using AE/DME as needed.       Dates: Start:  06/01/23               Problem: Toileting       Dates: Start:  06/01/23         Goal: STG-Within one week, patient will complete toileting tasks with min A overall using AE/DME as needed.       Dates: Start:  06/01/23

## 2023-06-03 NOTE — CARE PLAN
The patient is Stable - Low risk of patient condition declining or worsening    Shift Goals  Clinical Goals: safety, pain mamagement, sleep  Patient Goals: rest, pain control      Problem: Fall Risk - Rehab  Goal: Patient will remain free from falls  Outcome: Progressing     Problem: Risk for Aspiration  Goal: Patient's risk for aspiration will be absent or decrease  Outcome: Progressing     Problem: Mobility  Goal: Patient's capacity to carry out activities will improve  Outcome: Progressing

## 2023-06-03 NOTE — THERAPY
Speech Language Pathology  Daily Treatment     Patient Name: Rashmi Parra  Age:  83 y.o., Sex:  female  Medical Record #: [id number]  Today's Date: 6/3/2023     Precautions  Precautions: Fall Risk  Comments: orthostatic, double mastectomy causes inaccurate readings    Subjective    Pt pleasant and cooperative during tx.       Objective       06/03/23 1131   Treatment Charges   SLP Swallowing Dysfunction Treatment Swallowing Dysfunction Treatment   SLP Total Time Spent   SLP Individual Total Time Spent (Mins) 30   Dysphagia    Other Treatments diet tolerance, use of strategies         Assessment    Pt assessed with level 5 textures and thin liquids during tdine.  Pt presented with cough x1 following thin by straw.  Pt tolerated level 5 textures without oral residue or s/s of aspiration.  Pt recalled swallow strategies given min verbal cues.      Strengths: Pleasant and cooperative, Able to follow instructions, Willingly participates in therapeutic activities  Barriers: Aspiration risk, Generalized weakness, Orthostatic hypotension    Plan    Diet tolerance, use of strategies, level 6 trials as deemed appropriate, MBS        Speech Therapy Problems (Active)       Problem: Speech/Swallowing LTGs       Dates: Start:  06/02/23         Goal: LTG-By discharge, patient will safely swallow level 6 textures and thin liquids without aspiration.         Dates: Start:  06/02/23               Problem: Swallowing STGs       Dates: Start:  06/02/23         Goal: STG-Within one week, patient will participate in an MBS to fully assess swallow function.        Dates: Start:  06/02/23            Goal: STG-Within one week, patient will recall and utilize swallow strategies with 80% accuracy given min verbal cues.         Dates: Start:  06/02/23

## 2023-06-03 NOTE — FLOWSHEET NOTE
06/03/23 0725   Events/Summary/Plan   Events/Summary/Plan 02 pulse ox check. laying in bed   Vital Signs   Pulse 71   Respiration 18   Pulse Oximetry 95 %   $ Pulse Oximetry (Spot Check) Yes   Respiratory Assessment   Level of Consciousness Alert   Chest Exam   Work Of Breathing / Effort Within Normal Limits   Oxygen   O2 (LPM) 2   O2 Delivery Device Nasal Cannula

## 2023-06-03 NOTE — THERAPY
Physical Therapy   Daily Treatment     Patient Name: Rsahmi Parra  Age:  83 y.o., Sex:  female  Medical Record #: [id number]  Today's Date: 6/3/2023     Precautions  Precautions: Fall Risk  Comments: orthostatic, double mastectomy causes inaccurate readings    Subjective    Pt resting in bed, reports abdominal binder is not comfortable, requesting removal.     Objective       06/03/23 1101   PT Charge Group   PT Gait Training (Units) 1   PT Therapeutic Exercise (Units) 1   PT Total Time Spent   PT Individual Total Time Spent (Mins) 30   Vitals   Patient BP Position Sitting   Blood Pressure  (!) 148/80   Gait Functional Level of Assist    Gait Level Of Assist Minimal Assist   Assistive Device Front Wheel Walker   Distance (Feet) 50   # of Times Distance was Traveled 2   Deviation Bradykinetic;Shuffled Gait  (thoracic kyphosis)   Transfer Functional Level of Assist   Bed, Chair, Wheelchair Transfer Moderate Assist   Bed Chair Wheelchair Transfer Description Adaptive equipment;Increased time;Requires lift   Sitting Lower Body Exercises   Sitting Lower Body Exercises Yes   Ankle Pumps 1 set of 10   Hip Abduction 1 set of 10   Hip Adduction 1 set of 10   Long Arc Quad 1 set of 10   Marching Reciprocal;1 set of 10   Bed Mobility    Supine to Sit Minimal Assist   Sit to Stand Moderate Assist         Assessment    Pt slow to mobilize but completed gait/transfer and LE exercises as noted, pt without orthostasis but rather elevated BP throughout session, abdominal binder removed.   Strengths: Able to follow instructions, Adequate strength, Independent prior level of function, Motivated for self care and independence, Pleasant and cooperative, Willingly participates in therapeutic activities, Supportive family  Barriers: Fatigue, Decreased endurance, Generalized weakness, Home accessibility, Hypotension, Impaired balance, Impaired carryover of learning, Impaired activity tolerance, Limited mobility, Visual  impairment    Plan    Progress seated and standing tolerance, gait c/ cues for step length, CV endurance. Consider SaraSteady or standing frame to promote upright tolerance. Manual and TE for trunk and hip ROM/mobility, posture.      Passport items to be completed:  Get in/out of bed safely, in/out of a vehicle, safely use mobility device, walk or wheel around home/community, navigate up and down stairs, show how to get up/down from the ground, ensure home is accessible, demonstrate HEP, complete caregiver training      Physical Therapy Problems (Active)       Problem: Mobility       Dates: Start:  06/01/23         Goal: STG-Within one week, patient will ambulate household distances >/= 50' c/ Donald or better and LRAD (FWW vs 4WW).        Dates: Start:  06/01/23            Goal: STG-Within one week, patient will ambulate up/down a curb with ModA or better, LRAD.       Dates: Start:  06/01/23               Problem: Mobility Transfers       Dates: Start:  06/01/23         Goal: STG-Within one week, patient will perform bed mobility with Liliam.        Dates: Start:  06/01/23            Goal: STG-Within one week, patient will transfer bed to chair c/ Donald or better.        Dates: Start:  06/01/23               Problem: PT-Long Term Goals       Dates: Start:  06/01/23         Goal: LTG-By discharge, patient will ambulate >/= 100' in a single effort c/ LRAD at SBA or better.        Dates: Start:  06/01/23            Goal: LTG-By discharge, patient will transfer one surface to another c/ Liliam or better.        Dates: Start:  06/01/23            Goal: LTG-By discharge, patient will ambulate up/down 2 stairs c/ BHR to safely access home environment at CGA or better.        Dates: Start:  06/01/23            Goal: LTG-By discharge, patient will transfer in/out of a car with Donald or better, LRAD.        Dates: Start:  06/01/23

## 2023-06-03 NOTE — PROGRESS NOTES
[facility] Medicine Daily Progress Note    Date of Service  6/3/2023    Chief Complaint:  Hypotension    Interval History:  Pt requesting to have stool softeners and Laxatives prn except for daily Miralax -- will do.    Review of Systems  Review of Systems   Constitutional:  Negative for fever.   Eyes:  Negative for blurred vision.   Respiratory:  Negative for shortness of breath.    Cardiovascular:  Negative for palpitations.   Gastrointestinal:  Negative for nausea and vomiting.   Neurological:  Negative for dizziness and headaches.   Psychiatric/Behavioral:  Negative for hallucinations.         Physical Exam  Temp:  [36.3 °C (97.3 °F)-37.1 °C (98.8 °F)] 37.1 °C (98.8 °F)  Pulse:  [60-98] 71  Resp:  [16-22] 18  BP: (137-180)/() 149/60  SpO2:  [92 %-97 %] 95 %    Physical Exam  Vitals and nursing note reviewed.   Constitutional:       General: She is not in acute distress.  HENT:      Mouth/Throat:      Mouth: Mucous membranes are moist.      Pharynx: Oropharynx is clear.   Eyes:      General: No scleral icterus.  Cardiovascular:      Rate and Rhythm: Normal rate and regular rhythm.   Pulmonary:      Effort: Pulmonary effort is normal.      Breath sounds: No wheezing or rales.   Abdominal:      General: Bowel sounds are normal.      Palpations: Abdomen is soft.   Musculoskeletal:      Cervical back: No rigidity.      Right lower leg: No edema.      Left lower leg: No edema.   Skin:     General: Skin is warm and dry.   Neurological:      Mental Status: She is alert and oriented to person, place, and time.   Psychiatric:         Mood and Affect: Mood normal.         Behavior: Behavior normal.         Fluids    Intake/Output Summary (Last 24 hours) at 6/3/2023 1029  Last data filed at 6/3/2023 0556  Gross per 24 hour   Intake 720 ml   Output 1 ml   Net 719 ml         Laboratory  Recent Labs     06/01/23  0525 06/02/23  0532   WBC 5.1 7.8   RBC 4.37 4.07*   HEMOGLOBIN 13.2 12.6   HEMATOCRIT 41.1 38.1   MCV 94.1  93.6   MCH 30.2 31.0   MCHC 32.1* 33.1   RDW 46.1 45.7   PLATELETCT 242 246   MPV 10.5 10.6       Recent Labs     06/01/23  0525 06/02/23  0532   SODIUM 137 144   POTASSIUM 3.9 3.6   CHLORIDE 101 105   CO2 24 29   GLUCOSE 130* 108*   BUN 14 13   CREATININE 0.61 0.55   CALCIUM 8.6 8.4*                     Imaging    Assessment/Plan  Abnormal CXR  Assessment & Plan  Has been afebrile  No leukocytosis (6/2)  Denies cough  Pt asymptomatic  CXR (6/1, 2 pm): unremarkable  CXR (6/1, 6 pm): showed developing left opacities - likely pneumonitis  CXR (6/2): streaky opacities in the right upper lung, possibly developing pneumonia  Note: pt was choking and likely aspirated at dinner on 6/1  Will hold off on abx for now -- pt asymptomatic  Cont to monitor    Hypotension  Assessment & Plan  BP is always low when taken on the arms  BP is usually higher in the legs  BP (6/2): 77/51 (8 am, legs) --> 174/75 (noon, legs)  BP more elevated recently (6/3)  O2: 2 liters   Cortisol: levels labile: 2.7 --> 17 --> 36 --> 44 --> 40 --> 2.1 (5/29)  ACTH: levels labile: 10.3 (5/27)  --> 2.2 (5/28)  TSH: wnl (6/1)  D-Dimer: 0.68  Orthostatics (+) (6/2)  Off Lisinopril  S/P IVF's x 1 liter (6/1)  Doubt infection: has been afebrile, no leukocytosis  Doubting AI 2nd to labile levels: on Cortef bid -- will d/c and observe  Having nursing taking BP on the left leg only  Note: pt has hx of b/l mastectomy and BP needs to be taken on the legs  Note: pt is on Cymbalta and dose was cut in half (was taking at home)  Note: was on Midodrine and Florinef at [facility] and was taken off  Note: was on steroids at [facility] and was taken off  Cont to monitor    Depression- (present on admission)  Assessment & Plan  Cont Cymbalta    Dyslipidemia- (present on admission)  Assessment & Plan  Cont Lovastatin

## 2023-06-04 ENCOUNTER — APPOINTMENT (OUTPATIENT)
Dept: PHYSICAL THERAPY | Facility: REHABILITATION | Age: 83
DRG: 312 | End: 2023-06-04
Attending: PHYSICAL MEDICINE & REHABILITATION
Payer: MEDICARE

## 2023-06-04 PROBLEM — I10 ESSENTIAL HYPERTENSION: Status: ACTIVE | Noted: 2023-06-01

## 2023-06-04 PROCEDURE — 97530 THERAPEUTIC ACTIVITIES: CPT

## 2023-06-04 PROCEDURE — 99232 SBSQ HOSP IP/OBS MODERATE 35: CPT | Performed by: HOSPITALIST

## 2023-06-04 PROCEDURE — 770010 HCHG ROOM/CARE - REHAB SEMI PRIVAT*

## 2023-06-04 PROCEDURE — 700102 HCHG RX REV CODE 250 W/ 637 OVERRIDE(OP): Performed by: HOSPITALIST

## 2023-06-04 PROCEDURE — 700111 HCHG RX REV CODE 636 W/ 250 OVERRIDE (IP): Performed by: PHYSICAL MEDICINE & REHABILITATION

## 2023-06-04 PROCEDURE — 700102 HCHG RX REV CODE 250 W/ 637 OVERRIDE(OP): Performed by: PHYSICAL MEDICINE & REHABILITATION

## 2023-06-04 PROCEDURE — 94760 N-INVAS EAR/PLS OXIMETRY 1: CPT

## 2023-06-04 PROCEDURE — A9270 NON-COVERED ITEM OR SERVICE: HCPCS | Performed by: HOSPITALIST

## 2023-06-04 PROCEDURE — A9270 NON-COVERED ITEM OR SERVICE: HCPCS | Performed by: PHYSICAL MEDICINE & REHABILITATION

## 2023-06-04 PROCEDURE — 97116 GAIT TRAINING THERAPY: CPT

## 2023-06-04 RX ORDER — AMLODIPINE BESYLATE 5 MG/1
5 TABLET ORAL
Status: DISCONTINUED | OUTPATIENT
Start: 2023-06-04 | End: 2023-06-05

## 2023-06-04 RX ADMIN — OMEPRAZOLE 20 MG: 20 CAPSULE, DELAYED RELEASE ORAL at 08:12

## 2023-06-04 RX ADMIN — LOVASTATIN 20 MG: 20 TABLET ORAL at 20:30

## 2023-06-04 RX ADMIN — ENOXAPARIN SODIUM 40 MG: 100 INJECTION SUBCUTANEOUS at 17:41

## 2023-06-04 RX ADMIN — POLYETHYLENE GLYCOL 3350 1 PACKET: 17 POWDER, FOR SOLUTION ORAL at 08:12

## 2023-06-04 RX ADMIN — AMLODIPINE BESYLATE 5 MG: 5 TABLET ORAL at 10:42

## 2023-06-04 RX ADMIN — Medication 4.5 MG: at 20:30

## 2023-06-04 RX ADMIN — DULOXETINE HYDROCHLORIDE 30 MG: 30 CAPSULE, DELAYED RELEASE ORAL at 08:12

## 2023-06-04 ASSESSMENT — ENCOUNTER SYMPTOMS
DIARRHEA: 0
BLURRED VISION: 0
NERVOUS/ANXIOUS: 0
DIZZINESS: 0
FEVER: 0
COUGH: 0

## 2023-06-04 ASSESSMENT — GAIT ASSESSMENTS
DEVIATION: BRADYKINETIC;SHUFFLED GAIT
GAIT LEVEL OF ASSIST: CONTACT GUARD ASSIST
DISTANCE (FEET): 130
ASSISTIVE DEVICE: FRONT WHEEL WALKER

## 2023-06-04 ASSESSMENT — PAIN DESCRIPTION - PAIN TYPE: TYPE: ACUTE PAIN

## 2023-06-04 ASSESSMENT — ACTIVITIES OF DAILY LIVING (ADL)
TOILET_TRANSFER_DESCRIPTION: SET-UP OF EQUIPMENT;VERBAL CUEING;INCREASED TIME
BED_CHAIR_WHEELCHAIR_TRANSFER_DESCRIPTION: INCREASED TIME;INITIAL PREPARATION FOR TASK;VERBAL CUEING;SET-UP OF EQUIPMENT

## 2023-06-04 NOTE — THERAPY
Physical Therapy   Daily Treatment     Patient Name: Rashmi Parra  Age:  83 y.o., Sex:  female  Medical Record #: [id number]  Today's Date: 6/4/2023     Precautions  Precautions: Fall Risk  Comments: orthostatic, double mastectomy causes inaccurate readings    Subjective    Patient resting and asking PT to come back later; agreeable to ambulation with second attempt     Objective       06/04/23 1431   PT Charge Group   PT Gait Training (Units) 1   PT Therapeutic Activities (Units) 1   PT Total Time Spent   PT Individual Total Time Spent (Mins) 30   Precautions   Precautions Fall Risk   Comments orthostatic, double mastectomy causes inaccurate readings   Gait Functional Level of Assist    Gait Level Of Assist Contact Guard Assist   Assistive Device Front Wheel Walker   Distance (Feet) 130   # of Times Distance was Traveled 1   Deviation Bradykinetic;Shuffled Gait   Transfer Functional Level of Assist   Bed, Chair, Wheelchair Transfer Moderate Assist   Bed Chair Wheelchair Transfer Description Increased time;Initial preparation for task;Verbal cueing;Set-up of equipment   Toilet Transfers Minimal Assist   Toilet Transfer Description Set-up of equipment;Verbal cueing;Increased time         Assessment    Able to ambulate 130ft FWW CGA, self-limits distance based on fatigue    Strengths: Able to follow instructions, Adequate strength, Independent prior level of function, Motivated for self care and independence, Pleasant and cooperative, Willingly participates in therapeutic activities, Supportive family  Barriers: Fatigue, Decreased endurance, Generalized weakness, Home accessibility, Hypotension, Impaired balance, Impaired carryover of learning, Impaired activity tolerance, Limited mobility, Visual impairment    Plan    Progress seated and standing tolerance, gait c/ cues for step length, CV endurance. Consider SaraSteady or standing frame to promote upright tolerance. Manual and TE for trunk and hip ROM/mobility,  posture.      Passport items to be completed:  Get in/out of bed safely, in/out of a vehicle, safely use mobility device, walk or wheel around home/community, navigate up and down stairs, show how to get up/down from the ground, ensure home is accessible, demonstrate HEP, complete caregiver training     Physical Therapy Problems (Active)       Problem: Mobility       Dates: Start:  06/01/23         Goal: STG-Within one week, patient will ambulate household distances >/= 50' c/ Donald or better and LRAD (FWW vs 4WW).        Dates: Start:  06/01/23            Goal: STG-Within one week, patient will ambulate up/down a curb with ModA or better, LRAD.       Dates: Start:  06/01/23               Problem: Mobility Transfers       Dates: Start:  06/01/23         Goal: STG-Within one week, patient will perform bed mobility with Liliam.        Dates: Start:  06/01/23            Goal: STG-Within one week, patient will transfer bed to chair c/ Donald or better.        Dates: Start:  06/01/23               Problem: PT-Long Term Goals       Dates: Start:  06/01/23         Goal: LTG-By discharge, patient will ambulate >/= 100' in a single effort c/ LRAD at SBA or better.        Dates: Start:  06/01/23            Goal: LTG-By discharge, patient will transfer one surface to another c/ Liliam or better.        Dates: Start:  06/01/23            Goal: LTG-By discharge, patient will ambulate up/down 2 stairs c/ BHR to safely access home environment at CGA or better.        Dates: Start:  06/01/23            Goal: LTG-By discharge, patient will transfer in/out of a car with Donald or better, LRAD.        Dates: Start:  06/01/23

## 2023-06-04 NOTE — CARE PLAN
Problem: Fall Risk - Rehab  Goal: Patient will remain free from falls  Outcome: Progressing- Patient utilizes call light to alert staff for mobility needs and toileting.     Problem: Pain - Standard  Goal: Alleviation of pain or a reduction in pain to the patient’s comfort goal  Outcome: Progressing- Patient communicates with staff regarding pain control needs.    The patient is Stable - Low risk of patient condition declining or worsening    Shift Goals  Clinical Goals: Safety, pain management, sleep  Patient Goals: Pain management, sleep    Progress made toward(s) clinical / shift goals:  Progressing    Patient is not progressing towards the following goals:

## 2023-06-04 NOTE — FLOWSHEET NOTE
06/04/23 0919   Events/Summary/Plan   Events/Summary/Plan spot check done pt remains on 2 liters nc doing well   Skin Inspection Respiratory Device Intact   Vital Signs   Pulse 85   Respiration 16   Pulse Oximetry 93 %   $ Pulse Oximetry (Spot Check) Yes   Respiratory Assessment   Respiratory Pattern Within Normal Limits   Level of Consciousness Alert   Chest Exam   Work Of Breathing / Effort Within Normal Limits   Breath Sounds   RUL Breath Sounds Clear   RML Breath Sounds Clear   RLL Breath Sounds Clear;Diminished   SANDRO Breath Sounds Clear   LLL Breath Sounds Clear;Diminished   Oxygen   O2 (LPM) 2   O2 Delivery Device Nasal Cannula

## 2023-06-04 NOTE — THERAPY
"Recreational Therapy   Initial Evaluation     Patient Name: Rashmi Parra  Age:  83 y.o., Sex:  female  Medical Record #: [id number]  Today's Date: 6/4/2023     Subjective    Patient agreeable to recreation therapy evaluation.   \"No one listens to me.\" After Dr explained why she was given BP medication this AM.    Objective       06/04/23 1101   Procedural Tracking   Procedural Tracking Community Re-Integration;Community Skills Development;Leisure Skills Awareness;Leisure Skills Development;Social Skills Training;Cognitive Skills Training;Gross Motor Functional Leisure Skills;Fine Motor Functional Leisure Skills;Group Treatment   Treatment Time   Total Time Spent (mins) 15   Total Time Missed 0   Leisure History   Leisure Interests Card;Family;Hobbies  (bridge, golf, bowling)   Pre-Morbid Leisure Lifestyle Individual;Sedentary   Prior Living Arrangements   Lives with - Patient's Self Care Capacity Spouse   Steps Into Home 2   Steps In Home 0   Ambulation Independent   Assistive Devices Used 4-Wheel Walker   Functional Ability Status - Physical   Endurance Low   Right  Weak   Left  Weak   Right Arm Weak   Left Arm Weak   Right Leg Weak   Left Leg Weak   Upper Extremity Gross Motor Uses Both Arms / Hands   Lower Extremity Gross Motor Uses Both Legs   Functional Ability Status - Cognitive   Attention Span Remains on Task with Cueing   Comprehension Follows One Step Commands;Requires Cueing;Follows Two Step Commands   Judgment Impaired;Confused;Needs Assistance   Functional Ability Status - Emotional    Affect Angry   Mood Depressed;Guarded   Behavior Indifferent;Resistant   Leisure Competence Measure   Leisure Awareness Maximal Assist   Leisure Attitude Maximal Assist   Leisure Skills Maximal Assist   Cultural / Social Behaviors Maximal Assist   Interpersonal Skills Maximal Assist   Community Integration Skills Maximal Assist   Social Contact Maximal Assist   Community Participation Maximal Assist   Clinical " Impression   Clinical Impression Impaired Short Term Memory;Impaired Attention Span;Impaired Communication Skills;Impaired Cognitive Leisure Functioning;Impaired Endurance;Impaired Gross Motor Leisure Functioning;Impaired Fine Motor Leisure Functioning;Impaired Leisure Skills;Impaired Relaxation and Coping Skills;Impaired Community Skills;Impaired Group Interaction Skills;Impaired Social Skills;Impaired Long Term Memory   Current Discharge Plan   Current Discharge Plan Return to Prior Living Situation   Benefit    Benefit Patient would Benefit from Inpatient Recreational Therapy to Maximize Independent Leisure Functioning    Interdisciplinary Plan of Care Collaboration   IDT Collaboration with  Nursing   Patient Position at End of Therapy Seated;Phone within Reach;Tray Table within Reach;Call Light within Reach   Strengths & Barriers   Strengths Able to follow instructions;Alert and oriented;Effective communication skills   Barriers Decreased endurance;Agitation;Difficulty following instructions;Emotional lability;Fatigue;Generalized weakness;Hypertension;Impaired activity tolerance;Impaired balance;Impaired carryover of learning;Impaired insight/denial of deficits;Limited mobility;Pain;Pain poorly managed     Patient used to golf with her , bowl, and travel. Patient and SO no longer do these things due to his Parkinson's. Patient stated it has been 20 years since they've done anything. Patient perseverating on BP medication she was given.     Assessment  Patient is 83 y.o. female with a diagnosis of orthostatic hypotension.  Additional factors influencing patient status / progress (ie: cognitive factors, co-morbidities, social support, etc): past medical history of chronic hypoxia on 3 L, orthostatic hypotension, depresion, and breast cancer s/p mastectomy and radiation;  who presented on 5/25/2023  7:35 PM with after a fall/syncopal episode. Per documentation, patient reported atttempt to stand to walk to  the bathroom when she became lightheaded and fell back, hitting her head. Does not know if she LOC. Of note, patient has had 20 lb weight loss over the last year. Upon eval in the ED CT head was negative for acute process, CXR showed chronic underlying lung disease, she has required 2 L o2. Echo obtained on 5/26  shows EF 75%.  BNP up to 385. Hospital course has been notable for orthostatic hypotension. Cortisol level 2.7, adrenal insufficiency work-up negative and was taken off of steroid support. Patient has been able to participate with therapy, but she was limited by fatigue and orthostatic hypotension. Patient has been weaned from Midodrine and Florinef. She was found to have significant aortic stenosis in the lower abdomen on CT scan so outpatient referral to Vascular was made.        Plan  Recommend Recreational Therapy 30-60 minutes per day  1-2  days per week for 14 days for the following treatments:  Community Re-Integration, Community Skills Development, Leisure Skills Awareness, Leisure Skills Development, Social Skills Training, Cognitive Skills Training, Gross Motor Functional Leisure Skills, Fine Motor Functional Leisure Skills, and Group Treatment    Passport items to be completed:  Verbalize two positive leisure activities, discuss returning to work, hobbies, community groups or volunteer activities, explore community resources     Goals:  Long term and short term goals have been discussed with patient and they are in agreement.    Recreation Therapy Problems (Active)       Problem: Recreation Therapy       Dates: Start:  06/04/23         Goal: STG-Within one week, patient will identify two new coping skills.        Dates: Start:  06/04/23            Goal: STG-Within one week, patient will identify two new leisure interests.        Dates: Start:  06/04/23            Goal: LTG-By discharge, patient will identify and demonstrate three new coping skills and appropriate ways to use them.        Dates:  Start:  06/04/23            Goal: LTG-By discharge, patient will identify and demonstrate three new leisure interests.        Dates: Start:  06/04/23

## 2023-06-04 NOTE — PROGRESS NOTES
[facility] Medicine Daily Progress Note    Date of Service  6/4/2023    Chief Complaint:  Hypotension    Interval History:  Discussed with pt about her BP being elevated and have started Norvasc.    Review of Systems  Review of Systems   Constitutional:  Negative for fever.   Eyes:  Negative for blurred vision.   Respiratory:  Negative for cough.    Cardiovascular:  Negative for chest pain.   Gastrointestinal:  Negative for diarrhea.   Musculoskeletal:  Negative for joint pain.   Neurological:  Negative for dizziness.   Psychiatric/Behavioral:  The patient is not nervous/anxious.         Physical Exam  Temp:  [36.6 °C (97.9 °F)-37 °C (98.6 °F)] 37 °C (98.6 °F)  Pulse:  [71-82] 79  Resp:  [14-18] 14  BP: (138-180)/() 161/68  SpO2:  [91 %-96 %] 96 %    Physical Exam  Vitals and nursing note reviewed.   Constitutional:       Appearance: She is not diaphoretic.   HENT:      Mouth/Throat:      Pharynx: No oropharyngeal exudate or posterior oropharyngeal erythema.   Eyes:      Extraocular Movements: Extraocular movements intact.   Neck:      Vascular: No carotid bruit.   Cardiovascular:      Rate and Rhythm: Normal rate and regular rhythm.   Pulmonary:      Effort: Pulmonary effort is normal.      Breath sounds: No wheezing or rales.   Abdominal:      General: There is no distension.      Palpations: Abdomen is soft.      Tenderness: There is no abdominal tenderness.   Musculoskeletal:      Right lower leg: No edema.      Left lower leg: No edema.   Skin:     General: Skin is warm and dry.   Neurological:      Mental Status: She is alert and oriented to person, place, and time.   Psychiatric:         Mood and Affect: Mood normal.         Behavior: Behavior normal.         Fluids    Intake/Output Summary (Last 24 hours) at 6/4/2023 0839  Last data filed at 6/4/2023 0730  Gross per 24 hour   Intake 360 ml   Output 400 ml   Net -40 ml         Laboratory  Recent Labs     06/02/23  0532   WBC 7.8   RBC 4.07*   HEMOGLOBIN  12.6   HEMATOCRIT 38.1   MCV 93.6   MCH 31.0   MCHC 33.1   RDW 45.7   PLATELETCT 246   MPV 10.6       Recent Labs     06/02/23  0532   SODIUM 144   POTASSIUM 3.6   CHLORIDE 105   CO2 29   GLUCOSE 108*   BUN 13   CREATININE 0.55   CALCIUM 8.4*                     Imaging    Assessment/Plan  * Orthostatic hypotension- (present on admission)  Assessment & Plan  Orthostatics (+)  Cont Midodrine 2.5 mg  Cont to monitor    Abnormal CXR  Assessment & Plan  Has been afebrile  No leukocytosis (6/2)  Denies cough  Pt asymptomatic  CXR (6/1, 2 pm): unremarkable  CXR (6/1, 6 pm): showed developing left opacities - likely pneumonitis  CXR (6/2): streaky opacities in the right upper lung, possibly developing pneumonia  Note: pt was choking and likely aspirated at dinner on 6/1  Will hold off on abx for now -- pt asymptomatic  Cont to monitor    Essential hypertension  Assessment & Plan  BP is always low when taken on the arms  BP is usually higher in the legs  BP more elevated recently (6/4)  TSH: wnl (6/1)  D-Dimer: 0.68  Orthostatics (+) -- see other assessment  Having nursing taking BP on the left leg only    Note: pt has hx of b/l mastectomy and BP needs to be taken on the legs            had hypot[place] at [facility] but suspect BP taken on arms            was on Midodrine and Florinef at [facility] and was taken off            was on steroids at [facility] and was taken off  Cont to monitor    Depression- (present on admission)  Assessment & Plan  Cont Cymbalta    Dyslipidemia- (present on admission)  Assessment & Plan  Cont Lovastatin

## 2023-06-05 ENCOUNTER — APPOINTMENT (OUTPATIENT)
Dept: SPEECH THERAPY | Facility: REHABILITATION | Age: 83
DRG: 312 | End: 2023-06-05
Attending: PHYSICAL MEDICINE & REHABILITATION
Payer: MEDICARE

## 2023-06-05 ENCOUNTER — APPOINTMENT (OUTPATIENT)
Dept: RADIOLOGY | Facility: REHABILITATION | Age: 83
DRG: 312 | End: 2023-06-05
Attending: PHYSICAL MEDICINE & REHABILITATION
Payer: MEDICARE

## 2023-06-05 ENCOUNTER — APPOINTMENT (OUTPATIENT)
Dept: OCCUPATIONAL THERAPY | Facility: REHABILITATION | Age: 83
DRG: 312 | End: 2023-06-05
Attending: PHYSICAL MEDICINE & REHABILITATION
Payer: MEDICARE

## 2023-06-05 ENCOUNTER — APPOINTMENT (OUTPATIENT)
Dept: PAIN MANAGEMENT | Facility: REHABILITATION | Age: 83
DRG: 312 | End: 2023-06-05
Attending: PHYSICAL MEDICINE & REHABILITATION
Payer: MEDICARE

## 2023-06-05 ENCOUNTER — APPOINTMENT (OUTPATIENT)
Dept: PHYSICAL THERAPY | Facility: REHABILITATION | Age: 83
DRG: 312 | End: 2023-06-05
Attending: PHYSICAL MEDICINE & REHABILITATION
Payer: MEDICARE

## 2023-06-05 LAB
BASOPHILS # BLD AUTO: 0.2 % (ref 0–1.8)
BASOPHILS # BLD: 0.01 K/UL (ref 0–0.12)
EOSINOPHIL # BLD AUTO: 0.05 K/UL (ref 0–0.51)
EOSINOPHIL NFR BLD: 0.8 % (ref 0–6.9)
ERYTHROCYTE [DISTWIDTH] IN BLOOD BY AUTOMATED COUNT: 47.1 FL (ref 35.9–50)
HCT VFR BLD AUTO: 36.3 % (ref 37–47)
HGB BLD-MCNC: 11.7 G/DL (ref 12–16)
IMM GRANULOCYTES # BLD AUTO: 0.02 K/UL (ref 0–0.11)
IMM GRANULOCYTES NFR BLD AUTO: 0.3 % (ref 0–0.9)
LYMPHOCYTES # BLD AUTO: 0.84 K/UL (ref 1–4.8)
LYMPHOCYTES NFR BLD: 13.1 % (ref 22–41)
MCH RBC QN AUTO: 30.3 PG (ref 27–33)
MCHC RBC AUTO-ENTMCNC: 32.2 G/DL (ref 32.2–35.5)
MCV RBC AUTO: 94 FL (ref 81.4–97.8)
MONOCYTES # BLD AUTO: 0.79 K/UL (ref 0–0.85)
MONOCYTES NFR BLD AUTO: 12.4 % (ref 0–13.4)
NEUTROPHILS # BLD AUTO: 4.68 K/UL (ref 1.82–7.42)
NEUTROPHILS NFR BLD: 73.2 % (ref 44–72)
NRBC # BLD AUTO: 0 K/UL
NRBC BLD-RTO: 0 /100 WBC (ref 0–0.2)
PLATELET # BLD AUTO: 246 K/UL (ref 164–446)
PMV BLD AUTO: 10.8 FL (ref 9–12.9)
RBC # BLD AUTO: 3.86 M/UL (ref 4.2–5.4)
WBC # BLD AUTO: 6.4 K/UL (ref 4.8–10.8)

## 2023-06-05 PROCEDURE — A9270 NON-COVERED ITEM OR SERVICE: HCPCS | Performed by: HOSPITALIST

## 2023-06-05 PROCEDURE — 97110 THERAPEUTIC EXERCISES: CPT | Mod: CO

## 2023-06-05 PROCEDURE — 97116 GAIT TRAINING THERAPY: CPT

## 2023-06-05 PROCEDURE — 92526 ORAL FUNCTION THERAPY: CPT

## 2023-06-05 PROCEDURE — 94760 N-INVAS EAR/PLS OXIMETRY 1: CPT

## 2023-06-05 PROCEDURE — 97530 THERAPEUTIC ACTIVITIES: CPT

## 2023-06-05 PROCEDURE — 770010 HCHG ROOM/CARE - REHAB SEMI PRIVAT*

## 2023-06-05 PROCEDURE — 71045 X-RAY EXAM CHEST 1 VIEW: CPT

## 2023-06-05 PROCEDURE — 74230 X-RAY XM SWLNG FUNCJ C+: CPT

## 2023-06-05 PROCEDURE — 700102 HCHG RX REV CODE 250 W/ 637 OVERRIDE(OP): Performed by: PHYSICAL MEDICINE & REHABILITATION

## 2023-06-05 PROCEDURE — 99232 SBSQ HOSP IP/OBS MODERATE 35: CPT | Performed by: HOSPITALIST

## 2023-06-05 PROCEDURE — 97112 NEUROMUSCULAR REEDUCATION: CPT

## 2023-06-05 PROCEDURE — 97535 SELF CARE MNGMENT TRAINING: CPT | Mod: CO

## 2023-06-05 PROCEDURE — 97530 THERAPEUTIC ACTIVITIES: CPT | Mod: CO

## 2023-06-05 PROCEDURE — 97110 THERAPEUTIC EXERCISES: CPT

## 2023-06-05 PROCEDURE — 36415 COLL VENOUS BLD VENIPUNCTURE: CPT

## 2023-06-05 PROCEDURE — 85025 COMPLETE CBC W/AUTO DIFF WBC: CPT

## 2023-06-05 PROCEDURE — 700102 HCHG RX REV CODE 250 W/ 637 OVERRIDE(OP): Performed by: HOSPITALIST

## 2023-06-05 PROCEDURE — 700111 HCHG RX REV CODE 636 W/ 250 OVERRIDE (IP): Performed by: PHYSICAL MEDICINE & REHABILITATION

## 2023-06-05 PROCEDURE — A9270 NON-COVERED ITEM OR SERVICE: HCPCS | Performed by: PHYSICAL MEDICINE & REHABILITATION

## 2023-06-05 PROCEDURE — 99232 SBSQ HOSP IP/OBS MODERATE 35: CPT | Performed by: PHYSICAL MEDICINE & REHABILITATION

## 2023-06-05 RX ORDER — AMLODIPINE BESYLATE 5 MG/1
10 TABLET ORAL
Status: DISCONTINUED | OUTPATIENT
Start: 2023-06-06 | End: 2023-06-17 | Stop reason: HOSPADM

## 2023-06-05 RX ORDER — DEXLANSOPRAZOLE 60 MG/1
60 CAPSULE, DELAYED RELEASE ORAL EVERY EVENING
Status: DISCONTINUED | OUTPATIENT
Start: 2023-06-05 | End: 2023-06-17 | Stop reason: HOSPADM

## 2023-06-05 RX ORDER — AMLODIPINE BESYLATE 5 MG/1
5 TABLET ORAL ONCE
Status: COMPLETED | OUTPATIENT
Start: 2023-06-05 | End: 2023-06-05

## 2023-06-05 RX ADMIN — AMLODIPINE BESYLATE 5 MG: 5 TABLET ORAL at 11:46

## 2023-06-05 RX ADMIN — ENOXAPARIN SODIUM 40 MG: 100 INJECTION SUBCUTANEOUS at 17:35

## 2023-06-05 RX ADMIN — AMLODIPINE BESYLATE 5 MG: 5 TABLET ORAL at 05:44

## 2023-06-05 RX ADMIN — DULOXETINE HYDROCHLORIDE 30 MG: 30 CAPSULE, DELAYED RELEASE ORAL at 08:41

## 2023-06-05 RX ADMIN — MIDODRINE HYDROCHLORIDE 2.5 MG: 2.5 TABLET ORAL at 08:41

## 2023-06-05 RX ADMIN — DEXLANSOPRAZOLE 60 MG: 60 CAPSULE, DELAYED RELEASE ORAL at 20:29

## 2023-06-05 RX ADMIN — OMEPRAZOLE 20 MG: 20 CAPSULE, DELAYED RELEASE ORAL at 08:41

## 2023-06-05 RX ADMIN — POLYETHYLENE GLYCOL 3350 1 PACKET: 17 POWDER, FOR SOLUTION ORAL at 08:41

## 2023-06-05 RX ADMIN — LOVASTATIN 20 MG: 20 TABLET ORAL at 20:28

## 2023-06-05 RX ADMIN — Medication 4.5 MG: at 20:28

## 2023-06-05 ASSESSMENT — ENCOUNTER SYMPTOMS
NERVOUS/ANXIOUS: 0
FEVER: 0
NAUSEA: 0
ABDOMINAL PAIN: 0
SHORTNESS OF BREATH: 0
CHILLS: 0
VOMITING: 0
DIARRHEA: 0

## 2023-06-05 ASSESSMENT — GAIT ASSESSMENTS
ASSISTIVE DEVICE: FRONT WHEEL WALKER
DEVIATION: BRADYKINETIC;DECREASED HEEL STRIKE;DECREASED TOE OFF;SHUFFLED GAIT
DISTANCE (FEET): 148
GAIT LEVEL OF ASSIST: CONTACT GUARD ASSIST

## 2023-06-05 ASSESSMENT — ACTIVITIES OF DAILY LIVING (ADL)
BED_CHAIR_WHEELCHAIR_TRANSFER_DESCRIPTION: INCREASED TIME;SUPERVISION FOR SAFETY;VERBAL CUEING;ADAPTIVE EQUIPMENT;SET-UP OF EQUIPMENT

## 2023-06-05 NOTE — THERAPY
Speech Language Pathology  Video Swallow     Patient Name:  Rashmi Parra  AGE:  83 y.o., SEX:  female  Medical Record #:  [id number]  Today's Date: 6/5/2023     Objective       06/05/23 0901   SLP Total Time Spent   SLP Individual Total Time Spent (Mins) 30   History / Background Information   Prior Level of Function for Eating / Swallowing Regular/thin   Diagnosis dysphagia   Dysphagia Symptoms Warranting Video Swallow Pt choked on a piece of meat last week.  Recommended MBS to fully assess swallow function and risk of aspiration.   Dentition Intact   Procedure   Patient Seated in  WC   Seated at (Degrees) 90   Views Completed Lateral   Fluoroscopy Time 86.7 second   Consistencies / Presentation Method   Thin (0) Teaspoon;Cup;Straw   Pureed (4) Teaspoon   Soft & Bite-Sized (6) - (Dysphagia III) Teaspoon   Regular (7)   (self - cookie)   Barium Tablet   (self)   Oral Phase   Soft & Bite-Sized (6) - (Dysphagia III) Delayed Oral Transit   Regular (7) Delayed Oral Transit   Pharyngeal Phase   Thin (0) Residue In Valleculae;Residue In Pyriform Sinus;Reduced Tongue Base Retraction   Pureed (4) Residue In Valleculae;Reduced Tongue Base Retraction    Soft & Bite-Sized (6) - (Dysphagia III) Residue In Valleculae;Reduced Tongue Base Retraction   Regular (7) Residue In Valleculae   Esophageal Phase   Esophageal Phase Comments 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  No impairment appreciated at the level of the UES   Compensatory Strategies Attempted   Compensatory Strategies Attempted Yes   Multiple Swallows effective in reducing vallecular residue.   Liquid Wash After Swallow effective in reduceing vallecular residue.   Impression   Dysphagia Present Yes   Oral - Pharyngeal Mild Impairment   Prognosis   Prognosis for Improvement Good   Barriers to Improvement diffuse weakness   Positive Indicators for Improvement limited deficit, pt able to follow directives/strategies   Recommendations   Diet / Liquid  Recommendation Soft & Bite-Sized (6) - (Dysphagia III)   Medication Administration  Whole with Liquid Wash   Strategies / Precautions Small Sips;Small Bites;Alternate Solids / Liquids;Sitting Upright at 90 Degrees while Eating;Stay Upright at Least 30 Minutes After Meals;Oral Care After Meals;Monitor Temperature and Lung Sounds;Multiple Swallows   Interventions Dysphagia Therapy by SLP;Therapeutic Dining Program for Meals;Patient / Caregiver Education / Training   SLP Contact Information (Name / Extension) Robert Dobbs M.S. [facility]-SLP [phone]   Interdisciplinary Plan of Care Collaboration   IDT Collaboration with  Nursing   Collaboration Comments results of the evaluation; diet upgrade         Assessment    Modified barium swallow study completed this day.  Pt presented with mild oropharyngeal dysphagia.  Oral dysphagia was characterized by prolonged mastication of level 6 (SBS) and regular textures.  Pharyngeal dysphagia was characterized by reduced tongue base retraction, and decreased pharyngeal constriction, resulting in residue in the valleculae and pyriform sinuses.  Residue was reduced with multiple swallows and thin liquid wash.   Barium capsule tolerated without difficulty.Recommend upgrade to level 6/ thin, meds whole with thin liquids.      Plan    Continue tdine to assess diet tolerance and use of strategies.  Recommend effortful swallows to target lingual and laryngeal strengthening.

## 2023-06-05 NOTE — PROGRESS NOTES
"  Physical Medicine & Rehabilitation Progress Note    Encounter Date: 6/5/2023    Chief Complaint: Decreased mobility, hypotension    Interval Events (Subjective):  Patient sitting up in room. She reports therapy is going well. She has elevated SBP today. Discussed with hospitalist and increase Amlodipine.     Objective:  VITAL SIGNS: BP (!) 154/69   Pulse 98   Temp 37.1 °C (98.7 °F) (Oral)   Resp 18   Ht 1.6 m (5' 3\")   Wt 59.9 kg (132 lb)   LMP  (LMP Unknown)   SpO2 93%   BMI 23.38 kg/m²   Gen: NAD  Psych: Mood and affect appropriate  CV: RRR, 1+ edema  Resp: CTAB, no upper airway sounds  Abd: NTND  Neuro: AOx3, following commands    Laboratory Values:  Recent Results (from the past 72 hour(s))   CBC WITH DIFFERENTIAL    Collection Time: 06/05/23  5:43 AM   Result Value Ref Range    WBC 6.4 4.8 - 10.8 K/uL    RBC 3.86 (L) 4.20 - 5.40 M/uL    Hemoglobin 11.7 (L) 12.0 - 16.0 g/dL    Hematocrit 36.3 (L) 37.0 - 47.0 %    MCV 94.0 81.4 - 97.8 fL    MCH 30.3 27.0 - 33.0 pg    MCHC 32.2 32.2 - 35.5 g/dL    RDW 47.1 35.9 - 50.0 fL    Platelet Count 246 164 - 446 K/uL    MPV 10.8 9.0 - 12.9 fL    Neutrophils-Polys 73.20 (H) 44.00 - 72.00 %    Lymphocytes 13.10 (L) 22.00 - 41.00 %    Monocytes 12.40 0.00 - 13.40 %    Eosinophils 0.80 0.00 - 6.90 %    Basophils 0.20 0.00 - 1.80 %    Immature Granulocytes 0.30 0.00 - 0.90 %    Nucleated RBC 0.00 0.00 - 0.20 /100 WBC    Neutrophils (Absolute) 4.68 1.82 - 7.42 K/uL    Lymphs (Absolute) 0.84 (L) 1.00 - 4.80 K/uL    Monos (Absolute) 0.79 0.00 - 0.85 K/uL    Eos (Absolute) 0.05 0.00 - 0.51 K/uL    Baso (Absolute) 0.01 0.00 - 0.12 K/uL    Immature Granulocytes (abs) 0.02 0.00 - 0.11 K/uL    NRBC (Absolute) 0.00 K/uL       Medications:  Scheduled Medications   Medication Dose Frequency    [START ON 6/6/2023] amLODIPine  10 mg Q DAY    Dexlansoprazole  60 mg Q EVENING    midodrine  2.5 mg TID WITH MEALS    polyethylene glycol/lytes  1 Packet DAILY    Pharmacy Consult Request  " 1 Each PHARMACY TO DOSE    DULoxetine  30 mg DAILY    enoxaparin (LOVENOX) injection  40 mg DAILY AT 1800    lovastatin  20 mg Nightly    melatonin  4.5 mg QHS     PRN medications: senna-docusate **AND** [DISCONTINUED] polyethylene glycol/lytes **AND** magnesium hydroxide **AND** bisacodyl, Respiratory Therapy Consult, hydrALAZINE, acetaminophen, mag hydrox-al hydrox-simeth, ondansetron **OR** ondansetron, traZODone, sodium chloride, traMADol, gabapentin    Diet:  Current Diet Order   Procedures    Diet Order Diet: Level 6 - Soft and Bite Sized (meds whole 1 at a time with thin); Liquid level: Level 0 - Thin       Medical Decision Making and Plan:  Debility - Patient with decreased mobility and weakness 2/2 orthostatic hypotension, low cortisol and multiple falls  -PT and OT for mobility and ADLs. Per guidelines, 15 hours per week between PT, OT and/or SLP.  -Follow-up PCP     Dysphagia - Patient with new dysphagia of choking on foods. Consult SLP. Will check CXR. Patient at high risk for further decline and respiratory failure due to age and debility.   -Choking event 6/1 with dinner. Repeat CXR, AM labs. Discussed with hospitalist and consider Abx    HTN - Patient on Lisinopril. Was previously having orthostatic hypotension requiring Midodrine and Florinef. Will monitor  -Severe orthostatic hypotension on admission into the 70s. Start IV fluids,consult hospitalist and monitor. May need to restart Florinef. Will check CXR. High risk for falls and further injury. Will recheck labs including cortisol per hospitalist.  -Elevated SBP on 6/5, increase Amlodipine to 10, will monitor for low SBP     HLD - Patient on Lovastatin 20 mg QHS. Continue lovastatin      Hypocalcemia - Check AM CMP     Low Cortisol - Negative ACTH test. Will monitor      PAD - Aortic stenosis on CTA of abdomen. Follow-up Vascular     Depression - Patient on Cymbalta 30 mg daily     Pain - APAP/Tramadol PRN     Skin - Patient at risk for skin  breakdown due to debility in areas including sacrum, achilles, elbows and head in addition to other sites. Nursing to assess skin daily.      GI Ppx - Patient on Prilosec for GERD prophylaxis. Patient on Senna-docusate for constipation prophylaxis.      DVT Ppx - Patient Lovenox on transfer.  ____________________________________    T. Toño Will MD/PhD  [facility]   [facility]   ____________________________________

## 2023-06-05 NOTE — DIETARY
Nutrition Update:    Day 5 of admit.  Rashmi Parra is a 83 y.o. female with admitting DX of Syncope and collapse [R55]  Orthostatic hypotension [I95.1].  Patient being followed to optimize nutrition.    Current Diet: Level 6-soft and bite sized  w/ Level 0 - thin liquids     Pt's diet was downgraded to Level 5- minced and moist on 6/2 due to report of pt choking at dinner on 6/1. MBSS today w/ SLP and diet has now been upgraded to Level 6-soft and bite sized.     Recorded PO x 72 hours has been adequate at % of most meals.     Problem: Nutritional:  Goal: Achieve adequate nutritional intake  Description: Patient will consume >50% of meals  Outcome: progressing.    RD following.

## 2023-06-05 NOTE — PROGRESS NOTES
[facility] Medicine Daily Progress Note    Date of Service  6/5/2023    Chief Complaint:  Hypotension    Interval History:  Discussed about het BP still elevated and have increased her Norvasc dose.    Review of Systems  Review of Systems   Constitutional:  Negative for chills and fever.   Respiratory:  Negative for shortness of breath.    Cardiovascular:  Negative for chest pain.   Gastrointestinal:  Negative for abdominal pain, diarrhea, nausea and vomiting.   Psychiatric/Behavioral:  The patient is not nervous/anxious.         Physical Exam  Temp:  [36.3 °C (97.3 °F)-37.1 °C (98.7 °F)] 37.1 °C (98.7 °F)  Pulse:  [79-98] 98  Resp:  [16-18] 18  BP: (154-188)/() 154/69  SpO2:  [93 %-94 %] 93 %    Physical Exam  Vitals and nursing note reviewed.   Constitutional:       Appearance: Normal appearance.   HENT:      Head: Atraumatic.   Eyes:      Conjunctiva/sclera: Conjunctivae normal.      Pupils: Pupils are equal, round, and reactive to light.   Cardiovascular:      Rate and Rhythm: Normal rate and regular rhythm.      Heart sounds: No murmur heard.  Pulmonary:      Effort: Pulmonary effort is normal.      Breath sounds: No stridor. No wheezing or rales.   Abdominal:      General: There is no distension.      Palpations: Abdomen is soft.      Tenderness: There is no abdominal tenderness.   Musculoskeletal:      Cervical back: Normal range of motion and neck supple.      Right lower leg: No edema.      Left lower leg: No edema.   Skin:     General: Skin is warm and dry.      Findings: No rash.   Neurological:      Mental Status: She is alert and oriented to person, place, and time.   Psychiatric:         Mood and Affect: Mood normal.         Behavior: Behavior normal.         Fluids    Intake/Output Summary (Last 24 hours) at 6/5/2023 0918  Last data filed at 6/4/2023 2032  Gross per 24 hour   Intake 710 ml   Output --   Net 710 ml         Laboratory  Recent Labs     06/05/23  0543   WBC 6.4   RBC 3.86*    HEMOGLOBIN 11.7*   HEMATOCRIT 36.3*   MCV 94.0   MCH 30.3   MCHC 32.2   RDW 47.1   PLATELETCT 246   MPV 10.8                           Imaging    Assessment/Plan  * Orthostatic hypotension- (present on admission)  Assessment & Plan  Orthostatics (+)  Cont Midodrine 2.5 mg  Cont to monitor    Abnormal CXR  Assessment & Plan  Has been afebrile  No leukocytosis (6/5)  Denies cough  Pt asymptomatic  CXR (6/1, 2 pm): unremarkable  CXR (6/1, 6 pm): showed developing left opacities - likely pneumonitis  CXR (6/2): streaky opacities in the right upper lung, possibly developing pneumonia  Note: pt was choking and likely aspirated at dinner on 6/1  Will hold off on abx for now -- pt asymptomatic  Cont to monitor    Essential hypertension  Assessment & Plan  BP is always low when taken on the arms  BP is usually higher in the legs  BP still elevated (6/5)  TSH: wnl (6/1)  On Norvasc -- will increase dose  Note: on Midodrine for orthostatics -- see other assessment    Note: pt has hx of b/l mastectomy and BP needs to be taken on the legs            had hypotension at [facility] but suspect BP taken on arms            was on Midodrine and Florinef at [facility] and was taken off            was on steroids at [facility] and was taken off -- had labile cortisol & ACTH levels  Cont to monitor    Depression- (present on admission)  Assessment & Plan  Cont Cymbalta    Dyslipidemia- (present on admission)  Assessment & Plan  Cont Lovastatin

## 2023-06-05 NOTE — CARE PLAN
Problem: Fall Risk - Rehab  Goal: Patient will remain free from falls  6/4/2023 1805 by Lennox Amado R.N.  Note: Pt uses call light consistently and appropriately. Waits for assistance does not attempt self transfer this shift. Able to verbalize needs. Call light and personal belongings within reach. Will continue to monitor.   Problem: Skin Integrity  Goal: Patient's skin integrity will be maintained or improve  6/4/2023 1805 by Lennox Amado R.N.  Note: Patient's skin remains intact and free from new or accidental injury this shift.  Will continue to monitor.    The patient is Watcher - Medium risk of patient condition declining or worsening    Shift Goals  Clinical Goals: Safety, pain management  Patient Goals: pain management

## 2023-06-05 NOTE — CARE PLAN
"  Problem: Fall Risk - Rehab  Goal: Patient will remain free from falls  Note: Anastasia Issa Fall risk Assessment Score: 20    High fall risk Interventions   - Alarming seatbelt  - Bed and strip alarm   - Yellow sign by the door   - Yellow wrist band \"Fall risk\"  - Room near to the nurse station  - Do not leave patient unattended in the bathroom  - Fall risk education provided        Problem: Bowel Elimination  Goal: Patient will participate in bowel management program  Note: Pt is continent of bowel per report.LBM 6/4.Will continue to monitor.         "

## 2023-06-05 NOTE — FLOWSHEET NOTE
06/05/23 1540   Events/Summary/Plan   Events/Summary/Plan SpO2 check   Vital Signs   Pulse 76   Respiration 16   Pulse Oximetry 96 %   $ Pulse Oximetry (Spot Check) Yes   Respiratory Assessment   Respiratory Pattern Within Normal Limits   Level of Consciousness Alert   Chest Exam   Work Of Breathing / Effort Within Normal Limits   Oxygen   O2 (LPM) 2   O2 Delivery Device Nasal Cannula

## 2023-06-05 NOTE — THERAPY
"Occupational Therapy  Daily Treatment     Patient Name: Rashmi Parra  Age:  83 y.o., Sex:  female  Medical Record #: [id number]  Today's Date: 6/5/2023     Precautions  Precautions: Fall Risk  Comments: orthostatic, double mastectomy causes inaccurate readings         Subjective    \"  I want my shirt out .\"  Requesting shirt un tucked from pants       Objective/Assessment       06/05/23 0932   OT Charge Group   OT Self Care / ADL (Units) 2   OT Therapy Activity (Units) 1   OT Therapeutic Exercise (Units) 1   OT Total Time Spent   OT Individual Total Time Spent (Mins) 60   Precautions   Precautions Fall Risk   Comments orthostatic, double mastectomy causes inaccurate readings   Vitals   Blood Pressure  (!) 153/70  (BP taken  right LE)   Functional Level of Assist   Grooming Supervision  (wash/dry hands and face  oral care comb hair)   Grooming Description Seated in wheelchair at sink   Toileting Moderate Assist  (assist with pants down due to urgency)   Toilet Transfers Contact Guard Assist  (cues for technique)   Sitting Upper Body Exercises   Chest Press 1 set of 10;Bilateral   Shoulder Press 1 set of 10;Bilateral   Internal Shoulder Rotation 1 set of 10;Right ;Left   External Shoulder Rotation 1 set of 10;Right ;Left   Bicep Curls 1 set of 10;Right ;Left   Pronation / Supination 1 set of 10;Right ;Left   Other Exercise 1 set of 10;Bilateral;  arm circles forward   Comments ther ex performed with 2lb weight   Interdisciplinary Plan of Care Collaboration   Patient Position at End of Therapy Seated;Call Light within Reach;Tray Table within Reach      Static standing ring toss  x  12 reps x 3    standing  approximately 2 to 3 minutes  each set.     No LOB      Abdominal binder off through out session      BP reading above  taking  right LE    asymptomatic for orthostatic BP  through out session   Reported min fatigue at end of session               Strengths: Able to follow instructions, Alert and oriented, Manages " pain appropriately, Motivated for self care and independence, Pleasant and cooperative, Willingly participates in therapeutic activities  Barriers: Decreased endurance, Generalized weakness, Fatigue, Hypotension, Orthostatic hypotension, Impaired activity tolerance, Impaired balance, Limited mobility    Plan  ADLs w/ AE  Functional transfers  UE strengthening  Standing balance/tolerance     Passport items to be completed:  Perform bathroom transfers, complete dressing, complete feeding, get ready for the day, prepare a simple meal, participate in household tasks, adapt home for safety needs, demonstrate home exercise program, complete caregiver training        Occupational Therapy Goals (Active)       Problem: Bathing       Dates: Start:  06/01/23         Goal: STG-Within one week, patient will bathe with Min A overall using AE/DME as needed.       Dates: Start:  06/01/23               Problem: Dressing       Dates: Start:  06/01/23         Goal: STG-Within one week, patient will dress UB with SBA overall using AE/DME as needed.       Dates: Start:  06/01/23            Goal: STG-Within one week, patient will dress LB with Mod A overall using AE/DME as needed.       Dates: Start:  06/01/23               Problem: Functional Transfers       Dates: Start:  06/01/23         Goal: STG-Within one week, patient will transfer to toilet with CGA and LRD overall utilizing DME.AE as needed.       Dates: Start:  06/01/23               Problem: OT Long Term Goals       Dates: Start:  06/01/23         Goal: LTG-By discharge, patient will complete basic self care tasks with supervision overall using AE/DME as needed.       Dates: Start:  06/01/23            Goal: LTG-By discharge, patient will perform bathroom transfers with supervision and LRD overall using AE/DME as needed.       Dates: Start:  06/01/23               Problem: Toileting       Dates: Start:  06/01/23         Goal: STG-Within one week, patient will complete  toileting tasks with min A overall using AE/DME as needed.       Dates: Start:  06/01/23

## 2023-06-05 NOTE — THERAPY
"Physical Therapy   Daily Treatment     Patient Name: Rashmi Parra  Age:  83 y.o., Sex:  female  Medical Record #: [id number]  Today's Date: 6/5/2023     Precautions  Precautions: Fall Risk  Comments: orthostatic, double mastectomy causes inaccurate readings    Subjective    \"I'm ready to do something, my back is stiff from sitting in this chair too long.\" Pt in w/c at arrival, agreeable to PT tx.      Objective       06/05/23 1345   PT Charge Group   PT Gait Training (Units) 1   PT Therapeutic Exercise (Units) 1   PT Neuromuscular Re-Education / Balance (Units) 1   PT Therapeutic Activities (Units) 1   PT Total Time Spent   PT Individual Total Time Spent (Mins) 60   Gait Functional Level of Assist    Gait Level Of Assist Contact Guard Assist   Assistive Device Front Wheel Walker   Distance (Feet) 148  (1 x 111', 2 x 10')   # of Times Distance was Traveled 1   Deviation Bradykinetic;Decreased Heel Strike;Decreased Toe Off;Shuffled Gait   Transfer Functional Level of Assist   Bed, Chair, Wheelchair Transfer Minimal Assist   Bed Chair Wheelchair Transfer Description Increased time;Supervision for safety;Verbal cueing;Adaptive equipment;Set-up of equipment  (VC for forward scooting, hand and foot placement prior to rise and for controlled descent to chair)   Sitting Lower Body Exercises   Bilateral Row Light Resistance Theraband;Bilateral  (1 x 8, OTB)   Marching   (1 x 7 each, c/ ipsilateral OH reach)   Other Exercises pull aparts 1 x 8 OTB, trunk ext on ball for lumbar support 1 x 5; trunk rotations c/ UE reaches 1 x 5 each, lumbar ext 1 x 5, lateral bends 1 x 5   Comments unsupported sitting for all above TE   Bed Mobility    Sit to Supine Minimal Assist  (help at RLE to lift into bed)   Sit to Stand Minimal Assist   Scooting Standby Assist   Neuro-Muscular Treatments   Neuro-Muscular Treatments Anterior weight shift;Weight Shift Right;Weight Shift Left;Sequencing;Postural Facilitation;Postural Changes  (c/ seated " "TE for trunk control, ROM, and sequencing for improved STS/transfers)   Interdisciplinary Plan of Care Collaboration   Patient Position at End of Therapy In Bed;Call Light within Reach;Tray Table within Reach;Phone within Reach     Session focused on progression of gait c/ FWW over level surfaces and small thresholds, maneuvering around obstacles including turning and sidestepping. Trialed smaller walker to better match pt standing height, but pt reports preference for taller walker \"to help me stand up straighter.\"     Seated TE/NMRE: focusing on promotion of spinal ROM, core strength/postural control and endurance, balance. See flowsheet above for exercises.     Assessment    Aleyda with improving activity tolerance and participation in therapy session, decreased rest breaks needed compared to prior sessions and improving gait distances c/ FWW. With cues to scoot forward and increase forward lean, STS improved to Donald, but performance continues to be variable with fatigue due to pt tendency to lean back rather than to bring COM over feet when completing transition to stand.     Strengths: Able to follow instructions, Adequate strength, Independent prior level of function, Motivated for self care and independence, Pleasant and cooperative, Willingly participates in therapeutic activities, Supportive family  Barriers: Fatigue, Decreased endurance, Generalized weakness, Home accessibility, Hypotension, Impaired balance, Impaired carryover of learning, Impaired activity tolerance, Limited mobility, Visual impairment    Plan    Continue spinal ROM, postural control training, parascapular training, transfers/STS transitions, standing balance, gait c/ FWW, stair/curb training.     Passport items to be completed:  Get in/out of bed safely, in/out of a vehicle, safely use mobility device, walk or wheel around home/community, navigate up and down stairs, show how to get up/down from the ground, ensure home is accessible, " demonstrate HEP, complete caregiver training    Physical Therapy Problems (Active)       Problem: Mobility       Dates: Start:  06/01/23         Goal: STG-Within one week, patient will ambulate household distances >/= 50' c/ Donald or better and LRAD (FWW vs 4WW).        Dates: Start:  06/01/23            Goal: STG-Within one week, patient will ambulate up/down a curb with ModA or better, LRAD.       Dates: Start:  06/01/23               Problem: Mobility Transfers       Dates: Start:  06/01/23         Goal: STG-Within one week, patient will perform bed mobility with Liliam.        Dates: Start:  06/01/23            Goal: STG-Within one week, patient will transfer bed to chair c/ Donald or better.        Dates: Start:  06/01/23               Problem: PT-Long Term Goals       Dates: Start:  06/01/23         Goal: LTG-By discharge, patient will ambulate >/= 100' in a single effort c/ LRAD at SBA or better.        Dates: Start:  06/01/23            Goal: LTG-By discharge, patient will transfer one surface to another c/ Liliam or better.        Dates: Start:  06/01/23            Goal: LTG-By discharge, patient will ambulate up/down 2 stairs c/ BHR to safely access home environment at CGA or better.        Dates: Start:  06/01/23            Goal: LTG-By discharge, patient will transfer in/out of a car with Donald or better, LRAD.        Dates: Start:  06/01/23

## 2023-06-05 NOTE — THERAPY
Speech Language Pathology  Daily Treatment     Patient Name: Rashmi Parra  Age:  83 y.o., Sex:  female  Medical Record #: [id number]  Today's Date: 6/5/2023     Precautions  Precautions: Fall Risk  Comments: orthostatic, double mastectomy causes inaccurate readings    Subjective    Pt pleasant and cooperative during.     Objective       06/05/23 1101   Treatment Charges   SLP Swallowing Dysfunction Treatment Swallowing Dysfunction Treatment   SLP Total Time Spent   SLP Individual Total Time Spent (Mins) 30   Dysphagia    Other Treatments MBS reviewed; effortful swallow introduced         Assessment    Modified barium swallow study reviewed.  Emphasis placed on the importance of second swallow to clear vallecular residue.  Swallow strategies reviewed (small bites/sips, alternate bites/sips, swallow 2x per bite/sip.  Effortful swallow introduced.  Pt completed 10 effortful swallows given min verbal cues.      Strengths: Pleasant and cooperative, Able to follow instructions, Willingly participates in therapeutic activities  Barriers: Aspiration risk, Generalized weakness, Orthostatic hypotension    Plan    Effortful swallows, use of strategies, tolerance of level 6 textures        Speech Therapy Problems (Active)       Problem: Speech/Swallowing LTGs       Dates: Start:  06/02/23         Goal: LTG-By discharge, patient will safely swallow level 6 textures and thin liquids without aspiration.         Dates: Start:  06/02/23               Problem: Swallowing STGs       Dates: Start:  06/02/23         Goal: STG-Within one week, patient will participate in an MBS to fully assess swallow function.        Dates: Start:  06/02/23            Goal: STG-Within one week, patient will recall and utilize swallow strategies with 80% accuracy given min verbal cues.         Dates: Start:  06/02/23

## 2023-06-06 ENCOUNTER — APPOINTMENT (OUTPATIENT)
Dept: PHYSICAL THERAPY | Facility: REHABILITATION | Age: 83
DRG: 312 | End: 2023-06-06
Attending: PHYSICAL MEDICINE & REHABILITATION
Payer: MEDICARE

## 2023-06-06 ENCOUNTER — APPOINTMENT (OUTPATIENT)
Dept: SPEECH THERAPY | Facility: REHABILITATION | Age: 83
DRG: 312 | End: 2023-06-06
Attending: PHYSICAL MEDICINE & REHABILITATION
Payer: MEDICARE

## 2023-06-06 ENCOUNTER — APPOINTMENT (OUTPATIENT)
Dept: OCCUPATIONAL THERAPY | Facility: REHABILITATION | Age: 83
DRG: 312 | End: 2023-06-06
Attending: PHYSICAL MEDICINE & REHABILITATION
Payer: MEDICARE

## 2023-06-06 PROBLEM — K21.9 GERD (GASTROESOPHAGEAL REFLUX DISEASE): Status: ACTIVE | Noted: 2023-06-06

## 2023-06-06 PROCEDURE — A9270 NON-COVERED ITEM OR SERVICE: HCPCS | Performed by: HOSPITALIST

## 2023-06-06 PROCEDURE — 99233 SBSQ HOSP IP/OBS HIGH 50: CPT | Performed by: PHYSICAL MEDICINE & REHABILITATION

## 2023-06-06 PROCEDURE — 99232 SBSQ HOSP IP/OBS MODERATE 35: CPT | Performed by: HOSPITALIST

## 2023-06-06 PROCEDURE — 97110 THERAPEUTIC EXERCISES: CPT

## 2023-06-06 PROCEDURE — 97535 SELF CARE MNGMENT TRAINING: CPT

## 2023-06-06 PROCEDURE — 700102 HCHG RX REV CODE 250 W/ 637 OVERRIDE(OP): Performed by: PHYSICAL MEDICINE & REHABILITATION

## 2023-06-06 PROCEDURE — 92526 ORAL FUNCTION THERAPY: CPT

## 2023-06-06 PROCEDURE — 94760 N-INVAS EAR/PLS OXIMETRY 1: CPT

## 2023-06-06 PROCEDURE — 700111 HCHG RX REV CODE 636 W/ 250 OVERRIDE (IP): Performed by: PHYSICAL MEDICINE & REHABILITATION

## 2023-06-06 PROCEDURE — 97530 THERAPEUTIC ACTIVITIES: CPT

## 2023-06-06 PROCEDURE — 700102 HCHG RX REV CODE 250 W/ 637 OVERRIDE(OP): Performed by: HOSPITALIST

## 2023-06-06 PROCEDURE — 770010 HCHG ROOM/CARE - REHAB SEMI PRIVAT*

## 2023-06-06 PROCEDURE — 97116 GAIT TRAINING THERAPY: CPT

## 2023-06-06 PROCEDURE — A9270 NON-COVERED ITEM OR SERVICE: HCPCS | Performed by: PHYSICAL MEDICINE & REHABILITATION

## 2023-06-06 RX ADMIN — LOVASTATIN 20 MG: 20 TABLET ORAL at 19:54

## 2023-06-06 RX ADMIN — DULOXETINE HYDROCHLORIDE 30 MG: 30 CAPSULE, DELAYED RELEASE ORAL at 10:12

## 2023-06-06 RX ADMIN — ENOXAPARIN SODIUM 40 MG: 100 INJECTION SUBCUTANEOUS at 17:59

## 2023-06-06 RX ADMIN — Medication 4.5 MG: at 19:54

## 2023-06-06 RX ADMIN — POLYETHYLENE GLYCOL 3350 1 PACKET: 17 POWDER, FOR SOLUTION ORAL at 10:12

## 2023-06-06 RX ADMIN — DEXLANSOPRAZOLE 60 MG: 60 CAPSULE, DELAYED RELEASE ORAL at 19:55

## 2023-06-06 RX ADMIN — HYDRALAZINE HYDROCHLORIDE 25 MG: 25 TABLET, FILM COATED ORAL at 19:54

## 2023-06-06 RX ADMIN — AMLODIPINE BESYLATE 10 MG: 5 TABLET ORAL at 05:15

## 2023-06-06 ASSESSMENT — GAIT ASSESSMENTS
ASSISTIVE DEVICE: FRONT WHEEL WALKER
GAIT LEVEL OF ASSIST: CONTACT GUARD ASSIST
DEVIATION: BRADYKINETIC;DECREASED HEEL STRIKE;DECREASED TOE OFF
ASSISTIVE DEVICE: FRONT WHEEL WALKER
GAIT LEVEL OF ASSIST: CONTACT GUARD ASSIST
DISTANCE (FEET): 360
DEVIATION: BRADYKINETIC;DECREASED HEEL STRIKE;DECREASED TOE OFF
DISTANCE (FEET): 150

## 2023-06-06 ASSESSMENT — ENCOUNTER SYMPTOMS
COUGH: 0
PALPITATIONS: 0
BRUISES/BLEEDS EASILY: 0
VOMITING: 0
MUSCULOSKELETAL NEGATIVE: 1
EYES NEGATIVE: 1
SHORTNESS OF BREATH: 0
NAUSEA: 0
CHILLS: 0
ABDOMINAL PAIN: 0
FEVER: 0
POLYDIPSIA: 0

## 2023-06-06 ASSESSMENT — ACTIVITIES OF DAILY LIVING (ADL)
BED_CHAIR_WHEELCHAIR_TRANSFER_DESCRIPTION: ADAPTIVE EQUIPMENT;INCREASED TIME;SET-UP OF EQUIPMENT;SUPERVISION FOR SAFETY;VERBAL CUEING
TOILET_TRANSFER_DESCRIPTION: ADAPTIVE EQUIPMENT;GRAB BAR;INCREASED TIME;SET-UP OF EQUIPMENT;SUPERVISION FOR SAFETY;VERBAL CUEING
BED_CHAIR_WHEELCHAIR_TRANSFER_DESCRIPTION: ADAPTIVE EQUIPMENT;INCREASED TIME;SET-UP OF EQUIPMENT;SUPERVISION FOR SAFETY;VERBAL CUEING

## 2023-06-06 ASSESSMENT — PAIN DESCRIPTION - PAIN TYPE: TYPE: ACUTE PAIN

## 2023-06-06 NOTE — PROGRESS NOTES
Physical Medicine & Rehabilitation Progress Note    Encounter Date: 6/6/2023    Chief Complaint: Decreased mobility, hypotension    Interval Events (Subjective):  Patient sitting up in room. She reports therapy is going well. Discussed would have IDT later today to discuss discharge planning. Denies NVD.     _____________________________________  Interdisciplinary Team Conference   Most recent IDT on 6/6/2023    IStella M.D./Ph.D., was present and led the interdisciplinary team conference on 6/6/2023.  I led the IDT conference and agree with the IDT conference documentation and plan of care as noted below.     Nursing:  Diet Current Diet Order   Procedures    Diet Order Diet: Level 6 - Soft and Bite Sized (meds whole 1 at a time with thin); Liquid level: Level 0 - Thin       Eating ADL Supervision  Increased time, Modified diet, Supervision for safety   % of Last Meal  Oral Nutrition: *  * Meal *  *, Dinner, Between % Consumed   Sleep    Bowel Last BM: 06/04/23   Bladder    Barriers to Discharge Home:  Pain    Physical Therapy:  Bed Mobility    Transfers Minimal Assist  Increased time, Supervision for safety, Verbal cueing, Adaptive equipment, Set-up of equipment (VC for forward scooting, hand and foot placement prior to rise and for controlled descent to chair)   Mobility Contact Guard Assist   Stairs    Barriers to Discharge Home:  Improving endurance but limited    Occupational Therapy:  Grooming Supervision, Seated   Bathing Minimal Assist   UB Dressing Minimal Assist (pull shirt down)   LB Dressing Maximal Assist   Toileting Maximal Assist   Shower & Transfer    Barriers to Discharge Home:  Limited endurance    Speech-Language Pathology:  Comprehension:  Modified Independent  Comprehension Description:     Expression:  Independent  Expression Description:     Social Interaction:  Independent  Social Interaction Description:     Problem Solving:  Supervision  Problem Solving  "Description:     Memory:  Supervision  Memory Description:     Barriers to Discharge Home:  MBSS - weak but normal swallow    Rec Therapy  Perseverative on SBP    Case Management:  Continues to work on disposition and DME needs.      Discharge Date/Disposition:  6/14/23  _____________________________________      Objective:  VITAL SIGNS: BP (!) 157/89   Pulse 80   Temp 36.7 °C (98.1 °F) (Oral)   Resp 16   Ht 1.6 m (5' 3\")   Wt 59.9 kg (132 lb)   LMP  (LMP Unknown)   SpO2 97%   BMI 23.38 kg/m²   Gen: NAD  Psych: Mood and affect appropriate  CV: RRR, 1+ edema  Resp: CTAB, no upper airway sounds  Abd: NTND  Neuro: AOx3, following commands  Unchanged from 6/5/23    Laboratory Values:  Recent Results (from the past 72 hour(s))   CBC WITH DIFFERENTIAL    Collection Time: 06/05/23  5:43 AM   Result Value Ref Range    WBC 6.4 4.8 - 10.8 K/uL    RBC 3.86 (L) 4.20 - 5.40 M/uL    Hemoglobin 11.7 (L) 12.0 - 16.0 g/dL    Hematocrit 36.3 (L) 37.0 - 47.0 %    MCV 94.0 81.4 - 97.8 fL    MCH 30.3 27.0 - 33.0 pg    MCHC 32.2 32.2 - 35.5 g/dL    RDW 47.1 35.9 - 50.0 fL    Platelet Count 246 164 - 446 K/uL    MPV 10.8 9.0 - 12.9 fL    Neutrophils-Polys 73.20 (H) 44.00 - 72.00 %    Lymphocytes 13.10 (L) 22.00 - 41.00 %    Monocytes 12.40 0.00 - 13.40 %    Eosinophils 0.80 0.00 - 6.90 %    Basophils 0.20 0.00 - 1.80 %    Immature Granulocytes 0.30 0.00 - 0.90 %    Nucleated RBC 0.00 0.00 - 0.20 /100 WBC    Neutrophils (Absolute) 4.68 1.82 - 7.42 K/uL    Lymphs (Absolute) 0.84 (L) 1.00 - 4.80 K/uL    Monos (Absolute) 0.79 0.00 - 0.85 K/uL    Eos (Absolute) 0.05 0.00 - 0.51 K/uL    Baso (Absolute) 0.01 0.00 - 0.12 K/uL    Immature Granulocytes (abs) 0.02 0.00 - 0.11 K/uL    NRBC (Absolute) 0.00 K/uL       Medications:  Scheduled Medications   Medication Dose Frequency    amLODIPine  10 mg Q DAY    Dexlansoprazole  60 mg Q EVENING    polyethylene glycol/lytes  1 Packet DAILY    Pharmacy Consult Request  1 Each PHARMACY TO DOSE    " DULoxetine  30 mg DAILY    enoxaparin (LOVENOX) injection  40 mg DAILY AT 1800    lovastatin  20 mg Nightly    melatonin  4.5 mg QHS     PRN medications: senna-docusate **AND** [DISCONTINUED] polyethylene glycol/lytes **AND** magnesium hydroxide **AND** bisacodyl, Respiratory Therapy Consult, hydrALAZINE, acetaminophen, mag hydrox-al hydrox-simeth, ondansetron **OR** ondansetron, traZODone, sodium chloride, traMADol, gabapentin    Diet:  Current Diet Order   Procedures    Diet Order Diet: Level 6 - Soft and Bite Sized (meds whole 1 at a time with thin); Liquid level: Level 0 - Thin       Medical Decision Making and Plan:  Debility - Patient with decreased mobility and weakness 2/2 orthostatic hypotension, low cortisol and multiple falls  -PT and OT for mobility and ADLs. Per guidelines, 15 hours per week between PT, OT and/or SLP.  -Follow-up PCP     Dysphagia - Patient with new dysphagia of choking on foods. Consult SLP. Will check CXR. Patient at high risk for further decline and respiratory failure due to age and debility.   -Choking event 6/1 with dinner. Repeat CXR, AM labs.    HTN - Patient on Lisinopril. Was previously having orthostatic hypotension requiring Midodrine and Florinef. Will monitor  -Severe orthostatic hypotension on admission into the 70s. Start IV fluids,consult hospitalist and monitor. May need to restart Florinef. Will check CXR. High risk for falls and further injury. Will recheck labs including cortisol per hospitalist.  -Elevated SBP on 6/5, increase Amlodipine to 10, will monitor for low SBP. Midodrine discontinued.      HLD - Patient on Lovastatin 20 mg QHS. Continue lovastatin      Hypocalcemia - Check AM CMP     Low Cortisol - Negative ACTH test. Will monitor      PAD - Aortic stenosis on CTA of abdomen. Follow-up Vascular     Depression - Patient on Cymbalta 30 mg daily     Pain - APAP/Tramadol PRN     Skin - Patient at risk for skin breakdown due to debility in areas including  sacrum, achilles, elbows and head in addition to other sites. Nursing to assess skin daily.      GI Ppx - Patient on Prilosec for GERD prophylaxis. Patient on Senna-docusate for constipation prophylaxis.      DVT Ppx - Patient Lovenox on transfer.  ____________________________________    T. Toño Will MD/PhD  [facility] - Physical Medicine & Rehabilitation   [facility] - Brain Injury Medicine   ____________________________________    Total time:  50 minutes. Time spent included pre-rounding review of vitals and tests, unit/floor time, face-to-face time with the patient including physical examination, care coordination, counseling of patient and/or family, ordering medications/procedures/tests, discussion with CM, PT, OT, SLP and/or other healthcare providers, and documentation in the electronic medical record. Topics discussed included elevated SBP, discontinue midodrine, and monitor for low SBP. Patient was discussed separately in IDT today; please see details above.

## 2023-06-06 NOTE — CARE PLAN
Problem: Dressing  Goal: STG-Within one week, patient will dress UB with SBA overall using AE/DME as needed.  Outcome: Not Met  Note: Min assist     Limited by decreased strength/endurance    Continue goal   Goal: STG-Within one week, patient will dress LB with Mod A overall using AE/DME as needed.  Outcome: Not Met  Note: Max assist     Limited by decreased strength/endurance        Problem: Toileting  Goal: STG-Within one week, patient will complete toileting tasks with min A overall using AE/DME as needed.  Outcome: Not Met  Note: Mod to max assist    Limited by decreased strength/endurance    Continue goal        Problem: Functional Transfers  Goal: STG-Within one week, patient will transfer to toilet with CGA and LRD overall utilizing DME.AE as needed.  Outcome: Not Met  Note: Requires min assist    Limited by decreased strength/endurance

## 2023-06-06 NOTE — CARE PLAN
Problem: Mobility  Goal: STG-Within one week, patient will ambulate up/down a curb with ModA or better, LRAD.  Outcome: Progressing  Note: Needs assessment.      Problem: Mobility Transfers  Goal: STG-Within one week, patient will perform bed mobility with Liliam.   Outcome: Progressing  Note: Variable CGA <> Donald for LE management      Problem: Mobility  Goal: STG-Within one week, patient will ambulate household distances >/= 50' c/ Donald or better and LRAD (FWW vs 4WW).   Outcome: Met     Problem: Mobility Transfers  Goal: STG-Within one week, patient will transfer bed to chair c/ Donald or better.   Outcome: Met

## 2023-06-06 NOTE — CARE PLAN
Problem: Swallowing STGs  Goal: STG-Within one week, patient will recall and utilize swallow strategies with 80% accuracy given min verbal cues.    Outcome: Not Met

## 2023-06-06 NOTE — THERAPY
"Physical Therapy   Daily Treatment     Patient Name: Rashmi Parra  Age:  83 y.o., Sex:  female  Medical Record #: [id number]  Today's Date: 6/6/2023     Precautions  Precautions: Fall Risk  Comments: BP on LLE only, check BP c/ activity, O2 c/ activity    Subjective    Pt was seen twice at 1001 and 1301 for 90 min total of tx.     1001: Pt received up in wc brushing teeth at bathroom sink from OT, agreeable to participate.      1301: Pt received up in wc, agreeable to participate. \"I hope this does something\" during postural mm strengthening exercises.     Objective       06/06/23 1001 06/06/23 1301   PT Charge Group   PT Gait Training (Units) 2 2   PT Therapeutic Exercise (Units)  --  1   PT Therapeutic Activities (Units)  --  1   PT Total Time Spent   PT Individual Total Time Spent (Mins) 30 60   Vitals   O2 (LPM) 3 2   O2 Delivery Device Nasal Cannula Nasal Cannula   Cognition    Attention Impaired Impaired   Gait Functional Level of Assist    Gait Level Of Assist Contact Guard Assist Contact Guard Assist   Assistive Device Front Wheel Walker Front Wheel Walker   Distance (Feet) 360 150   # of Times Distance was Traveled 1 2   Deviation Bradykinetic;Decreased Heel Strike;Decreased Toe Off  (kyphotic and scoliotic posture, mild path deviation, frequent stops d/t poor attention) Bradykinetic;Decreased Heel Strike;Decreased Toe Off  (kyphotic and scoliotic posture, mild path deviation)   Transfer Functional Level of Assist   Bed, Chair, Wheelchair Transfer Contact Guard Assist Contact Guard Assist   Bed Chair Wheelchair Transfer Description Adaptive equipment;Increased time;Set-up of equipment;Supervision for safety;Verbal cueing  (stand step FWW) Adaptive equipment;Increased time;Set-up of equipment;Supervision for safety;Verbal cueing  (stand step FWW)   Toilet Transfers  --  Minimal Assist   Toilet Transfer Description  --  Adaptive equipment;Grab bar;Increased time;Set-up of equipment;Supervision for " safety;Verbal cueing  (FWW>toilet with handoff to CNA on toilet)   Sitting Lower Body Exercises   Bilateral Row  --  2 sets of 10;Bilateral;Light Resistance Theraband  (orange TB)   Other Exercises  --  unsupported sitting EOM scapular squeezes with orange TB 2x10, chin retractions 2x10, forw/ bwd leans for core/ back strengthening with BUE holding dowel 2x8   Bed Mobility    Sit to Stand Minimal Assist Contact Guard Assist   Scooting Standby Assist  (wc)  --    Interdisciplinary Plan of Care Collaboration   IDT Collaboration with  Occupational Therapist;Nursing;Family / Caregiver;Other (See Comments)  (hospitalist) Certified Nursing Assistant   Patient Position at End of Therapy Seated;Chair Alarm On;Call Light within Reach;Tray Table within Reach;Phone within Reach;Family / Friend in Room Seated;Call Light within Reach;Other (Comments)  (on toilet)   Collaboration Comments handoff from OT; RN gave meds; hospitalist checked in; granddtr and  arrived at end of session direct handoff to CNA on toilet     1301: Spent approx 8 min standing with FWW support and CGA-SBA while talking with therapy supervisor.    Assessment    Pt with good tolerance for both sessions focused on gait endurance and postural mm strengthening. Able to inc gait distance up to 360 ft with FWW however took multiple standing RB d/t distractibility/ poor attention to task. With varied sit<>stand ability bw sessions requiring min A-CGA to stand.    Strengths: Able to follow instructions, Adequate strength, Independent prior level of function, Motivated for self care and independence, Pleasant and cooperative, Willingly participates in therapeutic activities, Supportive family  Barriers: Fatigue, Decreased endurance, Generalized weakness, Home accessibility, Hypotension, Impaired balance, Impaired carryover of learning, Impaired activity tolerance, Limited mobility, Visual impairment    Plan    Continue spinal ROM, postural control training,  parascapular training, transfers/STS transitions, standing balance, gait c/ FWW, stair/curb training.      Passport items to be completed:  Get in/out of bed safely, in/out of a vehicle, safely use mobility device, walk or wheel around home/community, navigate up and down stairs, show how to get up/down from the ground, ensure home is accessible, demonstrate HEP, complete caregiver training    Physical Therapy Problems (Active)       Problem: Mobility       Dates: Start:  06/01/23         Goal: STG-Within one week, patient will ambulate up/down a curb with ModA or better, LRAD.       Dates: Start:  06/01/23         Goal Note filed on 06/06/23 0823 by Florence Issa, PT       Needs assessment.                  Problem: Mobility Transfers       Dates: Start:  06/01/23         Goal: STG-Within one week, patient will perform bed mobility with Liliam.        Dates: Start:  06/01/23         Goal Note filed on 06/06/23 0823 by Florence Issa, PT       Variable CGA <> Donald for LE management                  Problem: PT-Long Term Goals       Dates: Start:  06/01/23         Goal: LTG-By discharge, patient will ambulate >/= 100' in a single effort c/ LRAD at SBA or better.        Dates: Start:  06/01/23            Goal: LTG-By discharge, patient will transfer one surface to another c/ Liliam or better.        Dates: Start:  06/01/23            Goal: LTG-By discharge, patient will ambulate up/down 2 stairs c/ BHR to safely access home environment at CGA or better.        Dates: Start:  06/01/23            Goal: LTG-By discharge, patient will transfer in/out of a car with Donald or better, LRAD.        Dates: Start:  06/01/23

## 2023-06-06 NOTE — THERAPY
Occupational Therapy  Daily Treatment     Patient Name: Rashmi Parra  Age:  83 y.o., Sex:  female  Medical Record #: [id number]  Today's Date: 6/6/2023     Precautions  Precautions: Fall Risk  Comments: BP on LLE only, check BP c/ activity, O2 c/ activity         Subjective    Pt seated in t-dine eating breakfast upon arrival, pleasant and cooperative, agreeable to therapy and shower       Objective       06/06/23 0901   OT Charge Group   OT Self Care / ADL (Units) 5   OT Total Time Spent   OT Individual Total Time Spent (Mins) 75   Functional Level of Assist   Eating Supervision   Grooming Supervision;Seated   Bathing Minimal Assist   Bathing Description Grab bar;Hand held shower;Increased time;Tub bench;Set up for shower sleeve;Assit with perineal   Upper Body Dressing Minimal Assist  (pull shirt down)   Lower Body Dressing Maximal Assist   Toileting Maximal Assist   Toilet Transfers Minimal Assist  (stand pivot w/c<>toilet with GB)   Tub / Shower Transfers Minimal Assist  (sit<>stand assist fold down bench to FWW)   Interdisciplinary Plan of Care Collaboration   Patient Position at End of Therapy Seated;Call Light within Reach;Tray Table within Reach  (handoff to PT)     Functional mobility at FWW: CGA room<>bathroom    Assessment    Pt completed shower routine at Min - Max A overall using FWW, shower bench, GB's. Pt's functional performance was impacted by impaired memory, impaired general strength/endurance, and impaired standing balance, requires increase time     Strengths: Able to follow instructions, Alert and oriented, Manages pain appropriately, Motivated for self care and independence, Pleasant and cooperative, Willingly participates in therapeutic activities  Barriers: Decreased endurance, Generalized weakness, Fatigue, Hypotension, Orthostatic hypotension, Impaired activity tolerance, Impaired balance, Limited mobility    Plan    Refer to Primary OT POC/goals     Occupational Therapy Goals (Active)   [facility]. Believes is causing cough, nausea and headache.   Patient seen by Dr. Lyons 11/21/19 for cough x 2 months, skin issue and BP was noted to be elevated.   Treated for bronchitis with zpak - today should be last dose. Lisinopril 5mg ordered for HTN.     BP Readings from Last 6 Encounters:   11/21/19 (!) 158/96   03/01/18 124/86   03/14/17 116/80   05/16/16 (!) 138/104   04/11/16 138/88   05/27/15 134/88     Left message for patient to call nurse to discuss.   Are bronchitis symptoms better since zpack?   Cough different since lisinopril started? Consider hold lisinopril till well then retry.         Problem: Dressing       Dates: Start:  06/01/23         Goal: STG-Within one week, patient will dress UB with SBA overall using AE/DME as needed.       Dates: Start:  06/01/23         Goal Note filed on 06/06/23 1149 by BRANDI LeO.T.A.       Min assist     Limited by decreased strength/endurance    Continue goal               Goal: STG-Within one week, patient will dress LB with Mod A overall using AE/DME as needed.       Dates: Start:  06/01/23         Goal Note filed on 06/06/23 1149 by RUTH ANN Le.O.T.A.       Max assist     Limited by decreased strength/endurance                    Problem: Functional Transfers       Dates: Start:  06/01/23         Goal: STG-Within one week, patient will transfer to toilet with CGA and LRD overall utilizing DME.AE as needed.       Dates: Start:  06/01/23         Goal Note filed on 06/06/23 1149 by BRANDI LeO.T.NISHA.       Requires min assist    Limited by decreased strength/endurance                  Problem: OT Long Term Goals       Dates: Start:  06/01/23         Goal: LTG-By discharge, patient will complete basic self care tasks with supervision overall using AE/DME as needed.       Dates: Start:  06/01/23            Goal: LTG-By discharge, patient will perform bathroom transfers with supervision and LRD overall using AE/DME as needed.       Dates: Start:  06/01/23               Problem: Toileting       Dates: Start:  06/01/23         Goal: STG-Within one week, patient will complete toileting tasks with min A overall using AE/DME as needed.       Dates: Start:  06/01/23         Goal Note filed on 06/06/23 1149 by RUTH ANN Le.O.T.A.       Mod to max assist    Limited by decreased strength/endurance    Continue goal

## 2023-06-06 NOTE — CARE PLAN
"The patient is Watcher - Medium risk of patient condition declining or worsening    Shift Goals  Clinical Goals: safety  Patient Goals: pain management    Progress made toward(s) clinical / shift goals:    Problem: Fall Risk - Rehab  Goal: Patient will remain free from falls  Note: Anastasia Issa Fall risk Assessment Score: 24    High fall risk Interventions   - Alarming seatbelt  - Bed and strip alarm   - Yellow sign by the door   - Yellow wrist band \"Fall risk\"  - Room near to the nurse station  - Do not leave patient unattended in the bathroom  - Fall risk education provided       Problem: Psychosocial  Goal: Patient's level of anxiety will decrease  Note: Calm, cooperative, sleeping comfortably. No anxious behavior noted.            "

## 2023-06-06 NOTE — CARE PLAN
Problem: Recreation Therapy  Goal: STG-Within one week, patient will identify two new coping skills.   Outcome: Progressing  Goal: STG-Within one week, patient will identify two new leisure interests.   Outcome: Progressing  Goal: LTG-By discharge, patient will identify and demonstrate three new coping skills and appropriate ways to use them.   Outcome: Progressing  Goal: LTG-By discharge, patient will identify and demonstrate three new leisure interests.   Outcome: Progressing

## 2023-06-06 NOTE — CARE PLAN
The patient is Stable - Low risk of patient condition declining or worsening    Shift Goals  Clinical Goals: safety  Patient Goals: pain management    Progress made toward(s) clinical / shift goals:      Problem: Knowledge Deficit - Standard  Goal: Patient and family/care givers will demonstrate understanding of plan of care, disease process/condition, diagnostic tests and medications  Outcome: Progressing     Problem: Fall Risk - Rehab  Goal: Patient will remain free from falls  Outcome: Progressing     Problem: Bowel Elimination  Goal: Patient will participate in bowel management program  Outcome: Progressing       Patient is not progressing towards the following goals:

## 2023-06-06 NOTE — THERAPY
Speech Language Pathology  Daily Treatment     Patient Name: Rashmi Parra  Age:  83 y.o., Sex:  female  Medical Record #: [id number]  Today's Date: 6/6/2023     Precautions  Precautions: Fall Risk  Comments: BP on LLE only, check BP c/ activity, O2 c/ activity    Subjective    Patient was willing to participate in ST.      Objective       06/06/23 0802   Treatment Charges   SLP Swallowing Dysfunction Treatment Swallowing Dysfunction Treatment   SLP Total Time Spent   SLP Individual Total Time Spent (Mins) 60   Dysphagia    Diet / Liquid Recommendation Soft & Bite-Sized (6) - (Dysphagia III)   Nutritional Liquid Intake Rating Scale Non thickened beverages   Nutritional Food Intake Rating Scale Total oral diet with multiple consistencies without special preparation but with specific food limitations   Speech Language Pathologist Assigned   Assigned SLP / Extension CL/MP 30 swallow         Assessment    Patient was assessed with current diet textures. Effortful swallows targeted in conjunction with PO. No overt s/sx of aspiration were noted. Patient was able to verbalize safe swallow strategies and carry-over during meal with occasional cues needed.   Patient reports somewhat modified diet at baseline, avoiding tough meats and breads.     Strengths: Able to follow instructions, Pleasant and cooperative, Willingly participates in therapeutic activities, Motivated for self care and independence  Barriers: Aspiration risk    Plan    Continue to target effortful swallows, reinforce safe swallow strategies.       Speech Therapy Problems (Active)       Problem: Speech/Swallowing LTGs       Dates: Start:  06/02/23         Goal: LTG-By discharge, patient will safely swallow level 6 textures and thin liquids without aspiration.         Dates: Start:  06/02/23               Problem: Swallowing STGs       Dates: Start:  06/02/23         Goal: STG-Within one week, patient will recall and utilize swallow strategies with 80%  accuracy given min verbal cues.         Dates: Start:  06/02/23

## 2023-06-06 NOTE — PROGRESS NOTES
Hospital Medicine Daily Progress Note        Chief Complaint:  Hypotension    Interval History:  Pt seen and examined during therapies.  Last 24 hour blood pressure range:  152-194/69-93.    Review of Systems  Review of Systems   Constitutional:  Negative for chills and fever.   HENT: Negative.     Eyes: Negative.    Respiratory:  Negative for cough and shortness of breath.    Cardiovascular:  Negative for chest pain and palpitations.   Gastrointestinal:  Negative for abdominal pain, nausea and vomiting.   Genitourinary: Negative.    Musculoskeletal: Negative.    Skin:  Negative for itching and rash.   Endo/Heme/Allergies:  Negative for polydipsia. Does not bruise/bleed easily.        Physical Exam  Temp:  [36.6 °C (97.9 °F)-36.7 °C (98.1 °F)] 36.7 °C (98.1 °F)  Pulse:  [75-88] 80  Resp:  [16-18] 16  BP: (134-194)/(61-93) 157/89  SpO2:  [94 %-98 %] 97 %    Physical Exam  Vitals reviewed.   Constitutional:       General: She is not in acute distress.     Appearance: Normal appearance. She is not ill-appearing.   HENT:      Head: Normocephalic and atraumatic.      Right Ear: External ear normal.      Left Ear: External ear normal.      Nose: Nose normal.      Mouth/Throat:      Pharynx: Oropharynx is clear.   Eyes:      General:         Right eye: No discharge.         Left eye: No discharge.      Extraocular Movements: Extraocular movements intact.      Conjunctiva/sclera: Conjunctivae normal.   Cardiovascular:      Rate and Rhythm: Normal rate and regular rhythm.   Pulmonary:      Effort: No respiratory distress.      Breath sounds: No wheezing.      Comments: Decreased BS  Abdominal:      General: Bowel sounds are normal. There is no distension.      Palpations: Abdomen is soft.      Tenderness: There is no abdominal tenderness.   Musculoskeletal:      Cervical back: Normal range of motion and neck supple.      Right lower leg: No edema.      Left lower leg: No edema.   Skin:     General: Skin is warm and dry.    Neurological:      Mental Status: She is alert and oriented to person, place, and time.         Fluids    Intake/Output Summary (Last 24 hours) at 6/6/2023 1103  Last data filed at 6/5/2023 2030  Gross per 24 hour   Intake 660 ml   Output --   Net 660 ml       Laboratory  Recent Labs     06/05/23  0543   WBC 6.4   RBC 3.86*   HEMOGLOBIN 11.7*   HEMATOCRIT 36.3*   MCV 94.0   MCH 30.3   MCHC 32.2   RDW 47.1   PLATELETCT 246   MPV 10.8                       Assessment/Plan  GERD (gastroesophageal reflux disease)  Assessment & Plan  On Dexlansoprazole    Abnormal CXR  Assessment & Plan  Has had no cough  Afebrile w/ normal WBC  CXR streaky airspace disease in RUL, suspect pneumonitis  No compelling indication to initiate empiric antimicrobial therapy at this time    Essential hypertension  Assessment & Plan  H/O bilat mastectomy and blood pressures need to be measured on legs  SBP has been consistently >150  Will discontinue Midodrine  Observe blood pressure trends on Norvasc    Depression- (present on admission)  Assessment & Plan  On Cymbalta    Dyslipidemia- (present on admission)  Assessment & Plan  On Lovastatin      DNR    Reviewed w/ RN (Bindu)

## 2023-06-06 NOTE — FLOWSHEET NOTE
06/06/23 1414   Events/Summary/Plan   Events/Summary/Plan SpO2 check   Vital Signs   Pulse 80   Respiration 18   Pulse Oximetry 93 %   $ Pulse Oximetry (Spot Check) Yes   Respiratory Assessment   Respiratory Pattern Within Normal Limits   Level of Consciousness Alert   Chest Exam   Work Of Breathing / Effort Within Normal Limits   Oxygen   O2 (LPM) 2   O2 Delivery Device Nasal Cannula

## 2023-06-07 ENCOUNTER — APPOINTMENT (OUTPATIENT)
Dept: OCCUPATIONAL THERAPY | Facility: REHABILITATION | Age: 83
DRG: 312 | End: 2023-06-07
Attending: PHYSICAL MEDICINE & REHABILITATION
Payer: MEDICARE

## 2023-06-07 ENCOUNTER — APPOINTMENT (OUTPATIENT)
Dept: PHYSICAL THERAPY | Facility: REHABILITATION | Age: 83
DRG: 312 | End: 2023-06-07
Attending: PHYSICAL MEDICINE & REHABILITATION
Payer: MEDICARE

## 2023-06-07 ENCOUNTER — APPOINTMENT (OUTPATIENT)
Dept: SPEECH THERAPY | Facility: REHABILITATION | Age: 83
DRG: 312 | End: 2023-06-07
Attending: PHYSICAL MEDICINE & REHABILITATION
Payer: MEDICARE

## 2023-06-07 PROCEDURE — 97530 THERAPEUTIC ACTIVITIES: CPT

## 2023-06-07 PROCEDURE — A9270 NON-COVERED ITEM OR SERVICE: HCPCS | Performed by: PHYSICAL MEDICINE & REHABILITATION

## 2023-06-07 PROCEDURE — 700102 HCHG RX REV CODE 250 W/ 637 OVERRIDE(OP): Performed by: HOSPITALIST

## 2023-06-07 PROCEDURE — 770010 HCHG ROOM/CARE - REHAB SEMI PRIVAT*

## 2023-06-07 PROCEDURE — 700111 HCHG RX REV CODE 636 W/ 250 OVERRIDE (IP): Performed by: PHYSICAL MEDICINE & REHABILITATION

## 2023-06-07 PROCEDURE — 97112 NEUROMUSCULAR REEDUCATION: CPT

## 2023-06-07 PROCEDURE — 99232 SBSQ HOSP IP/OBS MODERATE 35: CPT | Performed by: HOSPITALIST

## 2023-06-07 PROCEDURE — 99232 SBSQ HOSP IP/OBS MODERATE 35: CPT | Performed by: PHYSICAL MEDICINE & REHABILITATION

## 2023-06-07 PROCEDURE — 97535 SELF CARE MNGMENT TRAINING: CPT

## 2023-06-07 PROCEDURE — A9270 NON-COVERED ITEM OR SERVICE: HCPCS | Performed by: HOSPITALIST

## 2023-06-07 PROCEDURE — 92523 SPEECH SOUND LANG COMPREHEN: CPT

## 2023-06-07 PROCEDURE — 97116 GAIT TRAINING THERAPY: CPT

## 2023-06-07 PROCEDURE — 700102 HCHG RX REV CODE 250 W/ 637 OVERRIDE(OP): Performed by: PHYSICAL MEDICINE & REHABILITATION

## 2023-06-07 PROCEDURE — 94760 N-INVAS EAR/PLS OXIMETRY 1: CPT

## 2023-06-07 RX ADMIN — DEXLANSOPRAZOLE 60 MG: 60 CAPSULE, DELAYED RELEASE ORAL at 19:57

## 2023-06-07 RX ADMIN — HYDRALAZINE HYDROCHLORIDE 25 MG: 25 TABLET, FILM COATED ORAL at 20:04

## 2023-06-07 RX ADMIN — DULOXETINE HYDROCHLORIDE 30 MG: 30 CAPSULE, DELAYED RELEASE ORAL at 09:21

## 2023-06-07 RX ADMIN — TRAZODONE HYDROCHLORIDE 50 MG: 50 TABLET ORAL at 22:20

## 2023-06-07 RX ADMIN — SENNOSIDES AND DOCUSATE SODIUM 2 TABLET: 50; 8.6 TABLET ORAL at 19:58

## 2023-06-07 RX ADMIN — SENNOSIDES AND DOCUSATE SODIUM 2 TABLET: 50; 8.6 TABLET ORAL at 09:21

## 2023-06-07 RX ADMIN — AMLODIPINE BESYLATE 10 MG: 5 TABLET ORAL at 05:37

## 2023-06-07 RX ADMIN — Medication 4.5 MG: at 19:57

## 2023-06-07 RX ADMIN — POLYETHYLENE GLYCOL 3350 1 PACKET: 17 POWDER, FOR SOLUTION ORAL at 09:21

## 2023-06-07 RX ADMIN — ENOXAPARIN SODIUM 40 MG: 100 INJECTION SUBCUTANEOUS at 18:24

## 2023-06-07 RX ADMIN — LOVASTATIN 20 MG: 20 TABLET ORAL at 19:57

## 2023-06-07 ASSESSMENT — ENCOUNTER SYMPTOMS
PALPITATIONS: 0
SHORTNESS OF BREATH: 0
VOMITING: 0
CHILLS: 0
NAUSEA: 0
BRUISES/BLEEDS EASILY: 0
FEVER: 0
MUSCULOSKELETAL NEGATIVE: 1
COUGH: 0
ABDOMINAL PAIN: 0
EYES NEGATIVE: 1
POLYDIPSIA: 0

## 2023-06-07 ASSESSMENT — GAIT ASSESSMENTS
GAIT LEVEL OF ASSIST: STANDBY ASSIST
DISTANCE (FEET): 150
DISTANCE (FEET): 150
ASSISTIVE DEVICE: FRONT WHEEL WALKER
DEVIATION: SHUFFLED GAIT;DECREASED HEEL STRIKE
GAIT LEVEL OF ASSIST: CONTACT GUARD ASSIST
ASSISTIVE DEVICE: FRONT WHEEL WALKER

## 2023-06-07 ASSESSMENT — ACTIVITIES OF DAILY LIVING (ADL)
BED_CHAIR_WHEELCHAIR_TRANSFER_DESCRIPTION: INCREASED TIME;INITIAL PREPARATION FOR TASK;SET-UP OF EQUIPMENT;VERBAL CUEING
TOILETING_LEVEL_OF_ASSIST_DESCRIPTION: ASSIST FOR STANDING BALANCE;GRAB BAR;INCREASED TIME;INITIAL PREPARATION FOR TASK;SET-UP OF EQUIPMENT;SUPERVISION FOR SAFETY
TOILET_TRANSFER_DESCRIPTION: GRAB BAR;INCREASED TIME;INITIAL PREPARATION FOR TASK;REQUIRES LIFT;SET-UP OF EQUIPMENT;SUPERVISION FOR SAFETY;VERBAL CUEING
BED_CHAIR_WHEELCHAIR_TRANSFER_DESCRIPTION: ADAPTIVE EQUIPMENT;INCREASED TIME;INITIAL PREPARATION FOR TASK;SET-UP OF EQUIPMENT;SUPERVISION FOR SAFETY
BED_CHAIR_WHEELCHAIR_TRANSFER_DESCRIPTION: INCREASED TIME;INITIAL PREPARATION FOR TASK;SET-UP OF EQUIPMENT

## 2023-06-07 ASSESSMENT — PAIN DESCRIPTION - PAIN TYPE: TYPE: ACUTE PAIN

## 2023-06-07 NOTE — PROGRESS NOTES
Hospital Medicine Daily Progress Note        Chief Complaint:  Hypotension    Interval History:  Elevated blood pressure trends a bit better off Midodrine.    Review of Systems  Review of Systems   Constitutional:  Negative for chills and fever.   HENT: Negative.     Eyes: Negative.    Respiratory:  Negative for cough and shortness of breath.    Cardiovascular:  Negative for chest pain and palpitations.   Gastrointestinal:  Negative for abdominal pain, nausea and vomiting.   Genitourinary: Negative.    Musculoskeletal: Negative.    Skin:  Negative for itching and rash.   Endo/Heme/Allergies:  Negative for polydipsia. Does not bruise/bleed easily.        Physical Exam  Temp:  [36.6 °C (97.9 °F)-37 °C (98.6 °F)] 36.8 °C (98.2 °F)  Pulse:  [76-91] 77  Resp:  [16-19] 18  BP: (130-164)/(58-86) 147/66  SpO2:  [93 %-98 %] 95 %    Physical Exam  Vitals reviewed.   Constitutional:       General: She is not in acute distress.     Appearance: Normal appearance. She is not ill-appearing.   HENT:      Head: Normocephalic and atraumatic.      Right Ear: External ear normal.      Left Ear: External ear normal.      Nose: Nose normal.      Mouth/Throat:      Pharynx: Oropharynx is clear.   Eyes:      General:         Right eye: No discharge.         Left eye: No discharge.      Extraocular Movements: Extraocular movements intact.      Conjunctiva/sclera: Conjunctivae normal.   Cardiovascular:      Rate and Rhythm: Normal rate and regular rhythm.   Pulmonary:      Effort: No respiratory distress.      Breath sounds: No wheezing.      Comments: Decreased BS  Abdominal:      General: Bowel sounds are normal. There is no distension.      Palpations: Abdomen is soft.      Tenderness: There is no abdominal tenderness.   Musculoskeletal:      Cervical back: Normal range of motion and neck supple.      Right lower leg: No edema.      Left lower leg: No edema.   Skin:     General: Skin is warm and dry.   Neurological:      Mental Status:  She is alert and oriented to person, place, and time.         Fluids    Intake/Output Summary (Last 24 hours) at 6/7/2023 1156  Last data filed at 6/7/2023 0900  Gross per 24 hour   Intake 860 ml   Output --   Net 860 ml       Laboratory  Recent Labs     06/05/23  0543   WBC 6.4   RBC 3.86*   HEMOGLOBIN 11.7*   HEMATOCRIT 36.3*   MCV 94.0   MCH 30.3   MCHC 32.2   RDW 47.1   PLATELETCT 246   MPV 10.8                       Assessment/Plan  GERD (gastroesophageal reflux disease)  Assessment & Plan  On Dexlansoprazole    Abnormal CXR  Assessment & Plan  Has had no cough  Afebrile w/ normal WBC  CXR streaky airspace disease in RUL, suspect pneumonitis  Check F/U labs in AM and PCT  No compelling indication to initiate empiric antimicrobial therapy at this time    Essential hypertension  Assessment & Plan  H/O bilat mastectomy and blood pressures need to be measured on legs  SBP has been consistently >150, Midodrine discontinued  Observe blood pressure trends on Norvasc    Depression- (present on admission)  Assessment & Plan  On Cymbalta    Dyslipidemia- (present on admission)  Assessment & Plan  On Lovastatin      DNR

## 2023-06-07 NOTE — PROGRESS NOTES
"  Physical Medicine & Rehabilitation Progress Note    Encounter Date: 6/7/2023    Chief Complaint: Decreased mobility, hypotension    Interval Events (Subjective):  Patient sitting up in room. She reports she is doing well. She reports she does not think she will need a whole week to recover. Discussed will continue to meet with therapy team. Denies NVD.     _____________________________________  Interdisciplinary Team Conference   Most recent IDT on 6/6/2023    Discharge Date/Disposition:  6/14/23  _____________________________________      Objective:  VITAL SIGNS: BP (!) 147/66   Pulse 77   Temp 36.8 °C (98.2 °F) (Oral)   Resp 18   Ht 1.6 m (5' 3\")   Wt 59.9 kg (132 lb)   LMP  (LMP Unknown)   SpO2 95%   BMI 23.38 kg/m²   Gen: NAD  Psych: Mood and affect appropriate  CV: RRR, 0 edema  Resp: CTAB, no upper airway sounds  Abd: NTND  Neuro: AOx4, following commands    Laboratory Values:  Recent Results (from the past 72 hour(s))   CBC WITH DIFFERENTIAL    Collection Time: 06/05/23  5:43 AM   Result Value Ref Range    WBC 6.4 4.8 - 10.8 K/uL    RBC 3.86 (L) 4.20 - 5.40 M/uL    Hemoglobin 11.7 (L) 12.0 - 16.0 g/dL    Hematocrit 36.3 (L) 37.0 - 47.0 %    MCV 94.0 81.4 - 97.8 fL    MCH 30.3 27.0 - 33.0 pg    MCHC 32.2 32.2 - 35.5 g/dL    RDW 47.1 35.9 - 50.0 fL    Platelet Count 246 164 - 446 K/uL    MPV 10.8 9.0 - 12.9 fL    Neutrophils-Polys 73.20 (H) 44.00 - 72.00 %    Lymphocytes 13.10 (L) 22.00 - 41.00 %    Monocytes 12.40 0.00 - 13.40 %    Eosinophils 0.80 0.00 - 6.90 %    Basophils 0.20 0.00 - 1.80 %    Immature Granulocytes 0.30 0.00 - 0.90 %    Nucleated RBC 0.00 0.00 - 0.20 /100 WBC    Neutrophils (Absolute) 4.68 1.82 - 7.42 K/uL    Lymphs (Absolute) 0.84 (L) 1.00 - 4.80 K/uL    Monos (Absolute) 0.79 0.00 - 0.85 K/uL    Eos (Absolute) 0.05 0.00 - 0.51 K/uL    Baso (Absolute) 0.01 0.00 - 0.12 K/uL    Immature Granulocytes (abs) 0.02 0.00 - 0.11 K/uL    NRBC (Absolute) 0.00 K/uL "       Medications:  Scheduled Medications   Medication Dose Frequency    amLODIPine  10 mg Q DAY    Dexlansoprazole  60 mg Q EVENING    polyethylene glycol/lytes  1 Packet DAILY    Pharmacy Consult Request  1 Each PHARMACY TO DOSE    DULoxetine  30 mg DAILY    enoxaparin (LOVENOX) injection  40 mg DAILY AT 1800    lovastatin  20 mg Nightly    melatonin  4.5 mg QHS     PRN medications: senna-docusate **AND** [DISCONTINUED] polyethylene glycol/lytes **AND** magnesium hydroxide **AND** bisacodyl, Respiratory Therapy Consult, hydrALAZINE, acetaminophen, mag hydrox-al hydrox-simeth, ondansetron **OR** ondansetron, traZODone, sodium chloride, traMADol, gabapentin    Diet:  Current Diet Order   Procedures    Diet Order Diet: Level 6 - Soft and Bite Sized (meds whole 1 at a time with thin); Liquid level: Level 0 - Thin       Medical Decision Making and Plan:  Debility - Patient with decreased mobility and weakness 2/2 orthostatic hypotension, low cortisol and multiple falls  -PT and OT for mobility and ADLs. Per guidelines, 15 hours per week between PT, OT and/or SLP.  -Follow-up PCP     Dysphagia - Patient with new dysphagia of choking on foods. Consult SLP. Will check CXR. Patient at high risk for further decline and respiratory failure due to age and debility.   -Choking event 6/1 with dinner. Repeat CXR, AM labs. CXR stable. Passed swallow    HTN - Patient on Lisinopril. Was previously having orthostatic hypotension requiring Midodrine and Florinef. Will monitor  -Severe orthostatic hypotension on admission into the 70s. Start IV fluids,consult hospitalist and monitor. May need to restart Florinef. Will check CXR. High risk for falls and further injury. Will recheck labs including cortisol per hospitalist.  -Elevated SBP on 6/5, increase Amlodipine to 10, will monitor for low SBP. Midodrine discontinued.      HLD - Patient on Lovastatin 20 mg QHS. Continue lovastatin      Hypocalcemia - Check AM CMP. Stable     Low  Cortisol - Negative ACTH test. Will monitor      PAD - Aortic stenosis on CTA of abdomen. Follow-up Vascular     Depression - Patient on Cymbalta 30 mg daily. Continue Cymbalta     Pain - APAP/Tramadol PRN     Skin - Patient at risk for skin breakdown due to debility in areas including sacrum, achilles, elbows and head in addition to other sites. Nursing to assess skin daily.      GI Ppx - Patient on Prilosec for GERD prophylaxis. Patient on Senna-docusate for constipation prophylaxis.   -Switch to Dexlansoprazole      DVT Ppx - Patient Lovenox on transfer.  ____________________________________    T. Toño Will MD/PhD  [facility] - Physical Medicine & Rehabilitation   [facility] - [facility] Medicine   ____________________________________

## 2023-06-07 NOTE — THERAPY
Speech Language Pathology   Initial Assessment     Patient Name: Rashmi Parra  AGE:  83 y.o., SEX:  female  Medical Record #: [id number]  Today's Date: 6/7/2023     Subjective    Patient participated in cognitive linguistic evaluation. Patient reports intermittent support at home for financial management and various IADL's . Patient self manages medications. Denies cognitive changes with current hospitalization, but does report ongoing changes with memory over a few years.        Objective       06/07/23 0932   Evaluation Charges   SLP Speech Language Evaluation Speech Sound Language Comprehension   SLP Total Time Spent   SLP Individual Total Time Spent (Mins) 60   Prior Living Situation   Prior Services Intermittent Physical Support for ADL Per Service;Intermittent Physical Support for ADL Per Family   Housing / Facility 1 Story House   Lives with - Patient's Self Care Capacity Spouse   Prior Level Of Function   Communication Within Functional Limits   Swallow Within Functional Limits   Dentition Intact   Dentures None   Hearing Within Functional Limits for Evaluation   Hearing Aid None   Vision Wears Corrective Lenses   Patient's Primary Language English   Outcome Measures   Outcome Measures Utilized SCCAN   SCCAN (Scales of Cognitive and Communicative Ability for Neurorehabilitation)   Oral Expression - Raw Score 19   Oral Expression - Scale Performance Score 100   Orientation - Raw Score 12   Orientation - Scale Performance Score 100   Memory - Raw Score 13   Memory - Scale Performance Score 68   Speech Comprehension - Raw Score 12   Speech Comprehension - Scale Performance Score 92   Reading Comprehension - Raw Score 10   Reading Comprehension - Scale Performance Score 83   Writing - Raw Score 7   Writing - Scale Performance Score 100   Attention - Raw Score 13   Attention - Scale Performance Score 81   Problem Solving - Raw Score 20   Problem Solving - Scale Performance Score 87   SCCAN Total Raw Score 83    SCCAN Degree of Severity Mild Impairment   Problem List   Problem List Cognitive-Linguistic Deficits;Memory Deficit   Current Discharge Plan   Current Discharge Plan Return to Prior Living Situation   Benefit   Therapy Benefit Patient would benefit from Inpatient Rehab Speech-Language Pathology to address above identified deficits.   Interdisciplinary Plan of Care Collaboration   Patient Position at End of Therapy Seated;Chair Alarm On;Tray Table within Reach;Call Light within Reach   Speech Language Pathologist Assigned   Assigned SLP / Extension CL/MP 60         Assessment    Patient is 83 y.o. female with a diagnosis of debility s/p fall.  Additional factors influencing patient status/progress (ie: cognitive factors, co-morbidities, social support, etc): caregivers, supportive family.      Cognitive linguistic evaluation was completed. Patient participated in SCCAN with a final raw score of 83 indicating mild cognitive deficits. Patient received scores above 80% in all cognitive subtests except for memory. Final memory score of 68%.   Recommend 30 minute cog therapy to address memory and medication management. Continue 30 minutes ST for dysphagia.       Plan  Recommend Speech Therapy 30-60 minutes per day 5-6 days per week for 1-2 weeks for the following treatments:  SLP Speech Language Treatment, SLP Swallowing Dysfunction Treatment, SLP Oral Pharyngeal Evaluation, SLP Video Swallow Evaluation, SLP Self Care / ADL Training , SLP Cognitive Skill Development, and SLP Group Treatment.    Passport items to be completed:  Express basic needs, understand food/liquid recommendations, consistently follow swallow precautions, manage finances, manage medications, arrive to therapy appointments on time, complete daily memory log entries, solve problems related to safety situations, review education related to hospitalization, complete caregiver training     Goals:  Long term and short term goals have been discussed with  patient and they are in agreement.    Speech Therapy Problems (Active)       Problem: Memory STGs       Dates: Start:  06/07/23         Goal: STG-Within one week, patient will utilize memory strategies in order to recall safety precautions and information from therapies after a 2 hr delay.        Dates: Start:  06/07/23               Problem: Problem Solving STGs       Dates: Start:  06/07/23         Goal: STG-Within one week, patient will solve complex problems related to medication management with 80% accuracy given min verbal cues.        Dates: Start:  06/07/23               Problem: Speech/Swallowing LTGs       Dates: Start:  06/02/23         Goal: LTG-By discharge, patient will safely swallow level 6 textures and thin liquids without aspiration.         Dates: Start:  06/02/23            Goal: LTG-By discharge, patient will solve complex problems related to IADL's in order to d/c home with SPV       Dates: Start:  06/07/23               Problem: Swallowing STGs       Dates: Start:  06/02/23         Goal: STG-Within one week, patient will recall and utilize swallow strategies with 80% accuracy given min verbal cues.         Dates: Start:  06/02/23

## 2023-06-07 NOTE — THERAPY
"Physical Therapy   Daily Treatment     Patient Name: Rashmi Parra  Age:  83 y.o., Sex:  female  Medical Record #: [id number]  Today's Date: 6/7/2023     Precautions  Precautions: Fall Risk  Comments: BP on LLE only, check BP c/ activity, O2 c/ activity    Subjective    \"I'm ready to do something, I've been sitting here too long.\" Pt in w/c at arrival, agreeable to PT session.    Seen from [id number], [id number].      Objective       06/07/23 1031 06/07/23 1301   Therapy Missed   15 hour?  --  Y   PT Charge Group   PT Gait Training (Units) 1  --    PT Neuromuscular Re-Education / Balance (Units)  --  1   PT Therapeutic Activities (Units) 1 1   PT Total Time Spent   PT Individual Total Time Spent (Mins) 30 30   Gait Functional Level of Assist    Gait Level Of Assist Standby Assist Contact Guard Assist   Assistive Device Front Wheel Walker Front Wheel Walker   Distance (Feet) 150 150   # of Times Distance was Traveled 2 2   Deviation Decreased Heel Strike;Shuffled Gait;Bradykinetic  (cues for inc step length) Shuffled Gait;Decreased Heel Strike  (foot drag LLE)   Transfer Functional Level of Assist   Bed, Chair, Wheelchair Transfer Minimal Assist Minimal Assist   Bed Chair Wheelchair Transfer Description Increased time;Initial preparation for task;Set-up of equipment  (lifting assist) Increased time;Initial preparation for task;Set-up of equipment;Verbal cueing  (lifting assist, cues to scoot forward prior to stand)   Sitting Lower Body Exercises   Sit to Stand   (5 x 5 from slightly elevated mat (20\"), progressed with 2 steps forward/back dual task reach and place/removing objects of various size and weight from tray table for anticipatory postural control)  --    Standing Lower Body Exercises   Heel Rise  --  1 set of 10;Bilateral   Other Exercises  --  Standing WS: A-P, laterals 1 x 10 each dir, circles 1 x 5, sidebend c/ OH reach (contralateral) 1 x 5 each   Bed Mobility    Sit to Supine  --  Moderate Assist  (help " for lifting BLEs into bed and scooting)   Sit to Stand  --  Minimal Assist   Neuro-Muscular Treatments   Neuro-Muscular Treatments Anterior weight shift;Weight Shift Right;Weight Shift Left;Verbal Cuing;Sequencing;Tactile Cuing;Postural Changes;Postural Facilitation  (c/ above STS exercises, gait c/ FWW to keep FWW close to COM and increase upright posture) Anterior weight shift;Weight Shift Right;Weight Shift Left;Verbal Cuing;Tactile Cuing;Sequencing;Postural Facilitation   Comments  --  cues for standing tall, lifting gaze up, and inc amplitude of WS during standing TE   Interdisciplinary Plan of Care Collaboration   Patient Position at End of Therapy Seated;Chair Alarm On;Call Light within Reach;Tray Table within Reach;Phone within Reach In Bed;Bed Alarm On;Call Light within Reach;Tray Table within Reach;Phone within Reach     Sessions focused on progression of gait tolerance, STS transitions and standing postural control. Pt c/ increased back pain in PM requiring modification of activities.     Assessment    Aleyda continues to make progress with gait and transfers, but limited by poor activity tolerance, with significant increase in assist and encouragement for participation in PM session. Anticipate pt will need 24/7 assistance upon return to home. Pt unsure if family can provide this level of support as her daughters both work fulltime. Pt became frustrated and tearful upon questioning regarding support at home. Suspect her response is related to afternoon fatigue but warrants follow up with family to clarify d/c plan.     Strengths: Able to follow instructions, Adequate strength, Independent prior level of function, Motivated for self care and independence, Pleasant and cooperative, Willingly participates in therapeutic activities, Supportive family  Barriers: Fatigue, Decreased endurance, Generalized weakness, Home accessibility, Hypotension, Impaired balance, Impaired carryover of learning, Impaired  activity tolerance, Limited mobility, Visual impairment    Plan    Continue spinal ROM, postural control training, parascapular training, transfers/STS transitions, standing balance, gait c/ FWW, stair/curb training.      Passport items to be completed:  Get in/out of bed safely, in/out of a vehicle, safely use mobility device, walk or wheel around home/community, navigate up and down stairs, show how to get up/down from the ground, ensure home is accessible, demonstrate HEP, complete caregiver training       Physical Therapy Problems (Active)       Problem: Mobility       Dates: Start:  06/01/23         Goal: STG-Within one week, patient will ambulate up/down a curb with ModA or better, LRAD.       Dates: Start:  06/01/23         Goal Note filed on 06/06/23 0823 by Florence Issa, PT       Needs assessment.                  Problem: Mobility Transfers       Dates: Start:  06/01/23         Goal: STG-Within one week, patient will perform bed mobility with Liliam.        Dates: Start:  06/01/23         Goal Note filed on 06/06/23 0823 by Florence Issa, PT       Variable CGA <> Donald for LE management                  Problem: PT-Long Term Goals       Dates: Start:  06/01/23         Goal: LTG-By discharge, patient will ambulate >/= 100' in a single effort c/ LRAD at SBA or better.        Dates: Start:  06/01/23            Goal: LTG-By discharge, patient will transfer one surface to another c/ Liliam or better.        Dates: Start:  06/01/23            Goal: LTG-By discharge, patient will ambulate up/down 2 stairs c/ BHR to safely access home environment at CGA or better.        Dates: Start:  06/01/23            Goal: LTG-By discharge, patient will transfer in/out of a car with Donald or better, LRAD.        Dates: Start:  06/01/23

## 2023-06-07 NOTE — DISCHARGE PLANNING
Cm. Met w/ pt providing update from IDT and dc date of 6/14 w/ home health; she reports she is eager to return home.  Dtr's FMLA forms left @ bedside.

## 2023-06-07 NOTE — CARE PLAN
The patient is Watcher - Medium risk of patient condition declining or worsening    Shift Goals  Clinical Goals: Safety, Monitor BP  Patient Goals: Sleep    Progress made toward(s) clinical / shift goals:    Problem: Knowledge Deficit - Standard  Goal: Patient and family/care givers will demonstrate understanding of plan of care, disease process/condition, diagnostic tests and medications  Outcome: Progressing   Patient educated on the POC and medications administered. Patient was educated on the importance of maintain a proper BP and the medications administered for her blood pressure. Patient demonstrated some understanding of information provided. Re enforcement of educated provided needed.  Problem: Fall Risk - Rehab  Goal: Patient will remain free from falls  Outcome: Progressing   Bed is locked and in low position. Patient calls appropriately for assistance.    Patient is not progressing towards the following goals:

## 2023-06-07 NOTE — THERAPY
"Occupational Therapy  Daily Treatment     Patient Name: Rashmi Parra  Age:  83 y.o., Sex:  female  Medical Record #: [id number]  Today's Date: 6/7/2023     Precautions  Precautions: Fall Risk  Comments: BP on LLE only, check BP c/ activity, O2 c/ activity         Subjective  \"No! They are fine!\"   \"I'm sorry for yelling, but people never seem to hear me.\"     Pt notably frustrated for needing to repeat herself to therapist due to talking softly and sink on in bathroom making it difficult to hear pt.      Objective       06/07/23 0701   OT Charge Group   OT Self Care / ADL (Units) 4   OT Total Time Spent   OT Individual Total Time Spent (Mins) 60   Cognition    Level of Consciousness Alert   Functional Level of Assist   Grooming Supervision;Seated   Grooming Description Increased time;Initial preparation for task;Seated in wheelchair at sink;Set-up of equipment;Supervision for safety   Upper Body Dressing Stand by Assist   Upper Body Dressing Description Increased time;Initial preparation for task;Set-up of equipment  (doff/don pull over shirt)   Lower Body Dressing Minimal Assist   Lower Body Dressing Description Grab bar;Reacher;Shoe horn;Sock aid;Increased time;Initial preparation for task;Set-up of equipment;Supervision for safety;Verbal cueing  (doff/don pants/socks and don shoes; cues for use of reacher placement; assist to don sock on sock-aid 2/2 pts sock aid at home is smaller; assist for donning R shoe 2/2 time constraints)   Toileting Minimal Assist   Toileting Description Assist for standing balance;Grab bar;Increased time;Initial preparation for task;Set-up of equipment;Supervision for safety   Bed, Chair, Wheelchair Transfer Contact Guard Assist   Bed Chair Wheelchair Transfer Description Adaptive equipment;Increased time;Initial preparation for task;Set-up of equipment;Supervision for safety  (FWW)   Toilet Transfers Minimal Assist   Toilet Transfer Description Grab bar;Increased time;Initial " preparation for task;Requires lift;Set-up of equipment;Supervision for safety;Verbal cueing  (min a for STS from toilet)   Bed Mobility    Supine to Sit Contact Guard Assist   Scooting Standby Assist   Interdisciplinary Plan of Care Collaboration   Patient Position at End of Therapy Seated;Chair Alarm On;Self Releasing Lap Belt Applied;Other (Comments)  (left in t-dine)         Assessment  Pt tolerated session well. Pt up and dressed upon arrival but wanting to change clothing and go to the bathroom. Increased time taken for LBD seated on toilet and in w/c. Cues needed for use of reacher for LBD to doff pants/socks but no physical assist needed. Pt fatigues quickly and requires increased breaks throughout. Pt reported having sock-aid at home but hers is smaller- increased difficulty using hard plastic sock aid but was able to get 1 sock on with increased time. Assist for other sock 2/2 time constraints- pt used long handled shoe horn to don L shoe and was able to reach down and tie laces.     Strengths: Able to follow instructions, Alert and oriented, Manages pain appropriately, Motivated for self care and independence, Pleasant and cooperative, Willingly participates in therapeutic activities  Barriers: Decreased endurance, Generalized weakness, Fatigue, Hypotension, Orthostatic hypotension, Impaired activity tolerance, Impaired balance, Limited mobility    Plan    ADLs w/ AE  Functional transfers  UE strengthening  Standing balance/tolerance     Passport items to be completed:  Perform bathroom transfers, complete dressing, complete feeding, get ready for the day, prepare a simple meal, participate in household tasks, adapt home for safety needs, demonstrate home exercise program, complete caregiver training        Occupational Therapy Goals (Active)       Problem: Dressing       Dates: Start:  06/01/23         Goal: STG-Within one week, patient will dress UB with SBA overall using AE/DME as needed.       Dates:  Start:  06/01/23         Goal Note filed on 06/06/23 1149 by BAKARI LeTNONA.       Min assist     Limited by decreased strength/endurance    Continue goal               Goal: STG-Within one week, patient will dress LB with Mod A overall using AE/DME as needed.       Dates: Start:  06/01/23         Goal Note filed on 06/06/23 1149 by BAKARI LeTNONA.       Max assist     Limited by decreased strength/endurance                    Problem: Functional Transfers       Dates: Start:  06/01/23         Goal: STG-Within one week, patient will transfer to toilet with CGA and LRD overall utilizing DME.AE as needed.       Dates: Start:  06/01/23         Goal Note filed on 06/06/23 1149 by ABEBE Le.       Requires min assist    Limited by decreased strength/endurance                  Problem: OT Long Term Goals       Dates: Start:  06/01/23         Goal: LTG-By discharge, patient will complete basic self care tasks with supervision overall using AE/DME as needed.       Dates: Start:  06/01/23            Goal: LTG-By discharge, patient will perform bathroom transfers with supervision and LRD overall using AE/DME as needed.       Dates: Start:  06/01/23               Problem: Toileting       Dates: Start:  06/01/23         Goal: STG-Within one week, patient will complete toileting tasks with min A overall using AE/DME as needed.       Dates: Start:  06/01/23         Goal Note filed on 06/06/23 1149 by BRANDI LeOMayraTNONA.       Mod to max assist    Limited by decreased strength/endurance    Continue goal

## 2023-06-08 ENCOUNTER — APPOINTMENT (OUTPATIENT)
Dept: SPEECH THERAPY | Facility: REHABILITATION | Age: 83
DRG: 312 | End: 2023-06-08
Attending: PHYSICAL MEDICINE & REHABILITATION
Payer: MEDICARE

## 2023-06-08 LAB
ALBUMIN SERPL BCP-MCNC: 3.3 G/DL (ref 3.2–4.9)
ALBUMIN/GLOB SERPL: 1.2 G/DL
ALP SERPL-CCNC: 86 U/L (ref 30–99)
ALT SERPL-CCNC: 13 U/L (ref 2–50)
ANION GAP SERPL CALC-SCNC: 12 MMOL/L (ref 7–16)
AST SERPL-CCNC: 14 U/L (ref 12–45)
BASOPHILS # BLD AUTO: 0.3 % (ref 0–1.8)
BASOPHILS # BLD: 0.02 K/UL (ref 0–0.12)
BILIRUB SERPL-MCNC: 0.5 MG/DL (ref 0.1–1.5)
BUN SERPL-MCNC: 11 MG/DL (ref 8–22)
CALCIUM ALBUM COR SERPL-MCNC: 9.6 MG/DL (ref 8.5–10.5)
CALCIUM SERPL-MCNC: 9 MG/DL (ref 8.5–10.5)
CHLORIDE SERPL-SCNC: 98 MMOL/L (ref 96–112)
CO2 SERPL-SCNC: 27 MMOL/L (ref 20–33)
CREAT SERPL-MCNC: 0.7 MG/DL (ref 0.5–1.4)
EOSINOPHIL # BLD AUTO: 0.05 K/UL (ref 0–0.51)
EOSINOPHIL NFR BLD: 0.9 % (ref 0–6.9)
ERYTHROCYTE [DISTWIDTH] IN BLOOD BY AUTOMATED COUNT: 46.9 FL (ref 35.9–50)
GFR SERPLBLD CREATININE-BSD FMLA CKD-EPI: 85 ML/MIN/1.73 M 2
GLOBULIN SER CALC-MCNC: 2.7 G/DL (ref 1.9–3.5)
GLUCOSE SERPL-MCNC: 141 MG/DL (ref 65–99)
HCT VFR BLD AUTO: 36.7 % (ref 37–47)
HGB BLD-MCNC: 11.9 G/DL (ref 12–16)
IMM GRANULOCYTES # BLD AUTO: 0.02 K/UL (ref 0–0.11)
IMM GRANULOCYTES NFR BLD AUTO: 0.3 % (ref 0–0.9)
LYMPHOCYTES # BLD AUTO: 0.89 K/UL (ref 1–4.8)
LYMPHOCYTES NFR BLD: 15.2 % (ref 22–41)
MAGNESIUM SERPL-MCNC: 1.7 MG/DL (ref 1.5–2.5)
MCH RBC QN AUTO: 30.5 PG (ref 27–33)
MCHC RBC AUTO-ENTMCNC: 32.4 G/DL (ref 32.2–35.5)
MCV RBC AUTO: 94.1 FL (ref 81.4–97.8)
MONOCYTES # BLD AUTO: 0.77 K/UL (ref 0–0.85)
MONOCYTES NFR BLD AUTO: 13.1 % (ref 0–13.4)
NEUTROPHILS # BLD AUTO: 4.11 K/UL (ref 1.82–7.42)
NEUTROPHILS NFR BLD: 70.2 % (ref 44–72)
NRBC # BLD AUTO: 0 K/UL
NRBC BLD-RTO: 0 /100 WBC (ref 0–0.2)
PHOSPHATE SERPL-MCNC: 3.3 MG/DL (ref 2.5–4.5)
PLATELET # BLD AUTO: 261 K/UL (ref 164–446)
PMV BLD AUTO: 11 FL (ref 9–12.9)
POTASSIUM SERPL-SCNC: 3.8 MMOL/L (ref 3.6–5.5)
PROCALCITONIN SERPL-MCNC: 0.06 NG/ML
PROT SERPL-MCNC: 6 G/DL (ref 6–8.2)
RBC # BLD AUTO: 3.9 M/UL (ref 4.2–5.4)
SODIUM SERPL-SCNC: 137 MMOL/L (ref 135–145)
WBC # BLD AUTO: 5.9 K/UL (ref 4.8–10.8)

## 2023-06-08 PROCEDURE — 700111 HCHG RX REV CODE 636 W/ 250 OVERRIDE (IP): Performed by: PHYSICAL MEDICINE & REHABILITATION

## 2023-06-08 PROCEDURE — A9270 NON-COVERED ITEM OR SERVICE: HCPCS | Performed by: HOSPITALIST

## 2023-06-08 PROCEDURE — 99232 SBSQ HOSP IP/OBS MODERATE 35: CPT | Performed by: PHYSICAL MEDICINE & REHABILITATION

## 2023-06-08 PROCEDURE — A9270 NON-COVERED ITEM OR SERVICE: HCPCS | Performed by: PHYSICAL MEDICINE & REHABILITATION

## 2023-06-08 PROCEDURE — 97130 THER IVNTJ EA ADDL 15 MIN: CPT

## 2023-06-08 PROCEDURE — 97129 THER IVNTJ 1ST 15 MIN: CPT

## 2023-06-08 PROCEDURE — 97116 GAIT TRAINING THERAPY: CPT

## 2023-06-08 PROCEDURE — 80053 COMPREHEN METABOLIC PANEL: CPT

## 2023-06-08 PROCEDURE — 99232 SBSQ HOSP IP/OBS MODERATE 35: CPT | Performed by: HOSPITALIST

## 2023-06-08 PROCEDURE — 97110 THERAPEUTIC EXERCISES: CPT

## 2023-06-08 PROCEDURE — 700102 HCHG RX REV CODE 250 W/ 637 OVERRIDE(OP): Performed by: PHYSICAL MEDICINE & REHABILITATION

## 2023-06-08 PROCEDURE — 83735 ASSAY OF MAGNESIUM: CPT

## 2023-06-08 PROCEDURE — 36415 COLL VENOUS BLD VENIPUNCTURE: CPT

## 2023-06-08 PROCEDURE — 94760 N-INVAS EAR/PLS OXIMETRY 1: CPT

## 2023-06-08 PROCEDURE — 84100 ASSAY OF PHOSPHORUS: CPT

## 2023-06-08 PROCEDURE — 97112 NEUROMUSCULAR REEDUCATION: CPT

## 2023-06-08 PROCEDURE — 84145 PROCALCITONIN (PCT): CPT

## 2023-06-08 PROCEDURE — 85025 COMPLETE CBC W/AUTO DIFF WBC: CPT

## 2023-06-08 PROCEDURE — 700102 HCHG RX REV CODE 250 W/ 637 OVERRIDE(OP): Performed by: HOSPITALIST

## 2023-06-08 PROCEDURE — 97535 SELF CARE MNGMENT TRAINING: CPT

## 2023-06-08 PROCEDURE — 770010 HCHG ROOM/CARE - REHAB SEMI PRIVAT*

## 2023-06-08 RX ADMIN — DULOXETINE HYDROCHLORIDE 30 MG: 30 CAPSULE, DELAYED RELEASE ORAL at 08:55

## 2023-06-08 RX ADMIN — TRAMADOL HYDROCHLORIDE 50 MG: 50 TABLET, COATED ORAL at 08:59

## 2023-06-08 RX ADMIN — MAGNESIUM HYDROXIDE 30 ML: 1200 LIQUID ORAL at 05:36

## 2023-06-08 RX ADMIN — DEXLANSOPRAZOLE 60 MG: 60 CAPSULE, DELAYED RELEASE ORAL at 20:43

## 2023-06-08 RX ADMIN — Medication 4.5 MG: at 20:43

## 2023-06-08 RX ADMIN — POLYETHYLENE GLYCOL 3350 1 PACKET: 17 POWDER, FOR SOLUTION ORAL at 08:55

## 2023-06-08 RX ADMIN — LOVASTATIN 20 MG: 20 TABLET ORAL at 20:43

## 2023-06-08 RX ADMIN — AMLODIPINE BESYLATE 10 MG: 5 TABLET ORAL at 05:35

## 2023-06-08 RX ADMIN — ENOXAPARIN SODIUM 40 MG: 100 INJECTION SUBCUTANEOUS at 17:23

## 2023-06-08 ASSESSMENT — ENCOUNTER SYMPTOMS
MUSCULOSKELETAL NEGATIVE: 1
POLYDIPSIA: 0
VOMITING: 0
ABDOMINAL PAIN: 0
FEVER: 0
SHORTNESS OF BREATH: 0
BRUISES/BLEEDS EASILY: 0
CHILLS: 0
NAUSEA: 0
COUGH: 0
EYES NEGATIVE: 1
PALPITATIONS: 0

## 2023-06-08 ASSESSMENT — ACTIVITIES OF DAILY LIVING (ADL)
TOILET_TRANSFER_DESCRIPTION: GRAB BAR;VERBAL CUEING
TOILET_TRANSFER_DESCRIPTION: GRAB BAR;INCREASED TIME;INITIAL PREPARATION FOR TASK;SET-UP OF EQUIPMENT;SUPERVISION FOR SAFETY;VERBAL CUEING

## 2023-06-08 ASSESSMENT — GAIT ASSESSMENTS
GAIT LEVEL OF ASSIST: CONTACT GUARD ASSIST
DEVIATION: STEP TO;SHUFFLED GAIT;DECREASED HEEL STRIKE;DECREASED TOE OFF
ASSISTIVE DEVICE: FRONT WHEEL WALKER
DISTANCE (FEET): 75

## 2023-06-08 ASSESSMENT — PAIN DESCRIPTION - PAIN TYPE: TYPE: ACUTE PAIN

## 2023-06-08 NOTE — DIETARY
Nutrition Update:    Day 8 of admit.  Rashmi Parra is a 83 y.o. female with admitting DX of Syncope and collapse [R55]  Orthostatic hypotension [I95.1].    Patient being followed to optimize nutrition.    Current Diet: Level 6-soft and bite sized  w/ Level 0 - thin liquids     PO intake continues to be good at % of most meals. Avg PO is 61%.     Problem: Nutritional:  Goal: Achieve adequate nutritional intake  Description: Patient will consume >50% of meals  Outcome: met    RD will follow weekly or PRN.

## 2023-06-08 NOTE — THERAPY
Occupational Therapy  Daily Treatment     Patient Name: Rashmi Parra  Age:  83 y.o., Sex:  female  Medical Record #: [id number]  Today's Date: 6/8/2023     Precautions  Precautions: Fall Risk  Comments: BP on LLE only, check BP c/ activity, O2 c/ activity    Subjective    Pt willing to participate in OT    Pt expressed frustration in using AE for LB dressing. Pt also stated she felt her hands we're strong enough to manage sock aide.     Objective     06/08/23 0931   OT Charge Group   Charges Yes   OT Self Care / ADL (Units) 4   OT Total Time Spent   OT Individual Total Time Spent (Mins) 60   Precautions   Precautions Fall Risk   Comments BP on LLE only, check BP c/ activity, O2 c/ activity   Vitals   Pulse 70   Patient BP Position Sitting   Blood Pressure  (!) 148/79   O2 (LPM) 2   O2 Delivery Device Silicone Nasal Cannula   Pain   Intervention Declines   Cognition    Level of Consciousness Alert   ABS (Agitated Behavior Scale)   Agitated Behavior Scale Performed Yes   Short Attention Span, Easy Distractibility, Inability to Concentrate 2   Impulsive, Impatient, Low Tolerance for Pain or Frustration 2   Uncooperative, Resistant to Care, Demanding 1   Violent and/or Threatening Violence Toward People or Property 1   Explosive and/or Unpredictable Anger 1   Rocking, Rubbing, Moaning, Other Self-Stimulating Behavior 1   Pulling at Tubes, Restraints, etc. 1   Wandering from Treatment Area 1   Restlessness, Pacing, Excessive Movement 1   Repetitive Behaviors, Motor and/or Verbal 1   Rapid, Loud or Excessive Talking 1   Sudden Changes of Mood 2   Easily Initiated - Excessive Crying and/or Laughter 1   Self-Abusiveness, Physical and/or Verbal 1   Agitated Behavior Scale Total Score 17   Level of Severity No Agitation   Sleep/Wake Cycle   Sleep & Rest Awake;Out of bed   Functional Level of Assist   Lower Body Dressing Moderate Assist   Lower Body Dressing Description Grab bar;Reacher;Shoe horn;Sock aid;Assist with  threading into pant leg;Increased time;Initial preparation for task;Set-up of equipment;Supervision for safety;Verbal cueing  (Provided assist threading bilat LEs w/ reacher and v/c for technique; pt able to don briefs and pants over bilat hips. Provided assist to load sock onto sock aide d/t sock being tight on sock aide and technique for shoe horn. Pt requires extra time.)   Toileting Total Assist   Toileting Description Assist for hygiene;Assist for standing balance;Grab bar;Increased time;Initial preparation for task;Set-up of equipment;Supervision for safety  (Provided assist for BM hygiene)   Toilet Transfers Minimal Assist   Toilet Transfer Description Grab bar;Increased time;Initial preparation for task;Set-up of equipment;Supervision for safety;Verbal cueing   Interdisciplinary Plan of Care Collaboration   IDT Collaboration with  Physician   Patient Position at End of Therapy Seated;Chair Alarm On;Call Light within Reach;Tray Table within Reach   Collaboration Comments MD rounds   Strengths & Barriers   Strengths Able to follow instructions;Alert and oriented;Manages pain appropriately;Motivated for self care and independence;Pleasant and cooperative;Willingly participates in therapeutic activities   Barriers Decreased endurance;Generalized weakness;Fatigue;Hypotension;Orthostatic hypotension;Impaired activity tolerance;Impaired balance;Limited mobility     Assessment    Pt seen for OT tx, addressing functional transfers, toileting, and LB dressing. Pt requires extra time to complete ADL tasks. Pt progressing towards goals. Continue OT POC.    Strengths: Able to follow instructions, Alert and oriented, Manages pain appropriately, Motivated for self care and independence, Pleasant and cooperative, Willingly participates in therapeutic activities    Barriers: Decreased endurance, Generalized weakness, Fatigue, Hypotension, Orthostatic hypotension, Impaired activity tolerance, Impaired balance, Limited  mobility    Plan    ADLs w/ AE - trial elastic shoe laces to facilitate easier shoe donning?  Functional transfers  UE strengthening - hand strengthening  Standing balance/tolerance    Passport items to be completed:  Perform bathroom transfers, complete dressing, complete feeding, get ready for the day, prepare a simple meal, participate in household tasks, adapt home for safety needs, demonstrate home exercise program, complete caregiver training     Occupational Therapy Goals (Active)       Problem: Dressing       Dates: Start:  06/01/23         Goal: STG-Within one week, patient will dress UB with SBA overall using AE/DME as needed.       Dates: Start:  06/01/23         Goal Note filed on 06/06/23 1149 by Evaristo Casey C.O.T.A.       Min assist     Limited by decreased strength/endurance    Continue goal               Goal: STG-Within one week, patient will dress LB with Mod A overall using AE/DME as needed.       Dates: Start:  06/01/23         Goal Note filed on 06/06/23 1149 by RUTH ANN Le.O.T.A.       Max assist     Limited by decreased strength/endurance                    Problem: Functional Transfers       Dates: Start:  06/01/23         Goal: STG-Within one week, patient will transfer to toilet with CGA and LRD overall utilizing DME.AE as needed.       Dates: Start:  06/01/23         Goal Note filed on 06/06/23 1149 by Evaristo Casey C.O.T.A.       Requires min assist    Limited by decreased strength/endurance                  Problem: OT Long Term Goals       Dates: Start:  06/01/23         Goal: LTG-By discharge, patient will complete basic self care tasks with supervision overall using AE/DME as needed.       Dates: Start:  06/01/23            Goal: LTG-By discharge, patient will perform bathroom transfers with supervision and LRD overall using AE/DME as needed.       Dates: Start:  06/01/23               Problem: Toileting       Dates: Start:  06/01/23         Goal: STG-Within one week,  patient will complete toileting tasks with min A overall using AE/DME as needed.       Dates: Start:  06/01/23         Goal Note filed on 06/06/23 1149 by BRANDI LeO.TNONA.       Mod to max assist    Limited by decreased strength/endurance    Continue goal

## 2023-06-08 NOTE — PROGRESS NOTES
[facility] Medicine Daily Progress Note        Chief Complaint:  Hypotension    Interval History:  No chest pain, shortness of breath, or palpitations.  Labs reviewed and discussed.    Review of Systems  Review of Systems   Constitutional:  Negative for chills and fever.   HENT: Negative.     Eyes: Negative.    Respiratory:  Negative for cough and shortness of breath.    Cardiovascular:  Negative for chest pain and palpitations.   Gastrointestinal:  Negative for abdominal pain, nausea and vomiting.   Genitourinary: Negative.    Musculoskeletal: Negative.    Skin:  Negative for itching and rash.   Endo/Heme/Allergies:  Negative for polydipsia. Does not bruise/bleed easily.        Physical Exam  Temp:  [36.5 °C (97.7 °F)-36.7 °C (98.1 °F)] 36.5 °C (97.7 °F)  Pulse:  [70-90] 72  Resp:  [16-18] 16  BP: (109-171)/(45-95) 116/54  SpO2:  [96 %-98 %] 98 %    Physical Exam  Vitals reviewed.   Constitutional:       General: She is not in acute distress.     Appearance: Normal appearance. She is not ill-appearing.   HENT:      Head: Normocephalic and atraumatic.      Right Ear: External ear normal.      Left Ear: External ear normal.      Nose: Nose normal.      Mouth/Throat:      Pharynx: Oropharynx is clear.   Eyes:      General:         Right eye: No discharge.         Left eye: No discharge.      Extraocular Movements: Extraocular movements intact.      Conjunctiva/sclera: Conjunctivae normal.   Cardiovascular:      Rate and Rhythm: Normal rate and regular rhythm.   Pulmonary:      Effort: No respiratory distress.      Breath sounds: No wheezing.      Comments: Decreased BS  Abdominal:      General: Bowel sounds are normal. There is no distension.      Palpations: Abdomen is soft.      Tenderness: There is no abdominal tenderness.   Musculoskeletal:      Cervical back: Normal range of motion and neck supple.      Right lower leg: No edema.      Left lower leg: No edema.   Skin:     General: Skin is warm and dry.    Neurological:      Mental Status: She is alert and oriented to person, place, and time.         Fluids    Intake/Output Summary (Last 24 hours) at 6/8/2023 1326  Last data filed at 6/8/2023 1200  Gross per 24 hour   Intake 500 ml   Output --   Net 500 ml       Laboratory  Recent Labs     06/08/23  0550   WBC 5.9   RBC 3.90*   HEMOGLOBIN 11.9*   HEMATOCRIT 36.7*   MCV 94.1   MCH 30.5   MCHC 32.4   RDW 46.9   PLATELETCT 261   MPV 11.0     Recent Labs     06/08/23  0550   SODIUM 137   POTASSIUM 3.8   CHLORIDE 98   CO2 27   GLUCOSE 141*   BUN 11   CREATININE 0.70   CALCIUM 9.0                   Assessment/Plan  GERD (gastroesophageal reflux disease)  Assessment & Plan  On Dexlansoprazole    Abnormal CXR  Assessment & Plan  Has had no cough  Afebrile w/ normal WBC and PCT  CXR streaky airspace disease in RUL, suspect pneumonitis  No compelling indication to initiate empiric antimicrobial therapy at this time    Essential hypertension  Assessment & Plan  H/O bilat mastectomy and blood pressures need to be measured on legs  SBP has been consistently >150, Midodrine discontinued  Observe blood pressure trends on Norvasc    Depression- (present on admission)  Assessment & Plan  On Cymbalta    Dyslipidemia- (present on admission)  Assessment & Plan  On Lovastatin      DNR

## 2023-06-08 NOTE — THERAPY
"Physical Therapy   Daily Treatment     Patient Name: Rashmi Parra  Age:  83 y.o., Sex:  female  Medical Record #: [id number]  Today's Date: 6/8/2023     Precautions  Precautions: Fall Risk  Comments: BP on LLE only, check BP c/ activity, O2 c/ activity    Subjective    \"I don't like having all this afternoon PT.\" Pt in w/c at arrival, agreeable to PT tx.      Objective       06/08/23 1431   PT Charge Group   PT Gait Training (Units) 2   PT Therapeutic Exercise (Units) 1   PT Neuromuscular Re-Education / Balance (Units) 1   PT Total Time Spent   PT Individual Total Time Spent (Mins) 60   Gait Functional Level of Assist    Gait Level Of Assist Contact Guard Assist   Assistive Device Front Wheel Walker   Distance (Feet) 75   # of Times Distance was Traveled 1  (1 x 50')   Deviation Step To;Shuffled Gait;Decreased Heel Strike;Decreased Toe Off  (slow, effortful; needs cues for RLE clearance, step length)   Transfer Functional Level of Assist   Bed, Chair, Wheelchair Transfer Moderate Assist   Bed Chair Wheelchair Transfer Description Initial preparation for task;Increased time;Verbal cueing;Adaptive equipment;Set-up of equipment  (Min to ModA for posterior thrust with stand, cues for LE placement, UE placement, adequate forward lean, shift COM forward over feet to come to stand)   Toilet Transfers Moderate Assist   Toilet Transfer Description Grab bar;Verbal cueing  (assist for lifting and lowering from standard toilet height in room)   Sitting Lower Body Exercises   Other Exercises unsupported sitting c/ OTB: pull-aparts 2 x 10, up and overs 2 x 10 alt, pot stirs 2 x 5 each dir; ball OH tosses to self x 10, D2 unilateral diagonals c/ OTB x 10 each, behind head (triceps press) throws 2 x 10, with cues to increase amplitude and intensity of throw over efforts   Bed Mobility    Sit to Stand Moderate Assist   Neuro-Muscular Treatments   Neuro-Muscular Treatments Anterior weight shift;Co-Contraction;Postural " Facilitation;Postural Changes;Weight Shift Right;Weight Shift Left;Tactile Cuing;Sequencing   Comments pelvic support at R hip and coccyx in seated to decrease R low pelvis and posterior pelvic tilt in seated to improve midline alignement, mirror for postural FB to midline; serial practice of STS from elevated surface, cues to reach forward c/ UE to target to increase forward trunk lean during transition, 1 x 4.   Interdisciplinary Plan of Care Collaboration   IDT Collaboration with  Certified Nursing Assistant   Patient Position at End of Therapy Seated;Edge of Bed;Other (Comments)  (handoff to CNA for back to bed)     Session focused on on-unit navigation c/ FWW, increased time and VC for step length, orientation, beatrice, LLE clearance.     Unsupported seated TE c/ resistance and bimanual reaching/throwing tasks for facilitation of midline postural control, trunk endurance, upright gaze/gaze to targets, as above.     Assessment    Patient with good participation c/ encouragement but resistant to PM therapy due to self-reported fatigue. Decreased transfer and gait performance today c/ FWW with increased physical assist and cues needed for sequencing.     Strengths: Able to follow instructions, Adequate strength, Independent prior level of function, Motivated for self care and independence, Pleasant and cooperative, Willingly participates in therapeutic activities, Supportive family  Barriers: Fatigue, Decreased endurance, Generalized weakness, Home accessibility, Hypotension, Impaired balance, Impaired carryover of learning, Impaired activity tolerance, Limited mobility, Visual impairment    Plan    Continue spinal ROM, postural control training, parascapular training, transfers/STS transitions, standing balance, gait c/ FWW, stair/curb training.      Passport items to be completed:  Get in/out of bed safely, in/out of a vehicle, safely use mobility device, walk or wheel around home/community, navigate up and down  stairs, show how to get up/down from the ground, ensure home is accessible, demonstrate HEP, complete caregiver training      Physical Therapy Problems (Active)       Problem: Mobility       Dates: Start:  06/01/23         Goal: STG-Within one week, patient will ambulate up/down a curb with ModA or better, LRAD.       Dates: Start:  06/01/23         Goal Note filed on 06/06/23 0823 by Florence Issa, PT       Needs assessment.                  Problem: Mobility Transfers       Dates: Start:  06/01/23         Goal: STG-Within one week, patient will perform bed mobility with Liliam.        Dates: Start:  06/01/23         Goal Note filed on 06/06/23 0823 by Florence Issa, PT       Variable CGA <> Donald for LE management                  Problem: PT-Long Term Goals       Dates: Start:  06/01/23         Goal: LTG-By discharge, patient will ambulate >/= 100' in a single effort c/ LRAD at SBA or better.        Dates: Start:  06/01/23            Goal: LTG-By discharge, patient will transfer one surface to another c/ Liliam or better.        Dates: Start:  06/01/23            Goal: LTG-By discharge, patient will ambulate up/down 2 stairs c/ BHR to safely access home environment at CGA or better.        Dates: Start:  06/01/23            Goal: LTG-By discharge, patient will transfer in/out of a car with Donald or better, LRAD.        Dates: Start:  06/01/23

## 2023-06-08 NOTE — FLOWSHEET NOTE
06/07/23 1917   Events/Summary/Plan   Events/Summary/Plan SpO2 check   Vital Signs   Pulse 90   Respiration 16   Pulse Oximetry 97 %   $ Pulse Oximetry (Spot Check) Yes   Respiratory Assessment   Respiratory Pattern Within Normal Limits   Level of Consciousness Alert   Chest Exam   Work Of Breathing / Effort Within Normal Limits   Oxygen   O2 (LPM) 2   O2 Delivery Device Nasal Cannula

## 2023-06-08 NOTE — CARE PLAN
The patient is Stable - Low risk of patient condition declining or worsening    Shift Goals  Clinical Goals: safety  Patient Goals: safety      Problem: Fall Risk - Rehab  Goal: Patient will remain free from falls  Outcome: Progressing Pt uses call light consistently and appropriately. Waits for assistance does not attempt self transfer this shift. Able to verbalize needs.        Problem: Bowel Elimination  Goal: Patient will participate in bowel management program  Outcome: Not Met patient has had no BM for x 4 days, given scheduled miralax that patient was hesitant to take, will try to offer prn bowel meds.

## 2023-06-08 NOTE — CARE PLAN
The patient is Stable - Low risk of patient condition declining or worsening    Shift Goals  Clinical Goals: Safety, Monitor BP  Patient Goals: Sleep    Progress made toward(s) clinical / shift goals:    Problem: Knowledge Deficit - Standard  Goal: Patient and family/care givers will demonstrate understanding of plan of care, disease process/condition, diagnostic tests and medications  Outcome: Progressing   Patient educated on the POC and medications administered. All questions and concerns addressed at this time  Problem: Fall Risk - Rehab  Goal: Patient will remain free from falls  Outcome: Progressing   Bed is locked and in low position. Call light and belongings within reach. Patient calls appropriately via call light.     Patient is not progressing towards the following goals:

## 2023-06-08 NOTE — FLOWSHEET NOTE
06/08/23 0651   Events/Summary/Plan   Events/Summary/Plan 02 pulse ox check. Appears to be sleeping   Vital Signs   Pulse 70   Respiration 18   Pulse Oximetry 98 %   $ Pulse Oximetry (Spot Check) Yes   Respiratory Assessment   Respiratory Pattern Within Normal Limits   Level of Consciousness Responds to voice   Oxygen   O2 (LPM) 2   O2 Delivery Device Nasal Cannula

## 2023-06-08 NOTE — PROGRESS NOTES
Patient care assumed. Report received from Noc FRANCO Rodríguez. Patient is alert and calm, resting in bed. Call light and bedside table within reach. Will continue to monitor.

## 2023-06-08 NOTE — THERAPY
Speech Language Pathology  Daily Treatment     Patient Name: Rashmi Parra  Age:  83 y.o., Sex:  female  Medical Record #: [id number]  Today's Date: 6/8/2023     Precautions  Precautions: Fall Risk  Comments: BP on LLE only, check BP c/ activity, O2 c/ activity    Subjective    Patient was willing to participate in ST.      Objective       06/08/23 0732   Treatment Charges   SLP Cognitive Skill Development First 15 Minutes 1   SLP Cognitive Skill Development Additional 15 Minutes 3   SLP Total Time Spent   SLP Individual Total Time Spent (Mins) 60   Cognition   Simple Attention Within Functional Limits (6-7)   Functional Sequencing for ADL / IADLs Minimal (4)   Medication Management  Minimal (4)   Interdisciplinary Plan of Care Collaboration   Patient Position at End of Therapy Seated;Chair Alarm On;Call Light within Reach;Tray Table within Reach;Phone within Reach         Assessment    Completed written medication list. Patient was able to recall medications take at home, but could not state purposes of new medications. Willing to practice pill sorting task prior to discharge.     Strengths: Able to follow instructions, Pleasant and cooperative, Willingly participates in therapeutic activities, Motivated for self care and independence  Barriers: Aspiration risk    Plan    Pill sorting task. Trials of regular (easy to chew) textures as tolerated.       Speech Therapy Problems (Active)       Problem: Memory STGs       Dates: Start:  06/07/23         Goal: STG-Within one week, patient will utilize memory strategies in order to recall safety precautions and information from therapies after a 2 hr delay.        Dates: Start:  06/07/23               Problem: Problem Solving STGs       Dates: Start:  06/07/23         Goal: STG-Within one week, patient will solve complex problems related to medication management with 80% accuracy given min verbal cues.        Dates: Start:  06/07/23               Problem: Speech/Swallowing  LTGs       Dates: Start:  06/02/23         Goal: LTG-By discharge, patient will safely swallow level 6 textures and thin liquids without aspiration.         Dates: Start:  06/02/23            Goal: LTG-By discharge, patient will solve complex problems related to IADL's in order to d/c home with SPV       Dates: Start:  06/07/23               Problem: Swallowing STGs       Dates: Start:  06/02/23         Goal: STG-Within one week, patient will recall and utilize swallow strategies with 80% accuracy given min verbal cues.         Dates: Start:  06/02/23

## 2023-06-08 NOTE — CARE PLAN
Problem: Fall Risk - Rehab  Goal: Patient will remain free from falls  Outcome: Progressing   Patient calls for assist with all transfers. No unsafe behaviors noted.

## 2023-06-09 ENCOUNTER — APPOINTMENT (OUTPATIENT)
Dept: SPEECH THERAPY | Facility: REHABILITATION | Age: 83
DRG: 312 | End: 2023-06-09
Attending: PHYSICAL MEDICINE & REHABILITATION
Payer: MEDICARE

## 2023-06-09 ENCOUNTER — APPOINTMENT (OUTPATIENT)
Dept: OCCUPATIONAL THERAPY | Facility: REHABILITATION | Age: 83
DRG: 312 | End: 2023-06-09
Attending: PHYSICAL MEDICINE & REHABILITATION
Payer: MEDICARE

## 2023-06-09 PROCEDURE — 700111 HCHG RX REV CODE 636 W/ 250 OVERRIDE (IP): Performed by: PHYSICAL MEDICINE & REHABILITATION

## 2023-06-09 PROCEDURE — 99232 SBSQ HOSP IP/OBS MODERATE 35: CPT | Performed by: PHYSICAL MEDICINE & REHABILITATION

## 2023-06-09 PROCEDURE — A9270 NON-COVERED ITEM OR SERVICE: HCPCS | Performed by: HOSPITALIST

## 2023-06-09 PROCEDURE — 97530 THERAPEUTIC ACTIVITIES: CPT

## 2023-06-09 PROCEDURE — 97129 THER IVNTJ 1ST 15 MIN: CPT

## 2023-06-09 PROCEDURE — 97112 NEUROMUSCULAR REEDUCATION: CPT

## 2023-06-09 PROCEDURE — 97535 SELF CARE MNGMENT TRAINING: CPT | Mod: CO

## 2023-06-09 PROCEDURE — 94760 N-INVAS EAR/PLS OXIMETRY 1: CPT

## 2023-06-09 PROCEDURE — 97116 GAIT TRAINING THERAPY: CPT

## 2023-06-09 PROCEDURE — 92526 ORAL FUNCTION THERAPY: CPT

## 2023-06-09 PROCEDURE — A9270 NON-COVERED ITEM OR SERVICE: HCPCS | Performed by: PHYSICAL MEDICINE & REHABILITATION

## 2023-06-09 PROCEDURE — 700102 HCHG RX REV CODE 250 W/ 637 OVERRIDE(OP): Performed by: PHYSICAL MEDICINE & REHABILITATION

## 2023-06-09 PROCEDURE — 97130 THER IVNTJ EA ADDL 15 MIN: CPT

## 2023-06-09 PROCEDURE — 700102 HCHG RX REV CODE 250 W/ 637 OVERRIDE(OP): Performed by: HOSPITALIST

## 2023-06-09 PROCEDURE — 770010 HCHG ROOM/CARE - REHAB SEMI PRIVAT*

## 2023-06-09 PROCEDURE — 99232 SBSQ HOSP IP/OBS MODERATE 35: CPT | Performed by: HOSPITALIST

## 2023-06-09 RX ADMIN — ENOXAPARIN SODIUM 40 MG: 100 INJECTION SUBCUTANEOUS at 17:30

## 2023-06-09 RX ADMIN — DULOXETINE HYDROCHLORIDE 30 MG: 30 CAPSULE, DELAYED RELEASE ORAL at 08:53

## 2023-06-09 RX ADMIN — AMLODIPINE BESYLATE 10 MG: 5 TABLET ORAL at 05:36

## 2023-06-09 RX ADMIN — DEXLANSOPRAZOLE 60 MG: 60 CAPSULE, DELAYED RELEASE ORAL at 21:25

## 2023-06-09 RX ADMIN — POLYETHYLENE GLYCOL 3350 1 PACKET: 17 POWDER, FOR SOLUTION ORAL at 08:53

## 2023-06-09 RX ADMIN — Medication 4.5 MG: at 21:23

## 2023-06-09 RX ADMIN — LOVASTATIN 20 MG: 20 TABLET ORAL at 21:23

## 2023-06-09 ASSESSMENT — ENCOUNTER SYMPTOMS
EYES NEGATIVE: 1
POLYDIPSIA: 0
NAUSEA: 0
FEVER: 0
COUGH: 0
VOMITING: 0
PALPITATIONS: 0
ABDOMINAL PAIN: 0
BRUISES/BLEEDS EASILY: 0
MUSCULOSKELETAL NEGATIVE: 1
CHILLS: 0
SHORTNESS OF BREATH: 0

## 2023-06-09 ASSESSMENT — GAIT ASSESSMENTS
ASSISTIVE DEVICE: FRONT WHEEL WALKER
DEVIATION: STEP TO;SHUFFLED GAIT;DECREASED HEEL STRIKE;DECREASED TOE OFF
DISTANCE (FEET): 120
GAIT LEVEL OF ASSIST: CONTACT GUARD ASSIST

## 2023-06-09 NOTE — PROGRESS NOTES
Hospital Medicine Daily Progress Note        Chief Complaint:  Hypotension    Interval History:  Elevated blood pressures resolved off Midodrine.    Review of Systems  Review of Systems   Constitutional:  Negative for chills and fever.   HENT: Negative.     Eyes: Negative.    Respiratory:  Negative for cough and shortness of breath.    Cardiovascular:  Negative for chest pain and palpitations.   Gastrointestinal:  Negative for abdominal pain, nausea and vomiting.   Genitourinary: Negative.    Musculoskeletal: Negative.    Skin:  Negative for itching and rash.   Endo/Heme/Allergies:  Negative for polydipsia. Does not bruise/bleed easily.        Physical Exam  Temp:  [37 °C (98.6 °F)-37.2 °C (98.9 °F)] 37.2 °C (98.9 °F)  Pulse:  [78-89] 81  Resp:  [16-18] 16  BP: (128-137)/(50-63) 137/57  SpO2:  [95 %-97 %] 95 %    Physical Exam  Vitals reviewed.   Constitutional:       General: She is not in acute distress.     Appearance: Normal appearance. She is not ill-appearing.   HENT:      Head: Normocephalic and atraumatic.      Right Ear: External ear normal.      Left Ear: External ear normal.      Nose: Nose normal.      Mouth/Throat:      Pharynx: Oropharynx is clear.   Eyes:      General:         Right eye: No discharge.         Left eye: No discharge.      Extraocular Movements: Extraocular movements intact.      Conjunctiva/sclera: Conjunctivae normal.   Cardiovascular:      Rate and Rhythm: Normal rate and regular rhythm.   Pulmonary:      Effort: No respiratory distress.      Breath sounds: No wheezing.      Comments: Decreased BS  Abdominal:      General: Bowel sounds are normal. There is no distension.      Palpations: Abdomen is soft.      Tenderness: There is no abdominal tenderness.   Musculoskeletal:      Cervical back: Normal range of motion and neck supple.      Right lower leg: No edema.      Left lower leg: No edema.   Skin:     General: Skin is warm and dry.   Neurological:      Mental Status: She is alert  and oriented to person, place, and time.         Fluids    Intake/Output Summary (Last 24 hours) at 6/9/2023 1240  Last data filed at 6/9/2023 1230  Gross per 24 hour   Intake 260 ml   Output 0 ml   Net 260 ml       Laboratory  Recent Labs     06/08/23  0550   WBC 5.9   RBC 3.90*   HEMOGLOBIN 11.9*   HEMATOCRIT 36.7*   MCV 94.1   MCH 30.5   MCHC 32.4   RDW 46.9   PLATELETCT 261   MPV 11.0     Recent Labs     06/08/23  0550   SODIUM 137   POTASSIUM 3.8   CHLORIDE 98   CO2 27   GLUCOSE 141*   BUN 11   CREATININE 0.70   CALCIUM 9.0                   Assessment/Plan  GERD (gastroesophageal reflux disease)  Assessment & Plan  On Dexlansoprazole    Abnormal CXR  Assessment & Plan  Has had no cough  Afebrile w/ normal WBC and PCT  CXR streaky airspace disease in RUL, suspect pneumonitis  No compelling indication to initiate empiric antimicrobial therapy at this time  Continue to monitor    Essential hypertension  Assessment & Plan  H/O bilat mastectomy and blood pressures need to be measured on legs  SBP has been consistently >150, Midodrine discontinued  Blood pressure trends stabilizing on Norvasc    Depression- (present on admission)  Assessment & Plan  On Cymbalta    Dyslipidemia- (present on admission)  Assessment & Plan  On Lovastatin      DNR

## 2023-06-09 NOTE — THERAPY
"Physical Therapy   Daily Treatment     Patient Name: Rashmi Parra  Age:  83 y.o., Sex:  female  Medical Record #: [id number]  Today's Date: 6/9/2023     Precautions  Precautions: Fall Risk  Comments: BP on LLE only, check BP c/ activity, O2 c/ activity    Subjective    \"I was feeling good till you showed up.\" Pt in w/c at arrival, agreeable to PT tx.     Objective       06/09/23 1001   PT Charge Group   PT Gait Training (Units) 1   PT Neuromuscular Re-Education / Balance (Units) 1   PT Therapeutic Activities (Units) 2   PT Total Time Spent   PT Individual Total Time Spent (Mins) 60   Gait Functional Level of Assist    Gait Level Of Assist Contact Guard Assist   Assistive Device Front Wheel Walker   Distance (Feet) 120   # of Times Distance was Traveled 1  (1 x 100')   Deviation Step To;Shuffled Gait;Decreased Heel Strike;Decreased Toe Off  (increased forward lean)   Transfer Functional Level of Assist   Bed, Chair, Wheelchair Transfer Minimal Assist   Bed Chair Wheelchair Transfer Description Initial preparation for task;Increased time;Assist with two limbs;Verbal cueing;Set-up of equipment  (lifting assist for STS up to ModA, + inc time to sequence forward scoot/lean, UE and LE placement)   Bed Mobility    Sit to Stand Moderate Assist  (inc time and lifting assist)   Neuro-Muscular Treatments   Neuro-Muscular Treatments Anterior weight shift;Weight Shift Right;Weight Shift Left;Verbal Cuing;Tactile Cuing;Sequencing;Postural Changes;Postural Facilitation;Facilitation   Comments see note   Interdisciplinary Plan of Care Collaboration   IDT Collaboration with  Physician   Patient Position at End of Therapy Seated;Chair Alarm On;Call Light within Reach;Tray Table within Reach;Phone within Reach   Collaboration Comments MD rounds       NMRE/TA: part and full practice of WS, scooting, and STS to improve initiation of transfers and controlled descent to chair/surface; from elevated mat for mechanical advantage with high " repetition practice  Unsupported seated   Lateral lean and contralateral hip flx/march 1 x 12 alt  Mini STS c/ BUE support, focus on forward lean and soft descent 1 x 10  STS c/ cross midline reaching to retrieve objects from table, WS onto opposite hand c/ CL reach x 4-6 each side  STS c/ dual task puzzle assembly, sorting pieces in seated then standing to place in pattern on peg board, x 14 reps   Seated rest btw therapy activities.     Assessment    Aleyda with good participation in session, high engagement c/ puzzle activity resulting in increased intensity of practice of STS. Reports preference for all therapy in AM. Continues with posterior lean/thrust c/ STS from w/c resulting in ongoing need for hands on assist.    Strengths: Able to follow instructions, Adequate strength, Independent prior level of function, Motivated for self care and independence, Pleasant and cooperative, Willingly participates in therapeutic activities, Supportive family  Barriers: Fatigue, Decreased endurance, Generalized weakness, Home accessibility, Hypotension, Impaired balance, Impaired carryover of learning, Impaired activity tolerance, Limited mobility, Visual impairment    Plan  Continue spinal ROM, postural control training, parascapular training, transfers/STS transitions, standing balance, gait c/ FWW, stair/curb training.      Passport items to be completed:  Get in/out of bed safely, in/out of a vehicle, safely use mobility device, walk or wheel around home/community, navigate up and down stairs, show how to get up/down from the ground, ensure home is accessible, demonstrate HEP, complete caregiver training       Physical Therapy Problems (Active)       Problem: Mobility       Dates: Start:  06/01/23         Goal: STG-Within one week, patient will ambulate up/down a curb with ModA or better, LRAD.       Dates: Start:  06/01/23         Goal Note filed on 06/06/23 0823 by Florence Issa PT       Needs assessment.                   Problem: Mobility Transfers       Dates: Start:  06/01/23         Goal: STG-Within one week, patient will perform bed mobility with Liliam.        Dates: Start:  06/01/23         Goal Note filed on 06/06/23 0823 by Florence Issa, PT       Variable CGA <> Donald for LE management                  Problem: PT-Long Term Goals       Dates: Start:  06/01/23         Goal: LTG-By discharge, patient will ambulate >/= 100' in a single effort c/ LRAD at SBA or better.        Dates: Start:  06/01/23            Goal: LTG-By discharge, patient will transfer one surface to another c/ Liliam or better.        Dates: Start:  06/01/23            Goal: LTG-By discharge, patient will ambulate up/down 2 stairs c/ BHR to safely access home environment at CGA or better.        Dates: Start:  06/01/23            Goal: LTG-By discharge, patient will transfer in/out of a car with Donald or better, LRAD.        Dates: Start:  06/01/23

## 2023-06-09 NOTE — FLOWSHEET NOTE
06/09/23 0630   Events/Summary/Plan   Events/Summary/Plan 02 pulse ox check. Appears to be sleeping   Vital Signs   Pulse 78   Respiration 16   Pulse Oximetry 96 %   $ Pulse Oximetry (Spot Check) Yes   Respiratory Assessment   Level of Consciousness Responds to voice   Chest Exam   Work Of Breathing / Effort Within Normal Limits   Oxygen   O2 (LPM) 2   O2 Delivery Device Nasal Cannula

## 2023-06-09 NOTE — THERAPY
"Occupational Therapy  Daily Treatment     Patient Name: Rashmi Parra  Age:  83 y.o., Sex:  female  Medical Record #: [id number]  Today's Date: 6/9/2023     Precautions  Precautions: (P) Fall Risk  Comments: (P) BP on LLE only, check BP c/ activity, O2 c/ activity         Subjective    \" I don't like these squiggly things.\"    Referring to elastic shoe laces applied to   right shoe      Objective       06/09/23 0701   OT Charge Group   OT Self Care / ADL (Units) 4   OT Total Time Spent   OT Individual Total Time Spent (Mins) 60   Precautions   Precautions Fall Risk   Comments BP on LLE only, check BP c/ activity, O2 c/ activity   Functional Level of Assist   Grooming Supervision   Grooming Description Seated in wheelchair at sink   Lower Body Dressing Moderate Assist  (pants min assist and cues using reacher to don over feet   brief mod assist      required moderate standing assist  and  min to pull both up.   shoes   mod assist)   Toileting Maximal Assist  (assist  with  brief up and down   assist for wiping buttocks  with  zaira care cloth)   Bed, Chair, Wheelchair Transfer Moderate Assist   Toilet Transfers Moderate Assist   Interdisciplinary Plan of Care Collaboration   Patient Position at End of Therapy Seated;Chair Alarm On;Self Releasing Lap Belt Applied  (in T dine for breakfast)         Assessment    Contradicts self  through out session   at first agreeable to trying elastic shoe laces  but then reported she didn't like them or need them  \" I sit on the edge of the bathtub and put my shoes on.\"   She donned one shoe with setup elastic shoe laces applied   the other shoe assist with using  long shoe horn  and she tied the laces  with out assist.     When I removed elastic  lace from right shoe  she complained that  I didn't give her a chance to try them.  I reminded her  that she reported  just minutes prior that she didn't like  or want it.         Suggested  sitting on  chair at home  as opposed to  edge " of tub to don shoes.     Strengths: Able to follow instructions, Alert and oriented, Manages pain appropriately, Motivated for self care and independence, Pleasant and cooperative, Willingly participates in therapeutic activities  Barriers: Decreased endurance, Generalized weakness, Fatigue, Hypotension, Orthostatic hypotension, Impaired activity tolerance, Impaired balance, Limited mobility    Plan    ADLs w/ AE - trial elastic shoe laces to facilitate easier shoe donning?  Functional transfers  UE strengthening - hand strengthening  Standing balance/tolerance     Passport items to be completed:  Perform bathroom transfers, complete dressing, complete feeding, get ready for the day, prepare a simple meal, participate in household tasks, adapt home for safety needs, demonstrate home exercise program, complete caregiver training        Occupational Therapy Goals (Active)       Problem: Dressing       Dates: Start:  06/01/23         Goal: STG-Within one week, patient will dress UB with SBA overall using AE/DME as needed.       Dates: Start:  06/01/23         Goal Note filed on 06/06/23 1149 by Evaristo Casey C.O.T.A.       Min assist     Limited by decreased strength/endurance    Continue goal               Goal: STG-Within one week, patient will dress LB with Mod A overall using AE/DME as needed.       Dates: Start:  06/01/23         Goal Note filed on 06/06/23 1149 by Evaristo Casey, C.O.T.A.       Max assist     Limited by decreased strength/endurance                    Problem: Functional Transfers       Dates: Start:  06/01/23         Goal: STG-Within one week, patient will transfer to toilet with CGA and LRD overall utilizing DME.AE as needed.       Dates: Start:  06/01/23         Goal Note filed on 06/06/23 1149 by Evaristo Casey, C.O.T.A.       Requires min assist    Limited by decreased strength/endurance                  Problem: OT Long Term Goals       Dates: Start:  06/01/23         Goal: LTG-By  discharge, patient will complete basic self care tasks with supervision overall using AE/DME as needed.       Dates: Start:  06/01/23            Goal: LTG-By discharge, patient will perform bathroom transfers with supervision and LRD overall using AE/DME as needed.       Dates: Start:  06/01/23               Problem: Toileting       Dates: Start:  06/01/23         Goal: STG-Within one week, patient will complete toileting tasks with min A overall using AE/DME as needed.       Dates: Start:  06/01/23         Goal Note filed on 06/06/23 1149 by Evaristo Casey C.O.T.NISHA.       Mod to max assist    Limited by decreased strength/endurance    Continue goal

## 2023-06-09 NOTE — DISCHARGE PLANNING
Cm  Referral made to [facility] health; on track for dc on 6/16; pt michelle need follow up w/ pcp; family will transport home.

## 2023-06-09 NOTE — PROGRESS NOTES
March 17, 2021        José Mao  [address]  [facility] [address]    To Whom It May Concern:    This is to certify José Mao was evaluated on 03/17/21 and is unable to return to work.    José Mao should self-isolate   ?  CDC guidelines for return to work are as follows:  · At least 24 hours have passed since fever resolution without use of fever reducing medication and   · Symptoms have improved and  · At least 10 days have passed since symptoms first appeared  · At least 10 days have passed since the collection date for a positive COVID-19 test.     **The loss of taste and smell may persist for weeks or months after recovery and do not need to delay the end of isolation.     Per CDC recommendations, employers should not require a COVID-19 test result or a healthcare provider’s note for employees who are sick to validate their illness, qualify for sick leave, or to return to work.    The Coronavirus is a rapidly evolving situation and recommendations are changing regularly to prevent spread of the disease and further loss of life.    Thank you for your understanding during these unusual times.     Electronically signed by:     JOHN Manrique                 [address]  [address] [place] [address]     Received shift report and assumed care of patient.  Patient awake, calm and stable, currently positioned in bed for comfort and safety; call light within reach.  Denies pain or discomfort at this time.  Will continue to monitor.    Street

## 2023-06-09 NOTE — THERAPY
Speech Language Pathology  Daily Treatment     Patient Name: Rashmi Parra  Age:  83 y.o., Sex:  female  Medical Record #: [id number]  Today's Date: 6/9/2023     Precautions  Precautions: Fall Risk  Comments: BP on LLE only, check BP c/ activity, O2 c/ activity    Subjective    Pt agreeable to dysphagia intervention during functional meal setting in T-dine.      Objective       06/09/23 0804   Treatment Charges   SLP Swallowing Dysfunction Treatment Swallowing Dysfunction Treatment   SLP Total Time Spent   SLP Individual Total Time Spent (Mins) 30   Dysphagia    Other Treatments trials of regular textures   Diet / Liquid Recommendation Thin (0);Soft & Bite-Sized (6) - (Dysphagia III)   Nutritional Liquid Intake Rating Scale Non thickened beverages   Nutritional Food Intake Rating Scale Total oral diet with multiple consistencies without special preparation but with specific food limitations   Interdisciplinary Plan of Care Collaboration   IDT Collaboration with  Therapy Tech;Speech Therapist   Patient Position at End of Therapy Seated;Self Releasing Lap Belt Applied   Collaboration Comments transfer of care to therapy tech. Informed primary SLP of performance at breakfast         Assessment    Pt assessed with regular texture trial at breakfast including fresh fruit, ramirez. Pt tolerated all textures with no difficulty, no overt s/sx of pen/asp, appropriate mastication, timely swallow, no oral residue. Pt encouraged to alternate solids and liquids, did so following cueing and with cueing faded as session progressed.     Strengths: Able to follow instructions, Pleasant and cooperative, Willingly participates in therapeutic activities, Motivated for self care and independence  Barriers: Aspiration risk    Plan    Continue regular trials for lunch, advance as appropriate.     Passport items to be completed:  Express basic needs, understand food/liquid recommendations, consistently follow swallow precautions, manage  finances, manage medications, arrive to therapy appointments on time, complete daily memory log entries, solve problems related to safety situations, review education related to hospitalization, complete caregiver training     Speech Therapy Problems (Active)       Problem: Memory STGs       Dates: Start:  06/07/23         Goal: STG-Within one week, patient will utilize memory strategies in order to recall safety precautions and information from therapies after a 2 hr delay.        Dates: Start:  06/07/23               Problem: Problem Solving STGs       Dates: Start:  06/07/23         Goal: STG-Within one week, patient will solve complex problems related to medication management with 80% accuracy given min verbal cues.        Dates: Start:  06/07/23               Problem: Speech/Swallowing LTGs       Dates: Start:  06/02/23         Goal: LTG-By discharge, patient will safely swallow level 6 textures and thin liquids without aspiration.         Dates: Start:  06/02/23            Goal: LTG-By discharge, patient will solve complex problems related to IADL's in order to d/c home with SPV       Dates: Start:  06/07/23               Problem: Swallowing STGs       Dates: Start:  06/02/23         Goal: STG-Within one week, patient will recall and utilize swallow strategies with 80% accuracy given min verbal cues.         Dates: Start:  06/02/23

## 2023-06-09 NOTE — CARE PLAN
Problem: Self Care  Goal: Patient will have the ability to perform ADLs independently or with assistance  Outcome: Progressing  Note: Patient able to perform regular activities this shift.  Pain controlled this shift.  Pain management includes PRN pain meds as well as non-pharmacological measures such as emotional support, rest, and repositioning.  Will continue to monitor.    The patient is Stable - Low risk of patient condition declining or worsening    Shift Goals  Clinical Goals: Safety  Patient Goals: Sleep    Progress made toward(s) clinical / shift goals:  pt participates with therapy and is cooperative with nursing    Patient is not progressing towards the following goals:

## 2023-06-09 NOTE — THERAPY
"Speech Language Pathology  Daily Treatment     Patient Name: Rashmi Parra  Age:  83 y.o., Sex:  female  Medical Record #: [id number]  Today's Date: 6/9/2023     Precautions  Precautions: Fall Risk  Comments: BP on LLE only, check BP c/ activity, O2 c/ activity    Subjective      Pt pleasant and cooperative during tx.  Pt stated; \"I shouldn't have ordered a cheeseburger.  It's too thick.  I meant to order grilled cheese.\"     Objective       06/09/23 1131   Treatment Charges   SLP Swallowing Dysfunction Treatment Swallowing Dysfunction Treatment   SLP Total Time Spent   SLP Individual Total Time Spent (Mins) 30   Dysphagia    Other Treatments regular texture trials.         Assessment    Pt assessed with regular texture trials.  Pt unable to eat cheeseburger due to size.  Pt requested grilled cheese.  Pt tolerated regular textures (grilled cheese, and chips) without overt s/s of aspiration.  Pt tolerated thin liquids without overt s/s of aspiration.      Strengths: Able to follow instructions, Pleasant and cooperative, Willingly participates in therapeutic activities, Motivated for self care and independence  Barriers: Aspiration risk    Plan    Upgrade to level 7 (EC) chopped meats; thin liquids.  Monitor in tdine, and discharge as deemed appropriate.       Speech Therapy Problems (Active)       Problem: Memory STGs       Dates: Start:  06/07/23         Goal: STG-Within one week, patient will utilize memory strategies in order to recall safety precautions and information from therapies after a 2 hr delay.        Dates: Start:  06/07/23               Problem: Problem Solving STGs       Dates: Start:  06/07/23         Goal: STG-Within one week, patient will solve complex problems related to medication management with 80% accuracy given min verbal cues.        Dates: Start:  06/07/23               Problem: Speech/Swallowing LTGs       Dates: Start:  06/02/23         Goal: LTG-By discharge, patient will safely swallow " level 6 textures and thin liquids without aspiration.         Dates: Start:  06/02/23            Goal: LTG-By discharge, patient will solve complex problems related to IADL's in order to d/c home with SPV       Dates: Start:  06/07/23               Problem: Swallowing STGs       Dates: Start:  06/02/23         Goal: STG-Within one week, patient will recall and utilize swallow strategies with 80% accuracy given min verbal cues.         Dates: Start:  06/02/23

## 2023-06-09 NOTE — PROGRESS NOTES
Assumed care of patient. Bedside report received from Brittany HOOPER. Patient is in bed. Fall precautions in place. Call light and belongings within reach. Updated on POC, all questions and concerns answered at this time. No other needs at this time.

## 2023-06-09 NOTE — PROGRESS NOTES
"  Physical Medicine & Rehabilitation Progress Note    Encounter Date: 6/9/2023    Chief Complaint: Decreased mobility, hypotension    Interval Events (Subjective):  Patient sitting up in room. She reports therapy is going well. Her biggest limitation remains getting up to stand. Denies NVD. Denies SOB.     _____________________________________  Interdisciplinary Team Conference   Most recent IDT on 6/6/2023    Discharge Date/Disposition:  6/14/23  _____________________________________      Objective:  VITAL SIGNS: /57   Pulse 81   Temp 37.2 °C (98.9 °F) (Oral)   Resp 16   Ht 1.6 m (5' 3\")   Wt 59.9 kg (132 lb)   LMP  (LMP Unknown)   SpO2 95%   BMI 23.38 kg/m²   Gen: NAD  Psych: Mood and affect appropriate  CV: RRR, 0 edema  Resp: CTAB, no upper airway sounds  Abd: NTND  Neuro: AOx4, following commands    Laboratory Values:  Recent Results (from the past 72 hour(s))   CBC WITH DIFFERENTIAL    Collection Time: 06/08/23  5:50 AM   Result Value Ref Range    WBC 5.9 4.8 - 10.8 K/uL    RBC 3.90 (L) 4.20 - 5.40 M/uL    Hemoglobin 11.9 (L) 12.0 - 16.0 g/dL    Hematocrit 36.7 (L) 37.0 - 47.0 %    MCV 94.1 81.4 - 97.8 fL    MCH 30.5 27.0 - 33.0 pg    MCHC 32.4 32.2 - 35.5 g/dL    RDW 46.9 35.9 - 50.0 fL    Platelet Count 261 164 - 446 K/uL    MPV 11.0 9.0 - 12.9 fL    Neutrophils-Polys 70.20 44.00 - 72.00 %    Lymphocytes 15.20 (L) 22.00 - 41.00 %    Monocytes 13.10 0.00 - 13.40 %    Eosinophils 0.90 0.00 - 6.90 %    Basophils 0.30 0.00 - 1.80 %    Immature Granulocytes 0.30 0.00 - 0.90 %    Nucleated RBC 0.00 0.00 - 0.20 /100 WBC    Neutrophils (Absolute) 4.11 1.82 - 7.42 K/uL    Lymphs (Absolute) 0.89 (L) 1.00 - 4.80 K/uL    Monos (Absolute) 0.77 0.00 - 0.85 K/uL    Eos (Absolute) 0.05 0.00 - 0.51 K/uL    Baso (Absolute) 0.02 0.00 - 0.12 K/uL    Immature Granulocytes (abs) 0.02 0.00 - 0.11 K/uL    NRBC (Absolute) 0.00 K/uL   Comp Metabolic Panel    Collection Time: 06/08/23  5:50 AM   Result Value Ref Range    " Sodium 137 135 - 145 mmol/L    Potassium 3.8 3.6 - 5.5 mmol/L    Chloride 98 96 - 112 mmol/L    Co2 27 20 - 33 mmol/L    Anion Gap 12.0 7.0 - 16.0    Glucose 141 (H) 65 - 99 mg/dL    Bun 11 8 - 22 mg/dL    Creatinine 0.70 0.50 - 1.40 mg/dL    Calcium 9.0 8.5 - 10.5 mg/dL    AST(SGOT) 14 12 - 45 U/L    ALT(SGPT) 13 2 - 50 U/L    Alkaline Phosphatase 86 30 - 99 U/L    Total Bilirubin 0.5 0.1 - 1.5 mg/dL    Albumin 3.3 3.2 - 4.9 g/dL    Total Protein 6.0 6.0 - 8.2 g/dL    Globulin 2.7 1.9 - 3.5 g/dL    A-G Ratio 1.2 g/dL   MAGNESIUM    Collection Time: 06/08/23  5:50 AM   Result Value Ref Range    Magnesium 1.7 1.5 - 2.5 mg/dL   PHOSPHORUS    Collection Time: 06/08/23  5:50 AM   Result Value Ref Range    Phosphorus 3.3 2.5 - 4.5 mg/dL   PROCALCITONIN    Collection Time: 06/08/23  5:50 AM   Result Value Ref Range    Procalcitonin 0.06 <0.25 ng/mL   CORRECTED CALCIUM    Collection Time: 06/08/23  5:50 AM   Result Value Ref Range    Correct Calcium 9.6 8.5 - 10.5 mg/dL   ESTIMATED GFR    Collection Time: 06/08/23  5:50 AM   Result Value Ref Range    GFR (CKD-EPI) 85 >60 mL/min/1.73 m 2       Medications:  Scheduled Medications   Medication Dose Frequency    amLODIPine  10 mg Q DAY    Dexlansoprazole  60 mg Q EVENING    polyethylene glycol/lytes  1 Packet DAILY    Pharmacy Consult Request  1 Each PHARMACY TO DOSE    DULoxetine  30 mg DAILY    enoxaparin (LOVENOX) injection  40 mg DAILY AT 1800    lovastatin  20 mg Nightly    melatonin  4.5 mg QHS     PRN medications: senna-docusate **AND** [DISCONTINUED] polyethylene glycol/lytes **AND** magnesium hydroxide **AND** bisacodyl, Respiratory Therapy Consult, hydrALAZINE, acetaminophen, mag hydrox-al hydrox-simeth, ondansetron **OR** ondansetron, traZODone, sodium chloride, traMADol, gabapentin    Diet:  Current Diet Order   Procedures    Diet Order Diet: Level 6 - Soft and Bite Sized (meds whole 1 at a time with thin); Liquid level: Level 0 - Thin       Medical Decision Making  and Plan:  Debility - Patient with decreased mobility and weakness 2/2 orthostatic hypotension, low cortisol and multiple falls  -PT and OT for mobility and ADLs. Per guidelines, 15 hours per week between PT, OT and/or SLP.  -Follow-up PCP     Dysphagia - Patient with new dysphagia of choking on foods. Consult SLP. Will check CXR. Patient at high risk for further decline and respiratory failure due to age and debility.   -Choking event 6/1 with dinner. Repeat CXR, AM labs. CXR stable. Passed swallow    HTN - Patient on Lisinopril. Was previously having orthostatic hypotension requiring Midodrine and Florinef. Will monitor  -Severe orthostatic hypotension on admission into the 70s. Start IV fluids,consult hospitalist and monitor. May need to restart Florinef. Will check CXR. High risk for falls and further injury. Will recheck labs including cortisol per hospitalist.  -Elevated SBP on 6/5, increase Amlodipine to 10, will monitor for low SBP. Midodrine discontinued.   -Continue Amlodipine 10 mg     HLD - Patient on Lovastatin 20 mg QHS. Continue Lovastatin     Hypocalcemia - Check AM CMP. Stable     Low Cortisol - Negative ACTH test. Will monitor      Anemia - 11.9 on 6/8/23, improving.     PAD - Aortic stenosis on CTA of abdomen. Follow-up Vascular     Depression - Patient on Cymbalta 30 mg daily. Continue Cymbalta     Pain - APAP/Tramadol PRN     Skin - Patient at risk for skin breakdown due to debility in areas including sacrum, achilles, elbows and head in addition to other sites. Nursing to assess skin daily.      GI Ppx - Patient on Prilosec for GERD prophylaxis. Patient on Senna-docusate for constipation prophylaxis.   -Switch to Dexlansoprazole      DVT Ppx - Patient Lovenox on transfer.  ____________________________________    T. Toño Will MD/PhD  [facility] - Physical Medicine & Rehabilitation   [facility] - Brain Injury Medicine   ____________________________________

## 2023-06-09 NOTE — CARE PLAN
The patient is Stable - Low risk of patient condition declining or worsening    Shift Goals  Clinical Goals: Safety  Patient Goals: Sleep    Progress made toward(s) clinical / shift goals:    Problem: Knowledge Deficit - Standard  Goal: Patient and family/care givers will demonstrate understanding of plan of care, disease process/condition, diagnostic tests and medications  Outcome: Progressing   Patient educated on the POC and medications administered. All questions and concerns addressed at this time  Problem: Fall Risk - Rehab  Goal: Patient will remain free from falls  Outcome: Progressing   Bed is locked and in low position. Call light and belongings within reach. Patient calls appropriately for asssitance  Problem: Pain - Standard  Goal: Alleviation of pain or a reduction in pain to the patient’s comfort goal  Outcome: Progressing   Patient is able to verbalize pain or discomfort appropriately. Education provided on pharmacological and nonpharmacological pain management interventions.    Patient is not progressing towards the following goals:

## 2023-06-10 ENCOUNTER — APPOINTMENT (OUTPATIENT)
Dept: SPEECH THERAPY | Facility: REHABILITATION | Age: 83
DRG: 312 | End: 2023-06-10
Attending: PHYSICAL MEDICINE & REHABILITATION
Payer: MEDICARE

## 2023-06-10 ENCOUNTER — APPOINTMENT (OUTPATIENT)
Dept: PHYSICAL THERAPY | Facility: REHABILITATION | Age: 83
End: 2023-06-10
Attending: PHYSICAL MEDICINE & REHABILITATION
Payer: MEDICARE

## 2023-06-10 PROCEDURE — 700111 HCHG RX REV CODE 636 W/ 250 OVERRIDE (IP): Performed by: PHYSICAL MEDICINE & REHABILITATION

## 2023-06-10 PROCEDURE — 770010 HCHG ROOM/CARE - REHAB SEMI PRIVAT*

## 2023-06-10 PROCEDURE — 94760 N-INVAS EAR/PLS OXIMETRY 1: CPT

## 2023-06-10 PROCEDURE — 92526 ORAL FUNCTION THERAPY: CPT

## 2023-06-10 PROCEDURE — 97110 THERAPEUTIC EXERCISES: CPT

## 2023-06-10 PROCEDURE — A9270 NON-COVERED ITEM OR SERVICE: HCPCS | Performed by: HOSPITALIST

## 2023-06-10 PROCEDURE — A9270 NON-COVERED ITEM OR SERVICE: HCPCS | Performed by: PHYSICAL MEDICINE & REHABILITATION

## 2023-06-10 PROCEDURE — 700102 HCHG RX REV CODE 250 W/ 637 OVERRIDE(OP): Performed by: HOSPITALIST

## 2023-06-10 PROCEDURE — 99232 SBSQ HOSP IP/OBS MODERATE 35: CPT | Performed by: HOSPITALIST

## 2023-06-10 PROCEDURE — 97530 THERAPEUTIC ACTIVITIES: CPT

## 2023-06-10 PROCEDURE — 700102 HCHG RX REV CODE 250 W/ 637 OVERRIDE(OP): Performed by: PHYSICAL MEDICINE & REHABILITATION

## 2023-06-10 PROCEDURE — 97116 GAIT TRAINING THERAPY: CPT

## 2023-06-10 RX ADMIN — POLYETHYLENE GLYCOL 3350 1 PACKET: 17 POWDER, FOR SOLUTION ORAL at 08:28

## 2023-06-10 RX ADMIN — LOVASTATIN 20 MG: 20 TABLET ORAL at 20:41

## 2023-06-10 RX ADMIN — HYDRALAZINE HYDROCHLORIDE 25 MG: 25 TABLET, FILM COATED ORAL at 14:45

## 2023-06-10 RX ADMIN — ENOXAPARIN SODIUM 40 MG: 100 INJECTION SUBCUTANEOUS at 17:20

## 2023-06-10 RX ADMIN — DEXLANSOPRAZOLE 60 MG: 60 CAPSULE, DELAYED RELEASE ORAL at 20:39

## 2023-06-10 RX ADMIN — DULOXETINE HYDROCHLORIDE 30 MG: 30 CAPSULE, DELAYED RELEASE ORAL at 08:27

## 2023-06-10 RX ADMIN — Medication 4.5 MG: at 20:40

## 2023-06-10 RX ADMIN — AMLODIPINE BESYLATE 10 MG: 5 TABLET ORAL at 05:19

## 2023-06-10 ASSESSMENT — PAIN DESCRIPTION - PAIN TYPE: TYPE: ACUTE PAIN

## 2023-06-10 ASSESSMENT — ENCOUNTER SYMPTOMS
VOMITING: 0
PALPITATIONS: 0
COUGH: 0
POLYDIPSIA: 0
NAUSEA: 0
SHORTNESS OF BREATH: 0
ABDOMINAL PAIN: 0
EYES NEGATIVE: 1
FEVER: 0
BRUISES/BLEEDS EASILY: 0
MUSCULOSKELETAL NEGATIVE: 1
CHILLS: 0

## 2023-06-10 ASSESSMENT — GAIT ASSESSMENTS
ASSISTIVE DEVICE: FRONT WHEEL WALKER
GAIT LEVEL OF ASSIST: CONTACT GUARD ASSIST
DISTANCE (FEET): 200
DEVIATION: SHUFFLED GAIT;BRADYKINETIC

## 2023-06-10 NOTE — THERAPY
Speech Language Pathology  Daily Treatment     Patient Name: Rashmi Parra  Age:  83 y.o., Sex:  female  Medical Record #: [id number]  Today's Date: 6/10/2023     Precautions  Precautions: Fall Risk  Comments: BP on LLe only    Subjective    Pt completed 2 sessions: [id number], and [id number].       Objective       06/10/23 1001   Treatment Charges   SLP Swallowing Dysfunction Treatment Swallowing Dysfunction Treatment   SLP Total Time Spent   SLP Individual Total Time Spent (Mins) 60   Dysphagia    Other Treatments regular texture trials, recall/use of strategies.         Assessment    Pt tolerated regular texture trials without oral residue, or s/s of aspiration.  Pt recalled swallow strategies given additional time.  Pt tolerated thin liquids without overt s/s of aspiration.      Strengths: Able to follow instructions, Pleasant and cooperative, Willingly participates in therapeutic activities, Motivated for self care and independence  Barriers: Aspiration risk    Plan    Continue level 7 EC with chopped meats; thin liquids.  DC TDINE.  Med management/pill sort        Speech Therapy Problems (Active)       Problem: Memory STGs       Dates: Start:  06/07/23         Goal: STG-Within one week, patient will utilize memory strategies in order to recall safety precautions and information from therapies after a 2 hr delay.        Dates: Start:  06/07/23               Problem: Problem Solving STGs       Dates: Start:  06/07/23         Goal: STG-Within one week, patient will solve complex problems related to medication management with 80% accuracy given min verbal cues.        Dates: Start:  06/07/23               Problem: Speech/Swallowing LTGs       Dates: Start:  06/02/23         Goal: LTG-By discharge, patient will safely swallow level 6 textures and thin liquids without aspiration.         Dates: Start:  06/02/23            Goal: LTG-By discharge, patient will solve complex problems related to IADL's in order to d/c  home with SPV       Dates: Start:  06/07/23               Problem: Swallowing STGs       Dates: Start:  06/02/23         Goal: STG-Within one week, patient will recall and utilize swallow strategies with 80% accuracy given min verbal cues.         Dates: Start:  06/02/23

## 2023-06-10 NOTE — THERAPY
Physical Therapy   Daily Treatment     Patient Name: Rashmi Parra  Age:  83 y.o., Sex:  female  Medical Record #: [id number]  Today's Date: 6/10/2023     Precautions  Precautions: (P) Fall Risk  Comments: (P) BP on LLe only    Subjective    Pt is pleasant and cooperative.     Objective       06/10/23 0900   PT Charge Group   PT Gait Training (Units) 2   PT Therapeutic Exercise (Units) 1   PT Therapeutic Activities (Units) 1   PT Total Time Spent   PT Individual Total Time Spent (Mins) 60   Precautions   Precautions Fall Risk   Comments BP on LLe only   Vitals   Pulse 76   Patient BP Position Sitting   Blood Pressure  (!) 154/65   Pulse Oximetry 100 %   O2 (LPM) 2   O2 Delivery Device Nasal Cannula   Pain   Intervention Declines   Sleep/Wake Cycle   Sleep & Rest Awake   Gait Functional Level of Assist    Gait Level Of Assist Contact Guard Assist   Assistive Device Front Wheel Walker   Distance (Feet) 200   # of Times Distance was Traveled 3   Deviation Shuffled Gait;Bradykinetic   Bed Mobility    Sit to Stand Moderate Assist   Interdisciplinary Plan of Care Collaboration   Patient Position at End of Therapy Seated;Bed Alarm On;Phone within Reach;Tray Table within Reach;Call Light within Reach       Pt brought to therapy gym via w/c.    Pt required variable MIN-MOD A for sit to stand transfers throughout session.     Pt ambulated 3 bouts of 120-220' with FWW and CGA. She demonstrates slow shuffled gait pattern with forward flexed posture, decreased step distance. No significant losses of balance, and only mild reports of fatigue as session progressed.     Pt performed 5x sit to stand with variable MOD progressing to CGA, significantly increased time for performance. VC/TC for anterior weight shifting, improved sequencing, and improved output of LE for upright posture.     Pt performed motomed BLE G2 8 mins.       Assessment    Pt tolerated session well with improving ambulation distances. She continues to require  physical assistance for sit to stand transfers.     Strengths: Able to follow instructions, Adequate strength, Independent prior level of function, Motivated for self care and independence, Pleasant and cooperative, Willingly participates in therapeutic activities, Supportive family  Barriers: Fatigue, Decreased endurance, Generalized weakness, Home accessibility, Hypotension, Impaired balance, Impaired carryover of learning, Impaired activity tolerance, Limited mobility, Visual impairment    Plan    Continue to progress pt's functional independence as per plan of care.     Passport items to be completed:  Get in/out of bed safely, in/out of a vehicle, safely use mobility device, walk or wheel around home/community, navigate up and down stairs, show how to get up/down from the ground, ensure home is accessible, demonstrate HEP, complete caregiver training    Physical Therapy Problems (Active)       Problem: Mobility       Dates: Start:  06/01/23         Goal: STG-Within one week, patient will ambulate up/down a curb with ModA or better, LRAD.       Dates: Start:  06/01/23         Goal Note filed on 06/06/23 0823 by Florence Issa, PT       Needs assessment.                  Problem: Mobility Transfers       Dates: Start:  06/01/23         Goal: STG-Within one week, patient will perform bed mobility with Liliam.        Dates: Start:  06/01/23         Goal Note filed on 06/06/23 0823 by Florence Issa, PT       Variable CGA <> Donald for LE management                  Problem: PT-Long Term Goals       Dates: Start:  06/01/23         Goal: LTG-By discharge, patient will ambulate >/= 100' in a single effort c/ LRAD at SBA or better.        Dates: Start:  06/01/23            Goal: LTG-By discharge, patient will transfer one surface to another c/ Liliam or better.        Dates: Start:  06/01/23            Goal: LTG-By discharge, patient will ambulate up/down 2 stairs c/ BHR to safely access home environment at CGA or better.         Dates: Start:  06/01/23            Goal: LTG-By discharge, patient will transfer in/out of a car with Donald or better, LRAD.        Dates: Start:  06/01/23

## 2023-06-10 NOTE — PROGRESS NOTES
Hospital Medicine Daily Progress Note        Chief Complaint:  Hypotension    Interval History:  No new issues.    Review of Systems  Review of Systems   Constitutional:  Negative for chills and fever.   HENT: Negative.     Eyes: Negative.    Respiratory:  Negative for cough and shortness of breath.    Cardiovascular:  Negative for chest pain and palpitations.   Gastrointestinal:  Negative for abdominal pain, nausea and vomiting.   Genitourinary: Negative.    Musculoskeletal: Negative.    Skin:  Negative for itching and rash.   Endo/Heme/Allergies:  Negative for polydipsia. Does not bruise/bleed easily.        Physical Exam  Temp:  [36.8 °C (98.2 °F)-37.7 °C (99.8 °F)] 36.8 °C (98.2 °F)  Pulse:  [76-87] 76  Resp:  [16-18] 16  BP: (115-154)/(49-71) 154/65  SpO2:  [93 %-100 %] 100 %    Physical Exam  Vitals reviewed.   Constitutional:       General: She is not in acute distress.     Appearance: Normal appearance. She is not ill-appearing.   HENT:      Head: Normocephalic and atraumatic.      Right Ear: External ear normal.      Left Ear: External ear normal.      Nose: Nose normal.      Mouth/Throat:      Pharynx: Oropharynx is clear.   Eyes:      General:         Right eye: No discharge.         Left eye: No discharge.      Extraocular Movements: Extraocular movements intact.      Conjunctiva/sclera: Conjunctivae normal.   Cardiovascular:      Rate and Rhythm: Normal rate and regular rhythm.   Pulmonary:      Effort: No respiratory distress.      Breath sounds: No wheezing.      Comments: Decreased BS  Abdominal:      General: Bowel sounds are normal. There is no distension.      Palpations: Abdomen is soft.      Tenderness: There is no abdominal tenderness.   Musculoskeletal:      Cervical back: Normal range of motion and neck supple.      Right lower leg: No edema.      Left lower leg: No edema.   Skin:     General: Skin is warm and dry.   Neurological:      Mental Status: She is alert and oriented to person,  place, and time.         Fluids    Intake/Output Summary (Last 24 hours) at 6/10/2023 1325  Last data filed at 6/10/2023 1200  Gross per 24 hour   Intake 480 ml   Output --   Net 480 ml       Laboratory  Recent Labs     06/08/23  0550   WBC 5.9   RBC 3.90*   HEMOGLOBIN 11.9*   HEMATOCRIT 36.7*   MCV 94.1   MCH 30.5   MCHC 32.4   RDW 46.9   PLATELETCT 261   MPV 11.0     Recent Labs     06/08/23  0550   SODIUM 137   POTASSIUM 3.8   CHLORIDE 98   CO2 27   GLUCOSE 141*   BUN 11   CREATININE 0.70   CALCIUM 9.0                   Assessment/Plan  GERD (gastroesophageal reflux disease)  Assessment & Plan  On Dexlansoprazole    Abnormal CXR  Assessment & Plan  Has had no cough  Afebrile w/ normal WBC and PCT  CXR streaky airspace disease in RUL, suspect pneumonitis  No compelling indication to initiate empiric antimicrobial therapy at this time  Continue to monitor    Essential hypertension  Assessment & Plan  H/O bilat mastectomy and blood pressures need to be measured on legs  SBP had been consistently >150, so Midodrine discontinued  Blood pressure trends stabilizing on Norvasc    Depression- (present on admission)  Assessment & Plan  On Cymbalta    Dyslipidemia- (present on admission)  Assessment & Plan  On Lovastatin      DNR    Pt w/o acute medical issues requiring Hospitalist services at this time.  Will sign off.  Please re-consult as needed.

## 2023-06-10 NOTE — PROGRESS NOTES
Received bedside shift report regarding patient and assumed care. Patient is sleeping, calm and stable, currently positioned in bed for comfort and safety; call light within reach. No s/s of distress noted. Will continue to monitor.

## 2023-06-10 NOTE — CARE PLAN
The patient is Stable - Low risk of patient condition declining or worsening    Shift Goals  Clinical Goals: skin integrity, no aspiration  Patient Goals: pain control    Progress made toward(s) clinical / shift goals: Skin remains intact. Patient up for therapy and all meals for t-dine. Pain is well controlled.       Problem: Fall Risk - Rehab  Goal: Patient will remain free from falls  Outcome: Progressing     Problem: Risk for Aspiration  Goal: Patient's risk for aspiration will be absent or decrease  Outcome: Progressing

## 2023-06-10 NOTE — FLOWSHEET NOTE
06/10/23 0649   Events/Summary/Plan   Events/Summary/Plan 02 pulse ox check   Vital Signs   Pulse 78   Respiration 16   Pulse Oximetry 97 %   $ Pulse Oximetry (Spot Check) Yes   Respiratory Assessment   Level of Consciousness Responds to voice  (appears to be sleeping)   Chest Exam   Work Of Breathing / Effort Within Normal Limits   Oxygen   O2 (LPM) 2   O2 Delivery Device Silicone Nasal Cannula

## 2023-06-10 NOTE — CARE PLAN
"  Problem: Fall Risk - Rehab  Goal: Patient will remain free from falls  Outcome: Progressing  Note: Anastasia Issa Fall risk Assessment Score: 20    High fall risk Interventions   - Alarming seatbelt  - Bed and strip alarm   - Yellow sign by the door   - Yellow wrist band \"Fall risk\"  - Room near to the nurse station  - Do not leave patient unattended in the bathroom  - Fall risk education provided       Problem: Pain - Standard  Goal: Alleviation of pain or a reduction in pain to the patient’s comfort goal  Outcome: Progressing  Note: Assessed for pain and discomfort ,needs anticipated and attended.       The patient is Stable - Low risk of patient condition declining or worsening    Shift Goals  Clinical Goals: Safety  Patient Goals: Sleep    Progress made toward(s) clinical / shift goals:  Pt free from pain and discomfort .    "

## 2023-06-11 ENCOUNTER — APPOINTMENT (OUTPATIENT)
Dept: PHYSICAL THERAPY | Facility: REHABILITATION | Age: 83
DRG: 312 | End: 2023-06-11
Attending: PHYSICAL MEDICINE & REHABILITATION
Payer: MEDICARE

## 2023-06-11 ENCOUNTER — APPOINTMENT (OUTPATIENT)
Dept: OCCUPATIONAL THERAPY | Facility: REHABILITATION | Age: 83
DRG: 312 | End: 2023-06-11
Attending: PHYSICAL MEDICINE & REHABILITATION
Payer: MEDICARE

## 2023-06-11 PROCEDURE — A9270 NON-COVERED ITEM OR SERVICE: HCPCS | Performed by: HOSPITALIST

## 2023-06-11 PROCEDURE — 700102 HCHG RX REV CODE 250 W/ 637 OVERRIDE(OP): Performed by: HOSPITALIST

## 2023-06-11 PROCEDURE — 700111 HCHG RX REV CODE 636 W/ 250 OVERRIDE (IP): Performed by: PHYSICAL MEDICINE & REHABILITATION

## 2023-06-11 PROCEDURE — A9270 NON-COVERED ITEM OR SERVICE: HCPCS | Performed by: PHYSICAL MEDICINE & REHABILITATION

## 2023-06-11 PROCEDURE — 94760 N-INVAS EAR/PLS OXIMETRY 1: CPT

## 2023-06-11 PROCEDURE — 97535 SELF CARE MNGMENT TRAINING: CPT

## 2023-06-11 PROCEDURE — 700102 HCHG RX REV CODE 250 W/ 637 OVERRIDE(OP): Performed by: PHYSICAL MEDICINE & REHABILITATION

## 2023-06-11 PROCEDURE — 770010 HCHG ROOM/CARE - REHAB SEMI PRIVAT*

## 2023-06-11 RX ADMIN — SENNOSIDES AND DOCUSATE SODIUM 2 TABLET: 50; 8.6 TABLET ORAL at 21:15

## 2023-06-11 RX ADMIN — DEXLANSOPRAZOLE 60 MG: 60 CAPSULE, DELAYED RELEASE ORAL at 21:07

## 2023-06-11 RX ADMIN — POLYETHYLENE GLYCOL 3350 1 PACKET: 17 POWDER, FOR SOLUTION ORAL at 08:37

## 2023-06-11 RX ADMIN — AMLODIPINE BESYLATE 10 MG: 5 TABLET ORAL at 05:29

## 2023-06-11 RX ADMIN — HYDRALAZINE HYDROCHLORIDE 25 MG: 25 TABLET, FILM COATED ORAL at 05:29

## 2023-06-11 RX ADMIN — Medication 4.5 MG: at 21:06

## 2023-06-11 RX ADMIN — LOVASTATIN 20 MG: 20 TABLET ORAL at 21:06

## 2023-06-11 RX ADMIN — DULOXETINE HYDROCHLORIDE 30 MG: 30 CAPSULE, DELAYED RELEASE ORAL at 08:37

## 2023-06-11 RX ADMIN — ENOXAPARIN SODIUM 40 MG: 100 INJECTION SUBCUTANEOUS at 16:58

## 2023-06-11 ASSESSMENT — ACTIVITIES OF DAILY LIVING (ADL): BED_CHAIR_WHEELCHAIR_TRANSFER_DESCRIPTION: INCREASED TIME;SUPERVISION FOR SAFETY

## 2023-06-11 ASSESSMENT — PATIENT HEALTH QUESTIONNAIRE - PHQ9
2. FEELING DOWN, DEPRESSED, IRRITABLE, OR HOPELESS: NOT AT ALL
2. FEELING DOWN, DEPRESSED, IRRITABLE, OR HOPELESS: NOT AT ALL
SUM OF ALL RESPONSES TO PHQ9 QUESTIONS 1 AND 2: 0
SUM OF ALL RESPONSES TO PHQ9 QUESTIONS 1 AND 2: 0
1. LITTLE INTEREST OR PLEASURE IN DOING THINGS: NOT AT ALL
1. LITTLE INTEREST OR PLEASURE IN DOING THINGS: NOT AT ALL

## 2023-06-11 NOTE — THERAPY
"Missed Therapy    Patient Name: Rashmi Parra  Age:  83 y.o., Sex:  female  Medical Record #: [id number]  Today's Date: 6/11/2023    Discussed missed therapy with therapy . Attempted session at 0701 as scheduled, pt received asleep in bed. Easily woken but refused to open her eyes, \"I can't\" when asked multiple times. \"Not at 7 in the morning.\" Confirmed with pt that she prefers am therapy sessions but not at 7am, notified therapy  via request sheet.    Reattempted session at 0831 and 0845 but pt still eating bfast in cafeteria.       06/11/23 0701   Therapy Missed   Missed Therapy (Minutes) 60   Reason For Missed Therapy Non-Medical - Patient Refused   Vitals   O2 (LPM) 2   O2 Delivery Device Nasal Cannula   Interdisciplinary Plan of Care Collaboration   Patient Position at End of Therapy Seated;Other (Comments)  (in cafeteria finishing bfast)       "

## 2023-06-11 NOTE — FLOWSHEET NOTE
06/11/23 0641   Events/Summary/Plan   Events/Summary/Plan 02 pulse ox check. Appears to be sleeping   Vital Signs   Pulse 79   Respiration 18   Pulse Oximetry 97 %   $ Pulse Oximetry (Spot Check) Yes   Respiratory Assessment   Level of Consciousness Responds to voice   Chest Exam   Work Of Breathing / Effort Within Normal Limits   Oxygen   O2 (LPM) 2   O2 Delivery Device Silicone Nasal Cannula

## 2023-06-11 NOTE — CARE PLAN
"  Problem: Fall Risk - Rehab  Goal: Patient will remain free from falls  Outcome: Progressing  Note: Anastasia sIsa Fall risk Assessment Score: 20    High fall risk Interventions   - Alarming seatbelt  - Bed and strip alarm   - Yellow sign by the door   - Yellow wrist band \"Fall risk\"  - Room near to the nurse station  - Do not leave patient unattended in the bathroom  - Fall risk education provided       Problem: Pain - Standard  Goal: Alleviation of pain or a reduction in pain to the patient’s comfort goal  Outcome: Progressing  Note: Assessed for pain and discomfort , denies pain.   The patient is Stable - Low risk of patient condition declining or worsening    Shift Goals  Clinical Goals: skin integrity, no aspiration  Patient Goals: pain control    Progress made toward(s) clinical / shift goals:  Pt free from fall and injury    "

## 2023-06-11 NOTE — FLOWSHEET NOTE
06/11/23 0641   Events/Summary/Plan   Events/Summary/Plan 02 pulse ox check. Appears to be sleeping   Vital Signs   Pulse 79   Respiration 18   Pulse Oximetry 97 %   $ Pulse Oximetry (Spot Check) Yes   Respiratory Assessment   Level of Consciousness Responds to voice   Chest Exam   Work Of Breathing / Effort Within Normal Limits   Oxygen   O2 (LPM) 2   O2 Delivery Device Room air w/o2 available

## 2023-06-11 NOTE — THERAPY
Occupational Therapy  Daily Treatment     Patient Name: Rashmi Parra  Age:  83 y.o., Sex:  female  Medical Record #: [id number]  Today's Date: 6/11/2023     Precautions  Precautions: (P) Fall Risk  Comments: (P) BP on LLE only         Subjective    Pt found in the bathroom with CNA. Pt agreeable to OT focused on bathing/self care routine.      Objective       06/11/23 0931   OT Charge Group   OT Self Care / ADL (Units) 4   OT Total Time Spent   OT Individual Total Time Spent (Mins) 60   Precautions   Precautions Fall Risk   Comments BP on LLE only   Vitals   Pulse 88   Pulse Oximetry 95 %   O2 (LPM) 2   O2 Delivery Device Nasal Cannula   Non Verbal Descriptors   Non Verbal Scale  Calm   Cognition    Level of Consciousness Alert   Functional Level of Assist   Grooming Supervision   Grooming Description Seated in wheelchair at sink   Bathing Minimal Assist  (B lean for cleaning perineal but pt unable to sustain lean and reach simultaneously)   Bathing Description Grab bar;Hand held shower;Assit with perineal;Increased time;Set up for shower sleeve;Set-up of equipment;Supervision for safety   Upper Body Dressing Stand by Assist   Upper Body Dressing Description Increased time;Set-up of equipment;Supervision for safety   Lower Body Dressing Moderate Assist   Lower Body Dressing Description Grab bar;Assist with threading into pant leg;Set-up of equipment;Supervision for safety;Increased time   Bed, Chair, Wheelchair Transfer Minimal Assist   Bed Chair Wheelchair Transfer Description Increased time;Supervision for safety   Balance   Sitting Balance (Static) Fair +   Sitting Balance (Dynamic) Fair   Interdisciplinary Plan of Care Collaboration   IDT Collaboration with  Certified O.T. Assistant  (ESCUDERO)   Patient Position at End of Therapy Seated;Chair Alarm On;Self Releasing Lap Belt Applied;Call Light within Reach;Tray Table within Reach;Phone within Reach   Collaboration Comments re: POC and CLOF         Assessment    Pt  tolerated session fairly well this date focused on bathing/self care routine. However, she was limited by fatigue and poor strength. No s/s or report of distress t/o session. She would continue to benefit from OT until goals are met. She was left happy in her room and with all needs met.     Strengths: Able to follow instructions, Alert and oriented, Manages pain appropriately, Motivated for self care and independence, Pleasant and cooperative, Willingly participates in therapeutic activities  Barriers: Decreased endurance, Generalized weakness, Fatigue, Hypotension, Orthostatic hypotension, Impaired activity tolerance, Impaired balance, Limited mobility    Plan  ADLs w/ AE - trial elastic shoe laces to facilitate easier shoe donning?  Functional transfers  UE strengthening - hand strengthening  Standing balance/tolerance     Passport items to be completed:  Perform bathroom transfers, complete dressing, complete feeding, get ready for the day, prepare a simple meal, participate in household tasks, adapt home for safety needs, demonstrate home exercise program, complete caregiver training     Occupational Therapy Goals (Active)       Problem: Dressing       Dates: Start:  06/01/23         Goal: STG-Within one week, patient will dress UB with SBA overall using AE/DME as needed.       Dates: Start:  06/01/23         Goal Note filed on 06/06/23 1149 by Evaristo Casey, C.O.T.A.       Min assist     Limited by decreased strength/endurance    Continue goal               Goal: STG-Within one week, patient will dress LB with Mod A overall using AE/DME as needed.       Dates: Start:  06/01/23         Goal Note filed on 06/06/23 1149 by Evaristo Casey, C.O.T.A.       Max assist     Limited by decreased strength/endurance                    Problem: Functional Transfers       Dates: Start:  06/01/23         Goal: STG-Within one week, patient will transfer to toilet with CGA and LRD overall utilizing DME.AE as needed.        Dates: Start:  06/01/23         Goal Note filed on 06/06/23 1149 by BAKARI LeTMayraA.       Requires min assist    Limited by decreased strength/endurance                  Problem: OT Long Term Goals       Dates: Start:  06/01/23         Goal: LTG-By discharge, patient will complete basic self care tasks with supervision overall using AE/DME as needed.       Dates: Start:  06/01/23            Goal: LTG-By discharge, patient will perform bathroom transfers with supervision and LRD overall using AE/DME as needed.       Dates: Start:  06/01/23               Problem: Toileting       Dates: Start:  06/01/23         Goal: STG-Within one week, patient will complete toileting tasks with min A overall using AE/DME as needed.       Dates: Start:  06/01/23         Goal Note filed on 06/06/23 1149 by BAKARI LeTNONA.       Mod to max assist    Limited by decreased strength/endurance    Continue goal

## 2023-06-11 NOTE — CARE PLAN
The patient is Stable - Low risk of patient condition declining or worsening    Shift Goals  Clinical Goals: safety  Patient Goals: pain control    Progress made toward(s) clinical / shift goals:      Problem: Knowledge Deficit - Standard  Goal: Patient and family/care givers will demonstrate understanding of plan of care, disease process/condition, diagnostic tests and medications  Outcome: Progressing     Problem: Fall Risk - Rehab  Goal: Patient will remain free from falls  Outcome: Progressing  Patient use call light for assistance. Remains free from fall.        Patient is not progressing towards the following goals:

## 2023-06-12 ENCOUNTER — APPOINTMENT (OUTPATIENT)
Dept: PHYSICAL THERAPY | Facility: REHABILITATION | Age: 83
DRG: 312 | End: 2023-06-12
Attending: PHYSICAL MEDICINE & REHABILITATION
Payer: MEDICARE

## 2023-06-12 ENCOUNTER — APPOINTMENT (OUTPATIENT)
Dept: SPEECH THERAPY | Facility: REHABILITATION | Age: 83
DRG: 312 | End: 2023-06-12
Attending: PHYSICAL MEDICINE & REHABILITATION
Payer: MEDICARE

## 2023-06-12 ENCOUNTER — APPOINTMENT (OUTPATIENT)
Dept: SPEECH THERAPY | Facility: REHABILITATION | Age: 83
End: 2023-06-12
Attending: PHYSICAL MEDICINE & REHABILITATION
Payer: MEDICARE

## 2023-06-12 ENCOUNTER — APPOINTMENT (OUTPATIENT)
Dept: OCCUPATIONAL THERAPY | Facility: REHABILITATION | Age: 83
DRG: 312 | End: 2023-06-12
Attending: PHYSICAL MEDICINE & REHABILITATION
Payer: MEDICARE

## 2023-06-12 PROCEDURE — 97110 THERAPEUTIC EXERCISES: CPT

## 2023-06-12 PROCEDURE — 97535 SELF CARE MNGMENT TRAINING: CPT

## 2023-06-12 PROCEDURE — 99232 SBSQ HOSP IP/OBS MODERATE 35: CPT | Performed by: PHYSICAL MEDICINE & REHABILITATION

## 2023-06-12 PROCEDURE — 770010 HCHG ROOM/CARE - REHAB SEMI PRIVAT*

## 2023-06-12 PROCEDURE — 700102 HCHG RX REV CODE 250 W/ 637 OVERRIDE(OP): Performed by: HOSPITALIST

## 2023-06-12 PROCEDURE — 97129 THER IVNTJ 1ST 15 MIN: CPT

## 2023-06-12 PROCEDURE — A9270 NON-COVERED ITEM OR SERVICE: HCPCS | Performed by: PHYSICAL MEDICINE & REHABILITATION

## 2023-06-12 PROCEDURE — 94760 N-INVAS EAR/PLS OXIMETRY 1: CPT

## 2023-06-12 PROCEDURE — 97530 THERAPEUTIC ACTIVITIES: CPT

## 2023-06-12 PROCEDURE — 700111 HCHG RX REV CODE 636 W/ 250 OVERRIDE (IP): Performed by: PHYSICAL MEDICINE & REHABILITATION

## 2023-06-12 PROCEDURE — A9270 NON-COVERED ITEM OR SERVICE: HCPCS | Performed by: HOSPITALIST

## 2023-06-12 PROCEDURE — 97130 THER IVNTJ EA ADDL 15 MIN: CPT

## 2023-06-12 PROCEDURE — 97116 GAIT TRAINING THERAPY: CPT

## 2023-06-12 PROCEDURE — 700102 HCHG RX REV CODE 250 W/ 637 OVERRIDE(OP): Performed by: PHYSICAL MEDICINE & REHABILITATION

## 2023-06-12 PROCEDURE — 97112 NEUROMUSCULAR REEDUCATION: CPT

## 2023-06-12 RX ADMIN — ENOXAPARIN SODIUM 40 MG: 100 INJECTION SUBCUTANEOUS at 17:42

## 2023-06-12 RX ADMIN — LOVASTATIN 20 MG: 20 TABLET ORAL at 19:47

## 2023-06-12 RX ADMIN — POLYETHYLENE GLYCOL 3350 1 PACKET: 17 POWDER, FOR SOLUTION ORAL at 08:10

## 2023-06-12 RX ADMIN — Medication 4.5 MG: at 19:47

## 2023-06-12 RX ADMIN — DEXLANSOPRAZOLE 60 MG: 60 CAPSULE, DELAYED RELEASE ORAL at 19:48

## 2023-06-12 RX ADMIN — AMLODIPINE BESYLATE 10 MG: 5 TABLET ORAL at 05:49

## 2023-06-12 RX ADMIN — DULOXETINE HYDROCHLORIDE 30 MG: 30 CAPSULE, DELAYED RELEASE ORAL at 08:10

## 2023-06-12 ASSESSMENT — GAIT ASSESSMENTS
GAIT LEVEL OF ASSIST: CONTACT GUARD ASSIST
DEVIATION: SHUFFLED GAIT;DECREASED HEEL STRIKE;DECREASED TOE OFF
ASSISTIVE DEVICE: FRONT WHEEL WALKER
DISTANCE (FEET): 120

## 2023-06-12 ASSESSMENT — PAIN DESCRIPTION - PAIN TYPE: TYPE: ACUTE PAIN

## 2023-06-12 ASSESSMENT — ACTIVITIES OF DAILY LIVING (ADL)
BED_CHAIR_WHEELCHAIR_TRANSFER_DESCRIPTION: ADAPTIVE EQUIPMENT;INCREASED TIME;INITIAL PREPARATION FOR TASK;SUPERVISION FOR SAFETY;VERBAL CUEING;SET-UP OF EQUIPMENT
TOILETING_LEVEL_OF_ASSIST_DESCRIPTION: ASSIST TO PULL PANTS UP;ASSIST FOR STANDING BALANCE;GRAB BAR;INCREASED TIME;SUPERVISION FOR SAFETY
TOILET_TRANSFER_DESCRIPTION: GRAB BAR;INCREASED TIME;SET-UP OF EQUIPMENT;SUPERVISION FOR SAFETY
TUB_SHOWER_TRANSFER_DESCRIPTION: ADAPTIVE EQUIPMENT;GRAB BAR;INCREASED TIME;SET-UP OF EQUIPMENT;SUPERVISION FOR SAFETY;VERBAL CUEING

## 2023-06-12 NOTE — PROGRESS NOTES
"  Physical Medicine & Rehabilitation Progress Note    Encounter Date: 6/12/2023    Chief Complaint: Decreased mobility, hypotension    Interval Events (Subjective):  Patient sitting up in room. She reports therapy is going well, she was just cleared to go to the bathroom on her own in the wheelchair so she will trial. Denies NVD. Denies SOB.     _____________________________________  Interdisciplinary Team Conference   Most recent IDT on 6/6/2023    Discharge Date/Disposition:  6/14/23  _____________________________________      Objective:  VITAL SIGNS: BP (!) 145/72   Pulse 72   Temp 36.3 °C (97.3 °F) (Oral)   Resp 18   Ht 1.6 m (5' 3\")   Wt 59.9 kg (132 lb)   LMP  (LMP Unknown)   SpO2 98%   BMI 23.38 kg/m²   Gen: NAD  Psych: Mood and affect appropriate  CV: RRR, 0 edema  Resp: CTAB, no upper airway sounds  Abd: NTND  Neuro: AOx4, pushing wheelchair BUE    Laboratory Values:  No results found for this or any previous visit (from the past 72 hour(s)).      Medications:  Scheduled Medications   Medication Dose Frequency    amLODIPine  10 mg Q DAY    Dexlansoprazole  60 mg Q EVENING    polyethylene glycol/lytes  1 Packet DAILY    DULoxetine  30 mg DAILY    enoxaparin (LOVENOX) injection  40 mg DAILY AT 1800    lovastatin  20 mg Nightly    melatonin  4.5 mg QHS     PRN medications: senna-docusate **AND** [DISCONTINUED] polyethylene glycol/lytes **AND** magnesium hydroxide **AND** bisacodyl, Respiratory Therapy Consult, hydrALAZINE, acetaminophen, mag hydrox-al hydrox-simeth, ondansetron **OR** ondansetron, traZODone, sodium chloride, traMADol, gabapentin    Diet:  Current Diet Order   Procedures    Diet Order Diet: Level 7 - Easy to Chew (Chopped meats; meds whole 1 at a time with thin); Liquid level: Level 0 - Thin       Medical Decision Making and Plan:  Debility - Patient with decreased mobility and weakness 2/2 orthostatic hypotension, low cortisol and multiple falls  -PT and OT for mobility and ADLs. Per " guidelines, 15 hours per week between PT, OT and/or SLP.  -Follow-up PCP     Dysphagia - Patient with new dysphagia of choking on foods. Consult SLP. Will check CXR. Patient at high risk for further decline and respiratory failure due to age and debility.   -Choking event 6/1 with dinner. Repeat CXR, AM labs. CXR stable. Passed swallow    HTN - Patient on Lisinopril. Was previously having orthostatic hypotension requiring Midodrine and Florinef. Will monitor  -Severe orthostatic hypotension on admission into the 70s. Start IV fluids,consult hospitalist and monitor. May need to restart Florinef. Will check CXR. High risk for falls and further injury. Will recheck labs including cortisol per hospitalist.  -Elevated SBP on 6/5, increase Amlodipine to 10, will monitor for low SBP. Midodrine discontinued.   -Continue Amlodipine     HLD - Patient on Lovastatin 20 mg QHS. Continue Lovastatin     Hypocalcemia - Check AM CMP. Stable     Low Cortisol - Negative ACTH test. Will monitor      Anemia - 11.9 on 6/8/23, improving.     PAD - Aortic stenosis on CTA of abdomen. Follow-up Vascular     Depression - Patient on Cymbalta 30 mg daily. Continue Cymbalta     Pain - APAP/Tramadol PRN     Skin - Patient at risk for skin breakdown due to debility in areas including sacrum, achilles, elbows and head in addition to other sites. Nursing to assess skin daily.      GI Ppx - Patient on Prilosec for GERD prophylaxis. Patient on Senna-docusate for constipation prophylaxis.   -Switch to Dexlansoprazole      DVT Ppx - Patient Lovenox on transfer.  ____________________________________    T. Toño Will MD/PhD  [facility] - Physical Medicine & Rehabilitation   [facility] - Brain Injury Medicine   ____________________________________

## 2023-06-12 NOTE — THERAPY
Speech Language Pathology  Daily Treatment     Patient Name: Rashmi Parra  Age:  83 y.o., Sex:  female  Medical Record #: [id number]  Today's Date: 6/12/2023     Precautions  Precautions: Fall Risk  Comments: BP on LLE only    Subjective    Pt pleasant and cooperative during tx.       Objective       06/12/23 1001   Treatment Charges   SLP Cognitive Skill Development First 15 Minutes 1   SLP Cognitive Skill Development Additional 15 Minutes 3   SLP Total Time Spent   SLP Individual Total Time Spent (Mins) 60   Cognition   Medication Management  Minimal (4)         Assessment    Functional medication sort: 80% independent; 100% given min verbal cues.  Pt recalled daily events given min verbal cues.  Pt stated that dtr may be able to assist with med management at time of discharge.      Strengths: Able to follow instructions, Pleasant and cooperative, Willingly participates in therapeutic activities, Motivated for self care and independence  Barriers: Aspiration risk    Plan    Med sort/outcome assessment        Speech Therapy Problems (Active)       Problem: Memory STGs       Dates: Start:  06/07/23         Goal: STG-Within one week, patient will utilize memory strategies in order to recall safety precautions and information from therapies after a 2 hr delay.        Dates: Start:  06/07/23               Problem: Problem Solving STGs       Dates: Start:  06/07/23         Goal: STG-Within one week, patient will solve complex problems related to medication management with 80% accuracy given min verbal cues.        Dates: Start:  06/07/23               Problem: Speech/Swallowing LTGs       Dates: Start:  06/02/23         Goal: LTG-By discharge, patient will safely swallow level 6 textures and thin liquids without aspiration.         Dates: Start:  06/02/23            Goal: LTG-By discharge, patient will solve complex problems related to IADL's in order to d/c home with SPV       Dates: Start:  06/07/23                Problem: Swallowing STGs       Dates: Start:  06/02/23

## 2023-06-12 NOTE — CARE PLAN
"  Problem: Fall Risk - Rehab  Goal: Patient will remain free from falls  Outcome: Progressing  Note: Anastasia Issa Fall risk Assessment Score: 20    High fall risk Interventions   - Alarming seatbelt  - Bed and strip alarm   - Yellow sign by the door   - Yellow wrist band \"Fall risk\"  - Room near to the nurse station  - Do not leave patient unattended in the bathroom  - Fall risk education provided   Problem: Psychosocial  Goal: Patient's level of anxiety will decrease  Outcome: Progressing  Note: Assessed for any s/s of agitation, pt needs anticipated and attended. Pt needy and impatient at times. Encouraged to verbalize concerns  .Situated  in bed, encouraged to request pain med as needed. Pt denies pain, bedside table within easy reach, call light at hand . Lights turned off.    The patient is Stable - Low risk of patient condition declining or worsening    Shift Goals  Clinical Goals: safety  Patient Goals: pain control    Progress made toward(s) clinical / shift goals:  Pt free from fall and injury.    "

## 2023-06-12 NOTE — THERAPY
Occupational Therapy  Daily Treatment     Patient Name: Rashmi Parra  Age:  83 y.o., Sex:  female  Medical Record #: [id number]  Today's Date: 6/12/2023     Precautions  Precautions: Fall Risk  Comments: BP on LLE only, 2L O2         Subjective    Pt seated up in w/c, agreeable to OT session.      Objective       06/12/23 1301   OT Charge Group   OT Self Care / ADL (Units) 2   OT Therapy Activity (Units) 2   OT Total Time Spent   OT Individual Total Time Spent (Mins) 60   Precautions   Precautions Fall Risk   Comments BP on LLE only   Cognition    Level of Consciousness Alert   Functional Level of Assist   Grooming Supervision;Seated   Toileting Moderate Assist   Toileting Description Assist to pull pants up;Assist for standing balance;Grab bar;Increased time;Supervision for safety   Toilet Transfers Contact Guard Assist   Toilet Transfer Description Grab bar;Increased time;Set-up of equipment;Supervision for safety  (w/c<>toilet stand step via GB)   Tub / Shower Transfers Minimal Assist   Tub Shower Transfer Description Adaptive equipment;Grab bar;Increased time;Set-up of equipment;Supervision for safety;Verbal cueing  (dry walk in shower txfr using GB and shower chair)   IADL Treatments   Kitchen Mobility Education Pt educated on safe kitchen mobility techniques from FWW level. Pt retrieved cones from cupboards, appliances, countertop at various heights with CGA-Min A and assist w/ mgt of O2 tank. Educated pt on safety strategies, including keeping frequently used items within easy reach, placement of FWW, use of countertop for UB support/stability, use of countertop or FWW tray to transport items around kitchen, and completing portions of cooking tasks while seated for energy conservation.   Interdisciplinary Plan of Care Collaboration   IDT Collaboration with  Certified O.T. Assistant  (ESCUDERO)   Patient Position at End of Therapy Seated;Chair Alarm On;Self Releasing Lap Belt Applied;Call Light within Reach;Tray  Table within Reach;Phone within Reach   Collaboration Comments CLOF, POC         Assessment    Pt tolerated OT session well with focus on progression w/ ADLs and IADLs. Pt demo fair safety with kitchen mobility activity. She required CGA-Min A for balance safety, intermittent cues for carryover w/ safety strategies, and assist w/ mgt of O2 tank. Pt reports that she no longer cooks, but does fix herself simple snacks/drinks. Primary barriers con't to be impaired memory, generalized weakness, impaired balance and decreased endurance.   Strengths: Able to follow instructions, Alert and oriented, Manages pain appropriately, Motivated for self care and independence, Pleasant and cooperative, Willingly participates in therapeutic activities  Barriers: Decreased endurance, Generalized weakness, Fatigue, Hypotension, Orthostatic hypotension, Impaired activity tolerance, Impaired balance, Limited mobility    Plan    D/c IRF Jevon     Passport items to be completed:  Perform bathroom transfers, complete dressing, complete feeding, get ready for the day, prepare a simple meal, participate in household tasks, adapt home for safety needs, demonstrate home exercise program, complete caregiver training     Occupational Therapy Goals (Active)       Problem: Dressing       Dates: Start:  06/01/23         Goal: STG-Within one week, patient will dress UB with SBA overall using AE/DME as needed.       Dates: Start:  06/01/23         Goal Note filed on 06/06/23 1149 by Evaristo Casey, C.O.T.A.       Min assist     Limited by decreased strength/endurance    Continue goal               Goal: STG-Within one week, patient will dress LB with Mod A overall using AE/DME as needed.       Dates: Start:  06/01/23         Goal Note filed on 06/06/23 1149 by Evaristo Casey, C.O.T.A.       Max assist     Limited by decreased strength/endurance                    Problem: Functional Transfers       Dates: Start:  06/01/23         Goal: STG-Within one  week, patient will transfer to toilet with CGA and LRD overall utilizing DME.AE as needed.       Dates: Start:  06/01/23         Goal Note filed on 06/06/23 1149 by BAKARI LeTNONA.       Requires min assist    Limited by decreased strength/endurance                  Problem: OT Long Term Goals       Dates: Start:  06/01/23         Goal: LTG-By discharge, patient will complete basic self care tasks with supervision overall using AE/DME as needed.       Dates: Start:  06/01/23            Goal: LTG-By discharge, patient will perform bathroom transfers with supervision and LRD overall using AE/DME as needed.       Dates: Start:  06/01/23               Problem: Toileting       Dates: Start:  06/01/23         Goal: STG-Within one week, patient will complete toileting tasks with min A overall using AE/DME as needed.       Dates: Start:  06/01/23         Goal Note filed on 06/06/23 1149 by ABEBE Le.       Mod to max assist    Limited by decreased strength/endurance    Continue goal

## 2023-06-12 NOTE — THERAPY
Speech Language Pathology  Daily Treatment     Patient Name: Rashmi Parra  Age:  83 y.o., Sex:  female  Medical Record #: [id number]  Today's Date: 6/12/2023     Precautions  Precautions: Fall Risk  Comments: BP on LLE only    Subjective    Pt pleasant and cooperative.      Objective       06/12/23 1403   Treatment Charges   SLP Cognitive Skill Development First 15 Minutes 1   SLP Cognitive Skill Development Additional 15 Minutes 1   SLP Total Time Spent   SLP Individual Total Time Spent (Mins) 30         Assessment    Medication sort repeated at this time, Medication box changed from four spot to 2 spot due to current medication needs. Pt completing with single error, min cues to correct. Cont to address to achieve 100% accuracy indep    Strengths: Able to follow instructions, Pleasant and cooperative, Willingly participates in therapeutic activities, Motivated for self care and independence  Barriers: Aspiration risk    Plan  Repeat functional medication sort.     Speech Therapy Problems (Active)       Problem: Memory STGs       Dates: Start:  06/07/23         Goal: STG-Within one week, patient will utilize memory strategies in order to recall safety precautions and information from therapies after a 2 hr delay.        Dates: Start:  06/07/23               Problem: Problem Solving STGs       Dates: Start:  06/07/23         Goal: STG-Within one week, patient will solve complex problems related to medication management with 80% accuracy given min verbal cues.        Dates: Start:  06/07/23               Problem: Speech/Swallowing LTGs       Dates: Start:  06/02/23         Goal: LTG-By discharge, patient will safely swallow level 6 textures and thin liquids without aspiration.         Dates: Start:  06/02/23            Goal: LTG-By discharge, patient will solve complex problems related to IADL's in order to d/c home with SPV       Dates: Start:  06/07/23               Problem: Swallowing STGs       Dates: Start:   06/02/23

## 2023-06-12 NOTE — CARE PLAN
"The patient is Stable - Low risk of patient condition declining or worsening    Shift Goals  Clinical Goals: safety  Patient Goals: pain control    Progress made toward(s) clinical / shift goals:    Problem: Fall Risk - Rehab  Goal: Patient will remain free from falls  Outcome: Progressing     Anastasia Issa Fall risk Assessment : 20    High fall risk Interventions   - Alarming seatbelt  - Bed and strip alarm   - Yellow sign by the door   - Yellow wrist band \"Fall risk\"  - Room near to the nurse station  - Do not leave patient unattended in the bathroom  - Fall risk education provided    Pt uses call light consistently and appropriately. Waits for assistance does not attempt self transfer this shift. Able to verbalize needs.   "

## 2023-06-13 ENCOUNTER — APPOINTMENT (OUTPATIENT)
Dept: SPEECH THERAPY | Facility: REHABILITATION | Age: 83
DRG: 312 | End: 2023-06-13
Attending: PHYSICAL MEDICINE & REHABILITATION
Payer: MEDICARE

## 2023-06-13 ENCOUNTER — APPOINTMENT (OUTPATIENT)
Dept: PHYSICAL THERAPY | Facility: REHABILITATION | Age: 83
DRG: 312 | End: 2023-06-13
Attending: PHYSICAL MEDICINE & REHABILITATION
Payer: MEDICARE

## 2023-06-13 PROCEDURE — 97129 THER IVNTJ 1ST 15 MIN: CPT

## 2023-06-13 PROCEDURE — 97530 THERAPEUTIC ACTIVITIES: CPT

## 2023-06-13 PROCEDURE — 97110 THERAPEUTIC EXERCISES: CPT

## 2023-06-13 PROCEDURE — 97130 THER IVNTJ EA ADDL 15 MIN: CPT

## 2023-06-13 PROCEDURE — 770010 HCHG ROOM/CARE - REHAB SEMI PRIVAT*

## 2023-06-13 PROCEDURE — A9270 NON-COVERED ITEM OR SERVICE: HCPCS | Performed by: HOSPITALIST

## 2023-06-13 PROCEDURE — 94760 N-INVAS EAR/PLS OXIMETRY 1: CPT

## 2023-06-13 PROCEDURE — A9270 NON-COVERED ITEM OR SERVICE: HCPCS | Performed by: PHYSICAL MEDICINE & REHABILITATION

## 2023-06-13 PROCEDURE — 97116 GAIT TRAINING THERAPY: CPT

## 2023-06-13 PROCEDURE — 99232 SBSQ HOSP IP/OBS MODERATE 35: CPT | Performed by: PHYSICAL MEDICINE & REHABILITATION

## 2023-06-13 PROCEDURE — 700102 HCHG RX REV CODE 250 W/ 637 OVERRIDE(OP): Performed by: PHYSICAL MEDICINE & REHABILITATION

## 2023-06-13 PROCEDURE — 700111 HCHG RX REV CODE 636 W/ 250 OVERRIDE (IP): Performed by: PHYSICAL MEDICINE & REHABILITATION

## 2023-06-13 PROCEDURE — 700102 HCHG RX REV CODE 250 W/ 637 OVERRIDE(OP): Performed by: HOSPITALIST

## 2023-06-13 PROCEDURE — 97535 SELF CARE MNGMENT TRAINING: CPT

## 2023-06-13 RX ADMIN — DULOXETINE HYDROCHLORIDE 30 MG: 30 CAPSULE, DELAYED RELEASE ORAL at 10:23

## 2023-06-13 RX ADMIN — ENOXAPARIN SODIUM 40 MG: 100 INJECTION SUBCUTANEOUS at 17:36

## 2023-06-13 RX ADMIN — DEXLANSOPRAZOLE 60 MG: 60 CAPSULE, DELAYED RELEASE ORAL at 21:08

## 2023-06-13 RX ADMIN — LOVASTATIN 20 MG: 20 TABLET ORAL at 21:08

## 2023-06-13 RX ADMIN — AMLODIPINE BESYLATE 10 MG: 5 TABLET ORAL at 05:38

## 2023-06-13 RX ADMIN — Medication 4.5 MG: at 21:08

## 2023-06-13 RX ADMIN — POLYETHYLENE GLYCOL 3350 1 PACKET: 17 POWDER, FOR SOLUTION ORAL at 10:23

## 2023-06-13 ASSESSMENT — BRIEF INTERVIEW FOR MENTAL STATUS (BIMS)
WHAT MONTH IS IT: ACCURATE WITHIN 5 DAYS
INITIAL REPETITION OF BED BLUE SOCK - FIRST ATTEMPT: 3
ASKED TO RECALL SOCK: YES, NO CUE REQUIRED
ASKED TO RECALL BED: YES, NO CUE REQUIRED
BIMS SUMMARY SCORE: 15
ASKED TO RECALL BLUE: YES, NO CUE REQUIRED
WHAT DAY OF THE WEEK IS IT: CORRECT
WHAT YEAR IS IT: CORRECT

## 2023-06-13 ASSESSMENT — GAIT ASSESSMENTS
ASSISTIVE DEVICE: FRONT WHEEL WALKER
DISTANCE (FEET): 135
DEVIATION: SHUFFLED GAIT;BRADYKINETIC
GAIT LEVEL OF ASSIST: STANDBY ASSIST

## 2023-06-13 ASSESSMENT — ACTIVITIES OF DAILY LIVING (ADL)
BED_CHAIR_WHEELCHAIR_TRANSFER_DESCRIPTION: ADAPTIVE EQUIPMENT;SUPERVISION FOR SAFETY
TOILET_TRANSFER_DESCRIPTION: GRAB BAR;SET-UP OF EQUIPMENT;SUPERVISION FOR SAFETY;VERBAL CUEING
TOILETING_LEVEL_OF_ASSIST_DESCRIPTION: GRAB BAR;SUPERVISION FOR SAFETY;SET-UP OF EQUIPMENT

## 2023-06-13 ASSESSMENT — PAIN DESCRIPTION - PAIN TYPE: TYPE: ACUTE PAIN

## 2023-06-13 NOTE — FLOWSHEET NOTE
06/13/23 1532   Events/Summary/Plan   Events/Summary/Plan SpO2 check on 2 lpm and room air.   Skin Inspection Respiratory Device Intact   Location ears   Vital Signs   Pulse 75   Respiration 16   Pulse Oximetry 96 %   $ Pulse Oximetry (Spot Check) Yes   Respiratory Assessment   Respiratory Pattern Within Normal Limits   Level of Consciousness Alert   Chest Exam   Work Of Breathing / Effort Within Normal Limits   Oxygen   O2 (LPM) 2   O2 Delivery Device Nasal Cannula   Room Air Challenge Fail  (Room air Spo2=88%)

## 2023-06-13 NOTE — DISCHARGE PLANNING
IDT held today; met with pt, spouse Ruddy,and Dtr Mariela.   They are in process of making arrangements for Assisted Living / [facility]; spouse to take tour tomorrow and pt will FaceTime during tour.      There is a caregiver at pt/spouse's home 3 hrs per day only.   Discussed w/ Dr Will who met with above family members.   New DC date planned for 6/16 w/ plan to go to Assisted Living.

## 2023-06-13 NOTE — PROGRESS NOTES
Physical Medicine & Rehabilitation Progress Note    Encounter Date: 6/13/2023    Chief Complaint: Decreased mobility, hypotension    Interval Events (Subjective):  Patient sitting up in room. She reports therapy is going well. She is not concerned about going home. She reports she will talk with her family. I will have CM talk with her as well. Denies NVD. Discussed would have IDT later today.     _____________________________________  Interdisciplinary Team Conference   Most recent IDT on 6/13/2023    IStella M.D./Ph.D., was present and led the interdisciplinary team conference on 6/13/2023.  I led the IDT conference and agree with the IDT conference documentation and plan of care as noted below.     Nursing:  Diet Current Diet Order   Procedures    Diet Order Diet: Level 7 - Easy to Chew (Chopped meats; meds whole 1 at a time with thin); Liquid level: Level 0 - Thin       Eating ADL Independent  Increased time, Modified diet, Supervision for safety   % of Last Meal  Oral Nutrition: Breakfast, Between 25-50% Consumed   Sleep    Bowel Last BM: 06/13/23   Bladder    Barriers to Discharge Home:  none    Physical Therapy:  Bed Mobility    Transfers Standby Assist  Adaptive equipment, Supervision for safety   Mobility Standby Assist   Stairs Pam   Barriers to Discharge Home:  Sit to stand and balance limited    Occupational Therapy:  Grooming Supervision, Seated   Bathing Minimal Assist (B lean for cleaning perineal but pt unable to sustain lean and reach simultaneously)   UB Dressing Supervision   LB Dressing Moderate Assist   Toileting Standby Assist   Shower & Transfer SBA-Pam   Barriers to Discharge Home:  Impaired activity tolerance    HH OT    Speech-Language Pathology:  Comprehension:  Modified Independent  Comprehension Description:  Glasses  Expression:  Independent  Expression Description:     Social Interaction:  Modified Independent  Social Interaction Description:   "Medication  Problem Solving:  Supervision  Problem Solving Description:  Verbal cueing, Therapy schedule, Seat belt, Increased time  Memory:  Minimal Assist  Memory Description:  Verbal cueing, Therapy schedule, Seat belt, Bed/chair alarm  Barriers to Discharge Home:  Supervs for meds initially  Mild memory    Rec Therapy  Refusing    Case Management:  Continues to work on disposition and DME needs.      Discharge Date/Disposition:  6/14/23  _____________________________________      Objective:  VITAL SIGNS: BP (!) 154/65   Pulse 75   Temp 36.8 °C (98.2 °F) (Oral)   Resp 18   Ht 1.6 m (5' 3\")   Wt 59.9 kg (132 lb)   LMP  (LMP Unknown)   SpO2 98%   BMI 23.38 kg/m²   Gen: NAD  Psych: Mood and affect appropriate  CV: RRR, 0 edema  Resp: CTAB, no upper airway sounds  Abd: NTND  Neuro: AOx4, pushing wheelchair BUE  Unchanged from 6/12/23    Laboratory Values:  No results found for this or any previous visit (from the past 72 hour(s)).      Medications:  Scheduled Medications   Medication Dose Frequency    amLODIPine  10 mg Q DAY    Dexlansoprazole  60 mg Q EVENING    polyethylene glycol/lytes  1 Packet DAILY    DULoxetine  30 mg DAILY    enoxaparin (LOVENOX) injection  40 mg DAILY AT 1800    lovastatin  20 mg Nightly    melatonin  4.5 mg QHS     PRN medications: senna-docusate **AND** [DISCONTINUED] polyethylene glycol/lytes **AND** magnesium hydroxide **AND** bisacodyl, Respiratory Therapy Consult, hydrALAZINE, acetaminophen, mag hydrox-al hydrox-simeth, ondansetron **OR** ondansetron, traZODone, sodium chloride, traMADol, gabapentin    Diet:  Current Diet Order   Procedures    Diet Order Diet: Level 7 - Easy to Chew (Chopped meats; meds whole 1 at a time with thin); Liquid level: Level 0 - Thin       Medical Decision Making and Plan:  Debility - Patient with decreased mobility and weakness 2/2 orthostatic hypotension, low cortisol and multiple falls  -PT and OT for mobility and ADLs. Per guidelines, 15 hours " per week between PT, OT and/or SLP.  -Follow-up PCP     Dysphagia - Patient with new dysphagia of choking on foods. Consult SLP. Will check CXR. Patient at high risk for further decline and respiratory failure due to age and debility.   -Choking event 6/1 with dinner. Repeat CXR, AM labs. CXR stable. Passed swallow    HTN - Patient on Lisinopril. Was previously having orthostatic hypotension requiring Midodrine and Florinef. Will monitor  -Severe orthostatic hypotension on admission into the 70s. Start IV fluids,consult hospitalist and monitor. May need to restart Florinef. Will check CXR. High risk for falls and further injury. Will recheck labs including cortisol per hospitalist.  -Elevated SBP on 6/5, increase Amlodipine to 10, will monitor for low SBP. Midodrine discontinued.   -Continue Amlodipine 10 mg     HLD - Patient on Lovastatin 20 mg QHS. Continue Lovastatin     Hypocalcemia - Check AM CMP. Stable     Low Cortisol - Negative ACTH test. Will monitor      Anemia - 11.9 on 6/8/23, improving.     PAD - Aortic stenosis on CTA of abdomen. Follow-up Vascular     Depression - Patient on Cymbalta 30 mg daily. Continue Cymbalta     Pain - APAP/Tramadol PRN     Skin - Patient at risk for skin breakdown due to debility in areas including sacrum, achilles, elbows and head in addition to other sites. Nursing to assess skin daily.      GI Ppx - Patient on Prilosec for GERD prophylaxis. Patient on Senna-docusate for constipation prophylaxis.   -Switch to Dexlansoprazole. Continue Dexlansoprazole 60 mg QHS     DVT Ppx - Patient Lovenox on transfer.  ____________________________________    T. Toño Will MD/PhD  [facility] - Physical Medicine & Rehabilitation   [facility] - Brain Injury Medicine   ____________________________________

## 2023-06-13 NOTE — CARE PLAN
Problem: Recreation Therapy  Goal: STG-Within one week, patient will identify two new coping skills.   Outcome: Met  Goal: STG-Within one week, patient will identify two new leisure interests.   Outcome: Progressing  Goal: LTG-By discharge, patient will identify and demonstrate three new coping skills and appropriate ways to use them.   Outcome: Met  Goal: LTG-By discharge, patient will identify and demonstrate three new leisure interests.   Outcome: Progressing

## 2023-06-13 NOTE — CARE PLAN
"The patient is Stable - Low risk of patient condition declining or worsening    Shift Goals  Clinical Goals: Safety  Patient Goals: pain control    Patient is not progressing towards the following goals:    Problem: Fall Risk - Rehab  Goal: Patient will remain free from falls  Outcome: Not Met  Note: Anastasia Issa Fall risk Assessment Score: 18    High fall risk Interventions   - Alarming seatbelt  - Bed and strip alarm   - Yellow sign by the door   - Yellow wrist band \"Fall risk\"  - Do not leave patient unattended in the bathroom  - Fall risk education provided    Problem: Bladder / Voiding  Goal: Patient will establish and maintain regular urinary output  Outcome: Not Progressing  Note: Large bladder accident this AM     Problem: Bowel Elimination  Goal: Patient will participate in bowel management program  Outcome: Not Progressing  Note: Prune juice given with HS medications for constipation. Patient had large loose bowel accident in the AM.      "

## 2023-06-13 NOTE — CARE PLAN
Problem: Memory STGs  Goal: STG-Within one week, patient will utilize memory strategies in order to recall safety precautions and information from therapies after a 2 hr delay.   Outcome: Not Met  Note: Will continue to target.      Problem: Problem Solving STGs  Goal: STG-Within one week, patient will solve complex problems related to medication management with 80% accuracy given min verbal cues.   Outcome: Met  Note: 1 error noted with BID pill organizer.

## 2023-06-13 NOTE — THERAPY
Physical Therapy   Daily Treatment     Patient Name: Rashmi Parra  Age:  83 y.o., Sex:  female  Medical Record #: [id number]  Today's Date: 6/13/2023     Precautions  Precautions: Fall Risk  Comments: BP on LLE only    Subjective    Patient pleasant and agreeable to participate.      Objective       06/13/23 1031   PT Charge Group   PT Gait Training (Units) 1   PT Therapeutic Exercise (Units) 2   PT Therapeutic Activities (Units) 1   PT Total Time Spent   PT Individual Total Time Spent (Mins) 60   Gait Functional Level of Assist    Gait Level Of Assist Standby Assist   Assistive Device Front Wheel Walker   Distance (Feet) 135 + 80' (seated rest between bouts)   # of Times Distance was Traveled 1   Deviation Shuffled Gait;Bradykinetic   Stairs Functional Level of Assist   Level of Assist with Stairs Minimal Assist   # of Stairs Climbed 4   Stairs Description Hand rails;Extra time;Limited by fatigue;Verbal cueing  (step-to pattern)   Transfer Functional Level of Assist   Bed, Chair, Wheelchair Transfer Standby Assist   Bed Chair Wheelchair Transfer Description Adaptive equipment;Supervision for safety   Bed Mobility    Sit to Stand Minimal Assist  (verbal cuing for sequencing)   Neuro-Muscular Treatments   Comments   Standing balance with bean bag toss to targets  -3 x 1 minute  -patient required to reach for bean bags on mat table slightly behind her; no UE support; SBA for safety    Sit <> stand 3 x 5 from elevated mat table  -one set with therapist providing tactile cuing to encourage anterior weight-shift     Interdisciplinary Plan of Care Collaboration   Patient Position at End of Therapy Seated;Chair Alarm On  (in dining room for lunch)     Assessment    Patient with continued improvement in activity tolerance, with improved gait distance today. She continues to have difficulty with sit <> stand transition, particularly from WC, and required cuing throughout session for appropriate hand placement and anterior  weight-shift. Patient was motivated to participate throughout session today.     Strengths: Able to follow instructions, Adequate strength, Independent prior level of function, Motivated for self care and independence, Pleasant and cooperative, Willingly participates in therapeutic activities, Supportive family  Barriers: Fatigue, Decreased endurance, Generalized weakness, Home accessibility, Hypotension, Impaired balance, Impaired carryover of learning, Impaired activity tolerance, Limited mobility, Visual impairment    Plan    Family training needed prior to d/c.   Continue transfers/STS transitions, standing balance, gait c/ FWW, stair/curb training. Postural control training, posture/parascapular training     Passport items to be completed:  Get in/out of bed safely, in/out of a vehicle, safely use mobility device, walk or wheel around home/community, navigate up and down stairs, show how to get up/down from the ground, ensure home is accessible, demonstrate HEP, complete caregiver training    Physical Therapy Problems (Active)       Problem: Mobility       Dates: Start:  06/01/23         Goal: STG-Within one week, patient will ambulate up/down a curb with ModA or better, LRAD.       Dates: Start:  06/01/23         Goal Note filed on 06/06/23 0823 by Florence Issa, PT       Needs assessment.                  Problem: Mobility Transfers       Dates: Start:  06/01/23         Goal: STG-Within one week, patient will perform bed mobility with Liliam.        Dates: Start:  06/01/23         Goal Note filed on 06/06/23 0823 by Florence Issa, PT       Variable CGA <> Donald for LE management                  Problem: PT-Long Term Goals       Dates: Start:  06/01/23         Goal: LTG-By discharge, patient will ambulate >/= 100' in a single effort c/ LRAD at SBA or better.        Dates: Start:  06/01/23            Goal: LTG-By discharge, patient will transfer one surface to another c/ Liliam or better.        Dates: Start:  06/01/23             Goal: LTG-By discharge, patient will ambulate up/down 2 stairs c/ BHR to safely access home environment at [facility] or better.        Dates: Start:  06/01/23            Goal: LTG-By discharge, patient will transfer in/out of a car with Donald or better, LRAD.        Dates: Start:  06/01/23

## 2023-06-13 NOTE — THERAPY
Speech Language Pathology  Daily Treatment     Patient Name: Rashmi Parra  Age:  83 y.o., Sex:  female  Medical Record #: [id number]  Today's Date: 6/13/2023     Precautions  Precautions: Fall Risk  Comments: BP on LLE only    Subjective    Pt returned to bed immediately prior to this session. Agreeable to transfer with encouragement. Extra time needed for bed transfer.     Objective       06/13/23 1304   Treatment Charges   SLP Cognitive Skill Development First 15 Minutes 1   SLP Cognitive Skill Development Additional 15 Minutes 1   SLP Total Time Spent   SLP Individual Total Time Spent (Mins) 30   Cognition   Medication Management  Minimal (4)   Interdisciplinary Plan of Care Collaboration   IDT Collaboration with  Nursing   Patient Position at End of Therapy Seated;Call Light within Reach;Self Releasing Lap Belt Applied;Phone within Reach   Collaboration Comments nursing assisted with transfer OOB         Assessment    Med ID error activity using 2D illustrations. Pt required extra time to identify 2 medications and errors x2 trials. Pt 50% accuracy IND. Pt required repetition of instructions to comprehend task.     Strengths: Able to follow instructions, Alert and oriented, Effective communication skills, Making steady progress towards goals, Independent prior level of function, Motivated for self care and independence, Supportive family, Pleasant and cooperative, Willingly participates in therapeutic activities  Barriers:  (STM)    Plan    Confirm d/c.    Passport items to be completed:  Express basic needs, understand food/liquid recommendations, consistently follow swallow precautions, manage finances, manage medications, arrive to therapy appointments on time, complete daily memory log entries, solve problems related to safety situations, review education related to hospitalization, complete caregiver training     Speech Therapy Problems (Active)       Problem: Memory STGs       Dates: Start:  06/07/23          Goal: STG-Within one week, patient will utilize memory strategies in order to recall safety precautions and information from therapies after a 2 hr delay.        Dates: Start:  06/07/23         Goal Note filed on 06/13/23 1156 by Elizabeth Cornejo MS,CCC-SLP       Will continue to target.                  Problem: Speech/Swallowing LTGs       Dates: Start:  06/02/23         Goal: LTG-By discharge, patient will safely swallow level 6 textures and thin liquids without aspiration.         Dates: Start:  06/02/23            Goal: LTG-By discharge, patient will solve complex problems related to IADL's in order to d/c home with SPV       Dates: Start:  06/07/23               Problem: Swallowing STGs       Dates: Start:  06/02/23

## 2023-06-13 NOTE — CARE PLAN
Problem: Dressing  Goal: STG-Within one week, patient will dress UB with SBA overall using AE/DME as needed.  Outcome: Met  Note: Setup and sba  Goal: STG-Within one week, patient will dress LB with Mod A overall using AE/DME as needed.  Outcome: Met  Note: Mod A     Problem: Toileting  Goal: STG-Within one week, patient will complete toileting tasks with min A overall using AE/DME as needed.  Outcome: Met  Note: SBA     Problem: Functional Transfers  Goal: STG-Within one week, patient will transfer to toilet with CGA and LRD overall utilizing DME.AE as needed.  Outcome: Met  Note: SBA

## 2023-06-13 NOTE — FLOWSHEET NOTE
06/12/23 1751   Events/Summary/Plan   Events/Summary/Plan Spo2 check   Skin Inspection Respiratory Device Intact   Location ears   Protective Device Foam Pads   Vital Signs   Pulse 82   Respiration 18   Pulse Oximetry 98 %   $ Pulse Oximetry (Spot Check) Yes   Respiratory Assessment   Respiratory Pattern Within Normal Limits   Level of Consciousness Alert   Chest Exam   Work Of Breathing / Effort Within Normal Limits   Oxygen   O2 (LPM) 2   O2 Delivery Device Nasal Cannula

## 2023-06-13 NOTE — THERAPY
Speech Language Pathology  Daily Treatment     Patient Name: Rashmi Parra  Age:  83 y.o., Sex:  female  Medical Record #: [id number]  Today's Date: 6/13/2023     Precautions  Precautions: Fall Risk  Comments: BP on LLE only    Subjective    Patient agreeable to therapy.     Objective       06/13/23 1231   Treatment Charges   SLP Cognitive Skill Development First 15 Minutes 1   SLP Cognitive Skill Development Additional 15 Minutes 1   SLP Total Time Spent   SLP Individual Total Time Spent (Mins) 30   Cognition   Medication Management  Minimal (4)         Assessment    Patient participated in readministration of med sort ex.  Patient recalled error made on previous day.  Overall she did well but got distracted when attempting to place final medication replica and, did forget that she had already placed a different medication.  This was in part due to needing to isolate meds that she had already placed from remaining ones.      Strengths: Able to follow instructions, Alert and oriented, Effective communication skills, Making steady progress towards goals, Independent prior level of function, Motivated for self care and independence, Supportive family, Pleasant and cooperative, Willingly participates in therapeutic activities  Barriers:  (STM)    Plan    Target medication management, recall.       Passport items to be completed:  Express basic needs, understand food/liquid recommendations, consistently follow swallow precautions, manage finances, manage medications, arrive to therapy appointments on time, complete daily memory log entries, solve problems related to safety situations, review education related to hospitalization, complete caregiver training     Speech Therapy Problems (Active)       Problem: Memory STGs       Dates: Start:  06/07/23         Goal: STG-Within one week, patient will utilize memory strategies in order to recall safety precautions and information from therapies after a 2 hr delay.         Dates: Start:  06/07/23         Goal Note filed on 06/13/23 1156 by Elizabeth Cornejo MS,CCC-SLP       Will continue to target.                  Problem: Speech/Swallowing LTGs       Dates: Start:  06/02/23         Goal: LTG-By discharge, patient will safely swallow level 6 textures and thin liquids without aspiration.         Dates: Start:  06/02/23            Goal: LTG-By discharge, patient will solve complex problems related to IADL's in order to d/c home with SPV       Dates: Start:  06/07/23               Problem: Swallowing STGs       Dates: Start:  06/02/23

## 2023-06-13 NOTE — CARE PLAN
"The patient is Watcher - Medium risk of patient condition declining or worsening    Shift Goals  Clinical Goals: Safety  Patient Goals: Safety      Problem: Fall Risk - Rehab  Goal: Patient will remain free from falls  Outcome: Progressing    Anastasia Issa Fall risk Assessment Score: 18    High fall risk Interventions   - Alarming seatbelt  - Wander guard  - Bed and strip alarm   - Yellow sign by the door   - Yellow wrist band \"Fall risk\"  - Room near to the nurse station  - Do not leave patient unattended in the bathroom  - Fall risk education provided       Problem: Risk for Aspiration  Goal: Patient's risk for aspiration will be absent or decrease  Outcome: Progressing    Patient able to swallow medications with thin liquids without difficulty.     "

## 2023-06-13 NOTE — CARE PLAN
Problem: Mobility  Goal: STG-Within one week, patient will ambulate up/down a curb with ModA or better, LRAD.  Outcome: Not Met     Problem: Mobility Transfers  Goal: STG-Within one week, patient will perform bed mobility with Liliam.   Outcome: Not Met

## 2023-06-13 NOTE — THERAPY
"Physical Therapy   Daily Treatment     Patient Name: Rashmi Parra  Age:  83 y.o., Sex:  female  Medical Record #: [id number]  Today's Date: 6/12/2023     Precautions  Precautions: Fall Risk  Comments: BP on LLE only    Subjective    \"I'm doing fine.\" Pt in w/c at arrival, agreeable to PT tx.      Objective       06/12/23 0831   PT Charge Group   PT Gait Training (Units) 1   PT Therapeutic Exercise (Units) 1   PT Neuromuscular Re-Education / Balance (Units) 1   PT Therapeutic Activities (Units) 1   PT Total Time Spent   PT Individual Total Time Spent (Mins) 60   Gait Functional Level of Assist    Gait Level Of Assist Contact Guard Assist   Assistive Device Front Wheel Walker   Distance (Feet) 120  (4 x 30' sprints with standing rest/resets btw each set)   # of Times Distance was Traveled 1   Deviation Shuffled Gait;Decreased Heel Strike;Decreased Toe Off  (increased forward lean)   Stairs Functional Level of Assist   Level of Assist with Stairs Minimal Assist   # of Stairs Climbed 4   Stairs Description Hand rails;Extra time;Supervision for safety;Safety concerns;Verbal cueing  (steading on descent)   Transfer Functional Level of Assist   Bed, Chair, Wheelchair Transfer Contact Guard Assist   Bed Chair Wheelchair Transfer Description Adaptive equipment;Increased time;Initial preparation for task;Supervision for safety;Verbal cueing;Set-up of equipment  (stand step around, incidental steadying)   Sitting Lower Body Exercises   Marching 2 sets of 10  (1st set, alt marches; 2nd set march + same side arm raise)   Sit to Stand   (2 x 5 from high perch mat, then combined with rows and pull aparts 1 x 5 each)   Other Exercises seated rows c/ OTB 1 x 8, pull aparts 1 x 8; then STS + 3 standing rows 1 x 5, STS + 3 pull aparts 1 x 5   Standing Lower Body Exercises   Heel Rise   (2 x 8 c/ FWW support)   Neuro-Muscular Treatments   Neuro-Muscular Treatments Anterior weight shift;Weight Shift Right;Weight Shift " Left;Facilitation;Co-Contraction;Postural Changes;Postural Facilitation;Sequencing;Verbal Cuing  (c/ above TE and STS transitions)   Interdisciplinary Plan of Care Collaboration   Patient Position at End of Therapy Seated;Chair Alarm On;Call Light within Reach;Tray Table within Reach;Phone within Reach     Session focused on progression of gait, stairs, and STS/standing balance c/ dec UE support to inc anterior WS and trunk/hip ext control. Exercises above in flowsheet. Seated rests btw efforts.     Assessment    Aleyda with good participation in session, improved STS from elevated mat and w/c height today, though continues to need inc time, cues for sequencing, and steadying upon rise to stand due to posterior lean. Will need FT for safety at home.     Strengths: Able to follow instructions, Adequate strength, Independent prior level of function, Motivated for self care and independence, Pleasant and cooperative, Willingly participates in therapeutic activities, Supportive family  Barriers: Fatigue, Decreased endurance, Generalized weakness, Home accessibility, Hypotension, Impaired balance, Impaired carryover of learning, Impaired activity tolerance, Limited mobility, Visual impairment    Plan  Family training needed prior to d/c.   Continue transfers/STS transitions, standing balance, gait c/ FWW, stair/curb training. Postural control training, posture/parascapular training,      Passport items to be completed:  Get in/out of bed safely, in/out of a vehicle, safely use mobility device, walk or wheel around home/community, navigate up and down stairs, show how to get up/down from the ground, ensure home is accessible, demonstrate HEP, complete caregiver training        Physical Therapy Problems (Active)       Problem: Mobility       Dates: Start:  06/01/23         Goal: STG-Within one week, patient will ambulate up/down a curb with ModA or better, LRAD.       Dates: Start:  06/01/23         Goal Note filed on  06/06/23 0823 by Florence Issa, PT       Needs assessment.                  Problem: Mobility Transfers       Dates: Start:  06/01/23         Goal: STG-Within one week, patient will perform bed mobility with Liliam.        Dates: Start:  06/01/23         Goal Note filed on 06/06/23 0823 by Florence Issa, PT       Variable CGA <> Donald for LE management                  Problem: PT-Long Term Goals       Dates: Start:  06/01/23         Goal: LTG-By discharge, patient will ambulate >/= 100' in a single effort c/ LRAD at SBA or better.        Dates: Start:  06/01/23            Goal: LTG-By discharge, patient will transfer one surface to another c/ Liliam or better.        Dates: Start:  06/01/23            Goal: LTG-By discharge, patient will ambulate up/down 2 stairs c/ BHR to safely access home environment at CGA or better.        Dates: Start:  06/01/23            Goal: LTG-By discharge, patient will transfer in/out of a car with Donald or better, LRAD.        Dates: Start:  06/01/23

## 2023-06-13 NOTE — THERAPY
Occupational Therapy  Daily Treatment     Patient Name: Rashmi Parra  Age:  83 y.o., Sex:  female  Medical Record #: [id number]  Today's Date: 6/13/2023     Precautions  Precautions: Fall Risk  Comments: BP on LLE only         Subjective    Patient was seated in her w/c at the bathroom sink working on brushing her teeth.  She requested to use the toilet as well.  She refused offer to bathe.     Objective       06/13/23 0901   OT Charge Group   OT Self Care / ADL (Units) 2   OT Therapy Activity (Units) 1   OT Therapeutic Exercise (Units) 1   OT Total Time Spent   OT Individual Total Time Spent (Mins) 60   Precautions   Precautions Fall Risk   Functional Level of Assist   Eating Independent   Grooming Supervision;Seated   Grooming Description Supervision for safety;Verbal cueing;Seated in wheelchair at sink   Upper Body Dressing Supervision   Upper Body Dressing Description Supervision for safety  (to doff sweatshirt)   Toileting Standby Assist   Toileting Description Grab bar;Supervision for safety;Set-up of equipment   Toilet Transfers Standby Assist   Toilet Transfer Description Grab bar;Set-up of equipment;Supervision for safety;Verbal cueing   Sitting Upper Body Exercises   Upper Extremity Bike Minutes / Rest Breaks (See Comments)  (motomed cycle gear 3 x 10 minutes)   Balance   Comments Static standing at tall table x 9 minutes while working on cognitive leisure activity with SBA.   Interdisciplinary Plan of Care Collaboration   Patient Position at End of Therapy Seated;Call Light within Reach;Tray Table within Reach;Phone within Reach;Self Releasing Lap Belt Applied;Chair Alarm On         Assessment    Patient demonstrated improvement in toileting and toilet transfers today, as she completed them with SBA.  Was able to stand for nine consecutive minutes during a tabletop activity.   Strengths: Able to follow instructions, Alert and oriented, Manages pain appropriately, Motivated for self care and independence,  Pleasant and cooperative, Willingly participates in therapeutic activities  Barriers: Decreased endurance, Generalized weakness, Fatigue, Hypotension, Orthostatic hypotension, Impaired activity tolerance, Impaired balance, Limited mobility    Plan    Potential d/c home tomorrow with assistance of family/caregivers versus SNF     Occupational Therapy Goals (Active)       Problem: Dressing       Dates: Start:  06/13/23         Goal: STG-Within one week, patient will dress LB with min A        Dates: Start:  06/13/23               Problem: Functional Transfers       Dates: Start:  06/13/23         Goal: STG-Within one week, patient will transfer to toilet with supervision using grab bar       Dates: Start:  06/13/23               Problem: OT Long Term Goals       Dates: Start:  06/01/23         Goal: LTG-By discharge, patient will complete basic self care tasks with supervision overall using AE/DME as needed.       Dates: Start:  06/01/23            Goal: LTG-By discharge, patient will perform bathroom transfers with supervision and LRD overall using AE/DME as needed.       Dates: Start:  06/01/23               Problem: Toileting       Dates: Start:  06/13/23         Goal: STG-Within one week, patient will complete toileting tasks with supervision using grab bar       Dates: Start:  06/13/23

## 2023-06-14 ENCOUNTER — APPOINTMENT (OUTPATIENT)
Dept: PHYSICAL THERAPY | Facility: REHABILITATION | Age: 83
DRG: 312 | End: 2023-06-14
Attending: PHYSICAL MEDICINE & REHABILITATION
Payer: MEDICARE

## 2023-06-14 ENCOUNTER — APPOINTMENT (OUTPATIENT)
Dept: RADIOLOGY | Facility: REHABILITATION | Age: 83
DRG: 312 | End: 2023-06-14
Attending: PHYSICAL MEDICINE & REHABILITATION
Payer: MEDICARE

## 2023-06-14 ENCOUNTER — APPOINTMENT (OUTPATIENT)
Dept: SPEECH THERAPY | Facility: REHABILITATION | Age: 83
DRG: 312 | End: 2023-06-14
Attending: PHYSICAL MEDICINE & REHABILITATION
Payer: MEDICARE

## 2023-06-14 ENCOUNTER — APPOINTMENT (OUTPATIENT)
Dept: OCCUPATIONAL THERAPY | Facility: REHABILITATION | Age: 83
DRG: 312 | End: 2023-06-14
Attending: PHYSICAL MEDICINE & REHABILITATION
Payer: MEDICARE

## 2023-06-14 PROCEDURE — 97110 THERAPEUTIC EXERCISES: CPT

## 2023-06-14 PROCEDURE — 94760 N-INVAS EAR/PLS OXIMETRY 1: CPT

## 2023-06-14 PROCEDURE — 97130 THER IVNTJ EA ADDL 15 MIN: CPT

## 2023-06-14 PROCEDURE — 97129 THER IVNTJ 1ST 15 MIN: CPT

## 2023-06-14 PROCEDURE — 770010 HCHG ROOM/CARE - REHAB SEMI PRIVAT*

## 2023-06-14 PROCEDURE — 97116 GAIT TRAINING THERAPY: CPT

## 2023-06-14 PROCEDURE — A9270 NON-COVERED ITEM OR SERVICE: HCPCS | Performed by: PHYSICAL MEDICINE & REHABILITATION

## 2023-06-14 PROCEDURE — 97535 SELF CARE MNGMENT TRAINING: CPT | Mod: CO

## 2023-06-14 PROCEDURE — 700102 HCHG RX REV CODE 250 W/ 637 OVERRIDE(OP): Performed by: PHYSICAL MEDICINE & REHABILITATION

## 2023-06-14 PROCEDURE — 97530 THERAPEUTIC ACTIVITIES: CPT

## 2023-06-14 PROCEDURE — 71045 X-RAY EXAM CHEST 1 VIEW: CPT

## 2023-06-14 PROCEDURE — A9270 NON-COVERED ITEM OR SERVICE: HCPCS | Performed by: HOSPITALIST

## 2023-06-14 PROCEDURE — 700111 HCHG RX REV CODE 636 W/ 250 OVERRIDE (IP): Performed by: PHYSICAL MEDICINE & REHABILITATION

## 2023-06-14 PROCEDURE — 97110 THERAPEUTIC EXERCISES: CPT | Mod: CO

## 2023-06-14 PROCEDURE — 99233 SBSQ HOSP IP/OBS HIGH 50: CPT | Performed by: PHYSICAL MEDICINE & REHABILITATION

## 2023-06-14 PROCEDURE — 700102 HCHG RX REV CODE 250 W/ 637 OVERRIDE(OP): Performed by: HOSPITALIST

## 2023-06-14 RX ADMIN — ENOXAPARIN SODIUM 40 MG: 100 INJECTION SUBCUTANEOUS at 17:24

## 2023-06-14 RX ADMIN — LOVASTATIN 20 MG: 20 TABLET ORAL at 20:45

## 2023-06-14 RX ADMIN — DEXLANSOPRAZOLE 60 MG: 60 CAPSULE, DELAYED RELEASE ORAL at 20:50

## 2023-06-14 RX ADMIN — Medication 4.5 MG: at 20:45

## 2023-06-14 RX ADMIN — POLYETHYLENE GLYCOL 3350 1 PACKET: 17 POWDER, FOR SOLUTION ORAL at 11:56

## 2023-06-14 RX ADMIN — DULOXETINE HYDROCHLORIDE 30 MG: 30 CAPSULE, DELAYED RELEASE ORAL at 11:56

## 2023-06-14 RX ADMIN — AMLODIPINE BESYLATE 10 MG: 5 TABLET ORAL at 06:00

## 2023-06-14 ASSESSMENT — GAIT ASSESSMENTS
DEVIATION: SHUFFLED GAIT;DECREASED HEEL STRIKE;DECREASED TOE OFF
ASSISTIVE DEVICE: FRONT WHEEL WALKER
DISTANCE (FEET): 160
GAIT LEVEL OF ASSIST: STANDBY ASSIST

## 2023-06-14 ASSESSMENT — ACTIVITIES OF DAILY LIVING (ADL)
TOILET_TRANSFER_DESCRIPTION: GRAB BAR
BED_CHAIR_WHEELCHAIR_TRANSFER_DESCRIPTION: ADAPTIVE EQUIPMENT

## 2023-06-14 ASSESSMENT — PAIN DESCRIPTION - PAIN TYPE: TYPE: ACUTE PAIN

## 2023-06-14 NOTE — THERAPY
"Speech Language Pathology  Daily Treatment     Patient Name: Rashmi Parra  Age:  83 y.o., Sex:  female  Medical Record #: [id number]  Today's Date: 6/14/2023     Precautions  Precautions: Fall Risk  Comments: BP on LLE only    Subjective    Pt seen at 0930 and 1300 for a total of 60 minutes. Agreeable to therapy with encouragement.      Objective       06/14/23 1304   Treatment Charges   SLP Cognitive Skill Development First 15 Minutes 1   SLP Cognitive Skill Development Additional 15 Minutes 3   SLP Total Time Spent   SLP Individual Total Time Spent (Mins) 60   Cognition   Verbal Short Term Memory 2 Hours   Medication Management  Minimal (4)   Interdisciplinary Plan of Care Collaboration   Patient Position at End of Therapy Seated;Call Light within Reach;Tray Table within Reach;Phone within Reach  (O2 connected to wall, concentrator plugged in)         Assessment    0930: Pt recalled virtual meeting scheduled with spouse/daughter to \"tour\" [facility]. Pt recalled name of facility and requested to see pictures. Website pulled up on this SLPs computer and pt directing therapist to open various tabs in order to obtain further information and view pictures. Pt able to recall new d/c date IND (6/16). Pt identifying questions to ask during tour at 1100 related to cost and size of individual units.   1300: Pt recalled activities completed in PT in a.m. as well as brief FaceTime meeting with family (interrupted by lunch per patient). Med error ID completed with initial clarification on task, pt's accuracy increased as trials progressed. Pt benefits from breaking info down to 1 med at a time and using finger tracking for placekeeping.     Strengths: Able to follow instructions, Alert and oriented, Effective communication skills, Making steady progress towards goals, Independent prior level of function, Motivated for self care and independence, Supportive family, Pleasant and cooperative, Willingly participates in therapeutic " activities  Barriers:  (STM)    Plan    D/c planning, outcome assessment.     Passport items to be completed:  manage finances, manage medications, arrive to therapy appointments on time, complete daily memory log entries, solve problems related to safety situations, review education related to hospitalization, complete caregiver training     Speech Therapy Problems (Active)       Problem: Memory STGs       Dates: Start:  06/07/23         Goal: STG-Within one week, patient will utilize memory strategies in order to recall safety precautions and information from therapies after a 2 hr delay.        Dates: Start:  06/07/23         Goal Note filed on 06/13/23 1156 by Elizabeth Cornejo MS,CCC-SLP       Will continue to target.                  Problem: Speech/Swallowing LTGs       Dates: Start:  06/02/23         Goal: LTG-By discharge, patient will safely swallow level 6 textures and thin liquids without aspiration.         Dates: Start:  06/02/23            Goal: LTG-By discharge, patient will solve complex problems related to IADL's in order to d/c home with SPV       Dates: Start:  06/07/23               Problem: Swallowing STGs       Dates: Start:  06/02/23

## 2023-06-14 NOTE — CARE PLAN
Problem: Recreation Therapy  Goal: STG-Within one week, patient will identify two new leisure interests.   Outcome: Met  Goal: LTG-By discharge, patient will identify and demonstrate three new leisure interests.   Outcome: Met

## 2023-06-14 NOTE — THERAPY
Recreational Therapy  Daily Treatment     Patient Name: Rashmi Parra  AGE:  83 y.o., SEX:  female  Medical Record #: [id number]  Today's Date: 6/13/2023       Subjective    Patient agreeable to recreation therapy session.      Objective       06/13/23 1431   Procedural Tracking   Procedural Tracking Community Re-Integration;Community Skills Development;Leisure Skills Awareness;Leisure Skills Development;Social Skills Training;Cognitive Skills Training;Gross Motor Functional Leisure Skills;Fine Motor Functional Leisure Skills;Group Treatment   Treatment Time   Total Time Spent (mins) 15   Total Time Missed 15   Reasons for Time Missed Non-Medical-Other (Please Comment)  (patient and family speaking with CM)   Functional Ability Status - Cognitive   Attention Span Remains on Task with Cueing   Comprehension Requires Cueing;Follows One Step Commands   Judgment Needs Assistance;Impaired   Functional Ability Status - Emotional    Affect Appropriate   Mood Appropriate   Behavior Appropriate   Skilled Intervention    Skilled Intervention Relaxation / Coping Skills;Community Skills;Leisure Education    Skilled Intervention Comments dc info   Interdisciplinary Plan of Care Collaboration   IDT Collaboration with     Patient Position at End of Therapy Seated;Family / Friend in Room;Other (Comments)  (with CM)   Strengths & Barriers   Strengths Able to follow instructions;Alert and oriented;Effective communication skills;Willingly participates in therapeutic activities   Barriers Agitation;Decreased endurance;Fatigue;Generalized weakness;Impaired balance;Impaired activity tolerance;Limited mobility;Orthostatic hypotension         Assessment    Patient seen in room for first half of recreation therapy session. Patient, patient's family and CTRS discussed joining knitting group later in the day. Patient declined offer. CM entered room to discuss dc plan. CTRS and therapy tech will assist with Facetime call tomorrow  with .     Strengths: (P) Able to follow instructions, Alert and oriented, Effective communication skills, Willingly participates in therapeutic activities  Barriers: (P) Agitation, Decreased endurance, Fatigue, Generalized weakness, Impaired balance, Impaired activity tolerance, Limited mobility, Orthostatic hypotension    Plan    Patient will benefit from continued recreation therapy sessions.     Passport items to be completed:  Verbalize two positive leisure activities, discuss returning to work, hobbies, community groups or volunteer activities, explore community resources

## 2023-06-14 NOTE — FLOWSHEET NOTE
06/14/23 1004   Events/Summary/Plan   Events/Summary/Plan SpO2 check   Skin Inspection Respiratory Device Intact   Location ears   Vital Signs   Pulse 74   Respiration 16   Pulse Oximetry 98 %   $ Pulse Oximetry (Spot Check) Yes   Oxygen   O2 (LPM) 2   O2 Delivery Device High Flow Nasal Cannula  (Increases reliability of triggering POC)

## 2023-06-14 NOTE — CARE PLAN
The patient is Watcher - Medium risk of patient condition declining or worsening    Shift Goals  Clinical Goals: Safety  Patient Goals: Safety      Problem: Respiratory  Goal: Patient will understand use and administration of respiratory medications to improve respiratory function  Outcome: Progressing    SpO2 at 98% on 2L oxygen via NC.  No use of accessory muscles noted.      Problem: Risk for Aspiration  Goal: Patient's risk for aspiration will be absent or decrease  Outcome: Progressing     Patient able to swallow 1 whole pill at a time with thin liquids without difficulty.

## 2023-06-14 NOTE — THERAPY
"Physical Therapy   Daily Treatment     Patient Name: Rashmi Parra  Age:  83 y.o., Sex:  female  Medical Record #: [id number]  Today's Date: 6/14/2023     Precautions  Precautions: Fall Risk  Comments: BP on LLE only    Subjective    \"I'm doing okay-- well I'm not, but what can you do?\" Pt in w/c at arrival, agreeable to PT session.      Objective       06/14/23 1001   PT Charge Group   PT Gait Training (Units) 1   PT Therapeutic Exercise (Units) 2   PT Therapeutic Activities (Units) 1   PT Total Time Spent   PT Individual Total Time Spent (Mins) 60   Gait Functional Level of Assist    Gait Level Of Assist Standby Assist   Assistive Device Front Wheel Walker   Distance (Feet) 160   # of Times Distance was Traveled 1   Deviation Shuffled Gait;Decreased Heel Strike;Decreased Toe Off   Stairs Functional Level of Assist   Level of Assist with Stairs Contact Guard Assist   # of Stairs Climbed 4   Stairs Description Extra time;Verbal cueing;Hand rails   Transfer Functional Level of Assist   Bed, Chair, Wheelchair Transfer Standby Assist   Bed Chair Wheelchair Transfer Description Adaptive equipment  (stand step around c/ FWW, incidental steadying when first coming to stand for posterior lean)   Neuro-Muscular Treatments   Neuro-Muscular Treatments Anterior weight shift;Postural Changes;Postural Facilitation;Co-Contraction   Comments c/ pilates sled and stepping over obstacles in // bars: up/down <>Airex x 1 rep, up/over 4\" foam obstacle x 1; forward and backward stepping 2 x 3-5' backward in // bars btw activities   Interdisciplinary Plan of Care Collaboration   IDT Collaboration with  Therapy Tech;Speech Therapist   Patient Position at End of Therapy Seated;Other (Comments)  (c/ tech propelling from therapy to setup video call)   Collaboration Comments re: video call with family     Pilates sled for BLE and unilateral LE strength, NM control, ROM in modified CKC positions to support STS transition strength and control. " "  Squats  BLE: 1 x 10 @ 4 bands, 2 x 10 @ 5 bands  Unilateral LE: 1 x 10 each at 3 bands   Heel raise  BLE: 1 x 10 @ 4 bands, 2 x 10 @ 5 bands, cues to focus on isometric hold due to poor antigravity strength and impaired PF ROM at ankles  Gait on level surfaces as above, c/ FWW. Cues for step length and beatrice. Introduced stepping over obstacles as above x 1 to airex, and 4\" bolster for foot clearance.  Seated rests btw activities.  Discussed progress to date and d/c plan updated to [facility]. Pt reports that she is okay with group home, but is concerned about being excluded from decision. Setup c/ family phone c/ tech assist at end of session.     Assessment    Aleyda continues to make progress with transfers, gait, stairs. Performed all mobility today c/ CGA<>SBA, including steps. Good motivation and participation. Poor antigravity strength at BLE PF limits ankle strategy during standing balance.    Strengths: Able to follow instructions, Adequate strength, Independent prior level of function, Motivated for self care and independence, Pleasant and cooperative, Willingly participates in therapeutic activities, Supportive family  Barriers: Fatigue, Decreased endurance, Generalized weakness, Home accessibility, Hypotension, Impaired balance, Impaired carryover of learning, Impaired activity tolerance, Limited mobility, Visual impairment    Plan    Continue LE strength, tammy PF strength, ankle ROM, transfers/STS transitions, standing balance, gait c/ FWW, stair/curb training. Postural control training, posture/parascapular training     Passport items to be completed:  Get in/out of bed safely, in/out of a vehicle, safely use mobility device, walk or wheel around home/community, navigate up and down stairs, show how to get up/down from the ground, ensure home is accessible, demonstrate HEP, complete caregiver training       Physical Therapy Problems (Active)       Problem: Mobility       Dates: Start:  06/01/23         Goal: " STG-Within one week, patient will ambulate up/down a curb with ModA or better, LRAD.       Dates: Start:  06/01/23         Goal Note filed on 06/06/23 0823 by Florence Issa, PT       Needs assessment.                  Problem: Mobility Transfers       Dates: Start:  06/01/23         Goal: STG-Within one week, patient will perform bed mobility with Liliam.        Dates: Start:  06/01/23         Goal Note filed on 06/06/23 0823 by Florence Issa PT       Variable CGA <> Donald for LE management                  Problem: PT-Long Term Goals       Dates: Start:  06/01/23         Goal: LTG-By discharge, patient will ambulate >/= 100' in a single effort c/ LRAD at SBA or better.        Dates: Start:  06/01/23            Goal: LTG-By discharge, patient will transfer one surface to another c/ Liliam or better.        Dates: Start:  06/01/23            Goal: LTG-By discharge, patient will ambulate up/down 2 stairs c/ BHR to safely access home environment at CGA or better.        Dates: Start:  06/01/23            Goal: LTG-By discharge, patient will transfer in/out of a car with Donald or better, LRAD.        Dates: Start:  06/01/23

## 2023-06-14 NOTE — CARE PLAN
"The patient is Stable - Low risk of patient condition declining or worsening    Shift Goals  Clinical Goals: Safety  Patient Goals: Safety    Progress made toward(s) clinical / shift goals:      Problem: Neurogenic Bowel  Goal: Patient will perform adequate hygiene with incontinent episodes  Outcome: Progressing  Note: Patient stayed continent of bowel this shift     Patient is not progressing towards the following goals:    Problem: Fall Risk - Rehab  Goal: Patient will remain free from falls  Outcome: Not Met  Note: Anastasia Issa Fall risk Assessment Score: 18    High fall risk Interventions   - Alarming seatbelt  - Bed and strip alarm   - Yellow sign by the door   - Yellow wrist band \"Fall risk\"  - Room near to the nurse station  - Do not leave patient unattended in the bathroom  - Fall risk education provided     "

## 2023-06-14 NOTE — PROGRESS NOTES
"  Physical Medicine & Rehabilitation Progress Note    Encounter Date: 6/14/2023    Chief Complaint: Decreased mobility, hypotension    Interval Events (Subjective):  Patient sitting up in room. She reports she is doing well. Had discussion with daughter and  and they plan to go to [facility] today to look at the place. Discussed short extension so they could decide about going to [facility] vs she go to SNF. Discussed would most likely be better for short period at home or [facility] directly. Denies NVD.     _____________________________________  Interdisciplinary Team Conference   Most recent IDT on 6/13/2023    Discharge Date/Disposition:  6/14/23 -> 6/15?  _____________________________________      Objective:  VITAL SIGNS: BP (!) 140/66   Pulse 74   Temp 36.4 °C (97.6 °F) (Oral)   Resp 16   Ht 1.6 m (5' 3\")   Wt 59.9 kg (132 lb)   LMP  (LMP Unknown)   SpO2 98%   BMI 23.38 kg/m²   Gen: NAD  Psych: Mood and affect appropriate  CV: RRR, 0 edema  Resp: CTAB, no upper airway sounds  Abd: NTND  Neuro: AOx4, following commands    Laboratory Values:  No results found for this or any previous visit (from the past 72 hour(s)).      Medications:  Scheduled Medications   Medication Dose Frequency    amLODIPine  10 mg Q DAY    Dexlansoprazole  60 mg Q EVENING    polyethylene glycol/lytes  1 Packet DAILY    DULoxetine  30 mg DAILY    enoxaparin (LOVENOX) injection  40 mg DAILY AT 1800    lovastatin  20 mg Nightly    melatonin  4.5 mg QHS     PRN medications: senna-docusate **AND** [DISCONTINUED] polyethylene glycol/lytes **AND** magnesium hydroxide **AND** bisacodyl, Respiratory Therapy Consult, hydrALAZINE, acetaminophen, mag hydrox-al hydrox-simeth, ondansetron **OR** ondansetron, traZODone, sodium chloride, traMADol, gabapentin    Diet:  Current Diet Order   Procedures    Diet Order Diet: Level 7 - Easy to Chew (Chopped meats; meds whole 1 at a time with thin); Liquid level: Level 0 - Thin       Medical Decision Making and " Plan:  Debility - Patient with decreased mobility and weakness 2/2 orthostatic hypotension, low cortisol and multiple falls  -PT and OT for mobility and ADLs. Per guidelines, 15 hours per week between PT, OT and/or SLP.  -Follow-up PCP     Dysphagia - Patient with new dysphagia of choking on foods. Consult SLP. Will check CXR. Patient at high risk for further decline and respiratory failure due to age and debility.   -Choking event 6/1 with dinner. Repeat CXR, AM labs. CXR stable. Passed swallow    HTN - Patient on Lisinopril. Was previously having orthostatic hypotension requiring Midodrine and Florinef. Will monitor  -Severe orthostatic hypotension on admission into the 70s. Start IV fluids,consult hospitalist and monitor. May need to restart Florinef. Will check CXR. High risk for falls and further injury. Will recheck labs including cortisol per hospitalist.  -Elevated SBP on 6/5, increase Amlodipine to 10, will monitor for low SBP. Midodrine discontinued.   -Continue Amlodipine 10 mg     HLD - Patient on Lovastatin 20 mg QHS. Continue Lovastatin     Hypocalcemia - Check AM CMP. Stable     Low Cortisol - Negative ACTH test. Will monitor      Anemia - 11.9 on 6/8/23, improving.     PAD - Aortic stenosis on CTA of abdomen. Follow-up Vascular     Depression - Patient on Cymbalta 30 mg daily. Continue Cymbalta     Pain - APAP/Tramadol PRN     Skin - Patient at risk for skin breakdown due to debility in areas including sacrum, achilles, elbows and head in addition to other sites. Nursing to assess skin daily.      GI Ppx - Patient on Prilosec for GERD prophylaxis. Patient on Senna-docusate for constipation prophylaxis.   -Switch to Dexlansoprazole. Continue Dexlansoprazole 60 mg QHS     DVT Ppx - Patient Lovenox on transfer.    Dispo - Patient and  to evaluate [facility] level of care. Short extension while daughters arrange care.   ____________________________________    T. Toño Will MD/PhD  [facility] - Physical  Medicine & Rehabilitation   [facility] - Brain Injury Medicine   ____________________________________    Total time:  50 minutes. Time spent included pre-rounding review of vitals and tests, unit/floor time, face-to-face time with the patient including physical examination, care coordination, counseling of patient and/or family, ordering medications/procedures/tests, discussion with CM, PT, OT, SLP and/or other healthcare providers, and documentation in the electronic medical record. Topics discussed included discharge planning, retirement, check quantiferon, CXR, and extension of stay.

## 2023-06-14 NOTE — THERAPY
Recreational Therapy  Daily Treatment     Patient Name: Rashmi Parra  AGE:  83 y.o., SEX:  female  Medical Record #: [id number]  Today's Date: 6/14/2023       Subjective    Patient ready and agreeable to recreation therapy session.      Objective       06/14/23 1501   Procedural Tracking   Procedural Tracking Community Re-Integration;Community Skills Development;Leisure Skills Awareness;Leisure Skills Development;Social Skills Training;Cognitive Skills Training;Gross Motor Functional Leisure Skills;Fine Motor Functional Leisure Skills;Group Treatment   Treatment Time   Total Time Spent (mins) 30   Total Time Missed 0   Functional Ability Status - Cognitive   Attention Span Remains on Task   Comprehension Follows One Step Commands;Requires Cueing;Follows Two Step Commands   Judgment Able to Make Independent Decisions;Needs Assistance   Functional Ability Status - Emotional    Affect Appropriate   Mood Appropriate   Behavior Appropriate;Cooperative   Skilled Intervention    Skilled Intervention Gross Motor Leisure;Fine Motor Leisure;Cognitive Leisure;Social Skills   Skilled Intervention Comments Garbage (card game)   Interdisciplinary Plan of Care Collaboration   IDT Collaboration with  Family / Caregiver   Patient Position at End of Therapy Seated;Other (Comments);Family / Friend in Room  (with family)   Strengths & Barriers   Strengths Able to follow instructions;Alert and oriented;Supportive family;Willingly participates in therapeutic activities;Pleasant and cooperative;Effective communication skills   Barriers Decreased endurance;Fatigue;Generalized weakness;Orthostatic hypotension;Impaired balance;Limited mobility         Assessment    Patient participated in new card game, Garbaefrain, during recreation therapy session with family. Patient required min cues for scanning, strategy and sequencing. Patient requires min A with reaching for cards. Patient benefited from the use of a card bob. Patient and patient's  hern[place] received recreation therapy dc information.     Strengths: (P) Able to follow instructions, Alert and oriented, Supportive family, Willingly participates in therapeutic activities, Pleasant and cooperative, Effective communication skills  Barriers: (P) Decreased endurance, Fatigue, Generalized weakness, Orthostatic hypotension, Impaired balance, Limited mobility    Plan    Patient will benefit from continued recreation therapy sessions.     Passport items to be completed:  Verbalize two positive leisure activities, discuss returning to work, hobbies, community groups or volunteer activities, explore community resources

## 2023-06-14 NOTE — THERAPY
"Occupational Therapy  Daily Treatment     Patient Name: Rashmi Parra  Age:  83 y.o., Sex:  female  Medical Record #: [id number]  Today's Date: 6/14/2023     Precautions  Precautions: (P) Fall Risk  Comments: (P) BP Left LE         Subjective    \" Can we try a couple of the easy ones with  a heavier weight? Maybe a  3?\"     Objective       06/14/23 0831   OT Charge Group   OT Self Care / ADL (Units) 2   OT Therapeutic Exercise (Units) 2   OT Total Time Spent   OT Individual Total Time Spent (Mins) 60   Precautions   Precautions Fall Risk   Comments BP Left LE   Functional Level of Assist   Eating Independent  (extra time   distracted by conversation  with table mate)   Grooming Supervision  (seated and standing at sink)   Toileting Supervision   Toilet Transfers Supervised   Toilet Transfer Description Grab bar   Sitting Upper Body Exercises   Chest Press 2 sets of 10;Bilateral   Shoulder Press 2 sets of 10;Bilateral   Internal Shoulder Rotation 2 sets of 10;Right ;Left   External Shoulder Rotation 2 sets of 10;Right ;Left   Bicep Curls 2 sets of 10;Right ;Left   Pronation / Supination 2 sets of 10;Right ;Left   Other Exercise 2 sets of 10;Bilateral;  arm circles forward and back       1 set of 10  arm circles  and  biceps curls  with 3lb weight   Comments performd with 2lb weight unless otherwise noted   Interdisciplinary Plan of Care Collaboration   Patient Position at End of Therapy Seated;Chair Alarm On;Self Releasing Lap Belt Applied  (hand off to ST for tx)         Assessment     Completed tx no complaints  demonstrates improved independence with  toileting and transfer this session     Strengths: Able to follow instructions, Alert and oriented, Manages pain appropriately, Motivated for self care and independence, Pleasant and cooperative, Willingly participates in therapeutic activities  Barriers: Decreased endurance, Generalized weakness, Fatigue, Hypotension, Orthostatic hypotension, Impaired activity " tolerance, Impaired balance, Limited mobility    Plan    D/c [facility]   6-15-23 prep for  possible transition to  [facility]  with spouse  6-16-23    Occupational Therapy Goals (Active)       Problem: Dressing       Dates: Start:  06/13/23         Goal: STG-Within one week, patient will dress LB with min A        Dates: Start:  06/13/23               Problem: Functional Transfers       Dates: Start:  06/13/23         Goal: STG-Within one week, patient will transfer to toilet with supervision using grab bar       Dates: Start:  06/13/23               Problem: OT Long Term Goals       Dates: Start:  06/01/23         Goal: LTG-By discharge, patient will complete basic self care tasks with supervision overall using AE/DME as needed.       Dates: Start:  06/01/23            Goal: LTG-By discharge, patient will perform bathroom transfers with supervision and LRD overall using AE/DME as needed.       Dates: Start:  06/01/23               Problem: Toileting       Dates: Start:  06/13/23         Goal: STG-Within one week, patient will complete toileting tasks with supervision using grab bar       Dates: Start:  06/13/23

## 2023-06-15 ENCOUNTER — APPOINTMENT (OUTPATIENT)
Dept: PHYSICAL THERAPY | Facility: REHABILITATION | Age: 83
DRG: 312 | End: 2023-06-15
Attending: PHYSICAL MEDICINE & REHABILITATION
Payer: MEDICARE

## 2023-06-15 ENCOUNTER — APPOINTMENT (OUTPATIENT)
Dept: OCCUPATIONAL THERAPY | Facility: REHABILITATION | Age: 83
End: 2023-06-15
Attending: PHYSICAL MEDICINE & REHABILITATION
Payer: MEDICARE

## 2023-06-15 ENCOUNTER — APPOINTMENT (OUTPATIENT)
Dept: SPEECH THERAPY | Facility: REHABILITATION | Age: 83
DRG: 312 | End: 2023-06-15
Attending: PHYSICAL MEDICINE & REHABILITATION
Payer: MEDICARE

## 2023-06-15 LAB
FLUAV RNA SPEC QL NAA+PROBE: NEGATIVE
FLUBV RNA SPEC QL NAA+PROBE: NEGATIVE
RSV RNA SPEC QL NAA+PROBE: NEGATIVE
SARS-COV-2 RNA RESP QL NAA+PROBE: NOTDETECTED
SPECIMEN SOURCE: NORMAL

## 2023-06-15 PROCEDURE — 700102 HCHG RX REV CODE 250 W/ 637 OVERRIDE(OP): Performed by: HOSPITALIST

## 2023-06-15 PROCEDURE — 97110 THERAPEUTIC EXERCISES: CPT

## 2023-06-15 PROCEDURE — 0241U HCHG SARS-COV-2 COVID-19 NFCT DS RESP RNA 4 TRGT MIC: CPT

## 2023-06-15 PROCEDURE — A9270 NON-COVERED ITEM OR SERVICE: HCPCS | Performed by: PHYSICAL MEDICINE & REHABILITATION

## 2023-06-15 PROCEDURE — 99232 SBSQ HOSP IP/OBS MODERATE 35: CPT | Performed by: PHYSICAL MEDICINE & REHABILITATION

## 2023-06-15 PROCEDURE — A9270 NON-COVERED ITEM OR SERVICE: HCPCS | Performed by: HOSPITALIST

## 2023-06-15 PROCEDURE — 770010 HCHG ROOM/CARE - REHAB SEMI PRIVAT*

## 2023-06-15 PROCEDURE — 97535 SELF CARE MNGMENT TRAINING: CPT

## 2023-06-15 PROCEDURE — 97530 THERAPEUTIC ACTIVITIES: CPT

## 2023-06-15 PROCEDURE — 97130 THER IVNTJ EA ADDL 15 MIN: CPT

## 2023-06-15 PROCEDURE — 700111 HCHG RX REV CODE 636 W/ 250 OVERRIDE (IP): Performed by: PHYSICAL MEDICINE & REHABILITATION

## 2023-06-15 PROCEDURE — 97129 THER IVNTJ 1ST 15 MIN: CPT

## 2023-06-15 PROCEDURE — 700102 HCHG RX REV CODE 250 W/ 637 OVERRIDE(OP): Performed by: PHYSICAL MEDICINE & REHABILITATION

## 2023-06-15 PROCEDURE — 86480 TB TEST CELL IMMUN MEASURE: CPT

## 2023-06-15 PROCEDURE — 94760 N-INVAS EAR/PLS OXIMETRY 1: CPT

## 2023-06-15 PROCEDURE — 36415 COLL VENOUS BLD VENIPUNCTURE: CPT

## 2023-06-15 RX ADMIN — DULOXETINE HYDROCHLORIDE 30 MG: 30 CAPSULE, DELAYED RELEASE ORAL at 08:35

## 2023-06-15 RX ADMIN — AMLODIPINE BESYLATE 10 MG: 5 TABLET ORAL at 05:55

## 2023-06-15 RX ADMIN — DEXLANSOPRAZOLE 60 MG: 60 CAPSULE, DELAYED RELEASE ORAL at 21:35

## 2023-06-15 RX ADMIN — ENOXAPARIN SODIUM 40 MG: 100 INJECTION SUBCUTANEOUS at 18:02

## 2023-06-15 RX ADMIN — LOVASTATIN 20 MG: 20 TABLET ORAL at 21:39

## 2023-06-15 RX ADMIN — Medication 4.5 MG: at 21:38

## 2023-06-15 RX ADMIN — POLYETHYLENE GLYCOL 3350 1 PACKET: 17 POWDER, FOR SOLUTION ORAL at 08:35

## 2023-06-15 ASSESSMENT — BRIEF INTERVIEW FOR MENTAL STATUS (BIMS)
BIMS SUMMARY SCORE: 15
WHAT MONTH IS IT: ACCURATE WITHIN 5 DAYS
ASKED TO RECALL BLUE: YES, NO CUE REQUIRED
WHAT MONTH IS IT: ACCURATE WITHIN 5 DAYS
WHAT DAY OF THE WEEK IS IT: CORRECT
WHAT YEAR IS IT: CORRECT
ASKED TO RECALL BED: YES, NO CUE REQUIRED
ASKED TO RECALL SOCK: YES, NO CUE REQUIRED
WHAT YEAR IS IT: CORRECT
ASKED TO RECALL SOCK: YES, NO CUE REQUIRED
INITIAL REPETITION OF BED BLUE SOCK - FIRST ATTEMPT: 3
ASKED TO RECALL BLUE: YES, NO CUE REQUIRED
BIMS SUMMARY SCORE: 15
INITIAL REPETITION OF BED BLUE SOCK - FIRST ATTEMPT: 3
ASKED TO RECALL BED: YES, NO CUE REQUIRED
WHAT DAY OF THE WEEK IS IT: CORRECT

## 2023-06-15 ASSESSMENT — ACTIVITIES OF DAILY LIVING (ADL)
TOILET_TRANSFER_LEVEL_OF_ASSIST: REQUIRES SUPERVISION WITH TOILET TRANSFER
TOILETING_LEVEL_OF_ASSIST: REQUIRES SUPERVISION WITH TOILETING
SHOWER_TRANSFER_LEVEL_OF_ASSIST: REQUIRES SUPERVISION WITH SHOWER TRANSFER
BED_CHAIR_WHEELCHAIR_TRANSFER_DESCRIPTION: ADAPTIVE EQUIPMENT;SET-UP OF EQUIPMENT;SUPERVISION FOR SAFETY
BED_CHAIR_WHEELCHAIR_TRANSFER_DESCRIPTION: ADAPTIVE EQUIPMENT

## 2023-06-15 ASSESSMENT — GAIT ASSESSMENTS
GAIT LEVEL OF ASSIST: STANDBY ASSIST
ASSISTIVE DEVICE: FRONT WHEEL WALKER
DEVIATION: DECREASED HEEL STRIKE;DECREASED TOE OFF
DISTANCE (FEET): 160

## 2023-06-15 NOTE — THERAPY
"Occupational Therapy  Daily Treatment     Patient Name: Rashmi Parra  Age:  83 y.o., Sex:  female  Medical Record #: [id number]  Today's Date: 6/15/2023     Precautions  Precautions: Fall Risk  Comments: BP Left LE         Subjective    \"My daughters are sending me and my  to an senior care and I'm not happy about it!\"      Objective       06/15/23 0701   OT Charge Group   OT Self Care / ADL (Units) 4   OT Total Time Spent   OT Individual Total Time Spent (Mins) 60   Vitals   O2 (LPM) 2   O2 Delivery Device Nasal Cannula   Pain   Intervention Declines   Cognition    Level of Consciousness Alert   Functional Level of Assist   Upper Body Dressing Supervision   Upper Body Dressing Description Increased time;Initial preparation for task;Set-up of equipment;Supervision for safety   Lower Body Dressing Minimal Assist   Lower Body Dressing Description Assistive devices;Shoe horn;Increased time;Initial preparation for task;Set-up of equipment;Supervision for safety;Verbal cueing  (CGA-min a for balance during standing with FWW)   Bed, Chair, Wheelchair Transfer Standby Assist   Bed Chair Wheelchair Transfer Description Adaptive equipment;Set-up of equipment;Supervision for safety  (FWW)   Bed Mobility    Supine to Sit Standby Assist   Scooting Standby Assist   Interdisciplinary Plan of Care Collaboration   Patient Position at End of Therapy Seated;Self Releasing Lap Belt Applied;Chair Alarm On;Other (Comments)  (left in dining room for breakfast)         Assessment    Pt tolerated session fair. Pt sleeping upon arrival, pt required increased time to increase alertness and sit EOB. Pt completed LB/UB dressing seated at EOB with FWW in front of pt. Pt demonstrated improvements with dressing and only required min a for LBD for balance during standing with FWW and supervision for UBD. PT required increased time for all tasks and use of LH shoe horn for donning shoes. Pt did not want to use reacher for LBD but was able to reach " down to thread pants over B feet. Pt refusing d/c shower this AM but required entire session to sit EOB and get dressed. Pt is d/c to [facility] tomorrow.     Strengths: Able to follow instructions, Alert and oriented, Manages pain appropriately, Motivated for self care and independence, Pleasant and cooperative, Willingly participates in therapeutic activities  Barriers: Decreased endurance, Generalized weakness, Fatigue, Hypotension, Orthostatic hypotension, Impaired activity tolerance, Impaired balance, Limited mobility    Plan    D/c to [facility] tomorrow     Occupational Therapy Goals (Active)       Problem: Dressing       Dates: Start:  06/13/23         Goal: STG-Within one week, patient will dress LB with min A        Dates: Start:  06/13/23               Problem: Functional Transfers       Dates: Start:  06/13/23         Goal: STG-Within one week, patient will transfer to toilet with supervision using grab bar       Dates: Start:  06/13/23               Problem: OT Long Term Goals       Dates: Start:  06/01/23         Goal: LTG-By discharge, patient will complete basic self care tasks with supervision overall using AE/DME as needed.       Dates: Start:  06/01/23            Goal: LTG-By discharge, patient will perform bathroom transfers with supervision and LRD overall using AE/DME as needed.       Dates: Start:  06/01/23               Problem: Toileting       Dates: Start:  06/13/23         Goal: STG-Within one week, patient will complete toileting tasks with supervision using grab bar       Dates: Start:  06/13/23

## 2023-06-15 NOTE — PROGRESS NOTES
"  Physical Medicine & Rehabilitation Progress Note    Encounter Date: 6/15/2023    Chief Complaint: Decreased mobility, hypotension    Interval Events (Subjective):  Patient sitting up in room. [facility]. Per staff they said possibly accepting for Saturday. Discussed would follow-up with CM. TB testing pending.     _____________________________________  Interdisciplinary Team Conference   Most recent IDT on 6/13/2023    Discharge Date/Disposition:  6/14/23 -> 6/15?  _____________________________________      Objective:  VITAL SIGNS: /60   Pulse 70   Temp 36.8 °C (98.2 °F) (Oral)   Resp 16   Ht 1.6 m (5' 3\")   Wt 59.9 kg (132 lb)   LMP  (LMP Unknown)   SpO2 98%   BMI 23.38 kg/m²   Gen: NAD  Psych: Mood and affect appropriate  CV: RRR, 0 edema  Resp: CTAB, no upper airway sounds  Abd: NTND  Neuro: AOx4, following commands  Unchanged from 6/14/23    Laboratory Values:  No results found for this or any previous visit (from the past 72 hour(s)).      Medications:  Scheduled Medications   Medication Dose Frequency    amLODIPine  10 mg Q DAY    Dexlansoprazole  60 mg Q EVENING    polyethylene glycol/lytes  1 Packet DAILY    DULoxetine  30 mg DAILY    enoxaparin (LOVENOX) injection  40 mg DAILY AT 1800    lovastatin  20 mg Nightly    melatonin  4.5 mg QHS     PRN medications: senna-docusate **AND** [DISCONTINUED] polyethylene glycol/lytes **AND** magnesium hydroxide **AND** bisacodyl, Respiratory Therapy Consult, hydrALAZINE, acetaminophen, mag hydrox-al hydrox-simeth, ondansetron **OR** ondansetron, traZODone, sodium chloride, traMADol, gabapentin    Diet:  Current Diet Order   Procedures    Diet Order Diet: Level 7 - Easy to Chew (Chopped meats; meds whole 1 at a time with thin); Liquid level: Level 0 - Thin       Medical Decision Making and Plan:  Debility - Patient with decreased mobility and weakness 2/2 orthostatic hypotension, low cortisol and multiple falls  -PT and OT " for mobility and ADLs. Per guidelines, 15 hours per week between PT, OT and/or SLP.  -Follow-up PCP     Dysphagia - Patient with new dysphagia of choking on foods. Consult SLP. Will check CXR. Patient at high risk for further decline and respiratory failure due to age and debility.   -Choking event 6/1 with dinner. Repeat CXR, AM labs. CXR stable. Passed swallow    HTN - Patient on Lisinopril. Was previously having orthostatic hypotension requiring Midodrine and Florinef. Will monitor  -Severe orthostatic hypotension on admission into the 70s. Start IV fluids,consult hospitalist and monitor. May need to restart Florinef. Will check CXR. High risk for falls and further injury. Will recheck labs including cortisol per hospitalist.  -Elevated SBP on 6/5, increase Amlodipine to 10, will monitor for low SBP. Midodrine discontinued.   -Continue Amlodipine     HLD - Patient on Lovastatin 20 mg QHS. Continue Losartan     Hypocalcemia - Check AM CMP. Stable     Low Cortisol - Negative ACTH test. Will monitor      Anemia - 11.9 on 6/8/23, improving.     PAD - Aortic stenosis on CTA of abdomen. Follow-up Vascular     Depression - Patient on Cymbalta 30 mg daily. Continue Cymbalta     Pain - APAP/Tramadol PRN     Skin - Patient at risk for skin breakdown due to debility in areas including sacrum, achilles, elbows and head in addition to other sites. Nursing to assess skin daily.      GI Ppx - Patient on Prilosec for GERD prophylaxis. Patient on Senna-docusate for constipation prophylaxis.   -Switch to Dexlansoprazole. Continue Dexlansoprazole 60 mg QHS     DVT Ppx - Patient Lovenox on transfer.    Dispo - Patient and  to evaluate group home level of care. Short extension while daughters arrange care. Possible room available 6/17  ____________________________________    T. Toño Will MD/PhD  [facility] - Physical Medicine & Rehabilitation   [facility] - Brain Injury Medicine   ____________________________________

## 2023-06-15 NOTE — FLOWSHEET NOTE
06/15/23 0650   Events/Summary/Plan   Events/Summary/Plan 02 pulse ox check. Appears to be sleeping   Vital Signs   Pulse 70   Respiration 16   Pulse Oximetry 98 %   $ Pulse Oximetry (Spot Check) Yes   Respiratory Assessment   Respiratory Pattern Within Normal Limits   Level of Consciousness Alert   Oxygen   O2 (LPM) 2   O2 Delivery Device Nasal Cannula

## 2023-06-15 NOTE — DISCHARGE PLANNING
CM  Dc plan is for pt and spouse to go to [facility]; paperwork completed; chest xray done, per residential  - they need quantiferon gold done; test ordered yesterday; tc to [facility] / who confirms they can accept pt on Saturday;they also need a COVID test done prior to admission; advised MD and Francine Charge Nurse; tc to pt's dtr Jazzy - she will provide transportation to [facility] on Sat 6/17.  Pt has 4ww and home 02 in place. A wheelchair / cushion has been ordered.     Reviewed signed copy of IMM and answered all questions.    Dc date /disposition:  6/17 Home /residential w/ home health.

## 2023-06-15 NOTE — CARE PLAN
"The patient is Stable - Low risk of patient condition declining or worsening    Shift Goals  Clinical Goals: Safety  Patient Goals: Safety    Progress made toward(s) clinical / shift goals:      Problem: Dialysis  Goal: Patient will maintain stable vital signs and fluid balance  Outcome: Met  Note: Patient not on dialysis     Patient is not progressing towards the following goals:    Problem: Fall Risk - Rehab  Goal: Patient will remain free from falls  Outcome: Not Met  Note: Anastasia Issa Fall risk Assessment Score: 18    High fall risk Interventions   - Alarming seatbelt  - Bed and strip alarm   - Yellow sign by the door   - Yellow wrist band \"Fall risk\"  - Fall risk education provided     "

## 2023-06-15 NOTE — THERAPY
"Physical Therapy   Daily Treatment     Patient Name: Rashmi Parra  Age:  83 y.o., Sex:  female  Medical Record #: [id number]  Today's Date: 6/15/2023     Precautions  Precautions: Fall Risk  Comments: BP Left LE    Subjective    \"I don't want to go to assisted living, but I don't have a choice.\" Pt in w/c at arrival, staff member from [facility] present for intake assessment.      Objective       06/15/23 0931   PT Charge Group   PT Therapeutic Exercise (Units) 2   PT Therapeutic Activities (Units) 2   PT Total Time Spent   PT Individual Total Time Spent (Mins) 60   Gait Functional Level of Assist    Gait Level Of Assist Standby Assist   Assistive Device Front Wheel Walker   Distance (Feet) 160   # of Times Distance was Traveled 1  (2 x 50' c/ turns)   Deviation Decreased Heel Strike;Decreased Toe Off  (cues to attend to task)   Stairs Functional Level of Assist   Level of Assist with Stairs Contact Guard Assist   # of Stairs Climbed 4   Stairs Description Extra time;Hand rails;Supervision for safety   Transfer Functional Level of Assist   Bed, Chair, Wheelchair Transfer Contact Guard Assist  (incidental steadying)   Bed Chair Wheelchair Transfer Description Adaptive equipment   Supine Lower Body Exercise   Comments Pilates sled: 3 x 8 @ 5 bands, focus on eccentric lowering, strong concentric phase; 1 x 6 each SL, 3 bands   Sitting Lower Body Exercises   Nustep Resistance Level 3;Time (See Comments)  (10'00\" total time, BUE/LE; 1 x 1'00\" BLE only)   Bed Mobility    Supine to Sit Standby Assist   Sit to Supine Minimal Assist  (incidental help at RLE)   Sit to Stand Standby Assist  (variable: SBA<>Donald)   Scooting Standby Assist   Rolling Standby Assist   Neuro-Muscular Treatments   Neuro-Muscular Treatments Anterior weight shift;Co-Contraction;Tactile Cuing;Verbal Cuing   Comments c/ pilates sled and bed mobility for sequencing c/ bed rail   Interdisciplinary Plan of Care Collaboration   IDT " Collaboration with  Other (See Comments);Physician  (staff from [facility] present for assessment)   Patient Position at End of Therapy In Bed;Tray Table within Reach;Call Light within Reach;Phone within Reach   Collaboration Comments pendiing [facility] d/c     Reviewed bed mobility, transfers, gait, and current level of assist via full practice <> bed and w/c with [facility] staff member.    Progressed gait, stairs, and LE strength and ROM as above. Pt with some discomfort on pilates sled due to positioning, improved with lumbar bolster added.    Discussed progress to date, d/c plan, and readiness for d/c c/ pt, MD, and [facility] staff. Tentative d/c scheduled for 6/17/23.     Assessment    Aleyda continues with good participation and progress in activity tolerance, reports enjoying NuStep and leg press activity. Continues to have variable performance in STS depending on fatigue, but consistently Donald or better.     Strengths: Able to follow instructions, Adequate strength, Independent prior level of function, Motivated for self care and independence, Pleasant and cooperative, Willingly participates in therapeutic activities, Supportive family  Barriers: Fatigue, Decreased endurance, Generalized weakness, Home accessibility, Hypotension, Impaired balance, Impaired carryover of learning, Impaired activity tolerance, Limited mobility, Visual impairment    Plan    Tentative d/c to [facility] 6/17/23.     Passport items to be completed:  Get in/out of bed safely, in/out of a vehicle,  show how to get up/down from the ground, , demonstrate HEP, complete caregiver training    Physical Therapy Problems (Active)       Problem: Mobility       Dates: Start:  06/01/23         Goal: STG-Within one week, patient will ambulate up/down a curb with ModA or better, LRAD.       Dates: Start:  06/01/23         Goal Note filed on 06/06/23 0823 by Florence Issa PT       Needs assessment.                  Problem: Mobility Transfers       Dates: Start:  06/01/23          Goal: STG-Within one week, patient will perform bed mobility with Liliam.        Dates: Start:  06/01/23         Goal Note filed on 06/06/23 0823 by Florence Issa, PT       Variable CGA <> Donald for LE management                  Problem: PT-Long Term Goals       Dates: Start:  06/01/23         Goal: LTG-By discharge, patient will ambulate >/= 100' in a single effort c/ LRAD at SBA or better.        Dates: Start:  06/01/23            Goal: LTG-By discharge, patient will transfer one surface to another c/ Liliam or better.        Dates: Start:  06/01/23            Goal: LTG-By discharge, patient will ambulate up/down 2 stairs c/ BHR to safely access home environment at CGA or better.        Dates: Start:  06/01/23            Goal: LTG-By discharge, patient will transfer in/out of a car with Donald or better, LRAD.        Dates: Start:  06/01/23

## 2023-06-16 PROBLEM — R93.89 ABNORMAL CXR: Status: RESOLVED | Noted: 2023-06-02 | Resolved: 2023-06-16

## 2023-06-16 LAB
FLUAV RNA SPEC QL NAA+PROBE: NEGATIVE
FLUBV RNA SPEC QL NAA+PROBE: NEGATIVE
GAMMA INTERFERON BACKGROUND BLD IA-ACNC: 0.04 IU/ML
M TB IFN-G BLD-IMP: NEGATIVE
M TB IFN-G CD4+ BCKGRND COR BLD-ACNC: 0.03 IU/ML
MITOGEN IGNF BCKGRD COR BLD-ACNC: >10 IU/ML
QFT TB2 - NIL TBQ2: 0.06 IU/ML
RSV RNA SPEC QL NAA+PROBE: NEGATIVE
SARS-COV-2 RNA RESP QL NAA+PROBE: NOTDETECTED
SPECIMEN SOURCE: NORMAL

## 2023-06-16 PROCEDURE — 99232 SBSQ HOSP IP/OBS MODERATE 35: CPT | Performed by: PHYSICAL MEDICINE & REHABILITATION

## 2023-06-16 PROCEDURE — 0241U HCHG SARS-COV-2 COVID-19 NFCT DS RESP RNA 4 TRGT MIC: CPT

## 2023-06-16 PROCEDURE — 700111 HCHG RX REV CODE 636 W/ 250 OVERRIDE (IP): Performed by: PHYSICAL MEDICINE & REHABILITATION

## 2023-06-16 PROCEDURE — 700102 HCHG RX REV CODE 250 W/ 637 OVERRIDE(OP): Performed by: PHYSICAL MEDICINE & REHABILITATION

## 2023-06-16 PROCEDURE — 700102 HCHG RX REV CODE 250 W/ 637 OVERRIDE(OP): Performed by: HOSPITALIST

## 2023-06-16 PROCEDURE — 770010 HCHG ROOM/CARE - REHAB SEMI PRIVAT*

## 2023-06-16 PROCEDURE — 97110 THERAPEUTIC EXERCISES: CPT

## 2023-06-16 PROCEDURE — 94760 N-INVAS EAR/PLS OXIMETRY 1: CPT

## 2023-06-16 PROCEDURE — A9270 NON-COVERED ITEM OR SERVICE: HCPCS | Performed by: PHYSICAL MEDICINE & REHABILITATION

## 2023-06-16 PROCEDURE — A9270 NON-COVERED ITEM OR SERVICE: HCPCS | Performed by: HOSPITALIST

## 2023-06-16 RX ORDER — AMLODIPINE BESYLATE 10 MG/1
10 TABLET ORAL DAILY
Qty: 30 TABLET | Status: SHIPPED
Start: 2023-06-17 | End: 2023-06-19 | Stop reason: SDUPTHER

## 2023-06-16 RX ADMIN — AMLODIPINE BESYLATE 10 MG: 5 TABLET ORAL at 05:36

## 2023-06-16 RX ADMIN — LOVASTATIN 20 MG: 20 TABLET ORAL at 20:25

## 2023-06-16 RX ADMIN — DEXLANSOPRAZOLE 60 MG: 60 CAPSULE, DELAYED RELEASE ORAL at 20:25

## 2023-06-16 RX ADMIN — POLYETHYLENE GLYCOL 3350 1 PACKET: 17 POWDER, FOR SOLUTION ORAL at 09:28

## 2023-06-16 RX ADMIN — DULOXETINE HYDROCHLORIDE 30 MG: 30 CAPSULE, DELAYED RELEASE ORAL at 09:28

## 2023-06-16 RX ADMIN — Medication 4.5 MG: at 20:25

## 2023-06-16 RX ADMIN — ENOXAPARIN SODIUM 40 MG: 100 INJECTION SUBCUTANEOUS at 17:59

## 2023-06-16 ASSESSMENT — PAIN DESCRIPTION - PAIN TYPE: TYPE: ACUTE PAIN

## 2023-06-16 NOTE — CARE PLAN
Problem: Fall Risk - Rehab  Goal: Patient will remain free from falls  Outcome: Progressing   The patient is Watcher - Medium risk of patient condition declining or worsening    Shift Goals  Clinical Goals: Safety  Patient Goals: Safety

## 2023-06-16 NOTE — PROGRESS NOTES
"  Physical Medicine & Rehabilitation Progress Note    Encounter Date: 6/16/2023    Chief Complaint: Decreased mobility, hypotension    Interval Events (Subjective):  Patient sitting up in room. She reports therapy is going well. She denies pain. Her SBP is into 150s. Discussed to follow-up PCP. Discussed may have a room tomorrow. Will need COVID screen. Denies NVD.     _____________________________________  Interdisciplinary Team Conference   Most recent IDT on 6/13/2023    Discharge Date/Disposition:  6/14/23 -> 6/15?  _____________________________________      Objective:  VITAL SIGNS: BP (!) 159/64   Pulse 66   Temp 36.3 °C (97.4 °F) (Oral)   Resp 16   Ht 1.6 m (5' 3\")   Wt 59.9 kg (132 lb)   LMP  (LMP Unknown)   SpO2 97%   BMI 23.38 kg/m²   Gen: NAD  Psych: Mood and affect appropriate  CV: RRR, 0 edema  Resp: CTAB, no upper airway sounds  Abd: NTND  Neuro: AOx4, following commands    Laboratory Values:  Recent Results (from the past 72 hour(s))   CoV-2, Flu A/B, And RSV by PCR ([employer])    Collection Time: 06/15/23  5:10 PM    Specimen: Nasopharyngeal; Respirate   Result Value Ref Range    Influenza virus A RNA Negative Negative    Influenza virus B, PCR Negative Negative    RSV, PCR Negative Negative    SARS-CoV-2 by PCR NotDetected     SARS-CoV-2 Source NP Swab          Medications:  Scheduled Medications   Medication Dose Frequency    amLODIPine  10 mg Q DAY    Dexlansoprazole  60 mg Q EVENING    polyethylene glycol/lytes  1 Packet DAILY    DULoxetine  30 mg DAILY    enoxaparin (LOVENOX) injection  40 mg DAILY AT 1800    lovastatin  20 mg Nightly    melatonin  4.5 mg QHS     PRN medications: senna-docusate **AND** [DISCONTINUED] polyethylene glycol/lytes **AND** magnesium hydroxide **AND** bisacodyl, Respiratory Therapy Consult, hydrALAZINE, acetaminophen, mag hydrox-al hydrox-simeth, ondansetron **OR** ondansetron, traZODone, sodium chloride, traMADol, gabapentin    Diet:  Current Diet Order "   Procedures    Diet Order Diet: Level 7 - Easy to Chew (Chopped meats; meds whole 1 at a time with thin); Liquid level: Level 0 - Thin       Medical Decision Making and Plan:  Debility - Patient with decreased mobility and weakness 2/2 orthostatic hypotension, low cortisol and multiple falls  -PT and OT for mobility and ADLs. Per guidelines, 15 hours per week between PT, OT and/or SLP.  -Follow-up PCP     Dysphagia - Patient with new dysphagia of choking on foods. Consult SLP. Will check CXR. Patient at high risk for further decline and respiratory failure due to age and debility.   -Choking event 6/1 with dinner. Repeat CXR, AM labs. CXR stable. Passed swallow    HTN - Patient on Lisinopril. Was previously having orthostatic hypotension requiring Midodrine and Florinef. Will monitor  -Severe orthostatic hypotension on admission into the 70s. Start IV fluids,consult hospitalist and monitor. May need to restart Florinef. Will check CXR. High risk for falls and further injury. Will recheck labs including cortisol per hospitalist.  -Elevated SBP on 6/5, increase Amlodipine to 10, will monitor for low SBP. Midodrine discontinued.   -Continue Amlodipine, OK to have -150s      HLD - Patient on Lovastatin 20 mg QHS. Continue Lovastatin     Hypocalcemia - Check AM CMP. Stable     Low Cortisol - Negative ACTH test. Will monitor      Anemia - 11.9 on 6/8/23, improving.     PAD - Aortic stenosis on CTA of abdomen. Follow-up Vascular     Depression - Patient on Cymbalta 30 mg daily. Continue Cymbalta     Pain - APAP/Tramadol PRN     Skin - Patient at risk for skin breakdown due to debility in areas including sacrum, achilles, elbows and head in addition to other sites. Nursing to assess skin daily.      GI Ppx - Patient on Prilosec for GERD prophylaxis. Patient on Senna-docusate for constipation prophylaxis.   -Switch to Dexlansoprazole. Continue Dexlansoprazole 60 mg QHS     DVT Ppx - Patient Lovenox on  transfer.    Dispo - Patient and  to evaluate [facility] level of care. Short extension while daughters arrange care. Possible room available 6/17  ____________________________________    T. Toño Will MD/PhD  [facility] - Physical Medicine & Rehabilitation   [facility] - Brain Injury Medicine   ____________________________________

## 2023-06-16 NOTE — THERAPY
Occupational Therapy   Discharge Summary     Patient Name: Rashmi Parra  Age:  83 y.o., Sex:  female  Medical Record #: [id number]  Today's Date: 6/16/2023     Precautions  Precautions: Fall Risk  Comments: BP Left LE    Subjective    Patient seated in w/c upon arrival, benefited from encouragement to participate.      Objective     06/15/23 1101   OT Charge Group   OT Self Care / ADL (Units) 2   OT Total Time Spent   OT Individual Total Time Spent (Mins) 30   Precautions   Precautions Fall Risk   Comments BP Left LE   Vitals   O2 (LPM) 2   O2 Delivery Device Nasal Cannula   Cognition    Level of Consciousness Alert   Sleep/Wake Cycle   Sleep & Rest Awake   Functional Level of Assist   Bathing Contact Guard Assist   Upper Body Dressing Supervision   Lower Body Dressing Minimal Assist   Tub / Shower Transfers Contact Guard Assist   Interdisciplinary Plan of Care Collaboration   IDT Collaboration with  Certified Nursing Assistant   Patient Position at End of Therapy Seated   Collaboration Comments Transferred care to CNA   Eating   Assistance Needed Independent   CARE Score - Eating 6   Oral Hygiene   Assistance Needed Supervision   Physical Assistance Level No physical assistance   CARE Score - Oral Hygiene 4   Toileting Hygiene   Assistance Needed Supervision   CARE Score - Toileting Hygiene 4   Shower/Bathe Self   Assistance Needed Incidental touching   CARE Score - Shower/Bathe Self 4   Upper Body Dressing   Assistance Needed Set-up / clean-up   CARE Score - Upper Body Dressing 5   Lower Body Dressing   Assistance Needed Incidental touching   CARE Score - Lower Body Dressing 4   Putting On/Taking Off Footwear   Assistance Needed Set-up / clean-up   CARE Score - Putting On/Taking Off Footwear 5   Toilet Transfer   Assistance Needed Incidental touching   CARE Score - Toilet Transfer 4   Cognitive Pattern Assessment   Cognitive Pattern Assessment Used BIMS   Brief Interview for Mental Status (BIMS)   Repetition of  "Three Words (First Attempt) 3   Temporal Orientation: Year Correct   Temporal Orientation: Month Accurate within 5 days   Temporal Orientation: Day Correct   Recall: \"Sock\" Yes, no cue required   Recall: \"Blue\" Yes, no cue required   Recall: \"Bed\" Yes, no cue required   BIMS Summary Score 15   Confusion Assessment Method (CAM)   Is there evidence of an acute change in mental status from the patient's baseline? No   Inattention Behavior not present   Disorganized thinking Behavior not present   Altered level of consciousness Behavior not present   Discharge Summary    Discharge Location  Assisted Living   Patient Discharging with Assist of Paid Caregiver  ([facility] staff)   Level of Supervision Required Intermittent Supervision   Recommended Services Upon Discharge Home Health Occupational Therapy   Long Term Goals Met 0   Long Term Goals Not Met 2   Reason(s) for Goals Not Met safety, balance   Criteria for Termination of Services Maximum Function Achieved for Inpatient Rehabilitation   Discharge Instructions to Patient   Level of Assist Required for Eating Able to Complete Eating without Assist   Level of Assist Required for Grooming Able to Complete Grooming without Assist   Level of Assist Required for Dressing Requires Supervision with Dressing   Level of Assist Required for Toileting Requires Supervision with Toileting   Level of Assist Required for Toilet Transfer Requires Supervision with Toilet Transfer   Level of Assist Required for Bathing Requires Supervision with Bathing   Equipment for Bathing Shower Chair;Grab Bars in Tub / Shower;Hand Held Shower Head   Level of Assist Required for Shower Transfer Requires Supervision with Shower Transfer   Driving Please Contact Physician Prior to Driving       Assessment    Patient tolerated OT session fair with focus on ADLs. Increased time and encouragement provided to maximize functional performance.   Strengths: Able to follow instructions, Alert and oriented, " Manages pain appropriately, Motivated for self care and independence, Pleasant and cooperative, Willingly participates in therapeutic activities  Barriers: Decreased endurance, Generalized weakness, Fatigue, Hypotension, Orthostatic hypotension, Impaired activity tolerance, Impaired balance, Limited mobility    Plan  Anticipate DC Saturday    Occupational Therapy Goals (Active)       Problem: Dressing       Dates: Start:  06/13/23         Goal: STG-Within one week, patient will dress LB with min A        Dates: Start:  06/13/23               Problem: Functional Transfers       Dates: Start:  06/13/23         Goal: STG-Within one week, patient will transfer to toilet with supervision using grab bar       Dates: Start:  06/13/23               Problem: Toileting       Dates: Start:  06/13/23         Goal: STG-Within one week, patient will complete toileting tasks with supervision using grab bar       Dates: Start:  06/13/23

## 2023-06-16 NOTE — THERAPY
"Speech Language Pathology  Daily Treatment     Patient Name: Rashmi Parra  Age:  83 y.o., Sex:  female  Medical Record #: [id number]  Today's Date: 6/15/2023     Precautions  Precautions: Fall Risk  Comments: BP Left LE    Subjective    Patient agreeable to therapy. Demonstrates low initiation.     Objective       06/15/23 1301   Treatment Charges   SLP Cognitive Skill Development First 15 Minutes 1   SLP Cognitive Skill Development Additional 15 Minutes 1   SLP Total Time Spent   SLP Individual Total Time Spent (Mins) 30   Cognition   Executive Functioning / Organization Supervision (5)   Functional Sequencing for ADL / IADLs Supervision (5)   Cognitive Pattern Assessment   Cognitive Pattern Assessment Used BIMS   Brief Interview for Mental Status (BIMS)   Repetition of Three Words (First Attempt) 3   Temporal Orientation: Year Correct   Temporal Orientation: Month Accurate within 5 days   Temporal Orientation: Day Correct   Recall: \"Sock\" Yes, no cue required   Recall: \"Blue\" Yes, no cue required   Recall: \"Bed\" Yes, no cue required   BIMS Summary Score 15   Confusion Assessment Method (CAM)   Is there evidence of an acute change in mental status from the patient's baseline? No   Inattention Behavior not present   Disorganized thinking Behavior not present   Altered level of consciousness Behavior not present   Discharge Summary    Progress since Admit Patient has made slow improvement toward ST goals.  She is currently safely swallowing (7) regular easy chew and (0) thin liquids with medications one at a time.  Patient is able to recall new information with set up with 2+ hour delay.  However, she does need assistance with medication, financial and health care management.   Discharge Location  Assisted Living   Patient Discharging with Assist of Paid Caregiver   Level of Supervision Required Intermittent Supervision  (assistance with medication, financial and health care management recommended.)   Recommended " Services Upon Discharge Home Health Speech Therapy   Long Term Goals Met patient will safely swallow level 6 textures and thin liquids without aspiration.  Exceeded with (7) regular easy chew.   Long Term Goals Not Met patient will solve complex problems related to IADL's in order to d/c home with SPV   Reason(s) for Goals Not Met Low task endurance, impaired initiation, slow speed of processing, impaired attention.   Criteria for Termination of Services Maximum Function Achieved for Inpatient Rehabilitation   Discharge Instructions   Diet Regular - Easy to Chew (7);Thin (0)  (medications one at a time.)   Supervision Please refer to PT/OT recommendations for supervision needs during activities and when on feet.  Recommend assistance with medication, finanical and healthcare managment.   Cognition / Communication Help patient improve independence for memory by giving him/her enough extra time to process.  Talk together about potential challenges to transfers and ambulation before performing them, and review strategies for safety.  Use RWAVS memory strategies to reinforce new learning.  R - repeat, repeat, repeat.   W - write it down or get it in writing.   A - associate the new information with something that you already know very well.   V - visualize it, practice in your mind's eye, when you aren't able to perform the action physically.  S - say it back, out loud.  This benefits you, as you repeat the information for practice. You also are seeking clarification from the educator, evaluating to see if all of the information was understood, and prompting feedback if needed.  And it slows down the exchange, buying extra time to process the information.         Assessment    Patient needs min A to spv/set up to recall new information.  Needs assistance with medication management.    Strengths: Able to follow instructions, Alert and oriented, Effective communication skills, Making steady progress towards goals,  Independent prior level of function, Motivated for self care and independence, Supportive family, Pleasant and cooperative, Willingly participates in therapeutic activities  Barriers:  (STM)    Plan      Passport items to be completed:  Express basic needs, understand food/liquid recommendations, consistently follow swallow precautions, manage finances, manage medications, arrive to therapy appointments on time, complete daily memory log entries, solve problems related to safety situations, review education related to hospitalization, complete caregiver training     Speech Therapy Problems (Active)       Problem: Memory STGs       Dates: Start:  06/07/23         Goal: STG-Within one week, patient will utilize memory strategies in order to recall safety precautions and information from therapies after a 2 hr delay.        Dates: Start:  06/07/23         Goal Note filed on 06/13/23 1156 by Elizabeth Cornejo MS,CCC-SLP       Will continue to target.                  Problem: Speech/Swallowing LTGs       Dates: Start:  06/02/23         Goal: LTG-By discharge, patient will safely swallow level 6 textures and thin liquids without aspiration.         Dates: Start:  06/02/23            Goal: LTG-By discharge, patient will solve complex problems related to IADL's in order to d/c home with SPV       Dates: Start:  06/07/23               Problem: Swallowing STGs       Dates: Start:  06/02/23

## 2023-06-16 NOTE — DISCHARGE SUMMARY
Physical Medicine & Rehabilitation Discharge Summary    Admission Date: 5/31/2023    Discharge Date: 6/17/2023     Attending Provider: Dr. Wilber Martin DO for Stella Will MD/PhD    Admission Diagnosis:   Active Hospital Problems    Diagnosis     *Orthostatic hypotension     GERD (gastroesophageal reflux disease)     Essential hypertension     Depression     Dyslipidemia        Discharge Diagnosis:  Active Hospital Problems    Diagnosis     *Orthostatic hypotension     GERD (gastroesophageal reflux disease)     Essential hypertension     Depression     Dyslipidemia        HPI per Admission History & Physical:  The patient is a 83 y.o. female with a past medical history of chronic hypoxia on 3 L, orthostatic hypotension, depresion, and breast cancer s/p mastectomy and radiation;  who presented on 5/25/2023  7:35 PM with after a fall/syncopal episode. Per documentation, patient reported atttempt to stand to walk to the bathroom when she became lightheaded and fell back, hitting her head. Does not know if she LOC. Of note, patient has had 20 lb weight loss over the last year. Upon eval in the ED CT head was negative for acute process, CXR showed chronic underlying lung disease, she has required 2 L o2. Echo obtained on 5/26  shows EF 75%.  BNP up to 385. Hospital course has been notable for orthostatic hypotension. Cortisol level 2.7, adrenal insufficiency work-up negative and was taken off of steroid support. Patient has been able to participate with therapy, but she was limited by fatigue and orthostatic hypotension. Patient has been weaned from Midodrine and Florinef. She was found to have significant aortic stenosis in the lower abdomen on CT scan so outpatient referral to Vascular was made.    Patient was admitted to [facility] on 5/31/2023.     Hospital Course by Problem List:  Debility - Patient with decreased mobility and weakness 2/2 orthostatic hypotension, low cortisol and  multiple falls  -PT and OT for mobility and ADLs. Per guidelines, 15 hours per week between PT, OT and/or SLP.  -Follow-up PCP      Dysphagia - Patient with new dysphagia of choking on foods. Consult SLP. Will check CXR. Patient at high risk for further decline and respiratory failure due to age and debility.   -Choking event 6/1 with dinner. Repeat CXR, AM labs. CXR stable. Passed swallow     HTN - Patient on Lisinopril. Was previously having orthostatic hypotension requiring Midodrine and Florinef. Will monitor  -Severe orthostatic hypotension on admission into the 70s. Start IV fluids,consult hospitalist and monitor. May need to restart Florinef. Will check CXR. High risk for falls and further injury. Will recheck labs including cortisol per hospitalist.  -Elevated SBP on 6/5, increase Amlodipine to 10, will monitor for low SBP. Midodrine discontinued.   -Continue Amlodipine, OK to have -150s      HLD - Patient on Lovastatin 20 mg QHS. Continue Lovastatin     Hypocalcemia - Check AM CMP. Stable     Low Cortisol - Negative ACTH test. Will monitor      Anemia - 11.9 on 6/8/23, improving.      PAD - Aortic stenosis on CTA of abdomen. Follow-up Vascular     Depression - Patient on Cymbalta 30 mg daily. Continue Cymbalta     Pain - APAP/Tramadol PRN     Skin - Patient at risk for skin breakdown due to debility in areas including sacrum, achilles, elbows and head in addition to other sites. Nursing to assess skin daily.      GI Ppx - Patient on Prilosec for GERD prophylaxis. Patient on Senna-docusate for constipation prophylaxis.   -Switch to Dexlansoprazole. Continue Dexlansoprazole 60 mg QHS     DVT Ppx - Patient Lovenox on transfer.     Dispo - Patient and  to evaluate shelter level of care. Short extension while daughters arrange care. Possible room available 6/17    Functional Status at Discharge  Eating:  Independent (extra time   distracted by conversation  with table mate)  Eating Description:   Increased time, Modified diet, Supervision for safety  Grooming:  Supervision (seated and standing at sink)  Grooming Description:  Supervision for safety, Verbal cueing, Seated in wheelchair at sink  Bathing:  Contact Guard Assist  Bathing Description:  Grab bar, Hand held shower, Assit with perineal, Increased time, Set up for shower sleeve, Set-up of equipment, Supervision for safety  Upper Body Dressing:  Supervision  Upper Body Dressing Description:  Increased time, Initial preparation for task, Set-up of equipment, Supervision for safety  Lower Body Dressing:  Minimal Assist  Lower Body Dressing Description:  Assistive devices, Shoe horn, Increased time, Initial preparation for task, Set-up of equipment, Supervision for safety, Verbal cueing (CGA-min a for balance during standing with FWW)  Discharge Location : Assisted Living  Patient Discharging with Assist of: Paid Caregiver ([facility] staff)  Level of Supervision Required: Intermittent Supervision  Recommended Services Upon Discharge: Home Health Occupational Therapy  Long Term Goals Met: 0  Long Term Goals Not Met: 2  Reason(s) for Goals Not Met: safety, balance  Criteria for Termination of Services: Maximum Function Achieved for Inpatient Rehabilitation  Walk:  Standby Assist  Distance Walked:  160  Number of Times Distance Was Traveled:  1 (2 x 50' c/ turns)  Assistive Device:  Front Wheel Walker  Gait Deviation:  Decreased Heel Strike, Decreased Toe Off (cues to attend to task)  Wheelchair:  Total Assist  Distance Propelled:  10   Wheelchair Description:  Extra time, Assistance with steering, Leg rest management (limited assessment due to needing to use bathroom)  Stairs Contact Guard Assist  Stairs Description Extra time, Hand rails, Supervision for safety     Comprehension:  Modified Independent  Comprehension Description:  Glasses  Expression:  Independent  Expression Description:     Social Interaction:  Modified Independent  Social Interaction  Description:  Medication  Problem Solving:  Supervision  Problem Solving Description:  Verbal cueing, Therapy schedule, Seat belt, Increased time  Memory:  Minimal Assist  Memory Description:  Verbal cueing, Therapy schedule, Seat belt, Bed/chair alarm  Progress since Admit: Patient has made slow improvement toward ST goals.  She is currently safely swallowing (7) regular easy chew and (0) thin liquids with medications one at a time.  Patient is able to recall new information with set up with 2+ hour delay.  However, she does need assistance with medication, financial and health care management.  Discharge Location : Assisted Living  Patient Discharging with Assist of: Paid Caregiver  Level of Supervision Required: Intermittent Supervision (assistance with medication, financial and health care management recommended.)  Recommended Services Upon Discharge: Home Health Speech Therapy  Long Term Goals Met: patient will safely swallow level 6 textures and thin liquids without aspiration.  Exceeded with (7) regular easy chew.  Long Term Goals Not Met: patient will solve complex problems related to IADL's in order to d/c home with SPV  Reason(s) for Goals Not Met: Low task endurance, impaired initiation, slow speed of processing, impaired attention.  Criteria for Termination of Services: Maximum Function Achieved for Inpatient Rehabilitation    Stella DAVIS M.D., personally performed a complete drug regimen review and no potential clinically significant medication issues were identified.   Discharge Medication:     Medication List        START taking these medications        Instructions   amLODIPine 10 MG Tabs  Start taking on: June 17, 2023  Commonly known as: NORVASC   Take 1 Tablet by mouth every day.  Dose: 10 mg            CONTINUE taking these medications        Instructions   acetaminophen 500 MG Tabs  Commonly known as: TYLENOL   Take 1-2 Tablets by mouth every 6 hours as needed.  Dose: 500-1,000  mg     anastrozole 1 MG Tabs  Commonly known as: Arimidex   Take 1 mg by mouth every day.  Dose: 1 mg     Dexlansoprazole 60 MG Cpdr delayed-release capsule   Take 1 Capsule by mouth every evening.  Dose: 1 Capsule     DULoxetine 30 MG Cpep  Commonly known as: CYMBALTA   Take 1 Capsule by mouth every day.  Dose: 30 mg     gabapentin 100 MG Caps  Commonly known as: NEURONTIN   Take 1 Capsule by mouth at bedtime as needed (nerve pain).  Dose: 100 mg     lovastatin 20 MG Tabs  Commonly known as: MEVACOR   Take 20 mg by mouth every evening.  Dose: 20 mg     melatonin 5 mg Tabs   Take 5 mg by mouth at bedtime.  Dose: 5 mg            STOP taking these medications      metFORMIN 500 MG Tabs  Commonly known as: GLUCOPHAGE     Myrbetriq 50 MG Tb24  Generic drug: Mirabegron ER              Discharge Diet:  Current Diet Order   Procedures    Diet Order Diet: Level 7 - Easy to Chew (Chopped meats; meds whole 1 at a time with thin); Liquid level: Level 0 - Thin       Discharge Activity:  As tolerated     Disposition:  Patient to discharge home with family support and community resources.    Equipment:  FWW    Follow-up & Discharge Instructions:  Follow up with your primary care provider (PCP) within 7-10 days of discharge to review your medications and take over your care.     If you develop chest pain, fever, chills, change in neurologic function (weakness, sensation changes, vision changes), or other concerning sxs, seek immediate medical attention or call 911.      Future Appointments   Date Time Provider Department Center   6/26/2023  1:00 PM Gerardo Brand M.D. [facility] None   6/29/2023  4:20 PM DAYANA Zaman [facility] DEBORAH [place]   7/5/2023  1:15 PM [facility] EXAM 9 ECHO [facility] [place]   7/18/2023  3:15 PM ALBERTO HOFFMAN BD 1 [facility]   9/5/2023  2:30 PM PFT-RM2 [facility] None   9/5/2023  3:30 PM Leona Osorio M.D. [facility] None       Condition on Discharge:  Fair    More than 36 minutes was spent on discharging  this patient, including face-to-face time, prescription management, and the dictation of this note.    Haider Martin D.O.    Date of Service: 6/17/2023

## 2023-06-16 NOTE — DISCHARGE INSTRUCTIONS
Occupational Therapy Discharge Instructions for Rashmi Parra    6/16/2023    Level of Assist Required for Eating: Able to Complete Eating without Assist  Level of Assist Required for Grooming: Able to Complete Grooming without Assist  Level of Assist Required for Dressing: Requires Supervision with Dressing  Level of Assist Required for Toileting: Requires Supervision with Toileting  Level of Assist Required for Toilet Transfer: Requires Supervision with Toilet Transfer  Level of Assist Required for Bathing: Requires Supervision with Bathing  Equipment for Bathing: Shower Chair, Grab Bars in Tub / Shower, Hand Held Shower Head  Level of Assist Required for Shower Transfer: Requires Supervision with Shower Transfer  Driving: Please Contact Physician Prior to Driving    Speech Therapy Discharge Instructions for Aleyda Parra    6/18/2023    Diet: Regular (7), Thin (0)  Swallow Strategies: small bites/sips, alternate bites/sips, eat slowly  Supervision: Please refer to PT/OT recommendations for supervision needs during activities and when on feet.  Recommend assistance with medication, finanical and healthcare managment.  Cognition / Communication: Recommend supervision for medication and financial management.  Recommend use of memory book to aid in recall.  It was a pleasure working with you.  We wish you the best in your continued recovery!  Robert Dobbs M.S. CCC-SLP

## 2023-06-16 NOTE — FLOWSHEET NOTE
06/16/23 0708   Events/Summary/Plan   Events/Summary/Plan 02 pulse ox check. Appears to be sleeping   Vital Signs   Pulse 66   Respiration 16   Pulse Oximetry 97 %   $ Pulse Oximetry (Spot Check) Yes   Respiratory Assessment   Level of Consciousness Responds to voice   Chest Exam   Work Of Breathing / Effort Within Normal Limits   Oxygen   O2 (LPM) 2   O2 Delivery Device Silicone Nasal Cannula

## 2023-06-16 NOTE — THERAPY
"Occupational Therapy  Daily Treatment     Patient Name: Rashmi Parra  Age:  83 y.o., Sex:  female  Medical Record #: [id number]  Today's Date: 6/16/2023     Precautions  Precautions: Fall Risk  Comments: BP Left LE         Subjective    \"I was supposed to go the facility today, but I guess it's tomorrow.\" Referring to RIMA      Objective       06/16/23 1345   OT Charge Group   OT Therapeutic Exercise (Units) 1   OT Total Time Spent   OT Individual Total Time Spent (Mins) 15   Vitals   O2 (LPM) 2   O2 Delivery Device Silicone Nasal Cannula   Pain   Intervention Declines   Sitting Lower Body Exercises   Nustep Resistance Level 3  (12'15\" for 732 steps with use of BUE/BLE for endurance and strengthening.)   Interdisciplinary Plan of Care Collaboration   Patient Position at End of Therapy Seated;Chair Alarm On;Self Releasing Lap Belt Applied;Call Light within Reach;Tray Table within Reach;Phone within Reach         Assessment    Pt tolerated tx session well, no reports of pain or fatigue.  Strengths: Able to follow instructions, Alert and oriented, Manages pain appropriately, Motivated for self care and independence, Pleasant and cooperative, Willingly participates in therapeutic activities  Barriers: Decreased endurance, Generalized weakness, Fatigue, Hypotension, Orthostatic hypotension, Impaired activity tolerance, Impaired balance, Limited mobility    Plan    DC to [facility] tomorrow.      Occupational Therapy Goals (Active)       Problem: Dressing       Dates: Start:  06/13/23         Goal: STG-Within one week, patient will dress LB with min A        Dates: Start:  06/13/23               Problem: Functional Transfers       Dates: Start:  06/13/23         Goal: STG-Within one week, patient will transfer to toilet with supervision using grab bar       Dates: Start:  06/13/23               Problem: Toileting       Dates: Start:  06/13/23         Goal: STG-Within one week, patient will complete toileting tasks with " supervision using grab bar       Dates: Start:  06/13/23

## 2023-06-16 NOTE — DISCHARGE PLANNING
Cm  PT TO DC TO [facility] ON Saturday 6/17 AFTER LUNCH.  [facility] WILL PROVIDE TRANSPORTATION.  COVID TEST NEEDS TO BE DONE PRIOR TO DC.   WHEELCHAIR /CUSHION ORDERED; WILL BE DELIVERED BY TOMORROW AM. HOME HEALTH ARRANGED.

## 2023-06-17 VITALS
OXYGEN SATURATION: 96 % | HEART RATE: 73 BPM | TEMPERATURE: 97.1 F | DIASTOLIC BLOOD PRESSURE: 71 MMHG | SYSTOLIC BLOOD PRESSURE: 152 MMHG | WEIGHT: 132 LBS | HEIGHT: 63 IN | BODY MASS INDEX: 23.39 KG/M2 | RESPIRATION RATE: 16 BRPM

## 2023-06-17 PROCEDURE — 99239 HOSP IP/OBS DSCHRG MGMT >30: CPT | Performed by: PHYSICAL MEDICINE & REHABILITATION

## 2023-06-17 PROCEDURE — 97530 THERAPEUTIC ACTIVITIES: CPT

## 2023-06-17 PROCEDURE — A9270 NON-COVERED ITEM OR SERVICE: HCPCS | Performed by: PHYSICAL MEDICINE & REHABILITATION

## 2023-06-17 PROCEDURE — 700102 HCHG RX REV CODE 250 W/ 637 OVERRIDE(OP): Performed by: PHYSICAL MEDICINE & REHABILITATION

## 2023-06-17 PROCEDURE — 700102 HCHG RX REV CODE 250 W/ 637 OVERRIDE(OP): Performed by: HOSPITALIST

## 2023-06-17 PROCEDURE — 94760 N-INVAS EAR/PLS OXIMETRY 1: CPT

## 2023-06-17 PROCEDURE — A9270 NON-COVERED ITEM OR SERVICE: HCPCS | Performed by: HOSPITALIST

## 2023-06-17 RX ADMIN — POLYETHYLENE GLYCOL 3350 1 PACKET: 17 POWDER, FOR SOLUTION ORAL at 09:14

## 2023-06-17 RX ADMIN — AMLODIPINE BESYLATE 10 MG: 5 TABLET ORAL at 05:38

## 2023-06-17 RX ADMIN — DULOXETINE HYDROCHLORIDE 30 MG: 30 CAPSULE, DELAYED RELEASE ORAL at 09:14

## 2023-06-17 NOTE — CARE PLAN
The patient is Watcher - Medium risk of patient condition declining or worsening    Shift Goals  Clinical Goals: Safety  Patient Goals: Safety    Problem: Fall Risk - Rehab  Goal: Patient will remain free from falls  Outcome: Progressing

## 2023-06-17 NOTE — PROGRESS NOTES
Patient discharged to [facility] per order.  Reviewed all discharge instructions, appointments and discharge medications with patient and They verbalize understanding.  Discharge paperwork completed, signed copies in chart. Patient alert, calm, stable: no change in status from morning assessment.  Patient left facitlity at 1415 accompanied by family and rehab staff.  Have enjoyed working with this pleasant patient.

## 2023-06-17 NOTE — THERAPY
"Physical Therapy   Daily Treatment     Patient Name: Rashmi Parra  Age:  83 y.o., Sex:  female  Medical Record #: [id number]  Today's Date: 6/17/2023     Precautions  Precautions: Fall Risk  Comments: BP Left LE    Subjective    PT contacted by RN and pt's daughter regarding delayed delivery of w/c and to assess loaner chair for suitability.      Objective       06/17/23 1445   PT Charge Group   PT Therapeutic Activities (Units) 1   PT Total Time Spent   PT Individual Total Time Spent (Mins) 15   Interdisciplinary Plan of Care Collaboration   IDT Collaboration with  Nursing;Family / Caregiver   Collaboration Comments w/c needs       Discussed pros/cons of hemiheight vs standard height w/c. Examined loaner chair and assisted pt to transfer to loaner chair at [facility]. Trialed STS from 18\" H loaner chair to assess for ease of transfer. Pt reports feeling more secure standing from 18\" H vs 16\" H chair.     Assessment    Aleyda would benefit from 18\"H manual w/c vs 16\" H. CM contacted to update recs.        Plan  Adjust w/c order from 16\"H x 16\"W to 18\"H x 16\"W manual w/c.     Physical Therapy Problems (Active)       Problem: Mobility       Dates: Start:  06/01/23         Goal: STG-Within one week, patient will ambulate up/down a curb with ModA or better, LRAD.       Dates: Start:  06/01/23         Goal Note filed on 06/06/23 0823 by Florence Issa, PT       Needs assessment.                  Problem: Mobility Transfers       Dates: Start:  06/01/23         Goal: STG-Within one week, patient will perform bed mobility with Liliam.        Dates: Start:  06/01/23         Goal Note filed on 06/06/23 0823 by Florence Isas, PT       Variable CGA <> Donald for LE management                  Problem: PT-Long Term Goals       Dates: Start:  06/01/23         Goal: LTG-By discharge, patient will ambulate >/= 100' in a single effort c/ LRAD at [facility] or better.        Dates: Start:  06/01/23            Goal: LTG-By discharge, patient will transfer " one surface to another c/ Liliam or better.        Dates: Start:  06/01/23            Goal: LTG-By discharge, patient will ambulate up/down 2 stairs c/ BHR to safely access home environment at [facility] or better.        Dates: Start:  06/01/23            Goal: LTG-By discharge, patient will transfer in/out of a car with [place] or better, LRAD.        Dates: Start:  06/01/23

## 2023-06-17 NOTE — FLOWSHEET NOTE
06/17/23 0956   Events/Summary/Plan   Events/Summary/Plan spot check done pt doing well, going home today   Skin Inspection Respiratory Device Intact   Location ears   Protective Device Foam Pads   Vital Signs   Pulse 73   Respiration 16   Pulse Oximetry 96 %   $ Pulse Oximetry (Spot Check) Yes   Respiratory Assessment   Respiratory Pattern Within Normal Limits   Level of Consciousness Alert   Chest Exam   Work Of Breathing / Effort Within Normal Limits   Oxygen   O2 (LPM) 3   O2 Delivery Device Nasal Cannula

## 2023-06-19 ENCOUNTER — PATIENT OUTREACH (OUTPATIENT)
Dept: MEDICAL GROUP | Facility: MEDICAL CENTER | Age: 83
End: 2023-06-19
Payer: MEDICARE

## 2023-06-19 DIAGNOSIS — I10 ESSENTIAL HYPERTENSION: ICD-10-CM

## 2023-06-19 RX ORDER — AMLODIPINE BESYLATE 10 MG/1
10 TABLET ORAL DAILY
Qty: 90 TABLET | Refills: 2 | Status: ON HOLD | OUTPATIENT
Start: 2023-06-19 | End: 2023-07-04 | Stop reason: SDUPTHER

## 2023-06-19 NOTE — PROGRESS NOTES
6/19: 1st Patient outreach for TCM.  LVM with contact information.  6/20: 2nd Patient outreach for TCM.  LVM with contact information.

## 2023-06-22 ENCOUNTER — TELEPHONE (OUTPATIENT)
Dept: MEDICAL GROUP | Facility: MEDICAL CENTER | Age: 83
End: 2023-06-22
Payer: MEDICARE

## 2023-06-22 NOTE — TELEPHONE ENCOUNTER
Anita from [facility] pt,    They are requesting verbal orders for PT   2 x a week for 4 weeks followed by 1 x a week for 2 week      Call back number is [phone]

## 2023-06-26 ENCOUNTER — APPOINTMENT (RX ONLY)
Dept: URBAN - METROPOLITAN AREA CLINIC 35 | Facility: CLINIC | Age: 83
Setting detail: DERMATOLOGY
End: 2023-06-26

## 2023-06-26 ENCOUNTER — OFFICE VISIT (OUTPATIENT)
Dept: CARDIOLOGY | Facility: MEDICAL CENTER | Age: 83
End: 2023-06-26
Attending: INTERNAL MEDICINE
Payer: MEDICARE

## 2023-06-26 VITALS
HEART RATE: 82 BPM | BODY MASS INDEX: 23.92 KG/M2 | RESPIRATION RATE: 16 BRPM | WEIGHT: 135 LBS | OXYGEN SATURATION: 95 % | SYSTOLIC BLOOD PRESSURE: 110 MMHG | DIASTOLIC BLOOD PRESSURE: 68 MMHG | HEIGHT: 63 IN

## 2023-06-26 DIAGNOSIS — I10 ESSENTIAL HYPERTENSION: ICD-10-CM

## 2023-06-26 DIAGNOSIS — D22 MELANOCYTIC NEVI: ICD-10-CM

## 2023-06-26 DIAGNOSIS — L82.1 OTHER SEBORRHEIC KERATOSIS: ICD-10-CM

## 2023-06-26 DIAGNOSIS — I05.0 MODERATE MITRAL STENOSIS: ICD-10-CM

## 2023-06-26 DIAGNOSIS — L81.4 OTHER MELANIN HYPERPIGMENTATION: ICD-10-CM

## 2023-06-26 DIAGNOSIS — Z71.89 OTHER SPECIFIED COUNSELING: ICD-10-CM

## 2023-06-26 DIAGNOSIS — Z85.828 PERSONAL HISTORY OF OTHER MALIGNANT NEOPLASM OF SKIN: ICD-10-CM

## 2023-06-26 DIAGNOSIS — D18.0 HEMANGIOMA: ICD-10-CM

## 2023-06-26 DIAGNOSIS — E78.5 DYSLIPIDEMIA: ICD-10-CM

## 2023-06-26 DIAGNOSIS — L21.8 OTHER SEBORRHEIC DERMATITIS: ICD-10-CM

## 2023-06-26 PROBLEM — C44.729 SQUAMOUS CELL CARCINOMA OF SKIN OF LEFT LOWER LIMB, INCLUDING HIP: Status: ACTIVE | Noted: 2023-06-26

## 2023-06-26 PROBLEM — D22.5 MELANOCYTIC NEVI OF TRUNK: Status: ACTIVE | Noted: 2023-06-26

## 2023-06-26 PROBLEM — D18.01 HEMANGIOMA OF SKIN AND SUBCUTANEOUS TISSUE: Status: ACTIVE | Noted: 2023-06-26

## 2023-06-26 PROCEDURE — 17262 DSTRJ MAL LES T/A/L 1.1-2.0: CPT

## 2023-06-26 PROCEDURE — ? COUNSELING

## 2023-06-26 PROCEDURE — ? PRESCRIPTION

## 2023-06-26 PROCEDURE — ? CURETTAGE AND DESTRUCTION

## 2023-06-26 PROCEDURE — 99214 OFFICE O/P EST MOD 30 MIN: CPT | Performed by: INTERNAL MEDICINE

## 2023-06-26 PROCEDURE — 1170F FXNL STATUS ASSESSED: CPT | Performed by: INTERNAL MEDICINE

## 2023-06-26 PROCEDURE — ? TREATMENT REGIMEN

## 2023-06-26 PROCEDURE — ? OBSERVATION AND MEASURE

## 2023-06-26 PROCEDURE — ? MEDICATION COUNSELING

## 2023-06-26 PROCEDURE — 3078F DIAST BP <80 MM HG: CPT | Performed by: INTERNAL MEDICINE

## 2023-06-26 PROCEDURE — 99213 OFFICE O/P EST LOW 20 MIN: CPT | Mod: 25

## 2023-06-26 PROCEDURE — 99212 OFFICE O/P EST SF 10 MIN: CPT | Performed by: INTERNAL MEDICINE

## 2023-06-26 PROCEDURE — 3074F SYST BP LT 130 MM HG: CPT | Performed by: INTERNAL MEDICINE

## 2023-06-26 PROCEDURE — ? SUNSCREEN RECOMMENDATIONS

## 2023-06-26 ASSESSMENT — LOCATION SIMPLE DESCRIPTION DERM
LOCATION SIMPLE: RIGHT UPPER BACK
LOCATION SIMPLE: NOSE
LOCATION SIMPLE: LEFT FOREHEAD
LOCATION SIMPLE: LEFT UPPER ARM
LOCATION SIMPLE: LEFT UPPER BACK
LOCATION SIMPLE: ANTERIOR SCALP

## 2023-06-26 ASSESSMENT — LOCATION ZONE DERM
LOCATION ZONE: NOSE
LOCATION ZONE: FACE
LOCATION ZONE: TRUNK
LOCATION ZONE: ARM
LOCATION ZONE: SCALP

## 2023-06-26 ASSESSMENT — ENCOUNTER SYMPTOMS
DIZZINESS: 0
SHORTNESS OF BREATH: 0
PALPITATIONS: 0
LOSS OF CONSCIOUSNESS: 0
MYALGIAS: 0
COUGH: 0

## 2023-06-26 ASSESSMENT — LOCATION DETAILED DESCRIPTION DERM
LOCATION DETAILED: NASAL DORSUM
LOCATION DETAILED: RIGHT SUPERIOR UPPER BACK
LOCATION DETAILED: LEFT MID-UPPER BACK
LOCATION DETAILED: RIGHT SUPERIOR MEDIAL UPPER BACK
LOCATION DETAILED: LEFT ANTERIOR PROXIMAL UPPER ARM
LOCATION DETAILED: LEFT INFERIOR LATERAL FOREHEAD
LOCATION DETAILED: MID-FRONTAL SCALP

## 2023-06-26 ASSESSMENT — FIBROSIS 4 INDEX: FIB4 SCORE: 1.25

## 2023-06-26 NOTE — PROCEDURE: MEDICATION COUNSELING
High Dose Vitamin A Counseling: Side effects reviewed, pt to contact office should one occur.
Calcipotriene Counseling:  I discussed with the patient the risks of calcipotriene including but not limited to erythema, scaling, itching, and irritation.
Enbrel Pregnancy And Lactation Text: This medication is Pregnancy Category B and is considered safe during pregnancy. It is unknown if this medication is excreted in breast milk.
Topical Retinoid Pregnancy And Lactation Text: This medication is Pregnancy Category C. It is unknown if this medication is excreted in breast milk.
Topical Steroids Counseling: I discussed with the patient that prolonged use of topical steroids can result in the increased appearance of superficial blood vessels (telangiectasias), lightening (hypopigmentation) and thinning of the skin (atrophy).  Patient understands to avoid using high potency steroids in skin folds, the groin or the face.  The patient verbalized understanding of the proper use and possible adverse effects of topical steroids.  All of the patient's questions and concerns were addressed.
Thalidomide Pregnancy And Lactation Text: This medication is Pregnancy Category X and is absolutely contraindicated during pregnancy. It is unknown if it is excreted in breast milk.
Stelara Counseling:  I discussed with the patient the risks of ustekinumab including but not limited to immunosuppression, malignancy, posterior leukoencephalopathy syndrome, and serious infections.  The patient understands that monitoring is required including a PPD at baseline and must alert us or the primary physician if symptoms of infection or other concerning signs are noted.
Adbry Counseling: I discussed with the patient the risks of tralokinumab including but not limited to eye infection and irritation, cold sores, injection site reactions, worsening of asthma, allergic reactions and increased risk of parasitic infection.  Live vaccines should be avoided while taking tralokinumab. The patient understands that monitoring is required and they must alert us or the primary physician if symptoms of infection or other concerning signs are noted.
Qbrexza Counseling:  I discussed with the patient the risks of Qbrexza including but not limited to headache, mydriasis, blurred vision, dry eyes, nasal dryness, dry mouth, dry throat, dry skin, urinary hesitation, and constipation.  Local skin reactions including erythema, burning, stinging, and itching can also occur.
Otezla Counseling: The side effects of Otezla were discussed with the patient, including but not limited to worsening or new depression, weight loss, diarrhea, nausea, upper respiratory tract infection, and headache. Patient instructed to call the office should any adverse effect occur.  The patient verbalized understanding of the proper use and possible adverse effects of Otezla.  All the patient's questions and concerns were addressed.
Minocycline Counseling: Patient advised regarding possible photosensitivity and discoloration of the teeth, skin, lips, tongue and gums.  Patient instructed to avoid sunlight, if possible.  When exposed to sunlight, patients should wear protective clothing, sunglasses, and sunscreen.  The patient was instructed to call the office immediately if the following severe adverse effects occur:  hearing changes, easy bruising/bleeding, severe headache, or vision changes.  The patient verbalized understanding of the proper use and possible adverse effects of minocycline.  All of the patient's questions and concerns were addressed.
Erivedge Counseling- I discussed with the patient the risks of Erivedge including but not limited to nausea, vomiting, diarrhea, constipation, weight loss, changes in the sense of taste, decreased appetite, muscle spasms, and hair loss.  The patient verbalized understanding of the proper use and possible adverse effects of Erivedge.  All of the patient's questions and concerns were addressed.
Itraconazole Counseling:  I discussed with the patient the risks of itraconazole including but not limited to liver damage, nausea/vomiting, neuropathy, and severe allergy.  The patient understands that this medication is best absorbed when taken with acidic beverages such as non-diet cola or ginger ale.  The patient understands that monitoring is required including baseline LFTs and repeat LFTs at intervals.  The patient understands that they are to contact us or the primary physician if concerning signs are noted.
Mirvaso Counseling: Mirvaso is a topical medication which can decrease superficial blood flow where applied. Side effects are uncommon and include stinging, redness and allergic reactions.
Niacinamide Counseling: I recommended taking niacin or niacinamide, also know as vitamin B3, twice daily. Recent evidence suggests that taking vitamin B3 (500 mg twice daily) can reduce the risk of actinic keratoses and non-melanoma skin cancers. Side effects of vitamin B3 include flushing and headache.
Itraconazole Pregnancy And Lactation Text: This medication is Pregnancy Category C and it isn't know if it is safe during pregnancy. It is also excreted in breast milk.
Finasteride Pregnancy And Lactation Text: This medication is absolutely contraindicated during pregnancy. It is unknown if it is excreted in breast milk.
Doxepin Pregnancy And Lactation Text: This medication is Pregnancy Category C and it isn't known if it is safe during pregnancy. It is also excreted in breast milk and breast feeding isn't recommended.
Dapsone Pregnancy And Lactation Text: This medication is Pregnancy Category C and is not considered safe during pregnancy or breast feeding.
Xeljanz Counseling: I discussed with the patient the risks of Xeljanz therapy including increased risk of infection, liver issues, headache, diarrhea, or cold symptoms. Live vaccines should be avoided. They were instructed to call if they have any problems.
Clindamycin Counseling: I counseled the patient regarding use of clindamycin as an antibiotic for prophylactic and/or therapeutic purposes. Clindamycin is active against numerous classes of bacteria, including skin bacteria. Side effects may include nausea, diarrhea, gastrointestinal upset, rash, hives, yeast infections, and in rare cases, colitis.
Siliq Counseling:  I discussed with the patient the risks of Siliq including but not limited to new or worsening depression, suicidal thoughts and behavior, immunosuppression, malignancy, posterior leukoencephalopathy syndrome, and serious infections.  The patient understands that monitoring is required including a PPD at baseline and must alert us or the primary physician if symptoms of infection or other concerning signs are noted. There is also a special program designed to monitor depression which is required with Siliq.
Vtama Pregnancy And Lactation Text: It is unknown if this medication can cause problems during pregnancy and breastfeeding.
Eucrisa Pregnancy And Lactation Text: This medication has not been assigned a Pregnancy Risk Category but animal studies failed to show danger with the topical medication. It is unknown if the medication is excreted in breast milk.
Opioid Pregnancy And Lactation Text: These medications can lead to premature delivery and should be avoided during pregnancy. These medications are also present in breast milk in small amounts.
Calcipotriene Pregnancy And Lactation Text: The use of this medication during pregnancy or lactation is not recommended as there is insufficient data.
Cellcept Pregnancy And Lactation Text: This medication is Pregnancy Category D and isn't considered safe during pregnancy. It is unknown if this medication is excreted in breast milk.
Humira Counseling:  I discussed with the patient the risks of adalimumab including but not limited to myelosuppression, immunosuppression, autoimmune hepatitis, demyelinating diseases, lymphoma, and serious infections.  The patient understands that monitoring is required including a PPD at baseline and must alert us or the primary physician if symptoms of infection or other concerning signs are noted.
Xelvalentinez Pregnancy And Lactation Text: This medication is Pregnancy Category D and is not considered safe during pregnancy.  The risk during breast feeding is also uncertain.
Tranexamic Acid Counseling:  Patient advised of the small risk of bleeding problems with tranexamic acid. They were also instructed to call if they developed any nausea, vomiting or diarrhea. All of the patient's questions and concerns were addressed.
Topical Steroids Applications Pregnancy And Lactation Text: Most topical steroids are considered safe to use during pregnancy and lactation.  Any topical steroid applied to the breast or nipple should be washed off before breastfeeding.
Tazorac Counseling:  Patient advised that medication is irritating and drying.  Patient may need to apply sparingly and wash off after an hour before eventually leaving it on overnight.  The patient verbalized understanding of the proper use and possible adverse effects of tazorac.  All of the patient's questions and concerns were addressed.
Clindamycin Pregnancy And Lactation Text: This medication can be used in pregnancy if certain situations. Clindamycin is also present in breast milk.
High Dose Vitamin A Pregnancy And Lactation Text: High dose vitamin A therapy is contraindicated during pregnancy and breast feeding.
Otezla Pregnancy And Lactation Text: This medication is Pregnancy Category C and it isn't known if it is safe during pregnancy. It is unknown if it is excreted in breast milk.
Cibinqo Counseling: I discussed with the patient the risks of Cibinqo therapy including but not limited to common cold, nausea, headache, cold sores, increased blood CPK levels, dizziness, UTIs, fatigue, acne, and vomitting. Live vaccines should be avoided.  This medication has been linked to serious infections; higher rate of mortality; malignancy and lymphoproliferative disorders; major adverse cardiovascular events; thrombosis; thrombocytopenia and lymphopenia; lipid elevations; and retinal detachment.
Minocycline Pregnancy And Lactation Text: This medication is Pregnancy Category D and not consider safe during pregnancy. It is also excreted in breast milk.
Qbrexza Pregnancy And Lactation Text: There is no available data on Qbrexza use in pregnant women.  There is no available data on Qbrexza use in lactation.
Adbry Pregnancy And Lactation Text: It is unknown if this medication will adversely affect pregnancy or breast feeding.
Cantharidin Counseling:  I discussed with the patient the risks of Cantharidin including but not limited to pain, redness, burning, itching, and blistering.
Cantharidin Pregnancy And Lactation Text: This medication has not been proven safe during pregnancy. It is unknown if this medication is excreted in breast milk.
Libtayo Counseling- I discussed with the patient the risks of Libtayo including but not limited to nausea, vomiting, diarrhea, and bone or muscle pain.  The patient verbalized understanding of the proper use and possible adverse effects of Libtayo.  All of the patient's questions and concerns were addressed.
Ketoconazole Counseling:   Patient counseled regarding improving absorption with orange juice.  Adverse effects include but are not limited to breast enlargement, headache, diarrhea, nausea, upset stomach, liver function test abnormalities, taste disturbance, and stomach pain.  There is a rare possibility of liver failure that can occur when taking ketoconazole. The patient understands that monitoring of LFTs may be required, especially at baseline. The patient verbalized understanding of the proper use and possible adverse effects of ketoconazole.  All of the patient's questions and concerns were addressed.
Birth Control Pills Counseling: Birth Control Pill Counseling: I discussed with the patient the potential side effects of OCPs including but not limited to increased risk of stroke, heart attack, thrombophlebitis, deep venous thrombosis, hepatic adenomas, breast changes, GI upset, headaches, and depression.  The patient verbalized understanding of the proper use and possible adverse effects of OCPs. All of the patient's questions and concerns were addressed.
Niacinamide Pregnancy And Lactation Text: These medications are considered safe during pregnancy.
Rhofade Counseling: Rhofade is a topical medication which can decrease superficial blood flow where applied. Side effects are uncommon and include stinging, redness and allergic reactions.
Mirvaso Pregnancy And Lactation Text: This medication has not been assigned a Pregnancy Risk Category. It is unknown if the medication is excreted in breast milk.
Hydroquinone Counseling:  Patient advised that medication may result in skin irritation, lightening (hypopigmentation), dryness, and burning.  In the event of skin irritation, the patient was advised to reduce the amount of the drug applied or use it less frequently.  Rarely, spots that are treated with hydroquinone can become darker (pseudoochronosis).  Should this occur, patient instructed to stop medication and call the office. The patient verbalized understanding of the proper use and possible adverse effects of hydroquinone.  All of the patient's questions and concerns were addressed.
Zoryve Counseling:  I discussed with the patient that Zoryve is not for use in the eyes, mouth or vagina. The most commonly reported side effects include diarrhea, headache, insomnia, application site pain, upper respiratory tract infections, and urinary tract infections.  All of the patient's questions and concerns were addressed.
Cyclophosphamide Counseling:  I discussed with the patient the risks of cyclophosphamide including but not limited to hair loss, hormonal abnormalities, decreased fertility, abdominal pain, diarrhea, nausea and vomiting, bone marrow suppression and infection. The patient understands that monitoring is required while taking this medication.
Hydroxyzine Counseling: Patient advised that the medication is sedating and not to drive a car after taking this medication.  Patient informed of potential adverse effects including but not limited to dry mouth, urinary retention, and blurry vision.  The patient verbalized understanding of the proper use and possible adverse effects of hydroxyzine.  All of the patient's questions and concerns were addressed.
Gabapentin Counseling: I discussed with the patient the risks of gabapentin including but not limited to dizziness, somnolence, fatigue and ataxia.
Cyclophosphamide Pregnancy And Lactation Text: This medication is Pregnancy Category D and it isn't considered safe during pregnancy. This medication is excreted in breast milk.
Arava Counseling:  Patient counseled regarding adverse effects of Arava including but not limited to nausea, vomiting, abnormalities in liver function tests. Patients may develop mouth sores, rash, diarrhea, and abnormalities in blood counts. The patient understands that monitoring is required including LFTs and blood counts.  There is a rare possibility of scarring of the liver and lung problems that can occur when taking methotrexate. Persistent nausea, loss of appetite, pale stools, dark urine, cough, and shortness of breath should be reported immediately. Patient advised to discontinue Arava treatment and consult with a physician prior to attempting conception. The patient will have to undergo a treatment to eliminate Arava from the body prior to conception.
Tranexamic Acid Pregnancy And Lactation Text: It is unknown if this medication is safe during pregnancy or breast feeding.
Cibinqo Pregnancy And Lactation Text: It is unknown if this medication will adversely affect pregnancy or breast feeding.  You should not take this medication if you are currently pregnant or planning a pregnancy or while breastfeeding.
Topical Sulfur Applications Counseling: Topical Sulfur Counseling: Patient counseled that this medication may cause skin irritation or allergic reactions.  In the event of skin irritation, the patient was advised to reduce the amount of the drug applied or use it less frequently.   The patient verbalized understanding of the proper use and possible adverse effects of topical sulfur application.  All of the patient's questions and concerns were addressed.
Doxycycline Counseling:  Patient counseled regarding possible photosensitivity and increased risk for sunburn.  Patient instructed to avoid sunlight, if possible.  When exposed to sunlight, patients should wear protective clothing, sunglasses, and sunscreen.  The patient was instructed to call the office immediately if the following severe adverse effects occur:  hearing changes, easy bruising/bleeding, severe headache, or vision changes.  The patient verbalized understanding of the proper use and possible adverse effects of doxycycline.  All of the patient's questions and concerns were addressed.
Taltz Counseling: I discussed with the patient the risks of ixekizumab including but not limited to immunosuppression, serious infections, worsening of inflammatory bowel disease and drug reactions.  The patient understands that monitoring is required including a PPD at baseline and must alert us or the primary physician if symptoms of infection or other concerning signs are noted.
Quinolones Counseling:  I discussed with the patient the risks of fluoroquinolones including but not limited to GI upset, allergic reaction, drug rash, diarrhea, dizziness, photosensitivity, yeast infections, liver function test abnormalities, tendonitis/tendon rupture.
Oxybutynin Counseling:  I discussed with the patient the risks of oxybutynin including but not limited to skin rash, drowsiness, dry mouth, difficulty urinating, and blurred vision.
Cimzia Counseling:  I discussed with the patient the risks of Cimzia including but not limited to immunosuppression, allergic reactions and infections.  The patient understands that monitoring is required including a PPD at baseline and must alert us or the primary physician if symptoms of infection or other concerning signs are noted.
5-Fu Counseling: 5-Fluorouracil Counseling:  I discussed with the patient the risks of 5-fluorouracil including but not limited to erythema, scaling, itching, weeping, crusting, and pain.
Tazorac Pregnancy And Lactation Text: This medication is not safe during pregnancy. It is unknown if this medication is excreted in breast milk.
Acitretin Counseling:  I discussed with the patient the risks of acitretin including but not limited to hair loss, dry lips/skin/eyes, liver damage, hyperlipidemia, depression/suicidal ideation, photosensitivity.  Serious rare side effects can include but are not limited to pancreatitis, pseudotumor cerebri, bony changes, clot formation/stroke/heart attack.  Patient understands that alcohol is contraindicated since it can result in liver toxicity and significantly prolong the elimination of the drug by many years.
Ketoconazole Pregnancy And Lactation Text: This medication is Pregnancy Category C and it isn't know if it is safe during pregnancy. It is also excreted in breast milk and breast feeding isn't recommended.
Taltz Pregnancy And Lactation Text: The risk during pregnancy and breastfeeding is uncertain with this medication.
Libtayo Pregnancy And Lactation Text: This medication is contraindicated in pregnancy and when breast feeding.
Detail Level: Zone
Nsaids Counseling: NSAID Counseling: I discussed with the patient that NSAIDs should be taken with food. Prolonged use of NSAIDs can result in the development of stomach ulcers.  Patient advised to stop taking NSAIDs if abdominal pain occurs.  The patient verbalized understanding of the proper use and possible adverse effects of NSAIDs.  All of the patient's questions and concerns were addressed.
Hydroxyzine Pregnancy And Lactation Text: This medication is not safe during pregnancy and should not be taken. It is also excreted in breast milk and breast feeding isn't recommended.
Aklief counseling:  Patient advised to apply a pea-sized amount only at bedtime and wait 30 minutes after washing their face before applying.  If too drying, patient may add a non-comedogenic moisturizer.  The most commonly reported side effects including irritation, redness, scaling, dryness, stinging, burning, itching, and increased risk of sunburn.  The patient verbalized understanding of the proper use and possible adverse effects of retinoids.  All of the patient's questions and concerns were addressed.
Gabapentin Pregnancy And Lactation Text: This medication is Pregnancy Category C and isn't considered safe during pregnancy. It is excreted in breast milk.
Birth Control Pills Pregnancy And Lactation Text: This medication should be avoided if pregnant and for the first 30 days post-partum.
Opzelura Counseling:  I discussed with the patient the risks of Opzelura including but not limited to nasopharngitis, bronchitis, ear infection, eosinophila, hives, diarrhea, folliculitis, tonsillitis, and rhinorrhea.  Taken orally, this medication has been linked to serious infections; higher rate of mortality; malignancy and lymphoproliferative disorders; major adverse cardiovascular events; thrombosis; thrombocytopenia, anemia, and neutropenia; and lipid elevations.
Doxycycline Pregnancy And Lactation Text: This medication is Pregnancy Category D and not consider safe during pregnancy. It is also excreted in breast milk but is considered safe for shorter treatment courses.
Glycopyrrolate Counseling:  I discussed with the patient the risks of glycopyrrolate including but not limited to skin rash, drowsiness, dry mouth, difficulty urinating, and blurred vision.
Opzelura Pregnancy And Lactation Text: There is insufficient data to evaluate drug-associated risk for major birth defects, miscarriage, or other adverse maternal or fetal outcomes.  There is a pregnancy registry that monitors pregnancy outcomes in pregnant persons exposed to the medication during pregnancy.  It is unknown if this medication is excreted in breast milk.  Do not breastfeed during treatment and for about 4 weeks after the last dose.
Ilumya Counseling: I discussed with the patient the risks of tildrakizumab including but not limited to immunosuppression, malignancy, posterior leukoencephalopathy syndrome, and serious infections.  The patient understands that monitoring is required including a PPD at baseline and must alert us or the primary physician if symptoms of infection or other concerning signs are noted.
Cyclosporine Counseling:  I discussed with the patient the risks of cyclosporine including but not limited to hypertension, gingival hyperplasia,myelosuppression, immunosuppression, liver damage, kidney damage, neurotoxicity, lymphoma, and serious infections. The patient understands that monitoring is required including baseline blood pressure, CBC, CMP, lipid panel and uric acid, and then 1-2 times monthly CMP and blood pressure.
Topical Sulfur Applications Pregnancy And Lactation Text: This medication is Pregnancy Category C and has an unknown safety profile during pregnancy. It is unknown if this topical medication is excreted in breast milk.
Valtrex Counseling: I discussed with the patient the risks of valacyclovir including but not limited to kidney damage, nausea, vomiting and severe allergy.  The patient understands that if the infection seems to be worsening or is not improving, they are to call.
Imiquimod Counseling:  I discussed with the patient the risks of imiquimod including but not limited to erythema, scaling, itching, weeping, crusting, and pain.  Patient understands that the inflammatory response to imiquimod is variable from person to person and was educated regarded proper titration schedule.  If flu-like symptoms develop, patient knows to discontinue the medication and contact us.
5-Fu Pregnancy And Lactation Text: This medication is Pregnancy Category X and contraindicated in pregnancy and in women who may become pregnant. It is unknown if this medication is excreted in breast milk.
Cimzia Pregnancy And Lactation Text: This medication crosses the placenta but can be considered safe in certain situations. Cimzia may be excreted in breast milk.
Olumiant Counseling: I discussed with the patient the risks of Olumiant therapy including but not limited to upper respiratory tract infections, shingles, cold sores, and nausea. Live vaccines should be avoided.  This medication has been linked to serious infections; higher rate of mortality; malignancy and lymphoproliferative disorders; major adverse cardiovascular events; thrombosis; gastrointestinal perforations; neutropenia; lymphopenia; anemia; liver enzyme elevations; and lipid elevations.
Topical Clindamycin Counseling: Patient counseled that this medication may cause skin irritation or allergic reactions.  In the event of skin irritation, the patient was advised to reduce the amount of the drug applied or use it less frequently.   The patient verbalized understanding of the proper use and possible adverse effects of clindamycin.  All of the patient's questions and concerns were addressed.
Oxybutynin Pregnancy And Lactation Text: This medication is Pregnancy Category B and is considered safe during pregnancy. It is unknown if it is excreted in breast milk.
Cosentyx Counseling:  I discussed with the patient the risks of Cosentyx including but not limited to worsening of Crohn's disease, immunosuppression, allergic reactions and infections.  The patient understands that monitoring is required including a PPD at baseline and must alert us or the primary physician if symptoms of infection or other concerning signs are noted.
Propranolol Counseling:  I discussed with the patient the risks of propranolol including but not limited to low heart rate, low blood pressure, low blood sugar, restlessness and increased cold sensitivity. They should call the office if they experience any of these side effects.
Terbinafine Counseling: Patient counseling regarding adverse effects of terbinafine including but not limited to headache, diarrhea, rash, upset stomach, liver function test abnormalities, itching, taste/smell disturbance, nausea, abdominal pain, and flatulence.  There is a rare possibility of liver failure that can occur when taking terbinafine.  The patient understands that a baseline LFT and kidney function test may be required. The patient verbalized understanding of the proper use and possible adverse effects of terbinafine.  All of the patient's questions and concerns were addressed.
Tremfya Counseling: I discussed with the patient the risks of guselkumab including but not limited to immunosuppression, serious infections, worsening of inflammatory bowel disease and drug reactions.  The patient understands that monitoring is required including a PPD at baseline and must alert us or the primary physician if symptoms of infection or other concerning signs are noted.
Acitretin Pregnancy And Lactation Text: This medication is Pregnancy Category X and should not be given to women who are pregnant or may become pregnant in the future. This medication is excreted in breast milk.
Odomzo Counseling- I discussed with the patient the risks of Odomzo including but not limited to nausea, vomiting, diarrhea, constipation, weight loss, changes in the sense of taste, decreased appetite, muscle spasms, and hair loss.  The patient verbalized understanding of the proper use and possible adverse effects of Odomzo.  All of the patient's questions and concerns were addressed.
Rifampin Counseling: I discussed with the patient the risks of rifampin including but not limited to liver damage, kidney damage, red-orange body fluids, nausea/vomiting and severe allergy.
Nsaids Pregnancy And Lactation Text: These medications are considered safe up to 30 weeks gestation. It is excreted in breast milk.
Spironolactone Counseling: Patient advised regarding risks of diarrhea, abdominal pain, hyperkalemia, birth defects (for female patients), liver toxicity and renal toxicity. The patient may need blood work to monitor liver and kidney function and potassium levels while on therapy. The patient verbalized understanding of the proper use and possible adverse effects of spironolactone.  All of the patient's questions and concerns were addressed.
Aklief Pregnancy And Lactation Text: It is unknown if this medication is safe to use during pregnancy.  It is unknown if this medication is excreted in breast milk.  Breastfeeding women should use the topical cream on the smallest area of the skin for the shortest time needed while breastfeeding.  Do not apply to nipple and areola.
Solaraze Counseling:  I discussed with the patient the risks of Solaraze including but not limited to erythema, scaling, itching, weeping, crusting, and pain.
Zyclara Counseling:  I discussed with the patient the risks of imiquimod including but not limited to erythema, scaling, itching, weeping, crusting, and pain.  Patient understands that the inflammatory response to imiquimod is variable from person to person and was educated regarded proper titration schedule.  If flu-like symptoms develop, patient knows to discontinue the medication and contact us.
Erythromycin Counseling:  I discussed with the patient the risks of erythromycin including but not limited to GI upset, allergic reaction, drug rash, diarrhea, increase in liver enzymes, and yeast infections.
Spironolactone Pregnancy And Lactation Text: This medication can cause feminization of the male fetus and should be avoided during pregnancy. The active metabolite is also found in breast milk.
Glycopyrrolate Pregnancy And Lactation Text: This medication is Pregnancy Category B and is considered safe during pregnancy. It is unknown if it is excreted breast milk.
Picato Counseling:  I discussed with the patient the risks of Picato including but not limited to erythema, scaling, itching, weeping, crusting, and pain.
Cyclosporine Pregnancy And Lactation Text: This medication is Pregnancy Category C and it isn't know if it is safe during pregnancy. This medication is excreted in breast milk.
Fluconazole Counseling:  Patient counseled regarding adverse effects of fluconazole including but not limited to headache, diarrhea, nausea, upset stomach, liver function test abnormalities, taste disturbance, and stomach pain.  There is a rare possibility of liver failure that can occur when taking fluconazole.  The patient understands that monitoring of LFTs and kidney function test may be required, especially at baseline. The patient verbalized understanding of the proper use and possible adverse effects of fluconazole.  All of the patient's questions and concerns were addressed.
Valtrex Pregnancy And Lactation Text: this medication is Pregnancy Category B and is considered safe during pregnancy. This medication is not directly found in breast milk but it's metabolite acyclovir is present.
Wartpeel Counseling:  I discussed with the patient the risks of Wartpeel including but not limited to erythema, scaling, itching, weeping, crusting, and pain.
Drysol Counseling:  I discussed with the patient the risks of drysol/aluminum chloride including but not limited to skin rash, itching, irritation, burning.
Include Pregnancy/Lactation Warning?: No
Clofazimine Counseling:  I discussed with the patient the risks of clofazimine including but not limited to skin and eye pigmentation, liver damage, nausea/vomiting, gastrointestinal bleeding and allergy.
Albendazole Counseling:  I discussed with the patient the risks of albendazole including but not limited to cytopenia, kidney damage, nausea/vomiting and severe allergy.  The patient understands that this medication is being used in an off-label manner.
Olumiant Pregnancy And Lactation Text: Based on animal studies, Olumiant may cause embryo-fetal harm when administered to pregnant women.  The medication should not be used in pregnancy.  Breastfeeding is not recommended during treatment.
Rinvoq Counseling: I discussed with the patient the risks of Rinvoq therapy including but not limited to upper respiratory tract infections, shingles, cold sores, bronchitis, nausea, cough, fever, acne, and headache. Live vaccines should be avoided.  This medication has been linked to serious infections; higher rate of mortality; malignancy and lymphoproliferative disorders; major adverse cardiovascular events; thrombosis; thrombocytopenia, anemia, and neutropenia; lipid elevations; liver enzyme elevations; and gastrointestinal perforations.
Drysol Pregnancy And Lactation Text: This medication is considered safe during pregnancy and breast feeding.
Propranolol Pregnancy And Lactation Text: This medication is Pregnancy Category C and it isn't known if it is safe during pregnancy. It is excreted in breast milk.
Albendazole Pregnancy And Lactation Text: This medication is Pregnancy Category C and it isn't known if it is safe during pregnancy. It is also excreted in breast milk.
Infliximab Counseling:  I discussed with the patient the risks of infliximab including but not limited to myelosuppression, immunosuppression, autoimmune hepatitis, demyelinating diseases, lymphoma, and serious infections.  The patient understands that monitoring is required including a PPD at baseline and must alert us or the primary physician if symptoms of infection or other concerning signs are noted.
Topical Ketoconazole Counseling: Patient counseled that this medication may cause skin irritation or allergic reactions.  In the event of skin irritation, the patient was advised to reduce the amount of the drug applied or use it less frequently.   The patient verbalized understanding of the proper use and possible adverse effects of ketoconazole.  All of the patient's questions and concerns were addressed.
Bexarotene Counseling:  I discussed with the patient the risks of bexarotene including but not limited to hair loss, dry lips/skin/eyes, liver abnormalities, hyperlipidemia, pancreatitis, depression/suicidal ideation, photosensitivity, drug rash/allergic reactions, hypothyroidism, anemia, leukopenia, infection, cataracts, and teratogenicity.  Patient understands that they will need regular blood tests to check lipid profile, liver function tests, white blood cell count, thyroid function tests and pregnancy test if applicable.
Rifampin Pregnancy And Lactation Text: This medication is Pregnancy Category C and it isn't know if it is safe during pregnancy. It is also excreted in breast milk and should not be used if you are breast feeding.
Azelaic Acid Counseling: Patient counseled that medicine may cause skin irritation and to avoid applying near the eyes.  In the event of skin irritation, the patient was advised to reduce the amount of the drug applied or use it less frequently.   The patient verbalized understanding of the proper use and possible adverse effects of azelaic acid.  All of the patient's questions and concerns were addressed.
Benzoyl Peroxide Counseling: Patient counseled that medicine may cause skin irritation and bleach clothing.  In the event of skin irritation, the patient was advised to reduce the amount of the drug applied or use it less frequently.   The patient verbalized understanding of the proper use and possible adverse effects of benzoyl peroxide.  All of the patient's questions and concerns were addressed.
Azithromycin Counseling:  I discussed with the patient the risks of azithromycin including but not limited to GI upset, allergic reaction, drug rash, diarrhea, and yeast infections.
Olanzapine Counseling- I discussed with the patient the common side effects of olanzapine including but are not limited to: lack of energy, dry mouth, increased appetite, sleepiness, tremor, constipation, dizziness, changes in behavior, or restlessness.  Explained that teenagers are more likely to experience headaches, abdominal pain, pain in the arms or legs, tiredness, and sleepiness.  Serious side effects include but are not limited: increased risk of death in elderly patients who are confused, have memory loss, or dementia-related psychosis; hyperglycemia; increased cholesterol and triglycerides; and weight gain.
Solaraze Pregnancy And Lactation Text: This medication is Pregnancy Category B and is considered safe. There is some data to suggest avoiding during the third trimester. It is unknown if this medication is excreted in breast milk.
Simponi Counseling:  I discussed with the patient the risks of golimumab including but not limited to myelosuppression, immunosuppression, autoimmune hepatitis, demyelinating diseases, lymphoma, and serious infections.  The patient understands that monitoring is required including a PPD at baseline and must alert us or the primary physician if symptoms of infection or other concerning signs are noted.
Terbinafine Pregnancy And Lactation Text: This medication is Pregnancy Category B and is considered safe during pregnancy. It is also excreted in breast milk and breast feeding isn't recommended.
Bactrim Counseling:  I discussed with the patient the risks of sulfa antibiotics including but not limited to GI upset, allergic reaction, drug rash, diarrhea, dizziness, photosensitivity, and yeast infections.  Rarely, more serious reactions can occur including but not limited to aplastic anemia, agranulocytosis, methemoglobinemia, blood dyscrasias, liver or kidney failure, lung infiltrates or desquamative/blistering drug rashes.
Erythromycin Pregnancy And Lactation Text: This medication is Pregnancy Category B and is considered safe during pregnancy. It is also excreted in breast milk.
Hydroxychloroquine Counseling:  I discussed with the patient that a baseline ophthalmologic exam is needed at the start of therapy and every year thereafter while on therapy. A CBC may also be warranted for monitoring.  The side effects of this medication were discussed with the patient, including but not limited to agranulocytosis, aplastic anemia, seizures, rashes, retinopathy, and liver toxicity. Patient instructed to call the office should any adverse effect occur.  The patient verbalized understanding of the proper use and possible adverse effects of Plaquenil.  All the patient's questions and concerns were addressed.
Benzoyl Peroxide Pregnancy And Lactation Text: This medication is Pregnancy Category C. It is unknown if benzoyl peroxide is excreted in breast milk.
Klisyri Counseling:  I discussed with the patient the risks of Klisyri including but not limited to erythema, scaling, itching, weeping, crusting, and pain.
Methotrexate Counseling:  Patient counseled regarding adverse effects of methotrexate including but not limited to nausea, vomiting, abnormalities in liver function tests. Patients may develop mouth sores, rash, diarrhea, and abnormalities in blood counts. The patient understands that monitoring is required including LFT's and blood counts.  There is a rare possibility of scarring of the liver and lung problems that can occur when taking methotrexate. Persistent nausea, loss of appetite, pale stools, dark urine, cough, and shortness of breath should be reported immediately. Patient advised to discontinue methotrexate treatment at least three months before attempting to become pregnant.  I discussed the need for folate supplements while taking methotrexate.  These supplements can decrease side effects during methotrexate treatment. The patient verbalized understanding of the proper use and possible adverse effects of methotrexate.  All of the patient's questions and concerns were addressed.
Colchicine Counseling:  Patient counseled regarding adverse effects including but not limited to stomach upset (nausea, vomiting, stomach pain, or diarrhea).  Patient instructed to limit alcohol consumption while taking this medication.  Colchicine may reduce blood counts especially with prolonged use.  The patient understands that monitoring of kidney function and blood counts may be required, especially at baseline. The patient verbalized understanding of the proper use and possible adverse effects of colchicine.  All of the patient's questions and concerns were addressed.
Azathioprine Counseling:  I discussed with the patient the risks of azathioprine including but not limited to myelosuppression, immunosuppression, hepatotoxicity, lymphoma, and infections.  The patient understands that monitoring is required including baseline LFTs, Creatinine, possible TPMP genotyping and weekly CBCs for the first month and then every 2 weeks thereafter.  The patient verbalized understanding of the proper use and possible adverse effects of azathioprine.  All of the patient's questions and concerns were addressed.
Rinvoq Pregnancy And Lactation Text: Based on animal studies, Rinvoq may cause embryo-fetal harm when administered to pregnant women.  The medication should not be used in pregnancy.  Breastfeeding is not recommended during treatment and for 6 days after the last dose.
Dupixent Counseling: I discussed with the patient the risks of dupilumab including but not limited to eye infection and irritation, cold sores, injection site reactions, worsening of asthma, allergic reactions and increased risk of parasitic infection.  Live vaccines should be avoided while taking dupilumab. Dupilumab will also interact with certain medications such as warfarin and cyclosporine. The patient understands that monitoring is required and they must alert us or the primary physician if symptoms of infection or other concerning signs are noted.
SSKI Counseling:  I discussed with the patient the risks of SSKI including but not limited to thyroid abnormalities, metallic taste, GI upset, fever, headache, acne, arthralgias, paraesthesias, lymphadenopathy, easy bleeding, arrhythmias, and allergic reaction.
Xolair Counseling:  Patient informed of potential adverse effects including but not limited to fever, muscle aches, rash and allergic reactions.  The patient verbalized understanding of the proper use and possible adverse effects of Xolair.  All of the patient's questions and concerns were addressed.
Bexarotene Pregnancy And Lactation Text: This medication is Pregnancy Category X and should not be given to women who are pregnant or may become pregnant. This medication should not be used if you are breast feeding.
Elidel Counseling: Patient may experience a mild burning sensation during topical application. Elidel is not approved in children less than 2 years of age. There have been case reports of hematologic and skin malignancies in patients using topical calcineurin inhibitors although causality is questionable.
Opioid Counseling: I discussed with the patient the potential side effects of opioids including but not limited to addiction, altered mental status, and depression. I stressed avoiding alcohol, benzodiazepines, muscle relaxants and sleep aids unless specifically okayed by a physician. The patient verbalized understanding of the proper use and possible adverse effects of opioids. All of the patient's questions and concerns were addressed. They were instructed to flush the remaining pills down the toilet if they did not need them for pain.
Azithromycin Pregnancy And Lactation Text: This medication is considered safe during pregnancy and is also secreted in breast milk.
Ivermectin Counseling:  Patient instructed to take medication on an empty stomach with a full glass of water.  Patient informed of potential adverse effects including but not limited to nausea, diarrhea, dizziness, itching, and swelling of the extremities or lymph nodes.  The patient verbalized understanding of the proper use and possible adverse effects of ivermectin.  All of the patient's questions and concerns were addressed.
Sarecycline Counseling: Patient advised regarding possible photosensitivity and discoloration of the teeth, skin, lips, tongue and gums.  Patient instructed to avoid sunlight, if possible.  When exposed to sunlight, patients should wear protective clothing, sunglasses, and sunscreen.  The patient was instructed to call the office immediately if the following severe adverse effects occur:  hearing changes, easy bruising/bleeding, severe headache, or vision changes.  The patient verbalized understanding of the proper use and possible adverse effects of sarecycline.  All of the patient's questions and concerns were addressed.
Bactrim Pregnancy And Lactation Text: This medication is Pregnancy Category D and is known to cause fetal risk.  It is also excreted in breast milk.
Olanzapine Pregnancy And Lactation Text: This medication is pregnancy category C.   There are no adequate and well controlled trials with olanzapine in pregnant females.  Olanzapine should be used during pregnancy only if the potential benefit justifies the potential risk to the fetus.   In a study in lactating healthy women, olanzapine was excreted in breast milk.  It is recommended that women taking olanzapine should not breast feed.
Soolantra Counseling: I discussed with the patients the risks of topial Soolantra. This is a medicine which decreases the number of mites and inflammation in the skin. You experience burning, stinging, eye irritation or allergic reactions.  Please call our office if you develop any problems from using this medication.
Protopic Counseling: Patient may experience a mild burning sensation during topical application. Protopic is not approved in children less than 2 years of age. There have been case reports of hematologic and skin malignancies in patients using topical calcineurin inhibitors although causality is questionable.
Griseofulvin Counseling:  I discussed with the patient the risks of griseofulvin including but not limited to photosensitivity, cytopenia, liver damage, nausea/vomiting and severe allergy.  The patient understands that this medication is best absorbed when taken with a fatty meal (e.g., ice cream or french fries).
Skyrizi Counseling: I discussed with the patient the risks of risankizumab-rzaa including but not limited to immunosuppression, and serious infections.  The patient understands that monitoring is required including a PPD at baseline and must alert us or the primary physician if symptoms of infection or other concerning signs are noted.
Carac Counseling:  I discussed with the patient the risks of Carac including but not limited to erythema, scaling, itching, weeping, crusting, and pain.
Oral Minoxidil Counseling- I discussed with the patient the risks of oral minoxidil including but not limited to shortness of breath, swelling of the feet or ankles, dizziness, lightheadedness, unwanted hair growth and allergic reaction.  The patient verbalized understanding of the proper use and possible adverse effects of oral minoxidil.  All of the patient's questions and concerns were addressed.
Klisyri Pregnancy And Lactation Text: It is unknown if this medication can harm a developing fetus or if it is excreted in breast milk.
Cimetidine Counseling:  I discussed with the patient the risks of Cimetidine including but not limited to gynecomastia, headache, diarrhea, nausea, drowsiness, arrhythmias, pancreatitis, skin rashes, psychosis, bone marrow suppression and kidney toxicity.
Methotrexate Pregnancy And Lactation Text: This medication is Pregnancy Category X and is known to cause fetal harm. This medication is excreted in breast milk.
Winlevi Counseling:  I discussed with the patient the risks of topical clascoterone including but not limited to erythema, scaling, itching, and stinging. Patient voiced their understanding.
Hydroxychloroquine Pregnancy And Lactation Text: This medication has been shown to cause fetal harm but it isn't assigned a Pregnancy Risk Category. There are small amounts excreted in breast milk.
Metronidazole Counseling:  I discussed with the patient the risks of metronidazole including but not limited to seizures, nausea/vomiting, a metallic taste in the mouth, nausea/vomiting and severe allergy.
Dutasteride Male Counseling: Dustasteride Counseling:  I discussed with the patient the risks of use of dutasteride including but not limited to decreased libido, decreased ejaculate volume, and gynecomastia. Women who can become pregnant should not handle medication.  All of the patient's questions and concerns were addressed.
Dutasteride Pregnancy And Lactation Text: This medication is absolutely contraindicated in women, especially during pregnancy and breast feeding. Feminization of male fetuses is possible if taking while pregnant.
Prednisone Counseling:  I discussed with the patient the risks of prolonged use of prednisone including but not limited to weight gain, insomnia, osteoporosis, mood changes, diabetes, susceptibility to infection, glaucoma and high blood pressure.  In cases where prednisone use is prolonged, patients should be monitored with blood pressure checks, serum glucose levels and an eye exam.  Additionally, the patient may need to be placed on GI prophylaxis, PCP prophylaxis, and calcium and vitamin D supplementation and/or a bisphosphonate.  The patient verbalized understanding of the proper use and the possible adverse effects of prednisone.  All of the patient's questions and concerns were addressed.
Sotyktu Counseling:  I discussed the most common side effects of Sotyktu including: common cold, sore throat, sinus infections, cold sores, canker sores, folliculitis, and acne.? I also discussed more serious side effects of Sotyktu including but not limited to: serious allergic reactions; increased risk for infections such as TB; cancers such as lymphomas; rhabdomyolysis and elevated CPK; and elevated triglycerides and liver enzymes.?
Minoxidil Counseling: Minoxidil is a topical medication which can increase blood flow where it is applied. It is uncertain how this medication increases hair growth. Side effects are uncommon and include stinging and allergic reactions.
Topical Metronidazole Counseling: Metronidazole is a topical antibiotic medication. You may experience burning, stinging, redness, or allergic reactions.  Please call our office if you develop any problems from using this medication.
Rituxan Counseling:  I discussed with the patient the risks of Rituxan infusions. Side effects can include infusion reactions, severe drug rashes including mucocutaneous reactions, reactivation of latent hepatitis and other infections and rarely progressive multifocal leukoencephalopathy.  All of the patient's questions and concerns were addressed.
Winlevi Pregnancy And Lactation Text: This medication is considered safe during pregnancy and breastfeeding.
Xolair Pregnancy And Lactation Text: This medication is Pregnancy Category B and is considered safe during pregnancy. This medication is excreted in breast milk.
Cephalexin Counseling: I counseled the patient regarding use of cephalexin as an antibiotic for prophylactic and/or therapeutic purposes. Cephalexin (commonly prescribed under brand name Keflex) is a cephalosporin antibiotic which is active against numerous classes of bacteria, including most skin bacteria. Side effects may include nausea, diarrhea, gastrointestinal upset, rash, hives, yeast infections, and in rare cases, hepatitis, kidney disease, seizures, fever, confusion, neurologic symptoms, and others. Patients with severe allergies to penicillin medications are cautioned that there is about a 10% incidence of cross-reactivity with cephalosporins. When possible, patients with penicillin allergies should use alternatives to cephalosporins for antibiotic therapy.
Isotretinoin Counseling: Patient should get monthly blood tests, not donate blood, not drive at night if vision affected, not share medication, and not undergo elective surgery for 6 months after tx completed. Side effects reviewed, pt to contact office should one occur.
Soolantra Pregnancy And Lactation Text: This medication is Pregnancy Category C. This medication is considered safe during breast feeding.
Dupixent Pregnancy And Lactation Text: This medication likely crosses the placenta but the risk for the fetus is uncertain. This medication is excreted in breast milk.
Sski Pregnancy And Lactation Text: This medication is Pregnancy Category D and isn't considered safe during pregnancy. It is excreted in breast milk.
Topical Retinoid counseling:  Patient advised to apply a pea-sized amount only at bedtime and wait 30 minutes after washing their face before applying.  If too drying, patient may add a non-comedogenic moisturizer. The patient verbalized understanding of the proper use and possible adverse effects of retinoids.  All of the patient's questions and concerns were addressed.
Oral Minoxidil Pregnancy And Lactation Text: This medication should only be used when clearly needed if you are pregnant, attempting to become pregnant or breast feeding.
Isotretinoin Pregnancy And Lactation Text: This medication is Pregnancy Category X and is considered extremely dangerous during pregnancy. It is unknown if it is excreted in breast milk.
Griseofulvin Pregnancy And Lactation Text: This medication is Pregnancy Category X and is known to cause serious birth defects. It is unknown if this medication is excreted in breast milk but breast feeding should be avoided.
Low Dose Naltrexone Counseling- I discussed with the patient the potential risks and side effects of low dose naltrexone including but not limited to: more vivid dreams, headaches, nausea, vomiting, abdominal pain, fatigue, dizziness, and anxiety.
Metronidazole Pregnancy And Lactation Text: This medication is Pregnancy Category B and considered safe during pregnancy.  It is also excreted in breast milk.
Protopic Pregnancy And Lactation Text: This medication is Pregnancy Category C. It is unknown if this medication is excreted in breast milk when applied topically.
Finasteride Male Counseling: Finasteride Counseling:  I discussed with the patient the risks of use of finasteride including but not limited to decreased libido, decreased ejaculate volume, gynecomastia, and depression. Women should not handle medication.  All of the patient's questions and concerns were addressed.
VTAMA Counseling: I discussed with the patient that VTAMA is not for use in the eyes, mouth or mouth. They should call the office if they develop any signs of allergic reactions to VTAMA. The patient verbalized understanding of the proper use and possible adverse effects of VTAMA.  All of the patient's questions and concerns were addressed.
Low Dose Naltrexone Pregnancy And Lactation Text: Naltrexone is pregnancy category C.  There have been no adequate and well-controlled studies in pregnant women.  It should be used in pregnancy only if the potential benefit justifies the potential risk to the fetus.   Limited data indicates that naltrexone is minimally excreted into breastmilk.
Doxepin Counseling:  Patient advised that the medication is sedating and not to drive a car after taking this medication. Patient informed of potential adverse effects including but not limited to dry mouth, urinary retention, and blurry vision.  The patient verbalized understanding of the proper use and possible adverse effects of doxepin.  All of the patient's questions and concerns were addressed.
Cellcept Counseling:  I discussed with the patient the risks of mycophenolate mofetil including but not limited to infection/immunosuppression, GI upset, hypokalemia, hypercholesterolemia, bone marrow suppression, lymphoproliferative disorders, malignancy, GI ulceration/bleed/perforation, colitis, interstitial lung disease, kidney failure, progressive multifocal leukoencephalopathy, and birth defects.  The patient understands that monitoring is required including a baseline creatinine and regular CBC testing. In addition, patient must alert us immediately if symptoms of infection or other concerning signs are noted.
Rituxan Pregnancy And Lactation Text: This medication is Pregnancy Category C and it isn't know if it is safe during pregnancy. It is unknown if this medication is excreted in breast milk but similar antibodies are known to be excreted.
Topical Metronidazole Pregnancy And Lactation Text: This medication is Pregnancy Category B and considered safe during pregnancy.  It is also considered safe to use while breastfeeding.
Tetracycline Counseling: Patient counseled regarding possible photosensitivity and increased risk for sunburn.  Patient instructed to avoid sunlight, if possible.  When exposed to sunlight, patients should wear protective clothing, sunglasses, and sunscreen.  The patient was instructed to call the office immediately if the following severe adverse effects occur:  hearing changes, easy bruising/bleeding, severe headache, or vision changes.  The patient verbalized understanding of the proper use and possible adverse effects of tetracycline.  All of the patient's questions and concerns were addressed. Patient understands to avoid pregnancy while on therapy due to potential birth defects.
Cephalexin Pregnancy And Lactation Text: This medication is Pregnancy Category B and considered safe during pregnancy.  It is also excreted in breast milk but can be used safely for shorter doses.
Dapsone Counseling: I discussed with the patient the risks of dapsone including but not limited to hemolytic anemia, agranulocytosis, rashes, methemoglobinemia, kidney failure, peripheral neuropathy, headaches, GI upset, and liver toxicity.  Patients who start dapsone require monitoring including baseline LFTs and weekly CBCs for the first month, then every month thereafter.  The patient verbalized understanding of the proper use and possible adverse effects of dapsone.  All of the patient's questions and concerns were addressed.
Eucrisa Counseling: Patient may experience a mild burning sensation during topical application. Eucrisa is not approved in children less than 2 years of age.
Thalidomide Counseling: I discussed with the patient the risks of thalidomide including but not limited to birth defects, anxiety, weakness, chest pain, dizziness, cough and severe allergy.
Sotyktu Pregnancy And Lactation Text: There is insufficient data to evaluate whether or not Sotyktu is safe to use during pregnancy.? ?It is not known if Sotyktu passes into breast milk and whether or not it is safe to use when breastfeeding.??
Enbrel Counseling:  I discussed with the patient the risks of etanercept including but not limited to myelosuppression, immunosuppression, autoimmune hepatitis, demyelinating diseases, lymphoma, and infections.  The patient understands that monitoring is required including a PPD at baseline and must alert us or the primary physician if symptoms of infection or other concerning signs are noted.

## 2023-06-26 NOTE — PROCEDURE: CURETTAGE AND DESTRUCTION
Detail Level: Detailed
Biopsy Photograph Reviewed: Yes
Number Of Curettages: 3
Size Of Lesion In Cm: 1
Size Of Lesion After Curettage: 1.5
Add Intralesional Injection: No
Anesthesia Type: 0.05% lidocaine without epinephrine
Anesthesia Volume In Cc: 0.7
Cautery Type: electrocautery (heat cautery)
What Was Performed First?: Curettage
Final Size Statement: The size of the lesion after curettage was
Additional Information: (Optional): The wound was cleaned, and a bandaid was applied.  The patient received detailed post-op instructions.
Consent was obtained from the patient. The risks, benefits and alternatives to therapy were discussed in detail. Specifically, the risks of infection, scarring, bleeding, prolonged wound healing, nerve injury, incomplete removal, allergy to anesthesia and recurrence were addressed. Alternatives to ED&C, such as: surgical removal and XRT were also discussed.  Prior to the procedure, the treatment site was clearly identified and confirmed by the patient. All components of Universal Protocol/PAUSE Rule completed.
Post-Care Instructions: I reviewed with the patient in detail post-care instructions. Patient is to keep the area dry for 24 hours, and not to engage in any swimming until the area is healed. Should the patient develop any fevers, chills, bleeding, severe pain patient will contact the office immediately.
Bill As A Line Item Or As Units: Line Item

## 2023-06-26 NOTE — PROGRESS NOTES
"Chief Complaint   Patient presents with    Hyperlipidemia    Dyslipidemia    Hypertension       Subjective     Aleyda Parra is a 82 y.o. female who presents today for follow-up for cardiac care    Here accompanied by her daughter    Since 12/26/2022 appointment the patient hospitalized at [facility] 5/25/2023-5/31/2023 for GLF with small occipital hematoma.  Noted to have orthostatic hypotension.  There were BP discrepancies between upper arms and thighs.  BUN was elevated 23.  The patient has had a 20 pound weight loss.  Clinically improved with IV fluids and continuous oxygen.  Previously on only nocturnal O2.  EKG showed sinus rhythm with no conduction abnormalities.  Echocardiogram showed normal LVEF and moderate mitral stenosis.  No reported cardiac arrhythmias.  Discharged on amlodipine.  Has been referred to vascular surgery for PVD.  Since has been doing much better reaffirmed by her daughter who is present.  Had prior GLF's with evidence of significant azotemia.    Since 7/8/2021 appointment the patient has had no cardiac symptoms including chest pain, palpitations, shortness of breath.  Hospitalized [facility] 6/15/2022 for GLF with right ankle fracture requiring ORIF and right humeral injury.  Underwent rehab and is returned home.  Functionally impaired, requiring walker for ambulation.     The patient has medical problems including mild mitral stenosis, LV outflow gradient, COPD on nocturnal O2, prior tobacco use, labile hypertension, hypercholesterolemia my DM and prior orthopedic surgeries.    The patient smoked cigarettes 25 pack years, quit 27 years ago.  No history of TIA, stroke, seizures, DVT or pulmonary emboli.     Past Medical History:   Diagnosis Date    Arthritis     osteo, generalized    Bowel habit changes     constipation    Breath shortness     Cataract     removed bilat    COPD (chronic obstructive pulmonary disease) (HCC)     Diabetes (HCC)     \"pre\", oral meds    Heart burn  "    Heart murmur     Hiatus hernia syndrome     High cholesterol     Hypertension     Indigestion     Malignant neoplasm of overlapping sites of breast in female, estrogen receptor positive (HCC) 11/6/2019    Psychiatric problem 2015    anxiety    Shortness of breath     Snoring     no sleep study    Urinary incontinence     Wears glasses      Past Surgical History:   Procedure Laterality Date    ORIF, ANKLE Right 6/10/2022    Procedure: ORIF, ANKLE - TRIMALLEOLAR;  Surgeon: Anuj Simpson M.D.;  Location: [facility];  Service: Orthopedics    ND TOTAL HIP ARTHROPLASTY Right 9/30/2021    Procedure: ARTHROPLASTY, HIP, TOTAL, ANTERIOR APPROACH;  Surgeon: Lewis Serrano M.D.;  Location: [facility];  Service: Orthopedics    WIDE EXCISION Right 8/16/2019    Procedure: WIDE EXCISION, LESION- RE-EXCISION FOR EXCISION FOR MARGINS RIGHT CHEST WALL INSTRMUSCULAR;  Surgeon: Andre Shelton M.D.;  Location: [facility];  Service: General    INCISION AND DRAINAGE GENERAL Bilateral 8/16/2019    Procedure: INCISION AND DRAINAGE- OF BILATERAL CHEST SEROMAS WITH DRAIN PLACEMENT;  Surgeon: Andre Shelton M.D.;  Location: [facility];  Service: General    PB MASTECTOMY, MODIFIED RADICAL Right 7/17/2019    Procedure: MASTECTOMY, MODIFIED RADICAL;  Surgeon: Andre Shelton M.D.;  Location: [facility];  Service: General    NODE BIOPSY SENTINEL Right 7/17/2019    Procedure: INTRA OPERATIVE SENTINEL NODE IDENTIFICATION AND BIOPSY;  Surgeon: Andre Shelton M.D.;  Location: [facility];  Service: General    AXILLARY NODE DISSECTION  7/17/2019    Procedure: LYMPHADENECTOMY, AXILLARY;  Surgeon: Andre Shelton M.D.;  Location: [facility];  Service: General    MASTECTOMY Left 7/17/2019    Procedure: TOTAL MASTECTOMY;  Surgeon: Andre Shelton M.D.;  Location: [facility];  Service: General    FLAP CLOSURE Bilateral 7/17/2019    Procedure: WIDE V FLAP;   Surgeon: Andre Shelton M.D.;  Location: SUR[facility];  Service: General    LEFORT COLPOCLESIS  2019    Procedure: LEFORT COLPOCLESIS;  Surgeon: Minnie Bañuelos M.D.;  Location: SURGERY [facility];  Service: Labor and Delivery    BLADDER SLING FEMALE  2019    Procedure: BLADDER SLING FEMALE- TOT;  Surgeon: Minnie Bañuelos M.D.;  Location: SURGERY [facility];  Service: Labor and Delivery    KNEE ARTHROPLASTY TOTAL Right 2015    Procedure: KNEE ARTHROPLASTY TOTAL;  Surgeon: Venkatesh Cardoza M.D.;  Location: SURGERY [facility];  Service:     ABDOMINAL HYSTERECTOMY TOTAL      OTHER ORTHOPEDIC SURGERY      L ankle    GYN SURGERY      abdominal hysterectomy    CHOLECYSTECTOMY  1964    CATARACT EXTRACTION WITH IOL Bilateral      Family History   Problem Relation Age of Onset    Heart Disease Neg Hx      Social History     Socioeconomic History    Marital status:      Spouse name: Not on file    Number of children: Not on file    Years of education: Not on file    Highest education level: Some college, no degree   Occupational History    Not on file   Tobacco Use    Smoking status: Former     Packs/day: 1.00     Years: 30.00     Pack years: 30.00     Types: Cigarettes     Quit date: 1992     Years since quittin.5    Smokeless tobacco: Former   Vaping Use    Vaping Use: Never used   Substance and Sexual Activity    Alcohol use: Yes     Alcohol/week: 0.6 oz     Types: 1 Glasses of wine per week     Comment: occ    Drug use: No    Sexual activity: Not on file   Other Topics Concern    Not on file   Social History Narrative    Not on file     Social Determinants of Health     Financial Resource Strain: Not on file   Food Insecurity: Not on file   Transportation Needs: No Transportation Needs (2023)    PRAPARE - Transportation     Lack of Transportation (Medical): No     Lack of Transportation (Non-Medical): No   Physical Activity: Inactive  (2/9/2021)    Exercise Vital Sign     Days of Exercise per Week: 0 days     Minutes of Exercise per Session: 0 min   Stress: Stress Concern Present (2/9/2021)    [place] [employer] - Occupational Stress Questionnaire     Feeling of Stress : To some extent   Social Connections: Unknown (2/9/2021)    Social Connection and Isolation Panel [NHANES]     Frequency of Communication with Friends and Family: More than three times a week     Frequency of Social Gatherings with Friends and Family: Twice a week     Attends [employer] Services: 1 to 4 times per year     Active Member of Clubs or Organizations: Not on file     Attends Club or Organization Meetings: Not on file     Marital Status:    Intimate Partner Violence: Not on file   Housing Stability: Low Risk  (2/9/2021)    Housing Stability Vital Sign     Unable to Pay for Housing in the Last Year: No     Number of Places Lived in the Last Year: 1     Unstable Housing in the Last Year: No     No Known Allergies  Outpatient Encounter Medications as of 6/26/2023   Medication Sig Dispense Refill    amLODIPine (NORVASC) 10 MG Tab Take 1 Tablet by mouth every day. 90 Tablet 2    DULoxetine (CYMBALTA) 30 MG Cap DR Particles Take 1 Capsule by mouth every day. 30 Capsule     gabapentin (NEURONTIN) 100 MG Cap Take 1 Capsule by mouth at bedtime as needed (nerve pain). 90 Capsule 3    Dexlansoprazole 60 MG CAPSULE DELAYED RELEASE delayed-release capsule Take 1 Capsule by mouth every evening. 90 Capsule 3    acetaminophen (TYLENOL) 500 MG Tab Take 1-2 Tablets by mouth every 6 hours as needed. 30 Tablet 0    anastrozole (ARIMIDEX) 1 MG Tab Take 1 mg by mouth every day.      lovastatin (MEVACOR) 20 MG Tab Take 20 mg by mouth every evening.      Melatonin 5 MG Tab Take 5 mg by mouth at bedtime.       No facility-administered encounter medications on file as of 6/26/2023.     Review of Systems   Respiratory:  Negative for cough and shortness of breath.   "  Cardiovascular:  Negative for chest pain and palpitations.   Musculoskeletal:  Negative for myalgias.   Neurological:  Negative for dizziness and loss of consciousness.              Objective     /68 (BP Location: Left arm, Patient Position: Sitting, BP Cuff Size: Adult)   Pulse 82   Resp 16   Ht 1.6 m (5' 3\")   Wt 61.2 kg (135 lb)   LMP  (LMP Unknown)   SpO2 95%   BMI 23.91 kg/m²     Physical Exam  Vitals reviewed.   Constitutional:       General: She is not in acute distress.     Appearance: She is well-developed.      Comments: Frail.  Uses a walker for ambulation assistance.  On continuous O2.   Eyes:      Conjunctiva/sclera: Conjunctivae normal.      Pupils: Pupils are equal, round, and reactive to light.   Neck:      Vascular: No JVD.   Cardiovascular:      Rate and Rhythm: Normal rate and regular rhythm.      Pulses:           Carotid pulses are 1+ on the right side and 1+ on the left side.       Radial pulses are 1+ on the right side and 1+ on the left side.      Heart sounds: Murmur heard.      No friction rub. No gallop.      Comments:    Pulmonary:      Effort: Pulmonary effort is normal. No accessory muscle usage or respiratory distress.      Breath sounds: Normal breath sounds. No wheezing or rales.   Chest:      Comments: Marked kyphosis.  Musculoskeletal:      Cervical back: Normal range of motion and neck supple.      Right lower leg: No edema.      Left lower leg: No edema.   Skin:     General: Skin is warm and dry.      Findings: No rash.      Nails: There is no clubbing.   Neurological:      Mental Status: She is alert and oriented to person, place, and time.      Coordination: Coordination abnormal.      Comments: Significant limited mobility.   Psychiatric:         Behavior: Behavior normal.              CHEST CT SCAN 6/6/2021  Hyperexpanded and emphysematous lungs.  Focal opacity posterior base right lower lobe consistent with atelectasis/possible pneumonia.  Interstitial " opacities within the posterior aspects of the right upper lobe and right lower lobe which may be posttreatment changes.  Wedge compression T9 vertebral body similar to 5/13/2021 chest x-rays.  Atherosclerotic changes.    ECHOCARDIOGRAM 4/22/2021  No prior study is available for comparison.   Hyperdynamic left ventricular systolic function.  Left ventricular ejection fraction is visually estimated to be greater   than 75%.  Unable to estimate pulmonary artery pressure due to an inadequate   tricuspid regurgitant jet.    ECHOCARDIOGRAM 5/26/2023  Hyperdynamic left ventricular systolic function.  The left ventricular ejection fraction is visually estimated to be   greater than 75%.  Severe mitral annular calcification.  Moderate mitral stenosis.  The right ventricle is normal in size and systolic function.  Moderately dilated left atrium.     PFTs 6/16/2021  Baseline spirometry shows low normal FVC at 1.96 L or 81% predicted and low normal FEV1 at 1.46 L or 80% predicted.  FEV1/FVC ratio is normal at 75.  Total lung capacity is also low normal at 3.93 L or 80% predicted.  Diffusion capacity is mildly reduced at 74% predicted.  Overall pulmonary function testing shows mild restrictive defect with mildly reduced DLCO.  This could be suggestive of early interstitial process.  Correlate clinically and with imaging.     Assessment & Plan     1. Moderate mitral stenosis        2. Essential hypertension        3. Dyslipidemia            Medical Decision Making: Today's Assessment/Status/Plan:   Assessment  Mitral stenosis, moderate  Chronic hypoxic respiratory failure on continuous O2  General functional debility, multifactorial.  Severe kyphosis.  ?  Restrictive lung disease by PFTs.  Diabetes mellitus.  Dyslipidemia  Osteoarthritis, diffuse, bilateral hips.  S/P right total knee replacement.  S/P ORIF right ankle 6/2022  Breast cancer 2019, right side, bilateral mastectomy, radiation therapy, hormonal therapy with  anastrozole.  PVD     Recommendation Discussion  I reviewed all of her hospital records and data.  I explained to the patient and her daughter that I do not feel that her hospital presentation was consistent with a cardiac etiology.  The patient has improved with hydration, normalization of BUN and use of continuous O2 in addition to tolerating amlodipine with normal blood pressures on home blood pressure recordings.  Agree with current medical therapy.  Advised to maintain good daily hydration and need for compliance with continuous O2.  Has pending vascular surgery consultation for PVD and possible concern for subclavian artery stenosis and pulmonary medicine follow-up  RTC 6 months

## 2023-06-26 NOTE — PROCEDURE: MIPS QUALITY
Quality 402: Tobacco Use And Help With Quitting Among Adolescents: Patient screened for tobacco and never smoked
Detail Level: Detailed
Quality 111:Pneumonia Vaccination Status For Older Adults: Patient received any pneumococcal conjugate or polysaccharide vaccine on or after their 60th birthday and before the end of the measurement period
Quality 226: Preventive Care And Screening: Tobacco Use: Screening And Cessation Intervention: Patient screened for tobacco use and is an ex/non-smoker
Quality 130: Documentation Of Current Medications In The Medical Record: Current Medications Documented

## 2023-06-27 RX ORDER — KETOCONAZOLE 20 MG/ML
SMALL AMOUNT SHAMPOO, SUSPENSION TOPICAL BID
Qty: 120 | Refills: 0 | Status: ERX | COMMUNITY
Start: 2023-06-27

## 2023-06-27 RX ADMIN — KETOCONAZOLE SMALL AMOUNT: 20 SHAMPOO, SUSPENSION TOPICAL at 00:00

## 2023-06-29 ENCOUNTER — OFFICE VISIT (OUTPATIENT)
Dept: MEDICAL GROUP | Facility: MEDICAL CENTER | Age: 83
End: 2023-06-29
Payer: MEDICARE

## 2023-06-29 VITALS
BODY MASS INDEX: 23.46 KG/M2 | HEIGHT: 63 IN | WEIGHT: 132.4 LBS | SYSTOLIC BLOOD PRESSURE: 90 MMHG | OXYGEN SATURATION: 92 % | HEART RATE: 83 BPM | DIASTOLIC BLOOD PRESSURE: 60 MMHG | TEMPERATURE: 98.1 F

## 2023-06-29 DIAGNOSIS — I10 ESSENTIAL HYPERTENSION: ICD-10-CM

## 2023-06-29 DIAGNOSIS — Z02.89 ENCOUNTER FOR COMPLETION OF FORM WITH PATIENT: ICD-10-CM

## 2023-06-29 DIAGNOSIS — F33.1 MODERATE EPISODE OF RECURRENT MAJOR DEPRESSIVE DISORDER (HCC): ICD-10-CM

## 2023-06-29 PROCEDURE — 99214 OFFICE O/P EST MOD 30 MIN: CPT | Performed by: NURSE PRACTITIONER

## 2023-06-29 PROCEDURE — 3074F SYST BP LT 130 MM HG: CPT | Performed by: NURSE PRACTITIONER

## 2023-06-29 PROCEDURE — 3078F DIAST BP <80 MM HG: CPT | Performed by: NURSE PRACTITIONER

## 2023-06-29 PROCEDURE — 1170F FXNL STATUS ASSESSED: CPT | Performed by: NURSE PRACTITIONER

## 2023-06-29 RX ORDER — KETOCONAZOLE 20 MG/ML
SHAMPOO TOPICAL
COMMUNITY
Start: 2023-06-27 | End: 2023-07-04

## 2023-06-29 RX ORDER — FLUOXETINE HYDROCHLORIDE 20 MG/1
20 CAPSULE ORAL DAILY
Qty: 90 CAPSULE | Refills: 3 | Status: SHIPPED | OUTPATIENT
Start: 2023-06-29 | End: 2023-12-11

## 2023-06-29 ASSESSMENT — FIBROSIS 4 INDEX: FIB4 SCORE: 1.25

## 2023-06-30 ENCOUNTER — PATIENT OUTREACH (OUTPATIENT)
Dept: HEALTH INFORMATION MANAGEMENT | Facility: OTHER | Age: 83
End: 2023-06-30
Payer: MEDICARE

## 2023-06-30 ENCOUNTER — PATIENT MESSAGE (OUTPATIENT)
Dept: HEALTH INFORMATION MANAGEMENT | Facility: OTHER | Age: 83
End: 2023-06-30

## 2023-06-30 DIAGNOSIS — I73.9 PAD (PERIPHERAL ARTERY DISEASE) (HCC): ICD-10-CM

## 2023-06-30 DIAGNOSIS — E44.0 MODERATE PROTEIN-CALORIE MALNUTRITION (HCC): ICD-10-CM

## 2023-06-30 DIAGNOSIS — I10 ESSENTIAL HYPERTENSION: ICD-10-CM

## 2023-06-30 NOTE — ASSESSMENT & PLAN NOTE
Chronic.  Taking amlodipine 10 mg daily.  Recently hospitalized for fall and closed head injury.  Found to be hypotensive.  She had not been eating or drinking normally that day.  Does have issues maintaining hydration.  Blood pressure today 90/60.  Her Cymbalta was reduced from 60 mg to 30 mg due to potential side effect of hypotension.

## 2023-06-30 NOTE — PROGRESS NOTES
Called to introduce the Personal Care Management Program. No Answer, unable to leave message. [employer] message sent.

## 2023-06-30 NOTE — ASSESSMENT & PLAN NOTE
Chronic.  Previously stable on duloxetine 60 mg daily.  Dose was reduced to 30 mg daily recently due to concern that this was causing her hypotension.  Reports depression has worsened since change, would like to increase dose or change medication to something else more effective.

## 2023-06-30 NOTE — PROGRESS NOTES
Subjective:   Rashmi Parra is a 83 y.o. female here today for hospital follow-up, blood pressure concern, depression    Essential hypertension  Chronic.  Taking amlodipine 10 mg daily.  Recently hospitalized for fall and closed head injury.  Found to be hypotensive.  She had not been eating or drinking normally that day.  Does have issues maintaining hydration.  Blood pressure today 90/60.  Her Cymbalta was reduced from 60 mg to 30 mg due to potential side effect of hypotension.    Encounter for completion of form with patient  Patient requesting to fill out POLST, does have advanced directive on file.    Moderate episode of recurrent major depressive disorder (HCC)  Chronic.  Previously stable on duloxetine 60 mg daily.  Dose was reduced to 30 mg daily recently due to concern that this was causing her hypotension.  Reports depression has worsened since change, would like to increase dose or change medication to something else more effective.       Current medicines (including changes today)  Current Outpatient Medications   Medication Sig Dispense Refill    Multiple Vitamin (MULTIVITAMIN ADULT PO) Take  by mouth.      Omega-3 Fatty Acids (OMEGA 3 PO) Take  by mouth.      ZINC METHIONATE PO Take  by mouth.      QUERCETIN PO Take  by mouth.      VITAMIN D-VITAMIN K PO Take  by mouth.      FLUoxetine (PROZAC) 20 MG Cap Take 1 Capsule by mouth every day. STOP DULOXETINE 90 Capsule 3    ketoconazole (NIZORAL) 2 % shampoo       amLODIPine (NORVASC) 10 MG Tab Take 1 Tablet by mouth every day. 90 Tablet 2    gabapentin (NEURONTIN) 100 MG Cap Take 1 Capsule by mouth at bedtime as needed (nerve pain). 90 Capsule 3    Dexlansoprazole 60 MG CAPSULE DELAYED RELEASE delayed-release capsule Take 1 Capsule by mouth every evening. 90 Capsule 3    acetaminophen (TYLENOL) 500 MG Tab Take 1-2 Tablets by mouth every 6 hours as needed. 30 Tablet 0    anastrozole (ARIMIDEX) 1 MG Tab Take 1 mg by mouth every day.      lovastatin  "(MEVACOR) 20 MG Tab Take 20 mg by mouth every evening.      Melatonin 5 MG Tab Take 5 mg by mouth at bedtime.       No current facility-administered medications for this visit.     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    ROS   No chest pain, no shortness of breath, no abdominal pain  Positive ROS as per HPI.  All other systems reviewed and are negative.     Objective:     BP 90/60 (BP Location: Left arm, Patient Position: Sitting, BP Cuff Size: Adult)   Pulse 83   Temp 36.7 °C (98.1 °F) (Temporal)   Ht 1.6 m (5' 3\")   Wt 60.1 kg (132 lb 6.4 oz)   SpO2 92%  Body mass index is 23.45 kg/m².     Physical Exam:  Constitutional: Alert, no distress.  Skin: Warm, dry, good turgor, no rashes in visible areas.  Eye: Equal, round and reactive, conjunctiva clear, lids normal.  ENMT: Lips without lesions, good dentition, oropharynx clear.  Neck: Trachea midline, no masses, no thyromegaly. No cervical or supraclavicular lymphadenopathy  Respiratory: Unlabored respiratory effort, lungs clear to auscultation, no wheezes, no ronchi.  Cardiovascular: Normal S1, S2, no murmur, no edema.  Abdomen: Soft, non-tender, no masses, no hepatosplenomegaly.  Psych: Alert and oriented x3, normal affect and mood.        Assessment and Plan:   The following treatment plan was discussed    1. Essential hypertension  Unstable  Continue amlodipine 10 mg daily, may need to reduce to 5 mg  Monitor blood pressure closely at home, report low readings  Stop Cymbalta    2. Encounter for completion of form with patient  POLST " completed with patient and family    3. Moderate episode of recurrent major depressive disorder (HCC)  Unstable  Stop Cymbalta  Start fluoxetine 20 mg daily with plan to increase dose for breakthrough depression if needed.  - FLUoxetine (PROZAC) 20 MG Cap; Take 1 Capsule by mouth every day. STOP DULOXETINE  Dispense: 90 Capsule; Refill: 3      Followup: Return in about 4 weeks (around 7/27/2023) for Depression/Anxiety, blood pressure.    The MA is performing the above orders under the direction of Dr. Mandujano    Please note that this dictation was created using voice recognition software. I have made every reasonable attempt to correct obvious errors, but I expect that there are errors of grammar and possibly content that I did not discover before finalizing the note.

## 2023-07-03 ENCOUNTER — APPOINTMENT (OUTPATIENT)
Dept: RADIOLOGY | Facility: MEDICAL CENTER | Age: 83
DRG: 177 | End: 2023-07-03
Attending: EMERGENCY MEDICINE
Payer: MEDICARE

## 2023-07-03 ENCOUNTER — HOSPITAL ENCOUNTER (INPATIENT)
Facility: MEDICAL CENTER | Age: 83
LOS: 6 days | DRG: 177 | End: 2023-07-10
Attending: EMERGENCY MEDICINE | Admitting: HOSPITALIST
Payer: MEDICARE

## 2023-07-03 DIAGNOSIS — R94.31 ABNORMAL EKG: ICD-10-CM

## 2023-07-03 DIAGNOSIS — I95.9 HYPOTENSION, UNSPECIFIED HYPOTENSION TYPE: ICD-10-CM

## 2023-07-03 DIAGNOSIS — N28.1 RENAL CYST: ICD-10-CM

## 2023-07-03 DIAGNOSIS — U07.1 COVID-19 VIRUS INFECTION: ICD-10-CM

## 2023-07-03 DIAGNOSIS — I10 ESSENTIAL HYPERTENSION: ICD-10-CM

## 2023-07-03 DIAGNOSIS — U07.1 COVID-19: ICD-10-CM

## 2023-07-03 DIAGNOSIS — M79.2 NERVE PAIN: ICD-10-CM

## 2023-07-03 LAB
APTT PPP: 27.8 SEC (ref 24.7–36)
BASOPHILS # BLD AUTO: 0.4 % (ref 0–1.8)
BASOPHILS # BLD: 0.03 K/UL (ref 0–0.12)
EOSINOPHIL # BLD AUTO: 0.01 K/UL (ref 0–0.51)
EOSINOPHIL NFR BLD: 0.1 % (ref 0–6.9)
ERYTHROCYTE [DISTWIDTH] IN BLOOD BY AUTOMATED COUNT: 47.9 FL (ref 35.9–50)
HCT VFR BLD AUTO: 40.6 % (ref 37–47)
HGB BLD-MCNC: 13.4 G/DL (ref 12–16)
IMM GRANULOCYTES # BLD AUTO: 0.07 K/UL (ref 0–0.11)
IMM GRANULOCYTES NFR BLD AUTO: 1 % (ref 0–0.9)
INR PPP: 1.1 (ref 0.87–1.13)
LACTATE SERPL-SCNC: 2.1 MMOL/L (ref 0.5–2)
LYMPHOCYTES # BLD AUTO: 0.67 K/UL (ref 1–4.8)
LYMPHOCYTES NFR BLD: 9.2 % (ref 22–41)
MCH RBC QN AUTO: 30.6 PG (ref 27–33)
MCHC RBC AUTO-ENTMCNC: 33 G/DL (ref 32.2–35.5)
MCV RBC AUTO: 92.7 FL (ref 81.4–97.8)
MONOCYTES # BLD AUTO: 0.93 K/UL (ref 0–0.85)
MONOCYTES NFR BLD AUTO: 12.8 % (ref 0–13.4)
NEUTROPHILS # BLD AUTO: 5.58 K/UL (ref 1.82–7.42)
NEUTROPHILS NFR BLD: 76.5 % (ref 44–72)
NRBC # BLD AUTO: 0 K/UL
NRBC BLD-RTO: 0 /100 WBC (ref 0–0.2)
PLATELET # BLD AUTO: 235 K/UL (ref 164–446)
PMV BLD AUTO: 10.9 FL (ref 9–12.9)
PROTHROMBIN TIME: 14.1 SEC (ref 12–14.6)
RBC # BLD AUTO: 4.38 M/UL (ref 4.2–5.4)
WBC # BLD AUTO: 7.3 K/UL (ref 4.8–10.8)

## 2023-07-03 PROCEDURE — 85730 THROMBOPLASTIN TIME PARTIAL: CPT

## 2023-07-03 PROCEDURE — 96375 TX/PRO/DX INJ NEW DRUG ADDON: CPT

## 2023-07-03 PROCEDURE — 700105 HCHG RX REV CODE 258: Performed by: EMERGENCY MEDICINE

## 2023-07-03 PROCEDURE — 81001 URINALYSIS AUTO W/SCOPE: CPT

## 2023-07-03 PROCEDURE — 85379 FIBRIN DEGRADATION QUANT: CPT

## 2023-07-03 PROCEDURE — 85610 PROTHROMBIN TIME: CPT

## 2023-07-03 PROCEDURE — 83880 ASSAY OF NATRIURETIC PEPTIDE: CPT

## 2023-07-03 PROCEDURE — 80053 COMPREHEN METABOLIC PANEL: CPT

## 2023-07-03 PROCEDURE — 71045 X-RAY EXAM CHEST 1 VIEW: CPT

## 2023-07-03 PROCEDURE — 700111 HCHG RX REV CODE 636 W/ 250 OVERRIDE (IP): Mod: JZ | Performed by: EMERGENCY MEDICINE

## 2023-07-03 PROCEDURE — 83735 ASSAY OF MAGNESIUM: CPT

## 2023-07-03 PROCEDURE — 93005 ELECTROCARDIOGRAM TRACING: CPT | Performed by: EMERGENCY MEDICINE

## 2023-07-03 PROCEDURE — 84100 ASSAY OF PHOSPHORUS: CPT

## 2023-07-03 PROCEDURE — 83690 ASSAY OF LIPASE: CPT

## 2023-07-03 PROCEDURE — 83605 ASSAY OF LACTIC ACID: CPT

## 2023-07-03 PROCEDURE — 85652 RBC SED RATE AUTOMATED: CPT

## 2023-07-03 PROCEDURE — 84484 ASSAY OF TROPONIN QUANT: CPT

## 2023-07-03 PROCEDURE — 87040 BLOOD CULTURE FOR BACTERIA: CPT | Mod: 91

## 2023-07-03 PROCEDURE — 82962 GLUCOSE BLOOD TEST: CPT

## 2023-07-03 PROCEDURE — 87086 URINE CULTURE/COLONY COUNT: CPT

## 2023-07-03 PROCEDURE — 36415 COLL VENOUS BLD VENIPUNCTURE: CPT

## 2023-07-03 PROCEDURE — 99285 EMERGENCY DEPT VISIT HI MDM: CPT

## 2023-07-03 PROCEDURE — 83036 HEMOGLOBIN GLYCOSYLATED A1C: CPT

## 2023-07-03 PROCEDURE — 0241U HCHG SARS-COV-2 COVID-19 NFCT DS RESP RNA 4 TRGT MIC: CPT

## 2023-07-03 PROCEDURE — 85025 COMPLETE CBC W/AUTO DIFF WBC: CPT

## 2023-07-03 PROCEDURE — C9803 HOPD COVID-19 SPEC COLLECT: HCPCS | Performed by: EMERGENCY MEDICINE

## 2023-07-03 RX ORDER — CEFTRIAXONE 2 G/1
2000 INJECTION, POWDER, FOR SOLUTION INTRAMUSCULAR; INTRAVENOUS ONCE
Status: COMPLETED | OUTPATIENT
Start: 2023-07-03 | End: 2023-07-03

## 2023-07-03 RX ORDER — SODIUM CHLORIDE 9 MG/ML
1000 INJECTION, SOLUTION INTRAVENOUS ONCE
Status: COMPLETED | OUTPATIENT
Start: 2023-07-03 | End: 2023-07-04

## 2023-07-03 RX ADMIN — SODIUM CHLORIDE 1000 ML: 9 INJECTION, SOLUTION INTRAVENOUS at 23:22

## 2023-07-03 RX ADMIN — CEFTRIAXONE SODIUM 2000 MG: 2 INJECTION, POWDER, FOR SOLUTION INTRAMUSCULAR; INTRAVENOUS at 23:34

## 2023-07-03 ASSESSMENT — FIBROSIS 4 INDEX: FIB4 SCORE: 1.25

## 2023-07-04 ENCOUNTER — PHARMACY VISIT (OUTPATIENT)
Dept: PHARMACY | Facility: MEDICAL CENTER | Age: 83
End: 2023-07-04
Payer: COMMERCIAL

## 2023-07-04 ENCOUNTER — APPOINTMENT (OUTPATIENT)
Dept: RADIOLOGY | Facility: MEDICAL CENTER | Age: 83
DRG: 177 | End: 2023-07-04
Attending: EMERGENCY MEDICINE
Payer: MEDICARE

## 2023-07-04 PROBLEM — R29.898 LOWER EXTREMITY WEAKNESS: Status: ACTIVE | Noted: 2023-05-08

## 2023-07-04 PROBLEM — R79.89 ELEVATED TROPONIN: Status: ACTIVE | Noted: 2023-07-04

## 2023-07-04 PROBLEM — U07.1 COVID-19: Status: ACTIVE | Noted: 2023-07-04

## 2023-07-04 LAB
ALBUMIN SERPL BCP-MCNC: 4.2 G/DL (ref 3.2–4.9)
ALBUMIN/GLOB SERPL: 1.4 G/DL
ALP SERPL-CCNC: 131 U/L (ref 30–99)
ALT SERPL-CCNC: 8 U/L (ref 2–50)
ANION GAP SERPL CALC-SCNC: 14 MMOL/L (ref 7–16)
APPEARANCE UR: CLEAR
AST SERPL-CCNC: 14 U/L (ref 12–45)
BACTERIA #/AREA URNS HPF: NEGATIVE /HPF
BILIRUB SERPL-MCNC: 0.3 MG/DL (ref 0.1–1.5)
BILIRUB UR QL STRIP.AUTO: NEGATIVE
BUN SERPL-MCNC: 20 MG/DL (ref 8–22)
CALCIUM ALBUM COR SERPL-MCNC: 9.6 MG/DL (ref 8.5–10.5)
CALCIUM SERPL-MCNC: 9.8 MG/DL (ref 8.5–10.5)
CHLORIDE SERPL-SCNC: 95 MMOL/L (ref 96–112)
CK SERPL-CCNC: 81 U/L (ref 0–154)
CO2 SERPL-SCNC: 27 MMOL/L (ref 20–33)
COLOR UR: YELLOW
CREAT SERPL-MCNC: 0.89 MG/DL (ref 0.5–1.4)
CRP SERPL HS-MCNC: 4.5 MG/DL (ref 0–0.75)
D DIMER PPP IA.FEU-MCNC: 0.74 UG/ML (FEU) (ref 0–0.5)
EKG IMPRESSION: NORMAL
EKG IMPRESSION: NORMAL
EPI CELLS #/AREA URNS HPF: NEGATIVE /HPF
ERYTHROCYTE [SEDIMENTATION RATE] IN BLOOD BY WESTERGREN METHOD: 36 MM/HOUR (ref 0–25)
EST. AVERAGE GLUCOSE BLD GHB EST-MCNC: 143 MG/DL
FLUAV RNA SPEC QL NAA+PROBE: NEGATIVE
FLUBV RNA SPEC QL NAA+PROBE: NEGATIVE
GFR SERPLBLD CREATININE-BSD FMLA CKD-EPI: 64 ML/MIN/1.73 M 2
GLOBULIN SER CALC-MCNC: 3.1 G/DL (ref 1.9–3.5)
GLUCOSE BLD STRIP.AUTO-MCNC: 132 MG/DL (ref 65–99)
GLUCOSE BLD STRIP.AUTO-MCNC: 147 MG/DL (ref 65–99)
GLUCOSE BLD STRIP.AUTO-MCNC: 165 MG/DL (ref 65–99)
GLUCOSE BLD STRIP.AUTO-MCNC: 179 MG/DL (ref 65–99)
GLUCOSE BLD STRIP.AUTO-MCNC: 197 MG/DL (ref 65–99)
GLUCOSE SERPL-MCNC: 135 MG/DL (ref 65–99)
GLUCOSE UR STRIP.AUTO-MCNC: NEGATIVE MG/DL
HBA1C MFR BLD: 6.6 % (ref 4–5.6)
HYALINE CASTS #/AREA URNS LPF: NORMAL /LPF
KETONES UR STRIP.AUTO-MCNC: NEGATIVE MG/DL
LACTATE SERPL-SCNC: 1.1 MMOL/L (ref 0.5–2)
LEUKOCYTE ESTERASE UR QL STRIP.AUTO: NEGATIVE
LIPASE SERPL-CCNC: 20 U/L (ref 11–82)
MAGNESIUM SERPL-MCNC: 1.7 MG/DL (ref 1.5–2.5)
MICRO URNS: ABNORMAL
NITRITE UR QL STRIP.AUTO: NEGATIVE
NT-PROBNP SERPL IA-MCNC: 1480 PG/ML (ref 0–125)
PH UR STRIP.AUTO: 5.5 [PH] (ref 5–8)
PHOSPHATE SERPL-MCNC: 2.8 MG/DL (ref 2.5–4.5)
POTASSIUM SERPL-SCNC: 3.8 MMOL/L (ref 3.6–5.5)
PROCALCITONIN SERPL-MCNC: 0.08 NG/ML
PROT SERPL-MCNC: 7.3 G/DL (ref 6–8.2)
PROT UR QL STRIP: NEGATIVE MG/DL
RBC # URNS HPF: NORMAL /HPF
RBC UR QL AUTO: ABNORMAL
RSV RNA SPEC QL NAA+PROBE: NEGATIVE
SARS-COV-2 RNA RESP QL NAA+PROBE: DETECTED
SODIUM SERPL-SCNC: 136 MMOL/L (ref 135–145)
SP GR UR STRIP.AUTO: 1.01
SPECIMEN SOURCE: ABNORMAL
TROPONIN T SERPL-MCNC: 33 NG/L (ref 6–19)
TROPONIN T SERPL-MCNC: 37 NG/L (ref 6–19)
TROPONIN T SERPL-MCNC: 41 NG/L (ref 6–19)
UROBILINOGEN UR STRIP.AUTO-MCNC: 1 MG/DL
WBC #/AREA URNS HPF: NORMAL /HPF

## 2023-07-04 PROCEDURE — 770020 HCHG ROOM/CARE - TELE (206)

## 2023-07-04 PROCEDURE — A9270 NON-COVERED ITEM OR SERVICE: HCPCS

## 2023-07-04 PROCEDURE — 700117 HCHG RX CONTRAST REV CODE 255: Performed by: EMERGENCY MEDICINE

## 2023-07-04 PROCEDURE — A9270 NON-COVERED ITEM OR SERVICE: HCPCS | Performed by: HOSPITALIST

## 2023-07-04 PROCEDURE — 700101 HCHG RX REV CODE 250: Performed by: EMERGENCY MEDICINE

## 2023-07-04 PROCEDURE — 99497 ADVNCD CARE PLAN 30 MIN: CPT | Performed by: HOSPITALIST

## 2023-07-04 PROCEDURE — 84145 PROCALCITONIN (PCT): CPT

## 2023-07-04 PROCEDURE — 96372 THER/PROPH/DIAG INJ SC/IM: CPT

## 2023-07-04 PROCEDURE — 84484 ASSAY OF TROPONIN QUANT: CPT

## 2023-07-04 PROCEDURE — 99223 1ST HOSP IP/OBS HIGH 75: CPT | Mod: 25,AI | Performed by: HOSPITALIST

## 2023-07-04 PROCEDURE — 96365 THER/PROPH/DIAG IV INF INIT: CPT

## 2023-07-04 PROCEDURE — 700105 HCHG RX REV CODE 258: Mod: JZ | Performed by: EMERGENCY MEDICINE

## 2023-07-04 PROCEDURE — 36415 COLL VENOUS BLD VENIPUNCTURE: CPT

## 2023-07-04 PROCEDURE — 700102 HCHG RX REV CODE 250 W/ 637 OVERRIDE(OP): Performed by: HOSPITALIST

## 2023-07-04 PROCEDURE — 71275 CT ANGIOGRAPHY CHEST: CPT

## 2023-07-04 PROCEDURE — RXMED WILLOW AMBULATORY MEDICATION CHARGE: Performed by: STUDENT IN AN ORGANIZED HEALTH CARE EDUCATION/TRAINING PROGRAM

## 2023-07-04 PROCEDURE — 86140 C-REACTIVE PROTEIN: CPT

## 2023-07-04 PROCEDURE — 700102 HCHG RX REV CODE 250 W/ 637 OVERRIDE(OP)

## 2023-07-04 PROCEDURE — 96375 TX/PRO/DX INJ NEW DRUG ADDON: CPT

## 2023-07-04 PROCEDURE — 82962 GLUCOSE BLOOD TEST: CPT | Mod: 91

## 2023-07-04 PROCEDURE — 700111 HCHG RX REV CODE 636 W/ 250 OVERRIDE (IP): Mod: JZ | Performed by: HOSPITALIST

## 2023-07-04 PROCEDURE — 96366 THER/PROPH/DIAG IV INF ADDON: CPT

## 2023-07-04 PROCEDURE — 83605 ASSAY OF LACTIC ACID: CPT

## 2023-07-04 PROCEDURE — 82550 ASSAY OF CK (CPK): CPT

## 2023-07-04 RX ORDER — GABAPENTIN 100 MG/1
100 CAPSULE ORAL NIGHTLY PRN
Qty: 90 CAPSULE | Refills: 3 | Status: SHIPPED | OUTPATIENT
Start: 2023-07-04

## 2023-07-04 RX ORDER — DEXAMETHASONE 6 MG/1
6 TABLET ORAL DAILY
Qty: 10 TABLET | Refills: 0 | Status: SHIPPED | OUTPATIENT
Start: 2023-07-05

## 2023-07-04 RX ORDER — ENOXAPARIN SODIUM 100 MG/ML
40 INJECTION SUBCUTANEOUS DAILY
Status: DISCONTINUED | OUTPATIENT
Start: 2023-07-04 | End: 2023-07-10 | Stop reason: HOSPADM

## 2023-07-04 RX ORDER — AMLODIPINE BESYLATE 10 MG/1
10 TABLET ORAL DAILY
Qty: 90 TABLET | Refills: 0 | Status: SHIPPED | OUTPATIENT
Start: 2023-07-04 | End: 2023-07-10

## 2023-07-04 RX ORDER — ACETAMINOPHEN 325 MG/1
650 TABLET ORAL EVERY 6 HOURS PRN
Status: DISCONTINUED | OUTPATIENT
Start: 2023-07-04 | End: 2023-07-10 | Stop reason: HOSPADM

## 2023-07-04 RX ORDER — AMLODIPINE BESYLATE 5 MG/1
10 TABLET ORAL DAILY
Status: DISCONTINUED | OUTPATIENT
Start: 2023-07-04 | End: 2023-07-04

## 2023-07-04 RX ORDER — NOREPINEPHRINE BITARTRATE 0.03 MG/ML
0-1 INJECTION, SOLUTION INTRAVENOUS CONTINUOUS
Status: DISCONTINUED | OUTPATIENT
Start: 2023-07-04 | End: 2023-07-04

## 2023-07-04 RX ORDER — OMEPRAZOLE 20 MG/1
40 CAPSULE, DELAYED RELEASE ORAL EVERY EVENING
Status: DISCONTINUED | OUTPATIENT
Start: 2023-07-04 | End: 2023-07-10 | Stop reason: HOSPADM

## 2023-07-04 RX ORDER — DEXAMETHASONE SODIUM PHOSPHATE 4 MG/ML
6 INJECTION, SOLUTION INTRA-ARTICULAR; INTRALESIONAL; INTRAMUSCULAR; INTRAVENOUS; SOFT TISSUE DAILY
Status: DISCONTINUED | OUTPATIENT
Start: 2023-07-04 | End: 2023-07-04

## 2023-07-04 RX ORDER — CHOLECALCIFEROL (VITAMIN D3) 125 MCG
5 CAPSULE ORAL NIGHTLY PRN
Status: DISCONTINUED | OUTPATIENT
Start: 2023-07-04 | End: 2023-07-10 | Stop reason: HOSPADM

## 2023-07-04 RX ORDER — ONDANSETRON 4 MG/1
4 TABLET, ORALLY DISINTEGRATING ORAL EVERY 4 HOURS PRN
Status: DISCONTINUED | OUTPATIENT
Start: 2023-07-04 | End: 2023-07-10 | Stop reason: HOSPADM

## 2023-07-04 RX ORDER — DEXAMETHASONE 4 MG/1
6 TABLET ORAL DAILY
Status: DISCONTINUED | OUTPATIENT
Start: 2023-07-05 | End: 2023-07-10 | Stop reason: HOSPADM

## 2023-07-04 RX ORDER — ONDANSETRON 2 MG/ML
4 INJECTION INTRAMUSCULAR; INTRAVENOUS EVERY 4 HOURS PRN
Status: DISCONTINUED | OUTPATIENT
Start: 2023-07-04 | End: 2023-07-10 | Stop reason: HOSPADM

## 2023-07-04 RX ORDER — GABAPENTIN 100 MG/1
100 CAPSULE ORAL NIGHTLY PRN
Status: DISCONTINUED | OUTPATIENT
Start: 2023-07-04 | End: 2023-07-10 | Stop reason: HOSPADM

## 2023-07-04 RX ORDER — FLUOXETINE HYDROCHLORIDE 20 MG/1
20 CAPSULE ORAL DAILY
Status: DISCONTINUED | OUTPATIENT
Start: 2023-07-04 | End: 2023-07-10 | Stop reason: HOSPADM

## 2023-07-04 RX ORDER — POLYETHYLENE GLYCOL 3350 17 G/17G
17 POWDER, FOR SOLUTION ORAL DAILY
COMMUNITY

## 2023-07-04 RX ORDER — ACETAMINOPHEN 325 MG/1
650 TABLET ORAL EVERY 4 HOURS PRN
COMMUNITY

## 2023-07-04 RX ORDER — DULOXETIN HYDROCHLORIDE 30 MG/1
30 CAPSULE, DELAYED RELEASE ORAL DAILY
COMMUNITY
End: 2023-08-24

## 2023-07-04 RX ORDER — ASPIRIN 81 MG/1
81 TABLET, CHEWABLE ORAL DAILY
Status: DISCONTINUED | OUTPATIENT
Start: 2023-07-04 | End: 2023-07-10 | Stop reason: HOSPADM

## 2023-07-04 RX ORDER — DEXLANSOPRAZOLE 60 MG/1
1 CAPSULE, DELAYED RELEASE ORAL EVERY EVENING
Status: DISCONTINUED | OUTPATIENT
Start: 2023-07-04 | End: 2023-07-04

## 2023-07-04 RX ADMIN — FLUOXETINE 20 MG: 20 CAPSULE ORAL at 07:29

## 2023-07-04 RX ADMIN — ENOXAPARIN SODIUM 40 MG: 100 INJECTION SUBCUTANEOUS at 17:08

## 2023-07-04 RX ADMIN — Medication 5 MG: at 23:10

## 2023-07-04 RX ADMIN — INSULIN HUMAN 2 UNITS: 100 INJECTION, SOLUTION PARENTERAL at 13:19

## 2023-07-04 RX ADMIN — INSULIN HUMAN 2 UNITS: 100 INJECTION, SOLUTION PARENTERAL at 16:57

## 2023-07-04 RX ADMIN — NOREPINEPHRINE BITARTRATE 0.05 MCG/KG/MIN: 1 INJECTION, SOLUTION, CONCENTRATE INTRAVENOUS at 00:44

## 2023-07-04 RX ADMIN — ASPIRIN 81 MG 81 MG: 81 TABLET ORAL at 05:05

## 2023-07-04 RX ADMIN — ENOXAPARIN SODIUM 40 MG: 100 INJECTION SUBCUTANEOUS at 05:05

## 2023-07-04 RX ADMIN — OMEPRAZOLE 40 MG: 20 CAPSULE, DELAYED RELEASE ORAL at 17:07

## 2023-07-04 RX ADMIN — DEXAMETHASONE SODIUM PHOSPHATE 6 MG: 4 INJECTION, SOLUTION INTRAMUSCULAR; INTRAVENOUS at 05:05

## 2023-07-04 RX ADMIN — IOHEXOL 35 ML: 350 INJECTION, SOLUTION INTRAVENOUS at 02:05

## 2023-07-04 RX ADMIN — INSULIN HUMAN 2 UNITS: 100 INJECTION, SOLUTION PARENTERAL at 07:32

## 2023-07-04 ASSESSMENT — ENCOUNTER SYMPTOMS
HEARTBURN: 0
PHOTOPHOBIA: 0
WHEEZING: 0
BLOOD IN STOOL: 0
VOMITING: 0
DIARRHEA: 0
SHORTNESS OF BREATH: 1
SPUTUM PRODUCTION: 0
CHILLS: 1
WEAKNESS: 1
HEADACHES: 0
PALPITATIONS: 0
CLAUDICATION: 0
CONSTIPATION: 0
STRIDOR: 0
DEPRESSION: 0
PND: 0
SINUS PAIN: 0
ORTHOPNEA: 0
SORE THROAT: 0
FEVER: 1
BRUISES/BLEEDS EASILY: 0
DOUBLE VISION: 0
POLYDIPSIA: 0
FALLS: 0
NAUSEA: 0
COUGH: 1
BLURRED VISION: 0
DIZZINESS: 0
BACK PAIN: 0
FLANK PAIN: 0
MYALGIAS: 0
HEMOPTYSIS: 0
ABDOMINAL PAIN: 0
TREMORS: 0
DIAPHORESIS: 0
NECK PAIN: 0
HALLUCINATIONS: 0
EYE PAIN: 0
TINGLING: 0

## 2023-07-04 ASSESSMENT — PAIN DESCRIPTION - PAIN TYPE: TYPE: ACUTE PAIN

## 2023-07-04 ASSESSMENT — LIFESTYLE VARIABLES
EVER FELT BAD OR GUILTY ABOUT YOUR DRINKING: NO
CONSUMPTION TOTAL: NEGATIVE
TOTAL SCORE: 0
TOTAL SCORE: 0
HAVE YOU EVER FELT YOU SHOULD CUT DOWN ON YOUR DRINKING: NO
SUBSTANCE_ABUSE: 0
EVER HAD A DRINK FIRST THING IN THE MORNING TO STEADY YOUR NERVES TO GET RID OF A HANGOVER: NO
ALCOHOL_USE: NO
ON A TYPICAL DAY WHEN YOU DRINK ALCOHOL HOW MANY DRINKS DO YOU HAVE: 0
HAVE PEOPLE ANNOYED YOU BY CRITICIZING YOUR DRINKING: NO
TOTAL SCORE: 0
HOW MANY TIMES IN THE PAST YEAR HAVE YOU HAD 5 OR MORE DRINKS IN A DAY: 0
AVERAGE NUMBER OF DAYS PER WEEK YOU HAVE A DRINK CONTAINING ALCOHOL: 0

## 2023-07-04 ASSESSMENT — COGNITIVE AND FUNCTIONAL STATUS - GENERAL
EATING MEALS: A LOT
DRESSING REGULAR LOWER BODY CLOTHING: A LOT
DAILY ACTIVITIY SCORE: 12
STANDING UP FROM CHAIR USING ARMS: A LOT
CLIMB 3 TO 5 STEPS WITH RAILING: TOTAL
TOILETING: A LOT
TURNING FROM BACK TO SIDE WHILE IN FLAT BAD: A LOT
MOVING TO AND FROM BED TO CHAIR: A LOT
WALKING IN HOSPITAL ROOM: TOTAL
SUGGESTED CMS G CODE MODIFIER DAILY ACTIVITY: CL
MOBILITY SCORE: 10
DRESSING REGULAR UPPER BODY CLOTHING: A LOT
HELP NEEDED FOR BATHING: A LOT
PERSONAL GROOMING: A LOT
MOVING FROM LYING ON BACK TO SITTING ON SIDE OF FLAT BED: A LOT
SUGGESTED CMS G CODE MODIFIER MOBILITY: CL

## 2023-07-04 ASSESSMENT — FIBROSIS 4 INDEX
FIB4 SCORE: 1.77
FIB4 SCORE: 1.77

## 2023-07-04 NOTE — PROGRESS NOTES
4 Eyes Skin Assessment Completed by Lisandro RN and Genaro RN.    Head WDL  Ears Redness and Blanching  Nose WDL  Mouth WDL  Neck WDL  Breast/Chest WDL  Shoulder Blades WDL  Spine WDL  (R) Arm/Elbow/Hand WDL  (L) Arm/Elbow/Hand WDL  Abdomen WDL  Groin WDL  Scrotum/Coccyx/Buttocks Redness and Blanching  (R) Leg Edema  (L) Leg Scab and Edema  (R) Heel/Foot/Toe Redness, Blanching, and Boggy  (L) Heel/Foot/Toe Redness, Blanching, and Boggy          Devices In Places Tele Box      Interventions In Place Pillows    Possible Skin Injury No    Pictures Uploaded Into Epic N/A  Wound Consult Placed N/A  RN Wound Prevention Protocol Ordered No

## 2023-07-04 NOTE — ED NOTES
Med rec completed per MAR from [facility]  Allergies reviewed with patient and family at bedside

## 2023-07-04 NOTE — ED NOTES
Blood pressure on left and right arm similar. Patient's daughter notified this RN that patient had similar situation before where blood pressure on both arms are low but blood pressure on leg is normal. BP on left ankle 136/63. ERP notified. Levophed discontinued.

## 2023-07-04 NOTE — ASSESSMENT & PLAN NOTE
Positive for COVID  Complicated by acute hypoxic gemma failure  Oral Decadron  Up to chair for all meals  Frequent symptom spirometer  Titrate oxygen as tolerable

## 2023-07-04 NOTE — CARE PLAN
Problem: Skin Integrity  Goal: Skin integrity is maintained or improved  Outcome: Progressing     Problem: Knowledge Deficit - Standard  Goal: Patient and family/care givers will demonstrate understanding of plan of care, disease process/condition, diagnostic tests and medications  Outcome: Progressing     Problem: Fall Risk  Goal: Patient will remain free from falls  Outcome: Progressing   The patient is Stable - Low risk of patient condition declining or worsening    Shift Goals  Clinical Goals: See hospitalist; develop plan; improved O2 demand  Patient Goals: Rest  Family Goals: RENÉE    Progress made toward(s) clinical / shift goals:      Pt educated on plan of care, pt understands plan of care, reinforcement needed. Pt educated on pain scales, alleviating factors, pain medications and side effects, and need for pain reassessment, reinforcement needed, pain controlled at this time. Pt educated on the need to reposition frequently and remain dry to avoid skin breakdown, reinforcement needed, no skin break down during shift. Fall risk education provided to pt, pt educated about the need to call for assistance while ambulating, reinforcement needed, pt remains free of falls.

## 2023-07-04 NOTE — ASSESSMENT & PLAN NOTE
Start on insulin sliding scale with serial Accu-Checks  Check hemoglobin A1c  Hypoglycemic protocol in place

## 2023-07-04 NOTE — H&P
Hospital Medicine History & Physical Note    Date of Service  7/4/2023    Primary Care Physician  DAYANA Zaman    Consultants      Specialist Names:     Code Status  DNAR/DNI    Chief Complaint  Chief Complaint   Patient presents with    ALOC     Per daughter, pt is becoming increasingly confused and is taking longer to answer questions today.     Weakness     X1 day. Pt is normally ambulatory and has been too weak to walk all day.        History of Presenting Illness  Rashmi Parra is a 83 y.o. female who presented 7/3/2023 with past medical history of mitral stenosis, peripheral vascular disease, chronic hypoxic respiratory failure on 3 L of oxygen, restrictive lung disease, diabetes, breast cancer who comes into the hospital for generalized weakness.  Patient does have some back pain and lower extremity numbness but states that these are chronic and have not worsened.  Patient was recently admitted in the hospital for orthostatic hypotension.  Patient was taking midodrine and Florinef that was eventually weaned off.  Patient also had a CT scan of the abdomen that found stenosis of the abdominal aorta on 50%.  She was also found to have 2 chronic compression fractures.    Chest x-ray interpreted by me found left-sided infiltrate  EKG interpreted by me found normal sinus rhythm, biatrial enlargement T wave inversions on anterior precordial leads, LVH  CTA of the chest did not show any evidence of pulmonary embolism    I discussed the plan of care with patient.    Review of Systems  Review of Systems   Constitutional:  Positive for chills, fever and malaise/fatigue. Negative for diaphoresis.   HENT:  Negative for congestion, ear discharge, ear pain, hearing loss, nosebleeds, sinus pain, sore throat and tinnitus.    Eyes:  Negative for blurred vision, double vision, photophobia and pain.   Respiratory:  Positive for cough and shortness of breath. Negative for hemoptysis, sputum production, wheezing  and stridor.    Cardiovascular:  Positive for leg swelling. Negative for chest pain, palpitations, orthopnea, claudication and PND.   Gastrointestinal:  Negative for abdominal pain, blood in stool, constipation, diarrhea, heartburn, melena, nausea and vomiting.   Genitourinary:  Negative for dysuria, flank pain, frequency, hematuria and urgency.   Musculoskeletal:  Negative for back pain, falls, joint pain, myalgias and neck pain.   Skin:  Negative for itching and rash.   Neurological:  Positive for weakness. Negative for dizziness, tingling, tremors and headaches.   Endo/Heme/Allergies:  Negative for environmental allergies and polydipsia. Does not bruise/bleed easily.   Psychiatric/Behavioral:  Negative for depression, hallucinations, substance abuse and suicidal ideas.        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    Surgical History   has a past surgical history that includes other orthopedic surgery (1993); gyn surgery (1992); cholecystectomy (1964); knee arthroplasty total (Right, 11/2/2015); cataract extraction with iol (Bilateral); lefort colpoclesis (1/21/2019); bladder sling female (1/21/2019); pr mastectomy, modified radical (Right, 7/17/2019); node biopsy sentinel (Right, 7/17/2019); axillary node dissection (7/17/2019); mastectomy (Left, 7/17/2019); flap closure (Bilateral, 7/17/2019); wide excision (Right, 8/16/2019); incision and drainage general (Bilateral, 8/16/2019); abdominal hysterectomy total (1994); pr total hip arthroplasty (Right, 9/30/2021); and orif, ankle  (Right, 6/10/2022).     Family History  family history is not on file.   Family history reviewed with patient. There is no family history that is pertinent to the chief complaint.     Social History   reports that she quit smoking about 31 years ago. Her smoking use included cigarettes. She has a 30.00 pack-year smoking history. She has quit using smokeless tobacco. She reports current alcohol use of about 0.6 oz of alcohol per week. She reports that she does not use drugs.    Allergies  No Known Allergies    Medications  Prior to Admission Medications   Prescriptions Last Dose Informant Patient Reported? Taking?   Dexlansoprazole 60 MG CAPSULE DELAYED RELEASE delayed-release capsule  Patient's Home Pharmacy No No   Sig: Take 1 Capsule by mouth every evening.   FLUoxetine (PROZAC) 20 MG Cap   No No   Sig: Take 1 Capsule by mouth every day. STOP DULOXETINE   Melatonin 5 MG Tab  Patient's Home Pharmacy Yes No   Sig: Take 5 mg by mouth at bedtime.   Multiple Vitamin (MULTIVITAMIN ADULT PO)   Yes No   Sig: Take  by mouth.   Omega-3 Fatty Acids (OMEGA 3 PO)   Yes No   Sig: Take  by mouth.   QUERCETIN PO   Yes No   Sig: Take  by mouth.   VITAMIN D-VITAMIN K PO   Yes No   Sig: Take  by mouth.   ZINC METHIONATE PO   Yes No   Sig: Take  by mouth.   acetaminophen (TYLENOL) 500 MG Tab  Patient's Home Pharmacy No No   Sig: Take 1-2 Tablets by mouth every 6 hours as needed.   amLODIPine (NORVASC) 10 MG Tab   No No   Sig: Take 1 Tablet by mouth every day.   anastrozole (ARIMIDEX) 1 MG Tab  Patient's Home Pharmacy Yes No   Sig: Take 1 mg by mouth every day.   gabapentin (NEURONTIN) 100 MG Cap  Patient's Home Pharmacy No No   Sig: Take 1 Capsule by mouth at bedtime as needed (nerve pain).   ketoconazole (NIZORAL) 2 % shampoo   Yes No   lovastatin (MEVACOR) 20 MG Tab  Patient's Home Pharmacy Yes No   Sig: Take 20 mg by mouth every evening.      Facility-Administered Medications: None       Physical Exam  Temp:  [36.6 °C (97.9 °F)]  36.6 °C (97.9 °F)  Pulse:  [73-89] 83  Resp:  [13-19] 15  BP: ()/(36-63) 136/63  SpO2:  [93 %-99 %] 96 %  Blood Pressure : 136/63   Temperature: 36.6 °C (97.9 °F)   Pulse: 83   Respiration: 15   Pulse Oximetry: 96 %       Physical Exam  Vitals and nursing note reviewed.   Constitutional:       General: She is not in acute distress.     Appearance: Normal appearance. She is not ill-appearing, toxic-appearing or diaphoretic.   HENT:      Head: Normocephalic and atraumatic.      Nose: No congestion or rhinorrhea.      Mouth/Throat:      Pharynx: No oropharyngeal exudate or posterior oropharyngeal erythema.   Eyes:      General: No scleral icterus.  Neck:      Vascular: JVD present. No carotid bruit.   Cardiovascular:      Rate and Rhythm: Normal rate and regular rhythm.      Pulses: Normal pulses.      Heart sounds: Normal heart sounds. No murmur heard.     No friction rub. No gallop.   Pulmonary:      Effort: Pulmonary effort is normal. No respiratory distress.      Breath sounds: No stridor. Rales present. No wheezing or rhonchi.   Abdominal:      General: Abdomen is flat. There is no distension.      Palpations: There is no mass.      Tenderness: There is no abdominal tenderness. There is no left CVA tenderness, guarding or rebound.      Hernia: No hernia is present.   Musculoskeletal:         General: No swelling. Normal range of motion.      Cervical back: No rigidity. No muscular tenderness.      Right lower leg: Edema present.      Left lower leg: Edema present.   Lymphadenopathy:      Cervical: No cervical adenopathy.   Skin:     General: Skin is warm and dry.      Capillary Refill: Capillary refill takes more than 3 seconds.      Coloration: Skin is not jaundiced or pale.      Findings: No bruising or erythema.   Neurological:      Mental Status: She is alert.         Laboratory:  Recent Labs     07/03/23  2311   WBC 7.3   RBC 4.38   HEMOGLOBIN 13.4   HEMATOCRIT 40.6   MCV 92.7   MCH 30.6   MCHC 33.0    RDW 47.9   PLATELETCT 235   MPV 10.9     Recent Labs     07/03/23  2311   SODIUM 136   POTASSIUM 3.8   CHLORIDE 95*   CO2 27   GLUCOSE 135*   BUN 20   CREATININE 0.89   CALCIUM 9.8     Recent Labs     07/03/23  2311   ALTSGPT 8   ASTSGOT 14   ALKPHOSPHAT 131*   TBILIRUBIN 0.3   LIPASE 20   GLUCOSE 135*     Recent Labs     07/03/23  2311   APTT 27.8   INR 1.10     Recent Labs     07/03/23  2311   NTPROBNP 1480*         Recent Labs     07/03/23 2311 07/04/23  0141   TROPONINT 41* 33*       Imaging:  CT-CTA CHEST PULMONARY ARTERY W/ RECONS   Final Result         1.  No pulmonary embolus appreciated.   2.  Left renal cyst, intermediate density likely hemorrhagic or proteinaceous cyst is decreased in size but slightly increased in density since prior study   3.  Atherosclerosis and atherosclerotic coronary artery disease.      DX-CHEST-PORTABLE (1 VIEW)   Final Result         1.  No acute cardiopulmonary disease.      US-EXTREMITY VENOUS LOWER BILAT    (Results Pending)   US-EXTREMITY ARTERY LOWER BILAT W/PARUL (COMBO)    (Results Pending)       X-Ray:  I have personally reviewed the images and compared with prior images.  EKG:  I have personally reviewed the images and compared with prior images.    Assessment/Plan:  Justification for Admission Status  I anticipate this patient will require at least two midnights for appropriate medical management, necessitating inpatient admission because of COVID    Patient will need a Telemetry bed on MEDICAL service .  The need is secondary to COVID.    * COVID-19- (present on admission)  Assessment & Plan  Monitor O2 status closely  Prone positioning and frequent change position  ISS and ambulation aggressively  Decadron 6 mg for 10 days  Inflammatory markers every 48 hours  Start IV Unasyn for now  Prophylactic Lovenox      Elevated troponin  Assessment & Plan  Patient denies any chest pain  she does have T wave inversion of the anterior precordial leads  Monitor on  telemetry    Moderate protein-calorie malnutrition (HCC)- (present on admission)  Assessment & Plan  Dietary consult    Lower extremity weakness  Assessment & Plan  Unknown etiology she does have compression fractures  Check ABIs  Check venous Dopplers since she has swelling  PT OT eval    Hypotension  Assessment & Plan  Patient had different blood pressures, upper extremities compared to her lower extremities.  She was placed on Levophed in the ER for short time when it was realized that her lower centimeters she had accurate blood pressures.  This is due to her abdominal aorta stenosis.  Check ABIs.    Type 2 diabetes mellitus with diabetic mononeuropathy, without long-term current use of insulin (HCC)- (present on admission)  Assessment & Plan  Start on insulin sliding scale with serial Accu-Checks  Check hemoglobin A1c  Hypoglycemic protocol in place          Acute on chronic respiratory failure with hypoxia (HCC)- (present on admission)  Assessment & Plan  Currently on 4 L of oxygen her baseline is 3 L        VTE prophylaxis: SCDs/TEDs      I discussed advance care planning for at least 30 minutes with the patient including diagnosis, prognosis, plan of care, risks and benefits of any therapies that could be offered, as well as alternatives including palliation and hospice, as appropriate. The patient has opted DNR Time spent is exclusive of evaluation and management or other separately billable procedures.

## 2023-07-04 NOTE — PROGRESS NOTES
83-year-old female with a past medical history of mitral stenosis, peripheral vascular disease, chronic hypoxic respiratory failure on 3 L of oxygen, restrictive lung disease, diabetes, breast cancer was admitted on 7/3/2023 for acute on chronic respiratory failure secondary to COVID pneumonia.     Continue Decadron  Up to chair for all meals  Titrate O2  Pending PT/OT  Labs on a.m.

## 2023-07-04 NOTE — ED NOTES
Followed up with ERP regarding BP. Says will come and check patient. Patient denies any new or worsening of symptoms.

## 2023-07-04 NOTE — RESPIRATORY CARE
COPD EDUCATION by COPD CLINICAL EDUCATOR  7/4/2023 at 6:19 AM by Brianna An, RRT     Patient reviewed by COPD education team. Patient does not have a history or diagnosis of COPD and is a non-smoker.  Therefore, patient does not qualify for the COPD program. Follows with [facility] had recent PFT 11/29/2022 confirms no obstructive defect -mild restriction

## 2023-07-04 NOTE — ED PROVIDER NOTES
ED Provider Note    CHIEF COMPLAINT  Chief Complaint   Patient presents with    ALOC     Per daughter, pt is becoming increasingly confused and is taking longer to answer questions today.     Weakness     X1 day. Pt is normally ambulatory and has been too weak to walk all day.        EXTERNAL RECORDS REVIEWED  Outpatient Notes Family medicine and cardiology office visit notes from 6/29/2023 and 6/26/2023, respectively.  Discharge summary from 5/31/2023    HPI/ROS  LIMITATION TO HISTORY   Select: : None  OUTSIDE HISTORIAN(S):  Family Daughter    Rashmi Parra is a 83 y.o. female who presents to the emergency department for evaluation of altered mental status and generalized weakness.  The patient's daughter states that this morning the patient woke up in her assisted living facility.  She told her caretakers that she was having some lower extremity pain.  She slept a little bit longer and then when she did try to get up was unable to stand by herself.  She normally walks with a walker.  The patient's daughter states that she does seem more confused than normal as well.  She had a temperature at the assisted living facility of 99.4 °F.  She was given Tylenol.  She was sent here for further evaluation.  The daughter denies any focal neurodeficits.  The patient denies any chest pain or shortness of breath.  She denies nausea, vomiting, abdominal pain.    PAST MEDICAL HISTORY   has a past medical history of Arthritis, Bowel habit changes, Breath shortness, Cataract, COPD (chronic obstructive pulmonary disease) (HCC), Diabetes (HCC), Heart burn, Heart murmur, Hiatus hernia syndrome, High cholesterol, Hypertension, Indigestion, Malignant neoplasm of overlapping sites of breast in female, estrogen receptor positive ([facility]) (11/6/2019), Psychiatric problem (2015), Shortness of breath, Snoring, Urinary incontinence, and Wears glasses.    SURGICAL HISTORY   has a past surgical history that includes other orthopedic surgery  "(); gyn surgery (); cholecystectomy (); knee arthroplasty total (Right, 2015); cataract extraction with iol (Bilateral); lefort colpoclesis (2019); bladder sling female (2019); mastectomy, modified radical (Right, 2019); node biopsy sentinel (Right, 2019); axillary node dissection (2019); mastectomy (Left, 2019); flap closure (Bilateral, 2019); wide excision (Right, 2019); incision and drainage general (Bilateral, 2019); abdominal hysterectomy total (); total hip arthroplasty (Right, 2021); and orif, ankle (Right, 6/10/2022).    FAMILY HISTORY  Family History   Problem Relation Age of Onset    Heart Disease Neg Hx        SOCIAL HISTORY  Social History     Tobacco Use    Smoking status: Former     Packs/day: 1.00     Years: 30.00     Pack years: 30.00     Types: Cigarettes     Quit date: 1992     Years since quittin.5    Smokeless tobacco: Former   Vaping Use    Vaping Use: Never used   Substance and Sexual Activity    Alcohol use: Yes     Alcohol/week: 0.6 oz     Types: 1 Glasses of wine per week     Comment: occ    Drug use: No    Sexual activity: Not on file       CURRENT MEDICATIONS  Home Medications    **Home medications have not yet been reviewed for this encounter**         ALLERGIES  No Known Allergies    PHYSICAL EXAM  VITAL SIGNS: BP (!) 143/64   Pulse 83   Temp 36.6 °C (97.9 °F) (Temporal)   Resp 20   Ht 1.6 m (5' 3\")   Wt 60.1 kg (132 lb 6.4 oz)   LMP  (LMP Unknown)   SpO2 92%   BMI 23.45 kg/m²   Constitutional: Alert and in no apparent distress.  HENT: Normocephalic atraumatic. Bilateral external ears normal.  Nose normal. Mucous membranes are moist.  Eyes: Pupils are equal and reactive. Conjunctiva normal. Non-icteric sclera.   Neck: Normal range of motion without tenderness. Supple. No meningeal signs.  Cardiovascular: Regular rate and rhythm. No murmurs, gallops or rubs.  Thorax & Lungs: No increased work of " breathing. Breath sounds are clear to auscultation bilaterally. No wheezing, rhonchi or rales.  Abdomen: Soft, nontender and nondistended. No hepatosplenomegaly.  Skin: Warm and dry. No rashes are noted.  Extremities: 2+ peripheral pulses. Cap refill is less than 2 seconds. No edema, cyanosis, or clubbing.  Musculoskeletal: Good range of motion in all major joints. No tenderness to palpation or major deformities noted.   Neurologic: Alert and appropriate for age. The patient moves all 4 extremities without obvious deficits.    DIAGNOSTIC STUDIES / PROCEDURES  EKG  I have independently interpreted this EKG  Results for orders placed or performed during the hospital encounter of 23   EKG   Result Value Ref Range    Report       [facility] Emergency Dept.    Test Date:  2023  Pt Name:    LINNEA HOFFMANN                 Department: ER  MRN:        [id number]                      Room:       [facility]  Gender:     Female                       Technician: [id number]  :        [id number]                   Requested By:LIVIER PURCELL  Order #:    [id number]                    Reading MD:    Measurements  Intervals                                Axis  Rate:       77                           P:          39  VA:         143                          QRS:        10  QRSD:       88                           T:          115  QT:         437  QTc:        495    Interpretive Statements  Sinus rhythm  LAE, consider biatrial enlargement  Probable left ventricular hypertrophy  Abnrm T, consider ischemia, anterolateral lds  Compared to ECG 2023 20:08:13  Possible ischemia now present       LABS  Results for orders placed or performed during the hospital encounter of 23   CBC With Differential   Result Value Ref Range    WBC 7.3 4.8 - 10.8 K/uL    RBC 4.38 4.20 - 5.40 M/uL    Hemoglobin 13.4 12.0 - 16.0 g/dL    Hematocrit 40.6 37.0 - 47.0 %    MCV 92.7 81.4 - 97.8 fL    MCH 30.6 27.0 - 33.0 pg    MCHC 33.0  32.2 - 35.5 g/dL    RDW 47.9 35.9 - 50.0 fL    Platelet Count 235 164 - 446 K/uL    MPV 10.9 9.0 - 12.9 fL    Neutrophils-Polys 76.50 (H) 44.00 - 72.00 %    Lymphocytes 9.20 (L) 22.00 - 41.00 %    Monocytes 12.80 0.00 - 13.40 %    Eosinophils 0.10 0.00 - 6.90 %    Basophils 0.40 0.00 - 1.80 %    Immature Granulocytes 1.00 (H) 0.00 - 0.90 %    Nucleated RBC 0.00 0.00 - 0.20 /100 WBC    Neutrophils (Absolute) 5.58 1.82 - 7.42 K/uL    Lymphs (Absolute) 0.67 (L) 1.00 - 4.80 K/uL    Monos (Absolute) 0.93 (H) 0.00 - 0.85 K/uL    Eos (Absolute) 0.01 0.00 - 0.51 K/uL    Baso (Absolute) 0.03 0.00 - 0.12 K/uL    Immature Granulocytes (abs) 0.07 0.00 - 0.11 K/uL    NRBC (Absolute) 0.00 K/uL   Comp Metabolic Panel   Result Value Ref Range    Sodium 136 135 - 145 mmol/L    Potassium 3.8 3.6 - 5.5 mmol/L    Chloride 95 (L) 96 - 112 mmol/L    Co2 27 20 - 33 mmol/L    Anion Gap 14.0 7.0 - 16.0    Glucose 135 (H) 65 - 99 mg/dL    Bun 20 8 - 22 mg/dL    Creatinine 0.89 0.50 - 1.40 mg/dL    Calcium 9.8 8.5 - 10.5 mg/dL    AST(SGOT) 14 12 - 45 U/L    ALT(SGPT) 8 2 - 50 U/L    Alkaline Phosphatase 131 (H) 30 - 99 U/L    Total Bilirubin 0.3 0.1 - 1.5 mg/dL    Albumin 4.2 3.2 - 4.9 g/dL    Total Protein 7.3 6.0 - 8.2 g/dL    Globulin 3.1 1.9 - 3.5 g/dL    A-G Ratio 1.4 g/dL   Lipase   Result Value Ref Range    Lipase 20 11 - 82 U/L   Lactic Acid   Result Value Ref Range    Lactic Acid 2.1 (H) 0.5 - 2.0 mmol/L   Lactic Acid   Result Value Ref Range    Lactic Acid 1.1 0.5 - 2.0 mmol/L   Urinalysis    Specimen: Urine   Result Value Ref Range    Color Yellow     Character Clear     Specific Gravity 1.010 <1.035    Ph 5.5 5.0 - 8.0    Glucose Negative Negative mg/dL    Ketones Negative Negative mg/dL    Protein Negative Negative mg/dL    Bilirubin Negative Negative    Urobilinogen, Urine 1.0 Negative    Nitrite Negative Negative    Leukocyte Esterase Negative Negative    Occult Blood Trace (A) Negative    Micro Urine Req Microscopic    CoV-2,  Flu A/B, And RSV by PCR (two.42.solutions)    Specimen: Respirate   Result Value Ref Range    Influenza virus A RNA Negative Negative    Influenza virus B, PCR Negative Negative    RSV, PCR Negative Negative    SARS-CoV-2 by PCR DETECTED (AA)     SARS-CoV-2 Source NP Swab    Magnesium   Result Value Ref Range    Magnesium 1.7 1.5 - 2.5 mg/dL   Phosphorus   Result Value Ref Range    Phosphorus 2.8 2.5 - 4.5 mg/dL   Troponin   Result Value Ref Range    Troponin T 41 (H) 6 - 19 ng/L   proBrain Natriuretic Peptide, NT   Result Value Ref Range    NT-proBNP 1480 (H) 0 - 125 pg/mL   Prothrombin Time   Result Value Ref Range    PT 14.1 12.0 - 14.6 sec    INR 1.10 0.87 - 1.13   APTT   Result Value Ref Range    APTT 27.8 24.7 - 36.0 sec   ESTIMATED GFR   Result Value Ref Range    GFR (CKD-EPI) 64 >60 mL/min/1.73 m 2   CORRECTED CALCIUM   Result Value Ref Range    Correct Calcium 9.6 8.5 - 10.5 mg/dL   TROPONIN   Result Value Ref Range    Troponin T 33 (H) 6 - 19 ng/L   URINE MICROSCOPIC (W/UA)   Result Value Ref Range    WBC 0-2 /hpf    RBC 0-2 /hpf    Bacteria Negative None /hpf    Epithelial Cells Negative /hpf    Hyaline Cast 0-2 /lpf   D-DIMER   Result Value Ref Range    D-Dimer 0.74 (H) 0.00 - 0.50 ug/mL (FEU)   HEMOGLOBIN A1C   Result Value Ref Range    Glycohemoglobin 6.6 (H) 4.0 - 5.6 %    Est Avg Glucose 143 mg/dL   CREATINE KINASE   Result Value Ref Range    CPK Total 81 0 - 154 U/L   PROCALCITONIN   Result Value Ref Range    Procalcitonin 0.08 <0.25 ng/mL   CRP QUANTITIVE (NON-CARDIAC)   Result Value Ref Range    Stat C-Reactive Protein 4.50 (H) 0.00 - 0.75 mg/dL   EKG   Result Value Ref Range    Report       [facility] Emergency Dept.    Test Date:  2023  Pt Name:    LINNEA HOFFMANN                 Department: ER  MRN:        [id number]                      Room:       [facility]  Gender:     Female                       Technician: [id number]  :        [id number]                   Requested By:LIVIER NOLASCO  JAZZY  Order #:    [id number]                    Reading MD:    Measurements  Intervals                                Axis  Rate:       77                           P:          39  WI:         143                          QRS:        10  QRSD:       88                           T:          115  QT:         437  QTc:        495    Interpretive Statements  Sinus rhythm  LAE, consider biatrial enlargement  Probable left ventricular hypertrophy  Abnrm T, consider ischemia, anterolateral lds  Compared to ECG 05/25/2023 20:08:13  Possible ischemia now present     POCT glucose device results   Result Value Ref Range    POC Glucose, Blood 147 (H) 65 - 99 mg/dL     RADIOLOGY  I have independently interpreted the diagnostic imaging associated with this visit and am waiting the final reading from the radiologist.   My preliminary interpretation is as follows: Clear chest x-ray  Radiologist interpretation:   CT-CTA CHEST PULMONARY ARTERY W/ RECONS   Final Result         1.  No pulmonary embolus appreciated.   2.  Left renal cyst, intermediate density likely hemorrhagic or proteinaceous cyst is decreased in size but slightly increased in density since prior study   3.  Atherosclerosis and atherosclerotic coronary artery disease.      DX-CHEST-PORTABLE (1 VIEW)   Final Result         1.  No acute cardiopulmonary disease.      US-EXTREMITY VENOUS LOWER BILAT    (Results Pending)   US-EXTREMITY ARTERY LOWER BILAT W/PARUL (COMBO)    (Results Pending)     COURSE & MEDICAL DECISION MAKING    ED Observation Status? Yes; I am placing the patient in to an observation status due to a diagnostic uncertainty as well as therapeutic intensity. Patient placed in observation status at 11:02 PM, 7/3/2023.     Observation plan is as follows: Labs, imaging and reevaluation    Upon Reevaluation, the patient's condition has: not improved; and will be escalated to hospitalization.    Patient discharged from ED Observation status at 3:27 AM (Time)  7/4/23 (Date).     INITIAL ASSESSMENT, COURSE AND PLAN  Care Narrative: This is an 83-year-old female presenting to the ED for evaluation of altered mental status and weakness.  On initial evaluation, the patient did not appear to be in any acute distress.  She was noted to be hypotensive with a blood pressure in the 70s over 40s.  The remainder of her vitals were reassuring.  She was alert and oriented and answers my questions appropriately.  She is moving all 4 extremities equally.  An IV was established and she was given an IV fluid bolus.      Patient did have persistent low blood pressures and was started on peripheral Levophed.  Upon reevaluation, the patient's daughter stated that she does have known low blood pressure in bilateral upper extremities since her mastectomy 6 years ago.  Blood pressures were taken in her legs and normal.  Clinically she does not appear to be hypotensive.  Levophed was discontinued.    The patient was noted to be COVID-positive.      Additionally, EKG did show some T wave changes in the anterior leads.  Her first troponin was 41 which seem to be at her baseline.  Repeat troponin was improved at 33.  She denies any chest pain.    3:27 AM - I discussed the case with Dr Kang, hospitalist. He agreed with the plan and accepted the patient.     HYDRATION: Based on the patient's presentation of Hypotension the patient was given IV fluids. IV Hydration was used because oral hydration was not adequate alone. Upon recheck following hydration, the patient was improved.      ADDITIONAL PROBLEM LIST  Covid 19 virus infection, hypotension, renal cyst, abnormal EKG  DISPOSITION AND DISCUSSIONS  I have discussed management of the patient with the following physicians and TAN's:  Dr Kang, hospitalist    Discussion of management with other [facility] or appropriate source(s): None     Escalation of care considered, and ultimately not performed:blood analysis and diagnostic imaging    Barriers to care  at this time, including but not limited to:  None .     Decision tools and prescription drugs considered including, but not limited to:  None .  FINAL IMPRESSION  1. COVID-19 virus infection    2. Hypotension, unspecified hypotension type    3. Renal cyst    4. Abnormal EKG      -ADMIT-    Electronically signed by: Jyothi Rodríguez D.O., 7/3/2023 11:00 PM

## 2023-07-04 NOTE — ASSESSMENT & PLAN NOTE
Patient denies any chest pain  she does have T wave inversion of the anterior precordial leads  Monitor on telemetry

## 2023-07-04 NOTE — PROGRESS NOTES
Report received from night nurse at 0700. Pt seen at bedside and pt care assumed. PT is A&Ox4, on 3L NC, and denies pain. PIV flushed and intact. PT is SR on tele monitor. PT is x1 assist w/ FWW.      Plan of care reviewed with pt, call light, phone, and personal belongings within reach. Bed alarm on, and bed in low locked position. All Pt's needs met at this time.

## 2023-07-04 NOTE — ED TRIAGE NOTES
"Chief Complaint   Patient presents with    ALOC     Per daughter, pt is becoming increasingly confused and is taking longer to answer questions today.     Weakness     X1 day. Pt is normally ambulatory and has been too weak to walk all day.      Pt BIB family from [facility]. History and story obtained by daughter. Pt has history of COPD, 3L baseline, HTN, HLD. Pt is fatigued, orientedX4. No facial droop or slurred speech noted. No numbness/tingling. No unilateral deficits. FSBG 147. No chest pain or shortness of breath. Pt endorses lightheadedness. Pt took 1000 mg tylenol PTA. Pt had fever of 99.9F PTA.     BP retaken in triage & charge RN notified. Pt immediately roomed from triage.     BP (!) 68/41   Pulse 89   Temp 36.6 °C (97.9 °F) (Temporal)   Resp 14   Ht 1.6 m (5' 3\")   Wt 60.1 kg (132 lb 6.4 oz)   LMP  (LMP Unknown)   SpO2 95%   BMI 23.45 kg/m²     "

## 2023-07-04 NOTE — DIETARY
"Nutrition services: Day 0 of admit.  Rashmi Parra is a 83 y.o. female with admitting DX of COVID-19    Consult received for Failure to Thrive    RD unable to enter room related to current department restrictions as it pertains to COVID-19 isolation. RD unsuccessful in attempting to reach pt on bedside phone.     Assessment:  Height: 160 cm (5' 3\")  Weight: 61.7 kg (136 lb 0.4 oz)  Body mass index is 24.1 kg/m²., BMI classification: WNL  Diet/Intake: 2 grams sodium     Evaluation:   Hx of Moderate protein-calorie malnutrition, T2DM, hypotension, lower extremity weakness, chronic hypoxic respiratory failure on 3L of oxygen  Pt intake PTA unknown, pt with >50% intake at previous admission 5/30/23.  Per ADLs, pt consuming 0% for breakfast this morning. RD adding Boost Plus nutrition shakes to support ease of intake.   Per chart review, weight hx with unknown measurement sources  Wt Readings from Last 4 Encounters:   07/04/23 61.7 kg (136 lb 0.4 oz)   06/29/23 60.1 kg (132 lb 6.4 oz)   06/26/23 61.2 kg (135 lb)   05/31/23 59.9 kg (132 lb)   1/16/23: 61.7kg/ 136 lbs  10/26/22: 69.4 kg/ 153 lbs  4/21/22: 64.4 kg/ 142 lbs   Weight on 10/2022 appears to be outlier in relation to weight hx on file. Weight appears stable within past 6 months at minimum.   Unable to assess via nutrition focused physical exam, noted pt met criteria for moderate malnutrition at previous admission on 6/1, related to moderate fat and muscle wasting at that time. Likely still meets this criteria as has not had any changes to her weight.  Skin: No pressure injuries documented, bilateral lower extremity edema identified per MD note, though degree not detailed.   Pertinent labs: A1c 6.6%, current POC glucose <180mg/dL, anticipate some elevations likely to decadron on MAR, given insufficient PO documented and hx of moderate malnutrition, RD to refrain from adding consistent CHO restriction that may further impact intake.   Pertinent meds: decadron, " lovenox, SSI, prilosec  Last BM per pt report- 7/3    Malnutrition Risk: Likely meets criteria for moderate malnutrition related to     Problem: Nutritional:  Goal: Achieve adequate nutritional intake    Recommendations/Plan:  Patient will consume 50% from meals or more  RD to add Boost Plus BID   Encourage intake of meals   Document intake of all meals and/or supplements as % taken in ADL's to provide interdisciplinary communication across all shifts.   Monitor weight.  Nutrition rep will continue to see patient for ongoing meal and snack preferences.     RD following

## 2023-07-05 ENCOUNTER — APPOINTMENT (OUTPATIENT)
Dept: CARDIOLOGY | Facility: MEDICAL CENTER | Age: 83
End: 2023-07-05
Attending: NURSE PRACTITIONER
Payer: MEDICARE

## 2023-07-05 ENCOUNTER — APPOINTMENT (OUTPATIENT)
Dept: RADIOLOGY | Facility: MEDICAL CENTER | Age: 83
DRG: 177 | End: 2023-07-05
Attending: HOSPITALIST
Payer: MEDICARE

## 2023-07-05 LAB
ALBUMIN SERPL BCP-MCNC: 3.1 G/DL (ref 3.2–4.9)
ALBUMIN/GLOB SERPL: 1.1 G/DL
ALP SERPL-CCNC: 90 U/L (ref 30–99)
ALT SERPL-CCNC: 9 U/L (ref 2–50)
ANION GAP SERPL CALC-SCNC: 11 MMOL/L (ref 7–16)
AST SERPL-CCNC: 14 U/L (ref 12–45)
BASOPHILS # BLD AUTO: 0.3 % (ref 0–1.8)
BASOPHILS # BLD: 0.01 K/UL (ref 0–0.12)
BILIRUB SERPL-MCNC: 0.2 MG/DL (ref 0.1–1.5)
BUN SERPL-MCNC: 18 MG/DL (ref 8–22)
CALCIUM ALBUM COR SERPL-MCNC: 9.6 MG/DL (ref 8.5–10.5)
CALCIUM SERPL-MCNC: 8.9 MG/DL (ref 8.5–10.5)
CHLORIDE SERPL-SCNC: 104 MMOL/L (ref 96–112)
CO2 SERPL-SCNC: 25 MMOL/L (ref 20–33)
CREAT SERPL-MCNC: 0.6 MG/DL (ref 0.5–1.4)
EOSINOPHIL # BLD AUTO: 0 K/UL (ref 0–0.51)
EOSINOPHIL NFR BLD: 0 % (ref 0–6.9)
ERYTHROCYTE [DISTWIDTH] IN BLOOD BY AUTOMATED COUNT: 50.4 FL (ref 35.9–50)
GFR SERPLBLD CREATININE-BSD FMLA CKD-EPI: 88 ML/MIN/1.73 M 2
GLOBULIN SER CALC-MCNC: 2.8 G/DL (ref 1.9–3.5)
GLUCOSE BLD STRIP.AUTO-MCNC: 147 MG/DL (ref 65–99)
GLUCOSE BLD STRIP.AUTO-MCNC: 163 MG/DL (ref 65–99)
GLUCOSE BLD STRIP.AUTO-MCNC: 189 MG/DL (ref 65–99)
GLUCOSE BLD STRIP.AUTO-MCNC: 224 MG/DL (ref 65–99)
GLUCOSE SERPL-MCNC: 145 MG/DL (ref 65–99)
HCT VFR BLD AUTO: 36.4 % (ref 37–47)
HGB BLD-MCNC: 11.5 G/DL (ref 12–16)
IMM GRANULOCYTES # BLD AUTO: 0.01 K/UL (ref 0–0.11)
IMM GRANULOCYTES NFR BLD AUTO: 0.3 % (ref 0–0.9)
LYMPHOCYTES # BLD AUTO: 0.93 K/UL (ref 1–4.8)
LYMPHOCYTES NFR BLD: 24.8 % (ref 22–41)
MCH RBC QN AUTO: 30.3 PG (ref 27–33)
MCHC RBC AUTO-ENTMCNC: 31.6 G/DL (ref 32.2–35.5)
MCV RBC AUTO: 95.8 FL (ref 81.4–97.8)
MONOCYTES # BLD AUTO: 0.78 K/UL (ref 0–0.85)
MONOCYTES NFR BLD AUTO: 20.8 % (ref 0–13.4)
NEUTROPHILS # BLD AUTO: 2.02 K/UL (ref 1.82–7.42)
NEUTROPHILS NFR BLD: 53.8 % (ref 44–72)
NRBC # BLD AUTO: 0 K/UL
NRBC BLD-RTO: 0 /100 WBC (ref 0–0.2)
PLATELET # BLD AUTO: 174 K/UL (ref 164–446)
PMV BLD AUTO: 10.7 FL (ref 9–12.9)
POTASSIUM SERPL-SCNC: 3.6 MMOL/L (ref 3.6–5.5)
PROT SERPL-MCNC: 5.9 G/DL (ref 6–8.2)
RBC # BLD AUTO: 3.8 M/UL (ref 4.2–5.4)
SODIUM SERPL-SCNC: 140 MMOL/L (ref 135–145)
WBC # BLD AUTO: 3.8 K/UL (ref 4.8–10.8)

## 2023-07-05 PROCEDURE — 700102 HCHG RX REV CODE 250 W/ 637 OVERRIDE(OP): Performed by: HOSPITALIST

## 2023-07-05 PROCEDURE — 36415 COLL VENOUS BLD VENIPUNCTURE: CPT

## 2023-07-05 PROCEDURE — 97162 PT EVAL MOD COMPLEX 30 MIN: CPT

## 2023-07-05 PROCEDURE — A9270 NON-COVERED ITEM OR SERVICE: HCPCS | Performed by: STUDENT IN AN ORGANIZED HEALTH CARE EDUCATION/TRAINING PROGRAM

## 2023-07-05 PROCEDURE — 99232 SBSQ HOSP IP/OBS MODERATE 35: CPT | Performed by: STUDENT IN AN ORGANIZED HEALTH CARE EDUCATION/TRAINING PROGRAM

## 2023-07-05 PROCEDURE — 700102 HCHG RX REV CODE 250 W/ 637 OVERRIDE(OP): Performed by: STUDENT IN AN ORGANIZED HEALTH CARE EDUCATION/TRAINING PROGRAM

## 2023-07-05 PROCEDURE — 85025 COMPLETE CBC W/AUTO DIFF WBC: CPT

## 2023-07-05 PROCEDURE — 700111 HCHG RX REV CODE 636 W/ 250 OVERRIDE (IP): Mod: JZ | Performed by: HOSPITALIST

## 2023-07-05 PROCEDURE — 82962 GLUCOSE BLOOD TEST: CPT | Mod: 91

## 2023-07-05 PROCEDURE — 93970 EXTREMITY STUDY: CPT

## 2023-07-05 PROCEDURE — 700102 HCHG RX REV CODE 250 W/ 637 OVERRIDE(OP)

## 2023-07-05 PROCEDURE — 80053 COMPREHEN METABOLIC PANEL: CPT

## 2023-07-05 PROCEDURE — A9270 NON-COVERED ITEM OR SERVICE: HCPCS

## 2023-07-05 PROCEDURE — 770020 HCHG ROOM/CARE - TELE (206)

## 2023-07-05 PROCEDURE — 97535 SELF CARE MNGMENT TRAINING: CPT

## 2023-07-05 PROCEDURE — A9270 NON-COVERED ITEM OR SERVICE: HCPCS | Performed by: HOSPITALIST

## 2023-07-05 PROCEDURE — 97166 OT EVAL MOD COMPLEX 45 MIN: CPT

## 2023-07-05 RX ADMIN — DEXAMETHASONE 6 MG: 4 TABLET ORAL at 09:40

## 2023-07-05 RX ADMIN — INSULIN HUMAN 3 UNITS: 100 INJECTION, SOLUTION PARENTERAL at 16:44

## 2023-07-05 RX ADMIN — INSULIN HUMAN 2 UNITS: 100 INJECTION, SOLUTION PARENTERAL at 13:39

## 2023-07-05 RX ADMIN — ENOXAPARIN SODIUM 40 MG: 100 INJECTION SUBCUTANEOUS at 16:34

## 2023-07-05 RX ADMIN — Medication 5 MG: at 22:51

## 2023-07-05 RX ADMIN — ASPIRIN 81 MG 81 MG: 81 TABLET ORAL at 09:40

## 2023-07-05 RX ADMIN — INSULIN HUMAN 2 UNITS: 100 INJECTION, SOLUTION PARENTERAL at 19:42

## 2023-07-05 RX ADMIN — OMEPRAZOLE 40 MG: 20 CAPSULE, DELAYED RELEASE ORAL at 16:34

## 2023-07-05 RX ADMIN — FLUOXETINE 20 MG: 20 CAPSULE ORAL at 08:00

## 2023-07-05 ASSESSMENT — COGNITIVE AND FUNCTIONAL STATUS - GENERAL
CLIMB 3 TO 5 STEPS WITH RAILING: A LOT
DAILY ACTIVITIY SCORE: 17
SUGGESTED CMS G CODE MODIFIER MOBILITY: CK
STANDING UP FROM CHAIR USING ARMS: A LITTLE
MOBILITY SCORE: 17
TOILETING: A LOT
WALKING IN HOSPITAL ROOM: A LITTLE
DRESSING REGULAR UPPER BODY CLOTHING: A LITTLE
TURNING FROM BACK TO SIDE WHILE IN FLAT BAD: A LITTLE
DRESSING REGULAR LOWER BODY CLOTHING: A LOT
SUGGESTED CMS G CODE MODIFIER DAILY ACTIVITY: CK
MOVING TO AND FROM BED TO CHAIR: A LITTLE
HELP NEEDED FOR BATHING: A LOT
MOVING FROM LYING ON BACK TO SITTING ON SIDE OF FLAT BED: A LITTLE

## 2023-07-05 ASSESSMENT — PAIN DESCRIPTION - PAIN TYPE: TYPE: ACUTE PAIN

## 2023-07-05 ASSESSMENT — ENCOUNTER SYMPTOMS
COUGH: 1
GASTROINTESTINAL NEGATIVE: 1
WEAKNESS: 1
MUSCULOSKELETAL NEGATIVE: 1
PSYCHIATRIC NEGATIVE: 1
EYES NEGATIVE: 1

## 2023-07-05 ASSESSMENT — GAIT ASSESSMENTS
GAIT LEVEL OF ASSIST: CONTACT GUARD ASSIST
ASSISTIVE DEVICE: FRONT WHEEL WALKER
DISTANCE (FEET): 40
DEVIATION: SHUFFLED GAIT

## 2023-07-05 ASSESSMENT — ACTIVITIES OF DAILY LIVING (ADL): TOILETING: REQUIRES ASSIST

## 2023-07-05 NOTE — PROGRESS NOTES
12 hour chart check    Rhythm: SR  Rate: 72-81  Ectopy: (R) PVC  Measurements: .16/.08/.38

## 2023-07-05 NOTE — PROGRESS NOTES
Hospital Medicine Daily Progress Note    Date of Service  7/5/2023    Chief Complaint  Rashmi Parra is a 83 y.o. female admitted 7/3/2023 with dyspnea    Hospital Course  83-year-old female with a past medical history of mitral stenosis, peripheral vascular disease, chronic hypoxic respiratory failure on 3 L of oxygen, restrictive lung disease, diabetes, breast cancer was admitted on 7/3/2023 for acute on chronic respiratory failure secondary to COVID pneumonia.     Interval Problem Update  Patient was evaluated at bedside  Up to chair in NAD  Stable vitals  On 3 L nasal cannula  Continue Decadron  PT/OT evaluated and recommending postacute versus home with home health  SNF referral placed    I have discussed this patient's plan of care and discharge plan at IDT rounds today with Case Management, Nursing, Nursing leadership, and other members of the IDT team.    Consultants/Specialty  None    Code Status  DNAR/DNI    Disposition  The patient is not medically cleared for discharge to home or a post-acute facility.  Anticipate discharge to: home with organized home healthcare and close outpatient follow-up    I have placed the appropriate orders for post-discharge needs.    Review of Systems  Review of Systems   Constitutional:  Positive for malaise/fatigue.   HENT: Negative.     Eyes: Negative.    Respiratory:  Positive for cough.    Cardiovascular:  Positive for leg swelling.   Gastrointestinal: Negative.    Genitourinary: Negative.    Musculoskeletal: Negative.    Skin: Negative.    Neurological:  Positive for weakness.   Endo/Heme/Allergies: Negative.    Psychiatric/Behavioral: Negative.          Physical Exam  Temp:  [36.2 °C (97.2 °F)-36.8 °C (98.2 °F)] 36.4 °C (97.5 °F)  Pulse:  [65-78] 76  Resp:  [16-18] 18  BP: (124-154)/(59-72) 149/65  SpO2:  [90 %-99 %] 98 %    Physical Exam  Constitutional:       Appearance: Normal appearance.   HENT:      Head: Normocephalic and atraumatic.      Mouth/Throat:       Mouth: Mucous membranes are moist.   Eyes:      Extraocular Movements: Extraocular movements intact.      Pupils: Pupils are equal, round, and reactive to light.   Cardiovascular:      Rate and Rhythm: Normal rate and regular rhythm.      Pulses: Normal pulses.      Heart sounds: Normal heart sounds.   Pulmonary:      Effort: Pulmonary effort is normal.      Breath sounds: Rales present.   Abdominal:      General: Bowel sounds are normal.      Palpations: Abdomen is soft.   Musculoskeletal:         General: No swelling. Normal range of motion.      Cervical back: Normal range of motion and neck supple.      Right lower leg: No edema.      Left lower leg: No edema.   Skin:     General: Skin is warm.      Coloration: Skin is not jaundiced.   Neurological:      General: No focal deficit present.      Mental Status: She is alert and oriented to person, place, and time. Mental status is at baseline.      Cranial Nerves: No cranial nerve deficit.   Psychiatric:         Mood and Affect: Mood normal.         Behavior: Behavior normal.         Thought Content: Thought content normal.         Judgment: Judgment normal.         Fluids  No intake or output data in the 24 hours ending 07/05/23 1641    Laboratory  Recent Labs     07/03/23 2311 07/05/23  0432   WBC 7.3 3.8*   RBC 4.38 3.80*   HEMOGLOBIN 13.4 11.5*   HEMATOCRIT 40.6 36.4*   MCV 92.7 95.8   MCH 30.6 30.3   MCHC 33.0 31.6*   RDW 47.9 50.4*   PLATELETCT 235 174   MPV 10.9 10.7     Recent Labs     07/03/23 2311 07/05/23  0432   SODIUM 136 140   POTASSIUM 3.8 3.6   CHLORIDE 95* 104   CO2 27 25   GLUCOSE 135* 145*   BUN 20 18   CREATININE 0.89 0.60   CALCIUM 9.8 8.9     Recent Labs     07/03/23 2311   APTT 27.8   INR 1.10               Imaging  US-EXTREMITY VENOUS LOWER BILAT   Final Result      CT-CTA CHEST PULMONARY ARTERY W/ RECONS   Final Result         1.  No pulmonary embolus appreciated.   2.  Left renal cyst, intermediate density likely hemorrhagic or  proteinaceous cyst is decreased in size but slightly increased in density since prior study   3.  Atherosclerosis and atherosclerotic coronary artery disease.      DX-CHEST-PORTABLE (1 VIEW)   Final Result         1.  No acute cardiopulmonary disease.           Assessment/Plan  * COVID-19- (present on admission)  Assessment & Plan  Positive for COVID  Complicated by acute hypoxic gemma failure  Oral Decadron  Up to chair for all meals  Frequent symptom spirometer  Titrate oxygen as tolerable    Elevated troponin  Assessment & Plan  Patient denies any chest pain  she does have T wave inversion of the anterior precordial leads  Monitor on telemetry    Moderate protein-calorie malnutrition (HCC)- (present on admission)  Assessment & Plan  Dietary consult    Lower extremity weakness  Assessment & Plan  Pending venous Dopplers since she has swelling  PT/OT eval    Hypotension  Assessment & Plan  Resolved    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 In review of yesterday's note (7/4/2023), there are no changes except as documented above.

## 2023-07-05 NOTE — PROGRESS NOTES
Pt reports difficulty sleeping. States that she normally takes melatonin 5mg at home nightly PRN for sleep. Spoke to on-call Kevin Aranda, orders received for melatonin 5mg PO Nightly PRN for sleep.

## 2023-07-05 NOTE — THERAPY
"Occupational Therapy   Initial Evaluation     Patient Name: Rashmi Parra  Age:  83 y.o., Sex:  female  Medical Record #: [id number]  Today's Date: 7/5/2023       Precautions: Fall Risk    Assessment  Patient is 83 y.o. female admitted for ALOC and weakness. PMhx: COPD 3L o2 baseline, HTN, HLD, DM, MDD, chronic pain, GERD, and moderate mitral stenosis. Pt reports she just recently moved into an [facility] and has assist for ADL's as needed.   This admission pt is dx w/COVID, elevated troponin, moderate protein-calorie malnutrition, LE weakness and hypotension. Today pt demonstrated decreased LE ROM, strength and endurance as well as decreased balance and activity tolerance impacting ADL's. OT will follow in this setting currently recommend post acute placement.     Plan  Occupational Therapy Initial Treatment Plan   Treatment Interventions: Self Care / Activities of Daily Living, Adaptive Equipment, Manual Therapy Techniques, Neuro Re-Education / Balance, Therapeutic Exercises, Therapeutic Activity  Treatment Frequency: 4 Times per Week  Duration: Until Therapy Goals Met    DC Equipment Recommendations: Unable to determine at this time  Discharge Recommendations: Recommend post-acute placement for additional occupational therapy services prior to discharge home     Subjective  \"I would like to try to move more\"      Objective     07/05/23 1255   Charge Group   OT Evaluation OT Evaluation Mod   OT Self Care / ADL (Units) 2   Total Time Spent   OT Time Spent Yes   OT Self Care / ADL (Minutes) 32   OT Evaluation (Minutes) 19   OT Total Time Spent (Calculated) 51   Initial Contact Note    Initial Contact Note Order Received and Verified, Occupational Therapy Evaluation in Progress with Full Report to Follow.   Prior Living Situation   Prior Services Intermittent Physical Support for ADL Per Service;Intermittent Physical Support for ADL Per Family   Housing / Facility 1 Story House   Steps Into Home 2   Bathroom Set up Walk In " Shower   Equipment Owned Front-Wheel Walker;4-Wheel Walker   Lives with - Patient's Self Care Capacity Spouse;Attendant / Paid Care Giver   Comments Pt has now transitioned into an [facility] ~2 weeks ago per pt and has assist available for ADL's as needed   Prior Level of ADL Function   Self Feeding Independent   Grooming / Hygiene Independent   Bathing Requires Assist   Dressing Requires Assist   Toileting Requires Assist   Comments pt reports she hasn't been bathing and hasn't setting into a new routine yet   Prior Level of IADL Function   Medication Management Independent   Laundry Requires Assist   Kitchen Mobility Requires Assist   Finances Independent   Home Management Requires Assist   Shopping Dependent   Prior Level Of Mobility Independent With Device in Home   Precautions   Precautions Fall Risk   Pain 0 - 10 Group   Therapist Pain Assessment During Activity;Nurse Notified  (none reported)   Cognition    Cognition / Consciousness WDL   Level of Consciousness Alert   Comments Generally cooperative   Passive ROM Upper Body   Passive ROM Upper Body WDL   Active ROM Upper Body   Active ROM Upper Body  X   Strength Upper Body   Upper Body Strength  X   Gross Strength Generalized Weakness, Equal Bilaterally.    Coordination Upper Body   Coordination WDL   Balance Assessment   Sitting Balance (Static) Fair   Sitting Balance (Dynamic) Fair   Standing Balance (Static) Fair -   Standing Balance (Dynamic) Poor +   Weight Shift Sitting Fair   Weight Shift Standing Poor   Comments w/fww   Bed Mobility    Supine to Sit Contact Guard Assist   Comments up to chair   ADL Assessment   Grooming Contact Guard Assist;Seated   Upper Body Dressing Minimal Assist   Lower Body Dressing Maximal Assist   Toileting Maximal Assist   Comments completed seated oral care, face washing and hair care, txf to BSC required assist for zaira care then to chair at bedside   How much help from another person does the patient currently need...   6  Clicks Daily Activity Score 17   Functional Mobility   Sit to Stand Minimal Assist   Bed, Chair, Wheelchair Transfer Minimal Assist   Toilet Transfers Moderate Assist   Mobility EOB>BSC>chair   Activity Tolerance   Comments no o vert c/o pain or fatigue   Patient / Family Goals   Patient / Family Goal #1 to go home   Short Term Goals   Short Term Goal # 1 pt will complete FB dressing SBA   Short Term Goal # 2 pt will complete toilet txf w/spv   Short Term Goal # 3 pt will complete grooming standing at sink w/spv   Education Group   Role of Occupational Therapist Patient Response Patient;Acceptance;Explanation;Reinforcement Needed;No Learning Evidence   ADL Patient Response Patient;Acceptance;Explanation;Demonstration;Verbal Demonstration;Action Demonstration   Pathology of Bedrest Patient Response Patient;Acceptance;Explanation;Reinforcement Needed;No Learning Evidence   Occupational Therapy Initial Treatment Plan    Treatment Interventions Self Care / Activities of Daily Living;Adaptive Equipment;Manual Therapy Techniques;Neuro Re-Education / Balance;Therapeutic Exercises;Therapeutic Activity   Treatment Frequency 4 Times per Week   Duration Until Therapy Goals Met   Problem List   Problem List Decreased Active Daily Living Skills;Decreased Activity Tolerance;Decreased Functional Mobility;Safety Awareness Deficits / Cognition;Impaired Postural Control / Balance   Anticipated Discharge Equipment and Recommendations   DC Equipment Recommendations Unable to determine at this time   Discharge Recommendations Recommend post-acute placement for additional occupational therapy services prior to discharge home   Interdisciplinary Plan of Care Collaboration   IDT Collaboration with  Nursing;Certified Nursing Assistant   Patient Position at End of Therapy Other (Comments)  (up in chair CNA at bedside)   Collaboration Comments RN aware of session   Session Information   Date / Session Number  7/5 #1 (1/4, 7/11)

## 2023-07-05 NOTE — THERAPY
Physical Therapy   Initial Evaluation     Patient Name: Rashmi Parra  Age:  83 y.o., Sex:  female  Medical Record #: [id number]  Today's Date: 7/5/2023     Precautions  Precautions: (P) Fall Risk    Assessment  Patient is 83 y.o. female with a diagnosis of COVID- 19. PMH includes COPD 3L baseline, HTN, HLD, DM, MDD, chronic pain, gerd.     Pt tolerated session fairly considering her acute medical state. Pt ambulated 40' with FWW and CGA, she waas limited by fatigue with O2 saturations stable. She presents with impaired functional independence, poor activity tolerance, decreased strength, decreased cardiovascular endurance. She will benefit from acute PT services to improve her independence, decrease the burden of care for a caregiver, and facilitate home discharge.     Pt lives at assisted living facility and needs to walk to the dining room. At this time anticipate discharge back to prior [facility] with home with home care pending medical and physical progress. If pt's functional status does not improve to where she can go home she would be appropriate for discharge to post acute placement.     Plan    Physical Therapy Initial Treatment Plan   Treatment Plan : (P) Bed Mobility, Debridement, Equipment, Group Therapy, Gait Training, Manual Therapy, Neuro Re-Education / Balance, Orthotics Training , Self Care / Home Evaluation, Stair Training, Therapeutic Activities, Therapeutic Exercise  Treatment Frequency: (P) 4 Times per Week  Duration: (P) Until Therapy Goals Met    DC Equipment Recommendations: (P) Unable to determine at this time  Discharge Recommendations: (P) Recommend home health for continued physical therapy services       Subjective    Pt is pleasant and cooperative. Slightly confused and demonstrates increased time for processing verbal information.      Objective       07/05/23 7280   Initial Contact Note    Initial Contact Note Order Received and Verified, Physical Therapy Evaluation in Progress with Full  Report to Follow.   Precautions   Precautions Fall Risk   Vitals   Pulse 76   Pulse Oximetry 98 %   O2 (LPM) 3   O2 Delivery Device Silicone Nasal Cannula   Pain   Intervention Declines   Prior Living Situation   Housing / Facility Assisted Living Residence   Steps Into Home 0   Steps In Home 0   Equipment Owned Front-Wheel Walker;4-Wheel Walker   Lives with - Patient's Self Care Capacity Spouse;Attendant / Paid Care Giver   Comments Pt lives in assisted living facility.   Prior Level of Functional Mobility   Bed Mobility Independent   Transfer Status Independent   Ambulation Independent   Ambulation Distance household   Assistive Devices Used 4-Wheel Walker   Stairs Independent   Passive ROM Lower Body   Passive ROM Lower Body WDL   Active ROM Lower Body    Comments slightly limited bilaterally due to weakness   Strength Lower Body   Gross Strength Generalized Weakness, Equal Bilaterally   Balance Assessment   Sitting Balance (Static) Fair   Sitting Balance (Dynamic) Fair   Standing Balance (Static) Fair -   Standing Balance (Dynamic) Poor +   Weight Shift Sitting Fair   Weight Shift Standing Poor   Bed Mobility    Supine to Sit Contact Guard Assist   Gait Analysis   Gait Level Of Assist Contact Guard Assist   Assistive Device Front Wheel Walker   Distance (Feet) 40   # of Times Distance was Traveled 1   Deviation Shuffled Gait   Comments Pt ambulated 40' with FWW and CGA, shufled and forward flexed posture. No losses of balance throughout. Pt reported fatiue and requested return to chair. Post ambulation O2 at 93% on 3L   Functional Mobility   Sit to Stand Contact Guard Assist   Bed, Chair, Wheelchair Transfer Contact Guard Assist   How much difficulty does the patient currently have...   Turning over in bed (including adjusting bedclothes, sheets and blankets)? 3   Sitting down on and standing up from a chair with arms (e.g., wheelchair, bedside commode, etc.) 3   Moving from lying on back to sitting on the side  of the bed? 3   How much help from another person does the patient currently need...   Moving to and from a bed to a chair (including a wheelchair)? 3   Need to walk in a hospital room? 3   Climbing 3-5 steps with a railing? 2   6 clicks Mobility Score 17   Short Term Goals    Short Term Goal # 1 Pt will perform sit<>supine with supervision   Short Term Goal # 2 Pt will perform sit<>stand and stand pivot transfers with FWW and supervision.   Short Term Goal # 3 Pt will ambulate 150' with FWW and supervision.   Education Group   Role of Physical Therapist Patient Response Patient;Acceptance;Explanation;Demonstration;Action Demonstration   Physical Therapy Initial Treatment Plan    Treatment Plan  Bed Mobility;Debridement;Equipment;Group Therapy;Gait Training;Manual Therapy;Neuro Re-Education / Balance;Orthotics Training ;Self Care / Home Evaluation;Stair Training;Therapeutic Activities;Therapeutic Exercise   Treatment Frequency 4 Times per Week   Duration Until Therapy Goals Met   Problem List    Problems Impaired Bed Mobility;Impaired Transfers;Impaired Ambulation;Functional Strength Deficit;Functional ROM Deficit;Decreased Activity Tolerance;Safety Awareness Deficits / Cognition   Anticipated Discharge Equipment and Recommendations   DC Equipment Recommendations Unable to determine at this time   Discharge Recommendations Recommend home health for continued physical therapy services   Interdisciplinary Plan of Care Collaboration   IDT Collaboration with  Nursing   Patient Position at End of Therapy Seated;Chair Alarm On;Phone within Reach;Tray Table within Reach;Call Light within Reach   Session Information   Date / Session Number  7/5-1 (1/4, 7/11)

## 2023-07-05 NOTE — CARE PLAN
The patient is Stable - Low risk of patient condition declining or worsening    Shift Goals  Clinical Goals: See hospitalist; develop plan; improved O2 demand  Patient Goals: Rest  Family Goals: RENÉE    Progress made toward(s) clinical / shift goals:  Progressing      Problem: Knowledge Deficit - Standard  Goal: Patient and family/care givers will demonstrate understanding of plan of care, disease process/condition, diagnostic tests and medications  Outcome: Progressing  Note: Pt verbalizes understanding of plan of care     Problem: Fall Risk  Goal: Patient will remain free from falls  Outcome: Progressing  Note: Pt wearing treaded slipper socks, bed alarm in use, call light within reach

## 2023-07-06 LAB
ANION GAP SERPL CALC-SCNC: 11 MMOL/L (ref 7–16)
BACTERIA UR CULT: NORMAL
BUN SERPL-MCNC: 21 MG/DL (ref 8–22)
CALCIUM SERPL-MCNC: 9.2 MG/DL (ref 8.5–10.5)
CHLORIDE SERPL-SCNC: 101 MMOL/L (ref 96–112)
CO2 SERPL-SCNC: 27 MMOL/L (ref 20–33)
CREAT SERPL-MCNC: 0.6 MG/DL (ref 0.5–1.4)
GFR SERPLBLD CREATININE-BSD FMLA CKD-EPI: 88 ML/MIN/1.73 M 2
GLUCOSE BLD STRIP.AUTO-MCNC: 154 MG/DL (ref 65–99)
GLUCOSE BLD STRIP.AUTO-MCNC: 169 MG/DL (ref 65–99)
GLUCOSE BLD STRIP.AUTO-MCNC: 183 MG/DL (ref 65–99)
GLUCOSE BLD STRIP.AUTO-MCNC: 320 MG/DL (ref 65–99)
GLUCOSE SERPL-MCNC: 217 MG/DL (ref 65–99)
POTASSIUM SERPL-SCNC: 3.9 MMOL/L (ref 3.6–5.5)
SIGNIFICANT IND 70042: NORMAL
SITE SITE: NORMAL
SODIUM SERPL-SCNC: 139 MMOL/L (ref 135–145)
SOURCE SOURCE: NORMAL

## 2023-07-06 PROCEDURE — 82962 GLUCOSE BLOOD TEST: CPT | Mod: 91

## 2023-07-06 PROCEDURE — A9270 NON-COVERED ITEM OR SERVICE: HCPCS | Performed by: HOSPITALIST

## 2023-07-06 PROCEDURE — 700111 HCHG RX REV CODE 636 W/ 250 OVERRIDE (IP): Mod: JZ | Performed by: HOSPITALIST

## 2023-07-06 PROCEDURE — 99232 SBSQ HOSP IP/OBS MODERATE 35: CPT | Performed by: STUDENT IN AN ORGANIZED HEALTH CARE EDUCATION/TRAINING PROGRAM

## 2023-07-06 PROCEDURE — 770001 HCHG ROOM/CARE - MED/SURG/GYN PRIV*

## 2023-07-06 PROCEDURE — 80048 BASIC METABOLIC PNL TOTAL CA: CPT

## 2023-07-06 PROCEDURE — A9270 NON-COVERED ITEM OR SERVICE: HCPCS | Performed by: STUDENT IN AN ORGANIZED HEALTH CARE EDUCATION/TRAINING PROGRAM

## 2023-07-06 PROCEDURE — 36415 COLL VENOUS BLD VENIPUNCTURE: CPT

## 2023-07-06 PROCEDURE — 700102 HCHG RX REV CODE 250 W/ 637 OVERRIDE(OP): Performed by: HOSPITALIST

## 2023-07-06 PROCEDURE — 700102 HCHG RX REV CODE 250 W/ 637 OVERRIDE(OP)

## 2023-07-06 PROCEDURE — 700102 HCHG RX REV CODE 250 W/ 637 OVERRIDE(OP): Performed by: STUDENT IN AN ORGANIZED HEALTH CARE EDUCATION/TRAINING PROGRAM

## 2023-07-06 PROCEDURE — A9270 NON-COVERED ITEM OR SERVICE: HCPCS

## 2023-07-06 RX ORDER — AMLODIPINE BESYLATE 5 MG/1
5 TABLET ORAL
Status: DISCONTINUED | OUTPATIENT
Start: 2023-07-06 | End: 2023-07-07

## 2023-07-06 RX ORDER — DIPHENHYDRAMINE HCL 25 MG
25 TABLET ORAL ONCE
Status: COMPLETED | OUTPATIENT
Start: 2023-07-06 | End: 2023-07-06

## 2023-07-06 RX ORDER — HYDRALAZINE HYDROCHLORIDE 20 MG/ML
20 INJECTION INTRAMUSCULAR; INTRAVENOUS EVERY 6 HOURS PRN
Status: DISCONTINUED | OUTPATIENT
Start: 2023-07-06 | End: 2023-07-10 | Stop reason: HOSPADM

## 2023-07-06 RX ADMIN — INSULIN HUMAN 2 UNITS: 100 INJECTION, SOLUTION PARENTERAL at 19:48

## 2023-07-06 RX ADMIN — ASPIRIN 81 MG 81 MG: 81 TABLET ORAL at 04:56

## 2023-07-06 RX ADMIN — ENOXAPARIN SODIUM 40 MG: 100 INJECTION SUBCUTANEOUS at 18:55

## 2023-07-06 RX ADMIN — INSULIN HUMAN 2 UNITS: 100 INJECTION, SOLUTION PARENTERAL at 17:00

## 2023-07-06 RX ADMIN — AMLODIPINE BESYLATE 5 MG: 5 TABLET ORAL at 18:55

## 2023-07-06 RX ADMIN — DIPHENHYDRAMINE HYDROCHLORIDE 25 MG: 25 TABLET ORAL at 00:38

## 2023-07-06 RX ADMIN — INSULIN HUMAN 6 UNITS: 100 INJECTION, SOLUTION PARENTERAL at 11:00

## 2023-07-06 RX ADMIN — FLUOXETINE 20 MG: 20 CAPSULE ORAL at 04:56

## 2023-07-06 RX ADMIN — Medication 5 MG: at 23:17

## 2023-07-06 RX ADMIN — DEXAMETHASONE 6 MG: 4 TABLET ORAL at 04:55

## 2023-07-06 RX ADMIN — OMEPRAZOLE 40 MG: 20 CAPSULE, DELAYED RELEASE ORAL at 18:55

## 2023-07-06 ASSESSMENT — ENCOUNTER SYMPTOMS
EYES NEGATIVE: 1
COUGH: 1
MUSCULOSKELETAL NEGATIVE: 1
PSYCHIATRIC NEGATIVE: 1
WEAKNESS: 1
GASTROINTESTINAL NEGATIVE: 1

## 2023-07-06 ASSESSMENT — FIBROSIS 4 INDEX: FIB4 SCORE: 2.25

## 2023-07-06 ASSESSMENT — PAIN DESCRIPTION - PAIN TYPE: TYPE: ACUTE PAIN

## 2023-07-06 NOTE — DISCHARGE PLANNING
[facility] Transitional Care Coordination    Referral from: Sylwia  Insurance Provider on Facesheet:[facility]  Potential Rehab Diagnosis:     Chart review indicates patient may have on going medical management and may have therapy needs to possibly meet inpatient rehab facility criteria with the goal of returning to community.    D/C support: spouse, daughter     Physiatry consultation denied per protocol.   Patient doesn't meet criteria at this time for IPR level of care.  Please reach back out if medical management is requested.          Thank you for the referral.

## 2023-07-06 NOTE — DISCHARGE PLANNING
In the case of an emergency, pt's legal NOK is her  Ruddy Parra.     RN KARIE met with Aleyda at bedside, then later called emory Serrano and obtained the information used in this assessment. Maggie verified accuracy of facesheet; patient lives at [facility] as of a couple weeks ago.  Prior to current hospitalization, pt was independent with ADLS/IADLS. Aleyda's PCP is JAMARCUS Roth. Patient has no financial concerns. Pt has great support from her family. Pt denies any hx of substance use and denies any dx of mh. Aleyda has AD and POLST ACP documents on file.      Care Transition Team Assessment    Information Source  Orientation Level: Oriented X4  Information Given By: Patient  Who is responsible for making decisions for patient? : Patient    Readmission Evaluation  Is this a readmission?: Yes - unplanned readmission    Elopement Risk  Legal Hold: No  Ambulatory or Self Mobile in Wheelchair: No-Not an Elopement Risk  Elopement Risk: Not at Risk for Elopement    Interdisciplinary Discharge Planning  Does Admitting Nurse Feel This Could be a Complex Discharge?: No  Primary Care Physician: Tere Roth  Lives with - Patient's Self Care Capacity: Spouse, Attendant / Paid Care Giver  Patient or legal guardian wants to designate a caregiver: Yes  Caregiver name: Maggie New  Support Systems: Family Member(s), Friends / Neighbors, Home Care Staff  Housing / Facility: Assisted Living Residence  Name of Care Facility: [facility]  Do You Take your Prescribed Medications Regularly: Yes  Able to Return to Previous ADL's: Future Time w/Therapy  Mobility Issues: No  Prior Services: Intermittent Physical Support for ADL Per Service, Intermittent Physical Support for ADL Per Family  Assistance Needed: Unknown at this Time  Durable Medical Equipment: Not Applicable    Discharge Preparedness  What is your plan after discharge?: Skilled nursing facility  What are your discharge supports?: Child, Other (comment)  Prior Functional Level:  Ambulatory, Independent with Activities of Daily Living, Independent with Medication Management  Difficulity with ADLs: None  Difficulity with IADLs: None    Functional Assesment  Prior Functional Level: Ambulatory, Independent with Activities of Daily Living, Independent with Medication Management    Finances  Financial Barriers to Discharge: No  Prescription Coverage: Yes    Vision / Hearing Impairment  Vision Impairment : Yes  Right Eye Vision: Impaired, Wears Glasses  Left Eye Vision: Impaired, Wears Glasses  Hearing Impairment : Yes  Hearing Impairment: Both Ears, Hearing Device Not Available    Values / Beliefs / Concerns  Values / Beliefs Concerns : No    Advance Directive  Advance Directive?: BETHANY SINGH for Health Care  Durable Power of  Name and Contact : Michael Parra    Domestic Abuse  Have you ever been the victim of abuse or violence?: No  Physical Abuse or Sexual Abuse: No  Verbal Abuse or Emotional Abuse: No  Possible Abuse/Neglect Reported to:: Not Applicable              Anticipated Discharge Information  Discharge Disposition: D/T to SNF with Medicare cert in anticipation of skilled care (03)

## 2023-07-06 NOTE — PROGRESS NOTES
Monitor Summary:  Rhythm: SB/SR  Rate: 59-72  Ectopy:PVC (R), couplet (r)  Measurement:  .16/.04/.44

## 2023-07-06 NOTE — DISCHARGE PLANNING
"Case Management Discharge Planning    Admission Date: 7/3/2023  GMLOS: 5.2  ALOS: 2    6-Clicks ADL Score: 17  6-Clicks Mobility Score: 17  PT and/or OT Eval ordered: Yes  Post-acute Referrals Ordered: Yes  Post-acute Choice Obtained: Yes  Has referral(s) been sent to post-acute provider:  Yes      Anticipated Discharge Dispo: Discharge Disposition: D/T to SNF with Medicare cert in anticipation of skilled care (03)    DME Needed: No    Action(s) Taken: Updated Provider/Nurse on Discharge Plan, Patient Conference, DC Assessment Complete (See below), and Choice obtained    Escalations Completed: None    Medically Clear: No    Next Steps: PT/OT recommending post-acute placement prior to returning to her [facility]. RN KARIE visited with Aleyda at  and she appeared quite sleepy still, unable to recall the name of the new [facility] she recently moved into. She recalls having been at Rehab and also several SNFs in the past, but had trouble with their names. This RN offered to call her daughter which she was in agreement with.    Call to Maggie and updates provided. She inquired about Aleyda going back to [facility] as she had just DC'd from there recently and \"did well.\" KARIE offered to discuss with MD regarding PMR referral. If this is not an option, Maggie requested SNF choice be sent to [facility]. She denied any questions/needs at this time.     Barriers to Discharge: Outpatient referrals pending    Is the patient up for discharge tomorrow: No        "

## 2023-07-06 NOTE — DISCHARGE PLANNING
Received Choice form at 3102  Agency/Facility Name: [facility]  Referral sent per Choice form @ 5874

## 2023-07-06 NOTE — CARE PLAN
The patient is Stable - Low risk of patient condition declining or worsening    Shift Goals  Clinical Goals: monitor oxygenation and tele  Patient Goals: sleep  Family Goals: RENÉE    Progress made toward(s) clinical / shift goals:      Problem: Knowledge Deficit - Standard  Goal: Patient and family/care givers will demonstrate understanding of plan of care, disease process/condition, diagnostic tests and medications  Outcome: Progressing  Note: Pt educated on POC and disease processes. Pt verbalized understanding of medication regimen and interventions.      Problem: Fall Risk  Goal: Patient will remain free from falls  Outcome: Progressing  Note: Pt educated on importance of calling nurse before getting up. Fall precautions in place. Bed locked in lowest position. Call light and belongings within reach. Wristband and proper sign placed.  Bed alarm on.

## 2023-07-06 NOTE — PROGRESS NOTES
Assumed care of patient after receiving report from Shirley night shift RN. Patient is currently resting in bed.

## 2023-07-06 NOTE — FACE TO FACE
Face to Face Supporting Documentation - Home Health    The encounter with this patient was in whole or in part the primary reason for home health admission.    Date of encounter:   Patient:                    MRN:                       YOB: 2024  Rashmi Parra  [id number]  1940     Home health to see patient for:  Skilled Nursing care for assessment, interventions & education, Physical Therapy evaluation and treatment, and Occupational therapy evaluation and treatment    Skilled need for:  New Onset Medical Diagnosis COVID    Skilled nursing interventions to include:  Comment: PT/OT    Homebound status evidenced by:  Needs the assistance of another person in order to leave the home. Leaving home requires a considerable and taxing effort. There is a normal inability to leave the home.    Community Physician to provide follow up care: DAYANA Zaman     Optional Interventions? No      I certify the face to face encounter for this home health care referral meets the CMS requirements and the encounter/clinical assessment with the patient was, in whole, or in part, for the medical condition(s) listed above, which is the primary reason for home health care. Based on my clinical findings: the service(s) are medically necessary, support the need for home health care, and the homebound criteria are met.  I certify that this patient has had a face to face encounter by myself.  Zak Adams M.D. - NPI: [id number]

## 2023-07-07 LAB
GLUCOSE BLD STRIP.AUTO-MCNC: 159 MG/DL (ref 65–99)
GLUCOSE BLD STRIP.AUTO-MCNC: 178 MG/DL (ref 65–99)
GLUCOSE BLD STRIP.AUTO-MCNC: 229 MG/DL (ref 65–99)
GLUCOSE BLD STRIP.AUTO-MCNC: 245 MG/DL (ref 65–99)

## 2023-07-07 PROCEDURE — 99232 SBSQ HOSP IP/OBS MODERATE 35: CPT | Performed by: STUDENT IN AN ORGANIZED HEALTH CARE EDUCATION/TRAINING PROGRAM

## 2023-07-07 PROCEDURE — 700102 HCHG RX REV CODE 250 W/ 637 OVERRIDE(OP)

## 2023-07-07 PROCEDURE — 700102 HCHG RX REV CODE 250 W/ 637 OVERRIDE(OP): Performed by: HOSPITALIST

## 2023-07-07 PROCEDURE — 82962 GLUCOSE BLOOD TEST: CPT | Mod: 91

## 2023-07-07 PROCEDURE — A9270 NON-COVERED ITEM OR SERVICE: HCPCS | Performed by: STUDENT IN AN ORGANIZED HEALTH CARE EDUCATION/TRAINING PROGRAM

## 2023-07-07 PROCEDURE — 700102 HCHG RX REV CODE 250 W/ 637 OVERRIDE(OP): Performed by: STUDENT IN AN ORGANIZED HEALTH CARE EDUCATION/TRAINING PROGRAM

## 2023-07-07 PROCEDURE — 770001 HCHG ROOM/CARE - MED/SURG/GYN PRIV*

## 2023-07-07 PROCEDURE — A9270 NON-COVERED ITEM OR SERVICE: HCPCS

## 2023-07-07 PROCEDURE — 700111 HCHG RX REV CODE 636 W/ 250 OVERRIDE (IP): Mod: JZ | Performed by: HOSPITALIST

## 2023-07-07 PROCEDURE — A9270 NON-COVERED ITEM OR SERVICE: HCPCS | Performed by: HOSPITALIST

## 2023-07-07 RX ORDER — LISINOPRIL 5 MG/1
5 TABLET ORAL
Status: DISCONTINUED | OUTPATIENT
Start: 2023-07-07 | End: 2023-07-10 | Stop reason: HOSPADM

## 2023-07-07 RX ORDER — AMLODIPINE BESYLATE 10 MG/1
10 TABLET ORAL
Status: DISCONTINUED | OUTPATIENT
Start: 2023-07-08 | End: 2023-07-10 | Stop reason: HOSPADM

## 2023-07-07 RX ADMIN — ASPIRIN 81 MG 81 MG: 81 TABLET ORAL at 06:01

## 2023-07-07 RX ADMIN — INSULIN HUMAN 2 UNITS: 100 INJECTION, SOLUTION PARENTERAL at 19:20

## 2023-07-07 RX ADMIN — INSULIN HUMAN 2 UNITS: 100 INJECTION, SOLUTION PARENTERAL at 10:24

## 2023-07-07 RX ADMIN — Medication 5 MG: at 21:23

## 2023-07-07 RX ADMIN — DEXAMETHASONE 6 MG: 4 TABLET ORAL at 05:56

## 2023-07-07 RX ADMIN — ENOXAPARIN SODIUM 40 MG: 100 INJECTION SUBCUTANEOUS at 18:51

## 2023-07-07 RX ADMIN — AMLODIPINE BESYLATE 5 MG: 5 TABLET ORAL at 05:58

## 2023-07-07 RX ADMIN — INSULIN HUMAN 3 UNITS: 100 INJECTION, SOLUTION PARENTERAL at 14:37

## 2023-07-07 RX ADMIN — FLUOXETINE 20 MG: 20 CAPSULE ORAL at 05:55

## 2023-07-07 RX ADMIN — INSULIN HUMAN 3 UNITS: 100 INJECTION, SOLUTION PARENTERAL at 21:33

## 2023-07-07 RX ADMIN — OMEPRAZOLE 40 MG: 20 CAPSULE, DELAYED RELEASE ORAL at 18:51

## 2023-07-07 RX ADMIN — LISINOPRIL 5 MG: 5 TABLET ORAL at 18:51

## 2023-07-07 ASSESSMENT — ENCOUNTER SYMPTOMS
GASTROINTESTINAL NEGATIVE: 1
EYES NEGATIVE: 1
WEAKNESS: 1
COUGH: 1
MUSCULOSKELETAL NEGATIVE: 1
PSYCHIATRIC NEGATIVE: 1

## 2023-07-07 ASSESSMENT — PAIN DESCRIPTION - PAIN TYPE: TYPE: ACUTE PAIN

## 2023-07-07 ASSESSMENT — FIBROSIS 4 INDEX: FIB4 SCORE: 2.25

## 2023-07-07 NOTE — CARE PLAN
The patient is Watcher - Medium risk of patient condition declining or worsening    Shift Goals  Clinical Goals: Monitor O2  Patient Goals: Rest  Family Goals: RENÉE    Progress made toward(s) clinical / shift goals:  Patient stable throughout the shift

## 2023-07-07 NOTE — PROGRESS NOTES
Monitor summary  SR-SB w/ R [facility] coup  58-70  .14/.06/.44

## 2023-07-07 NOTE — DISCHARGE PLANNING
Agency/Facility Name: [facility]  Spoke To: [place]  Outcome: SNF informed DPA they do not have covid beds available, and will leave Pt as pending status and consider admission as Pt nears covid clearance date (should be cleared 7/13, as onset date was 7/3).

## 2023-07-07 NOTE — DISCHARGE PLANNING
Chart reviewed.  Observed [facility] declined patient 7/6.  Will move forward with SNF referrals.     Addendum 1246: Received a call from patient's daughter inquiring about discharge planning and status of [facility] referral.  Relayed that the patient did not meet criteria for [facility].  She would prefer patient to return to [facility] ([phone]) [facility] with [facility] instead of SNF.  Have left a message for rep at [facility] inviting a call back.    PT recommends home with [facility]. OT recommended post-acute.

## 2023-07-07 NOTE — PROGRESS NOTES
Monitor Summary  Rhythm: Sinus Rhythm  Rate: 60-80s  Ectopy: Rare couplets and PVCs  .16 / .08 / .41

## 2023-07-07 NOTE — PROGRESS NOTES
Hospital Medicine Daily Progress Note    Date of Service  7/6/2023    Chief Complaint  Rashmi Parra is a 83 y.o. female admitted 7/3/2023 with dyspnea    Hospital Course  83-year-old female with a past medical history of mitral stenosis, peripheral vascular disease, chronic hypoxic respiratory failure on 3 L of oxygen, restrictive lung disease, diabetes, breast cancer was admitted on 7/3/2023 for acute on chronic respiratory failure secondary to COVID pneumonia.     Interval Problem Update  Patient was evaluated at bedside  Up to chair in NAD  BP higher today, adjust antihypertensives  On 1 L nasal cannula  Continue Decadron  PT/OT evaluated and recommending postacute versus home with home health  SNF referral placed  PMR referral placed    I have discussed this patient's plan of care and discharge plan at IDT rounds today with Case Management, Nursing, Nursing leadership, and other members of the IDT team.    Consultants/Specialty  None    Code Status  DNAR/DNI    Disposition  The patient is medically cleared for discharge to home or a post-acute facility.  Anticipate discharge to: skilled nursing facility    I have placed the appropriate orders for post-discharge needs.    Review of Systems  Review of Systems   Constitutional:  Positive for malaise/fatigue.   HENT: Negative.     Eyes: Negative.    Respiratory:  Positive for cough.    Cardiovascular:  Positive for leg swelling.   Gastrointestinal: Negative.    Genitourinary: Negative.    Musculoskeletal: Negative.    Skin: Negative.    Neurological:  Positive for weakness.   Endo/Heme/Allergies: Negative.    Psychiatric/Behavioral: Negative.          Physical Exam  Temp:  [36.4 °C (97.5 °F)-37.3 °C (99.1 °F)] 37.3 °C (99.1 °F)  Pulse:  [62-77] 77  Resp:  [16-18] 18  BP: (123-185)/(52-88) 175/88  SpO2:  [91 %-96 %] 94 %    Physical Exam  Constitutional:       Appearance: Normal appearance.   HENT:      Head: Normocephalic and atraumatic.      Mouth/Throat:       Mouth: Mucous membranes are moist.   Eyes:      Extraocular Movements: Extraocular movements intact.      Pupils: Pupils are equal, round, and reactive to light.   Cardiovascular:      Rate and Rhythm: Normal rate and regular rhythm.      Pulses: Normal pulses.      Heart sounds: Normal heart sounds.   Pulmonary:      Effort: Pulmonary effort is normal.      Breath sounds: Rales present.   Abdominal:      General: Bowel sounds are normal.      Palpations: Abdomen is soft.   Musculoskeletal:         General: No swelling. Normal range of motion.      Cervical back: Normal range of motion and neck supple.      Right lower leg: No edema.      Left lower leg: No edema.   Skin:     General: Skin is warm.      Coloration: Skin is not jaundiced.   Neurological:      General: No focal deficit present.      Mental Status: She is alert and oriented to person, place, and time. Mental status is at baseline.      Cranial Nerves: No cranial nerve deficit.   Psychiatric:         Mood and Affect: Mood normal.         Behavior: Behavior normal.         Thought Content: Thought content normal.         Judgment: Judgment normal.         Fluids    Intake/Output Summary (Last 24 hours) at 7/6/2023 1742  Last data filed at 7/6/2023 1205  Gross per 24 hour   Intake 600 ml   Output 1975 ml   Net -1375 ml       Laboratory  Recent Labs     07/03/23 2311 07/05/23  0432   WBC 7.3 3.8*   RBC 4.38 3.80*   HEMOGLOBIN 13.4 11.5*   HEMATOCRIT 40.6 36.4*   MCV 92.7 95.8   MCH 30.6 30.3   MCHC 33.0 31.6*   RDW 47.9 50.4*   PLATELETCT 235 174   MPV 10.9 10.7     Recent Labs     07/03/23 2311 07/05/23  0432 07/06/23  0038   SODIUM 136 140 139   POTASSIUM 3.8 3.6 3.9   CHLORIDE 95* 104 101   CO2 27 25 27   GLUCOSE 135* 145* 217*   BUN 20 18 21   CREATININE 0.89 0.60 0.60   CALCIUM 9.8 8.9 9.2     Recent Labs     07/03/23 2311   APTT 27.8   INR 1.10               Imaging  US-EXTREMITY VENOUS LOWER BILAT   Final Result      CT-CTA CHEST PULMONARY ARTERY  W/ RECONS   Final Result         1.  No pulmonary embolus appreciated.   2.  Left renal cyst, intermediate density likely hemorrhagic or proteinaceous cyst is decreased in size but slightly increased in density since prior study   3.  Atherosclerosis and atherosclerotic coronary artery disease.      DX-CHEST-PORTABLE (1 VIEW)   Final Result         1.  No acute cardiopulmonary disease.           Assessment/Plan  * COVID-19- (present on admission)  Assessment & Plan  Positive for COVID  Complicated by acute hypoxic gemma failure  Oral Decadron  Up to chair for all meals  Frequent symptom spirometer  Titrate oxygen as tolerable    Elevated troponin  Assessment & Plan  Patient denies any chest pain  she does have T wave inversion of the anterior precordial leads  Monitor on telemetry    Moderate protein-calorie malnutrition (HCC)- (present on admission)  Assessment & Plan  Dietary consult    Lower extremity weakness  Assessment & Plan  Pending venous Dopplers since she has swelling  PT/OT eval    Hypotension  Assessment & Plan  Resolved    Type 2 diabetes mellitus with diabetic mononeuropathy, without long-term current use of insulin (HCC)- (present on admission)  Assessment & Plan  Start on insulin sliding scale with serial Accu-Checks  Check hemoglobin A1c  Hypoglycemic protocol in place    Acute on chronic respiratory failure with hypoxia (HCC)- (present on admission)  Assessment & Plan  Currently on 3L of oxygen her baseline is 3 L         VTE prophylaxis: enoxaparin ppx    I have performed a physical exam and reviewed and updated ROS and Plan today (7/6/2023). In review of yesterday's note (7/5/2023), there are no changes except as documented above.

## 2023-07-07 NOTE — PROGRESS NOTES
Hospital Medicine Daily Progress Note    Date of Service  7/7/2023    Chief Complaint  Rashmi Parra is a 83 y.o. female admitted 7/3/2023 with dyspnea    Hospital Course  83-year-old female with a past medical history of mitral stenosis, peripheral vascular disease, chronic hypoxic respiratory failure on 3 L of oxygen, restrictive lung disease, diabetes, breast cancer was admitted on 7/3/2023 for acute on chronic respiratory failure secondary to COVID pneumonia.     Interval Problem Update  Patient was evaluated at bedside  Up to chair in NAD  BP higher today  On room air  Increase amlodipine  Add lisinopril  Continue Decadron    Patient is medically clear  Pending home health to assisted living facility    I have discussed this patient's plan of care and discharge plan at IDT rounds today with Case Management, Nursing, Nursing leadership, and other members of the IDT team.    Consultants/Specialty  None    Code Status  DNAR/DNI    Disposition  The patient is not medically cleared for discharge to home or a post-acute facility.  Anticipate discharge to: home with close outpatient follow-up    I have placed the appropriate orders for post-discharge needs.    Review of Systems  Review of Systems   Constitutional:  Positive for malaise/fatigue.   HENT: Negative.     Eyes: Negative.    Respiratory:  Positive for cough.    Cardiovascular:  Positive for leg swelling.   Gastrointestinal: Negative.    Genitourinary: Negative.    Musculoskeletal: Negative.    Skin: Negative.    Neurological:  Positive for weakness.   Endo/Heme/Allergies: Negative.    Psychiatric/Behavioral: Negative.          Physical Exam  Temp:  [36.2 °C (97.2 °F)-37.3 °C (99.1 °F)] 36.9 °C (98.4 °F)  Pulse:  [61-80] 80  Resp:  [16-18] 16  BP: (159-176)/(61-88) 169/81  SpO2:  [94 %-97 %] 95 %    Physical Exam  Constitutional:       Appearance: Normal appearance.   HENT:      Head: Normocephalic and atraumatic.      Mouth/Throat:      Mouth: Mucous  membranes are moist.   Eyes:      Extraocular Movements: Extraocular movements intact.      Pupils: Pupils are equal, round, and reactive to light.   Cardiovascular:      Rate and Rhythm: Normal rate and regular rhythm.      Pulses: Normal pulses.      Heart sounds: Normal heart sounds.   Pulmonary:      Effort: Pulmonary effort is normal.      Breath sounds: Rales present.   Abdominal:      General: Bowel sounds are normal.      Palpations: Abdomen is soft.   Musculoskeletal:         General: No swelling. Normal range of motion.      Cervical back: Normal range of motion and neck supple.      Right lower leg: No edema.      Left lower leg: No edema.   Skin:     General: Skin is warm.      Coloration: Skin is not jaundiced.   Neurological:      General: No focal deficit present.      Mental Status: She is alert and oriented to person, place, and time. Mental status is at baseline.      Cranial Nerves: No cranial nerve deficit.   Psychiatric:         Mood and Affect: Mood normal.         Behavior: Behavior normal.         Thought Content: Thought content normal.         Judgment: Judgment normal.         Fluids    Intake/Output Summary (Last 24 hours) at 7/7/2023 1614  Last data filed at 7/7/2023 1554  Gross per 24 hour   Intake 580 ml   Output 1300 ml   Net -720 ml       Laboratory  Recent Labs     07/05/23  0432   WBC 3.8*   RBC 3.80*   HEMOGLOBIN 11.5*   HEMATOCRIT 36.4*   MCV 95.8   MCH 30.3   MCHC 31.6*   RDW 50.4*   PLATELETCT 174   MPV 10.7     Recent Labs     07/05/23  0432 07/06/23  0038   SODIUM 140 139   POTASSIUM 3.6 3.9   CHLORIDE 104 101   CO2 25 27   GLUCOSE 145* 217*   BUN 18 21   CREATININE 0.60 0.60   CALCIUM 8.9 9.2                     Imaging  US-EXTREMITY VENOUS LOWER BILAT   Final Result      CT-CTA CHEST PULMONARY ARTERY W/ RECONS   Final Result         1.  No pulmonary embolus appreciated.   2.  Left renal cyst, intermediate density likely hemorrhagic or proteinaceous cyst is decreased in size  but slightly increased in density since prior study   3.  Atherosclerosis and atherosclerotic coronary artery disease.      DX-CHEST-PORTABLE (1 VIEW)   Final Result         1.  No acute cardiopulmonary disease.           Assessment/Plan  * COVID-19- (present on admission)  Assessment & Plan  Positive for COVID  Complicated by acute hypoxic gemma failure  Oral Decadron  Up to chair for all meals  Frequent symptom spirometer  Titrate oxygen as tolerable    Elevated troponin  Assessment & Plan  Patient denies any chest pain  she does have T wave inversion of the anterior precordial leads  Monitor on telemetry    Moderate protein-calorie malnutrition (HCC)- (present on admission)  Assessment & Plan  Dietary consult    Lower extremity weakness  Assessment & Plan  Pending venous Dopplers since she has swelling  PT/OT eval    Hypotension  Assessment & Plan  Resolved    Type 2 diabetes mellitus with diabetic mononeuropathy, without long-term current use of insulin (HCC)- (present on admission)  Assessment & Plan  Start on insulin sliding scale with serial Accu-Checks  Check hemoglobin A1c  Hypoglycemic protocol in place    Acute on chronic respiratory failure with hypoxia (HCC)- (present on admission)  Assessment & Plan  Currently on 3L of oxygen her baseline is 3 L         VTE prophylaxis: enoxaparin ppx    I have performed a physical exam and reviewed and updated ROS and Plan today (7/7/2023). In review of yesterday's note (7/6/2023), there are no changes except as documented above.

## 2023-07-07 NOTE — PROGRESS NOTES
Assumed care of patient from day shift RN AT 1915, Patient AOX4, VSS.   Fall education reinforced call bell within reach, bed locked and in lowest position

## 2023-07-07 NOTE — CARE PLAN
Problem: Skin Integrity  Goal: Skin integrity is maintained or improved  Outcome: Progressing     Problem: Fall Risk  Goal: Patient will remain free from falls  Outcome: Progressing   The patient is Watcher - Medium risk of patient condition declining or worsening    Shift Goals  Clinical Goals: monitor o2, orientation  Patient Goals: sleep  Family Goals: RENÉE    Progress made toward(s) clinical / shift goals:  Pt repositions self in bed and has purwick in place to promote clean dry skin.   Fall risk precautions in place, pt uses call bell as instructed and remain fall free throughout the shift.     Patient is not progressing towards the following goals:

## 2023-07-07 NOTE — DIETARY
Nutrition Update:    Day 3 of admit.  Rashmi Parra is a 83 y.o. female with admitting DX of COVID-19 [U07.1].  Patient being followed to optimize nutrition.    Current Diet: 2 gm sodium w/ supplements    Pt eating well with multiple meals at %.     Problem: Nutritional:  Goal: Achieve adequate nutritional intake  Description: Patient will consume >50% of meals  Outcome: Met    RD to monitor per department policy.

## 2023-07-08 LAB
GLUCOSE BLD STRIP.AUTO-MCNC: 133 MG/DL (ref 65–99)
GLUCOSE BLD STRIP.AUTO-MCNC: 187 MG/DL (ref 65–99)
GLUCOSE BLD STRIP.AUTO-MCNC: 204 MG/DL (ref 65–99)
GLUCOSE BLD STRIP.AUTO-MCNC: 446 MG/DL (ref 65–99)

## 2023-07-08 PROCEDURE — 82962 GLUCOSE BLOOD TEST: CPT

## 2023-07-08 PROCEDURE — 700102 HCHG RX REV CODE 250 W/ 637 OVERRIDE(OP): Performed by: STUDENT IN AN ORGANIZED HEALTH CARE EDUCATION/TRAINING PROGRAM

## 2023-07-08 PROCEDURE — A9270 NON-COVERED ITEM OR SERVICE: HCPCS

## 2023-07-08 PROCEDURE — 770001 HCHG ROOM/CARE - MED/SURG/GYN PRIV*

## 2023-07-08 PROCEDURE — 99232 SBSQ HOSP IP/OBS MODERATE 35: CPT | Performed by: STUDENT IN AN ORGANIZED HEALTH CARE EDUCATION/TRAINING PROGRAM

## 2023-07-08 PROCEDURE — 700102 HCHG RX REV CODE 250 W/ 637 OVERRIDE(OP)

## 2023-07-08 PROCEDURE — A9270 NON-COVERED ITEM OR SERVICE: HCPCS | Performed by: STUDENT IN AN ORGANIZED HEALTH CARE EDUCATION/TRAINING PROGRAM

## 2023-07-08 PROCEDURE — 51798 US URINE CAPACITY MEASURE: CPT

## 2023-07-08 PROCEDURE — 700111 HCHG RX REV CODE 636 W/ 250 OVERRIDE (IP): Mod: JZ | Performed by: HOSPITALIST

## 2023-07-08 PROCEDURE — A9270 NON-COVERED ITEM OR SERVICE: HCPCS | Performed by: HOSPITALIST

## 2023-07-08 PROCEDURE — 700102 HCHG RX REV CODE 250 W/ 637 OVERRIDE(OP): Performed by: HOSPITALIST

## 2023-07-08 RX ADMIN — Medication 5 MG: at 21:11

## 2023-07-08 RX ADMIN — AMLODIPINE BESYLATE 10 MG: 10 TABLET ORAL at 06:19

## 2023-07-08 RX ADMIN — ENOXAPARIN SODIUM 40 MG: 100 INJECTION SUBCUTANEOUS at 18:12

## 2023-07-08 RX ADMIN — FLUOXETINE 20 MG: 20 CAPSULE ORAL at 06:19

## 2023-07-08 RX ADMIN — ASPIRIN 81 MG 81 MG: 81 TABLET ORAL at 06:19

## 2023-07-08 RX ADMIN — OMEPRAZOLE 40 MG: 20 CAPSULE, DELAYED RELEASE ORAL at 18:12

## 2023-07-08 RX ADMIN — LISINOPRIL 5 MG: 5 TABLET ORAL at 06:19

## 2023-07-08 RX ADMIN — INSULIN HUMAN 9 UNITS: 100 INJECTION, SOLUTION PARENTERAL at 14:00

## 2023-07-08 RX ADMIN — INSULIN HUMAN 3 UNITS: 100 INJECTION, SOLUTION PARENTERAL at 18:12

## 2023-07-08 RX ADMIN — INSULIN HUMAN 2 UNITS: 100 INJECTION, SOLUTION PARENTERAL at 20:03

## 2023-07-08 RX ADMIN — DEXAMETHASONE 6 MG: 4 TABLET ORAL at 06:19

## 2023-07-08 ASSESSMENT — ENCOUNTER SYMPTOMS
MUSCULOSKELETAL NEGATIVE: 1
PSYCHIATRIC NEGATIVE: 1
EYES NEGATIVE: 1
GASTROINTESTINAL NEGATIVE: 1
WEAKNESS: 1
COUGH: 1

## 2023-07-08 ASSESSMENT — PAIN DESCRIPTION - PAIN TYPE: TYPE: ACUTE PAIN

## 2023-07-08 NOTE — PROGRESS NOTES
Hospital Medicine Daily Progress Note    Date of Service  7/8/2023    Chief Complaint  Rashmi Parra is a 83 y.o. female admitted 7/3/2023 with dyspnea    Hospital Course  83-year-old female with a past medical history of mitral stenosis, peripheral vascular disease, chronic hypoxic respiratory failure on 3 L of oxygen, restrictive lung disease, diabetes, breast cancer was admitted on 7/3/2023 for acute on chronic respiratory failure secondary to COVID pneumonia.     Interval Problem Update  Patient was evaluated at bedside  BP better today  On room air  Continue Decadron  Hyperglycemia to 400s, secondary to steroid  Continue insulin sign scale    Patient is medically clear  Pending home health to assisted living facility    I have discussed this patient's plan of care and discharge plan at IDT rounds today with Case Management, Nursing, Nursing leadership, and other members of the IDT team.    Consultants/Specialty  None    Code Status  DNAR/DNI    Disposition  The patient is medically cleared for discharge to home or a post-acute facility.  Anticipate discharge to: skilled nursing facility    I have placed the appropriate orders for post-discharge needs.    Review of Systems  Review of Systems   Constitutional:  Positive for malaise/fatigue.   HENT: Negative.     Eyes: Negative.    Respiratory:  Positive for cough.    Cardiovascular:  Positive for leg swelling.   Gastrointestinal: Negative.    Genitourinary: Negative.    Musculoskeletal: Negative.    Skin: Negative.    Neurological:  Positive for weakness.   Endo/Heme/Allergies: Negative.    Psychiatric/Behavioral: Negative.          Physical Exam  Temp:  [36.5 °C (97.7 °F)-36.9 °C (98.4 °F)] 36.7 °C (98.1 °F)  Pulse:  [64-80] 64  Resp:  [16-18] 18  BP: (112-169)/(59-81) 112/59  SpO2:  [93 %-96 %] 93 %    Physical Exam  Constitutional:       Appearance: Normal appearance.   HENT:      Head: Normocephalic and atraumatic.      Mouth/Throat:      Mouth: Mucous  membranes are moist.   Eyes:      Extraocular Movements: Extraocular movements intact.      Pupils: Pupils are equal, round, and reactive to light.   Cardiovascular:      Rate and Rhythm: Normal rate and regular rhythm.      Pulses: Normal pulses.      Heart sounds: Normal heart sounds.   Pulmonary:      Effort: Pulmonary effort is normal.      Breath sounds: Rales present.   Abdominal:      General: Bowel sounds are normal.      Palpations: Abdomen is soft.   Musculoskeletal:         General: No swelling. Normal range of motion.      Cervical back: Normal range of motion and neck supple.      Right lower leg: No edema.      Left lower leg: No edema.   Skin:     General: Skin is warm.      Coloration: Skin is not jaundiced.   Neurological:      General: No focal deficit present.      Mental Status: She is alert and oriented to person, place, and time. Mental status is at baseline.      Cranial Nerves: No cranial nerve deficit.   Psychiatric:         Mood and Affect: Mood normal.         Behavior: Behavior normal.         Thought Content: Thought content normal.         Judgment: Judgment normal.         Fluids    Intake/Output Summary (Last 24 hours) at 7/8/2023 1534  Last data filed at 7/8/2023 0458  Gross per 24 hour   Intake 360 ml   Output 800 ml   Net -440 ml       Laboratory        Recent Labs     07/06/23  0038   SODIUM 139   POTASSIUM 3.9   CHLORIDE 101   CO2 27   GLUCOSE 217*   BUN 21   CREATININE 0.60   CALCIUM 9.2                     Imaging  US-EXTREMITY VENOUS LOWER BILAT   Final Result      CT-CTA CHEST PULMONARY ARTERY W/ RECONS   Final Result         1.  No pulmonary embolus appreciated.   2.  Left renal cyst, intermediate density likely hemorrhagic or proteinaceous cyst is decreased in size but slightly increased in density since prior study   3.  Atherosclerosis and atherosclerotic coronary artery disease.      DX-CHEST-PORTABLE (1 VIEW)   Final Result         1.  No acute cardiopulmonary disease.            Assessment/Plan  * COVID-19- (present on admission)  Assessment & Plan  Positive for COVID  Complicated by acute hypoxic gemma failure  Oral Decadron  Up to chair for all meals  Frequent symptom spirometer  Titrate oxygen as tolerable    Elevated troponin  Assessment & Plan  Patient denies any chest pain  she does have T wave inversion of the anterior precordial leads  Monitor on telemetry    Moderate protein-calorie malnutrition (HCC)- (present on admission)  Assessment & Plan  Dietary consult    Lower extremity weakness  Assessment & Plan  Pending venous Dopplers since she has swelling  PT/OT eval    Hypotension  Assessment & Plan  Resolved    Type 2 diabetes mellitus with diabetic mononeuropathy, without long-term current use of insulin (HCC)- (present on admission)  Assessment & Plan  Start on insulin sliding scale with serial Accu-Checks  Check hemoglobin A1c  Hypoglycemic protocol in place    Acute on chronic respiratory failure with hypoxia (HCC)- (present on admission)  Assessment & Plan  Currently on 3L of oxygen her baseline is 3 L         VTE prophylaxis: enoxaparin ppx    I have performed a physical exam and reviewed and updated ROS and Plan today (7/8/2023). In review of yesterday's note (7/7/2023), there are no changes except as documented above.

## 2023-07-08 NOTE — CARE PLAN
The patient is Stable - Low risk of patient condition declining or worsening    Shift Goals  Clinical Goals: Wean oxygen use to baseline room air  Patient Goals: Patient will transition to next level of care  Family Goals: Patient will transition to next level of care    Progress made toward(s) clinical / shift goals:  Progressing      Problem: Knowledge Deficit - Standard  Goal: Patient and family/care givers will demonstrate understanding of plan of care, disease process/condition, diagnostic tests and medications  Outcome: Progressing  Note: Pt verbalizes understanding of plan of care     Problem: Fall Risk  Goal: Patient will remain free from falls  Outcome: Progressing  Note: Pt wearing treaded slipper socks, call light within reach, bed alarm in use

## 2023-07-08 NOTE — CARE PLAN
The patient is Stable - Low risk of patient condition declining or worsening    Shift Goals  Clinical Goals: Wean oxygen use to baseline room air  Patient Goals: Patient will transition to next level of care  Family Goals: Patient will transition to next level of care    Progress made toward(s) clinical / shift goals:  Patient able to weaned to room air; maintains low to mid 90s. Patient's daughter updated on plan of care: referrals to be sent to SNF and IRF for post acute therapy.    Problem: Skin Integrity  Goal: Skin integrity is maintained or improved  Outcome: Progressing     Problem: Knowledge Deficit - Standard  Goal: Patient and family/care givers will demonstrate understanding of plan of care, disease process/condition, diagnostic tests and medications  Outcome: Progressing     Problem: Fall Risk  Goal: Patient will remain free from falls  Outcome: Progressing       Patient is not progressing towards the following goals:

## 2023-07-08 NOTE — CARE PLAN
The patient is Stable - Low risk of patient condition declining or worsening    Shift Goals  Clinical Goals: Maintain O2 above 90%  Patient Goals: Patient will transition to next level of care  Family Goals: Patient will transition to next level of care    Progress made toward(s) clinical / shift goals: Pt O2 stayed above 90% on RA.     Problem: Skin Integrity  Goal: Skin integrity is maintained or improved  Outcome: Progressing  Note: Pt ambulated in room and sat in chair for breakfast. Encouraged to turn while in bed.      Problem: Knowledge Deficit - Standard  Goal: Patient and family/care givers will demonstrate understanding of plan of care, disease process/condition, diagnostic tests and medications  Outcome: Progressing     Problem: Fall Risk  Goal: Patient will remain free from falls  Outcome: Progressing  Note: Bed alarm in place. Pt has treaded socks on. Instructed on use of front wheel walker,        Patient is not progressing towards the following goals:

## 2023-07-09 LAB
ANION GAP SERPL CALC-SCNC: 9 MMOL/L (ref 7–16)
BACTERIA BLD CULT: NORMAL
BACTERIA BLD CULT: NORMAL
BUN SERPL-MCNC: 25 MG/DL (ref 8–22)
CALCIUM SERPL-MCNC: 9.2 MG/DL (ref 8.5–10.5)
CHLORIDE SERPL-SCNC: 103 MMOL/L (ref 96–112)
CO2 SERPL-SCNC: 30 MMOL/L (ref 20–33)
CREAT SERPL-MCNC: 0.76 MG/DL (ref 0.5–1.4)
GFR SERPLBLD CREATININE-BSD FMLA CKD-EPI: 77 ML/MIN/1.73 M 2
GLUCOSE BLD STRIP.AUTO-MCNC: 133 MG/DL (ref 65–99)
GLUCOSE BLD STRIP.AUTO-MCNC: 210 MG/DL (ref 65–99)
GLUCOSE BLD STRIP.AUTO-MCNC: 210 MG/DL (ref 65–99)
GLUCOSE BLD STRIP.AUTO-MCNC: 293 MG/DL (ref 65–99)
GLUCOSE SERPL-MCNC: 142 MG/DL (ref 65–99)
POTASSIUM SERPL-SCNC: 5.2 MMOL/L (ref 3.6–5.5)
SIGNIFICANT IND 70042: NORMAL
SIGNIFICANT IND 70042: NORMAL
SITE SITE: NORMAL
SITE SITE: NORMAL
SODIUM SERPL-SCNC: 142 MMOL/L (ref 135–145)
SOURCE SOURCE: NORMAL
SOURCE SOURCE: NORMAL

## 2023-07-09 PROCEDURE — 700102 HCHG RX REV CODE 250 W/ 637 OVERRIDE(OP): Performed by: HOSPITALIST

## 2023-07-09 PROCEDURE — 700111 HCHG RX REV CODE 636 W/ 250 OVERRIDE (IP): Mod: JZ | Performed by: HOSPITALIST

## 2023-07-09 PROCEDURE — A9270 NON-COVERED ITEM OR SERVICE: HCPCS | Performed by: HOSPITALIST

## 2023-07-09 PROCEDURE — 700102 HCHG RX REV CODE 250 W/ 637 OVERRIDE(OP): Performed by: STUDENT IN AN ORGANIZED HEALTH CARE EDUCATION/TRAINING PROGRAM

## 2023-07-09 PROCEDURE — 99232 SBSQ HOSP IP/OBS MODERATE 35: CPT | Performed by: STUDENT IN AN ORGANIZED HEALTH CARE EDUCATION/TRAINING PROGRAM

## 2023-07-09 PROCEDURE — 700102 HCHG RX REV CODE 250 W/ 637 OVERRIDE(OP)

## 2023-07-09 PROCEDURE — A9270 NON-COVERED ITEM OR SERVICE: HCPCS | Performed by: STUDENT IN AN ORGANIZED HEALTH CARE EDUCATION/TRAINING PROGRAM

## 2023-07-09 PROCEDURE — 80048 BASIC METABOLIC PNL TOTAL CA: CPT

## 2023-07-09 PROCEDURE — 36415 COLL VENOUS BLD VENIPUNCTURE: CPT

## 2023-07-09 PROCEDURE — 82962 GLUCOSE BLOOD TEST: CPT | Mod: 91

## 2023-07-09 PROCEDURE — 770001 HCHG ROOM/CARE - MED/SURG/GYN PRIV*

## 2023-07-09 PROCEDURE — A9270 NON-COVERED ITEM OR SERVICE: HCPCS

## 2023-07-09 RX ADMIN — INSULIN HUMAN 3 UNITS: 100 INJECTION, SOLUTION PARENTERAL at 10:03

## 2023-07-09 RX ADMIN — ENOXAPARIN SODIUM 40 MG: 100 INJECTION SUBCUTANEOUS at 18:29

## 2023-07-09 RX ADMIN — AMLODIPINE BESYLATE 10 MG: 10 TABLET ORAL at 05:25

## 2023-07-09 RX ADMIN — FLUOXETINE 20 MG: 20 CAPSULE ORAL at 05:22

## 2023-07-09 RX ADMIN — OMEPRAZOLE 40 MG: 20 CAPSULE, DELAYED RELEASE ORAL at 18:29

## 2023-07-09 RX ADMIN — ASPIRIN 81 MG 81 MG: 81 TABLET ORAL at 05:23

## 2023-07-09 RX ADMIN — INSULIN HUMAN 5 UNITS: 100 INJECTION, SOLUTION PARENTERAL at 14:17

## 2023-07-09 RX ADMIN — DEXAMETHASONE 6 MG: 4 TABLET ORAL at 05:22

## 2023-07-09 RX ADMIN — INSULIN HUMAN 3 UNITS: 100 INJECTION, SOLUTION PARENTERAL at 18:25

## 2023-07-09 RX ADMIN — Medication 5 MG: at 20:28

## 2023-07-09 RX ADMIN — LISINOPRIL 5 MG: 5 TABLET ORAL at 05:25

## 2023-07-09 ASSESSMENT — PATIENT HEALTH QUESTIONNAIRE - PHQ9
SUM OF ALL RESPONSES TO PHQ9 QUESTIONS 1 AND 2: 0
2. FEELING DOWN, DEPRESSED, IRRITABLE, OR HOPELESS: NOT AT ALL
1. LITTLE INTEREST OR PLEASURE IN DOING THINGS: NOT AT ALL

## 2023-07-09 ASSESSMENT — ENCOUNTER SYMPTOMS
GASTROINTESTINAL NEGATIVE: 1
MUSCULOSKELETAL NEGATIVE: 1
COUGH: 1
EYES NEGATIVE: 1
WEAKNESS: 1
PSYCHIATRIC NEGATIVE: 1

## 2023-07-09 ASSESSMENT — PAIN DESCRIPTION - PAIN TYPE: TYPE: ACUTE PAIN

## 2023-07-09 NOTE — PROGRESS NOTES
Hospital Medicine Daily Progress Note    Date of Service  7/9/2023    Chief Complaint  Rashmi Parra is a 83 y.o. female admitted 7/3/2023 with dyspnea    Hospital Course  83-year-old female with a past medical history of mitral stenosis, peripheral vascular disease, chronic hypoxic respiratory failure on 3 L of oxygen, restrictive lung disease, diabetes, breast cancer was admitted on 7/3/2023 for acute on chronic respiratory failure secondary to COVID pneumonia.     Interval Problem Update  Patient was evaluated at bedside  BP better today  On room air  Blood glucose in the 290s, secondary to Decadron  Continue Decadron    Patient is medically clear  Pending home health to assisted living facility    I have discussed this patient's plan of care and discharge plan at IDT rounds today with Case Management, Nursing, Nursing leadership, and other members of the IDT team.    Consultants/Specialty  None    Code Status  DNAR/DNI    Disposition  The patient is not medically cleared for discharge to home or a post-acute facility.  Anticipate discharge to: skilled nursing facility    I have placed the appropriate orders for post-discharge needs.    Review of Systems  Review of Systems   Constitutional:  Positive for malaise/fatigue.   HENT: Negative.     Eyes: Negative.    Respiratory:  Positive for cough.    Cardiovascular:  Positive for leg swelling.   Gastrointestinal: Negative.    Genitourinary: Negative.    Musculoskeletal: Negative.    Skin: Negative.    Neurological:  Positive for weakness.   Endo/Heme/Allergies: Negative.    Psychiatric/Behavioral: Negative.          Physical Exam  Temp:  [36.6 °C (97.9 °F)-36.7 °C (98.1 °F)] 36.7 °C (98.1 °F)  Pulse:  [59-71] 59  Resp:  [16] 16  BP: (130-159)/(63-65) 159/65  SpO2:  [91 %-95 %] 95 %    Physical Exam  Constitutional:       Appearance: Normal appearance.   HENT:      Head: Normocephalic and atraumatic.      Mouth/Throat:      Mouth: Mucous membranes are moist.    Eyes:      Extraocular Movements: Extraocular movements intact.      Pupils: Pupils are equal, round, and reactive to light.   Cardiovascular:      Rate and Rhythm: Normal rate and regular rhythm.      Pulses: Normal pulses.      Heart sounds: Normal heart sounds.   Pulmonary:      Effort: Pulmonary effort is normal.      Breath sounds: Rales present.   Abdominal:      General: Bowel sounds are normal.      Palpations: Abdomen is soft.   Musculoskeletal:         General: No swelling. Normal range of motion.      Cervical back: Normal range of motion and neck supple.      Right lower leg: No edema.      Left lower leg: No edema.   Skin:     General: Skin is warm.      Coloration: Skin is not jaundiced.   Neurological:      General: No focal deficit present.      Mental Status: She is alert and oriented to person, place, and time. Mental status is at baseline.      Cranial Nerves: No cranial nerve deficit.   Psychiatric:         Mood and Affect: Mood normal.         Behavior: Behavior normal.         Thought Content: Thought content normal.         Judgment: Judgment normal.         Fluids    Intake/Output Summary (Last 24 hours) at 7/9/2023 1506  Last data filed at 7/9/2023 0500  Gross per 24 hour   Intake --   Output 450 ml   Net -450 ml       Laboratory        Recent Labs     07/09/23  0623   SODIUM 142   POTASSIUM 5.2   CHLORIDE 103   CO2 30   GLUCOSE 142*   BUN 25*   CREATININE 0.76   CALCIUM 9.2                     Imaging  US-EXTREMITY VENOUS LOWER BILAT   Final Result      CT-CTA CHEST PULMONARY ARTERY W/ RECONS   Final Result         1.  No pulmonary embolus appreciated.   2.  Left renal cyst, intermediate density likely hemorrhagic or proteinaceous cyst is decreased in size but slightly increased in density since prior study   3.  Atherosclerosis and atherosclerotic coronary artery disease.      DX-CHEST-PORTABLE (1 VIEW)   Final Result         1.  No acute cardiopulmonary disease.            Assessment/Plan  * COVID-19- (present on admission)  Assessment & Plan  Positive for COVID  Complicated by acute hypoxic gemma failure  Oral Decadron  Up to chair for all meals  Frequent symptom spirometer  Titrate oxygen as tolerable    Elevated troponin  Assessment & Plan  Patient denies any chest pain  she does have T wave inversion of the anterior precordial leads  Monitor on telemetry    Moderate protein-calorie malnutrition (HCC)- (present on admission)  Assessment & Plan  Dietary consult    Lower extremity weakness  Assessment & Plan  Pending venous Dopplers since she has swelling  PT/OT eval    Hypotension  Assessment & Plan  Resolved    Type 2 diabetes mellitus with diabetic mononeuropathy, without long-term current use of insulin (HCC)- (present on admission)  Assessment & Plan  Start on insulin sliding scale with serial Accu-Checks  Check hemoglobin A1c  Hypoglycemic protocol in place    Acute on chronic respiratory failure with hypoxia (HCC)- (present on admission)  Assessment & Plan  Currently on 3L of oxygen her baseline is 3 L         VTE prophylaxis: enoxaparin ppx    I have performed a physical exam and reviewed and updated ROS and Plan today (7/9/2023). In review of yesterday's note (7/8/2023), there are no changes except as documented above.

## 2023-07-09 NOTE — CARE PLAN
The patient is Watcher - Medium risk of patient condition declining or worsening    Shift Goals  Clinical Goals: monitor resp efforts, no pain, decrease anxiety, rest  Patient Goals: rest  Family Goals: Patient will transition to next level of care    Progress made toward(s) clinical / shift goals:    Problem: Skin Integrity  Goal: Skin integrity is maintained or improved  Description: Target End Date:  Prior to discharge or change in level of care    Document interventions on Skin Risk/Sunny flowsheet groups and corresponding LDA    1.  Assess and monitor skin integrity, appearance and/or temperature  2.  Assess risk factors for impaired skin integrity and/or pressures ulcers  3.  Implement precautions to protect skin integrity in collaboration with interdisciplinary team  4.  Implement pressure ulcer prevention protocol if at risk for skin breakdown  5.  Confirm wound care consult if at risk for skin breakdown  6.  Ensure patient use of pressure relieving devices  (Low air loss bed, waffle overlay, heel protectors, ROHO cushion, etc)  Outcome: Progressing     Problem: Knowledge Deficit - Standard  Goal: Patient and family/care givers will demonstrate understanding of plan of care, disease process/condition, diagnostic tests and medications  Description: Target End Date:  1-3 days or as soon as patient condition allows    Document in Patient Education    1.  Patient and family/caregiver oriented to unit, equipment, visitation policy and means for communicating concern  2.  Complete/review Learning Assessment  3.  Assess knowledge level of disease process/condition, treatment plan, diagnostic tests and medications  4.  Explain disease process/condition, treatment plan, diagnostic tests and medications  Outcome: Progressing     Problem: Fall Risk  Goal: Patient will remain free from falls  Description: Target End Date:  Prior to discharge or change in level of care    Document interventions on the Oconnor Luis Manuel Fall  Risk Assessment    1.  Assess for fall risk factors  2.  Implement fall precautions  Outcome: Progressing       Patient is not progressing towards the following goals:

## 2023-07-09 NOTE — CARE PLAN
The patient is Stable - Low risk of patient condition declining or worsening    Shift Goals  Clinical Goals: Monitor WOB, Rest  Patient Goals: REst  Family Goals: Patient will transition to next level of care    Progress made toward(s) clinical / shift goals:    Problem: Skin Integrity  Goal: Skin integrity is maintained or improved  Outcome: Progressing     Problem: Fall Risk  Goal: Patient will remain free from falls  Outcome: Progressing  Note: Pt calls appropriately. Bed alarm and chair alarm in place.        Patient is not progressing towards the following goals:

## 2023-07-10 VITALS
OXYGEN SATURATION: 92 % | TEMPERATURE: 97.9 F | HEART RATE: 61 BPM | RESPIRATION RATE: 16 BRPM | BODY MASS INDEX: 23.48 KG/M2 | SYSTOLIC BLOOD PRESSURE: 148 MMHG | DIASTOLIC BLOOD PRESSURE: 61 MMHG | HEIGHT: 63 IN | WEIGHT: 132.5 LBS

## 2023-07-10 LAB — GLUCOSE BLD STRIP.AUTO-MCNC: 219 MG/DL (ref 65–99)

## 2023-07-10 PROCEDURE — 700102 HCHG RX REV CODE 250 W/ 637 OVERRIDE(OP): Performed by: STUDENT IN AN ORGANIZED HEALTH CARE EDUCATION/TRAINING PROGRAM

## 2023-07-10 PROCEDURE — 700102 HCHG RX REV CODE 250 W/ 637 OVERRIDE(OP): Performed by: HOSPITALIST

## 2023-07-10 PROCEDURE — 82962 GLUCOSE BLOOD TEST: CPT

## 2023-07-10 PROCEDURE — A9270 NON-COVERED ITEM OR SERVICE: HCPCS | Performed by: STUDENT IN AN ORGANIZED HEALTH CARE EDUCATION/TRAINING PROGRAM

## 2023-07-10 PROCEDURE — A9270 NON-COVERED ITEM OR SERVICE: HCPCS | Performed by: HOSPITALIST

## 2023-07-10 PROCEDURE — 99239 HOSP IP/OBS DSCHRG MGMT >30: CPT | Performed by: STUDENT IN AN ORGANIZED HEALTH CARE EDUCATION/TRAINING PROGRAM

## 2023-07-10 RX ORDER — AMLODIPINE BESYLATE 10 MG/1
10 TABLET ORAL DAILY
Qty: 30 TABLET | Refills: 0 | Status: SHIPPED | OUTPATIENT
Start: 2023-07-11 | End: 2023-07-10 | Stop reason: SDUPTHER

## 2023-07-10 RX ORDER — LISINOPRIL 5 MG/1
5 TABLET ORAL DAILY
Qty: 30 TABLET | Refills: 0 | Status: SHIPPED
Start: 2023-07-11 | End: 2023-09-05 | Stop reason: SDUPTHER

## 2023-07-10 RX ORDER — AMLODIPINE BESYLATE 10 MG/1
10 TABLET ORAL DAILY
Qty: 30 TABLET | Refills: 0 | Status: SHIPPED | OUTPATIENT
Start: 2023-07-11 | End: 2023-12-15

## 2023-07-10 RX ORDER — LISINOPRIL 5 MG/1
5 TABLET ORAL DAILY
Qty: 30 TABLET | Refills: 0 | Status: SHIPPED | OUTPATIENT
Start: 2023-07-11 | End: 2023-07-10 | Stop reason: SDUPTHER

## 2023-07-10 RX ADMIN — AMLODIPINE BESYLATE 10 MG: 10 TABLET ORAL at 05:08

## 2023-07-10 RX ADMIN — DEXAMETHASONE 6 MG: 4 TABLET ORAL at 05:08

## 2023-07-10 RX ADMIN — FLUOXETINE 20 MG: 20 CAPSULE ORAL at 05:08

## 2023-07-10 RX ADMIN — LISINOPRIL 5 MG: 5 TABLET ORAL at 05:08

## 2023-07-10 RX ADMIN — ASPIRIN 81 MG 81 MG: 81 TABLET ORAL at 05:08

## 2023-07-10 RX ADMIN — INSULIN HUMAN 3 UNITS: 100 INJECTION, SOLUTION PARENTERAL at 10:16

## 2023-07-10 ASSESSMENT — PAIN DESCRIPTION - PAIN TYPE: TYPE: ACUTE PAIN

## 2023-07-10 NOTE — CARE PLAN
The patient is Watcher - Medium risk of patient condition declining or worsening    Shift Goals  Clinical Goals: monitor resp efforts, no pain, decrease anxiety, rest  Patient Goals: no pain, rest  Family Goals: Patient will transition to next level of care  Progress made toward(s) clinical / shift goals:    Problem: Skin Integrity  Goal: Skin integrity is maintained or improved  Description: Target End Date:  Prior to discharge or change in level of care    Document interventions on Skin Risk/Sunny flowsheet groups and corresponding LDA    1.  Assess and monitor skin integrity, appearance and/or temperature  2.  Assess risk factors for impaired skin integrity and/or pressures ulcers  3.  Implement precautions to protect skin integrity in collaboration with interdisciplinary team  4.  Implement pressure ulcer prevention protocol if at risk for skin breakdown  5.  Confirm wound care consult if at risk for skin breakdown  6.  Ensure patient use of pressure relieving devices  (Low air loss bed, waffle overlay, heel protectors, ROHO cushion, etc)  7/10/2023 0300 by Jennifer Rachel R.N.  Outcome: Progressing  7/10/2023 0229 by Jennifer Rachel R.N.  Outcome: Progressing     Problem: Knowledge Deficit - Standard  Goal: Patient and family/care givers will demonstrate understanding of plan of care, disease process/condition, diagnostic tests and medications  Description: Target End Date:  1-3 days or as soon as patient condition allows    Document in Patient Education    1.  Patient and family/caregiver oriented to unit, equipment, visitation policy and means for communicating concern  2.  Complete/review Learning Assessment  3.  Assess knowledge level of disease process/condition, treatment plan, diagnostic tests and medications  4.  Explain disease process/condition, treatment plan, diagnostic tests and medications  7/10/2023 0300 by Jennifer Rachel R.N.  Outcome: Progressing  7/10/2023 0229 by Jennifer Rachel  ROBERT  Outcome: Progressing     Problem: Fall Risk  Goal: Patient will remain free from falls  Description: Target End Date:  Prior to discharge or change in level of care    Document interventions on the O[place]nor Luis Manuel Fall Risk Assessment    1.  Assess for fall risk factors  2.  Implement fall precautions  7/10/2023 0300 by Jennifer Rachel R.N.  Outcome: Progressing  7/10/2023 0229 by Jennifer Rachel R.N.  Outcome: Progressing       Patient is not progressing towards the following goals:

## 2023-07-10 NOTE — DISCHARGE PLANNING
Case Management Discharge Planning    Admission Date: 7/3/2023  GMLOS: 5.2  ALOS: 6    6-Clicks ADL Score: 17  6-Clicks Mobility Score: 17  PT and/or OT Eval ordered: Yes  Post-acute Referrals Ordered: Yes  Post-acute Choice Obtained: Yes  Has referral(s) been sent to post-acute provider:  Yes      Anticipated Discharge Dispo: Discharge Disposition: D/T to SNF with Medicare cert in anticipation of skilled care (03)    DME Needed: No    Action(s) Taken: Choice obtained, Referral(s) sent, and DME Face to Face     Escalations Completed: None    Medically Clear: Yes    Next Steps: Updated by DONTA RN that family is requesting home health and wishes to continue with [facility]. Family can transport Aleyda back to [facility] when the DC is completed. Call placed to Brandie at [facility] and they are able to take Aleyda back today, preferable before 1500. Brandie asking for the DC summary and recent notes to be faxed to her, as well as hard scripts for her new medications. Brandie is aware that she is resuming HH with [facility]. Plan made to f/u on medications, HH acceptance, and DC time with family then will update Brandie.    Choice faxed for Rajni. Needed paperwork faxed to Brandie at [phone].  Will f/u with family and Brandie when [facility] acceptance received.     Barriers to Discharge: Outpatient referrals pending    Is the patient up for discharge tomorrow: No- DC today

## 2023-07-10 NOTE — DISCHARGE PLANNING
Agency/Facility Name: [facility]  Spoke To: Bernie  Outcome: SNF informed DPA they've accepted Pt, asking if she is MC.     RN CM notified.     1125-  Agency/Facility Name: [facility]  Spoke To: Bernie  Outcome: DPA informed SNF that they can disregard referral, as Pt's DC plan has changed, per RN CM's update.

## 2023-07-10 NOTE — DISCHARGE SUMMARY
Discharge Summary    CHIEF COMPLAINT ON ADMISSION  Chief Complaint   Patient presents with    ALOC     Per daughter, pt is becoming increasingly confused and is taking longer to answer questions today.     Weakness     X1 day. Pt is normally ambulatory and has been too weak to walk all day.        Reason for Admission  Bilat foot pain    Admission Date  7/3/2023     CODE STATUS  DNAR/DNI    HPI & HOSPITAL COURSE  83-year-old female with a past medical history of mitral stenosis, peripheral vascular disease, chronic hypoxic respiratory failure on 3 L of oxygen, restrictive lung disease, diabetes, breast cancer was admitted on 7/3/2023 for acute on chronic respiratory failure secondary to COVID pneumonia.  Patient was started on a Decadron regimen as well as incentive spirometer.  Ultimately, patient was titrated off of oxygen.  She was eval by physical therapy and Occupational Therapy who ultimately recommended patient to be discharged home with home health which was set up prior to discharge.  Stable patient with in chronic condition was discharged to assisted living facility with home health and was instructed to follow-up with her primary care provider in the outpatient setting.  All results and plan of action were discussed with the patient for which she voiced understanding and agreement with the primary care team.  Patient was instructed to return to the emergency department if symptoms were to worsen.    Therefore, she is discharged in good and stable condition to home with organized home healthcare and close outpatient follow-up.    The patient met 2-midnight criteria for an inpatient stay at the time of discharge.      FOLLOW UP ITEMS POST DISCHARGE  Primary care provider follow posthospital discharge care    DISCHARGE DIAGNOSES  Principal Problem:    COVID-19 (POA: Yes)  Active Problems:    Acute on chronic respiratory failure with hypoxia (HCC) (POA: Yes)    Type 2 diabetes mellitus with diabetic  mononeuropathy, without long-term current use of insulin (HCC) (POA: Yes)    Hypotension (POA: Unknown)    Lower extremity weakness (POA: Unknown)    Moderate protein-calorie malnutrition (HCC) (POA: Yes)    Elevated troponin (POA: Unknown)  Resolved Problems:    * No resolved hospital problems. *      FOLLOW UP  Future Appointments   Date Time Provider Department Center   7/18/2023  3:15 PM ALBERTO HOFFMAN BD 1 [facility]   8/4/2023  4:00 PM DAYANA Zaman [facility] DEBORAH [place]   9/5/2023  2:30 PM PFT-RM2 [facility] None   9/5/2023  3:30 PM Leona Osorio M.D. [facility] None   11/17/2023  1:40 PM Gerardo Brand M.D. [facility] None     No follow-up provider specified.    MEDICATIONS ON DISCHARGE     Medication List        START taking these medications        Instructions   dexamethasone 6 MG Tabs  Commonly known as: Decadron   Take 1 Tablet by mouth every day.  Dose: 6 mg            CONTINUE taking these medications        Instructions   acetaminophen 325 MG Tabs  Commonly known as: Tylenol   Take 650 mg by mouth every four hours as needed for Fever. Indications: Pain  Dose: 650 mg     amLODIPine 10 MG Tabs  Commonly known as: Norvasc   Doctor's comments: Hold if SBP less than 130  Take 1 Tablet by mouth every day.  Dose: 10 mg     CALCIUM CITRATE-VITAMIN D3 PO   Take 1 Tablet by mouth every day.  Dose: 1 Tablet     Dexlansoprazole 60 MG Cpdr delayed-release capsule   Take 1 Capsule by mouth every evening.  Dose: 1 Capsule     DULoxetine 30 MG Cpep  Commonly known as: Cymbalta   Take 30 mg by mouth every day.  Dose: 30 mg     FLUoxetine 20 MG Caps  Commonly known as: PROzac   Take 1 Capsule by mouth every day. STOP DULOXETINE  Dose: 20 mg     gabapentin 100 MG Caps  Commonly known as: Neurontin   Take 1 Capsule by mouth at bedtime as needed (nerve pain).  Dose: 100 mg     lovastatin 20 MG Tabs  Commonly known as: Mevacor   Take 20 mg by mouth every evening.  Dose: 20 mg     melatonin 5 mg Tabs   Take 5 mg by  mouth at bedtime.  Dose: 5 mg     polyethylene glycol/lytes 17 g Pack  Commonly known as: Miralax   Take 17 g by mouth every day.  Dose: 17 g              Allergies  No Known Allergies    DIET  Orders Placed This Encounter   Procedures    Diet Order Diet: 2 Gram Sodium; Second Modifier: (optional): Consistent CHO (Diabetic)     Standing Status:   Standing     Number of Occurrences:   1     Order Specific Question:   Diet:     Answer:   2 Gram Sodium [7]     Order Specific Question:   Second Modifier: (optional)     Answer:   Consistent CHO (Diabetic) [4]       ACTIVITY  As tolerated.  Weight bearing as tolerated    LINES, DRAINS, AND WOUNDS  This is an automated list. Peripheral IVs will be removed prior to discharge.                     MENTAL STATUS ON TRANSFER  At baseline    CONSULTATIONS  None    PROCEDURES  None    LABORATORY  Lab Results   Component Value Date    SODIUM 142 07/09/2023    POTASSIUM 5.2 07/09/2023    CHLORIDE 103 07/09/2023    CO2 30 07/09/2023    GLUCOSE 142 (H) 07/09/2023    BUN 25 (H) 07/09/2023    CREATININE 0.76 07/09/2023        Lab Results   Component Value Date    WBC 3.8 (L) 07/05/2023    HEMOGLOBIN 11.5 (L) 07/05/2023    HEMATOCRIT 36.4 (L) 07/05/2023    PLATELETCT 174 07/05/2023        Total time of the discharge process exceeds 34 minutes.

## 2023-07-10 NOTE — CARE PLAN
The patient is Watcher - Medium risk of patient condition declining or worsening    Shift Goals  Clinical Goals: monitor resp efforts, no pain, decrease anxiety, rest  Patient Goals: no pain, rest  Family Goals: Patient will transition to next level of care    Progress made toward(s) clinical / shift goals:    Problem: Skin Integrity  Goal: Skin integrity is maintained or improved  Description: Target End Date:  Prior to discharge or change in level of care    Document interventions on Skin Risk/Sunny flowsheet groups and corresponding LDA    1.  Assess and monitor skin integrity, appearance and/or temperature  2.  Assess risk factors for impaired skin integrity and/or pressures ulcers  3.  Implement precautions to protect skin integrity in collaboration with interdisciplinary team  4.  Implement pressure ulcer prevention protocol if at risk for skin breakdown  5.  Confirm wound care consult if at risk for skin breakdown  6.  Ensure patient use of pressure relieving devices  (Low air loss bed, waffle overlay, heel protectors, ROHO cushion, etc)  Outcome: Progressing     Problem: Knowledge Deficit - Standard  Goal: Patient and family/care givers will demonstrate understanding of plan of care, disease process/condition, diagnostic tests and medications  Description: Target End Date:  1-3 days or as soon as patient condition allows    Document in Patient Education    1.  Patient and family/caregiver oriented to unit, equipment, visitation policy and means for communicating concern  2.  Complete/review Learning Assessment  3.  Assess knowledge level of disease process/condition, treatment plan, diagnostic tests and medications  4.  Explain disease process/condition, treatment plan, diagnostic tests and medications  Outcome: Progressing     Problem: Fall Risk  Goal: Patient will remain free from falls  Description: Target End Date:  Prior to discharge or change in level of care    Document interventions on the Oconnor  Luis Manuel Fall Risk Assessment    1.  Assess for fall risk factors  2.  Implement fall precautions  Outcome: Progressing       Patient is not progressing towards the following goals:

## 2023-07-11 ENCOUNTER — PATIENT OUTREACH (OUTPATIENT)
Dept: MEDICAL GROUP | Facility: MEDICAL CENTER | Age: 83
End: 2023-07-11
Payer: MEDICARE

## 2023-07-11 NOTE — PROGRESS NOTES
7/11: 1st Patient outreach for TCM. \LVM with contact information.  7/12: 2nd Patient outreach for TCM.  LVM with contact information.

## 2023-07-13 NOTE — PROGRESS NOTES
Three attempts to introduce the Personal Care Management Program. Called home and cell, left messages on cell. Home phone unable to leave message. Sent [employer] message.

## 2023-07-18 ENCOUNTER — HOSPITAL ENCOUNTER (OUTPATIENT)
Dept: RADIOLOGY | Facility: MEDICAL CENTER | Age: 83
End: 2023-07-18
Attending: NURSE PRACTITIONER
Payer: MEDICARE

## 2023-07-18 DIAGNOSIS — Z78.0 POSTMENOPAUSAL: ICD-10-CM

## 2023-07-18 PROCEDURE — 77080 DXA BONE DENSITY AXIAL: CPT

## 2023-07-20 ENCOUNTER — TELEPHONE (OUTPATIENT)
Dept: MEDICAL GROUP | Facility: MEDICAL CENTER | Age: 83
End: 2023-07-20

## 2023-07-20 NOTE — TELEPHONE ENCOUNTER
Lorna  from [facility] called [phone] would like verbal orders for occupational therapy.     1 time a week for 4 weeks.

## 2023-08-04 ENCOUNTER — APPOINTMENT (OUTPATIENT)
Dept: MEDICAL GROUP | Facility: MEDICAL CENTER | Age: 83
End: 2023-08-04
Payer: MEDICARE

## 2023-08-17 ENCOUNTER — TELEPHONE (OUTPATIENT)
Dept: MEDICAL GROUP | Facility: MEDICAL CENTER | Age: 83
End: 2023-08-17
Payer: MEDICARE

## 2023-08-17 DIAGNOSIS — J96.21 ACUTE ON CHRONIC RESPIRATORY FAILURE WITH HYPOXIA (HCC): ICD-10-CM

## 2023-08-17 DIAGNOSIS — R53.1 WEAKNESS: ICD-10-CM

## 2023-08-17 DIAGNOSIS — Z91.81 RISK FOR FALLS: ICD-10-CM

## 2023-08-17 NOTE — TELEPHONE ENCOUNTER
[facility] called and left message. Anita is requesting orders for PT & OT for this patient.

## 2023-08-24 ENCOUNTER — TELEPHONE (OUTPATIENT)
Dept: MEDICAL GROUP | Facility: MEDICAL CENTER | Age: 83
End: 2023-08-24

## 2023-08-24 ENCOUNTER — TELEMEDICINE (OUTPATIENT)
Dept: MEDICAL GROUP | Facility: MEDICAL CENTER | Age: 83
End: 2023-08-24
Payer: MEDICARE

## 2023-08-24 DIAGNOSIS — C50.911 PRIMARY MALIGNANT NEOPLASM OF RIGHT FEMALE BREAST (HCC): ICD-10-CM

## 2023-08-24 DIAGNOSIS — E11.41 TYPE 2 DIABETES MELLITUS WITH DIABETIC MONONEUROPATHY, WITHOUT LONG-TERM CURRENT USE OF INSULIN (HCC): ICD-10-CM

## 2023-08-24 DIAGNOSIS — F32.A DEPRESSION, UNSPECIFIED DEPRESSION TYPE: ICD-10-CM

## 2023-08-24 PROCEDURE — 99214 OFFICE O/P EST MOD 30 MIN: CPT | Mod: 95 | Performed by: NURSE PRACTITIONER

## 2023-08-24 RX ORDER — METFORMIN HYDROCHLORIDE 500 MG/1
500 TABLET, EXTENDED RELEASE ORAL DAILY
Qty: 90 TABLET | Refills: 3 | Status: SHIPPED
Start: 2023-08-24

## 2023-08-24 NOTE — TELEPHONE ENCOUNTER
Glynn from [facility] called,   States they will not be taking her on as she is already doing pt ot with her living facility

## 2023-08-24 NOTE — ASSESSMENT & PLAN NOTE
"Chronic. Medication changed from duloxetine 30 mg to fluoxetine 20 mg daily.     She can't tell if her depression is any better or not. She feels like \"it's fine\".     She is feeling like her life is very boring now that she has been moved into an assisted living with her , feels far away from family and friends, doesn't like the activities available, unable to be as active as she would like.   "

## 2023-08-25 NOTE — PROGRESS NOTES
"Telemedicine: Established Patient   This evaluation was conducted via Zoom using secure and encrypted videoconferencing technology. The patient was in their home in the [facility].    The patient's identity was confirmed and verbal consent was obtained for this virtual visit.    Subjective:   CC: follow up on depression, labs  Rashmi Parra is a 83 y.o. female presenting for evaluation and management of:    Type 2 diabetes mellitus with diabetic mononeuropathy, without long-term current use of insulin (HCC)  Chronic.  Stable on metformin 500 mg once daily.  Last A1c 6.6.  Patient's PT is requesting discontinuation of metformin as she believes this may be a potential cause of her fatigue and may be lowering her B12 levels despite normal B12 level in December.  Requesting MMA testing for further evaluation of possible vitamin B deficiency.     Depression  Chronic. Medication changed from duloxetine 30 mg to fluoxetine 20 mg daily.     She can't tell if her depression is any better or not. She feels like \"it's fine\".     She is feeling like her life is very boring now that she has been moved into an assisted living with her , feels far away from family and friends, doesn't like the activities available, unable to be as active as she would like.      ROS   Positive ROS as per HPI. All other systems reviewed and are negative.    No Known Allergies    Current medicines (including changes today)  Current Outpatient Medications   Medication Sig Dispense Refill    metFORMIN ER (GLUCOPHAGE XR) 500 MG TABLET SR 24 HR Take 1 Tablet by mouth every day. 90 Tablet 3    amLODIPine (NORVASC) 10 MG Tab Take 1 Tablet by mouth every day. 30 Tablet 0    lisinopril (PRINIVIL) 5 MG Tab Take 1 Tablet by mouth every day. 30 Tablet 0    CALCIUM CITRATE-VITAMIN D3 PO Take 1 Tablet by mouth every day.      polyethylene glycol/lytes (MIRALAX) 17 g Pack Take 17 g by mouth every day.      acetaminophen (TYLENOL) 325 MG Tab Take 650 " mg by mouth every four hours as needed for Fever. Indications: Pain      gabapentin (NEURONTIN) 100 MG Cap Take 1 Capsule by mouth at bedtime as needed (nerve pain). 90 Capsule 3    dexamethasone (DECADRON) 6 MG Tab Take 1 Tablet by mouth every day. 10 Tablet 0    FLUoxetine (PROZAC) 20 MG Cap Take 1 Capsule by mouth every day. STOP DULOXETINE 90 Capsule 3    Dexlansoprazole 60 MG CAPSULE DELAYED RELEASE delayed-release capsule Take 1 Capsule by mouth every evening. 90 Capsule 3    lovastatin (MEVACOR) 20 MG Tab Take 20 mg by mouth every evening.      Melatonin 5 MG Tab Take 5 mg by mouth at bedtime.       No current facility-administered medications for this visit.       Patient Active Problem List    Diagnosis Date Noted    COVID-19 07/04/2023    Elevated troponin 07/04/2023    Encounter for completion of form with patient 06/29/2023    GERD (gastroesophageal reflux disease) 06/06/2023    Essential hypertension 06/01/2023    Orthostatic hypotension 05/31/2023    PAD (peripheral artery disease) (formerly [facility]) 05/31/2023    Cortisol deficiency (formerly [facility]) 05/27/2023    Moderate protein-calorie malnutrition (formerly [facility]) 05/27/2023    Mitral annular calcification 05/27/2023    Depression 05/26/2023    Lower extremity weakness 05/08/2023    Fatigue 05/08/2023    Risk for falls 05/08/2023    Balance problem 01/16/2023    Age-related physical debility 01/16/2023    Heart murmur, systolic 10/26/2022    Dyslipidemia 10/26/2022    Hospital discharge follow-up 09/21/2022    Hypotension 06/12/2022    Hypophosphatemia 06/12/2022    Closed nondisplaced fracture of greater tuberosity of right humerus 06/11/2022    Trimalleolar fracture of ankle, closed, right, initial encounter 06/10/2022    Type 2 diabetes mellitus with diabetic mononeuropathy, without long-term current use of insulin (formerly [facility]) 04/21/2022    Numbness in left leg 04/21/2022    History of right hip replacement 11/11/2021    Moderate episode of recurrent major depressive disorder (formerly [facility])  07/12/2021    Moderate mitral stenosis 07/10/2021    Chronic pain of both knees 06/11/2021    Other emphysema (HCC) 06/11/2021    Burning sensation of throat 04/09/2021    Nocturnal hypoxia 04/09/2021    Tot's esophagus with dysplasia 02/10/2021    Mixed stress and urge urinary incontinence 02/10/2021    Hip pain, right 02/10/2021    Acute on chronic respiratory failure with hypoxia (HCC) 02/10/2021    Other hyperlipidemia 02/10/2021    Primary malignant neoplasm of right female breast (HCC) 11/06/2019    Localized primary osteoarthritis of lower leg 11/02/2015       Family History   Problem Relation Age of Onset    Heart Disease Neg Hx        She  has a past medical history of Arthritis, Bowel habit changes, Breath shortness, Cataract, COPD (chronic obstructive pulmonary disease) (HCC), Diabetes (HCC), Heart burn, Heart murmur, Hiatus hernia syndrome, High cholesterol, Hypertension, Indigestion, Malignant neoplasm of overlapping sites of breast in female, estrogen receptor positive (HCC) (11/6/2019), Psychiatric problem (2015), Shortness of breath, Snoring, Urinary incontinence, and Wears glasses.  She  has a past surgical history that includes other orthopedic surgery (1993); gyn surgery (1992); cholecystectomy (1964); knee arthroplasty total (Right, 11/2/2015); cataract extraction with iol (Bilateral); lefort colpoclesis (1/21/2019); bladder sling female (1/21/2019); pr mastectomy, modified radical (Right, 7/17/2019); node biopsy sentinel (Right, 7/17/2019); axillary node dissection (7/17/2019); mastectomy (Left, 7/17/2019); flap closure (Bilateral, 7/17/2019); wide excision (Right, 8/16/2019); incision and drainage general (Bilateral, 8/16/2019); abdominal hysterectomy total (1994); pr total hip arthroplasty (Right, 9/30/2021); and orif, ankle (Right, 6/10/2022).       Objective:   LMP  (LMP Unknown)     Physical Exam:  Constitutional: Alert, no distress, well-groomed.  Skin: No rashes in visible  areas.  Eye: Round. Conjunctiva clear, lids normal. No icterus.   ENMT: Lips pink without lesions, good dentition, moist mucous membranes. Phonation normal.  Neck: No masses, no thyromegaly. Moves freely without pain.  CV: Pulse as reported by patient  Respiratory: Unlabored respiratory effort, no cough or audible wheeze  Psych: Alert and oriented x3, normal affect and mood.       Assessment and Plan:   The following treatment plan was discussed:     1. Type 2 diabetes mellitus with diabetic mononeuropathy, without long-term current use of insulin (HCC)  Unstable, worsening on recent labs since stopping metformin  Restart metformin  mg daily  - metFORMIN ER (GLUCOPHAGE XR) 500 MG TABLET SR 24 HR; Take 1 Tablet by mouth every day.  Dispense: 90 Tablet; Refill: 3    2. Depression, unspecified depression type  Unstable, situation related  Patient does not want to adjust her medications today  She will try to be involved in more of the activities at her assisted living    3. Primary malignant neoplasm of right female breast (HCC)  Stable, in remission, continue monitoring with oncology      Follow-up: Return in about 2 months (around 10/24/2023).

## 2023-09-05 ENCOUNTER — OFFICE VISIT (OUTPATIENT)
Dept: SLEEP MEDICINE | Facility: MEDICAL CENTER | Age: 83
End: 2023-09-05
Attending: INTERNAL MEDICINE
Payer: MEDICARE

## 2023-09-05 VITALS — WEIGHT: 130 LBS | HEIGHT: 63 IN | OXYGEN SATURATION: 92 % | HEART RATE: 82 BPM | BODY MASS INDEX: 23.04 KG/M2

## 2023-09-05 VITALS — BODY MASS INDEX: 23.04 KG/M2 | HEIGHT: 63 IN | WEIGHT: 130 LBS

## 2023-09-05 DIAGNOSIS — R06.09 DOE (DYSPNEA ON EXERTION): ICD-10-CM

## 2023-09-05 DIAGNOSIS — J98.4 RESTRICTIVE LUNG DISEASE: ICD-10-CM

## 2023-09-05 DIAGNOSIS — J96.11 CHRONIC RESPIRATORY FAILURE WITH HYPOXIA (HCC): ICD-10-CM

## 2023-09-05 DIAGNOSIS — Z85.3 HISTORY OF BREAST CANCER: ICD-10-CM

## 2023-09-05 DIAGNOSIS — I10 ESSENTIAL HYPERTENSION: ICD-10-CM

## 2023-09-05 PROCEDURE — 94010 BREATHING CAPACITY TEST: CPT | Performed by: INTERNAL MEDICINE

## 2023-09-05 PROCEDURE — 94010 BREATHING CAPACITY TEST: CPT | Mod: 26 | Performed by: INTERNAL MEDICINE

## 2023-09-05 PROCEDURE — 99214 OFFICE O/P EST MOD 30 MIN: CPT | Performed by: INTERNAL MEDICINE

## 2023-09-05 PROCEDURE — 99212 OFFICE O/P EST SF 10 MIN: CPT | Performed by: INTERNAL MEDICINE

## 2023-09-05 PROCEDURE — 1170F FXNL STATUS ASSESSED: CPT | Performed by: INTERNAL MEDICINE

## 2023-09-05 RX ORDER — ALBUTEROL SULFATE 90 UG/1
1-2 AEROSOL, METERED RESPIRATORY (INHALATION) EVERY 4 HOURS PRN
Qty: 1 EACH | Refills: 6 | Status: SHIPPED
Start: 2023-09-05 | End: 2023-09-05

## 2023-09-05 RX ORDER — ALBUTEROL SULFATE 90 UG/1
1-2 AEROSOL, METERED RESPIRATORY (INHALATION) EVERY 4 HOURS PRN
Qty: 1 EACH | Refills: 6 | Status: SHIPPED
Start: 2023-09-05 | End: 2024-03-18 | Stop reason: SDUPTHER

## 2023-09-05 RX ORDER — LISINOPRIL 5 MG/1
5 TABLET ORAL DAILY
Qty: 90 TABLET | Refills: 1 | Status: SHIPPED | OUTPATIENT
Start: 2023-09-05 | End: 2023-09-08 | Stop reason: SDUPTHER

## 2023-09-05 ASSESSMENT — ENCOUNTER SYMPTOMS
BLURRED VISION: 0
FALLS: 0
COUGH: 0
SINUS PAIN: 0
WEAKNESS: 0
HEADACHES: 0
DIAPHORESIS: 0
DEPRESSION: 0
SHORTNESS OF BREATH: 1
DIARRHEA: 0
EYE PAIN: 0
CLAUDICATION: 0
WHEEZING: 0
PALPITATIONS: 0
NAUSEA: 0
DOUBLE VISION: 0
SPEECH CHANGE: 0
CONSTIPATION: 0
SORE THROAT: 0
FEVER: 0
EYE DISCHARGE: 0
HEMOPTYSIS: 0
HEARTBURN: 0
FOCAL WEAKNESS: 0
SPUTUM PRODUCTION: 0
STRIDOR: 0
ORTHOPNEA: 0
TREMORS: 0
DIZZINESS: 0
ABDOMINAL PAIN: 0
BACK PAIN: 0
WEIGHT LOSS: 0
VOMITING: 0
PND: 0
EYE REDNESS: 0
PHOTOPHOBIA: 0
MYALGIAS: 0
CHILLS: 0
NECK PAIN: 0

## 2023-09-05 ASSESSMENT — PULMONARY FUNCTION TESTS
FVC_PREDICTED: 2.34
FVC_PERCENT_PREDICTED: 70
FVC: 1.64
FEV1: 1.19
FEV1/FVC: 73
FEV1_PREDICTED: 67
FEV1_PREDICTED: 1.77
FEV1/FVC_PREDICTED: 75.64
FEV1/FVC_PERCENT_PREDICTED: 96

## 2023-09-05 ASSESSMENT — FIBROSIS 4 INDEX
FIB4 SCORE: 2.25
FIB4 SCORE: 2.25

## 2023-09-05 NOTE — PROCEDURES
Technician: Denise Rodgers RRT, CPFT  Good patient effort & cooperation.  The results of this test meet the ATS/ERS standards for acceptability & reproducibility.  Test was performed on the Arch[employer]onic Body Plethysmograph-Elite DX system.  Predicted equations for Spirometry are GLI-2012.    Interpretation;    Baseline spirometry shows reduction in both FVC at 1.64 L or 70% predicted and FEV1 at 1.19 L or 67% predicted with an FEV1/FVC ratio of 72.  This does not qualify for airflow obstruction however there is concavity to the expiratory flow volume loop.  Bronchodilator testing was not performed.  Spirometry suggest a mixed restrictive obstructive process.  Correlate clinically.

## 2023-09-05 NOTE — PROGRESS NOTES
Chief Complaint   Patient presents with    Follow-Up     LAST SEEN 3/2/23    Results     CTA CHEST 7/4/23  CXR 7/3/23, 6/14/23, 6/5/23  BARRIUM SWALLOW 6/5/23         HPI: This patient is a 83 y.o. female whom is followed in our clinic for MONTIEL with restrictive lung pattern on PFT last seen by me on 3/2/23.  PMHx is significant for gastroesophageal reflux disease complicated by Ott's esophagus, breast cancer, ER positive, status post bilateral mastectomy and radiation therapy completed November 2018 currently on anastrozole, urinary incontinence, cataracts. She also had moderate MV stenosis based on TTE. She is a former tobacco smoker with roughly 30-pack-year history and quit at the age of 54. Pt presented to me with MONTIEL over 6 mos after not being able to exercise due to covid for 9-12 mos. PFTS showed restriction based on low FVC and low TLC (both 80% pred) but FEV1 also reduced at 81% pred. Her DLCO was reduced mildly at 74% predicted. Her CT showed focal fibrosis in RUL c/w hx of XRT and some emphysema. Sxs were overall mild until a fall last year in the fall after which she required rehab. Repeat PFTs after fall in 11/2022 showed mild decline in FVC, FEV1, TLC and DLCO.  AT the time of our last visit she was not requiring O2 but was not participating in any activity. We were attributing her SOB to deconditioning but also kyphosis with some possible radiation damage contributing to restrictive lung disease. We planned to repeat full PFTs but she has had 2 hospitalizations since then, once in May for ? Syncope and then in July for respiratory failure presumed 2/2 covid however no infiltrate on CT chest in May or July. She is on supplemental O2 at 2LPM today with SpO2 of 92%. She is SOB with any activity. No cough. She was started on amlodipine in May and has fairly significant b/l LE today. Spirometry today suggest mild obstruction with proportionate reduced in FVC and FEV1 similar to 11/2022. No cough. No CP.  "    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, estrogen receptor positive (HCC) 2019    Psychiatric problem 2015    anxiety    Shortness of breath     Snoring     no sleep study    Urinary incontinence     Wears glasses        Social History     Socioeconomic History    Marital status:      Spouse name: Not on file    Number of children: Not on file    Years of education: Not on file    Highest education level: Some college, no degree   Occupational History    Not on file   Tobacco Use    Smoking status: Former     Current packs/day: 0.00     Average packs/day: 1 pack/day for 30.0 years (30.0 ttl pk-yrs)     Types: Cigarettes     Start date: 1962     Quit date: 1992     Years since quittin.6    Smokeless tobacco: Former   Vaping Use    Vaping Use: Never used   Substance and Sexual Activity    Alcohol use: Yes     Alcohol/week: 0.6 oz     Types: 1 Glasses of wine per week     Comment: occ    Drug use: No    Sexual activity: Not on file   Other Topics Concern    Not on file   Social History Narrative    Not on file     Social Determinants of Health     Financial Resource Strain: Not on file   Food Insecurity: Not on file   Transportation Needs: No Transportation Needs (2023)    PRAPARE - Transportation     Lack of Transportation (Medical): No     Lack of Transportation (Non-Medical): No   Physical Activity: Inactive (2021)    Exercise Vital Sign     Days of Exercise per Week: 0 days     Minutes of Exercise per Session: 0 min   Stress: Stress Concern Present (2021)    [facility] " Occupational Health - Occupational Stress Questionnaire     Feeling of Stress : To some extent   Social Connections: Unknown (2/9/2021)    Social Connection and Isolation Panel [NHANES]     Frequency of Communication with Friends and Family: More than three times a week     Frequency of Social Gatherings with Friends and Family: Twice a week     Attends [employer] Services: 1 to 4 times per year     Active Member of Clubs or Organizations: Not on file     Attends Club or Organization Meetings: Not on file     Marital Status:    Intimate Partner Violence: Not on file   Housing Stability: Low Risk  (2/9/2021)    Housing Stability Vital Sign     Unable to Pay for Housing in the Last Year: No     Number of Places Lived in the Last Year: 1     Unstable Housing in the Last Year: No       Family History   Problem Relation Age of Onset    Heart Disease Neg Hx        Current Outpatient Medications on File Prior to Visit   Medication Sig Dispense Refill    metFORMIN ER (GLUCOPHAGE XR) 500 MG TABLET SR 24 HR Take 1 Tablet by mouth every day. 90 Tablet 3    amLODIPine (NORVASC) 10 MG Tab Take 1 Tablet by mouth every day. 30 Tablet 0    CALCIUM CITRATE-VITAMIN D3 PO Take 1 Tablet by mouth every day.      polyethylene glycol/lytes (MIRALAX) 17 g Pack Take 17 g by mouth every day.      acetaminophen (TYLENOL) 325 MG Tab Take 650 mg by mouth every four hours as needed for Fever. Indications: Pain      gabapentin (NEURONTIN) 100 MG Cap Take 1 Capsule by mouth at bedtime as needed (nerve pain). 90 Capsule 3    dexamethasone (DECADRON) 6 MG Tab Take 1 Tablet by mouth every day. 10 Tablet 0    FLUoxetine (PROZAC) 20 MG Cap Take 1 Capsule by mouth every day. STOP DULOXETINE 90 Capsule 3    Dexlansoprazole 60 MG CAPSULE DELAYED RELEASE delayed-release capsule Take 1 Capsule by mouth every evening. 90 Capsule 3    lovastatin (MEVACOR) 20 MG Tab Take 20 mg by mouth every evening.      Melatonin 5 MG Tab Take 5 mg by mouth at bedtime.  "      No current facility-administered medications on file prior to visit.       Patient has no known allergies.      ROS:   Review of Systems   Constitutional:  Negative for chills, diaphoresis, fever, malaise/fatigue and weight loss.   HENT:  Negative for congestion, ear discharge, ear pain, hearing loss, nosebleeds, sinus pain, sore throat and tinnitus.    Eyes:  Negative for blurred vision, double vision, photophobia, pain, discharge and redness.   Respiratory:  Positive for shortness of breath. Negative for cough, hemoptysis, sputum production, wheezing and stridor.    Cardiovascular:  Negative for chest pain, palpitations, orthopnea, claudication, leg swelling and PND.   Gastrointestinal:  Negative for abdominal pain, constipation, diarrhea, heartburn, nausea and vomiting.   Genitourinary:  Negative for dysuria and urgency.   Musculoskeletal:  Negative for back pain, falls, joint pain, myalgias and neck pain.   Skin:  Negative for itching and rash.   Neurological:  Negative for dizziness, tremors, speech change, focal weakness, weakness and headaches.   Endo/Heme/Allergies:  Negative for environmental allergies.   Psychiatric/Behavioral:  Negative for depression.        Pulse 82   Ht 1.6 m (5' 3\")   Wt 59 kg (130 lb)   SpO2 92%   Physical Exam  Constitutional:       General: She is not in acute distress.     Appearance: Normal appearance. She is well-developed and normal weight.   HENT:      Head: Normocephalic and atraumatic.      Right Ear: External ear normal.      Left Ear: External ear normal.      Nose: Nose normal. No congestion.      Mouth/Throat:      Mouth: Mucous membranes are moist.      Pharynx: Oropharynx is clear. No oropharyngeal exudate.   Eyes:      General: No scleral icterus.     Extraocular Movements: Extraocular movements intact.      Conjunctiva/sclera: Conjunctivae normal.      Pupils: Pupils are equal, round, and reactive to light.   Neck:      Vascular: No JVD.      Trachea: No " tracheal deviation.   Cardiovascular:      Rate and Rhythm: Normal rate and regular rhythm.      Heart sounds: Normal heart sounds. No murmur heard.     No friction rub. No gallop.   Pulmonary:      Effort: Pulmonary effort is normal. No accessory muscle usage or respiratory distress.      Breath sounds: No wheezing or rales.      Comments: Decreased air movement b/l throughout  Abdominal:      General: There is no distension.      Palpations: Abdomen is soft.      Tenderness: There is no abdominal tenderness.   Musculoskeletal:         General: No tenderness or deformity. Normal range of motion.      Cervical back: Normal range of motion and neck supple.      Right lower leg: Edema present.      Left lower leg: Edema present.   Lymphadenopathy:      Cervical: No cervical adenopathy.   Skin:     General: Skin is warm and dry.      Findings: No rash.      Nails: There is no clubbing.   Neurological:      Mental Status: She is alert and oriented to person, place, and time.      Cranial Nerves: No cranial nerve deficit.      Gait: Gait normal.   Psychiatric:         Behavior: Behavior normal.         PFTs as reviewed by me personally: as per hPI    Imaging as reviewed by me personally:  as per hPI    Assessment:  1. MONTIEL (dyspnea on exertion)  albuterol 108 (90 Base) MCG/ACT Aero Soln inhalation aerosol    Spacer/Aero-Holding Chambers Device      2. Restrictive lung disease        3. History of breast cancer        4. Chronic respiratory failure with hypoxia (HCC)  albuterol 108 (90 Base) MCG/ACT Aero Soln inhalation aerosol    Spacer/Aero-Holding Chambers Device          Plan:  This is chronic and progressive since we met but I do not have a solid pulmonary reason for her progressive MONTIEL other than weakness. I suspect she is quite weak and has had a series of hospital stays since last fall none of which she has been able to fully recover from. We opted to proceed with a trial of albuterol prn and she is starting PT/OT.  Consider pulmonary rehab after PT although she may not qualify due to recurrent fall.  Suspect this is mainly kyphosis and neuromuscular weakness. No crackles on exam or e/o ILD on imaging.   She has mild, focal fibrotic changes c/w hx of XRT  New but as per above, I do not feel she has fully recovered from any of her most recent hospital stays. Continue Oxygen at 2LPM with activity and at night which pt is compliant with and benefiting from.   Return in about 4 months (around 1/5/2024) for please put on cancellation list.

## 2023-09-05 NOTE — Clinical Note
Good afternoon, I saw Aleyda today. She is quite short of breath but I don't have a real explanation other than her kyphosis and general weakness. She was unable to complete full PFTs so we started as needed albuterol and will see her back after PT/OT. She had pretty significant edema today and I am not sure if she needs diuresis or perhaps side effect of amlodipine. I told her I would reach out.  Dr. Osorio

## 2023-09-08 DIAGNOSIS — I10 ESSENTIAL HYPERTENSION: ICD-10-CM

## 2023-09-11 RX ORDER — LISINOPRIL 5 MG/1
5 TABLET ORAL DAILY
Qty: 90 TABLET | Refills: 1 | Status: SHIPPED | OUTPATIENT
Start: 2023-09-11 | End: 2023-12-18 | Stop reason: SDUPTHER

## 2023-11-07 NOTE — TELEPHONE ENCOUNTER
Received request via: Pharmacy    Was the patient seen in the last year in this department? Yes    Does the patient have an active prescription (recently filled or refills available) for medication(s) requested? yes    Does the patient have [facility] and need 100 day supply (blood pressure, diabetes and cholesterol meds only)? Patient does not have SCP   Requested Prescriptions     Pending Prescriptions Disp Refills    Calcium Citrate-Vitamin D3 315-6.25 MG-MCG Tab       Sig: Take  by mouth every day.

## 2023-11-08 RX ORDER — THIAMINE HCL 100 MG
2 TABLET ORAL DAILY
Qty: 180 TABLET | Refills: 3 | Status: SHIPPED | OUTPATIENT
Start: 2023-11-08 | End: 2023-12-15 | Stop reason: SDUPTHER

## 2023-11-14 NOTE — ASSESSMENT & PLAN NOTE
Physical Therapy at [facility],   a part of [address] [place] [address] [address], [address]  Phone: [phone]  Fax: [phone]      PHYSICAL THERAPY PROGRESS NOTE  Patient Name:  Genet Meng :  1986   Treatment/Medical Diagnosis: Other low back pain [M54.59]   Referral Source:  Fortino Sifuentes MD     Date of Initial Visit:  23 Attended Visits:  38 Missed Visits:  1     SUMMARY OF TREATMENT/ASSESSMENT:  Overall, patient is making great gains with skilled physical therapy. He has returned to lifting at the gym and demonstrates significant improvement in FOTO score from initial evaluation. He has recently completed the return to run protocol and has been working to increase his run to jog ratio to fully return to job requirements without observed impairments that could affect the load on his lumbar spine. This is the current emphasis of his physical therapy with weekly progressions in interval training to allow for full return to PT testing for his current job. CURRENT STATUS  FOTO score: 75/100 (53)    Short Term Goals: To be accomplished in 8-10 treatments:  1) Pt will be able to perform bed mobility without report of increased pain. MET     Long Term Goals: To be accomplished in 16-20 treatments:  1) Pt will be able to squat with good form and without an increase in pain MET  2) Pt will be able to perform PT tests for /contractor requirements without increased pain. Progressing very well-has not attempted full run assessment but working towards it with interval training. Performed sit-ups and push-ups to max requirements. 3) Pt will be able to carry >/= 50 lbs without pain MET  4) Patient will be able to jog without increased pain.  MET      RECOMMENDATIONS  Patient would be appropriate to transition towards return to work assessment with continued progression for interval run training in order to maximize full return to all Subcutaneous tissue and muscle wasting with mild decreased  strength.  Patient has lost 20 pounds in the past year.    Nutrition consult

## 2023-11-17 ENCOUNTER — OFFICE VISIT (OUTPATIENT)
Dept: CARDIOLOGY | Facility: MEDICAL CENTER | Age: 83
End: 2023-11-17
Attending: INTERNAL MEDICINE
Payer: MEDICARE

## 2023-11-17 VITALS
RESPIRATION RATE: 16 BRPM | BODY MASS INDEX: 23.21 KG/M2 | WEIGHT: 131 LBS | HEART RATE: 71 BPM | OXYGEN SATURATION: 95 % | HEIGHT: 63 IN | SYSTOLIC BLOOD PRESSURE: 98 MMHG | DIASTOLIC BLOOD PRESSURE: 62 MMHG

## 2023-11-17 DIAGNOSIS — I05.0 MODERATE MITRAL STENOSIS: ICD-10-CM

## 2023-11-17 DIAGNOSIS — I95.1 ORTHOSTATIC SYNCOPE: ICD-10-CM

## 2023-11-17 DIAGNOSIS — R55 SYNCOPE, UNSPECIFIED SYNCOPE TYPE: ICD-10-CM

## 2023-11-17 DIAGNOSIS — R60.0 LEG EDEMA: ICD-10-CM

## 2023-11-17 PROCEDURE — 3074F SYST BP LT 130 MM HG: CPT | Performed by: INTERNAL MEDICINE

## 2023-11-17 PROCEDURE — 1170F FXNL STATUS ASSESSED: CPT | Performed by: INTERNAL MEDICINE

## 2023-11-17 PROCEDURE — 99213 OFFICE O/P EST LOW 20 MIN: CPT | Performed by: INTERNAL MEDICINE

## 2023-11-17 PROCEDURE — 99214 OFFICE O/P EST MOD 30 MIN: CPT | Performed by: INTERNAL MEDICINE

## 2023-11-17 PROCEDURE — 3078F DIAST BP <80 MM HG: CPT | Performed by: INTERNAL MEDICINE

## 2023-11-17 ASSESSMENT — ENCOUNTER SYMPTOMS
PALPITATIONS: 0
COUGH: 0
DIZZINESS: 0
SHORTNESS OF BREATH: 0
MYALGIAS: 0
LOSS OF CONSCIOUSNESS: 0

## 2023-11-17 ASSESSMENT — FIBROSIS 4 INDEX: FIB4 SCORE: 2.25

## 2023-11-17 NOTE — PROGRESS NOTES
Chief Complaint   Patient presents with    Other     F/V Dx: Moderate mitral stenosis       Subjective     Aleyda Parra is a 82 y.o. female who presents today for follow-up for cardiac care    Here accompanied by her daughter    Since 6/26/2023 appointment the patient was hospitalized for COVID-19 infection. She was rehospitalized 7/2023 after having a syncopal episode in the shower had some premonitory lightheadedness and feels she was in a blackout.  Diagnosed with COVID-19.  Apparently there was discrepancy between upper arm and leg blood pressures which I noted in my last note.  She has been on amlodipine and lisinopril. Had been referred to vascular surgeon for PVD apparently did not follow-up or was not scheduled.  No specific cardiac symptoms.  She is fairly limited as far as her activity.  Has some dependent LE edema left greater than right with recent normal noninvasive venous studies and had a negative chest CTA during her hospitalization.    Since 12/26/2022 appointment the patient hospitalized at [facility] 5/25/2023-5/31/2023 for GLF with small occipital hematoma.  Noted to have orthostatic hypotension.  There were BP discrepancies between upper arms and thighs.  BUN was elevated 23.  The patient has had a 20 pound weight loss.  Clinically improved with IV fluids and continuous oxygen.  Previously on only nocturnal O2.  EKG showed sinus rhythm with no conduction abnormalities.  Echocardiogram showed normal LVEF and moderate mitral stenosis.  No reported cardiac arrhythmias.  Discharged on amlodipine.  Has been referred to vascular surgery for PVD.  Since has been doing much better reaffirmed by her daughter who is present.  Had prior GLF's with evidence of significant azotemia.    Since 7/8/2021 appointment the patient has had no cardiac symptoms including chest pain, palpitations, shortness of breath.  Hospitalized [facility] 6/15/2022 for GLF with right ankle fracture requiring ORIF and right  "humeral injury.  Underwent rehab and is returned home.  Functionally impaired, requiring walker for ambulation.     The patient has medical problems including mild mitral stenosis, LV outflow gradient, COPD on nocturnal O2, prior tobacco use, labile hypertension, hypercholesterolemia my DM and prior orthopedic surgeries.    The patient smoked cigarettes 25 pack years, quit 27 years ago.  No history of TIA, stroke, seizures, DVT or pulmonary emboli.     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, estrogen receptor positive (HCC) 11/6/2019    Psychiatric problem 2015    anxiety    Shortness of breath     Snoring     no sleep study    Urinary incontinence     Wears glasses      Past Surgical History:   Procedure Laterality Date    ORIF, ANKLE Right 6/10/2022    Procedure: ORIF, ANKLE - TRIMALLEOLAR;  Surgeon: Anuj Simpson M.D.;  Location: SURGERY [facility];  Service: Orthopedics    VA TOTAL HIP ARTHROPLASTY Right 9/30/2021    Procedure: ARTHROPLASTY, HIP, TOTAL, ANTERIOR APPROACH;  Surgeon: Lewis Serrano M.D.;  Location: [facility];  Service: Orthopedics    WIDE EXCISION Right 8/16/2019    Procedure: WIDE EXCISION, LESION- RE-EXCISION FOR EXCISION FOR MARGINS RIGHT CHEST WALL INSTRMUSCULAR;  Surgeon: Andre Shelton M.D.;  Location: [facility];  Service: General    INCISION AND DRAINAGE GENERAL Bilateral 8/16/2019    Procedure: INCISION AND DRAINAGE- OF BILATERAL CHEST SEROMAS WITH DRAIN PLACEMENT;  Surgeon: Andre Shelton M.D.;  Location: [facility];  Service: General    PB MASTECTOMY, " MODIFIED RADICAL Right 7/17/2019    Procedure: MASTECTOMY, MODIFIED RADICAL;  Surgeon: Andre Shelton M.D.;  Location: [facility];  Service: General    NODE BIOPSY SENTINEL Right 7/17/2019    Procedure: INTRA OPERATIVE SENTINEL NODE IDENTIFICATION AND BIOPSY;  Surgeon: Andre Shelton M.D.;  Location: [facility]ERY [facility];  Service: General    AXILLARY NODE DISSECTION  7/17/2019    Procedure: LYMPHADENECTOMY, AXILLARY;  Surgeon: Andre Shelton M.D.;  Location: [facility];  Service: General    MASTECTOMY Left 7/17/2019    Procedure: TOTAL MASTECTOMY;  Surgeon: Andre Shelton M.D.;  Location: [facility];  Service: General    FLAP CLOSURE Bilateral 7/17/2019    Procedure: WIDE V FLAP;  Surgeon: Andre Shelton M.D.;  Location: [facility];  Service: General    LEFORT COLPOCLESIS  1/21/2019    Procedure: LEFORT COLPOCLESIS;  Surgeon: Minnie Bañuelos M.D.;  Location: [facility];  Service: Labor and Delivery    BLADDER SLING FEMALE  1/21/2019    Procedure: BLADDER SLING FEMALE- TOT;  Surgeon: Minnie Bañuelos M.D.;  Location: [facility];  Service: Labor and Delivery    KNEE ARTHROPLASTY TOTAL Right 11/2/2015    Procedure: KNEE ARTHROPLASTY TOTAL;  Surgeon: Venkatesh Cardoza M.D.;  Location: [facility];  Service:     ABDOMINAL HYSTERECTOMY TOTAL  1994    OTHER ORTHOPEDIC SURGERY  1993    L ankle    GYN SURGERY  1992    abdominal hysterectomy    CHOLECYSTECTOMY  1964    CATARACT EXTRACTION WITH IOL Bilateral      Family History   Problem Relation Age of Onset    Heart Disease Neg Hx      Social History     Socioeconomic History    Marital status:      Spouse name: Not on file    Number of children: Not on file    Years of education: Not on file    Highest education level: Some college, no degree   Occupational History    Not on file   Tobacco Use    Smoking status: Former     Current packs/day: 0.00     Average packs/day: 1  pack/day for 30.0 years (30.0 ttl pk-yrs)     Types: Cigarettes     Start date: 1962     Quit date: 1992     Years since quittin.8    Smokeless tobacco: Former   Vaping Use    Vaping Use: Never used   Substance and Sexual Activity    Alcohol use: Yes     Alcohol/week: 0.6 oz     Types: 1 Glasses of wine per week     Comment: occ    Drug use: No    Sexual activity: Not on file   Other Topics Concern    Not on file   Social History Narrative    Not on file     Social Determinants of Health     Financial Resource Strain: Not on file   Food Insecurity: Not on file   Transportation Needs: No Transportation Needs (2023)    PRAPARE - Transportation     Lack of Transportation (Medical): No     Lack of Transportation (Non-Medical): No   Physical Activity: Inactive (2021)    Exercise Vital Sign     Days of Exercise per Week: 0 days     Minutes of Exercise per Session: 0 min   Stress: Stress Concern Present (2021)    [employer] - Occupational Stress Questionnaire     Feeling of Stress : To some extent   Social Connections: Unknown (2021)    Social Connection and Isolation Panel [NHANES]     Frequency of Communication with Friends and Family: More than three times a week     Frequency of Social Gatherings with Friends and Family: Twice a week     Attends Mosque Services: 1 to 4 times per year     Active Member of Clubs or Organizations: Not on file     Attends Club or Organization Meetings: Not on file     Marital Status:    Intimate Partner Violence: Not on file   Housing Stability: Low Risk  (2021)    Housing Stability Vital Sign     Unable to Pay for Housing in the Last Year: No     Number of Places Lived in the Last Year: 1     Unstable Housing in the Last Year: No     No Known Allergies  Outpatient Encounter Medications as of 2023   Medication Sig Dispense Refill    Calcium Citrate-Vitamin D3 315-6.25 MG-MCG Tab Take 2 Tablets by mouth every  "day. 180 Tablet 3    lisinopril (PRINIVIL) 5 MG Tab Take 1 Tablet by mouth every day. 90 Tablet 1    albuterol 108 (90 Base) MCG/ACT Aero Soln inhalation aerosol Inhale 1-2 Puffs every four hours as needed for Shortness of Breath. 1 Each 6    Spacer/Aero-Holding Chambers Device 1 Device 2 times a day. 1 Each 0    metFORMIN ER (GLUCOPHAGE XR) 500 MG TABLET SR 24 HR Take 1 Tablet by mouth every day. 90 Tablet 3    amLODIPine (NORVASC) 10 MG Tab Take 1 Tablet by mouth every day. 30 Tablet 0    polyethylene glycol/lytes (MIRALAX) 17 g Pack Take 17 g by mouth every day.      acetaminophen (TYLENOL) 325 MG Tab Take 650 mg by mouth every four hours as needed for Fever. Indications: Pain      gabapentin (NEURONTIN) 100 MG Cap Take 1 Capsule by mouth at bedtime as needed (nerve pain). 90 Capsule 3    dexamethasone (DECADRON) 6 MG Tab Take 1 Tablet by mouth every day. 10 Tablet 0    FLUoxetine (PROZAC) 20 MG Cap Take 1 Capsule by mouth every day. STOP DULOXETINE 90 Capsule 3    Dexlansoprazole 60 MG CAPSULE DELAYED RELEASE delayed-release capsule Take 1 Capsule by mouth every evening. 90 Capsule 3    lovastatin (MEVACOR) 20 MG Tab Take 20 mg by mouth every evening.      Melatonin 5 MG Tab Take 5 mg by mouth at bedtime.       No facility-administered encounter medications on file as of 11/17/2023.     Review of Systems   Respiratory:  Negative for cough and shortness of breath.    Cardiovascular:  Negative for chest pain and palpitations.   Musculoskeletal:  Negative for myalgias.   Neurological:  Negative for dizziness and loss of consciousness.              Objective     BP 98/62 (BP Location: Left arm, Patient Position: Sitting, BP Cuff Size: Adult)   Pulse 71   Resp 16   Ht 1.6 m (5' 3\")   Wt 59.4 kg (131 lb)   LMP  (LMP Unknown)   SpO2 95%   BMI 23.21 kg/m²     Physical Exam  Vitals reviewed.   Constitutional:       General: She is not in acute distress.     Appearance: She is well-developed.      Comments: Frail.  " Uses a walker for ambulation assistance.  On continuous O2.   Eyes:      Conjunctiva/sclera: Conjunctivae normal.      Pupils: Pupils are equal, round, and reactive to light.   Neck:      Vascular: No JVD.   Cardiovascular:      Rate and Rhythm: Normal rate and regular rhythm.      Pulses:           Carotid pulses are 1+ on the right side and 1+ on the left side.       Radial pulses are 1+ on the right side and 1+ on the left side.      Heart sounds: Murmur heard.      No friction rub. No gallop.      Comments:    Pulmonary:      Effort: Pulmonary effort is normal. No accessory muscle usage or respiratory distress.      Breath sounds: Normal breath sounds. No wheezing or rales.   Chest:      Comments: Marked kyphosis.  Musculoskeletal:      Cervical back: Normal range of motion and neck supple.      Right lower leg: Edema present.      Left lower leg: Edema present.      Comments: Edema left greater than right  Diffuse muscle atrophy   Skin:     General: Skin is warm and dry.      Findings: No rash.      Nails: There is no clubbing.   Neurological:      Mental Status: She is alert and oriented to person, place, and time.      Coordination: Coordination normal.      Comments: Significant limited mobility.   Psychiatric:         Behavior: Behavior normal.              CHEST CT SCAN 6/6/2021  Hyperexpanded and emphysematous lungs.  Focal opacity posterior base right lower lobe consistent with atelectasis/possible pneumonia.  Interstitial opacities within the posterior aspects of the right upper lobe and right lower lobe which may be posttreatment changes.  Wedge compression T9 vertebral body similar to 5/13/2021 chest x-rays.  Atherosclerotic changes.    ECHOCARDIOGRAM 4/22/2021  No prior study is available for comparison.   Hyperdynamic left ventricular systolic function.  Left ventricular ejection fraction is visually estimated to be greater   than 75%.  Unable to estimate pulmonary artery pressure due to an  inadequate   tricuspid regurgitant jet.    ECHOCARDIOGRAM 5/26/2023  Hyperdynamic left ventricular systolic function.  The left ventricular ejection fraction is visually estimated to be   greater than 75%.  Severe mitral annular calcification.  Moderate mitral stenosis.  The right ventricle is normal in size and systolic function.  Moderately dilated left atrium.     PFTs 6/16/2021  Baseline spirometry shows low normal FVC at 1.96 L or 81% predicted and low normal FEV1 at 1.46 L or 80% predicted.  FEV1/FVC ratio is normal at 75.  Total lung capacity is also low normal at 3.93 L or 80% predicted.  Diffusion capacity is mildly reduced at 74% predicted.  Overall pulmonary function testing shows mild restrictive defect with mildly reduced DLCO.  This could be suggestive of early interstitial process.  Correlate clinically and with imaging.      Assessment & Plan     1. Moderate mitral stenosis        2. Syncope, unspecified syncope type  US-CAROTID DOPPLER BILAT      3. Orthostatic syncope        4. Leg edema            Medical Decision Making: Today's Assessment/Status/Plan:   Assessment  Mitral stenosis, moderate  Orthostatic hypotension and syncope.  Chronic hypoxic respiratory failure on continuous O2  General functional debility, multifactorial.  Severe kyphosis.  ?  Restrictive lung disease by PFTs.  Diabetes mellitus.  Dyslipidemia  Osteoarthritis, diffuse, bilateral hips.  S/P right total knee replacement.  S/P ORIF right ankle 6/2022  Breast cancer 2019, right side, bilateral mastectomy, radiation therapy, hormonal therapy with anastrozole.  PVD  LE edema, chronic     Recommendation Discussion  No additional recommendations regarding her valve disease.  We will schedule carotid ultrasound to rule out subclavian artery stenosis.  Her daughter will follow-up with the referral to vascular surgeon that has been ordered by Sukumar Foss MD, hospitalist in 5/2023 regarding subclavian artery stenoses.  For LE edema  would pursue conservative measures, elevation, support stockings, may be aggravated by amlodipine 10 mg daily  She will follow-up with with her PCP Tere RICKETTS for hypertension

## 2023-12-07 DIAGNOSIS — F33.1 MODERATE EPISODE OF RECURRENT MAJOR DEPRESSIVE DISORDER (HCC): ICD-10-CM

## 2023-12-07 NOTE — TELEPHONE ENCOUNTER
Received request via: Pharmacy    Was the patient seen in the last year in this department? Yes    Does the patient have an active prescription (recently filled or refills available) for medication(s) requested? yes    Does the patient have [facility] and need 100 day supply (blood pressure, diabetes and cholesterol meds only)? Patient does not have SCP   Requested Prescriptions     Pending Prescriptions Disp Refills    FLUoxetine (PROZAC) 20 MG Cap [Pharmacy Med Name: FLUOXETINE HCL 20 MG CAPSULE] 31 Capsule      Sig: TAKE 1 CAPSULE BY MOUTH DAILY

## 2023-12-11 RX ORDER — FLUOXETINE HYDROCHLORIDE 20 MG/1
20 CAPSULE ORAL DAILY
Qty: 90 CAPSULE | Refills: 3 | Status: SHIPPED | OUTPATIENT
Start: 2023-12-11 | End: 2023-12-15 | Stop reason: SDUPTHER

## 2023-12-15 ENCOUNTER — OFFICE VISIT (OUTPATIENT)
Dept: MEDICAL GROUP | Facility: MEDICAL CENTER | Age: 83
End: 2023-12-15
Payer: MEDICARE

## 2023-12-15 ENCOUNTER — HOSPITAL ENCOUNTER (OUTPATIENT)
Facility: MEDICAL CENTER | Age: 83
End: 2023-12-15
Attending: NURSE PRACTITIONER
Payer: MEDICARE

## 2023-12-15 VITALS
TEMPERATURE: 97 F | SYSTOLIC BLOOD PRESSURE: 106 MMHG | HEART RATE: 74 BPM | OXYGEN SATURATION: 95 % | DIASTOLIC BLOOD PRESSURE: 58 MMHG

## 2023-12-15 DIAGNOSIS — J96.21 ACUTE ON CHRONIC RESPIRATORY FAILURE WITH HYPOXIA (HCC): ICD-10-CM

## 2023-12-15 DIAGNOSIS — E11.41 TYPE 2 DIABETES MELLITUS WITH DIABETIC MONONEUROPATHY, WITHOUT LONG-TERM CURRENT USE OF INSULIN (HCC): ICD-10-CM

## 2023-12-15 DIAGNOSIS — F33.1 MODERATE EPISODE OF RECURRENT MAJOR DEPRESSIVE DISORDER (HCC): ICD-10-CM

## 2023-12-15 DIAGNOSIS — C50.911 PRIMARY MALIGNANT NEOPLASM OF RIGHT FEMALE BREAST (HCC): ICD-10-CM

## 2023-12-15 DIAGNOSIS — I10 ESSENTIAL HYPERTENSION: ICD-10-CM

## 2023-12-15 PROBLEM — S82.851A TRIMALLEOLAR FRACTURE OF ANKLE, CLOSED, RIGHT, INITIAL ENCOUNTER: Status: RESOLVED | Noted: 2022-06-10 | Resolved: 2023-12-15

## 2023-12-15 LAB
HBA1C MFR BLD: 6.4 % (ref ?–5.8)
POCT INT CON NEG: NEGATIVE
POCT INT CON POS: POSITIVE

## 2023-12-15 PROCEDURE — 3078F DIAST BP <80 MM HG: CPT | Performed by: NURSE PRACTITIONER

## 2023-12-15 PROCEDURE — 83036 HEMOGLOBIN GLYCOSYLATED A1C: CPT | Performed by: NURSE PRACTITIONER

## 2023-12-15 PROCEDURE — 82043 UR ALBUMIN QUANTITATIVE: CPT

## 2023-12-15 PROCEDURE — 82570 ASSAY OF URINE CREATININE: CPT

## 2023-12-15 PROCEDURE — 3074F SYST BP LT 130 MM HG: CPT | Performed by: NURSE PRACTITIONER

## 2023-12-15 PROCEDURE — 99214 OFFICE O/P EST MOD 30 MIN: CPT | Performed by: NURSE PRACTITIONER

## 2023-12-15 PROCEDURE — 1170F FXNL STATUS ASSESSED: CPT | Performed by: NURSE PRACTITIONER

## 2023-12-15 RX ORDER — THIAMINE HCL 100 MG
2 TABLET ORAL DAILY
Qty: 180 TABLET | Refills: 3 | Status: SHIPPED | OUTPATIENT
Start: 2023-12-15

## 2023-12-15 RX ORDER — ASPIRIN 325 MG
TABLET ORAL
Qty: 100 TABLET | Refills: 3 | Status: SHIPPED | OUTPATIENT
Start: 2023-12-15

## 2023-12-15 RX ORDER — FLUOXETINE HYDROCHLORIDE 40 MG/1
40 CAPSULE ORAL DAILY
Qty: 90 CAPSULE | Refills: 3 | Status: SHIPPED | OUTPATIENT
Start: 2023-12-15

## 2023-12-15 NOTE — ASSESSMENT & PLAN NOTE
Chronic. Ongoing for a few years. Getting short of breath with exertion. Sleeping 9+ hours nightly. Feels exhausted in the morning. No history of asthma. Smoked for over 30 years, quit in 1994.     Does snore when she sleeps. Does wake up with some mild headaches.     Currently on 3 liters oxygen continuously. Daughter and patient reports that her oxygen has been dipping down to 80. Unsure if this is during exertion.     Does have shortness of breath with exertion, unsure if this has worsened.

## 2023-12-15 NOTE — LETTER
STEVEN Zaman.  [place] [id number] [facility] [place] [address]  Phone: [phone] - Fax: [phone]        December 15, 2023       Patient: Rashmi Parra   YOB: 1940   Date of Visit: 12/15/2023         To Whom It May Concern:    In my medical opinion, I recommend the following for Rashmi Parra:    -Please administer 2 gummies of vitamin D and calcium supplement daily  -Ok for patient's family to supply a daily Multivitamin gummy  -Increasing Fluoxetine to 40 mg daily  -Ok for patient to keep her albuterol inhaler with spacer in her room to use as needed for shortness of breath.   -Ok to increase oxygen to 4-5 L as needed when she exerts herself or is doing PT/OT if oxygen drops below 90%.       If you have any questions or concerns, please don't hesitate to call [phone]          Sincerely,        DAYANA Zaman  Electronically Signed

## 2023-12-15 NOTE — ASSESSMENT & PLAN NOTE
Chronic. Taking lisinopril 5 mg daily and amlodipine 10 mg daily.  Blood pressure in office has been running 90s-low 100s/50s-60s.  Not checking blood pressure at home.  She is at risk for falls, using four-wheel walker.

## 2023-12-15 NOTE — LETTER
STEVEN Zaman.  [place] [id number] [facility] [place] [address]  Phone: [phone] - Fax: [phone]          December 15, 2023       Patient: Rashmi Parra   YOB: 1940   Date of Visit: 12/15/2023         To Whom It May Concern:    In my medical opinion, I recommend the following for Rashmi Parra:    -Please administer 2 gummies of vitamin D and calcium supplement daily  -Ok for patient's family to supply a daily Multivitamin gummy  -Increasing Fluoxetine to 40 mg daily  -Ok for patient to keep her albuterol inhaler with spacer in her room to use as needed for shortness of breath.   -Ok to increase oxygen to 4-5 L as needed when she exerts herself or is doing PT/OT if oxygen drops below 90%.   -Discontinue Amlodipine 10 mg daily, please check blood pressure twice weekly.       If you have any questions or concerns, please don't hesitate to call [phone]          Sincerely,          DAYANA Zaman  Electronically Signed

## 2023-12-16 LAB
CREAT UR-MCNC: 167.81 MG/DL
MICROALBUMIN UR-MCNC: 3.9 MG/DL
MICROALBUMIN/CREAT UR: 23 MG/G (ref 0–30)

## 2023-12-16 NOTE — PROGRESS NOTES
Subjective:   Rashmi Parra is a 83 y.o. female here today for follow up    Essential hypertension  Chronic. Taking lisinopril 5 mg daily and amlodipine 10 mg daily.  Blood pressure in office has been running 90s-low 100s/50s-60s.  Not checking blood pressure at home.  She is at risk for falls, using four-wheel walker.    Acute on chronic respiratory failure with hypoxia (HCC)  Chronic. Ongoing for a few years. Getting short of breath with exertion. Sleeping 9+ hours nightly. Feels exhausted in the morning. No history of asthma. Smoked for over 30 years, quit in 1994.     Does snore when she sleeps. Does wake up with some mild headaches.     Currently on 3 liters oxygen continuously. Daughter and patient reports that her oxygen has been dipping down to 80. Unsure if this is during exertion.     Does have shortness of breath with exertion, unsure if this has worsened.     Type 2 diabetes mellitus with diabetic mononeuropathy, without long-term current use of insulin (HCC)  Chronic.  Stable on metformin  mg once daily. A1c today 6.4. She is wondering if she needs to continue metformin or not. No reported s/e from this. She isn't sure if she is being given this medication at her assisted living facility or not.      Current medicines (including changes today)  Current Outpatient Medications   Medication Sig Dispense Refill    Calcium Citrate-Vitamin D3 315-6.25 MG-MCG Tab Take 2 Tablets by mouth every day. 180 Tablet 3    Multiple Vitamins-Minerals (MULTIVITAMIN GUMMIES ADULT) Chew Tab Take 1 serving chewable daily. Patient's family to supply preferred MVI. 100 Tablet 3    FLUoxetine (PROZAC) 40 MG capsule Take 1 Capsule by mouth every day. 90 Capsule 3    lisinopril (PRINIVIL) 5 MG Tab Take 1 Tablet by mouth every day. 90 Tablet 1    albuterol 108 (90 Base) MCG/ACT Aero Soln inhalation aerosol Inhale 1-2 Puffs every four hours as needed for Shortness of Breath. 1 Each 6    Spacer/Aero-Holding Chambers Device  1 Device 2 times a day. 1 Each 0    metFORMIN ER (GLUCOPHAGE XR) 500 MG TABLET SR 24 HR Take 1 Tablet by mouth every day. 90 Tablet 3    amLODIPine (NORVASC) 10 MG Tab Take 1 Tablet by mouth every day. 30 Tablet 0    polyethylene glycol/lytes (MIRALAX) 17 g Pack Take 17 g by mouth every day.      acetaminophen (TYLENOL) 325 MG Tab Take 650 mg by mouth every four hours as needed for Fever. Indications: Pain      gabapentin (NEURONTIN) 100 MG Cap Take 1 Capsule by mouth at bedtime as needed (nerve pain). 90 Capsule 3    dexamethasone (DECADRON) 6 MG Tab Take 1 Tablet by mouth every day. 10 Tablet 0    Dexlansoprazole 60 MG CAPSULE DELAYED RELEASE delayed-release capsule Take 1 Capsule by mouth every evening. 90 Capsule 3    lovastatin (MEVACOR) 20 MG Tab Take 20 mg by mouth every evening.      Melatonin 5 MG Tab Take 5 mg by mouth at bedtime.       No current facility-administered medications for this visit.     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    ROS  No chest pain, no shortness of breath, no abdominal pain  Positive ROS as per HPI.  All other systems reviewed and are negative.     Objective:     /58   Pulse 74   Temp 36.1 °C (97 °F) (Temporal)   SpO2 95%  There is no height or weight on file to calculate BMI.     Physical Exam:  Constitutional: Alert, no distress.  Skin: Warm, dry, good turgor, no rashes in visible areas.  Eye: Equal, round and reactive, conjunctiva clear, lids normal.  ENMT: Lips without lesions, good dentition  Respiratory: Unlabored respiratory effort, oxygen in  place  Psych: Alert and oriented x3, flat affect and mood.      Assessment and Plan:   The following treatment plan was discussed    1. Type 2 diabetes mellitus with diabetic mononeuropathy, without long-term current use of insulin (HCC)  Stable on current medication   Discussed stopping metformin with her, she is first going to find out if she is being given medication.  We will plan to follow-up on this at her next visit  - POCT Hemoglobin A1C  - MICROALBUMIN CREAT RATIO URINE; Future    2. Moderate episode of recurrent major depressive disorder (HCC)  Unstable  Increase fluoxetine to 40 mg daily  - FLUoxetine (PROZAC) 40 MG capsule; Take 1 Capsule by mouth every day.  Dispense: 90 Capsule; Refill: 3    3. Essential hypertension  Unstable, overtreated  Continue lisinopril 5 mg daily  Stop amlodipine 10 mg daily  Letter written for patient to take to her assisted living advising them to check her blood pressure twice weekly if possible.  Daughter will also remind her to check her blood pressure at home.    4. Acute on chronic respiratory failure with hypoxia (HCC)  Unstable, having some exertional hypoxia  Order given to increase supplemental oxygen to 4 to 5 L during exertion and PT/OT if needed  Unsure if she has tried using her albuterol inhaler prescribed by pulmonology, advised her to use this prior to exertion and as needed to see if this is helpful for her shortness of breath.    5. Primary malignant neoplasm of right female breast (HCC)  Stable, continue follow-up with [facility] as directed      Followup: Return in about 3 months (around 3/15/2024) for Diabetes, Hypertension, Depression/Anxiety.    The MA is performing the above orders under the direction of Dr. Mandujano    Please note that this dictation was created using voice recognition software. I have made every reasonable attempt to correct obvious errors, but I expect that there are errors of grammar and possibly content that I did not  discover before finalizing the note.

## 2023-12-16 NOTE — ASSESSMENT & PLAN NOTE
Chronic.  Stable on metformin  mg once daily. A1c today 6.4. She is wondering if she needs to continue metformin or not. No reported s/e from this. She isn't sure if she is being given this medication at her assisted living facility or not.

## 2023-12-18 DIAGNOSIS — I10 ESSENTIAL HYPERTENSION: ICD-10-CM

## 2023-12-20 RX ORDER — LISINOPRIL 5 MG/1
5 TABLET ORAL DAILY
Qty: 90 TABLET | Refills: 1 | Status: SHIPPED | OUTPATIENT
Start: 2023-12-20 | End: 2024-01-03 | Stop reason: SDUPTHER

## 2024-01-03 ENCOUNTER — TELEPHONE (OUTPATIENT)
Dept: MEDICAL GROUP | Facility: MEDICAL CENTER | Age: 84
End: 2024-01-03
Payer: MEDICARE

## 2024-01-03 DIAGNOSIS — E78.49 OTHER HYPERLIPIDEMIA: ICD-10-CM

## 2024-01-03 DIAGNOSIS — I10 ESSENTIAL HYPERTENSION: ICD-10-CM

## 2024-01-03 RX ORDER — LOVASTATIN 20 MG/1
20 TABLET ORAL NIGHTLY
Qty: 90 TABLET | Refills: 3 | Status: SHIPPED | OUTPATIENT
Start: 2024-01-03

## 2024-01-03 RX ORDER — LISINOPRIL 5 MG/1
5 TABLET ORAL DAILY
Qty: 90 TABLET | Refills: 3 | Status: SHIPPED | OUTPATIENT
Start: 2024-01-03

## 2024-01-23 ENCOUNTER — HOSPITAL ENCOUNTER (OUTPATIENT)
Dept: RADIOLOGY | Facility: MEDICAL CENTER | Age: 84
End: 2024-01-23
Attending: INTERNAL MEDICINE
Payer: MEDICARE

## 2024-01-23 DIAGNOSIS — R55 SYNCOPE, UNSPECIFIED SYNCOPE TYPE: ICD-10-CM

## 2024-01-23 PROCEDURE — 93880 EXTRACRANIAL BILAT STUDY: CPT

## 2024-01-24 ENCOUNTER — TELEPHONE (OUTPATIENT)
Dept: CARDIOLOGY | Facility: MEDICAL CENTER | Age: 84
End: 2024-01-24

## 2024-01-25 NOTE — TELEPHONE ENCOUNTER
S/W pt's daughter, Donna. We reviewed SW feedback on carotid US results and informed no changes from cardiac standpoint while waiting to see vascular surgeon. FYI to SW to ensure he is aware vascular surgeon appt scheduled for 2/12/24.    Nothing needed.

## 2024-01-25 NOTE — TELEPHONE ENCOUNTER
SRUTHI    Caller: Maggie (daughter)    Topic/issue: Pt had imaging done and made an appt with Dr. Foote 02/12/2024. She is wondering if there was anything else needed or if you need to see her before that appt with the Vascular Provider?     Callback Number: PH  [phone]    Thank You  Donna BARRY

## 2024-01-25 NOTE — RESULT ENCOUNTER NOTE
"Please notify Rashmi that I tried to call the patient about her abnormal carotid ultrasound results  No answer No voicemail  She should see the vascular surgeon about these findings as was indicated in my last PN 11/17/2023-see below  I don't now if this was done or not  Please find out from the patient or her daughter    \"Her daughter will follow-up with the referral to vascular surgeon that has been ordered by Sukumar Foss MD, hospitalist in 5/2023 regarding subclavian artery stenoses\"    Let me know if there are any questions"

## 2024-01-29 ENCOUNTER — APPOINTMENT (RX ONLY)
Dept: URBAN - METROPOLITAN AREA CLINIC 35 | Facility: CLINIC | Age: 84
Setting detail: DERMATOLOGY
End: 2024-01-29

## 2024-01-29 DIAGNOSIS — L82.1 OTHER SEBORRHEIC KERATOSIS: ICD-10-CM

## 2024-01-29 DIAGNOSIS — L90.5 SCAR CONDITIONS AND FIBROSIS OF SKIN: ICD-10-CM

## 2024-01-29 DIAGNOSIS — Z85.828 PERSONAL HISTORY OF OTHER MALIGNANT NEOPLASM OF SKIN: ICD-10-CM

## 2024-01-29 DIAGNOSIS — D22 MELANOCYTIC NEVI: ICD-10-CM

## 2024-01-29 DIAGNOSIS — D18.0 HEMANGIOMA: ICD-10-CM

## 2024-01-29 DIAGNOSIS — L21.8 OTHER SEBORRHEIC DERMATITIS: ICD-10-CM

## 2024-01-29 DIAGNOSIS — L81.4 OTHER MELANIN HYPERPIGMENTATION: ICD-10-CM

## 2024-01-29 DIAGNOSIS — Z71.89 OTHER SPECIFIED COUNSELING: ICD-10-CM

## 2024-01-29 PROBLEM — D18.01 HEMANGIOMA OF SKIN AND SUBCUTANEOUS TISSUE: Status: ACTIVE | Noted: 2024-01-29

## 2024-01-29 PROBLEM — D22.5 MELANOCYTIC NEVI OF TRUNK: Status: ACTIVE | Noted: 2024-01-29

## 2024-01-29 PROCEDURE — ? COUNSELING

## 2024-01-29 PROCEDURE — ? SUNSCREEN RECOMMENDATIONS

## 2024-01-29 PROCEDURE — 99213 OFFICE O/P EST LOW 20 MIN: CPT

## 2024-01-29 PROCEDURE — ? OBSERVATION AND MEASURE

## 2024-01-29 PROCEDURE — ? TREATMENT REGIMEN

## 2024-01-29 ASSESSMENT — LOCATION ZONE DERM
LOCATION ZONE: LEG
LOCATION ZONE: TRUNK
LOCATION ZONE: ARM
LOCATION ZONE: FACE
LOCATION ZONE: NOSE

## 2024-01-29 ASSESSMENT — LOCATION DETAILED DESCRIPTION DERM
LOCATION DETAILED: RIGHT SUPERIOR UPPER BACK
LOCATION DETAILED: LEFT INFERIOR LATERAL FOREHEAD
LOCATION DETAILED: LEFT PROXIMAL DORSAL FOREARM
LOCATION DETAILED: LEFT ANTERIOR PROXIMAL UPPER ARM
LOCATION DETAILED: LEFT MID-UPPER BACK
LOCATION DETAILED: NASAL DORSUM
LOCATION DETAILED: LEFT ANTERIOR DISTAL THIGH
LOCATION DETAILED: RIGHT SUPERIOR MEDIAL UPPER BACK

## 2024-01-29 ASSESSMENT — LOCATION SIMPLE DESCRIPTION DERM
LOCATION SIMPLE: LEFT THIGH
LOCATION SIMPLE: LEFT UPPER BACK
LOCATION SIMPLE: LEFT FOREARM
LOCATION SIMPLE: LEFT UPPER ARM
LOCATION SIMPLE: RIGHT UPPER BACK
LOCATION SIMPLE: LEFT FOREHEAD
LOCATION SIMPLE: NOSE

## 2024-01-29 NOTE — PROCEDURE: MIPS QUALITY
Quality 402: Tobacco Use And Help With Quitting Among Adolescents: Patient screened for tobacco and is an ex-smoker
Detail Level: Detailed
Quality 111:Pneumonia Vaccination Status For Older Adults: Patient received any pneumococcal conjugate or polysaccharide vaccine on or after their 60th birthday and before the end of the measurement period
Quality 226: Preventive Care And Screening: Tobacco Use: Screening And Cessation Intervention: Patient screened for tobacco use and is an ex/non-smoker
Quality 130: Documentation Of Current Medications In The Medical Record: Current Medications Documented

## 2024-01-29 NOTE — PROCEDURE: OBSERVATION
Detail Level: Detailed
Size Of Lesion: 1cm x 2 cm tan patch
Size Of Lesion: 3mm flesh toned papule along a well healed hypopigmented linear scar

## 2024-01-31 RX ORDER — MELATONIN 5 MG
5 TABLET ORAL
Qty: 90 TABLET | Refills: 3 | Status: SHIPPED | OUTPATIENT
Start: 2024-01-31

## 2024-01-31 NOTE — TELEPHONE ENCOUNTER
Received request via: Pharmacy    Was the patient seen in the last year in this department? Yes    Does the patient have an active prescription (recently filled or refills available) for medication(s) requested? No    [facility]    Does the patient have assisted Plus and need 100 day supply (blood pressure, diabetes and cholesterol meds only)? Patient does not have SCP

## 2024-02-12 ENCOUNTER — TELEPHONE (OUTPATIENT)
Dept: CARDIOLOGY | Facility: MEDICAL CENTER | Age: 84
End: 2024-02-12

## 2024-02-12 ENCOUNTER — OFFICE VISIT (OUTPATIENT)
Dept: MEDICAL GROUP | Facility: PHYSICIAN GROUP | Age: 84
End: 2024-02-12
Payer: MEDICARE

## 2024-02-12 DIAGNOSIS — I73.9 PAD (PERIPHERAL ARTERY DISEASE) (HCC): ICD-10-CM

## 2024-02-12 PROCEDURE — 99999 PR NO CHARGE: CPT | Performed by: INTERNAL MEDICINE

## 2024-02-12 NOTE — TELEPHONE ENCOUNTER
SRUTHI  Caller: Mariela - Daughter    Topic/issue: Patient had an ultrasound done and followed up with a vascular doctor. Patient stated that this was the wrong follow up doctor. Patient's daughter would like a call back to go over the results and to discuss follow up care.    Callback Number: [phone]     Thank you,  Harleen RODRIGUEZ

## 2024-02-12 NOTE — PROGRESS NOTES
This patient with peripheral artery disease.  Pt and family made appt thinking that they are being evaluated by VASCULAR SURGEON today    Appt has been cancelled. Pt will reschedule her appt to see vascular surgery.

## 2024-02-13 NOTE — TELEPHONE ENCOUNTER
Phone Number Called: [phone]    Call outcome: Spoke to patient regarding message below.    Message: Called to discuss appointments. She stated they messed up the appointment but got scheduled for vascular appointment on 02/15/24. She would like to discuss with SW about results and questions they should be asking. Advised will send SW a message and see if he can contact her today. Answered all questions and concerns, appreciative of call.     SW: Please contact patient daughter Mariela at [phone] to discuss results and vascular appointment scheduled on 02/15/24.

## 2024-02-15 ENCOUNTER — OFFICE VISIT (OUTPATIENT)
Dept: VASCULAR SURGERY | Facility: MEDICAL CENTER | Age: 84
End: 2024-02-15
Payer: MEDICARE

## 2024-02-15 VITALS
SYSTOLIC BLOOD PRESSURE: 112 MMHG | WEIGHT: 131 LBS | HEART RATE: 70 BPM | OXYGEN SATURATION: 97 % | BODY MASS INDEX: 23.21 KG/M2 | DIASTOLIC BLOOD PRESSURE: 58 MMHG | TEMPERATURE: 98.2 F | HEIGHT: 63 IN

## 2024-02-15 DIAGNOSIS — I77.1 INNOMINATE ARTERY STENOSIS (HCC): ICD-10-CM

## 2024-02-15 DIAGNOSIS — I10 PRIMARY HYPERTENSION: ICD-10-CM

## 2024-02-15 DIAGNOSIS — J96.11 CHRONIC HYPOXIC RESPIRATORY FAILURE (HCC): ICD-10-CM

## 2024-02-15 DIAGNOSIS — I77.1 STENOSIS OF LEFT SUBCLAVIAN ARTERY (HCC): ICD-10-CM

## 2024-02-15 DIAGNOSIS — E78.5 DYSLIPIDEMIA: ICD-10-CM

## 2024-02-15 DIAGNOSIS — I73.9 PERIPHERAL ARTERIAL DISEASE (HCC): ICD-10-CM

## 2024-02-15 PROCEDURE — 1170F FXNL STATUS ASSESSED: CPT | Performed by: SURGERY

## 2024-02-15 PROCEDURE — 3078F DIAST BP <80 MM HG: CPT | Performed by: SURGERY

## 2024-02-15 PROCEDURE — 3074F SYST BP LT 130 MM HG: CPT | Performed by: SURGERY

## 2024-02-15 PROCEDURE — 99204 OFFICE O/P NEW MOD 45 MIN: CPT | Performed by: SURGERY

## 2024-02-15 RX ORDER — ASPIRIN 81 MG/1
81 TABLET ORAL DAILY
Qty: 100 TABLET | Refills: 1 | Status: SHIPPED | OUTPATIENT
Start: 2024-02-15 | End: 2024-02-23 | Stop reason: SDUPTHER

## 2024-02-15 ASSESSMENT — FIBROSIS 4 INDEX: FIB4 SCORE: 2.25

## 2024-02-16 NOTE — PROGRESS NOTES
"  VASCULAR SURGERY SERVICE  CONSULT NOTE      Date: 2/15/2024    Referring Provider: Sukumar Foss MD    Consulting Physician: Roger Foote MD - [facility]     -------------------------------------------------------------------------------------------------    Reason for consultation:  Peripheral arterial disease.    HPI:  This is a 83 y.o. female with multiple medical problem including chronic hypoxic respiratory failure requiring 3 L oxygen who was admitted to [facility] back in May 2022 3 after she sustained a fall.  CTA of the complete aorta was done and showed approximately 50% stenosis of bilateral common iliac arteries.  Patient was eventually discharged home and was told to follow-up with vascular service.  Recent carotid duplex done for \"cardiovascular disorder\" showed reversal of flow in the right carotid arterial system as well as in the right vertebra artery, no significant internal carotid artery stenosis on either side, left subclavian stenosis, and bidirectional flow in the right vertebral artery.  Patient denies any neurological symptoms.  She had chronic joint pains in the upper extremity as well as in the left leg.  She is on statin but not Aspirin.      Past Medical History:   Diagnosis Date    Arthritis     osteo, generalized    Bowel habit changes     constipation    Breath shortness     Cataract     removed bilat    COPD (chronic obstructive pulmonary disease) ([facility])     Diabetes ([facility])     \"pre\", oral meds    Heart burn     Heart murmur     Hiatus hernia syndrome     High cholesterol     Hypertension     Indigestion     Malignant neoplasm of overlapping sites of breast in female, estrogen receptor positive ([facility]) 11/6/2019    Psychiatric problem 2015    anxiety    Shortness of breath     Snoring     no sleep study    Urinary incontinence     Wears glasses        Past Surgical History:   Procedure Laterality Date    ORIF, ANKLE Right 6/10/2022    Procedure: ORIF, ANKLE - " TRIMALLEOLAR;  Surgeon: Anuj Simpson M.D.;  Location: [facility];  Service: Orthopedics    PA TOTAL HIP ARTHROPLASTY Right 9/30/2021    Procedure: ARTHROPLASTY, HIP, TOTAL, ANTERIOR APPROACH;  Surgeon: Lewis Serrano M.D.;  Location: [facility];  Service: Orthopedics    WIDE EXCISION Right 8/16/2019    Procedure: WIDE EXCISION, LESION- RE-EXCISION FOR EXCISION FOR MARGINS RIGHT CHEST WALL INSTRMUSCULAR;  Surgeon: Andre Shelton M.D.;  Location: [facility];  Service: General    INCISION AND DRAINAGE GENERAL Bilateral 8/16/2019    Procedure: INCISION AND DRAINAGE- OF BILATERAL CHEST SEROMAS WITH DRAIN PLACEMENT;  Surgeon: Andre Shelton M.D.;  Location: [facility];  Service: General    PB MASTECTOMY, MODIFIED RADICAL Right 7/17/2019    Procedure: MASTECTOMY, MODIFIED RADICAL;  Surgeon: Andre Shelton M.D.;  Location: [facility];  Service: General    NODE BIOPSY SENTINEL Right 7/17/2019    Procedure: INTRA OPERATIVE SENTINEL NODE IDENTIFICATION AND BIOPSY;  Surgeon: Andre Shelton M.D.;  Location: [facility];  Service: General    AXILLARY NODE DISSECTION  7/17/2019    Procedure: LYMPHADENECTOMY, AXILLARY;  Surgeon: Andre Shelton M.D.;  Location: [facility];  Service: General    MASTECTOMY Left 7/17/2019    Procedure: TOTAL MASTECTOMY;  Surgeon: Andre Shelton M.D.;  Location: [facility];  Service: General    FLAP CLOSURE Bilateral 7/17/2019    Procedure: WIDE V FLAP;  Surgeon: Andre Shelton M.D.;  Location: [facility];  Service: General    LEFORT COLPOCLESIS  1/21/2019    Procedure: LEFORT COLPOCLESIS;  Surgeon: Minnie Bañuelos M.D.;  Location: [facility];  Service: Labor and Delivery    BLADDER SLING FEMALE  1/21/2019    Procedure: BLADDER SLING FEMALE- TOT;  Surgeon: Minnie Bañuelos M.D.;  Location: [facility];  Service: Labor and Delivery    KNEE ARTHROPLASTY TOTAL  Right 11/2/2015    Procedure: KNEE ARTHROPLASTY TOTAL;  Surgeon: Venkatesh Cardoza M.D.;  Location: [facility];  Service:     ABDOMINAL HYSTERECTOMY TOTAL  1994    OTHER ORTHOPEDIC SURGERY  1993    L ankle    GYN SURGERY  1992    abdominal hysterectomy    CHOLECYSTECTOMY  1964    CATARACT EXTRACTION WITH IOL Bilateral        Current Outpatient Medications   Medication Sig Dispense Refill    aspirin 81 MG EC tablet Take 1 Tablet by mouth every day. 100 Tablet 1    melatonin (MELATONIN MAXIMUM STRENGTH) 5 mg Tab TAKE 1 TABLET BY MOUTH DAILY AT BEDTIME 90 Tablet 3    lovastatin (MEVACOR) 20 MG Tab Take 1 Tablet by mouth every evening. 90 Tablet 3    lisinopril (PRINIVIL) 5 MG Tab Take 1 Tablet by mouth every day. 90 Tablet 3    Calcium Citrate-Vitamin D3 315-6.25 MG-MCG Tab Take 2 Tablets by mouth every day. 180 Tablet 3    Multiple Vitamins-Minerals (MULTIVITAMIN GUMMIES ADULT) Chew Tab Take 1 serving chewable daily. Patient's family to supply preferred MVI. 100 Tablet 3    FLUoxetine (PROZAC) 40 MG capsule Take 1 Capsule by mouth every day. 90 Capsule 3    albuterol 108 (90 Base) MCG/ACT Aero Soln inhalation aerosol Inhale 1-2 Puffs every four hours as needed for Shortness of Breath. 1 Each 6    Spacer/Aero-Holding Chambers Device 1 Device 2 times a day. 1 Each 0    polyethylene glycol/lytes (MIRALAX) 17 g Pack Take 17 g by mouth every day.      acetaminophen (TYLENOL) 325 MG Tab Take 650 mg by mouth every four hours as needed for Fever. Indications: Pain      gabapentin (NEURONTIN) 100 MG Cap Take 1 Capsule by mouth at bedtime as needed (nerve pain). 90 Capsule 3    dexamethasone (DECADRON) 6 MG Tab Take 1 Tablet by mouth every day. 10 Tablet 0    Dexlansoprazole 60 MG CAPSULE DELAYED RELEASE delayed-release capsule Take 1 Capsule by mouth every evening. 90 Capsule 3    metFORMIN ER (GLUCOPHAGE XR) 500 MG TABLET SR 24 HR Take 1 Tablet by mouth every day. 90 Tablet 3     No current facility-administered  medications for this visit.       Social History     Socioeconomic History    Marital status:      Spouse name: Not on file    Number of children: Not on file    Years of education: Not on file    Highest education level: Some college, no degree   Occupational History    Not on file   Tobacco Use    Smoking status: Former     Current packs/day: 0.00     Average packs/day: 1 pack/day for 30.0 years (30.0 ttl pk-yrs)     Types: Cigarettes     Start date: 1962     Quit date: 1992     Years since quittin.1    Smokeless tobacco: Former   Vaping Use    Vaping Use: Never used   Substance and Sexual Activity    Alcohol use: Yes     Alcohol/week: 0.6 oz     Types: 1 Glasses of wine per week     Comment: occ    Drug use: No    Sexual activity: Not on file   Other Topics Concern    Not on file   Social History Narrative    Not on file     Social Determinants of Health     Financial Resource Strain: Not on file   Food Insecurity: Not on file   Transportation Needs: No Transportation Needs (2023)    PRAPARE - Transportation     Lack of Transportation (Medical): No     Lack of Transportation (Non-Medical): No   Physical Activity: Inactive (2021)    Exercise Vital Sign     Days of Exercise per Week: 0 days     Minutes of Exercise per Session: 0 min   Stress: Stress Concern Present (2021)    [employer] [employer] - Occupational Stress Questionnaire     Feeling of Stress : To some extent   Social Connections: Unknown (2021)    Social Connection and Isolation Panel [NHANES]     Frequency of Communication with Friends and Family: More than three times a week     Frequency of Social Gatherings with Friends and Family: Twice a week     Attends [employer] Services: 1 to 4 times per year     Active Member of Clubs or Organizations: Not on file     Attends Club or Organization Meetings: Not on file     Marital Status:    Intimate Partner Violence: Not on file   Housing  "Stability: Low Risk  (2/9/2021)    Housing Stability Vital Sign     Unable to Pay for Housing in the Last Year: No     Number of Places Lived in the Last Year: 1     Unstable Housing in the Last Year: No       Family History   Problem Relation Age of Onset    Heart Disease Neg Hx        Allergies:  Patient has no known allergies.    Review of Systems:    Constitutional: Negative for fever, chills, weight loss,   HENT:               Negative for hearing loss or tinnitus    Eyes:    Negative for blurred vision, double vision, or loss of vision  Respiratory:  Positive for shortness of breath on exertion   Cardiac:  Negative for chest pain or palpitations or orthopnea  Vascular:  Negative for claudication or rest pain   Gastrointestinal: Negative for nausea, vomiting, or abdominal pain     Negative for hematochezia or melena   Genitourinary: Negative for dysuria, frequency, or hematuria   Musculoskeletal: Positive for multiple joint pain  Skin:   Negative for itching or rash  Neurological:  Negative for dizziness, headaches, or tremors     Negative for speech disturbance     Negative for extremity weakness or paresthesias  Endo/Heme:  Negative for easy bruising or bleeding  Psychiatric:  Negative for depression, suicidal ideas, or hallucinations    Physical Exam:  /58 (BP Location: Left arm, Patient Position: Sitting, BP Cuff Size: Adult)   Pulse 70   Temp 36.8 °C (98.2 °F) (Temporal)   Ht 1.6 m (5' 3\")   Wt 59.4 kg (131 lb)   SpO2 97%     Constitutional: Alert, oriented, no acute distress  HEENT:  Normocephalic and atraumatic, EOMI  Neck:   Supple, no JVD  Cardiovascular: Regular rate and rhythm  Pulmonary:  Good air entry bilaterally  Abdominal:  Soft, non-tender, non-distended     Aortic impulse not widened  Musculoskeletal: No edema, no tenderness  Neurological:  CN II-XII grossly intact, no focal deficits  Skin:   Skin is warm and dry. No rash noted.  Psychiatric:  Normal mood and affect.  Upper " extremities:    Radial pulses are not palpable on either side.  Lower extremities:    1+ bilateral edema.  Triphasic Doppler flow signals are obtained over bilateral pedal arteries.  No ulceration.  Darkening of the bilateral forefeet which resolves with elevation of the legs.    Labs:  Reviewed.    Radiology:  CTA images and carotid duplex images were reviewed.    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  -Left subclavian artery stenosis.  -Chronic hypoxic respiratory failure on oxygen.  -Dyslipidemia.  -Hypertension.    Plan:  I had a long discussion with patient and her family members.  Study results and CT images were reviewed with them.    With regard to her lower extremity arterial circulation, her circulation is normal on exam and the 50% stenosis of the iliac arteries is not considered to be significant since it is less than 75%.    With regard to the abnormal carotid duplex, a possible solution for her innominate stenosis would be stenting; however, with patient being 83 years of age and the lesion is heavily calcified, there is a risk of arterial rupture with stenting and if this happened would lead to patient's demise.  Patient is also not a candidate for bypass secondary to her chronic hypoxic respiratory failure as well as her advanced age.  I therefore recommended against invasive intervention.  Patient and her family member indicated understanding and agreed with plan.  I advised patient to have her blood pressure checked in either thigh as the blood pressure in the either arm would be falsely low secondary to innominate and left subclavian arterial stenosis.  Patient and her family member indicated understanding.    Since patient has extensive vascular disease, she would benefit from being on Aspirin 81 mg once a day.  Patient and her family member agreed.  I prescribed her Aspirin 81 mg once a day.    I will see patient back as needed.  All questions were  answered.      Roger Foote MD  [facility]   [place] preferred or call my office [phone]  __________________________________________________________________  Patient:Rashmi Parra   MRN:[id number]   CSN:[id number]

## 2024-02-23 ENCOUNTER — TELEPHONE (OUTPATIENT)
Dept: MEDICAL GROUP | Facility: MEDICAL CENTER | Age: 84
End: 2024-02-23
Payer: MEDICARE

## 2024-02-23 DIAGNOSIS — K22.719 BARRETT'S ESOPHAGUS WITH DYSPLASIA: ICD-10-CM

## 2024-02-23 DIAGNOSIS — I73.9 PERIPHERAL ARTERIAL DISEASE (HCC): ICD-10-CM

## 2024-02-23 RX ORDER — DEXLANSOPRAZOLE 60 MG/1
1 CAPSULE, DELAYED RELEASE ORAL EVERY EVENING
Qty: 90 CAPSULE | Refills: 3 | Status: SHIPPED
Start: 2024-02-23

## 2024-02-23 RX ORDER — ASPIRIN 81 MG/1
81 TABLET ORAL DAILY
Qty: 100 TABLET | Refills: 1 | Status: SHIPPED
Start: 2024-02-23 | End: 2024-03-25 | Stop reason: SDUPTHER

## 2024-03-18 ENCOUNTER — OFFICE VISIT (OUTPATIENT)
Dept: SLEEP MEDICINE | Facility: MEDICAL CENTER | Age: 84
End: 2024-03-18
Attending: INTERNAL MEDICINE
Payer: MEDICARE

## 2024-03-18 VITALS
BODY MASS INDEX: 24.84 KG/M2 | OXYGEN SATURATION: 94 % | HEART RATE: 74 BPM | HEIGHT: 62 IN | DIASTOLIC BLOOD PRESSURE: 64 MMHG | WEIGHT: 135 LBS | RESPIRATION RATE: 16 BRPM | SYSTOLIC BLOOD PRESSURE: 114 MMHG

## 2024-03-18 DIAGNOSIS — R06.09 DOE (DYSPNEA ON EXERTION): ICD-10-CM

## 2024-03-18 DIAGNOSIS — J43.8 OTHER EMPHYSEMA (HCC): ICD-10-CM

## 2024-03-18 DIAGNOSIS — J43.9 MIXED RESTRICTIVE AND OBSTRUCTIVE LUNG DISEASE (HCC): ICD-10-CM

## 2024-03-18 DIAGNOSIS — J96.11 CHRONIC RESPIRATORY FAILURE WITH HYPOXIA (HCC): ICD-10-CM

## 2024-03-18 DIAGNOSIS — Z85.3 HISTORY OF BREAST CANCER: ICD-10-CM

## 2024-03-18 DIAGNOSIS — J98.4 MIXED RESTRICTIVE AND OBSTRUCTIVE LUNG DISEASE (HCC): ICD-10-CM

## 2024-03-18 DIAGNOSIS — J98.4 RESTRICTIVE LUNG DISEASE: ICD-10-CM

## 2024-03-18 PROCEDURE — 3074F SYST BP LT 130 MM HG: CPT | Performed by: NURSE PRACTITIONER

## 2024-03-18 PROCEDURE — 3078F DIAST BP <80 MM HG: CPT | Performed by: NURSE PRACTITIONER

## 2024-03-18 PROCEDURE — 99214 OFFICE O/P EST MOD 30 MIN: CPT | Performed by: NURSE PRACTITIONER

## 2024-03-18 PROCEDURE — 99213 OFFICE O/P EST LOW 20 MIN: CPT | Performed by: INTERNAL MEDICINE

## 2024-03-18 PROCEDURE — 1170F FXNL STATUS ASSESSED: CPT | Performed by: NURSE PRACTITIONER

## 2024-03-18 RX ORDER — ALBUTEROL SULFATE 90 UG/1
1-2 AEROSOL, METERED RESPIRATORY (INHALATION) EVERY 4 HOURS PRN
Qty: 1 EACH | Refills: 6 | Status: SHIPPED | OUTPATIENT
Start: 2024-03-18

## 2024-03-18 ASSESSMENT — FIBROSIS 4 INDEX: FIB4 SCORE: 2.25

## 2024-03-19 NOTE — PROGRESS NOTES
"Chief Complaint   Patient presents with    Follow-Up     Last seen 09/05/2023 Leona Osorio  Pulmonary F/V       HPI:  Rashmi Parra is a 83 y.o. year old female here today for follow-up on restrictive lung disease with subjective complaints of dyspnea with exertion.  Last seen 9/5/2023 by Dr. Osorio.PMHx is significant for gastroesophageal reflux disease complicated by Ott's esophagus, breast cancer, ER positive, status post bilateral mastectomy and radiation therapy completed November 2018 currently on anastrozole, urinary incontinence, cataracts. She also had moderate MV stenosis based on TTE. She is a former tobacco smoker with roughly 30-pack-year history and quit at the age of 54.     Her CT showed focal fibrosis in RUL c/w hx of XRT and some emphysema. Sxs were overall mild until a fall last year in the fall after which she required rehab. Repeat PFTs after fall in 11/2022 showed mild decline in FVC, FEV1, TLC and DLCO.  AT the time of our last visit she was not requiring O2 but was not participating in any activity. We were attributing her SOB to deconditioning but also kyphosis with some possible radiation damage contributing to restrictive lung disease.     CT chest (7/4/2023):  1.  No pulmonary embolus appreciated.  2.  Left renal cyst, intermediate density likely hemorrhagic or proteinaceous cyst is decreased in size but slightly increased in density since prior study  3.  Atherosclerosis and atherosclerotic coronary artery disease.    ROS: As per HPI and otherwise negative if not stated.    Past Medical History:   Diagnosis Date    Arthritis     osteo, generalized    Bowel habit changes     constipation    Breath shortness     Cataract     removed bilat    COPD (chronic obstructive pulmonary disease) (HCC)     Diabetes (HCC)     \"pre\", oral meds    Heart burn     Heart murmur     Hiatus hernia syndrome     High cholesterol     Hypertension     Indigestion     Malignant neoplasm of overlapping " sites of breast in female, estrogen receptor positive (HCC) 11/6/2019    Psychiatric problem 2015    anxiety    Shortness of breath     Snoring     no sleep study    Urinary incontinence     Wears glasses        Past Surgical History:   Procedure Laterality Date    ORIF, ANKLE Right 6/10/2022    Procedure: ORIF, ANKLE - TRIMALLEOLAR;  Surgeon: Anuj Simpson M.D.;  Location: [facility];  Service: Orthopedics    NM TOTAL HIP ARTHROPLASTY Right 9/30/2021    Procedure: ARTHROPLASTY, HIP, TOTAL, ANTERIOR APPROACH;  Surgeon: Lewis Serrano M.D.;  Location: [facility];  Service: Orthopedics    WIDE EXCISION Right 8/16/2019    Procedure: WIDE EXCISION, LESION- RE-EXCISION FOR EXCISION FOR MARGINS RIGHT CHEST WALL INSTRMUSCULAR;  Surgeon: Andre Shelton M.D.;  Location: [facility];  Service: General    INCISION AND DRAINAGE GENERAL Bilateral 8/16/2019    Procedure: INCISION AND DRAINAGE- OF BILATERAL CHEST SEROMAS WITH DRAIN PLACEMENT;  Surgeon: Andre Shelton M.D.;  Location: [facility];  Service: General    PB MASTECTOMY, MODIFIED RADICAL Right 7/17/2019    Procedure: MASTECTOMY, MODIFIED RADICAL;  Surgeon: Andre Shelton M.D.;  Location: [facility];  Service: General    NODE BIOPSY SENTINEL Right 7/17/2019    Procedure: INTRA OPERATIVE SENTINEL NODE IDENTIFICATION AND BIOPSY;  Surgeon: Andre Shelton M.D.;  Location: [facility];  Service: General    AXILLARY NODE DISSECTION  7/17/2019    Procedure: LYMPHADENECTOMY, AXILLARY;  Surgeon: Andre Shleton M.D.;  Location: [facility];  Service: General    MASTECTOMY Left 7/17/2019    Procedure: TOTAL MASTECTOMY;  Surgeon: Andre Shelton M.D.;  Location: [facility];  Service: General    FLAP CLOSURE Bilateral 7/17/2019    Procedure: WIDE V FLAP;  Surgeon: Andre Shelton M.D.;  Location: [facility];  Service: General    LEFORT COLPOCLESIS  1/21/2019    Procedure:  "LEFORT COLPOCLESIS;  Surgeon: Minnie Bañuelos M.D.;  Location: [facility];  Service: Labor and Delivery    BLADDER SLING FEMALE  1/21/2019    Procedure: BLADDER SLING FEMALE- TOT;  Surgeon: Minnie Bañuelos M.D.;  Location: [facility];  Service: Labor and Delivery    KNEE ARTHROPLASTY TOTAL Right 11/2/2015    Procedure: KNEE ARTHROPLASTY TOTAL;  Surgeon: Venkatesh Cardoza M.D.;  Location: [facility];  Service:     ABDOMINAL HYSTERECTOMY TOTAL  1994    OTHER ORTHOPEDIC SURGERY  1993    L ankle    GYN SURGERY  1992    abdominal hysterectomy    CHOLECYSTECTOMY  1964    CATARACT EXTRACTION WITH IOL Bilateral        Family History   Problem Relation Age of Onset    Heart Disease Neg Hx        Allergies as of 03/18/2024    (No Known Allergies)        Vitals:  /64 (BP Location: Left arm, Patient Position: Sitting, BP Cuff Size: Adult)   Pulse 74   Resp 16   Ht 1.575 m (5' 2\")   Wt 61.2 kg (135 lb)   SpO2 94%     Current medications as of today   Current Outpatient Medications   Medication Sig Dispense Refill    albuterol 108 (90 Base) MCG/ACT Aero Soln inhalation aerosol Inhale 1-2 Puffs every four hours as needed for Shortness of Breath. 1 Each 6    Spacer/Aero-Holding Chambers Device 1 Device 2 times a day. 1 Each 0    aspirin 81 MG EC tablet Take 1 Tablet by mouth every day. 100 Tablet 1    Dexlansoprazole 60 MG CAPSULE DELAYED RELEASE delayed-release capsule Take 1 Capsule by mouth every evening. 90 Capsule 3    melatonin (MELATONIN MAXIMUM STRENGTH) 5 mg Tab TAKE 1 TABLET BY MOUTH DAILY AT BEDTIME 90 Tablet 3    lovastatin (MEVACOR) 20 MG Tab Take 1 Tablet by mouth every evening. 90 Tablet 3    lisinopril (PRINIVIL) 5 MG Tab Take 1 Tablet by mouth every day. 90 Tablet 3    Calcium Citrate-Vitamin D3 315-6.25 MG-MCG Tab Take 2 Tablets by mouth every day. 180 Tablet 3    Multiple Vitamins-Minerals (MULTIVITAMIN GUMMIES ADULT) Chew Tab Take 1 serving chewable daily. " Patient's family to supply preferred MVI. 100 Tablet 3    FLUoxetine (PROZAC) 40 MG capsule Take 1 Capsule by mouth every day. 90 Capsule 3    polyethylene glycol/lytes (MIRALAX) 17 g Pack Take 17 g by mouth every day.      acetaminophen (TYLENOL) 325 MG Tab Take 650 mg by mouth every four hours as needed for Fever. Indications: Pain      gabapentin (NEURONTIN) 100 MG Cap Take 1 Capsule by mouth at bedtime as needed (nerve pain). 90 Capsule 3    dexamethasone (DECADRON) 6 MG Tab Take 1 Tablet by mouth every day. 10 Tablet 0    metFORMIN ER (GLUCOPHAGE XR) 500 MG TABLET SR 24 HR Take 1 Tablet by mouth every day. 90 Tablet 3     No current facility-administered medications for this visit.         Physical Exam:   Gen:           Alert and oriented, No apparent distress. Mood and affect appropriate, normal interaction with examiner.  Eyes:          PERRL, EOM intact, sclere white, conjunctive moist.  Ears:          Not examined.   Hearing:     Grossly intact.  Nose:          Normal, no lesions or deformities.  Dentition:    Good dentition.  Oropharynx:   Tongue normal, posterior pharynx without erythema or exudate.  Neck:        Supple, trachea midline, no masses.  Respiratory Effort: No intercostal retractions or use of accessory muscles.   Lung Auscultation:      Clear to auscultation bilaterally; no rales, rhonchi or wheezing.  CV:            Regular rate and rhythm. No murmurs, rubs or gallops.  Abd:           Not examined.   Lymphadenopathy: Not examined.  Gait and Station: Normal.  Digits and Nails: No clubbing, cyanosis, petechiae, or nodes.   Cranial Nerves: II-XII grossly intact.  Skin:        No rashes, lesions or ulcers noted.               Ext:           No cyanosis or edema.      Assessment:  1. MONTIEL (dyspnea on exertion)  albuterol 108 (90 Base) MCG/ACT Aero Soln inhalation aerosol    Spacer/Aero-Holding Chambers Device      2. Chronic respiratory failure with hypoxia (HCC)  albuterol 108 (90 Base) MCG/ACT  Aero Soln inhalation aerosol    Spacer/Aero-Holding Chambers Device      3. 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 History of breast cancer        6. Other emphysema (HCC)          Plan:  This is chronic and progressive since we met but I do not have a solid pulmonary reason for her progressive MONTIEL other than weakness. I suspect she is quite weak and has had a series of hospital stays since last fall none of which she has been able to fully recover from. We opted to proceed with a trial of albuterol prn and she is starting PT/OT. Consider pulmonary rehab after PT although she may not qualify due to recurrent fall.  Suspect this is mainly kyphosis and neuromuscular weakness. No crackles on exam or e/o ILD on imaging.   [facility]    Diagnosis to address: J43.8 - Other emphysema (HCC)  Assessment and plan: Chronic, stable. Continue with current defined treatment plan: Will give her albuterol with spacer.  Not currently on any maintenance inhalers.  Main symptom is dyspnea with exertion.  Denies cough, chest congestion, wheezing, or chest tightness.. Follow-up at least annually.  Last edited 03/19/24 16:02 PDT by STEVEN Boston.           Please note that this dictation was created using voice recognition software. I have made every reasonable attempt to correct obvious errors, but it is possible there are errors of grammar and possibly content that I did not discover before finalizing the note.

## 2024-03-25 DIAGNOSIS — I73.9 PERIPHERAL ARTERIAL DISEASE (HCC): ICD-10-CM

## 2024-03-25 NOTE — TELEPHONE ENCOUNTER
Received request via: Pharmacy    Was the patient seen in the last year in this department? Yes    Does the patient have an active prescription (recently filled or refills available) for medication(s) requested? No    Pharmacy Name: [facility]     Does the patient have long-term Plus and need 100 day supply (blood pressure, diabetes and cholesterol meds only)? Patient does not have SCP

## 2024-03-27 RX ORDER — ASPIRIN 81 MG/1
81 TABLET ORAL DAILY
Qty: 100 TABLET | Refills: 1 | Status: SHIPPED | OUTPATIENT
Start: 2024-03-27

## 2024-03-29 ENCOUNTER — TELEPHONE (OUTPATIENT)
Dept: MEDICAL GROUP | Facility: MEDICAL CENTER | Age: 84
End: 2024-03-29

## 2024-03-31 DIAGNOSIS — F33.1 MODERATE EPISODE OF RECURRENT MAJOR DEPRESSIVE DISORDER (HCC): ICD-10-CM

## 2024-03-31 DIAGNOSIS — I10 ESSENTIAL HYPERTENSION: ICD-10-CM

## 2024-03-31 DIAGNOSIS — E78.49 OTHER HYPERLIPIDEMIA: ICD-10-CM

## 2024-04-01 ENCOUNTER — APPOINTMENT (OUTPATIENT)
Dept: URGENT CARE | Facility: PHYSICIAN GROUP | Age: 84
End: 2024-04-01
Payer: MEDICARE

## 2024-04-01 ENCOUNTER — TELEPHONE (OUTPATIENT)
Dept: MEDICAL GROUP | Facility: MEDICAL CENTER | Age: 84
End: 2024-04-01

## 2024-04-01 DIAGNOSIS — E11.41 TYPE 2 DIABETES MELLITUS WITH DIABETIC MONONEUROPATHY, WITHOUT LONG-TERM CURRENT USE OF INSULIN (HCC): ICD-10-CM

## 2024-04-01 DIAGNOSIS — I10 ESSENTIAL HYPERTENSION: ICD-10-CM

## 2024-04-01 DIAGNOSIS — E78.49 OTHER HYPERLIPIDEMIA: ICD-10-CM

## 2024-04-01 RX ORDER — FLUOXETINE HYDROCHLORIDE 40 MG/1
40 CAPSULE ORAL DAILY
Qty: 90 CAPSULE | Refills: 0 | Status: SHIPPED | OUTPATIENT
Start: 2024-04-01

## 2024-04-01 RX ORDER — LISINOPRIL 5 MG/1
5 TABLET ORAL DAILY
Qty: 90 TABLET | Refills: 0 | Status: SHIPPED | OUTPATIENT
Start: 2024-04-01

## 2024-04-01 RX ORDER — LOVASTATIN 20 MG/1
20 TABLET ORAL NIGHTLY
Qty: 90 TABLET | Refills: 0 | Status: SHIPPED | OUTPATIENT
Start: 2024-04-01

## 2024-04-01 NOTE — TELEPHONE ENCOUNTER
Received request via: Pharmacy    Was the patient seen in the last year in this department? Yes    Does the patient have an active prescription (recently filled or refills available) for medication(s) requested? No    [facility] Name: [facility]    Does the patient have skilled nursing Plus and need 100 day supply (blood pressure, diabetes and cholesterol meds only)? Patient does not have SCP

## 2024-04-02 ENCOUNTER — OFFICE VISIT (OUTPATIENT)
Dept: URGENT CARE | Facility: PHYSICIAN GROUP | Age: 84
End: 2024-04-02
Payer: MEDICARE

## 2024-04-02 ENCOUNTER — HOSPITAL ENCOUNTER (OUTPATIENT)
Dept: RADIOLOGY | Facility: MEDICAL CENTER | Age: 84
End: 2024-04-02
Attending: NURSE PRACTITIONER
Payer: MEDICARE

## 2024-04-02 VITALS
BODY MASS INDEX: 23.92 KG/M2 | OXYGEN SATURATION: 98 % | TEMPERATURE: 98 F | SYSTOLIC BLOOD PRESSURE: 114 MMHG | HEIGHT: 63 IN | RESPIRATION RATE: 16 BRPM | HEART RATE: 70 BPM | WEIGHT: 135 LBS | DIASTOLIC BLOOD PRESSURE: 58 MMHG

## 2024-04-02 DIAGNOSIS — R11.0 NAUSEA: ICD-10-CM

## 2024-04-02 DIAGNOSIS — R53.81 MALAISE: ICD-10-CM

## 2024-04-02 LAB
APPEARANCE UR: CLEAR
BILIRUB UR STRIP-MCNC: NORMAL MG/DL
COLOR UR AUTO: YELLOW
GLUCOSE BLD-MCNC: 113 MG/DL (ref 65–99)
GLUCOSE UR STRIP.AUTO-MCNC: NORMAL MG/DL
KETONES UR STRIP.AUTO-MCNC: NORMAL MG/DL
LEUKOCYTE ESTERASE UR QL STRIP.AUTO: NORMAL
NITRITE UR QL STRIP.AUTO: NORMAL
PH UR STRIP.AUTO: 5.5 [PH] (ref 5–8)
PROT UR QL STRIP: NORMAL MG/DL
RBC UR QL AUTO: NORMAL
SP GR UR STRIP.AUTO: 1.02
UROBILINOGEN UR STRIP-MCNC: 1 MG/DL

## 2024-04-02 PROCEDURE — 1170F FXNL STATUS ASSESSED: CPT | Performed by: NURSE PRACTITIONER

## 2024-04-02 PROCEDURE — 99214 OFFICE O/P EST MOD 30 MIN: CPT | Performed by: NURSE PRACTITIONER

## 2024-04-02 PROCEDURE — 3074F SYST BP LT 130 MM HG: CPT | Performed by: NURSE PRACTITIONER

## 2024-04-02 PROCEDURE — 74018 RADEX ABDOMEN 1 VIEW: CPT

## 2024-04-02 PROCEDURE — 3078F DIAST BP <80 MM HG: CPT | Performed by: NURSE PRACTITIONER

## 2024-04-02 PROCEDURE — 82962 GLUCOSE BLOOD TEST: CPT | Performed by: NURSE PRACTITIONER

## 2024-04-02 PROCEDURE — 81002 URINALYSIS NONAUTO W/O SCOPE: CPT | Performed by: NURSE PRACTITIONER

## 2024-04-02 RX ORDER — ONDANSETRON 4 MG/1
4 TABLET, ORALLY DISINTEGRATING ORAL EVERY 6 HOURS PRN
Qty: 10 TABLET | Refills: 0 | Status: SHIPPED | OUTPATIENT
Start: 2024-04-02

## 2024-04-02 ASSESSMENT — ENCOUNTER SYMPTOMS
VOMITING: 0
RESPIRATORY NEGATIVE: 1
HEARTBURN: 0
NAUSEA: 1
CHILLS: 0
WEAKNESS: 1
FEVER: 0
MUSCULOSKELETAL NEGATIVE: 1
EYES NEGATIVE: 1
ABDOMINAL PAIN: 1
CARDIOVASCULAR NEGATIVE: 1

## 2024-04-02 ASSESSMENT — FIBROSIS 4 INDEX: FIB4 SCORE: 2.25

## 2024-04-02 NOTE — TELEPHONE ENCOUNTER
Refill done. Patient is due for annual fasting labs, ordered. Please call and notify patient to try to do these prior to our upcoming appointment.   STEVEN Zaman.

## 2024-04-02 NOTE — PROGRESS NOTES
Subjective     Aleyda Parra is a 83 y.o. female who presents with Nausea (X 3 wk nausea, not eating or drinking well, not urinating well.)            Nausea  Associated symptoms include abdominal pain, nausea and weakness. Pertinent negatives include no chills, fever or vomiting.   Daughters of Aleyda have brought her in to urgent care today as she has been experiencing nausea without vomiting, decreased appetite for solids and liquids, intermittent right-sided abdominal discomfort as well as decreased urine output.  States she does live in an assisted living facility.  States the facility had notified the daughters that their mother did not recover from symptoms of possible norovirus 3 weeks ago that went through the facility.  Daughter states that it was confirmed that the staff was not giving her metformin.  Last A1c in December 2023: 6.4.  Daughter states blood sugars are not tested daily or as needed. Her primary care provider has ordered current blood work to be done prior to next visit.  Next primary care visit in 1 month.  Denies fever, chills or bodyaches.  No noted upper respiratory symptoms.  History of GERD and takes a acid reducer which she is not having any problems with this at this time.  History of constipation and will take MiraLAX every morning.  No noted dysuria, urgency or frequency as she has decreased output.  General malaise.  History of cholecystectomy and hysterectomy.  History of COPD and is on oxygen 3 L via nasal cannula.  Experiencing generalized weakness and has to use a wheelchair to get around, per daughters this is not normal for her.  Daughter stated have her over the Easter weekend and she did eat a little more than she has recently that appetite is decreased when asked if she is hungry.    PMH:  has a past medical history of Arthritis, Bowel habit changes, Breath shortness, Cataract, COPD (chronic obstructive pulmonary disease) (HCC), Diabetes (HCC), Heart burn, Heart murmur,  Hiatus hernia syndrome, High cholesterol, Hypertension, Indigestion, Malignant neoplasm of overlapping sites of breast in female, estrogen receptor positive (HCC) (11/6/2019), Psychiatric problem (2015), Shortness of breath, Snoring, Urinary incontinence, and Wears glasses.  MEDS:   Current Outpatient Medications:     fluoxetine (PROZAC) 40 MG capsule, TAKE 1 CAPSULE BY MOUTH DAILY, Disp: 90 Capsule, Rfl: 0    lovastatin (MEVACOR) 20 MG Tab, TAKE 1 TABLET BY MOUTH EVERY EVENING, Disp: 90 Tablet, Rfl: 0    lisinopril (PRINIVIL) 5 MG Tab, TAKE 1 TABLET BY MOUTH DAILY, Disp: 90 Tablet, Rfl: 0    aspirin 81 MG EC tablet, Take 1 Tablet by mouth every day., Disp: 100 Tablet, Rfl: 1    albuterol 108 (90 Base) MCG/ACT Aero Soln inhalation aerosol, Inhale 1-2 Puffs every four hours as needed for Shortness of Breath., Disp: 1 Each, Rfl: 6    Spacer/Aero-Holding Chambers Device, 1 Device 2 times a day., Disp: 1 Each, Rfl: 0    Dexlansoprazole 60 MG CAPSULE DELAYED RELEASE delayed-release capsule, Take 1 Capsule by mouth every evening., Disp: 90 Capsule, Rfl: 3    melatonin (MELATONIN MAXIMUM STRENGTH) 5 mg Tab, TAKE 1 TABLET BY MOUTH DAILY AT BEDTIME, Disp: 90 Tablet, Rfl: 3    Calcium Citrate-Vitamin D3 315-6.25 MG-MCG Tab, Take 2 Tablets by mouth every day., Disp: 180 Tablet, Rfl: 3    Multiple Vitamins-Minerals (MULTIVITAMIN GUMMIES ADULT) Chew Tab, Take 1 serving chewable daily. Patient's family to supply preferred MVI., Disp: 100 Tablet, Rfl: 3    polyethylene glycol/lytes (MIRALAX) 17 g Pack, Take 17 g by mouth every day., Disp: , Rfl:     acetaminophen (TYLENOL) 325 MG Tab, Take 650 mg by mouth every four hours as needed for Fever. Indications: Pain, Disp: , Rfl:     gabapentin (NEURONTIN) 100 MG Cap, Take 1 Capsule by mouth at bedtime as needed (nerve pain)., Disp: 90 Capsule, Rfl: 3    dexamethasone (DECADRON) 6 MG Tab, Take 1 Tablet by mouth every day., Disp: 10 Tablet, Rfl: 0    metFORMIN ER (GLUCOPHAGE XR) 500 MG  TABLET SR 24 HR, Take 1 Tablet by mouth every day., Disp: 90 Tablet, Rfl: 3  ALLERGIES: No Known Allergies  SURGHX:   Past Surgical History:   Procedure Laterality Date    ORIF, ANKLE Right 6/10/2022    Procedure: ORIF, ANKLE - TRIMALLEOLAR;  Surgeon: Anuj Simpson M.D.;  Location: [facility];  Service: Orthopedics    SD TOTAL HIP ARTHROPLASTY Right 9/30/2021    Procedure: ARTHROPLASTY, HIP, TOTAL, ANTERIOR APPROACH;  Surgeon: Lewis Serrano M.D.;  Location: [facility];  Service: Orthopedics    WIDE EXCISION Right 8/16/2019    Procedure: WIDE EXCISION, LESION- RE-EXCISION FOR EXCISION FOR MARGINS RIGHT CHEST WALL INSTRMUSCULAR;  Surgeon: Andre Shelton M.D.;  Location: [facility];  Service: General    INCISION AND DRAINAGE GENERAL Bilateral 8/16/2019    Procedure: INCISION AND DRAINAGE- OF BILATERAL CHEST SEROMAS WITH DRAIN PLACEMENT;  Surgeon: Andre Shelton M.D.;  Location: [facility];  Service: General    PB MASTECTOMY, MODIFIED RADICAL Right 7/17/2019    Procedure: MASTECTOMY, MODIFIED RADICAL;  Surgeon: Andre Shelton M.D.;  Location: [facility];  Service: General    NODE BIOPSY SENTINEL Right 7/17/2019    Procedure: INTRA OPERATIVE SENTINEL NODE IDENTIFICATION AND BIOPSY;  Surgeon: Andre Shelton M.D.;  Location: [facility];  Service: General    AXILLARY NODE DISSECTION  7/17/2019    Procedure: LYMPHADENECTOMY, AXILLARY;  Surgeon: Andre Shelton M.D.;  Location: [facility];  Service: General    MASTECTOMY Left 7/17/2019    Procedure: TOTAL MASTECTOMY;  Surgeon: Andre Shelton M.D.;  Location: [facility];  Service: General    FLAP CLOSURE Bilateral 7/17/2019    Procedure: WIDE V FLAP;  Surgeon: Andre Shelton M.D.;  Location: [facility];  Service: General    LEFORT COLPOCLESIS  1/21/2019    Procedure: LEFORT COLPOCLESIS;  Surgeon: Minnie Bañuelos M.D.;  Location: [facility];   "Service: Labor and Delivery    BLADDER SLING FEMALE  1/21/2019    Procedure: BLADDER SLING FEMALE- TOT;  Surgeon: Minnie Bañuelos M.D.;  Location: [facility];  Service: Labor and Delivery    KNEE ARTHROPLASTY TOTAL Right 11/2/2015    Procedure: KNEE ARTHROPLASTY TOTAL;  Surgeon: Venkatesh Cardoza M.D.;  Location: SUR[facility]ERY [facility];  Service:     ABDOMINAL HYSTERECTOMY TOTAL  1994    OTHER ORTHOPEDIC SURGERY  1993    L ankle    GYN SURGERY  1992    abdominal hysterectomy    CHOLECYSTECTOMY  1964    CATARACT EXTRACTION WITH IOL Bilateral      SOCHX:  reports that she quit smoking about 32 years ago. Her smoking use included cigarettes. She started smoking about 62 years ago. She has a 30 pack-year smoking history. She has quit using smokeless tobacco. She reports current alcohol use of about 0.6 oz of alcohol per week. She reports that she does not use drugs.  FH: Family history was reviewed, no pertinent findings to report      Review of Systems   Constitutional:  Positive for malaise/fatigue. Negative for chills and fever.   HENT: Negative.     Eyes: Negative.    Respiratory: Negative.     Cardiovascular: Negative.    Gastrointestinal:  Positive for abdominal pain and nausea. Negative for heartburn and vomiting.   Genitourinary: Negative.    Musculoskeletal: Negative.    Neurological:  Positive for weakness.   All other systems reviewed and are negative.             Objective     /58   Pulse 70   Temp 36.7 °C (98 °F) (Temporal)   Resp 16   Ht 1.6 m (5' 3\")   Wt 61.2 kg (135 lb)   LMP  (LMP Unknown)   SpO2 98% Comment: Pulse ox taken while on oxygen  BMI 23.91 kg/m²      Physical Exam  Vitals reviewed.   Constitutional:       General: She is awake. She is not in acute distress.     Appearance: Normal appearance. She is not ill-appearing, toxic-appearing or diaphoretic.   Cardiovascular:      Rate and Rhythm: Normal rate.   Pulmonary:      Effort: Pulmonary effort is normal. "   Abdominal:      General: There is no distension.      Palpations: Abdomen is soft.      Tenderness: There is no guarding or rebound.          Comments: Mild discomfort on palpation to right side of abdomen.  Difficult with exam as she is in a wheelchair and is unable to sit up for exam.   Skin:     General: Skin is warm and dry.      Coloration: Skin is not ashen, cyanotic, jaundiced, mottled, pale or sallow.   Neurological:      Mental Status: She is alert and oriented to person, place, and time.   Psychiatric:         Attention and Perception: Attention normal.         Mood and Affect: Mood normal.         Speech: Speech normal.         Behavior: Behavior normal. Behavior is cooperative.                             Assessment & Plan        1. Nausea    - [id number] VIEW; Future  - POCT Urinalysis  - POCT Glucose  - ondansetron (ZOFRAN ODT) 4 MG TABLET DISPERSIBLE; Take 1 Tablet by mouth every 6 hours as needed for Nausea/Vomiting.  Dispense: 10 Tablet; Refill: 0    2. Malaise    - [id number] VIEW; Future  - POCT Urinalysis  - POCT Glucose     -Discussed x-ray results to show mild constipation on right side of abdomen with gas which may be contributing to right-sided pain with patient and daughters  -Recommend MiraLAX consistently daily until regular bowel movement, this may require increase in nutrition in liquid form if appetite decreased-daughters and patient understand this  -Will send Zofran for nausea so she may retain liquid diet at this time, this will be short-term has if she is not improving in the last 24 hours recommend emergency room visit-daughters understand this  -Recommend liquid diet such as Ensure or smoothies as well as water/electrolytes  -Monitor for increased weakness, continued decrease in fluids and solid food intake, decreased urine output or lower abdominal pain-recommend to take to emergency room for possible IV fluids and/or blood work and further evaluation-daughters understand  this  -Follow-up with primary care provider as needed  -Recommend to have ordered blood work through primary care provider done as indicated    -Education provided: see above    -Return to urgent care as needed if new or worsening symptoms  -Patient expresses understanding to treatment and plan of care  -Questions were encouraged and answered  -Recommended over the counter meds were written down when requested   -Advised to follow-up with the primary care provider for recheck, reevaluation, and consideration of further management as needed     -Time spent evaluating this patient was at least 30 minutes. This time comprises of the following: preparing for visit/chart review, patient history taken into account pertinent to symptoms, patient counseling/education for needed treatment outpatient, exam/evaluation/treatment plan provided, ordering any appropriate lab/test/procedures/meds, independent interpretation of any clinic testing, medication management with any other listed medications and chart documentation.

## 2024-04-02 NOTE — LETTER
April 2, 2024       Patient: Rashmi Parra   YOB: 1940   Date of Visit: 4/2/2024         To Whom It May Concern:    In my medical opinion, I recommend that Rashmi Parra be dispensed her zofran every 6 hrs as needed for nausea or vomiting.     If you have any questions or concerns, please don't hesitate to call [phone]          Sincerely,          SANDRA PersonRMayraN.  Electronically Signed

## 2024-05-03 ENCOUNTER — APPOINTMENT (OUTPATIENT)
Dept: MEDICAL GROUP | Facility: MEDICAL CENTER | Age: 84
End: 2024-05-03
Payer: MEDICARE

## 2024-05-03 DIAGNOSIS — I10 ESSENTIAL HYPERTENSION: ICD-10-CM

## 2024-05-03 DIAGNOSIS — F33.1 MODERATE EPISODE OF RECURRENT MAJOR DEPRESSIVE DISORDER (HCC): ICD-10-CM

## 2024-05-03 NOTE — TELEPHONE ENCOUNTER
Received request via: Pharmacy    Was the patient seen in the last year in this department? Yes    Does the patient have an active prescription (recently filled or refills available) for medication(s) requested? No    [facility]    Does the patient have halfway Plus and need 100 day supply (blood pressure, diabetes and cholesterol meds only)? Patient does not have SCP

## 2024-05-06 ENCOUNTER — APPOINTMENT (OUTPATIENT)
Dept: MEDICAL GROUP | Facility: MEDICAL CENTER | Age: 84
End: 2024-05-06
Payer: MEDICARE

## 2024-05-06 RX ORDER — FLUOXETINE HYDROCHLORIDE 40 MG/1
40 CAPSULE ORAL DAILY
Qty: 90 CAPSULE | Refills: 1 | Status: SHIPPED | OUTPATIENT
Start: 2024-05-06

## 2024-05-06 RX ORDER — LISINOPRIL 5 MG/1
5 TABLET ORAL DAILY
Qty: 90 TABLET | Refills: 2 | Status: SHIPPED | OUTPATIENT
Start: 2024-05-06

## 2024-06-01 DIAGNOSIS — I73.9 PERIPHERAL ARTERIAL DISEASE (HCC): ICD-10-CM

## 2024-06-03 RX ORDER — ASPIRIN 81 MG/1
81 TABLET, COATED ORAL DAILY
Qty: 90 TABLET | Refills: 3 | Status: SHIPPED | OUTPATIENT
Start: 2024-06-03

## 2024-06-03 RX ORDER — UREA 10 %
5 LOTION (ML) TOPICAL
Qty: 90 TABLET | Refills: 3 | Status: SHIPPED | OUTPATIENT
Start: 2024-06-03

## 2024-06-03 NOTE — TELEPHONE ENCOUNTER
Received request via: Pharmacy    Was the patient seen in the last year in this department? Yes    Does the patient have an active prescription (recently filled or refills available) for medication(s) requested? No    [facility]    Does the patient have senior living Plus and need 100 day supply (blood pressure, diabetes and cholesterol meds only)? Patient does not have SCP

## 2024-07-04 DIAGNOSIS — I10 ESSENTIAL HYPERTENSION: ICD-10-CM

## 2024-07-04 DIAGNOSIS — K22.719 BARRETT'S ESOPHAGUS WITH DYSPLASIA: ICD-10-CM

## 2024-07-10 RX ORDER — DEXLANSOPRAZOLE 60 MG/1
1 CAPSULE, DELAYED RELEASE ORAL EVERY EVENING
Qty: 31 CAPSULE | Refills: 11 | Status: SHIPPED | OUTPATIENT
Start: 2024-07-10

## 2024-07-10 RX ORDER — LISINOPRIL 5 MG/1
5 TABLET ORAL DAILY
Qty: 31 TABLET | Refills: 11 | Status: SHIPPED | OUTPATIENT
Start: 2024-07-10

## 2024-07-15 ENCOUNTER — OFFICE VISIT (OUTPATIENT)
Dept: CARDIOLOGY | Facility: MEDICAL CENTER | Age: 84
End: 2024-07-15
Attending: INTERNAL MEDICINE
Payer: MEDICARE

## 2024-07-15 VITALS
SYSTOLIC BLOOD PRESSURE: 135 MMHG | HEART RATE: 63 BPM | DIASTOLIC BLOOD PRESSURE: 54 MMHG | OXYGEN SATURATION: 94 % | HEIGHT: 63 IN | WEIGHT: 135 LBS | BODY MASS INDEX: 23.92 KG/M2

## 2024-07-15 DIAGNOSIS — I77.1 SUBCLAVIAN ARTERY STENOSIS (HCC): ICD-10-CM

## 2024-07-15 DIAGNOSIS — I10 ESSENTIAL HYPERTENSION: ICD-10-CM

## 2024-07-15 DIAGNOSIS — I05.0 MODERATE MITRAL STENOSIS: ICD-10-CM

## 2024-07-15 LAB — EKG IMPRESSION: NORMAL

## 2024-07-15 PROCEDURE — 93010 ELECTROCARDIOGRAM REPORT: CPT | Performed by: INTERNAL MEDICINE

## 2024-07-15 PROCEDURE — 3075F SYST BP GE 130 - 139MM HG: CPT | Performed by: INTERNAL MEDICINE

## 2024-07-15 PROCEDURE — 93005 ELECTROCARDIOGRAM TRACING: CPT | Performed by: INTERNAL MEDICINE

## 2024-07-15 PROCEDURE — 99214 OFFICE O/P EST MOD 30 MIN: CPT | Performed by: INTERNAL MEDICINE

## 2024-07-15 PROCEDURE — 99213 OFFICE O/P EST LOW 20 MIN: CPT | Performed by: INTERNAL MEDICINE

## 2024-07-15 PROCEDURE — 3078F DIAST BP <80 MM HG: CPT | Performed by: INTERNAL MEDICINE

## 2024-07-15 PROCEDURE — 1170F FXNL STATUS ASSESSED: CPT | Performed by: INTERNAL MEDICINE

## 2024-07-15 ASSESSMENT — ENCOUNTER SYMPTOMS
LOSS OF CONSCIOUSNESS: 0
COUGH: 0
DIZZINESS: 0
SHORTNESS OF BREATH: 0
MYALGIAS: 0
PALPITATIONS: 0

## 2024-07-15 ASSESSMENT — FIBROSIS 4 INDEX: FIB4 SCORE: 2.28

## 2024-08-17 DIAGNOSIS — F33.1 MODERATE EPISODE OF RECURRENT MAJOR DEPRESSIVE DISORDER (HCC): ICD-10-CM

## 2024-08-19 RX ORDER — FLUOXETINE 40 MG/1
40 CAPSULE ORAL DAILY
Qty: 90 CAPSULE | Refills: 3 | Status: SHIPPED | OUTPATIENT
Start: 2024-08-19

## 2024-08-19 NOTE — TELEPHONE ENCOUNTER
Received request via: Pharmacy    Was the patient seen in the last year in this department? Yes    Does the patient have an active prescription (recently filled or refills available) for medication(s) requested? No    [facility]     Does the patient have longterm Plus and need 100-day supply? (This applies to ALL medications) Patient does not have SCP

## 2024-08-22 NOTE — PROGRESS NOTES
"  Physical Medicine & Rehabilitation Progress Note    Encounter Date: 6/8/2023    Chief Complaint: Decreased mobility, hypotension    Interval Events (Subjective):  Patient sitting up in room. She reports therapy is going well. She reports she still thinks she can get out early. She is struggling to get on her socks/shoes but reports she will just not wear them at home.     _____________________________________  Interdisciplinary Team Conference   Most recent IDT on 6/6/2023    Discharge Date/Disposition:  6/14/23  _____________________________________      Objective:  VITAL SIGNS: /54   Pulse 72   Temp 36.5 °C (97.7 °F) (Oral)   Resp 16   Ht 1.6 m (5' 3\")   Wt 59.9 kg (132 lb)   LMP  (LMP Unknown)   SpO2 98%   BMI 23.38 kg/m²   Gen: NAD  Psych: Mood and affect appropriate  CV: RRR, 0 edema  Resp: CTAB, no upper airway sounds  Abd: NTND  Neuro: AOx4, following commands  Unchanged from 6/7/23    Laboratory Values:  Recent Results (from the past 72 hour(s))   CBC WITH DIFFERENTIAL    Collection Time: 06/08/23  5:50 AM   Result Value Ref Range    WBC 5.9 4.8 - 10.8 K/uL    RBC 3.90 (L) 4.20 - 5.40 M/uL    Hemoglobin 11.9 (L) 12.0 - 16.0 g/dL    Hematocrit 36.7 (L) 37.0 - 47.0 %    MCV 94.1 81.4 - 97.8 fL    MCH 30.5 27.0 - 33.0 pg    MCHC 32.4 32.2 - 35.5 g/dL    RDW 46.9 35.9 - 50.0 fL    Platelet Count 261 164 - 446 K/uL    MPV 11.0 9.0 - 12.9 fL    Neutrophils-Polys 70.20 44.00 - 72.00 %    Lymphocytes 15.20 (L) 22.00 - 41.00 %    Monocytes 13.10 0.00 - 13.40 %    Eosinophils 0.90 0.00 - 6.90 %    Basophils 0.30 0.00 - 1.80 %    Immature Granulocytes 0.30 0.00 - 0.90 %    Nucleated RBC 0.00 0.00 - 0.20 /100 WBC    Neutrophils (Absolute) 4.11 1.82 - 7.42 K/uL    Lymphs (Absolute) 0.89 (L) 1.00 - 4.80 K/uL    Monos (Absolute) 0.77 0.00 - 0.85 K/uL    Eos (Absolute) 0.05 0.00 - 0.51 K/uL    Baso (Absolute) 0.02 0.00 - 0.12 K/uL    Immature Granulocytes (abs) 0.02 0.00 - 0.11 K/uL    NRBC (Absolute) 0.00 " [facility] Note    Chief Complaint      Chief Complaint   Patient presents with    Insomnia    hormones    Knee Pain       History of Present Illness      Sandra Velazquez is a 62 y.o. female who presents today for   Chief Complaint   Patient presents with    Insomnia    hormones    Knee Pain         Patient is known to me.  She presents to clinic today to discuss insomnia, weight gain, and knee pain.  Patient states she had a hysterectomy in her 40s.  She finds that it has been very hard to lose weight, she is always tired, and feels many of life stressors.  She currently uses estradiol patch every 3 to 4 days.  She finds that this may not be the answer to her physical issues as described above.  She would like a referral to a gynecologist who specializes in hormone therapy.  She is not currently exercising.  We discussed at length and exercise plan and she is ready to begin exercising.  She knows that she has to start eating healthier.  I will also place a referral to sleep Clinic as patient states she has not been re-evaluated in many years.  She currently wears a CPAP at home while she sleeps but finds it may not help her sleep apnea anymore.  She denies any chest pain any shortness a breath.  There are no other complaints at this time.         Review of Systems   Constitutional:  Positive for malaise/fatigue.   Musculoskeletal:  Positive for joint pain.        + generalized joint pain   Psychiatric/Behavioral:  The patient has insomnia.    All 12 systems otherwise negative.       Family History:  family history includes Asthma in her mother; Bell's palsy in her father; Colon cancer in her maternal uncle; Crohn's disease in her daughter and father; GI problems in her mother; Hyperlipidemia in her sister; Hypertension in her father; Scoliosis in her sister; Stroke (age of onset: 65) in her father.   Family history was reviewed with patient.     Medications:  Outpatient Encounter Medications as of  8/30/2024   Medication Sig Note Dispense Refill    acyclovir (ZOVIRAX) 400 MG tablet  3/17/2023: #90      cholecalciferol, vitamin D3, 10 mcg (400 unit) Cap Take 1 capsule (400 Units total) by mouth once daily.   0    estradioL (ESTRACE) 0.01 % (0.1 mg/gram) vaginal cream APPLY 1 APPLICATORFUL VAGINALLY AT BEDTIME EVERY SUNDAY       estradiol 0.1 mg/24 hr td ptwk 0.1 mg/24 hr PTWK Place 1 patch onto the skin every 7 days.       fluticasone propionate (FLONASE) 50 mcg/actuation nasal spray 2 sprays (100 mcg total) by Each Nostril route once daily.  16 g 5    levocetirizine (XYZAL) 5 MG tablet Take 1 tablet (5 mg total) by mouth every evening.  90 tablet 1    linaCLOtide (LINZESS) 145 mcg Cap capsule Take 1 capsule (145 mcg total) by mouth before breakfast.  30 capsule 11    meloxicam 15 mg TbDL Take 15 mg by mouth once daily.       vitamin B complex-folic acid (B COMPLEX 1, WITH FOLIC ACID,) 0.4 mg Tab Take 1 tablet by mouth once daily.       hydrocortisone 2.5 % cream Apply topically 2 (two) times daily. for 7 days  20 g 0    meloxicam (MOBIC) 15 MG tablet Take 1 tablet (15 mg total) by mouth once daily.  30 tablet 1    traZODone (DESYREL) 50 MG tablet Take 1 tablet (50 mg total) by mouth every evening.  30 tablet 3     No facility-administered encounter medications on file as of 8/30/2024.       Allergies:  Review of patient's allergies indicates:   Allergen Reactions    Darvocet a500 [propoxyphene n-acetaminophen] Hallucinations    Oxycodone-acetaminophen Nausea And Vomiting    Oxycodone-aspirin Nausea And Vomiting    Tramadol Nausea And Vomiting       Health Maintenance:  Health Maintenance   Topic Date Due    TETANUS VACCINE  Never done    Mammogram  01/31/2025    Lipid Panel  02/09/2029    Colorectal Cancer Screening  03/20/2031    Hepatitis C Screening  Completed    Shingles Vaccine  Completed     Health Maintenance Topics with due status: Not Due       Topic Last Completion Date    Mammogram 01/31/2024     K/uL   Comp Metabolic Panel    Collection Time: 06/08/23  5:50 AM   Result Value Ref Range    Sodium 137 135 - 145 mmol/L    Potassium 3.8 3.6 - 5.5 mmol/L    Chloride 98 96 - 112 mmol/L    Co2 27 20 - 33 mmol/L    Anion Gap 12.0 7.0 - 16.0    Glucose 141 (H) 65 - 99 mg/dL    Bun 11 8 - 22 mg/dL    Creatinine 0.70 0.50 - 1.40 mg/dL    Calcium 9.0 8.5 - 10.5 mg/dL    AST(SGOT) 14 12 - 45 U/L    ALT(SGPT) 13 2 - 50 U/L    Alkaline Phosphatase 86 30 - 99 U/L    Total Bilirubin 0.5 0.1 - 1.5 mg/dL    Albumin 3.3 3.2 - 4.9 g/dL    Total Protein 6.0 6.0 - 8.2 g/dL    Globulin 2.7 1.9 - 3.5 g/dL    A-G Ratio 1.2 g/dL   MAGNESIUM    Collection Time: 06/08/23  5:50 AM   Result Value Ref Range    Magnesium 1.7 1.5 - 2.5 mg/dL   PHOSPHORUS    Collection Time: 06/08/23  5:50 AM   Result Value Ref Range    Phosphorus 3.3 2.5 - 4.5 mg/dL   PROCALCITONIN    Collection Time: 06/08/23  5:50 AM   Result Value Ref Range    Procalcitonin 0.06 <0.25 ng/mL   CORRECTED CALCIUM    Collection Time: 06/08/23  5:50 AM   Result Value Ref Range    Correct Calcium 9.6 8.5 - 10.5 mg/dL   ESTIMATED GFR    Collection Time: 06/08/23  5:50 AM   Result Value Ref Range    GFR (CKD-EPI) 85 >60 mL/min/1.73 m 2       Medications:  Scheduled Medications   Medication Dose Frequency    amLODIPine  10 mg Q DAY    Dexlansoprazole  60 mg Q EVENING    polyethylene glycol/lytes  1 Packet DAILY    Pharmacy Consult Request  1 Each PHARMACY TO DOSE    DULoxetine  30 mg DAILY    enoxaparin (LOVENOX) injection  40 mg DAILY AT 1800    lovastatin  20 mg Nightly    melatonin  4.5 mg QHS     PRN medications: senna-docusate **AND** [DISCONTINUED] polyethylene glycol/lytes **AND** magnesium hydroxide **AND** bisacodyl, Respiratory Therapy Consult, hydrALAZINE, acetaminophen, mag hydrox-al hydrox-simeth, ondansetron **OR** ondansetron, traZODone, sodium chloride, traMADol, gabapentin    Diet:  Current Diet Order   Procedures    Diet Order Diet: Level 6 - Soft and Bite Sized  "Influenza Vaccine 02/02/2024    Hemoglobin A1c (Prediabetes) 02/09/2024    Lipid Panel 02/09/2024    Colorectal Cancer Screening 03/20/2024       Physical Exam      Vital Signs  Pulse: 76  SpO2: 100 %  BP: 132/84  BP Location: Right arm  Patient Position: Sitting  Pain Score:   5  Height and Weight  Height: 5' 1" (154.9 cm)  Weight: 101.9 kg (224 lb 10.4 oz)  BSA (Calculated - sq m): 2.09 sq meters  BMI (Calculated): 42.5  Weight in (lb) to have BMI = 25: 132]    Physical Exam  Vitals reviewed.   Constitutional:       Appearance: Normal appearance. She is obese.   HENT:      Head: Normocephalic and atraumatic.      Nose: Nose normal.      Mouth/Throat:      Mouth: Mucous membranes are moist.      Pharynx: Oropharynx is clear.   Eyes:      Extraocular Movements: Extraocular movements intact.      Conjunctiva/sclera: Conjunctivae normal.      Pupils: Pupils are equal, round, and reactive to light.   Cardiovascular:      Rate and Rhythm: Normal rate and regular rhythm.      Pulses: Normal pulses.      Heart sounds: Normal heart sounds.   Pulmonary:      Effort: Pulmonary effort is normal.      Breath sounds: Normal breath sounds.   Musculoskeletal:         General: Normal range of motion.      Cervical back: Normal range of motion and neck supple.   Skin:     General: Skin is warm and dry.      Capillary Refill: Capillary refill takes less than 2 seconds.   Neurological:      General: No focal deficit present.      Mental Status: She is alert and oriented to person, place, and time. Mental status is at baseline.   Psychiatric:         Mood and Affect: Mood normal.         Behavior: Behavior normal.         Thought Content: Thought content normal.         Judgment: Judgment normal.            Assessment/Plan     Sandra Velazquez is a 62 y.o.female with:    Menopause  -     Ambulatory referral/consult to Gynecology; Future; Expected date: 09/06/2024  -     FOLLICLE STIMULATING HORMONE; Future; Expected date: 08/30/2024  -   " (meds whole 1 at a time with thin); Liquid level: Level 0 - Thin       Medical Decision Making and Plan:  Debility - Patient with decreased mobility and weakness 2/2 orthostatic hypotension, low cortisol and multiple falls  -PT and OT for mobility and ADLs. Per guidelines, 15 hours per week between PT, OT and/or SLP.  -Follow-up PCP     Dysphagia - Patient with new dysphagia of choking on foods. Consult SLP. Will check CXR. Patient at high risk for further decline and respiratory failure due to age and debility.   -Choking event 6/1 with dinner. Repeat CXR, AM labs. CXR stable. Passed swallow    HTN - Patient on Lisinopril. Was previously having orthostatic hypotension requiring Midodrine and Florinef. Will monitor  -Severe orthostatic hypotension on admission into the 70s. Start IV fluids,consult hospitalist and monitor. May need to restart Florinef. Will check CXR. High risk for falls and further injury. Will recheck labs including cortisol per hospitalist.  -Elevated SBP on 6/5, increase Amlodipine to 10, will monitor for low SBP. Midodrine discontinued.      HLD - Patient on Lovastatin 20 mg QHS. Continue Lovastatin     Hypocalcemia - Check AM CMP. Stable     Low Cortisol - Negative ACTH test. Will monitor      Anemia - 11.9 on 6/8/23, improving.     PAD - Aortic stenosis on CTA of abdomen. Follow-up Vascular     Depression - Patient on Cymbalta 30 mg daily. Continue Cymbalta     Pain - APAP/Tramadol PRN     Skin - Patient at risk for skin breakdown due to debility in areas including sacrum, achilles, elbows and head in addition to other sites. Nursing to assess skin daily.      GI Ppx - Patient on Prilosec for GERD prophylaxis. Patient on Senna-docusate for constipation prophylaxis.   -Switch to Dexlansoprazole      DVT Ppx - Patient Lovenox on transfer.  ____________________________________    T. Toño Will MD/PhD  [facility] - Physical Medicine & Rehabilitation   [facility]    ESTROGENS, TOTAL; Future; Expected date: 08/30/2024  -     Cancel: Ambulatory referral/consult to Smoking Cessation Program; Future; Expected date: 09/06/2024  -     LUTEINIZING HORMONE; Future; Expected date: 08/30/2024    Fatigue, unspecified type  -     Ambulatory referral/consult to Gynecology; Future; Expected date: 09/06/2024  -     FOLLICLE STIMULATING HORMONE; Future; Expected date: 08/30/2024  -     ESTROGENS, TOTAL; Future; Expected date: 08/30/2024  -     Cancel: Ambulatory referral/consult to Smoking Cessation Program; Future; Expected date: 09/06/2024  -     LUTEINIZING HORMONE; Future; Expected date: 08/30/2024  -     CBC Auto Differential; Future; Expected date: 08/30/2024  -     Comprehensive Metabolic Panel; Future; Expected date: 08/30/2024  -     Lipid Panel; Future; Expected date: 08/30/2024  -     T4, Free; Future; Expected date: 08/30/2024  -     TSH; Future; Expected date: 08/30/2024    Arthralgia, unspecified joint  -     meloxicam (MOBIC) 15 MG tablet; Take 1 tablet (15 mg total) by mouth once daily.  Dispense: 30 tablet; Refill: 1    Sleep apnea, unspecified type  -     Ambulatory referral/consult to Sleep Disorders; Future; Expected date: 09/06/2024    Pre-diabetes  -     Hemoglobin A1C; Future; Expected date: 08/30/2024    Insomnia, unspecified type  -     traZODone (DESYREL) 50 MG tablet; Take 1 tablet (50 mg total) by mouth every evening.  Dispense: 30 tablet; Refill: 3    Major depression, recurrent, chronic    Class 3 severe obesity due to excess calories without serious comorbidity with body mass index (BMI) of 40.0 to 44.9 in adult    - patient does not wish to take a SSRI because of bad side effects in the past including weird dreams and hallucinations.  We will concentrate on exercise and diet at this time. I discussed diet of low calorie, low fat, low cholesterol, low carbohydrate, no fast foods diet. I have advised walking for 30 minutes for 2 days a week for 2 weeks and    ____________________________________   increasing a 30 minute day every 2 weeks. Patient verbalizes understanding.     As above, continue current medications and maintain follow up with specialists.  Return to clinic as needed.    Greater than 50% of visit was spent face to face with patient.  All questions were answered to patient's satisfaction.          Karen L Spencer, NP-C [facility]                Answers submitted by the patient for this visit:  Review of Systems Questionnaire (Submitted on 8/23/2024)  activity change: No  unexpected weight change: No  neck pain: Yes  hearing loss: No  rhinorrhea: No  trouble swallowing: No  eye discharge: No  visual disturbance: No  chest tightness: No  wheezing: No  chest pain: No  palpitations: No  blood in stool: No  constipation: No  vomiting: No  diarrhea: No  polydipsia: No  polyuria: No  difficulty urinating: No  hematuria: No  menstrual problem: No  dysuria: No  joint swelling: No  arthralgias: Yes  headaches: No  weakness: No  confusion: No  dysphoric mood: Yes

## 2024-09-23 ENCOUNTER — APPOINTMENT (RX ONLY)
Dept: URBAN - METROPOLITAN AREA CLINIC 35 | Facility: CLINIC | Age: 84
Setting detail: DERMATOLOGY
End: 2024-09-23

## 2024-09-23 DIAGNOSIS — D485 NEOPLASM OF UNCERTAIN BEHAVIOR OF SKIN: ICD-10-CM | Status: INADEQUATELY CONTROLLED

## 2024-09-23 PROBLEM — D48.5 NEOPLASM OF UNCERTAIN BEHAVIOR OF SKIN: Status: ACTIVE | Noted: 2024-09-23

## 2024-09-23 PROCEDURE — ? BIOPSY BY SHAVE METHOD

## 2024-09-23 PROCEDURE — ? SURGICAL DECISION MAKING

## 2024-09-23 PROCEDURE — 11102 TANGNTL BX SKIN SINGLE LES: CPT

## 2024-09-23 ASSESSMENT — LOCATION ZONE DERM
LOCATION ZONE: LEG
LOCATION ZONE: LEG

## 2024-09-23 ASSESSMENT — LOCATION DETAILED DESCRIPTION DERM
LOCATION DETAILED: LEFT ANTERIOR DISTAL THIGH
LOCATION DETAILED: LEFT ANTERIOR DISTAL THIGH

## 2024-09-23 ASSESSMENT — LOCATION SIMPLE DESCRIPTION DERM
LOCATION SIMPLE: LEFT THIGH
LOCATION SIMPLE: LEFT THIGH

## 2024-09-23 NOTE — PROCEDURE: SURGICAL DECISION MAKING
Risk Assessment Explanation (Does Not Render In The Note): Clinical determination of the probability and/or consequences of an event, such as surgery. Clinical assessment of the level of risk is affected by the nature of the event under consideration for the patient. Modifier 57 is used to indicate an Evaluation and Management (E/M) service resulted in the initial decision to perform surgery either the day before a major surgery (90 day global) or the day of a major surgery.
Identified Risk Factors Documented?: yes
Discussion: We discussed not only the risks of the procedure but also the likely parson that further treatment will be required to treat lesion. This could involve another visit here to destroy or excise  lesion, a visit to a Mohs or general or plastic surgeon, scarring, limited  activity, cosmetic imperfections.
Complexity (Necessary For Coding; Major - 90 Day Global With Some Exceptions; Minor - 10 Day Global): minor
Date Of Surgery - Today Or Tomorrow?: today

## 2024-09-23 NOTE — PROCEDURE: BIOPSY BY SHAVE METHOD
Detail Level: Detailed
Depth Of Biopsy: dermis
Was A Bandage Applied: Yes
X Size Of Lesion In Cm: 0
Biopsy Type: H and E
Biopsy Method: Dermablade
Anesthesia Type: 1% lidocaine without epinephrine
Anesthesia Volume In Cc: 0.7
Hemostasis: Aluminum Chloride
Wound Care: Petrolatum
Dressing: bandage
Type Of Destruction Used: Electrodesiccation and Curettage
Curettage Text: The wound bed was treated with curettage after the biopsy was performed.
Electrodesiccation And Curettage Text: The wound bed was treated with electrodesiccation and curettage after the biopsy was
Lab: [id number]
Lab Facility: 
Render Path Notes In Note?: No
Consent: Verbal consent was obtained and risks were reviewed including but not limited to scarring, infection, bleeding, scabbing, incomplete removal, nerve damage and allergy to anesthesia.
Post-Care Instructions: I reviewed with the patient in detail post-care instructions. Patient is to keep the biopsy site dry overnight, and then apply white petrolatum twice daily until healed. Patient may apply hydrogen peroxide soaks to remove any crusting.
Notification Instructions: Patient will be notified of biopsy results. However, patient instructed to call the office if not contacted within 2 weeks.
Billing Type: Third-Party Bill
Information: Selecting Yes will display possible errors in your note based on the variables you have selected. This validation is only offered as a suggestion for you. PLEASE NOTE THAT THE VALIDATION TEXT WILL BE REMOVED WHEN YOU FINALIZE YOUR NOTE. IF YOU WANT TO FAX A PRELIMINARY NOTE YOU WILL NEED TO TOGGLE THIS TO 'NO' IF YOU DO NOT WANT IT IN YOUR FAXED NOTE.

## 2024-09-27 NOTE — TELEPHONE ENCOUNTER
Received request via: Pharmacy    Was the patient seen in the last year in this department? Yes    Does the patient have an active prescription (recently filled or refills available) for medication(s) requested? No    [facility]     Does the patient have [facility] and need 100-day supply? (This applies to ALL medications) Patient does not have SCP

## 2024-10-27 DIAGNOSIS — I73.9 PERIPHERAL ARTERIAL DISEASE (HCC): ICD-10-CM

## 2024-10-29 RX ORDER — ASPIRIN 81 MG/1
81 TABLET, COATED ORAL DAILY
Qty: 90 TABLET | Refills: 3 | Status: SHIPPED | OUTPATIENT
Start: 2024-10-29

## 2024-12-22 NOTE — PROCEDURE: MOHS SURGERY PHONE CONSULTATION
Has The Patient Ever Received A Transplant?: No
Patient Reported Location: Lt distal pretib region
Detail Level: Detailed
Has The Pathology Report Been Received?: Yes
Which Antibiotic Do They Take For Surgical Prophylaxis?: Amoxicillin (2 grams)
Office Location Of Mohs Surgery: [place] [id number]
Negative

## 2025-03-20 DIAGNOSIS — F33.1 MODERATE EPISODE OF RECURRENT MAJOR DEPRESSIVE DISORDER (HCC): ICD-10-CM

## 2025-03-20 RX ORDER — FLUOXETINE HYDROCHLORIDE 40 MG/1
40 CAPSULE ORAL DAILY
Qty: 90 CAPSULE | Refills: 0 | Status: SHIPPED | OUTPATIENT
Start: 2025-03-20

## 2025-03-20 NOTE — TELEPHONE ENCOUNTER
Received request via: Pharmacy    Was the patient seen in the last year in this department? No    Does the patient have an active prescription (recently filled or refills available) for medication(s) requested? Yes. Refill request has been refused in Epic. Contacted pharmacy and called in most recent prescription.    Pharmacy Name: [facility]    Does the patient have long term Plus and need 100-day supply? (This applies to ALL medications) Patient does not have SCP

## 2025-04-10 NOTE — PROGRESS NOTES
MD Romo notified at 2341 of BP 81/48 post 1L bolus. PT asymptomatic at this time. No new orders.    neg.

## 2025-05-22 PROBLEM — Z76.89 ENCOUNTER TO ESTABLISH CARE: Status: ACTIVE | Noted: 2025-05-21

## 2025-05-22 PROBLEM — E78.2 MIXED DYSLIPIDEMIA: Status: ACTIVE | Noted: 2022-10-26

## 2025-05-22 PROBLEM — J44.9 COPD WITH HYPOXIA (HCC): Status: ACTIVE | Noted: 2021-06-11

## 2025-05-22 PROBLEM — R26.9 ABNORMALITY OF GAIT AND MOBILITY: Status: ACTIVE | Noted: 2025-05-21

## 2025-06-04 PROBLEM — R11.0 NAUSEA WITHOUT VOMITING: Status: ACTIVE | Noted: 2025-06-04

## 2025-06-04 PROBLEM — M25.471 EDEMA OF BOTH ANKLES: Status: ACTIVE | Noted: 2023-11-17

## 2025-06-04 PROBLEM — M25.472 EDEMA OF BOTH ANKLES: Status: ACTIVE | Noted: 2023-11-17

## 2025-06-04 PROBLEM — G89.29 CHRONIC RIGHT HIP PAIN: Status: ACTIVE | Noted: 2021-02-10

## 2025-07-19 PROBLEM — N18.30 BENIGN HYPERTENSION WITH CHRONIC KIDNEY DISEASE, STAGE III: Status: ACTIVE | Noted: 2025-07-18

## 2025-07-19 PROBLEM — I12.9 BENIGN HYPERTENSION WITH CHRONIC KIDNEY DISEASE, STAGE III: Status: ACTIVE | Noted: 2025-07-18

## 2025-07-31 ENCOUNTER — HOSPITAL ENCOUNTER (OUTPATIENT)
Facility: MEDICAL CENTER | Age: 85
End: 2025-07-31
Attending: NURSE PRACTITIONER
Payer: MEDICARE

## 2025-07-31 DIAGNOSIS — I10 ESSENTIAL HYPERTENSION: ICD-10-CM

## 2025-07-31 DIAGNOSIS — E78.2 MIXED DYSLIPIDEMIA: ICD-10-CM

## 2025-07-31 DIAGNOSIS — E78.49 OTHER HYPERLIPIDEMIA: ICD-10-CM

## 2025-07-31 DIAGNOSIS — R53.83 FATIGUE, UNSPECIFIED TYPE: ICD-10-CM

## 2025-07-31 DIAGNOSIS — R30.0 DYSURIA: ICD-10-CM

## 2025-07-31 DIAGNOSIS — G47.34 NOCTURNAL HYPOXIA: ICD-10-CM

## 2025-07-31 DIAGNOSIS — E11.41 TYPE 2 DIABETES MELLITUS WITH DIABETIC MONONEUROPATHY, WITHOUT LONG-TERM CURRENT USE OF INSULIN (HCC): ICD-10-CM

## 2025-07-31 DIAGNOSIS — I95.1 ORTHOSTATIC HYPOTENSION: ICD-10-CM

## 2025-07-31 LAB
ALBUMIN SERPL BCP-MCNC: 4.2 G/DL (ref 3.2–4.9)
ALBUMIN/GLOB SERPL: 1.9 G/DL
ALP SERPL-CCNC: 98 U/L (ref 30–99)
ALT SERPL-CCNC: 11 U/L (ref 2–50)
ANION GAP SERPL CALC-SCNC: 14 MMOL/L (ref 7–16)
APPEARANCE UR: CLEAR
AST SERPL-CCNC: 21 U/L (ref 12–45)
BASOPHILS # BLD AUTO: 0.3 % (ref 0–1.8)
BASOPHILS # BLD: 0.02 K/UL (ref 0–0.12)
BILIRUB SERPL-MCNC: 0.3 MG/DL (ref 0.1–1.5)
BILIRUB UR QL STRIP.AUTO: NEGATIVE
BUN SERPL-MCNC: 20 MG/DL (ref 8–22)
CALCIUM ALBUM COR SERPL-MCNC: 9.4 MG/DL (ref 8.5–10.5)
CALCIUM SERPL-MCNC: 9.6 MG/DL (ref 8.5–10.5)
CHLORIDE SERPL-SCNC: 102 MMOL/L (ref 96–112)
CHOLEST SERPL-MCNC: 295 MG/DL (ref 100–199)
CO2 SERPL-SCNC: 25 MMOL/L (ref 20–33)
COLOR UR: YELLOW
CREAT SERPL-MCNC: 0.98 MG/DL (ref 0.5–1.4)
EOSINOPHIL # BLD AUTO: 0.03 K/UL (ref 0–0.51)
EOSINOPHIL NFR BLD: 0.5 % (ref 0–6.9)
ERYTHROCYTE [DISTWIDTH] IN BLOOD BY AUTOMATED COUNT: 49.1 FL (ref 35.9–50)
FOLATE SERPL-MCNC: >40 NG/ML
GFR SERPLBLD CREATININE-BSD FMLA CKD-EPI: 56 ML/MIN/1.73 M 2
GLOBULIN SER CALC-MCNC: 2.2 G/DL (ref 1.9–3.5)
GLUCOSE SERPL-MCNC: 139 MG/DL (ref 65–99)
GLUCOSE UR STRIP.AUTO-MCNC: NEGATIVE MG/DL
HCT VFR BLD AUTO: 41.1 % (ref 37–47)
HDLC SERPL-MCNC: 75 MG/DL
HGB BLD-MCNC: 12.9 G/DL (ref 12–16)
IMM GRANULOCYTES # BLD AUTO: 0.02 K/UL (ref 0–0.11)
IMM GRANULOCYTES NFR BLD AUTO: 0.3 % (ref 0–0.9)
KETONES UR STRIP.AUTO-MCNC: NEGATIVE MG/DL
LDLC SERPL CALC-MCNC: 200 MG/DL
LEUKOCYTE ESTERASE UR QL STRIP.AUTO: NEGATIVE
LYMPHOCYTES # BLD AUTO: 0.72 K/UL (ref 1–4.8)
LYMPHOCYTES NFR BLD: 10.8 % (ref 22–41)
MCH RBC QN AUTO: 31.4 PG (ref 27–33)
MCHC RBC AUTO-ENTMCNC: 31.4 G/DL (ref 32.2–35.5)
MCV RBC AUTO: 100 FL (ref 81.4–97.8)
MICRO URNS: NORMAL
MONOCYTES # BLD AUTO: 0.47 K/UL (ref 0–0.85)
MONOCYTES NFR BLD AUTO: 7.1 % (ref 0–13.4)
NEUTROPHILS # BLD AUTO: 5.4 K/UL (ref 1.82–7.42)
NEUTROPHILS NFR BLD: 81 % (ref 44–72)
NITRITE UR QL STRIP.AUTO: NEGATIVE
NRBC # BLD AUTO: 0 K/UL
NRBC BLD-RTO: 0 /100 WBC (ref 0–0.2)
PH UR STRIP.AUTO: 5.5 [PH] (ref 5–8)
PLATELET # BLD AUTO: 244 K/UL (ref 164–446)
PMV BLD AUTO: 11.3 FL (ref 9–12.9)
POTASSIUM SERPL-SCNC: 4.3 MMOL/L (ref 3.6–5.5)
PROT SERPL-MCNC: 6.4 G/DL (ref 6–8.2)
PROT UR QL STRIP: NEGATIVE MG/DL
RBC # BLD AUTO: 4.11 M/UL (ref 4.2–5.4)
RBC UR QL AUTO: NEGATIVE
SODIUM SERPL-SCNC: 141 MMOL/L (ref 135–145)
SP GR UR STRIP.AUTO: 1.02
TRIGL SERPL-MCNC: 100 MG/DL (ref 0–149)
TSH SERPL DL<=0.005 MIU/L-ACNC: 1.4 UIU/ML (ref 0.38–5.33)
UROBILINOGEN UR STRIP.AUTO-MCNC: 0.2 EU/DL
VIT B12 SERPL-MCNC: 541 PG/ML (ref 211–911)
WBC # BLD AUTO: 6.7 K/UL (ref 4.8–10.8)

## 2025-07-31 PROCEDURE — 85025 COMPLETE CBC W/AUTO DIFF WBC: CPT

## 2025-07-31 PROCEDURE — 80053 COMPREHEN METABOLIC PANEL: CPT

## 2025-07-31 PROCEDURE — 87086 URINE CULTURE/COLONY COUNT: CPT

## 2025-07-31 PROCEDURE — 80061 LIPID PANEL: CPT

## 2025-07-31 PROCEDURE — 82746 ASSAY OF FOLIC ACID SERUM: CPT

## 2025-07-31 PROCEDURE — 84443 ASSAY THYROID STIM HORMONE: CPT

## 2025-07-31 PROCEDURE — 81003 URINALYSIS AUTO W/O SCOPE: CPT

## 2025-07-31 PROCEDURE — 82607 VITAMIN B-12: CPT

## 2025-08-03 LAB
BACTERIA UR CULT: NORMAL
SIGNIFICANT IND 70042: NORMAL
SITE SITE: NORMAL
SOURCE SOURCE: NORMAL

## 2025-08-13 PROBLEM — E11.42 TYPE 2 DIABETES MELLITUS WITH DIABETIC POLYNEUROPATHY, WITHOUT LONG-TERM CURRENT USE OF INSULIN (HCC): Status: ACTIVE | Noted: 2022-04-21

## (undated) DEVICE — BLADE SURGICAL #15 - (50/BX 3BX/CA)

## (undated) DEVICE — DRAPE LARGE 3 QUARTER - (20/CA)

## (undated) DEVICE — SUTURE GENERAL

## (undated) DEVICE — SLEEVE, VASO, THIGH, MED

## (undated) DEVICE — TUBE CONNECT SUCTION CLEAR 120 X 1/4" (50EA/CA)"

## (undated) DEVICE — PENCIL ELECTSURG 10FT BTN SWH - (50/CA)

## (undated) DEVICE — BLADE SURGICAL #11 - (50/BX)

## (undated) DEVICE — ELECTRODE DUAL RETURN W/ CORD - (50/PK)

## (undated) DEVICE — GLOVE BIOGEL PI INDICATOR SZ 6.5 SURGICAL PF LF - (50/BX 4BX/CA)

## (undated) DEVICE — DRILL BIT 2.5MM

## (undated) DEVICE — CLIP MED INTNL HRZN TI ESCP - (25/BX)

## (undated) DEVICE — DRILL BIT 2.6MM

## (undated) DEVICE — PAD LAP STERILE 18 X 18 - (5/PK 40PK/CA)

## (undated) DEVICE — SYRINGE SAFETY 10 ML 18 GA X 1 1/2 BLUNT LL (100/BX 4BX/CA)

## (undated) DEVICE — CANISTER SUCTION 3000ML MECHANICAL FILTER AUTO SHUTOFF MEDI-VAC NONSTERILE LF DISP  (40EA/CA)

## (undated) DEVICE — SET LEADWIRE 5 LEAD BEDSIDE DISPOSABLE ECG (1SET OF 5/EA)

## (undated) DEVICE — PADDING CAST 6 IN STERILE - 6 X 4 YDS (24/CA)

## (undated) DEVICE — SUTURE 2-0 MONOCRYL PLUS UNDYED CT-1 1 X 36 (36EA/BX)"

## (undated) DEVICE — CHLORAPREP 26 ML APPLICATOR - ORANGE TINT(25/CA)

## (undated) DEVICE — BOVIE BLADE COATED - (50/PK)

## (undated) DEVICE — SLEEVE VASO CALF MED - (10PR/CA)

## (undated) DEVICE — GLOVE BIOGEL SZ 6.5 SURGICAL PF LTX (50PR/BX 4BX/CA)

## (undated) DEVICE — MASK ANESTHESIA ADULT  - (100/CA)

## (undated) DEVICE — GLOVE BIOGEL SZ 8.5 SURGICAL PF LTX - (50PR/BX 4BX/CA)

## (undated) DEVICE — GOWN SURGEONS X-LARGE - DISP. (30/CA)

## (undated) DEVICE — GLOVE BIOGEL INDICATOR SZ 8.5 SURGICAL PF LTX - (50/BX 4BX/CA)

## (undated) DEVICE — HEAD HOLDER JUNIOR/ADULT

## (undated) DEVICE — GOWN SURGICAL X-LARGE ULTRA - FILM-REINFORCED (20/CA)

## (undated) DEVICE — WATER IRRIGATION STERILE 1000ML (12EA/CA)

## (undated) DEVICE — SYRINGE SAFETY TB 1 ML 27 GA X 1/2 IN (100/BX 4BX/CA)

## (undated) DEVICE — DRAPE SRG LG BCK TBL DISP - 10/CA

## (undated) DEVICE — BLADE SURGICAL #10 - (50/BX)

## (undated) DEVICE — MASK, LARYNGEAL AIRWAY #4

## (undated) DEVICE — PAD PREP 24 X 48 CUFFED - (100/CA)

## (undated) DEVICE — TRAY CATHETER FOLEY URINE METER W/STATLOCK 350ML (10EA/CA)

## (undated) DEVICE — TOWELS CLOTH SURGICAL - (4/PK 20PK/CA)

## (undated) DEVICE — GOWN WARMING STANDARD FLEX - (30/CA)

## (undated) DEVICE — DRAPE SURGICAL U 77X120 - (10/CA)

## (undated) DEVICE — ELECTRODE 850 FOAM ADHESIVE - HYDROGEL RADIOTRNSPRNT (50/PK)

## (undated) DEVICE — SPLINT, ORTHO-GLASS 6

## (undated) DEVICE — DRESSING XEROFORM 1X8 - (50/BX 4BX/CA)

## (undated) DEVICE — Device

## (undated) DEVICE — GLOVE SZ 6.5 BIOGEL PI MICRO - PF LF (50PR/BX)

## (undated) DEVICE — PADDING CAST 4 IN STERILE - 4 X 4 YDS (24/CA)

## (undated) DEVICE — SUCTION INSTRUMENT YANKAUER BULBOUS TIP W/O VENT (50EA/CA)

## (undated) DEVICE — PACK MAJOR BASIN - (2EA/CA)

## (undated) DEVICE — KIT ANESTHESIA W/CIRCUIT & 3/LT BAG W/FILTER (20EA/CA)

## (undated) DEVICE — KIT SURGIFLO W/OUT THROMBIN - (6EA/CA)

## (undated) DEVICE — SPONGE DRAIN 4 X 4IN 6-PLY - (2/PK25PK/BX12BX/CS)

## (undated) DEVICE — SET EXTENSION WITH 2 PORTS (48EA/CA) ***PART #2C8610 IS A SUBSTITUTE*****

## (undated) DEVICE — KIT ROOM DECONTAMINATION

## (undated) DEVICE — TUBING CLEARLINK DUO-VENT - C-FLO (48EA/CA)

## (undated) DEVICE — DRESSING TRANSPARENT FILM TEGADERM 4 X 4.75" (50EA/BX)"

## (undated) DEVICE — GLOVE SZ 7.5 LF PROTEXIS (50PR/BX)

## (undated) DEVICE — PROTECTOR ULNA NERVE - (36PR/CA)

## (undated) DEVICE — DEVICE SKIN CLOSURE SURGICAL ZIP 16 (10EA/PK)

## (undated) DEVICE — PACK LOWER EXTREMITY - (2/CA)

## (undated) DEVICE — SUTURE 0 VICRYL PLUS CT-1 - 36 INCH (36/BX)

## (undated) DEVICE — SUTURE 2-0 VICRYL PLUS CT-1 36 (36PK/BX)"

## (undated) DEVICE — BANDAGE ELASTIC LATEX STERILE VELCRO 4 X 5 YDS (25EA/CA)

## (undated) DEVICE — GOWN SURGEONS LARGE - (32/CA)

## (undated) DEVICE — IMPLANT REF THREADED HOLE COVER (1EA): Type: IMPLANTABLE DEVICE | Site: HIP | Status: NON-FUNCTIONAL

## (undated) DEVICE — RETRACTOR LIGHTED RADIALUX

## (undated) DEVICE — GLOVE BIOGEL INDICATOR SZ 8 SURGICAL PF LTX - (50/BX 4BX/CA)

## (undated) DEVICE — BLANKET WARMING LOWER BODY - (10/CA) INACTIVE USE #8585

## (undated) DEVICE — BLADE SURGICAL CLIPPER - (50EA/CA)

## (undated) DEVICE — DRAPE VAGINAL BIB W/ POUCH (10EA/CA)

## (undated) DEVICE — SUTURE 3-0 VICRYL PLUS SH - 8X 18 INCH (12/BX)

## (undated) DEVICE — PACK TOTAL HIP - (1/CA)

## (undated) DEVICE — TOWEL STOP TIMEOUT SAFETY FLAG (40EA/CA)

## (undated) DEVICE — STAPLER SKIN DISP - (6/BX 10BX/CA) VISISTAT

## (undated) DEVICE — TRAY SRGPRP PVP IOD WT PRP - (20/CA)

## (undated) DEVICE — LACTATED RINGERS INJ 1000 ML - (14EA/CA 60CA/PF)

## (undated) DEVICE — IMPLANT FLEXIBLE DRILL 35MM (1EA)

## (undated) DEVICE — DRILL BIT 2.7MM

## (undated) DEVICE — SODIUM CHL. IRRIGATION 0.9% 3000ML (4EA/CA 65CA/PF)

## (undated) DEVICE — SUTURE 3-0 VICRYL PLUS CT-1 - 36 INCH (36/BX)

## (undated) DEVICE — SYRINGE ASEPTO - (50EA/CA

## (undated) DEVICE — APPLIER OPEN MEDIUM CLIP (6EA/BX)

## (undated) DEVICE — GLOVE BIOGEL INDICATOR SZ 7.5 SURGICAL PF LTX - (50PR/BX 4BX/CA)

## (undated) DEVICE — SUTURE 2-0 ETHILON FS - (36/BX) 18 INCH

## (undated) DEVICE — COVER PROBE STERILE CONE (12EA/CA)

## (undated) DEVICE — NEPTUNE 4 PORT MANIFOLD - (20/PK)

## (undated) DEVICE — RESERVOIR SUCTION 100 CC - SILICONE (20EA/CA)

## (undated) DEVICE — GLOVE BIOGEL SZ 7.5 SURGICAL PF LTX - (50PR/BX 4BX/CA)

## (undated) DEVICE — GLOVE, LITE (PAIR)

## (undated) DEVICE — SPONGE GAUZESTER 4 X 4 4PLY - (128PK/CA)

## (undated) DEVICE — GLOVE BIOGEL INDICATOR SZ 7SURGICAL PF LTX - (50/BX 4BX/CA)

## (undated) DEVICE — DRAPE C-ARM LARGE 41IN X 74 IN - (10/BX 2BX/CA)

## (undated) DEVICE — SHEAR HS FOCUS 9CM CVD - (6/BX)

## (undated) DEVICE — DRAPE CHEST/BREAST - (12EA/CA)

## (undated) DEVICE — PEN SKIN MARKER W/RULER - (50EA/BX)

## (undated) DEVICE — DRAPE LAPAROTOMY T SHEET - (12EA/CA)

## (undated) DEVICE — SUTURE 4-0 MONOCRYL PLUS PS-2 - 27 INCH (36/BX)

## (undated) DEVICE — TIP INTPLS HFLO ML ORFC BTRY - (12/CS)  FOR SURGILAV

## (undated) DEVICE — LENS/HOOD FOR SPACESUIT - (32/PK) PEEL AWAY FACE

## (undated) DEVICE — PACK MINOR BASIN - (2EA/CA)

## (undated) DEVICE — GLOVE BIOGEL INDICATOR SZ 6.5 SURGICAL PF LTX - (50PR/BX 4BX/CA)

## (undated) DEVICE — DRAIN J-VAC 19FR. ROUND - (10/CS)

## (undated) DEVICE — SODIUM CHL IRRIGATION 0.9% 1000ML (12EA/CA)

## (undated) DEVICE — SUTURE 1 VICRYL PLUS CTX - 8 X 18 INCH (12/BX)

## (undated) DEVICE — CLIP SM INTNL HRZN TI ESCP LGT - (24EA/PK 25PK/BX)

## (undated) DEVICE — SUTURE 3-0 VICRYL PLUS - 12 X 18 INCH (12/BX)

## (undated) DEVICE — SLING ORTH UNV TIETX VLFM ARM

## (undated) DEVICE — GLOVE BIOGEL SZ 8 SURGICAL PF LTX - (50PR/BX 4BX/CA)

## (undated) DEVICE — SET IRRIGATION CYSTOSCOPY TUBE L80 IN (20EA/CA)

## (undated) DEVICE — GLOVE BIOGEL PI INDICATOR SZ 7.5 SURGICAL PF LF -(50/BX 4BX/CA)

## (undated) DEVICE — SYRINGE 10 ML CONTROL LL (25EA/BX 4BX/CA)

## (undated) DEVICE — DRESSING ANTIMICROBIAL BIOPATCH 4.0M (40EA/CA)

## (undated) DEVICE — BLADE SAGITTAL SYSTEM 18MM

## (undated) DEVICE — GLOVE BIOGEL PI INDICATOR SZ 8.5 SURGICAL PF LF - (50PR/BX 4BX/CA)

## (undated) DEVICE — CLOSURE SKIN STRIP 1/2 X 4 IN - (STERI STRIP) (50/BX 4BX/CA)

## (undated) DEVICE — HANDPIECE 10FT INTPLS SCT PLS IRRIGATION HAND CONTROL SET (6/PK)

## (undated) DEVICE — SUTURE 0 SILK TIES (36PK/BX)

## (undated) DEVICE — TUBE E-T HI-LO CUFF 7.0MM (10EA/PK)

## (undated) DEVICE — GLOVE BIOGEL PI ORTHO SZ 8 PF LF (40PR/BX)

## (undated) DEVICE — GLOVE SZ 7 BIOGEL PI MICRO - PF LF (50PR/BX 4BX/CA)

## (undated) DEVICE — SENSOR SPO2 NEO LNCS ADHESIVE (20/BX) SEE USER NOTES

## (undated) DEVICE — DERMABOND ADVANCED - (12EA/BX)

## (undated) DEVICE — SUTURE ETHILON 2-0 FSLX 30 (36PK/BX)"

## (undated) DEVICE — GLOVE BIOGEL PI ORTHO SZ 7 PF LF (40PR/BX)